# Patient Record
Sex: MALE | Race: WHITE | NOT HISPANIC OR LATINO | Employment: OTHER | URBAN - METROPOLITAN AREA
[De-identification: names, ages, dates, MRNs, and addresses within clinical notes are randomized per-mention and may not be internally consistent; named-entity substitution may affect disease eponyms.]

---

## 2024-01-28 ENCOUNTER — HOSPITAL ENCOUNTER (OUTPATIENT)
Facility: HOSPITAL | Age: 74
Discharge: HOME | End: 2024-01-29
Attending: EMERGENCY MEDICINE | Admitting: INTERNAL MEDICINE
Payer: MEDICARE

## 2024-01-28 ENCOUNTER — APPOINTMENT (OUTPATIENT)
Dept: CT IMAGING | Facility: HOSPITAL | Age: 74
End: 2024-01-28
Payer: MEDICARE

## 2024-01-28 ENCOUNTER — APPOINTMENT (OUTPATIENT)
Dept: GENERAL RADIOLOGY | Facility: HOSPITAL | Age: 74
End: 2024-01-28
Payer: MEDICARE

## 2024-01-28 DIAGNOSIS — J18.9 PNEUMONIA DUE TO INFECTIOUS ORGANISM, UNSPECIFIED LATERALITY, UNSPECIFIED PART OF LUNG: ICD-10-CM

## 2024-01-28 DIAGNOSIS — R07.9 CHEST PAIN, UNSPECIFIED TYPE: Primary | ICD-10-CM

## 2024-01-28 DIAGNOSIS — I21.4 NSTEMI, INITIAL EPISODE OF CARE: ICD-10-CM

## 2024-01-28 LAB
ALBUMIN SERPL-MCNC: 4.2 G/DL (ref 3.5–5.2)
ALBUMIN/GLOB SERPL: 1.4 G/DL
ALP SERPL-CCNC: 91 U/L (ref 39–117)
ALT SERPL W P-5'-P-CCNC: 79 U/L (ref 1–41)
ANION GAP SERPL CALCULATED.3IONS-SCNC: 11 MMOL/L (ref 5–15)
APTT PPP: 24.8 SECONDS (ref 61–76.5)
AST SERPL-CCNC: 133 U/L (ref 1–40)
BASOPHILS # BLD AUTO: 0.1 10*3/MM3 (ref 0–0.2)
BASOPHILS NFR BLD AUTO: 0.6 % (ref 0–1.5)
BILIRUB SERPL-MCNC: 0.6 MG/DL (ref 0–1.2)
BUN SERPL-MCNC: 26 MG/DL (ref 8–23)
BUN/CREAT SERPL: 22.8 (ref 7–25)
CALCIUM SPEC-SCNC: 9.4 MG/DL (ref 8.6–10.5)
CHLORIDE SERPL-SCNC: 102 MMOL/L (ref 98–107)
CO2 SERPL-SCNC: 27 MMOL/L (ref 22–29)
CREAT SERPL-MCNC: 1.14 MG/DL (ref 0.76–1.27)
DEPRECATED RDW RBC AUTO: 43.3 FL (ref 37–54)
EGFRCR SERPLBLD CKD-EPI 2021: 67.5 ML/MIN/1.73
EOSINOPHIL # BLD AUTO: 0.8 10*3/MM3 (ref 0–0.4)
EOSINOPHIL NFR BLD AUTO: 8.1 % (ref 0.3–6.2)
ERYTHROCYTE [DISTWIDTH] IN BLOOD BY AUTOMATED COUNT: 12.8 % (ref 12.3–15.4)
GEN 5 2HR TROPONIN T REFLEX: 53 NG/L
GLOBULIN UR ELPH-MCNC: 3 GM/DL
GLUCOSE SERPL-MCNC: 141 MG/DL (ref 65–99)
HCT VFR BLD AUTO: 47.2 % (ref 37.5–51)
HGB BLD-MCNC: 16.1 G/DL (ref 13–17.7)
HOLD SPECIMEN: NORMAL
HOLD SPECIMEN: NORMAL
INR PPP: 0.98 (ref 0.93–1.1)
LIPASE SERPL-CCNC: 46 U/L (ref 13–60)
LYMPHOCYTES # BLD AUTO: 1.8 10*3/MM3 (ref 0.7–3.1)
LYMPHOCYTES NFR BLD AUTO: 17.9 % (ref 19.6–45.3)
MCH RBC QN AUTO: 31.6 PG (ref 26.6–33)
MCHC RBC AUTO-ENTMCNC: 34.1 G/DL (ref 31.5–35.7)
MCV RBC AUTO: 92.7 FL (ref 79–97)
MONOCYTES # BLD AUTO: 0.6 10*3/MM3 (ref 0.1–0.9)
MONOCYTES NFR BLD AUTO: 5.9 % (ref 5–12)
NEUTROPHILS NFR BLD AUTO: 6.7 10*3/MM3 (ref 1.7–7)
NEUTROPHILS NFR BLD AUTO: 67.5 % (ref 42.7–76)
NRBC BLD AUTO-RTO: 0 /100 WBC (ref 0–0.2)
PLATELET # BLD AUTO: 173 10*3/MM3 (ref 140–450)
PMV BLD AUTO: 8.3 FL (ref 6–12)
POTASSIUM SERPL-SCNC: 4 MMOL/L (ref 3.5–5.2)
PROT SERPL-MCNC: 7.2 G/DL (ref 6–8.5)
PROTHROMBIN TIME: 10.7 SECONDS (ref 9.6–11.7)
RBC # BLD AUTO: 5.1 10*6/MM3 (ref 4.14–5.8)
SODIUM SERPL-SCNC: 140 MMOL/L (ref 136–145)
TROPONIN T DELTA: -6 NG/L
TROPONIN T SERPL HS-MCNC: 59 NG/L
WBC NRBC COR # BLD AUTO: 9.9 10*3/MM3 (ref 3.4–10.8)
WHOLE BLOOD HOLD COAG: NORMAL
WHOLE BLOOD HOLD SPECIMEN: NORMAL

## 2024-01-28 PROCEDURE — 93005 ELECTROCARDIOGRAM TRACING: CPT

## 2024-01-28 PROCEDURE — 83690 ASSAY OF LIPASE: CPT | Performed by: EMERGENCY MEDICINE

## 2024-01-28 PROCEDURE — 25510000001 IOPAMIDOL PER 1 ML: Performed by: EMERGENCY MEDICINE

## 2024-01-28 PROCEDURE — 36415 COLL VENOUS BLD VENIPUNCTURE: CPT

## 2024-01-28 PROCEDURE — 85730 THROMBOPLASTIN TIME PARTIAL: CPT | Performed by: EMERGENCY MEDICINE

## 2024-01-28 PROCEDURE — 71275 CT ANGIOGRAPHY CHEST: CPT

## 2024-01-28 PROCEDURE — 87040 BLOOD CULTURE FOR BACTERIA: CPT | Performed by: EMERGENCY MEDICINE

## 2024-01-28 PROCEDURE — 99285 EMERGENCY DEPT VISIT HI MDM: CPT

## 2024-01-28 PROCEDURE — 85025 COMPLETE CBC W/AUTO DIFF WBC: CPT | Performed by: EMERGENCY MEDICINE

## 2024-01-28 PROCEDURE — 80053 COMPREHEN METABOLIC PANEL: CPT | Performed by: EMERGENCY MEDICINE

## 2024-01-28 PROCEDURE — 84484 ASSAY OF TROPONIN QUANT: CPT | Performed by: EMERGENCY MEDICINE

## 2024-01-28 PROCEDURE — 93005 ELECTROCARDIOGRAM TRACING: CPT | Performed by: EMERGENCY MEDICINE

## 2024-01-28 PROCEDURE — 71045 X-RAY EXAM CHEST 1 VIEW: CPT

## 2024-01-28 PROCEDURE — 85610 PROTHROMBIN TIME: CPT | Performed by: EMERGENCY MEDICINE

## 2024-01-28 RX ORDER — NITROGLYCERIN 20 MG/100ML
10-50 INJECTION INTRAVENOUS
Status: DISCONTINUED | OUTPATIENT
Start: 2024-01-28 | End: 2024-01-30 | Stop reason: HOSPADM

## 2024-01-28 RX ORDER — MELOXICAM 7.5 MG/1
7.5 TABLET ORAL DAILY
COMMUNITY
Start: 2024-01-18 | End: 2024-01-29 | Stop reason: HOSPADM

## 2024-01-28 RX ORDER — SODIUM CHLORIDE 0.9 % (FLUSH) 0.9 %
10 SYRINGE (ML) INJECTION AS NEEDED
Status: DISCONTINUED | OUTPATIENT
Start: 2024-01-28 | End: 2024-01-30 | Stop reason: HOSPADM

## 2024-01-28 RX ORDER — TAMSULOSIN HYDROCHLORIDE 0.4 MG/1
1 CAPSULE ORAL NIGHTLY
COMMUNITY
Start: 2024-01-18

## 2024-01-28 RX ORDER — ASPIRIN 81 MG/1
81 TABLET ORAL NIGHTLY
COMMUNITY

## 2024-01-28 RX ORDER — SIMVASTATIN 20 MG
20 TABLET ORAL NIGHTLY
COMMUNITY
Start: 2024-01-18

## 2024-01-28 RX ADMIN — IOPAMIDOL 100 ML: 755 INJECTION, SOLUTION INTRAVENOUS at 20:12

## 2024-01-28 RX ADMIN — NITROGLYCERIN 1 INCH: 20 OINTMENT TOPICAL at 17:44

## 2024-01-28 NOTE — ED PROVIDER NOTES
Subjective   History of Present Illness  Chief complaint: Patient is 73-year-old sometime around 4:30 PM he was sitting in chair and had sudden onset chest pain.  His lower sternal chest straight through to his back.  It was severe.  He did take a Pepcid and over time has had some gradual improvement but there is still pain there.  He is not sure if it is heartburn or nausea is never experienced anything like this before.  He has had no history of a workup on his heart or lungs.  No stress test.  He does not take blood pressure medicine.  Takes a baby aspirin a day.  He has had no fever.    Context:    Duration:    Timing: Sudden acute onset    Severity: Severe    Associated Symptoms:        PCP:  LMP:      Review of Systems   Constitutional: Negative.    Respiratory: Negative.     Cardiovascular:  Positive for chest pain.   Gastrointestinal:  Negative for abdominal pain.   Genitourinary: Negative.    Musculoskeletal:  Positive for back pain.   Neurological: Negative.        No past medical history on file.    No Known Allergies    No past surgical history on file.    No family history on file.    Social History     Socioeconomic History    Marital status:            Objective   Physical Exam  Vitals and nursing note reviewed.   Constitutional:       Appearance: He is well-developed.   HENT:      Head: Normocephalic and atraumatic.   Cardiovascular:      Rate and Rhythm: Normal rate and regular rhythm.      Heart sounds: Normal heart sounds.   Pulmonary:      Effort: Pulmonary effort is normal.      Breath sounds: Normal breath sounds.   Abdominal:      Tenderness: There is no abdominal tenderness.   Skin:     General: Skin is warm and dry.   Neurological:      General: No focal deficit present.      Mental Status: He is alert and oriented to person, place, and time.   Psychiatric:         Mood and Affect: Mood normal.         Behavior: Behavior normal.         Procedures           ED Course                                  Results for orders placed or performed during the hospital encounter of 01/28/24   Comprehensive Metabolic Panel    Specimen: Blood   Result Value Ref Range    Glucose 141 (H) 65 - 99 mg/dL    BUN 26 (H) 8 - 23 mg/dL    Creatinine 1.14 0.76 - 1.27 mg/dL    Sodium 140 136 - 145 mmol/L    Potassium 4.0 3.5 - 5.2 mmol/L    Chloride 102 98 - 107 mmol/L    CO2 27.0 22.0 - 29.0 mmol/L    Calcium 9.4 8.6 - 10.5 mg/dL    Total Protein 7.2 6.0 - 8.5 g/dL    Albumin 4.2 3.5 - 5.2 g/dL    ALT (SGPT) 79 (H) 1 - 41 U/L    AST (SGOT) 133 (H) 1 - 40 U/L    Alkaline Phosphatase 91 39 - 117 U/L    Total Bilirubin 0.6 0.0 - 1.2 mg/dL    Globulin 3.0 gm/dL    A/G Ratio 1.4 g/dL    BUN/Creatinine Ratio 22.8 7.0 - 25.0    Anion Gap 11.0 5.0 - 15.0 mmol/L    eGFR 67.5 >60.0 mL/min/1.73   Protime-INR    Specimen: Blood   Result Value Ref Range    Protime 10.7 9.6 - 11.7 Seconds    INR 0.98 0.93 - 1.10   aPTT    Specimen: Blood   Result Value Ref Range    PTT 24.8 (L) 61.0 - 76.5 seconds   Lipase    Specimen: Blood   Result Value Ref Range    Lipase 46 13 - 60 U/L   High Sensitivity Troponin T    Specimen: Blood   Result Value Ref Range    HS Troponin T 59 (C) <22 ng/L   CBC Auto Differential    Specimen: Blood   Result Value Ref Range    WBC 9.90 3.40 - 10.80 10*3/mm3    RBC 5.10 4.14 - 5.80 10*6/mm3    Hemoglobin 16.1 13.0 - 17.7 g/dL    Hematocrit 47.2 37.5 - 51.0 %    MCV 92.7 79.0 - 97.0 fL    MCH 31.6 26.6 - 33.0 pg    MCHC 34.1 31.5 - 35.7 g/dL    RDW 12.8 12.3 - 15.4 %    RDW-SD 43.3 37.0 - 54.0 fl    MPV 8.3 6.0 - 12.0 fL    Platelets 173 140 - 450 10*3/mm3    Neutrophil % 67.5 42.7 - 76.0 %    Lymphocyte % 17.9 (L) 19.6 - 45.3 %    Monocyte % 5.9 5.0 - 12.0 %    Eosinophil % 8.1 (H) 0.3 - 6.2 %    Basophil % 0.6 0.0 - 1.5 %    Neutrophils, Absolute 6.70 1.70 - 7.00 10*3/mm3    Lymphocytes, Absolute 1.80 0.70 - 3.10 10*3/mm3    Monocytes, Absolute 0.60 0.10 - 0.90 10*3/mm3    Eosinophils, Absolute 0.80 (H) 0.00 -  0.40 10*3/mm3    Basophils, Absolute 0.10 0.00 - 0.20 10*3/mm3    nRBC 0.0 0.0 - 0.2 /100 WBC   High Sensitivity Troponin T 2Hr    Specimen: Hand, Left; Blood   Result Value Ref Range    HS Troponin T 53 (C) <22 ng/L    Troponin T Delta -6 (L) >=-4 - <+4 ng/L   ECG 12 Lead Chest Pain   Result Value Ref Range    QT Interval 383 ms    QTC Interval 455 ms   Green Top (Gel)   Result Value Ref Range    Extra Tube Done    Lavender Top   Result Value Ref Range    Extra Tube hold for add-on    Gold Top - SST   Result Value Ref Range    Extra Tube Hold for add-ons.    Light Blue Top   Result Value Ref Range    Extra Tube Hold for add-ons.        CT Angiogram Chest    Result Date: 1/28/2024  Impression: 1.No evidence of pulmonary embolism. 2.Patchy airspace disease within the superior segment of the right lower lobe consistent with pneumonia. Mild right hilar adenopathy is present, likely reactive. Follow-up to resolution recommended. 3.Tortuous prominent aorta with no evidence of focal aneurysmal dilatation. Correlate for history of valvular disease and/or hypertension 4.. Ancillary findings as described above. Electronically Signed: Catrachita Mast MD  1/28/2024 8:17 PM EST  Workstation ID: [id number]    XR Chest 1 View    Result Date: 1/28/2024  Impression: No acute cardiopulmonary abnormality. Electronically Signed: Emigdio Smith MD  1/28/2024 5:52 PM EST  Workstation ID: [id number]               Medical Decision Making  Patient was seen and evaluated for the above problem    Differential diagnosis includes but is not limited to ACS, PE, aneurysm, dissection, pneumothorax    Patient does have initial EKG interpreted by myself as below.  Troponin was 59 but repeat was down to 53.  He had sudden onset discomfort.  He does show on CT scan no signs of aneurysm.  No signs of PE.  No emergent findings but there is evidence of pneumonia.  Did provide aspirin and nitroglycerin here in the emergency department.  Patient is stable this  point time.  No new pain or worsening discomfort.  He was provided antibiotics.  I spoke withYolanda on-call for the hospitalist who agrees to admit the patient for further cardiac consultation.  Patient and family verbalized understanding of admission    Problems Addressed:  Chest pain, unspecified type: complicated acute illness or injury  Pneumonia due to infectious organism, unspecified laterality, unspecified part of lung: complicated acute illness or injury    Amount and/or Complexity of Data Reviewed  Labs: ordered. Decision-making details documented in ED Course.     Details: Labs reviewed by myself  Radiology: ordered and independent interpretation performed.     Details: CT chest reviewed by myself.  No dissection.  No aneurysm.  No emergent finding.  There are infiltrates pneumonia  ECG/medicine tests: ordered and independent interpretation performed.     Details: EKG interpreted by myself sinus rhythm rate of 86.  No ST elevation or depression noted.    Risk  Prescription drug management.  Drug therapy requiring intensive monitoring for toxicity.  Decision regarding hospitalization.        Final diagnoses:   None     Chest pain  Pneumonia  ED Disposition  ED Disposition       None            No follow-up provider specified.       Medication List      No changes were made to your prescriptions during this visit.            Mendez Gandhi DO  01/28/24 2149

## 2024-01-29 ENCOUNTER — APPOINTMENT (OUTPATIENT)
Dept: NUCLEAR MEDICINE | Facility: HOSPITAL | Age: 74
End: 2024-01-29
Payer: MEDICARE

## 2024-01-29 ENCOUNTER — APPOINTMENT (OUTPATIENT)
Dept: CARDIOLOGY | Facility: HOSPITAL | Age: 74
End: 2024-01-29
Payer: MEDICARE

## 2024-01-29 VITALS
DIASTOLIC BLOOD PRESSURE: 74 MMHG | HEART RATE: 52 BPM | RESPIRATION RATE: 18 BRPM | HEIGHT: 71 IN | OXYGEN SATURATION: 94 % | BODY MASS INDEX: 27.56 KG/M2 | TEMPERATURE: 97.5 F | SYSTOLIC BLOOD PRESSURE: 135 MMHG | WEIGHT: 196.87 LBS

## 2024-01-29 PROBLEM — J18.9 PNEUMONIA DUE TO INFECTIOUS ORGANISM: Status: ACTIVE | Noted: 2024-01-29

## 2024-01-29 PROBLEM — J18.9 PNEUMONIA: Status: ACTIVE | Noted: 2024-01-29

## 2024-01-29 PROBLEM — I21.4 NSTEMI, INITIAL EPISODE OF CARE: Status: ACTIVE | Noted: 2024-01-28

## 2024-01-29 PROBLEM — R07.9 CHEST PAIN: Status: ACTIVE | Noted: 2024-01-29

## 2024-01-29 LAB
ANION GAP SERPL CALCULATED.3IONS-SCNC: 11 MMOL/L (ref 5–15)
BASOPHILS # BLD AUTO: 0.1 10*3/MM3 (ref 0–0.2)
BASOPHILS NFR BLD AUTO: 1.2 % (ref 0–1.5)
BH CV ECHO MEAS - AO MAX PG: 7.2 MMHG
BH CV ECHO MEAS - AO MEAN PG: 4.3 MMHG
BH CV ECHO MEAS - AO ROOT DIAM: 3.6 CM
BH CV ECHO MEAS - AO V2 MAX: 134.6 CM/SEC
BH CV ECHO MEAS - AO V2 VTI: 25.7 CM
BH CV ECHO MEAS - AVA(I,D): 2.7 CM2
BH CV ECHO MEAS - EDV(CUBED): 85.4 ML
BH CV ECHO MEAS - EDV(MOD-SP2): 69 ML
BH CV ECHO MEAS - EDV(MOD-SP4): 76.5 ML
BH CV ECHO MEAS - EF(MOD-SP2): 61.1 %
BH CV ECHO MEAS - EF(MOD-SP4): 62.4 %
BH CV ECHO MEAS - ESV(CUBED): 14.7 ML
BH CV ECHO MEAS - ESV(MOD-SP2): 26.8 ML
BH CV ECHO MEAS - ESV(MOD-SP4): 28.8 ML
BH CV ECHO MEAS - FS: 44.4 %
BH CV ECHO MEAS - IVS/LVPW: 1.2 CM
BH CV ECHO MEAS - IVSD: 1.09 CM
BH CV ECHO MEAS - LV DIASTOLIC VOL/BSA (35-75): 36.6 CM2
BH CV ECHO MEAS - LV MASS(C)D: 148.4 GRAMS
BH CV ECHO MEAS - LV MAX PG: 5 MMHG
BH CV ECHO MEAS - LV MEAN PG: 2.6 MMHG
BH CV ECHO MEAS - LV SYSTOLIC VOL/BSA (12-30): 13.8 CM2
BH CV ECHO MEAS - LV V1 MAX: 112.3 CM/SEC
BH CV ECHO MEAS - LV V1 VTI: 19.3 CM
BH CV ECHO MEAS - LVIDD: 4.4 CM
BH CV ECHO MEAS - LVIDS: 2.45 CM
BH CV ECHO MEAS - LVOT AREA: 3.6 CM2
BH CV ECHO MEAS - LVOT DIAM: 2.15 CM
BH CV ECHO MEAS - LVPWD: 0.91 CM
BH CV ECHO MEAS - MV A MAX VEL: 64.9 CM/SEC
BH CV ECHO MEAS - MV DEC SLOPE: 511.7 CM/SEC2
BH CV ECHO MEAS - MV DEC TIME: 0.14 SEC
BH CV ECHO MEAS - MV E MAX VEL: 69.4 CM/SEC
BH CV ECHO MEAS - MV E/A: 1.07
BH CV ECHO MEAS - MV MAX PG: 3.4 MMHG
BH CV ECHO MEAS - MV MEAN PG: 1.75 MMHG
BH CV ECHO MEAS - MV V2 VTI: 21.9 CM
BH CV ECHO MEAS - MVA(VTI): 3.2 CM2
BH CV ECHO MEAS - PA V2 MAX: 79.8 CM/SEC
BH CV ECHO MEAS - PULM A REVS DUR: 0.09 SEC
BH CV ECHO MEAS - PULM A REVS VEL: 28.8 CM/SEC
BH CV ECHO MEAS - PULM DIAS VEL: 37.8 CM/SEC
BH CV ECHO MEAS - PULM S/D: 1.72
BH CV ECHO MEAS - PULM SYS VEL: 65.2 CM/SEC
BH CV ECHO MEAS - RV MAX PG: 1.58 MMHG
BH CV ECHO MEAS - RV V1 MAX: 62.9 CM/SEC
BH CV ECHO MEAS - RV V1 VTI: 12.6 CM
BH CV ECHO MEAS - RVDD: 3.4 CM
BH CV ECHO MEAS - SI(MOD-SP2): 20.2 ML/M2
BH CV ECHO MEAS - SI(MOD-SP4): 22.8 ML/M2
BH CV ECHO MEAS - SV(LVOT): 70.3 ML
BH CV ECHO MEAS - SV(MOD-SP2): 42.2 ML
BH CV ECHO MEAS - SV(MOD-SP4): 47.7 ML
BH CV ECHO MEAS - TR MAX PG: 23.9 MMHG
BH CV ECHO MEAS - TR MAX VEL: 240.3 CM/SEC
BH CV REST NUCLEAR ISOTOPE DOSE: 10.8 MCI
BH CV STRESS BP STAGE 1: NORMAL
BH CV STRESS BP STAGE 2: NORMAL
BH CV STRESS COMMENTS STAGE 1: NORMAL
BH CV STRESS DOSE REGADENOSON STAGE 1: 0.4
BH CV STRESS DURATION MIN STAGE 1: 0
BH CV STRESS DURATION SEC STAGE 1: 10
BH CV STRESS DURATION SEC STAGE 2: 0
BH CV STRESS HR STAGE 1: 105
BH CV STRESS HR STAGE 2: 114
BH CV STRESS NUCLEAR ISOTOPE DOSE: 32.8 MCI
BH CV STRESS PROTOCOL 1: NORMAL
BH CV STRESS RECOVERY BP: NORMAL MMHG
BH CV STRESS RECOVERY HR: 106 BPM
BH CV STRESS STAGE 1: 1
BH CV STRESS STAGE 2: 2
BUN SERPL-MCNC: 21 MG/DL (ref 8–23)
BUN/CREAT SERPL: 20.8 (ref 7–25)
CALCIUM SPEC-SCNC: 8.7 MG/DL (ref 8.6–10.5)
CHLORIDE SERPL-SCNC: 105 MMOL/L (ref 98–107)
CO2 SERPL-SCNC: 23 MMOL/L (ref 22–29)
CREAT SERPL-MCNC: 1.01 MG/DL (ref 0.76–1.27)
DEPRECATED RDW RBC AUTO: 43.8 FL (ref 37–54)
EGFRCR SERPLBLD CKD-EPI 2021: 78 ML/MIN/1.73
EOSINOPHIL # BLD AUTO: 0.4 10*3/MM3 (ref 0–0.4)
EOSINOPHIL NFR BLD AUTO: 4.4 % (ref 0.3–6.2)
ERYTHROCYTE [DISTWIDTH] IN BLOOD BY AUTOMATED COUNT: 13 % (ref 12.3–15.4)
GLUCOSE SERPL-MCNC: 102 MG/DL (ref 65–99)
HCT VFR BLD AUTO: 42.6 % (ref 37.5–51)
HGB BLD-MCNC: 14.6 G/DL (ref 13–17.7)
LV EF NUC BP: 56 %
LYMPHOCYTES # BLD AUTO: 2 10*3/MM3 (ref 0.7–3.1)
LYMPHOCYTES NFR BLD AUTO: 22 % (ref 19.6–45.3)
MAXIMAL PREDICTED HEART RATE: 146 BPM
MCH RBC QN AUTO: 31.5 PG (ref 26.6–33)
MCHC RBC AUTO-ENTMCNC: 34.3 G/DL (ref 31.5–35.7)
MCV RBC AUTO: 91.7 FL (ref 79–97)
MONOCYTES # BLD AUTO: 0.7 10*3/MM3 (ref 0.1–0.9)
MONOCYTES NFR BLD AUTO: 7.8 % (ref 5–12)
NEUTROPHILS NFR BLD AUTO: 6 10*3/MM3 (ref 1.7–7)
NEUTROPHILS NFR BLD AUTO: 64.6 % (ref 42.7–76)
NRBC BLD AUTO-RTO: 0.1 /100 WBC (ref 0–0.2)
PERCENT MAX PREDICTED HR: 78.08 %
PLATELET # BLD AUTO: 173 10*3/MM3 (ref 140–450)
PMV BLD AUTO: 8.4 FL (ref 6–12)
POTASSIUM SERPL-SCNC: 3.9 MMOL/L (ref 3.5–5.2)
PROCALCITONIN SERPL-MCNC: 0.27 NG/ML (ref 0–0.25)
QT INTERVAL: 383 MS
QTC INTERVAL: 455 MS
RBC # BLD AUTO: 4.64 10*6/MM3 (ref 4.14–5.8)
SODIUM SERPL-SCNC: 139 MMOL/L (ref 136–145)
STRESS BASELINE BP: NORMAL MMHG
STRESS BASELINE HR: 90 BPM
STRESS PERCENT HR: 92 %
STRESS POST PEAK BP: NORMAL MMHG
STRESS POST PEAK HR: 114 BPM
STRESS TARGET HR: 124 BPM
TROPONIN T SERPL HS-MCNC: 66 NG/L
WBC NRBC COR # BLD AUTO: 9.3 10*3/MM3 (ref 3.4–10.8)

## 2024-01-29 PROCEDURE — 84484 ASSAY OF TROPONIN QUANT: CPT

## 2024-01-29 PROCEDURE — C1769 GUIDE WIRE: HCPCS | Performed by: INTERNAL MEDICINE

## 2024-01-29 PROCEDURE — 93017 CV STRESS TEST TRACING ONLY: CPT

## 2024-01-29 PROCEDURE — A9502 TC99M TETROFOSMIN: HCPCS | Performed by: INTERNAL MEDICINE

## 2024-01-29 PROCEDURE — G0378 HOSPITAL OBSERVATION PER HR: HCPCS

## 2024-01-29 PROCEDURE — C1894 INTRO/SHEATH, NON-LASER: HCPCS | Performed by: INTERNAL MEDICINE

## 2024-01-29 PROCEDURE — 25010000002 CEFTRIAXONE PER 250 MG: Performed by: EMERGENCY MEDICINE

## 2024-01-29 PROCEDURE — 25010000002 MIDAZOLAM PER 1 MG: Performed by: INTERNAL MEDICINE

## 2024-01-29 PROCEDURE — 99204 OFFICE O/P NEW MOD 45 MIN: CPT | Performed by: INTERNAL MEDICINE

## 2024-01-29 PROCEDURE — 25010000002 FENTANYL CITRATE (PF) 100 MCG/2ML SOLUTION: Performed by: INTERNAL MEDICINE

## 2024-01-29 PROCEDURE — 78452 HT MUSCLE IMAGE SPECT MULT: CPT

## 2024-01-29 PROCEDURE — 93018 CV STRESS TEST I&R ONLY: CPT | Performed by: INTERNAL MEDICINE

## 2024-01-29 PROCEDURE — 25510000001 IOPAMIDOL PER 1 ML: Performed by: INTERNAL MEDICINE

## 2024-01-29 PROCEDURE — 93306 TTE W/DOPPLER COMPLETE: CPT

## 2024-01-29 PROCEDURE — 85025 COMPLETE CBC W/AUTO DIFF WBC: CPT

## 2024-01-29 PROCEDURE — 25010000002 AZITHROMYCIN PER 500 MG: Performed by: EMERGENCY MEDICINE

## 2024-01-29 PROCEDURE — C1760 CLOSURE DEV, VASC: HCPCS | Performed by: INTERNAL MEDICINE

## 2024-01-29 PROCEDURE — 25810000003 SODIUM CHLORIDE 0.9 % SOLUTION: Performed by: INTERNAL MEDICINE

## 2024-01-29 PROCEDURE — 84145 PROCALCITONIN (PCT): CPT | Performed by: EMERGENCY MEDICINE

## 2024-01-29 PROCEDURE — 93356 MYOCRD STRAIN IMG SPCKL TRCK: CPT

## 2024-01-29 PROCEDURE — 80048 BASIC METABOLIC PNL TOTAL CA: CPT

## 2024-01-29 PROCEDURE — 25810000003 SODIUM CHLORIDE 0.9 % SOLUTION 250 ML FLEX CONT: Performed by: EMERGENCY MEDICINE

## 2024-01-29 PROCEDURE — 25010000002 REGADENOSON 0.4 MG/5ML SOLUTION: Performed by: INTERNAL MEDICINE

## 2024-01-29 PROCEDURE — 0 TECHNETIUM TETROFOSMIN KIT: Performed by: INTERNAL MEDICINE

## 2024-01-29 PROCEDURE — 93454 CORONARY ARTERY ANGIO S&I: CPT | Performed by: INTERNAL MEDICINE

## 2024-01-29 PROCEDURE — 78452 HT MUSCLE IMAGE SPECT MULT: CPT | Performed by: INTERNAL MEDICINE

## 2024-01-29 RX ORDER — SODIUM CHLORIDE 9 MG/ML
40 INJECTION, SOLUTION INTRAVENOUS AS NEEDED
Status: DISCONTINUED | OUTPATIENT
Start: 2024-01-29 | End: 2024-01-30 | Stop reason: HOSPADM

## 2024-01-29 RX ORDER — LIDOCAINE HYDROCHLORIDE 20 MG/ML
INJECTION, SOLUTION INFILTRATION; PERINEURAL
Status: DISCONTINUED | OUTPATIENT
Start: 2024-01-29 | End: 2024-01-29 | Stop reason: HOSPADM

## 2024-01-29 RX ORDER — SODIUM CHLORIDE 0.9 % (FLUSH) 0.9 %
10 SYRINGE (ML) INJECTION EVERY 12 HOURS SCHEDULED
Status: DISCONTINUED | OUTPATIENT
Start: 2024-01-29 | End: 2024-01-30 | Stop reason: HOSPADM

## 2024-01-29 RX ORDER — SODIUM CHLORIDE 9 MG/ML
75 INJECTION, SOLUTION INTRAVENOUS CONTINUOUS
Status: DISCONTINUED | OUTPATIENT
Start: 2024-01-29 | End: 2024-01-30 | Stop reason: HOSPADM

## 2024-01-29 RX ORDER — BISACODYL 5 MG/1
5 TABLET, DELAYED RELEASE ORAL DAILY PRN
Status: DISCONTINUED | OUTPATIENT
Start: 2024-01-29 | End: 2024-01-30 | Stop reason: HOSPADM

## 2024-01-29 RX ORDER — HYDRALAZINE HYDROCHLORIDE 20 MG/ML
10 INJECTION INTRAMUSCULAR; INTRAVENOUS EVERY 6 HOURS PRN
Status: DISCONTINUED | OUTPATIENT
Start: 2024-01-29 | End: 2024-01-30 | Stop reason: HOSPADM

## 2024-01-29 RX ORDER — SODIUM CHLORIDE 0.9 % (FLUSH) 0.9 %
10 SYRINGE (ML) INJECTION AS NEEDED
Status: DISCONTINUED | OUTPATIENT
Start: 2024-01-29 | End: 2024-01-30 | Stop reason: HOSPADM

## 2024-01-29 RX ORDER — PANTOPRAZOLE SODIUM 40 MG/1
40 TABLET, DELAYED RELEASE ORAL DAILY
Qty: 30 TABLET | Refills: 1 | Status: SHIPPED | OUTPATIENT
Start: 2024-01-29

## 2024-01-29 RX ORDER — NITROGLYCERIN 0.4 MG/1
0.4 TABLET SUBLINGUAL
Status: DISCONTINUED | OUTPATIENT
Start: 2024-01-29 | End: 2024-01-30 | Stop reason: HOSPADM

## 2024-01-29 RX ORDER — ACETAMINOPHEN 325 MG/1
650 TABLET ORAL EVERY 4 HOURS PRN
Start: 2024-01-29

## 2024-01-29 RX ORDER — METOPROLOL SUCCINATE 25 MG/1
25 TABLET, EXTENDED RELEASE ORAL
Qty: 30 TABLET | Refills: 1 | Status: SHIPPED | OUTPATIENT
Start: 2024-01-30

## 2024-01-29 RX ORDER — BISACODYL 10 MG
10 SUPPOSITORY, RECTAL RECTAL DAILY PRN
Status: DISCONTINUED | OUTPATIENT
Start: 2024-01-29 | End: 2024-01-30 | Stop reason: HOSPADM

## 2024-01-29 RX ORDER — ONDANSETRON 2 MG/ML
4 INJECTION INTRAMUSCULAR; INTRAVENOUS EVERY 6 HOURS PRN
Status: DISCONTINUED | OUTPATIENT
Start: 2024-01-29 | End: 2024-01-30 | Stop reason: HOSPADM

## 2024-01-29 RX ORDER — ACETAMINOPHEN 325 MG/1
650 TABLET ORAL EVERY 4 HOURS PRN
Status: DISCONTINUED | OUTPATIENT
Start: 2024-01-29 | End: 2024-01-30 | Stop reason: HOSPADM

## 2024-01-29 RX ORDER — TAMSULOSIN HYDROCHLORIDE 0.4 MG/1
0.4 CAPSULE ORAL NIGHTLY
Status: DISCONTINUED | OUTPATIENT
Start: 2024-01-29 | End: 2024-01-30 | Stop reason: HOSPADM

## 2024-01-29 RX ORDER — REGADENOSON 0.08 MG/ML
0.4 INJECTION, SOLUTION INTRAVENOUS
Status: COMPLETED | OUTPATIENT
Start: 2024-01-29 | End: 2024-01-29

## 2024-01-29 RX ORDER — SODIUM CHLORIDE 9 MG/ML
250 INJECTION, SOLUTION INTRAVENOUS ONCE AS NEEDED
Status: DISCONTINUED | OUTPATIENT
Start: 2024-01-29 | End: 2024-01-30 | Stop reason: HOSPADM

## 2024-01-29 RX ORDER — FENTANYL CITRATE 50 UG/ML
INJECTION, SOLUTION INTRAMUSCULAR; INTRAVENOUS
Status: DISCONTINUED | OUTPATIENT
Start: 2024-01-29 | End: 2024-01-29 | Stop reason: HOSPADM

## 2024-01-29 RX ORDER — SODIUM CHLORIDE 9 MG/ML
INJECTION, SOLUTION INTRAVENOUS
Status: COMPLETED | OUTPATIENT
Start: 2024-01-29 | End: 2024-01-29

## 2024-01-29 RX ORDER — FAMOTIDINE 20 MG/1
40 TABLET, FILM COATED ORAL DAILY
Status: DISCONTINUED | OUTPATIENT
Start: 2024-01-29 | End: 2024-01-30 | Stop reason: HOSPADM

## 2024-01-29 RX ORDER — POLYETHYLENE GLYCOL 3350 17 G/17G
17 POWDER, FOR SOLUTION ORAL DAILY PRN
Status: DISCONTINUED | OUTPATIENT
Start: 2024-01-29 | End: 2024-01-30 | Stop reason: HOSPADM

## 2024-01-29 RX ORDER — AMOXICILLIN 250 MG
2 CAPSULE ORAL 2 TIMES DAILY
Status: DISCONTINUED | OUTPATIENT
Start: 2024-01-29 | End: 2024-01-30 | Stop reason: HOSPADM

## 2024-01-29 RX ORDER — METOPROLOL SUCCINATE 25 MG/1
25 TABLET, EXTENDED RELEASE ORAL
Status: DISCONTINUED | OUTPATIENT
Start: 2024-01-29 | End: 2024-01-30 | Stop reason: HOSPADM

## 2024-01-29 RX ORDER — MIDAZOLAM HYDROCHLORIDE 1 MG/ML
INJECTION INTRAMUSCULAR; INTRAVENOUS
Status: DISCONTINUED | OUTPATIENT
Start: 2024-01-29 | End: 2024-01-29 | Stop reason: HOSPADM

## 2024-01-29 RX ADMIN — AZITHROMYCIN MONOHYDRATE 500 MG: 500 INJECTION, POWDER, LYOPHILIZED, FOR SOLUTION INTRAVENOUS at 02:24

## 2024-01-29 RX ADMIN — Medication 10 ML: at 08:49

## 2024-01-29 RX ADMIN — METOPROLOL SUCCINATE 25 MG: 25 TABLET, EXTENDED RELEASE ORAL at 18:01

## 2024-01-29 RX ADMIN — TETROFOSMIN 1 DOSE: 1.38 INJECTION, POWDER, LYOPHILIZED, FOR SOLUTION INTRAVENOUS at 11:52

## 2024-01-29 RX ADMIN — SODIUM CHLORIDE 500 ML: 9 INJECTION, SOLUTION INTRAVENOUS at 10:38

## 2024-01-29 RX ADMIN — Medication 10 ML: at 02:26

## 2024-01-29 RX ADMIN — TETROFOSMIN 1 DOSE: 1.38 INJECTION, POWDER, LYOPHILIZED, FOR SOLUTION INTRAVENOUS at 13:01

## 2024-01-29 RX ADMIN — CEFTRIAXONE 2000 MG: 2 INJECTION, POWDER, FOR SOLUTION INTRAMUSCULAR; INTRAVENOUS at 01:04

## 2024-01-29 RX ADMIN — REGADENOSON 0.4 MG: 0.08 INJECTION, SOLUTION INTRAVENOUS at 13:01

## 2024-01-29 NOTE — CASE MANAGEMENT/SOCIAL WORK
Discharge Planning Assessment   Janes     Patient Name: Young Ames  MRN: [id number]  Today's Date: 1/29/2024    Admit Date: 1/28/2024    Plan: Anticipate routine home with spouse.   Discharge Needs Assessment       Row Name 01/29/24 1522       Living Environment    People in Home spouse    Name(s) of People in Home Spouse- Ruth    Current Living Arrangements home    Potentially Unsafe Housing Conditions none    In the past 12 months has the electric, gas, oil, or water company threatened to shut off services in your home? No    Primary Care Provided by self    Provides Primary Care For no one    Family Caregiver if Needed spouse    Quality of Family Relationships supportive;involved;helpful    Able to Return to Prior Arrangements yes       Resource/Environmental Concerns    Resource/Environmental Concerns none    Transportation Concerns none       Transportation Needs    In the past 12 months, has lack of transportation kept you from medical appointments or from getting medications? no    In the past 12 months, has lack of transportation kept you from meetings, work, or from getting things needed for daily living? No       Food Insecurity    Within the past 12 months, you worried that your food would run out before you got the money to buy more. Never true    Within the past 12 months, the food you bought just didn't last and you didn't have money to get more. Never true       Transition Planning    Patient/Family Anticipates Transition to home with family    Patient/Family Anticipated Services at Transition none    Transportation Anticipated family or friend will provide       Discharge Needs Assessment    Readmission Within the Last 30 Days no previous admission in last 30 days    Equipment Currently Used at Home none    Concerns to be Addressed denies needs/concerns at this time    Anticipated Changes Related to Illness none    Equipment Needed After Discharge none    Provided Post Acute Provider List? N/A                    Discharge Plan       Row Name 01/29/24 1523       Plan    Plan Anticipate routine home with spouse.    Patient/Family in Agreement with Plan yes    Plan Comments CM met with patient and spouse at bedside to discuss dc planning and MOON letter. PCP and pharmacy confirmed, reported that he sees PCP Abner Funes every 6 months, reported no trouble affording medications, and declined needs at this time for any DME/HH/PT services. DC Barriers: Myoview performed 1/29, results pending. Cardiology following.                        Demographic Summary       Row Name 01/29/24 1522       General Information    Admission Type observation    Arrived From emergency department    Required Notices Provided Observation Status Notice    Referral Source admission list    Reason for Consult discharge planning    Preferred Language English       Contact Information    Permission Granted to Share Info With                    Functional Status       Row Name 01/29/24 1522       Functional Status    Usual Activity Tolerance good    Current Activity Tolerance good       Functional Status, IADL    Medications independent    Meal Preparation independent    Housekeeping independent    Laundry independent    Shopping independent                Natalie Graves RN     Office Phone: [phone]  Office Cell: [phone]

## 2024-01-29 NOTE — PLAN OF CARE
Problem: Fluid Imbalance (Pneumonia)  Goal: Fluid Balance  Outcome: Ongoing, Progressing     Problem: Infection (Pneumonia)  Goal: Resolution of Infection Signs and Symptoms  Outcome: Ongoing, Progressing     Problem: Respiratory Compromise (Pneumonia)  Goal: Effective Oxygenation and Ventilation  Outcome: Ongoing, Progressing     Problem: Chest Pain  Goal: Resolution of Chest Pain Symptoms  Outcome: Ongoing, Progressing   Goal Outcome Evaluation:

## 2024-01-29 NOTE — PLAN OF CARE
Goal Outcome Evaluation:      VSS, no chest pain present

## 2024-01-29 NOTE — SIGNIFICANT NOTE
This provider went to see patient after admission and the patient stated his PCP is Abner Funes with [facility]. Spoke with bedside nurse. [facility] family medicine consult placed. Hospitalist group will cancel consult.

## 2024-01-29 NOTE — H&P
Patient Care Team:  Hao Butler MD as PCP - General    Chief complaint chest pain    Subjective     Patient is a 73 y.o. male who presented to the ER with complaints of sudden onset of lower sternal chest pain around 4:30pm while he was sitting in his vanessa at home. He reports the pain radiated straight through to his back and was severe. Reports taking pepcid with some improvement, but continued to have a persistent pain while in the ER. He denies any pain like this in the past and reports no cardiac workup that he knows of. Denies any fever, chills, nausea, vomiting, diaphoresis.   In the ER EKG sinus rhythm rate 86, troponin 59 repeat 53, CT chest negative for aneurysm or PE but did show pneumonia. He was given aspirin and nitro patch with some relief of his pain. He was started on rocephin and doxy for pneumonia.     No past cardiac workup per chart review      Onset of symptoms was 1 day    Review of Systems   Constitutional: Negative.    HENT: Negative.     Eyes: Negative.    Respiratory: Negative.     Cardiovascular:  Positive for chest pain.   Gastrointestinal: Negative.    Endocrine: Negative.    Genitourinary: Negative.    Musculoskeletal:  Positive for back pain.   Skin: Negative.    Neurological: Negative.    Psychiatric/Behavioral: Negative.            History  History reviewed. No pertinent past medical history.  History reviewed. No pertinent surgical history.  History reviewed. No pertinent family history.  Social History     Tobacco Use    Smoking status: Former     Packs/day: 0.25     Years: 15.00     Additional pack years: 0.00     Total pack years: 3.75     Types: Cigarettes     Start date: 1969     Quit date: 1999     Years since quittin.0     Passive exposure: Past    Smokeless tobacco: Never   Vaping Use    Vaping Use: Never used   Substance Use Topics    Alcohol use: Yes     Alcohol/week: 1.0 standard drink of alcohol     Types: 1 Cans of beer per week    Drug use: Never  Outpatient surveillance per PCP recommendations recommended, discussed with patient and her son       Medications Prior to Admission   Medication Sig Dispense Refill Last Dose    aspirin 81 MG EC tablet Take 1 tablet by mouth Every Night.   1/27/2024    meloxicam (MOBIC) 7.5 MG tablet Take 1 tablet by mouth Daily.   1/28/2024    simvastatin (ZOCOR) 20 MG tablet Take 1 tablet by mouth Every Night.   1/27/2024    tamsulosin (FLOMAX) 0.4 MG capsule 24 hr capsule Take 1 capsule by mouth Every Night.   1/27/2024     Allergies:  Patient has no known allergies.    Objective     Vital Signs  Temp:  [97.1 °F (36.2 °C)-97.8 °F (36.6 °C)] 97.4 °F (36.3 °C)  Heart Rate:  [80-97] 88  Resp:  [13-20] 13  BP: (100-162)/(67-91) 100/67     Physical Exam:      General Appearance:    Alert, cooperative, in no acute distress   Head:    Normocephalic, without obvious abnormality, atraumatic   Eyes:            Lids and lashes normal, conjunctivae and sclerae normal, no   icterus, no pallor, corneas clear, PERRLA   Ears:    Ears appear intact with no abnormalities noted   Throat:   No oral lesions, no thrush, oral mucosa moist   Neck:   No adenopathy, supple, trachea midline, no thyromegaly, no   carotid bruit, no JVD   Lungs:     Clear to auscultation,respirations regular, even and                  unlabored    Heart:    Regular rhythm and normal rate, normal S1 and S2, no            murmur, no gallop, no rub, no click   Chest Wall:    No abnormalities observed   Abdomen:     Normal bowel sounds, no masses, no organomegaly, soft        non-tender, non-distended, no guarding, no rebound                tenderness   Extremities:   Moves all extremities well, no edema, no cyanosis, no             redness   Pulses:   Pulses palpable and equal bilaterally   Skin:   No bleeding, bruising or rash   Lymph nodes:   No palpable adenopathy   Neurologic:   No focal deficits noted       Results Review:     Imaging Results (Last 24 Hours)       Procedure Component Value Units Date/Time    CT Angiogram Chest [[id number]] Collected: 01/28/24 2014      Updated: 01/28/24 2019    Narrative:      CT ANGIOGRAM CHEST    Date of Exam: 1/28/2024 8:11 PM EST    Indication: chest pain.    Comparison: None available.    Technique: CTA of the chest was performed after the uneventful intravenous administration of iodinated contrast. Reconstructed coronal and sagittal images were also obtained. In addition, a 3-D volume rendered image was created for interpretation.   Automated exposure control and iterative reconstruction methods were used.      Findings:    Pulmonary arteries: Adequate opacification of the pulmonary arteries. No evidence of acute pulmonary embolism.    Lungs and Pleura: Patchy airspace disease is seen within the superior segment of the right lower lobe. No additional significant airspace disease identified. Emphysematous changes are present predominately within the bilateral upper lobes. No focal   nodule.. No pleural fluid.    Mediastinum/Nandini: Mild right hilar adenopathy is present.. Mildly enlargement of the left thyroid lobe extending to the superior mediastinum.    Lymph nodes: No axillary or supraclavicular adenopathy.    Cardiovascular: The heart appears mildly enlarged. The pericardium is normal. The aorta appears tortuous with prominence of the arch. No evidence of dissection. No evidence of focal aneurysmal dilatation..       Upper Abdomen: The upper abdominal contents are unremarkable.          Bones and Soft Tissue: No suspicious osseous lesion.        Impression:      Impression:  1.No evidence of pulmonary embolism.  2.Patchy airspace disease within the superior segment of the right lower lobe consistent with pneumonia. Mild right hilar adenopathy is present, likely reactive. Follow-up to resolution recommended.  3.Tortuous prominent aorta with no evidence of focal aneurysmal dilatation. Correlate for history of valvular disease and/or hypertension  4.. Ancillary findings as described above.        Electronically Signed: Catrachita Mast MD     1/28/2024 8:17 PM EST    Workstation ID: [id number]    XR Chest 1 View [[id number]] Collected: 01/28/24 1752     Updated: 01/28/24 1754    Narrative:      XR CHEST 1 VW    Date of Exam: 1/28/2024 5:49 PM EST    Indication: cp    Comparison: 8/10/2014    Findings:  Unremarkable cardiomediastinal silhouette. No focal airspace consolidation. No pleural effusion or pneumothorax. No acute osseous abnormality.      Impression:      Impression:  No acute cardiopulmonary abnormality.      Electronically Signed: Emigdio Smith MD    1/28/2024 5:52 PM EST    Workstation ID: [id number]             Lab Results (last 24 hours)       Procedure Component Value Units Date/Time    Blood Culture - Blood, Arm, Right [[id number]] Collected: 01/28/24 2111    Specimen: Blood from Arm, Right Updated: 01/28/24 2114    Blood Culture - Blood, Hand, Left [[id number]] Collected: 01/28/24 2112    Specimen: Blood from Hand, Left Updated: 01/28/24 2114    High Sensitivity Troponin T 2Hr [[id number]]  (Abnormal) Collected: 01/28/24 2021    Specimen: Blood from Hand, Left Updated: 01/28/24 2048     HS Troponin T 53 ng/L      Troponin T Delta -6 ng/L     Narrative:      High Sensitive Troponin T Reference Range:  <14.0 ng/L- Negative Female for AMI  <22.0 ng/L- Negative Male for AMI  >=14 - Abnormal Female indicating possible myocardial injury.  >=22 - Abnormal Male indicating possible myocardial injury.   Clinicians would have to utilize clinical acumen, EKG, Troponin, and serial changes to determine if it is an Acute Myocardial Infarction or myocardial injury due to an underlying chronic condition.         [place] Draw [[id number]] Collected: 01/28/24 1737    Specimen: Blood Updated: 01/28/24 1845    Narrative:      The following orders were created for panel order [place] Draw.  Procedure                               Abnormality         Status                     ---------                               -----------         ------                     Green  Top (Gel)[[id number]]                                  Final result               Lavender Top[[id number]]                                     Final result               Gold Top - SST[[id number]]                                   Final result               Light Blue Top[[id number]]                                   Final result                 Please view results for these tests on the individual orders.    Lavender Top [[id number]] Collected: 01/28/24 1737    Specimen: Blood Updated: 01/28/24 1845     Extra Tube hold for add-on     Comment: Auto resulted       Gold Top - SST [[id number]] Collected: 01/28/24 1737    Specimen: Blood Updated: 01/28/24 1845     Extra Tube Hold for add-ons.     Comment: Auto resulted.       Light Blue Top [[id number]] Collected: 01/28/24 1737    Specimen: Blood Updated: 01/28/24 1845     Extra Tube Hold for add-ons.     Comment: Auto resulted       Green Top (Gel) [[id number]] Collected: 01/28/24 1737    Specimen: Blood Updated: 01/28/24 1833     Extra Tube Done    High Sensitivity Troponin T [[id number]]  (Abnormal) Collected: 01/28/24 1737    Specimen: Blood Updated: 01/28/24 1812     HS Troponin T 59 ng/L     Narrative:      High Sensitive Troponin T Reference Range:  <14.0 ng/L- Negative Female for AMI  <22.0 ng/L- Negative Male for AMI  >=14 - Abnormal Female indicating possible myocardial injury.  >=22 - Abnormal Male indicating possible myocardial injury.   Clinicians would have to utilize clinical acumen, EKG, Troponin, and serial changes to determine if it is an Acute Myocardial Infarction or myocardial injury due to an underlying chronic condition.         Comprehensive Metabolic Panel [[id number]]  (Abnormal) Collected: 01/28/24 1737    Specimen: Blood Updated: 01/28/24 1810     Glucose 141 mg/dL      BUN 26 mg/dL      Creatinine 1.14 mg/dL      Sodium 140 mmol/L      Potassium 4.0 mmol/L      Comment: Slight hemolysis detected by analyzer. Result may be falsely elevated.         Chloride 102 mmol/L      CO2 27.0 mmol/L      Calcium 9.4 mg/dL      Total Protein 7.2 g/dL      Albumin 4.2 g/dL      ALT (SGPT) 79 U/L      AST (SGOT) 133 U/L      Alkaline Phosphatase 91 U/L      Total Bilirubin 0.6 mg/dL      Globulin 3.0 gm/dL      A/G Ratio 1.4 g/dL      BUN/Creatinine Ratio 22.8     Anion Gap 11.0 mmol/L      eGFR 67.5 mL/min/1.73     Narrative:      GFR Normal >60  Chronic Kidney Disease <60  Kidney Failure <15    The GFR formula is only valid for adults with stable renal function between ages 18 and 70.    Lipase [[id number]]  (Normal) Collected: 01/28/24 1737    Specimen: Blood Updated: 01/28/24 1810     Lipase 46 U/L     Protime-INR [[id number]]  (Normal) Collected: 01/28/24 1737    Specimen: Blood Updated: 01/28/24 1758     Protime 10.7 Seconds      INR 0.98    aPTT [[id number]]  (Abnormal) Collected: 01/28/24 1737    Specimen: Blood Updated: 01/28/24 1758     PTT 24.8 seconds     CBC & Differential [[id number]]  (Abnormal) Collected: 01/28/24 1737    Specimen: Blood Updated: 01/28/24 1748    Narrative:      The following orders were created for panel order CBC & Differential.  Procedure                               Abnormality         Status                     ---------                               -----------         ------                     CBC Auto Differential[[id number]]        Abnormal            Final result                 Please view results for these tests on the individual orders.    CBC Auto Differential [[id number]]  (Abnormal) Collected: 01/28/24 1737    Specimen: Blood Updated: 01/28/24 1748     WBC 9.90 10*3/mm3      RBC 5.10 10*6/mm3      Hemoglobin 16.1 g/dL      Hematocrit 47.2 %      MCV 92.7 fL      MCH 31.6 pg      MCHC 34.1 g/dL      RDW 12.8 %      RDW-SD 43.3 fl      MPV 8.3 fL      Platelets 173 10*3/mm3      Neutrophil % 67.5 %      Lymphocyte % 17.9 %      Monocyte % 5.9 %      Eosinophil % 8.1 %      Basophil % 0.6 %      Neutrophils, Absolute 6.70 10*3/mm3       Lymphocytes, Absolute 1.80 10*3/mm3      Monocytes, Absolute 0.60 10*3/mm3      Eosinophils, Absolute 0.80 10*3/mm3      Basophils, Absolute 0.10 10*3/mm3      nRBC 0.0 /100 WBC              I reviewed the patient's new clinical results.    Assessment & Plan       Pneumonia due to infectious organism    Chest pain  -EKG sinus rhythm rate 86  -troponin 59 repeat 53  -CT chest negative for aneurysm or PE but did show pneumonia  -given aspirin and nitro patch with some relief of his pain  -started on rocephin and azithromycin for pneumonia  -npo after mn  -will plan for stress test in am  -echo pending for am      DVT prophylaxis- SCDs  GI prophylaxis- ppi    I discussed the patient's findings and my recommendations with patient.     Krista Quan, APRN  01/29/24  01:55 EST

## 2024-01-30 NOTE — CASE MANAGEMENT/SOCIAL WORK
Case Management Discharge Note           Selected Continued Care - Discharged on 1/29/2024 Admission date: 1/28/2024 - Discharge disposition: Home or Self Care          Transportation Services  Private: Car    Final Discharge Disposition Code: 01 - home or self-care

## 2024-01-30 NOTE — DISCHARGE SUMMARY
"  Date of Discharge:  1/31/2024    Discharge Diagnosis: Chest pain, non-obstructive CAD, NSTEMI    Presenting Problem/History of Present Illness  Active Hospital Problems    Diagnosis  POA    **Chest pain [R07.9]  Yes      Resolved Hospital Problems   No resolved problems to display.          Hospital Course  Patient is a 73 y.o. male presented with substernal chest pain associated with nausea and diaphoresis.  He had elevated troponin on arrival and troponin trended up slightly.  He had abnormal stress test.  Heart cath showed a 20-30% lesion in RCA.  This will be treated medically.  He felt well and had no recurrence of symptoms.  He will have follow up with PCP and cardiologist.      Procedures Performed    Procedure(s):  Coronary angiography  -------------------       Consults:   Consults       Date and Time Order Name Status Description    1/29/2024 10:00 AM Inpatient Cardiology Consult Completed             Pertinent Test Results:    Lab Results (most recent)       Procedure Component Value Units Date/Time    Procalcitonin [[id number]]  (Abnormal) Collected: 01/29/24 0427    Specimen: Blood Updated: 01/29/24 1013     Procalcitonin 0.27 ng/mL     Narrative:      As a Marker for Sepsis (Non-Neonates):    1. <0.5 ng/mL represents a low risk of severe sepsis and/or septic shock.  2. >2 ng/mL represents a high risk of severe sepsis and/or septic shock.    As a Marker for Lower Respiratory Tract Infections that require antibiotic therapy:    PCT on Admission    Antibiotic Therapy       6-12 Hrs later    >0.5                Strongly Recommended  >0.25 - <0.5        Recommended   0.1 - 0.25          Discouraged              Remeasure/reassess PCT  <0.1                Strongly Discouraged     Remeasure/reassess PCT    As 28 day mortality risk marker: \"Change in Procalcitonin Result\" (>80% or <=80%) if Day 0 (or Day 1) and Day 4 values are available. Refer to http://www.[employer]-pct-calculator.com    Change in PCT <=80%  A " decrease of PCT levels below or equal to 80% defines a positive change in PCT test result representing a higher risk for 28-day all-cause mortality of patients diagnosed with severe sepsis for septic shock.    Change in PCT >80%  A decrease of PCT levels of more than 80% defines a negative change in PCT result representing a lower risk for 28-day all-cause mortality of patients diagnosed with severe sepsis or septic shock.       High Sensitivity Troponin T [[id number]]  (Abnormal) Collected: 01/29/24 0427    Specimen: Blood Updated: 01/29/24 0509     HS Troponin T 66 ng/L     Narrative:      High Sensitive Troponin T Reference Range:  <14.0 ng/L- Negative Female for AMI  <22.0 ng/L- Negative Male for AMI  >=14 - Abnormal Female indicating possible myocardial injury.  >=22 - Abnormal Male indicating possible myocardial injury.   Clinicians would have to utilize clinical acumen, EKG, Troponin, and serial changes to determine if it is an Acute Myocardial Infarction or myocardial injury due to an underlying chronic condition.         Basic Metabolic Panel [[id number]]  (Abnormal) Collected: 01/29/24 0427    Specimen: Blood Updated: 01/29/24 0507     Glucose 102 mg/dL      BUN 21 mg/dL      Creatinine 1.01 mg/dL      Sodium 139 mmol/L      Potassium 3.9 mmol/L      Comment: Slight hemolysis detected by analyzer. Result may be falsely elevated.        Chloride 105 mmol/L      CO2 23.0 mmol/L      Calcium 8.7 mg/dL      BUN/Creatinine Ratio 20.8     Anion Gap 11.0 mmol/L      eGFR 78.0 mL/min/1.73     Narrative:      GFR Normal >60  Chronic Kidney Disease <60  Kidney Failure <15    The GFR formula is only valid for adults with stable renal function between ages 18 and 70.    CBC & Differential [[id number]]  (Normal) Collected: 01/29/24 0427    Specimen: Blood Updated: 01/29/24 0439    Narrative:      The following orders were created for panel order CBC & Differential.  Procedure                               Abnormality          Status                     ---------                               -----------         ------                     CBC Auto Differential[[id number]]        Normal              Final result                 Please view results for these tests on the individual orders.    CBC Auto Differential [[id number]]  (Normal) Collected: 01/29/24 0427    Specimen: Blood Updated: 01/29/24 0439     WBC 9.30 10*3/mm3      RBC 4.64 10*6/mm3      Hemoglobin 14.6 g/dL      Hematocrit 42.6 %      MCV 91.7 fL      MCH 31.5 pg      MCHC 34.3 g/dL      RDW 13.0 %      RDW-SD 43.8 fl      MPV 8.4 fL      Platelets 173 10*3/mm3      Neutrophil % 64.6 %      Lymphocyte % 22.0 %      Monocyte % 7.8 %      Eosinophil % 4.4 %      Basophil % 1.2 %      Neutrophils, Absolute 6.00 10*3/mm3      Lymphocytes, Absolute 2.00 10*3/mm3      Monocytes, Absolute 0.70 10*3/mm3      Eosinophils, Absolute 0.40 10*3/mm3      Basophils, Absolute 0.10 10*3/mm3      nRBC 0.1 /100 WBC     Blood Culture - Blood, Arm, Right [[id number]] Collected: 01/28/24 2111    Specimen: Blood from Arm, Right Updated: 01/28/24 2114    Blood Culture - Blood, Hand, Left [[id number]] Collected: 01/28/24 2112    Specimen: Blood from Hand, Left Updated: 01/28/24 2114    High Sensitivity Troponin T 2Hr [[id number]]  (Abnormal) Collected: 01/28/24 2021    Specimen: Blood from Hand, Left Updated: 01/28/24 2048     HS Troponin T 53 ng/L      Troponin T Delta -6 ng/L     Narrative:      High Sensitive Troponin T Reference Range:  <14.0 ng/L- Negative Female for AMI  <22.0 ng/L- Negative Male for AMI  >=14 - Abnormal Female indicating possible myocardial injury.  >=22 - Abnormal Male indicating possible myocardial injury.   Clinicians would have to utilize clinical acumen, EKG, Troponin, and serial changes to determine if it is an Acute Myocardial Infarction or myocardial injury due to an underlying chronic condition.         [facility] Draw [[id number]] Collected: 01/28/24 1737    Specimen:  Blood Updated: 01/28/24 1845    Narrative:      The following orders were created for panel order [facility] Draw.  Procedure                               Abnormality         Status                     ---------                               -----------         ------                     Green Top (Gel)[[id number]]                                  Final result               Lavender Top[[id number]]                                     Final result               Gold Top - SST[[id number]]                                   Final result               Light Blue Top[[id number]]                                   Final result                 Please view results for these tests on the individual orders.    Lavender Top [[id number]] Collected: 01/28/24 1737    Specimen: Blood Updated: 01/28/24 1845     Extra Tube hold for add-on     Comment: Auto resulted       Gold Top - SST [[id number]] Collected: 01/28/24 1737    Specimen: Blood Updated: 01/28/24 1845     Extra Tube Hold for add-ons.     Comment: Auto resulted.       Light Blue Top [[id number]] Collected: 01/28/24 1737    Specimen: Blood Updated: 01/28/24 1845     Extra Tube Hold for add-ons.     Comment: Auto resulted       Green Top (Gel) [[id number]] Collected: 01/28/24 1737    Specimen: Blood Updated: 01/28/24 1833     Extra Tube Done    High Sensitivity Troponin T [[id number]]  (Abnormal) Collected: 01/28/24 1737    Specimen: Blood Updated: 01/28/24 1812     HS Troponin T 59 ng/L     Narrative:      High Sensitive Troponin T Reference Range:  <14.0 ng/L- Negative Female for AMI  <22.0 ng/L- Negative Male for AMI  >=14 - Abnormal Female indicating possible myocardial injury.  >=22 - Abnormal Male indicating possible myocardial injury.   Clinicians would have to utilize clinical acumen, EKG, Troponin, and serial changes to determine if it is an Acute Myocardial Infarction or myocardial injury due to an underlying chronic condition.         Comprehensive Metabolic Panel  [[id number]]  (Abnormal) Collected: 01/28/24 1737    Specimen: Blood Updated: 01/28/24 1810     Glucose 141 mg/dL      BUN 26 mg/dL      Creatinine 1.14 mg/dL      Sodium 140 mmol/L      Potassium 4.0 mmol/L      Comment: Slight hemolysis detected by analyzer. Result may be falsely elevated.        Chloride 102 mmol/L      CO2 27.0 mmol/L      Calcium 9.4 mg/dL      Total Protein 7.2 g/dL      Albumin 4.2 g/dL      ALT (SGPT) 79 U/L      AST (SGOT) 133 U/L      Alkaline Phosphatase 91 U/L      Total Bilirubin 0.6 mg/dL      Globulin 3.0 gm/dL      A/G Ratio 1.4 g/dL      BUN/Creatinine Ratio 22.8     Anion Gap 11.0 mmol/L      eGFR 67.5 mL/min/1.73     Narrative:      GFR Normal >60  Chronic Kidney Disease <60  Kidney Failure <15    The GFR formula is only valid for adults with stable renal function between ages 18 and 70.    Lipase [[id number]]  (Normal) Collected: 01/28/24 1737    Specimen: Blood Updated: 01/28/24 1810     Lipase 46 U/L     Protime-INR [[id number]]  (Normal) Collected: 01/28/24 1737    Specimen: Blood Updated: 01/28/24 1758     Protime 10.7 Seconds      INR 0.98    aPTT [[id number]]  (Abnormal) Collected: 01/28/24 1737    Specimen: Blood Updated: 01/28/24 1758     PTT 24.8 seconds     CBC & Differential [[id number]]  (Abnormal) Collected: 01/28/24 1737    Specimen: Blood Updated: 01/28/24 1748    Narrative:      The following orders were created for panel order CBC & Differential.  Procedure                               Abnormality         Status                     ---------                               -----------         ------                     CBC Auto Differential[[id number]]        Abnormal            Final result                 Please view results for these tests on the individual orders.    CBC Auto Differential [[id number]]  (Abnormal) Collected: 01/28/24 1737    Specimen: Blood Updated: 01/28/24 1748     WBC 9.90 10*3/mm3      RBC 5.10 10*6/mm3      Hemoglobin 16.1 g/dL      Hematocrit  47.2 %      MCV 92.7 fL      MCH 31.6 pg      MCHC 34.1 g/dL      RDW 12.8 %      RDW-SD 43.3 fl      MPV 8.3 fL      Platelets 173 10*3/mm3      Neutrophil % 67.5 %      Lymphocyte % 17.9 %      Monocyte % 5.9 %      Eosinophil % 8.1 %      Basophil % 0.6 %      Neutrophils, Absolute 6.70 10*3/mm3      Lymphocytes, Absolute 1.80 10*3/mm3      Monocytes, Absolute 0.60 10*3/mm3      Eosinophils, Absolute 0.80 10*3/mm3      Basophils, Absolute 0.10 10*3/mm3      nRBC 0.0 /100 WBC              Results for orders placed during the hospital encounter of 01/28/24    Adult Transthoracic Echo Complete W/ Cont if Necessary Per Protocol    Interpretation Summary    Left ventricular ejection fraction appears to be 56 - 60%.    Left ventricular diastolic function was normal.    The right ventricular cavity is mildly dilated.              Condition on Discharge:  Improved, stable    Vital Signs       Physical Exam:     General Appearance:    Alert, cooperative, in no acute distress   Head:    Normocephalic, without obvious abnormality, atraumatic   Eyes:            Lids and lashes normal, conjunctivae and sclerae normal, no   icterus, no pallor, corneas clear, PERRLA   Ears:    Ears appear intact with no abnormalities noted   Throat:   No oral lesions, no thrush, oral mucosa moist   Neck:   No adenopathy, supple, trachea midline, no thyromegaly, no   carotid bruit, no JVD   Lungs:     Clear to auscultation,respirations regular, even and                  unlabored    Heart:    Regular rhythm and normal rate, normal S1 and S2, no            murmur, no gallop, no rub, no click   Chest Wall:    No abnormalities observed   Abdomen:     Normal bowel sounds, no masses, no organomegaly, soft        non-tender, non-distended, no guarding, no rebound                tenderness   Extremities:   Moves all extremities well, no edema, no cyanosis, no             redness   Pulses:   Pulses palpable and equal bilaterally   Skin:   No bleeding,  bruising or rash   Lymph nodes:   No palpable adenopathy   Neurologic:   Cranial nerves 2 - 12 grossly intact, sensation intact, DTR       present and equal bilaterally       Discharge Disposition  Home or Self Care    Discharge Medications     Discharge Medications        New Medications        Instructions Start Date   acetaminophen 325 MG tablet  Commonly known as: TYLENOL   650 mg, Oral, Every 4 Hours PRN      metoprolol succinate XL 25 MG 24 hr tablet  Commonly known as: TOPROL-XL   25 mg, Oral, Every 24 Hours Scheduled      pantoprazole 40 MG EC tablet  Commonly known as: PROTONIX   40 mg, Oral, Daily             Continue These Medications        Instructions Start Date   aspirin 81 MG EC tablet   81 mg, Oral, Nightly      simvastatin 20 MG tablet  Commonly known as: ZOCOR   20 mg, Oral, Nightly      tamsulosin 0.4 MG capsule 24 hr capsule  Commonly known as: FLOMAX   1 capsule, Oral, Nightly             Stop These Medications      meloxicam 7.5 MG tablet  Commonly known as: MOBIC              Discharge Diet: Healthy heart    Activity at Discharge: No restrictions    Follow-up Appointments  No future appointments.  Additional Instructions for the Follow-ups that You Need to Schedule       Discharge Follow-up with PCP   As directed       Currently Documented PCP:    Abner Funes FNP    PCP Phone Number:    [phone]     Follow Up Details: February 8th at 3:40 with Tommie Blount, in Abner Funes's office.                Test Results Pending at Discharge  Pending Labs       Order Current Status    Blood Culture - Blood, Arm, Right Preliminary result    Blood Culture - Blood, Hand, Left Preliminary result             Evelyn Felder MD  01/31/24  21:07 EST    Time: Discharge 25 min

## 2024-01-30 NOTE — PLAN OF CARE
Goal Outcome Evaluation:    PT went to cath lab, no intervention were donr, angio seal in place on rt groin, site is tender . VSS

## 2024-01-30 NOTE — CONSULTS
CARDIOLOGY CONSULT:    Young Ames  1950  male  [id number]      Referring Provider: Dr. Felder  Reason for Consultation: Chest pain and elevated troponin    Patient Care Team:  Abner Funes FNP as PCP - General (Family Medicine)    Chief complaint chest pain    Subjective .     History of present illness:  Young Ames is a 73 y.o. male with history of  hypertension and possibly hyperlipidemia presented to the hospital complains of chest pain.  Patient states that he started having chest pain which is substernal with radiation to the back and not worsened with any shortness of breath.  No complaint of any diaphoresis.  No PND or orthopnea.  No palpitation dizziness syncope or swelling of the feet.  In the ER patient had an EKG which showed normal sinus rhythm and his troponin was slightly elevated which later increased.  He had a CT scan of the chest which is negative for PE or aneurysm.  Patient is then admitted and had a stress moistly which was abnormal.    Review of Systems   Constitutional: Negative for fever and malaise/fatigue.   HENT:  Negative for ear pain and nosebleeds.    Eyes:  Negative for blurred vision and double vision.   Cardiovascular:  Positive for chest pain. Negative for dyspnea on exertion and palpitations.   Respiratory:  Negative for cough and shortness of breath.    Skin:  Negative for rash.   Musculoskeletal:  Negative for joint pain.   Gastrointestinal:  Negative for abdominal pain, nausea and vomiting.   Neurological:  Negative for focal weakness and headaches.   Psychiatric/Behavioral:  Negative for depression. The patient is not nervous/anxious.    All other systems reviewed and are negative.      History  History reviewed. No pertinent past medical history.        History reviewed. No pertinent family history.    Social History     Tobacco Use    Smoking status: Former     Packs/day: 0.25     Years: 15.00     Additional pack years: 0.00     Total pack years: 3.75     Types:  Cigarettes     Start date: 1969     Quit date: 1999     Years since quittin.0     Passive exposure: Past    Smokeless tobacco: Never   Vaping Use    Vaping Use: Never used   Substance Use Topics    Alcohol use: Yes     Alcohol/week: 1.0 standard drink of alcohol     Types: 1 Cans of beer per week    Drug use: Never        Medications Prior to Admission   Medication Sig Dispense Refill Last Dose    aspirin 81 MG EC tablet Take 1 tablet by mouth Every Night.   2024    meloxicam (MOBIC) 7.5 MG tablet Take 1 tablet by mouth Daily.   2024    simvastatin (ZOCOR) 20 MG tablet Take 1 tablet by mouth Every Night.   2024    tamsulosin (FLOMAX) 0.4 MG capsule 24 hr capsule Take 1 capsule by mouth Every Night.   2024       Allergies: Patient has no known allergies.    Scheduled Meds:famotidine, 40 mg, Oral, Daily  metoprolol succinate XL, 25 mg, Oral, Q24H  senna-docusate sodium, 2 tablet, Oral, BID  sodium chloride, 10 mL, Intravenous, Q12H  tamsulosin, 0.4 mg, Oral, Nightly      Continuous Infusions:nitroglycerin, 10-50 mcg/min  sodium chloride, 75 mL/hr      PRN Meds:.  acetaminophen    acetaminophen    atropine    senna-docusate sodium **AND** polyethylene glycol **AND** bisacodyl **AND** bisacodyl    Calcium Replacement - Follow Nurse / BPA Driven Protocol    hydrALAZINE    Magnesium Standard Dose Replacement - Follow Nurse / BPA Driven Protocol    nitroglycerin    ondansetron    Phosphorus Replacement - Follow Nurse / BPA Driven Protocol    Potassium Replacement - Follow Nurse / BPA Driven Protocol    sodium chloride    [COMPLETED] Insert Peripheral IV **AND** sodium chloride    sodium chloride    sodium chloride    sodium chloride    Objective     VITAL SIGNS  Vitals:    24 1945 24 2100   BP: 132/86  135/75 135/74   BP Location: Right arm  Right arm Right arm   Patient Position: Lying  Sitting Sitting   Pulse: 53 55 54 52   Resp: 18  18 18   Temp:  "97.3 °F (36.3 °C)  97.3 °F (36.3 °C) 97.5 °F (36.4 °C)   TempSrc: Oral  Oral Oral   SpO2: 92% 93% 93% 94%   Weight:       Height:           Flowsheet Rows      Flowsheet Row First Filed Value   Admission Height 180.3 cm (71\") Documented at 01/28/2024 1637   Admission Weight 88.5 kg (195 lb) Documented at 01/28/2024 1637             TELEMETRY: Sinus rhythm with nonspecific ST segment abnormality    Physical Exam:  Constitutional:       Appearance: Well-developed.   Eyes:      General: No scleral icterus.     Conjunctiva/sclera: Conjunctivae normal.      Pupils: Pupils are equal, round, and reactive to light.   HENT:      Head: Normocephalic and atraumatic.   Neck:      Vascular: No carotid bruit or JVD.   Pulmonary:      Effort: Pulmonary effort is normal.      Breath sounds: Normal breath sounds. No wheezing. No rales.   Cardiovascular:      Normal rate. Regular rhythm.   Pulses:     Intact distal pulses.   Abdominal:      General: Bowel sounds are normal.      Palpations: Abdomen is soft.   Musculoskeletal: Normal range of motion.      Cervical back: Normal range of motion and neck supple. Skin:     General: Skin is warm and dry.      Findings: No rash.   Neurological:      Mental Status: Alert.      Comments: No focal deficits          Results Review:   I reviewed the patient's new clinical results.  Lab Results (last 24 hours)       Procedure Component Value Units Date/Time    Blood Culture - Blood, Hand, Left [[id number]]  (Normal) Collected: 01/28/24 2112    Specimen: Blood from Hand, Left Updated: 01/29/24 2116     Blood Culture No growth at 24 hours    Blood Culture - Blood, Arm, Right [[id number]]  (Normal) Collected: 01/28/24 2111    Specimen: Blood from Arm, Right Updated: 01/29/24 2116     Blood Culture No growth at 24 hours    Procalcitonin [[id number]]  (Abnormal) Collected: 01/29/24 0427    Specimen: Blood Updated: 01/29/24 1013     Procalcitonin 0.27 ng/mL     Narrative:      As a Marker for Sepsis " "(Non-Neonates):    1. <0.5 ng/mL represents a low risk of severe sepsis and/or septic shock.  2. >2 ng/mL represents a high risk of severe sepsis and/or septic shock.    As a Marker for Lower Respiratory Tract Infections that require antibiotic therapy:    PCT on Admission    Antibiotic Therapy       6-12 Hrs later    >0.5                Strongly Recommended  >0.25 - <0.5        Recommended   0.1 - 0.25          Discouraged              Remeasure/reassess PCT  <0.1                Strongly Discouraged     Remeasure/reassess PCT    As 28 day mortality risk marker: \"Change in Procalcitonin Result\" (>80% or <=80%) if Day 0 (or Day 1) and Day 4 values are available. Refer to http://www.Red Mapachepct-calculator.com    Change in PCT <=80%  A decrease of PCT levels below or equal to 80% defines a positive change in PCT test result representing a higher risk for 28-day all-cause mortality of patients diagnosed with severe sepsis for septic shock.    Change in PCT >80%  A decrease of PCT levels of more than 80% defines a negative change in PCT result representing a lower risk for 28-day all-cause mortality of patients diagnosed with severe sepsis or septic shock.       High Sensitivity Troponin T [[id number]]  (Abnormal) Collected: 01/29/24 0427    Specimen: Blood Updated: 01/29/24 0509     HS Troponin T 66 ng/L     Narrative:      High Sensitive Troponin T Reference Range:  <14.0 ng/L- Negative Female for AMI  <22.0 ng/L- Negative Male for AMI  >=14 - Abnormal Female indicating possible myocardial injury.  >=22 - Abnormal Male indicating possible myocardial injury.   Clinicians would have to utilize clinical acumen, EKG, Troponin, and serial changes to determine if it is an Acute Myocardial Infarction or myocardial injury due to an underlying chronic condition.         Basic Metabolic Panel [[id number]]  (Abnormal) Collected: 01/29/24 0427    Specimen: Blood Updated: 01/29/24 0507     Glucose 102 mg/dL      BUN 21 mg/dL      " Creatinine 1.01 mg/dL      Sodium 139 mmol/L      Potassium 3.9 mmol/L      Comment: Slight hemolysis detected by analyzer. Result may be falsely elevated.        Chloride 105 mmol/L      CO2 23.0 mmol/L      Calcium 8.7 mg/dL      BUN/Creatinine Ratio 20.8     Anion Gap 11.0 mmol/L      eGFR 78.0 mL/min/1.73     Narrative:      GFR Normal >60  Chronic Kidney Disease <60  Kidney Failure <15    The GFR formula is only valid for adults with stable renal function between ages 18 and 70.    CBC & Differential [[id number]]  (Normal) Collected: 01/29/24 0427    Specimen: Blood Updated: 01/29/24 0439    Narrative:      The following orders were created for panel order CBC & Differential.  Procedure                               Abnormality         Status                     ---------                               -----------         ------                     CBC Auto Differential[[id number]]        Normal              Final result                 Please view results for these tests on the individual orders.    CBC Auto Differential [[id number]]  (Normal) Collected: 01/29/24 0427    Specimen: Blood Updated: 01/29/24 0439     WBC 9.30 10*3/mm3      RBC 4.64 10*6/mm3      Hemoglobin 14.6 g/dL      Hematocrit 42.6 %      MCV 91.7 fL      MCH 31.5 pg      MCHC 34.3 g/dL      RDW 13.0 %      RDW-SD 43.8 fl      MPV 8.4 fL      Platelets 173 10*3/mm3      Neutrophil % 64.6 %      Lymphocyte % 22.0 %      Monocyte % 7.8 %      Eosinophil % 4.4 %      Basophil % 1.2 %      Neutrophils, Absolute 6.00 10*3/mm3      Lymphocytes, Absolute 2.00 10*3/mm3      Monocytes, Absolute 0.70 10*3/mm3      Eosinophils, Absolute 0.40 10*3/mm3      Basophils, Absolute 0.10 10*3/mm3      nRBC 0.1 /100 WBC             Imaging Results (Last 24 Hours)       ** No results found for the last 24 hours. **            EKG      I personally viewed and interpreted the patient's EKG/Telemetry data:    ECHOCARDIOGRAM:  Results for orders placed during the  hospital encounter of 01/28/24    Adult Transthoracic Echo Complete W/ Cont if Necessary Per Protocol    Interpretation Summary    Left ventricular ejection fraction appears to be 56 - 60%.    Left ventricular diastolic function was normal.    The right ventricular cavity is mildly dilated.         STRESS MYOVIEW:  Results for orders placed during the hospital encounter of 01/28/24    Stress Test With Myocardial Perfusion One Day    Interpretation Summary    Impressions are consistent with an intermediate risk study.    Left ventricular ejection fraction is normal (Calculated EF = 56%).    Myocardial perfusion imaging indicates a moderate-sized, moderately severe area of ischemia located in the inferior wall and septal wall.       CARDIAC CATHETERIZATION:    No results found for this or any previous visit.       OTHER:         MDM      Chest pain, elevated troponin  Patient presented with chest pain and had elevated troponin consistent non-STEMI  Patient is on aspirin and nitroglycerin and thereafter underwent a stress test which showed inferior wall ischemia  Patient will have cardiac catheterization performed.  Discussed with patient and family about procedure risks and benefits    Hypertension  Patient blood pressure heart rate are high and hence started on Toprol-XL 25 g once a day    Hyperlipidemia  Patient is on simvastatin and the lipid levels are followed by the primary care doctor.    Pneumonia  Patient also has pneumonia and is CT scan with no PE and hence started on antibiotics.    I discussed the patients findings and my recommendations with patient and nurse    Abran Chand MD  01/29/24  21:37 EST

## 2024-02-02 LAB
BACTERIA SPEC AEROBE CULT: NORMAL
BACTERIA SPEC AEROBE CULT: NORMAL

## 2024-08-12 ENCOUNTER — APPOINTMENT (OUTPATIENT)
Dept: CT IMAGING | Facility: HOSPITAL | Age: 74
DRG: 194 | End: 2024-08-12
Payer: MEDICARE

## 2024-08-12 ENCOUNTER — HOSPITAL ENCOUNTER (INPATIENT)
Facility: HOSPITAL | Age: 74
LOS: 5 days | Discharge: HOME | DRG: 194 | End: 2024-08-17
Attending: EMERGENCY MEDICINE | Admitting: INTERNAL MEDICINE
Payer: MEDICARE

## 2024-08-12 DIAGNOSIS — J18.9 PNEUMONIA OF RIGHT LOWER LOBE DUE TO INFECTIOUS ORGANISM: ICD-10-CM

## 2024-08-12 DIAGNOSIS — C34.91 PRIMARY LUNG CANCER, RIGHT: ICD-10-CM

## 2024-08-12 DIAGNOSIS — J18.9 PNEUMONIA OF RIGHT MIDDLE LOBE DUE TO INFECTIOUS ORGANISM: ICD-10-CM

## 2024-08-12 DIAGNOSIS — J90 PLEURAL EFFUSION ON RIGHT: ICD-10-CM

## 2024-08-12 DIAGNOSIS — J18.9 PNEUMONIA DUE TO INFECTIOUS ORGANISM, UNSPECIFIED LATERALITY, UNSPECIFIED PART OF LUNG: Primary | ICD-10-CM

## 2024-08-12 DIAGNOSIS — R91.8 PULMONARY NODULES: ICD-10-CM

## 2024-08-12 LAB
ALBUMIN SERPL-MCNC: 3.3 G/DL (ref 3.5–5.2)
ALBUMIN/GLOB SERPL: 1.2 G/DL
ALP SERPL-CCNC: 55 U/L (ref 39–117)
ALT SERPL W P-5'-P-CCNC: 20 U/L (ref 1–41)
ANION GAP SERPL CALCULATED.3IONS-SCNC: 12.2 MMOL/L (ref 5–15)
AST SERPL-CCNC: 39 U/L (ref 1–40)
B PARAPERT DNA SPEC QL NAA+PROBE: NOT DETECTED
B PERT DNA SPEC QL NAA+PROBE: NOT DETECTED
BASOPHILS # BLD MANUAL: 0.13 10*3/MM3 (ref 0–0.2)
BASOPHILS NFR BLD MANUAL: 1 % (ref 0–1.5)
BILIRUB SERPL-MCNC: 0.2 MG/DL (ref 0–1.2)
BUN SERPL-MCNC: 23 MG/DL (ref 8–23)
BUN/CREAT SERPL: 18 (ref 7–25)
C PNEUM DNA NPH QL NAA+NON-PROBE: NOT DETECTED
CALCIUM SPEC-SCNC: 8.6 MG/DL (ref 8.6–10.5)
CHLORIDE SERPL-SCNC: 104 MMOL/L (ref 98–107)
CO2 SERPL-SCNC: 20.8 MMOL/L (ref 22–29)
CREAT SERPL-MCNC: 1.28 MG/DL (ref 0.76–1.27)
D-LACTATE SERPL-SCNC: 1.6 MMOL/L (ref 0.3–2)
DEPRECATED RDW RBC AUTO: 44.8 FL (ref 37–54)
EGFRCR SERPLBLD CKD-EPI 2021: 58.7 ML/MIN/1.73
EOSINOPHIL # BLD MANUAL: 0.81 10*3/MM3 (ref 0–0.4)
EOSINOPHIL NFR BLD MANUAL: 6 % (ref 0.3–6.2)
ERYTHROCYTE [DISTWIDTH] IN BLOOD BY AUTOMATED COUNT: 12.7 % (ref 12.3–15.4)
FLUAV SUBTYP SPEC NAA+PROBE: NOT DETECTED
FLUBV RNA ISLT QL NAA+PROBE: NOT DETECTED
GEN 5 2HR TROPONIN T REFLEX: 53 NG/L
GLOBULIN UR ELPH-MCNC: 2.7 GM/DL
GLUCOSE SERPL-MCNC: 167 MG/DL (ref 65–99)
HADV DNA SPEC NAA+PROBE: NOT DETECTED
HCOV 229E RNA SPEC QL NAA+PROBE: NOT DETECTED
HCOV HKU1 RNA SPEC QL NAA+PROBE: NOT DETECTED
HCOV NL63 RNA SPEC QL NAA+PROBE: NOT DETECTED
HCOV OC43 RNA SPEC QL NAA+PROBE: NOT DETECTED
HCT VFR BLD AUTO: 47.5 % (ref 37.5–51)
HGB BLD-MCNC: 15.8 G/DL (ref 13–17.7)
HMPV RNA NPH QL NAA+NON-PROBE: NOT DETECTED
HPIV1 RNA ISLT QL NAA+PROBE: NOT DETECTED
HPIV2 RNA SPEC QL NAA+PROBE: NOT DETECTED
HPIV3 RNA NPH QL NAA+PROBE: NOT DETECTED
HPIV4 P GENE NPH QL NAA+PROBE: NOT DETECTED
INR PPP: 1 (ref 0.93–1.1)
L PNEUMO1 AG UR QL IA: NEGATIVE
LYMPHOCYTES # BLD MANUAL: 0.94 10*3/MM3 (ref 0.7–3.1)
LYMPHOCYTES NFR BLD MANUAL: 4 % (ref 5–12)
M PNEUMO IGG SER IA-ACNC: NOT DETECTED
MCH RBC QN AUTO: 31.6 PG (ref 26.6–33)
MCHC RBC AUTO-ENTMCNC: 33.3 G/DL (ref 31.5–35.7)
MCV RBC AUTO: 95 FL (ref 79–97)
MONOCYTES # BLD: 0.54 10*3/MM3 (ref 0.1–0.9)
NEUTROPHILS # BLD AUTO: 11.05 10*3/MM3 (ref 1.7–7)
NEUTROPHILS NFR BLD MANUAL: 82 % (ref 42.7–76)
NT-PROBNP SERPL-MCNC: 118 PG/ML (ref 0–900)
PLAT MORPH BLD: NORMAL
PLATELET # BLD AUTO: 252 10*3/MM3 (ref 140–450)
PMV BLD AUTO: 10.3 FL (ref 6–12)
POTASSIUM SERPL-SCNC: 4.4 MMOL/L (ref 3.5–5.2)
PROT SERPL-MCNC: 6 G/DL (ref 6–8.5)
PROTHROMBIN TIME: 10.9 SECONDS (ref 9.6–11.7)
RBC # BLD AUTO: 5 10*6/MM3 (ref 4.14–5.8)
RBC MORPH BLD: NORMAL
RHINOVIRUS RNA SPEC NAA+PROBE: NOT DETECTED
RSV RNA NPH QL NAA+NON-PROBE: NOT DETECTED
S PNEUM AG SPEC QL LA: NEGATIVE
SARS-COV-2 RNA NPH QL NAA+NON-PROBE: NOT DETECTED
SCAN SLIDE: NORMAL
SODIUM SERPL-SCNC: 137 MMOL/L (ref 136–145)
TROPONIN T DELTA: -4 NG/L
TROPONIN T SERPL HS-MCNC: 57 NG/L
VARIANT LYMPHS NFR BLD MANUAL: 7 % (ref 19.6–45.3)
WBC MORPH BLD: NORMAL
WBC NRBC COR # BLD AUTO: 13.47 10*3/MM3 (ref 3.4–10.8)

## 2024-08-12 PROCEDURE — 83516 IMMUNOASSAY NONANTIBODY: CPT | Performed by: INTERNAL MEDICINE

## 2024-08-12 PROCEDURE — 80053 COMPREHEN METABOLIC PANEL: CPT | Performed by: EMERGENCY MEDICINE

## 2024-08-12 PROCEDURE — 85025 COMPLETE CBC W/AUTO DIFF WBC: CPT | Performed by: EMERGENCY MEDICINE

## 2024-08-12 PROCEDURE — 25010000002 METHYLPREDNISOLONE PER 125 MG: Performed by: EMERGENCY MEDICINE

## 2024-08-12 PROCEDURE — 86038 ANTINUCLEAR ANTIBODIES: CPT | Performed by: INTERNAL MEDICINE

## 2024-08-12 PROCEDURE — 25010000002 PIPERACILLIN SOD-TAZOBACTAM PER 1 G: Performed by: INTERNAL MEDICINE

## 2024-08-12 PROCEDURE — 84484 ASSAY OF TROPONIN QUANT: CPT | Performed by: EMERGENCY MEDICINE

## 2024-08-12 PROCEDURE — 0202U NFCT DS 22 TRGT SARS-COV-2: CPT | Performed by: EMERGENCY MEDICINE

## 2024-08-12 PROCEDURE — 83880 ASSAY OF NATRIURETIC PEPTIDE: CPT | Performed by: EMERGENCY MEDICINE

## 2024-08-12 PROCEDURE — 85610 PROTHROMBIN TIME: CPT | Performed by: INTERNAL MEDICINE

## 2024-08-12 PROCEDURE — 86235 NUCLEAR ANTIGEN ANTIBODY: CPT | Performed by: INTERNAL MEDICINE

## 2024-08-12 PROCEDURE — 94761 N-INVAS EAR/PLS OXIMETRY MLT: CPT

## 2024-08-12 PROCEDURE — 36415 COLL VENOUS BLD VENIPUNCTURE: CPT

## 2024-08-12 PROCEDURE — 93005 ELECTROCARDIOGRAM TRACING: CPT | Performed by: EMERGENCY MEDICINE

## 2024-08-12 PROCEDURE — 86037 ANCA TITER EACH ANTIBODY: CPT | Performed by: INTERNAL MEDICINE

## 2024-08-12 PROCEDURE — 71250 CT THORAX DX C-: CPT

## 2024-08-12 PROCEDURE — 87449 NOS EACH ORGANISM AG IA: CPT | Performed by: INTERNAL MEDICINE

## 2024-08-12 PROCEDURE — 94799 UNLISTED PULMONARY SVC/PX: CPT

## 2024-08-12 PROCEDURE — 87040 BLOOD CULTURE FOR BACTERIA: CPT | Performed by: EMERGENCY MEDICINE

## 2024-08-12 PROCEDURE — 99285 EMERGENCY DEPT VISIT HI MDM: CPT

## 2024-08-12 PROCEDURE — 86317 IMMUNOASSAY INFECTIOUS AGENT: CPT | Performed by: INTERNAL MEDICINE

## 2024-08-12 PROCEDURE — 85007 BL SMEAR W/DIFF WBC COUNT: CPT | Performed by: EMERGENCY MEDICINE

## 2024-08-12 PROCEDURE — 94640 AIRWAY INHALATION TREATMENT: CPT

## 2024-08-12 PROCEDURE — 86225 DNA ANTIBODY NATIVE: CPT | Performed by: INTERNAL MEDICINE

## 2024-08-12 PROCEDURE — 87385 HISTOPLASMA CAPSUL AG IA: CPT | Performed by: INTERNAL MEDICINE

## 2024-08-12 PROCEDURE — 93005 ELECTROCARDIOGRAM TRACING: CPT

## 2024-08-12 PROCEDURE — 83605 ASSAY OF LACTIC ACID: CPT

## 2024-08-12 RX ORDER — ALBUTEROL SULFATE 90 UG/1
2 AEROSOL, METERED RESPIRATORY (INHALATION) EVERY 4 HOURS PRN
COMMUNITY
Start: 2024-07-29

## 2024-08-12 RX ORDER — NITROGLYCERIN 0.4 MG/1
0.4 TABLET SUBLINGUAL
Status: DISCONTINUED | OUTPATIENT
Start: 2024-08-12 | End: 2024-08-17 | Stop reason: HOSPADM

## 2024-08-12 RX ORDER — METOPROLOL SUCCINATE 25 MG/1
25 TABLET, EXTENDED RELEASE ORAL NIGHTLY
COMMUNITY
End: 2024-08-17 | Stop reason: HOSPADM

## 2024-08-12 RX ORDER — ACETAMINOPHEN 325 MG/1
650 TABLET ORAL EVERY 4 HOURS PRN
Status: DISCONTINUED | OUTPATIENT
Start: 2024-08-12 | End: 2024-08-17 | Stop reason: HOSPADM

## 2024-08-12 RX ORDER — IPRATROPIUM BROMIDE AND ALBUTEROL SULFATE 2.5; .5 MG/3ML; MG/3ML
3 SOLUTION RESPIRATORY (INHALATION) ONCE
Status: COMPLETED | OUTPATIENT
Start: 2024-08-12 | End: 2024-08-12

## 2024-08-12 RX ORDER — METOPROLOL SUCCINATE 25 MG/1
25 TABLET, EXTENDED RELEASE ORAL
Status: DISCONTINUED | OUTPATIENT
Start: 2024-08-13 | End: 2024-08-14

## 2024-08-12 RX ORDER — SODIUM CHLORIDE 0.9 % (FLUSH) 0.9 %
10 SYRINGE (ML) INJECTION AS NEEDED
Status: DISCONTINUED | OUTPATIENT
Start: 2024-08-12 | End: 2024-08-17 | Stop reason: HOSPADM

## 2024-08-12 RX ORDER — ATORVASTATIN CALCIUM 10 MG/1
10 TABLET, FILM COATED ORAL DAILY
Status: DISCONTINUED | OUTPATIENT
Start: 2024-08-13 | End: 2024-08-17 | Stop reason: HOSPADM

## 2024-08-12 RX ORDER — ONDANSETRON 2 MG/ML
4 INJECTION INTRAMUSCULAR; INTRAVENOUS EVERY 6 HOURS PRN
Status: DISCONTINUED | OUTPATIENT
Start: 2024-08-12 | End: 2024-08-17 | Stop reason: HOSPADM

## 2024-08-12 RX ORDER — CHLORAL HYDRATE 500 MG
CAPSULE ORAL DAILY
COMMUNITY
End: 2024-08-17 | Stop reason: HOSPADM

## 2024-08-12 RX ORDER — TAMSULOSIN HYDROCHLORIDE 0.4 MG/1
0.4 CAPSULE ORAL NIGHTLY
Status: DISCONTINUED | OUTPATIENT
Start: 2024-08-13 | End: 2024-08-13

## 2024-08-12 RX ORDER — IPRATROPIUM BROMIDE AND ALBUTEROL SULFATE 2.5; .5 MG/3ML; MG/3ML
3 SOLUTION RESPIRATORY (INHALATION)
Status: DISCONTINUED | OUTPATIENT
Start: 2024-08-12 | End: 2024-08-17 | Stop reason: HOSPADM

## 2024-08-12 RX ORDER — SODIUM CHLORIDE 0.9 % (FLUSH) 0.9 %
10 SYRINGE (ML) INJECTION EVERY 12 HOURS SCHEDULED
Status: DISCONTINUED | OUTPATIENT
Start: 2024-08-12 | End: 2024-08-17 | Stop reason: HOSPADM

## 2024-08-12 RX ORDER — BISACODYL 10 MG
10 SUPPOSITORY, RECTAL RECTAL DAILY PRN
Status: DISCONTINUED | OUTPATIENT
Start: 2024-08-12 | End: 2024-08-17 | Stop reason: HOSPADM

## 2024-08-12 RX ORDER — BISACODYL 5 MG/1
5 TABLET, DELAYED RELEASE ORAL DAILY PRN
Status: DISCONTINUED | OUTPATIENT
Start: 2024-08-12 | End: 2024-08-17 | Stop reason: HOSPADM

## 2024-08-12 RX ORDER — PANTOPRAZOLE SODIUM 40 MG/1
40 TABLET, DELAYED RELEASE ORAL
Status: DISCONTINUED | OUTPATIENT
Start: 2024-08-13 | End: 2024-08-17 | Stop reason: HOSPADM

## 2024-08-12 RX ORDER — METHYLPREDNISOLONE SODIUM SUCCINATE 125 MG/2ML
125 INJECTION, POWDER, LYOPHILIZED, FOR SOLUTION INTRAMUSCULAR; INTRAVENOUS ONCE
Status: COMPLETED | OUTPATIENT
Start: 2024-08-12 | End: 2024-08-12

## 2024-08-12 RX ORDER — SODIUM CHLORIDE 9 MG/ML
40 INJECTION, SOLUTION INTRAVENOUS AS NEEDED
Status: DISCONTINUED | OUTPATIENT
Start: 2024-08-12 | End: 2024-08-17 | Stop reason: HOSPADM

## 2024-08-12 RX ORDER — METHYLPREDNISOLONE SODIUM SUCCINATE 40 MG/ML
40 INJECTION, POWDER, LYOPHILIZED, FOR SOLUTION INTRAMUSCULAR; INTRAVENOUS EVERY 8 HOURS
Status: DISCONTINUED | OUTPATIENT
Start: 2024-08-13 | End: 2024-08-14

## 2024-08-12 RX ORDER — AMOXICILLIN 250 MG
2 CAPSULE ORAL 2 TIMES DAILY PRN
Status: DISCONTINUED | OUTPATIENT
Start: 2024-08-12 | End: 2024-08-17 | Stop reason: HOSPADM

## 2024-08-12 RX ORDER — POLYETHYLENE GLYCOL 3350 17 G/17G
17 POWDER, FOR SOLUTION ORAL DAILY PRN
Status: DISCONTINUED | OUTPATIENT
Start: 2024-08-12 | End: 2024-08-17 | Stop reason: HOSPADM

## 2024-08-12 RX ADMIN — PIPERACILLIN AND TAZOBACTAM 4.5 G: 4; .5 INJECTION, POWDER, FOR SOLUTION INTRAVENOUS at 23:31

## 2024-08-12 RX ADMIN — IPRATROPIUM BROMIDE AND ALBUTEROL SULFATE 3 ML: .5; 3 SOLUTION RESPIRATORY (INHALATION) at 18:29

## 2024-08-12 RX ADMIN — IPRATROPIUM BROMIDE AND ALBUTEROL SULFATE 3 ML: .5; 3 SOLUTION RESPIRATORY (INHALATION) at 17:00

## 2024-08-12 RX ADMIN — PIPERACILLIN AND TAZOBACTAM 4.5 G: 4; .5 INJECTION, POWDER, FOR SOLUTION INTRAVENOUS at 18:35

## 2024-08-12 RX ADMIN — Medication 10 ML: at 22:59

## 2024-08-12 RX ADMIN — DOXYCYCLINE 100 MG: 100 INJECTION, POWDER, LYOPHILIZED, FOR SOLUTION INTRAVENOUS at 21:52

## 2024-08-12 RX ADMIN — METHYLPREDNISOLONE SODIUM SUCCINATE 125 MG: 125 INJECTION, POWDER, FOR SOLUTION INTRAMUSCULAR; INTRAVENOUS at 17:58

## 2024-08-12 NOTE — Clinical Note
Level of Care: Telemetry [5]   Diagnosis: Large pleural effusion [[id number]]   Admitting Physician: JESUS RYAN [[id number]]   Attending Physician: JESUS RYAN [[id number]]   Isolate for COVID?: No [0]   Certification: I Certify That Inpatient Hospital Services Are Medically Necessary For Greater Than 2 Midnights

## 2024-08-12 NOTE — ED TRIAGE NOTES
Pt via PV from home with c/o congest cough, SOA, abnormal repeat CXR;   Dx with PNA, no improvement with abx and steroids

## 2024-08-12 NOTE — CONSULTS
[facility]: [facility]         CONSULT NOTE    Patient Identification:  Young Ames  73 y.o.  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 patient was directed to come to the emergency room denies any hemoptysis   in [place] with exposure to agent of orange    Assessment:    73 year-old male ex-smoker quit in 1999 with 25-pack-year history, presents with unresolving pneumonia for the last 2 months with 15 pound weight loss despite multiple antibiotics  Chest x-ray done at outside facility showed 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  Broad-spectrum antibiotics Zosyn    STEVEN, ANCA  Urine histo antigen  Legionella and strep urine antigen        Review of Sytems:  Review of Systems   Respiratory:  Positive for cough and shortness of breath. Negative for wheezing and stridor.    Cardiovascular:  Negative for chest pain, palpitations and leg swelling.       Past Medical History:  History reviewed. No pertinent past medical history.    Past Surgical History:  Past Surgical History:   Procedure Laterality Date    CARDIAC CATHETERIZATION Right 1/29/2024    Procedure: Coronary angiography;  Surgeon: Abran Chand MD;  Location: [facility] INVASIVE LOCATION;  Service: Cardiovascular;  Laterality: Right;    CARDIAC CATHETERIZATION N/A 1/29/2024    Procedure: Left Heart Cath;  Surgeon: Jagruti  "MD Abran;  Location: [facility] INVASIVE LOCATION;  Service: Cardiovascular;  Laterality: N/A;        Home Meds:  (Not in a hospital admission)      Allergies:  No Known Allergies    Social History:   Social History     Socioeconomic History    Marital status:    Tobacco Use    Smoking status: Former     Current packs/day: 0.00     Average packs/day: 0.3 packs/day for 30.0 years (7.5 ttl pk-yrs)     Types: Cigarettes     Start date: 1969     Quit date: 1999     Years since quittin.6     Passive exposure: Past    Smokeless tobacco: Never   Vaping Use    Vaping status: Never Used   Substance and Sexual Activity    Alcohol use: Yes     Alcohol/week: 1.0 standard drink of alcohol     Types: 1 Cans of beer per week    Drug use: Never    Sexual activity: Defer       Family History:  History reviewed. No pertinent family history.    Physical Exam:  /87   Pulse 120   Temp 97.4 °F (36.3 °C) (Oral)   Resp 20   Ht 177.8 cm (70\")   Wt 82.3 kg (181 lb 7 oz)   SpO2 94%   BMI 26.03 kg/m²  Body mass index is 26.03 kg/m². 94% 82.3 kg (181 lb 7 oz)  Physical Exam  Cardiovascular:      Heart sounds: No murmur heard.     No gallop.   Pulmonary:      Effort: No respiratory distress.      Breath sounds: No stridor. No wheezing, rhonchi or rales.   Chest:      Chest wall: No tenderness.         LABS:  Lab Results   Component Value Date    CALCIUM 8.7 2024       Lab Results   Component Value Date    TROPONINT 66 (C) 2024             Results from last 7 days   Lab Units 24  1748   LACTATE mmol/L 1.6                     No results found for: \"TSH\"  CrCl cannot be calculated (Patient's most recent lab result is older than the maximum 30 days allowed.).         Imaging:  Imaging Results (Last 24 Hours)       ** No results found for the last 24 hours. **              Current Meds:   SCHEDULE  methylPREDNISolone sodium succinate, 125 mg, Intravenous, Once      Infusions     PRNs          Azmi " MD Lisa  8/12/2024  17:56 EDT      Much of this encounter note is an electronic transcription/translation of spoken language to printed text using Dragon Software.

## 2024-08-13 ENCOUNTER — ANESTHESIA EVENT (OUTPATIENT)
Dept: GASTROENTEROLOGY | Facility: HOSPITAL | Age: 74
End: 2024-08-13
Payer: MEDICARE

## 2024-08-13 ENCOUNTER — APPOINTMENT (OUTPATIENT)
Dept: INTERVENTIONAL RADIOLOGY/VASCULAR | Facility: HOSPITAL | Age: 74
DRG: 194 | End: 2024-08-13
Payer: MEDICARE

## 2024-08-13 ENCOUNTER — APPOINTMENT (OUTPATIENT)
Dept: GENERAL RADIOLOGY | Facility: HOSPITAL | Age: 74
DRG: 194 | End: 2024-08-13
Payer: MEDICARE

## 2024-08-13 ENCOUNTER — ANESTHESIA (OUTPATIENT)
Dept: GASTROENTEROLOGY | Facility: HOSPITAL | Age: 74
End: 2024-08-13
Payer: MEDICARE

## 2024-08-13 LAB
ANION GAP SERPL CALCULATED.3IONS-SCNC: 10.1 MMOL/L (ref 5–15)
APPEARANCE FLD: ABNORMAL
B PARAPERT DNA SPEC QL NAA+PROBE: NOT DETECTED
B PERT DNA SPEC QL NAA+PROBE: NOT DETECTED
BASOPHILS # BLD AUTO: 0.02 10*3/MM3 (ref 0–0.2)
BASOPHILS NFR BLD AUTO: 0.2 % (ref 0–1.5)
BUN SERPL-MCNC: 27 MG/DL (ref 8–23)
BUN/CREAT SERPL: 20.9 (ref 7–25)
C PNEUM DNA NPH QL NAA+NON-PROBE: NOT DETECTED
CALCIUM SPEC-SCNC: 8.7 MG/DL (ref 8.6–10.5)
CHLORIDE SERPL-SCNC: 106 MMOL/L (ref 98–107)
CO2 SERPL-SCNC: 18.9 MMOL/L (ref 22–29)
COLOR FLD: ABNORMAL
CREAT SERPL-MCNC: 1.29 MG/DL (ref 0.76–1.27)
DEPRECATED RDW RBC AUTO: 43.6 FL (ref 37–54)
EGFRCR SERPLBLD CKD-EPI 2021: 58.2 ML/MIN/1.73
EOSINOPHIL # BLD AUTO: 0 10*3/MM3 (ref 0–0.4)
EOSINOPHIL NFR BLD AUTO: 0 % (ref 0.3–6.2)
ERYTHROCYTE [DISTWIDTH] IN BLOOD BY AUTOMATED COUNT: 12.6 % (ref 12.3–15.4)
FLUAV SUBTYP SPEC NAA+PROBE: NOT DETECTED
FLUBV RNA ISLT QL NAA+PROBE: NOT DETECTED
GLUCOSE FLD-MCNC: 182 MG/DL
GLUCOSE SERPL-MCNC: 225 MG/DL (ref 65–99)
HADV DNA SPEC NAA+PROBE: NOT DETECTED
HCOV 229E RNA SPEC QL NAA+PROBE: NOT DETECTED
HCOV HKU1 RNA SPEC QL NAA+PROBE: NOT DETECTED
HCOV NL63 RNA SPEC QL NAA+PROBE: NOT DETECTED
HCOV OC43 RNA SPEC QL NAA+PROBE: NOT DETECTED
HCT VFR BLD AUTO: 42.5 % (ref 37.5–51)
HGB BLD-MCNC: 14.3 G/DL (ref 13–17.7)
HMPV RNA NPH QL NAA+NON-PROBE: NOT DETECTED
HPIV1 RNA ISLT QL NAA+PROBE: NOT DETECTED
HPIV2 RNA SPEC QL NAA+PROBE: NOT DETECTED
HPIV3 RNA NPH QL NAA+PROBE: NOT DETECTED
HPIV4 P GENE NPH QL NAA+PROBE: NOT DETECTED
IMM GRANULOCYTES # BLD AUTO: 0.03 10*3/MM3 (ref 0–0.05)
IMM GRANULOCYTES NFR BLD AUTO: 0.3 % (ref 0–0.5)
LDH FLD-CCNC: 538 U/L
LYMPHOCYTES # BLD AUTO: 0.92 10*3/MM3 (ref 0.7–3.1)
LYMPHOCYTES NFR BLD AUTO: 10.3 % (ref 19.6–45.3)
LYMPHOCYTES NFR FLD MANUAL: 2 %
M PNEUMO IGG SER IA-ACNC: NOT DETECTED
MCH RBC QN AUTO: 31.4 PG (ref 26.6–33)
MCHC RBC AUTO-ENTMCNC: 33.6 G/DL (ref 31.5–35.7)
MCV RBC AUTO: 93.2 FL (ref 79–97)
MESOTHL CELL NFR FLD MANUAL: 4 %
METHOD: ABNORMAL
MONOCYTES # BLD AUTO: 0.07 10*3/MM3 (ref 0.1–0.9)
MONOCYTES NFR BLD AUTO: 0.8 % (ref 5–12)
MONOCYTES NFR FLD: 16 %
NEUTROPHILS NFR BLD AUTO: 7.92 10*3/MM3 (ref 1.7–7)
NEUTROPHILS NFR BLD AUTO: 88.4 % (ref 42.7–76)
NEUTROPHILS NFR FLD MANUAL: 2 %
NRBC BLD AUTO-RTO: 0 /100 WBC (ref 0–0.2)
NUC CELL # FLD: 1539 /MM3
PATHOLOGY REVIEW: YES
PH FLD: 8 [PH] (ref 6.5–7.5)
PLATELET # BLD AUTO: 251 10*3/MM3 (ref 140–450)
PMV BLD AUTO: 9.6 FL (ref 6–12)
POTASSIUM SERPL-SCNC: 4.4 MMOL/L (ref 3.5–5.2)
PROT FLD-MCNC: 3.7 G/DL
QT INTERVAL: 315 MS
QTC INTERVAL: 453 MS
RBC # BLD AUTO: 4.56 10*6/MM3 (ref 4.14–5.8)
RHINOVIRUS RNA SPEC NAA+PROBE: NOT DETECTED
RSV RNA NPH QL NAA+NON-PROBE: NOT DETECTED
SARS-COV-2 RNA NPH QL NAA+NON-PROBE: NOT DETECTED
SODIUM SERPL-SCNC: 135 MMOL/L (ref 136–145)
UNCLASSIFIED CELLS, FLUID: 76 %
WBC NRBC COR # BLD AUTO: 8.96 10*3/MM3 (ref 3.4–10.8)

## 2024-08-13 PROCEDURE — 25010000002 PROPOFOL 200 MG/20ML EMULSION: Performed by: NURSE ANESTHETIST, CERTIFIED REGISTERED

## 2024-08-13 PROCEDURE — 87205 SMEAR GRAM STAIN: CPT | Performed by: INTERNAL MEDICINE

## 2024-08-13 PROCEDURE — 80048 BASIC METABOLIC PNL TOTAL CA: CPT

## 2024-08-13 PROCEDURE — 87798 DETECT AGENT NOS DNA AMP: CPT | Performed by: INTERNAL MEDICINE

## 2024-08-13 PROCEDURE — 94761 N-INVAS EAR/PLS OXIMETRY MLT: CPT

## 2024-08-13 PROCEDURE — 87305 ASPERGILLUS AG IA: CPT | Performed by: INTERNAL MEDICINE

## 2024-08-13 PROCEDURE — 87116 MYCOBACTERIA CULTURE: CPT | Performed by: INTERNAL MEDICINE

## 2024-08-13 PROCEDURE — 87496 CYTOMEG DNA AMP PROBE: CPT | Performed by: INTERNAL MEDICINE

## 2024-08-13 PROCEDURE — 94799 UNLISTED PULMONARY SVC/PX: CPT

## 2024-08-13 PROCEDURE — 71045 X-RAY EXAM CHEST 1 VIEW: CPT

## 2024-08-13 PROCEDURE — 76942 ECHO GUIDE FOR BIOPSY: CPT

## 2024-08-13 PROCEDURE — 0202U NFCT DS 22 TRGT SARS-COV-2: CPT | Performed by: INTERNAL MEDICINE

## 2024-08-13 PROCEDURE — 25010000002 PIPERACILLIN SOD-TAZOBACTAM PER 1 G: Performed by: INTERNAL MEDICINE

## 2024-08-13 PROCEDURE — 87102 FUNGUS ISOLATION CULTURE: CPT | Performed by: INTERNAL MEDICINE

## 2024-08-13 PROCEDURE — 25010000002 ESMOLOL 100 MG/10ML SOLUTION: Performed by: NURSE ANESTHETIST, CERTIFIED REGISTERED

## 2024-08-13 PROCEDURE — 87206 SMEAR FLUORESCENT/ACID STAI: CPT | Performed by: INTERNAL MEDICINE

## 2024-08-13 PROCEDURE — 85025 COMPLETE CBC W/AUTO DIFF WBC: CPT

## 2024-08-13 PROCEDURE — 84157 ASSAY OF PROTEIN OTHER: CPT | Performed by: INTERNAL MEDICINE

## 2024-08-13 PROCEDURE — 25010000002 METHYLPREDNISOLONE PER 40 MG: Performed by: INTERNAL MEDICINE

## 2024-08-13 PROCEDURE — 89051 BODY FLUID CELL COUNT: CPT | Performed by: INTERNAL MEDICINE

## 2024-08-13 PROCEDURE — 83615 LACTATE (LD) (LDH) ENZYME: CPT | Performed by: INTERNAL MEDICINE

## 2024-08-13 PROCEDURE — C1729 CATH, DRAINAGE: HCPCS

## 2024-08-13 PROCEDURE — 25810000003 SODIUM CHLORIDE 0.9 % SOLUTION: Performed by: NURSE ANESTHETIST, CERTIFIED REGISTERED

## 2024-08-13 PROCEDURE — 25010000002 LIDOCAINE 1 % SOLUTION

## 2024-08-13 PROCEDURE — 3E1F88Z IRRIGATION OF RESPIRATORY TRACT USING IRRIGATING SUBSTANCE, VIA NATURAL OR ARTIFICIAL OPENING ENDOSCOPIC: ICD-10-PCS | Performed by: INTERNAL MEDICINE

## 2024-08-13 PROCEDURE — 87075 CULTR BACTERIA EXCEPT BLOOD: CPT | Performed by: INTERNAL MEDICINE

## 2024-08-13 PROCEDURE — 88108 CYTOPATH CONCENTRATE TECH: CPT | Performed by: INTERNAL MEDICINE

## 2024-08-13 PROCEDURE — 87070 CULTURE OTHR SPECIMN AEROBIC: CPT | Performed by: INTERNAL MEDICINE

## 2024-08-13 PROCEDURE — 83986 ASSAY PH BODY FLUID NOS: CPT | Performed by: INTERNAL MEDICINE

## 2024-08-13 PROCEDURE — 25810000003 SODIUM CHLORIDE 0.9 % SOLUTION: Performed by: INTERNAL MEDICINE

## 2024-08-13 PROCEDURE — 87385 HISTOPLASMA CAPSUL AG IA: CPT | Performed by: INTERNAL MEDICINE

## 2024-08-13 PROCEDURE — 82945 GLUCOSE OTHER FLUID: CPT | Performed by: INTERNAL MEDICINE

## 2024-08-13 RX ORDER — LORAZEPAM 0.5 MG/1
0.5 TABLET ORAL EVERY 6 HOURS PRN
Status: DISCONTINUED | OUTPATIENT
Start: 2024-08-13 | End: 2024-08-17 | Stop reason: HOSPADM

## 2024-08-13 RX ORDER — HYDRALAZINE HYDROCHLORIDE 20 MG/ML
5 INJECTION INTRAMUSCULAR; INTRAVENOUS
Status: DISCONTINUED | OUTPATIENT
Start: 2024-08-13 | End: 2024-08-13 | Stop reason: HOSPADM

## 2024-08-13 RX ORDER — LIDOCAINE 50 MG/G
OINTMENT TOPICAL AS NEEDED
Status: DISCONTINUED | OUTPATIENT
Start: 2024-08-13 | End: 2024-08-13 | Stop reason: HOSPADM

## 2024-08-13 RX ORDER — LIDOCAINE HYDROCHLORIDE 20 MG/ML
INJECTION, SOLUTION INFILTRATION; PERINEURAL AS NEEDED
Status: DISCONTINUED | OUTPATIENT
Start: 2024-08-13 | End: 2024-08-13 | Stop reason: SURG

## 2024-08-13 RX ORDER — TAMSULOSIN HYDROCHLORIDE 0.4 MG/1
0.4 CAPSULE ORAL NIGHTLY
Status: DISCONTINUED | OUTPATIENT
Start: 2024-08-14 | End: 2024-08-17 | Stop reason: HOSPADM

## 2024-08-13 RX ORDER — ONDANSETRON 2 MG/ML
4 INJECTION INTRAMUSCULAR; INTRAVENOUS ONCE AS NEEDED
Status: DISCONTINUED | OUTPATIENT
Start: 2024-08-13 | End: 2024-08-13 | Stop reason: HOSPADM

## 2024-08-13 RX ORDER — LABETALOL HYDROCHLORIDE 5 MG/ML
5 INJECTION, SOLUTION INTRAVENOUS
Status: DISCONTINUED | OUTPATIENT
Start: 2024-08-13 | End: 2024-08-13 | Stop reason: HOSPADM

## 2024-08-13 RX ORDER — ESMOLOL HYDROCHLORIDE 10 MG/ML
INJECTION INTRAVENOUS AS NEEDED
Status: DISCONTINUED | OUTPATIENT
Start: 2024-08-13 | End: 2024-08-13 | Stop reason: SURG

## 2024-08-13 RX ORDER — DIPHENHYDRAMINE HYDROCHLORIDE 50 MG/ML
12.5 INJECTION INTRAMUSCULAR; INTRAVENOUS
Status: DISCONTINUED | OUTPATIENT
Start: 2024-08-13 | End: 2024-08-13 | Stop reason: HOSPADM

## 2024-08-13 RX ORDER — LIDOCAINE HYDROCHLORIDE 10 MG/ML
INJECTION, SOLUTION INFILTRATION; PERINEURAL AS NEEDED
Status: COMPLETED | OUTPATIENT
Start: 2024-08-13 | End: 2024-08-13

## 2024-08-13 RX ORDER — EPHEDRINE SULFATE 5 MG/ML
5 INJECTION INTRAVENOUS ONCE AS NEEDED
Status: DISCONTINUED | OUTPATIENT
Start: 2024-08-13 | End: 2024-08-13 | Stop reason: HOSPADM

## 2024-08-13 RX ORDER — PROPOFOL 10 MG/ML
INJECTION, EMULSION INTRAVENOUS AS NEEDED
Status: DISCONTINUED | OUTPATIENT
Start: 2024-08-13 | End: 2024-08-13 | Stop reason: SURG

## 2024-08-13 RX ORDER — SODIUM CHLORIDE 9 MG/ML
50 INJECTION, SOLUTION INTRAVENOUS CONTINUOUS
Status: DISCONTINUED | OUTPATIENT
Start: 2024-08-13 | End: 2024-08-17 | Stop reason: HOSPADM

## 2024-08-13 RX ORDER — IPRATROPIUM BROMIDE AND ALBUTEROL SULFATE 2.5; .5 MG/3ML; MG/3ML
3 SOLUTION RESPIRATORY (INHALATION) ONCE AS NEEDED
Status: DISCONTINUED | OUTPATIENT
Start: 2024-08-13 | End: 2024-08-13 | Stop reason: HOSPADM

## 2024-08-13 RX ORDER — SODIUM CHLORIDE 9 MG/ML
INJECTION, SOLUTION INTRAVENOUS CONTINUOUS PRN
Status: DISCONTINUED | OUTPATIENT
Start: 2024-08-13 | End: 2024-08-13 | Stop reason: SURG

## 2024-08-13 RX ORDER — LIDOCAINE HYDROCHLORIDE 20 MG/ML
INJECTION, SOLUTION INFILTRATION; PERINEURAL AS NEEDED
Status: DISCONTINUED | OUTPATIENT
Start: 2024-08-13 | End: 2024-08-13 | Stop reason: HOSPADM

## 2024-08-13 RX ADMIN — Medication 10 ML: at 10:34

## 2024-08-13 RX ADMIN — IPRATROPIUM BROMIDE AND ALBUTEROL SULFATE 3 ML: .5; 3 SOLUTION RESPIRATORY (INHALATION) at 11:49

## 2024-08-13 RX ADMIN — LIDOCAINE HYDROCHLORIDE 8 ML: 10 INJECTION, SOLUTION INFILTRATION; PERINEURAL at 13:54

## 2024-08-13 RX ADMIN — LIDOCAINE HYDROCHLORIDE 100 MG: 20 INJECTION, SOLUTION INFILTRATION; PERINEURAL at 08:09

## 2024-08-13 RX ADMIN — DOXYCYCLINE 100 MG: 100 INJECTION, POWDER, LYOPHILIZED, FOR SOLUTION INTRAVENOUS at 20:59

## 2024-08-13 RX ADMIN — ATORVASTATIN CALCIUM 10 MG: 10 TABLET, FILM COATED ORAL at 10:32

## 2024-08-13 RX ADMIN — SODIUM CHLORIDE: 9 INJECTION, SOLUTION INTRAVENOUS at 08:00

## 2024-08-13 RX ADMIN — PIPERACILLIN AND TAZOBACTAM 4.5 G: 4; .5 INJECTION, POWDER, FOR SOLUTION INTRAVENOUS at 11:07

## 2024-08-13 RX ADMIN — PANTOPRAZOLE SODIUM 40 MG: 40 TABLET, DELAYED RELEASE ORAL at 10:32

## 2024-08-13 RX ADMIN — PROPOFOL 50 MG: 10 INJECTION, EMULSION INTRAVENOUS at 08:12

## 2024-08-13 RX ADMIN — SODIUM CHLORIDE 50 ML/HR: 9 INJECTION, SOLUTION INTRAVENOUS at 10:33

## 2024-08-13 RX ADMIN — PROPOFOL 50 MG: 10 INJECTION, EMULSION INTRAVENOUS at 08:09

## 2024-08-13 RX ADMIN — Medication 10 ML: at 21:00

## 2024-08-13 RX ADMIN — IPRATROPIUM BROMIDE AND ALBUTEROL SULFATE 3 ML: .5; 3 SOLUTION RESPIRATORY (INHALATION) at 06:55

## 2024-08-13 RX ADMIN — METHYLPREDNISOLONE SODIUM SUCCINATE 40 MG: 40 INJECTION, POWDER, FOR SOLUTION INTRAMUSCULAR; INTRAVENOUS at 10:32

## 2024-08-13 RX ADMIN — ACETAMINOPHEN 650 MG: 325 TABLET, FILM COATED ORAL at 20:59

## 2024-08-13 RX ADMIN — METHYLPREDNISOLONE SODIUM SUCCINATE 40 MG: 40 INJECTION, POWDER, FOR SOLUTION INTRAMUSCULAR; INTRAVENOUS at 01:55

## 2024-08-13 RX ADMIN — DOXYCYCLINE 100 MG: 100 INJECTION, POWDER, LYOPHILIZED, FOR SOLUTION INTRAVENOUS at 10:33

## 2024-08-13 RX ADMIN — ACETAMINOPHEN 650 MG: 325 TABLET, FILM COATED ORAL at 16:24

## 2024-08-13 RX ADMIN — IPRATROPIUM BROMIDE AND ALBUTEROL SULFATE 3 ML: .5; 3 SOLUTION RESPIRATORY (INHALATION) at 18:55

## 2024-08-13 RX ADMIN — METHYLPREDNISOLONE SODIUM SUCCINATE 40 MG: 40 INJECTION, POWDER, FOR SOLUTION INTRAMUSCULAR; INTRAVENOUS at 17:22

## 2024-08-13 RX ADMIN — PIPERACILLIN AND TAZOBACTAM 4.5 G: 4; .5 INJECTION, POWDER, FOR SOLUTION INTRAVENOUS at 16:14

## 2024-08-13 RX ADMIN — TAMSULOSIN HYDROCHLORIDE 0.4 MG: 0.4 CAPSULE ORAL at 01:55

## 2024-08-13 RX ADMIN — TAMSULOSIN HYDROCHLORIDE 0.4 MG: 0.4 CAPSULE ORAL at 20:59

## 2024-08-13 RX ADMIN — ESMOLOL HYDROCHLORIDE 5 MG: 100 INJECTION, SOLUTION INTRAVENOUS at 08:03

## 2024-08-13 RX ADMIN — METOPROLOL SUCCINATE 25 MG: 25 TABLET, EXTENDED RELEASE ORAL at 10:32

## 2024-08-13 RX ADMIN — LORAZEPAM 0.5 MG: 0.5 TABLET ORAL at 16:54

## 2024-08-13 NOTE — H&P
Patient Care Team:  Abner Funes APRN as PCP - General (Family Medicine)    Chief complaint shortness of breath    Subjective     Patient is a 73 y.o. male who presented to the ER with complaints of increasing shortness of breath over the last week. He reports pleuritic chest pain over the last month that has been intermittent but has worsened the last 3 days. Patient reports history of being diagnosed in February with pneumonia and seems to not have fully recovered since then. He had a repeat CXR done in July that showed pleural effusion with residual pneumonia. He states hs had another CXR done today that showed increased effusion. He states he has been treated with Cephalexin, Zithromax, and Augmentin without relief of his symptoms. He was sent to the ER by Dr. Gonzalez where he was seen in the office today for un resolving pneumonia for the last 2 months.   Patient did see Dr. Gonzalez with Pulmonology today who recommended CT chest without contrast, IV Zosyn, and bronchoscopy in the morning.     Onset of symptoms was ongoing    Review of Systems   Constitutional: Negative.    HENT: Negative.     Eyes: Negative.    Respiratory:  Positive for cough and shortness of breath.    Cardiovascular: Negative.    Gastrointestinal: Negative.    Endocrine: Negative.    Genitourinary: Negative.    Musculoskeletal: Negative.    Skin: Negative.    Neurological: Negative.    Psychiatric/Behavioral: Negative.            History  History reviewed. No pertinent past medical history.  Past Surgical History:   Procedure Laterality Date    CARDIAC CATHETERIZATION Right 1/29/2024    Procedure: Coronary angiography;  Surgeon: Abran Chand MD;  Location: [facility] INVASIVE LOCATION;  Service: Cardiovascular;  Laterality: Right;    CARDIAC CATHETERIZATION N/A 1/29/2024    Procedure: Left Heart Cath;  Surgeon: Abran Chand MD;  Location: [facility] INVASIVE LOCATION;  Service: Cardiovascular;  Laterality: N/A;     History reviewed.  No pertinent family history.  Social History     Tobacco Use    Smoking status: Former     Current packs/day: 0.00     Average packs/day: 0.3 packs/day for 30.0 years (7.5 ttl pk-yrs)     Types: Cigarettes     Start date: 1969     Quit date: 1999     Years since quittin.6     Passive exposure: Past    Smokeless tobacco: Never   Vaping Use    Vaping status: Never Used   Substance Use Topics    Alcohol use: Yes     Alcohol/week: 1.0 standard drink of alcohol     Types: 1 Cans of beer per week    Drug use: Never     (Not in a hospital admission)    Allergies:  Patient has no known allergies.    Objective     Vital Signs  Temp:  [97.4 °F (36.3 °C)] 97.4 °F (36.3 °C)  Heart Rate:  [114-133] 114  Resp:  [16-20] 16  BP: (107-144)/(69-96) 107/76     Physical Exam:      General Appearance:    Alert, cooperative, in no acute distress   Head:    Normocephalic, without obvious abnormality, atraumatic   Eyes:            Lids and lashes normal, conjunctivae and sclerae normal, no   icterus, no pallor, corneas clear, PERRLA   Ears:    Ears appear intact with no abnormalities noted   Throat:   No oral lesions, no thrush, oral mucosa moist   Neck:   No adenopathy, supple, trachea midline, no thyromegaly, no   carotid bruit, no JVD   Lungs:     Decreased breath sounds,respirations regular, even and                  unlabored    Heart:    Regular rhythm and normal rate, normal S1 and S2, no            murmur, no gallop, no rub, no click   Chest Wall:    No abnormalities observed   Abdomen:     Normal bowel sounds, no masses, no organomegaly, soft        non-tender, non-distended, no guarding, no rebound                tenderness   Extremities:   Moves all extremities well, no edema, no cyanosis, no             redness   Pulses:   Pulses palpable and equal bilaterally   Skin:   No bleeding, bruising or rash   Lymph nodes:   No palpable adenopathy   Neurologic:   No focal deficits noted       Results Review:     Imaging  Results (Last 24 Hours)       Procedure Component Value Units Date/Time    CT Chest Without Contrast Diagnostic [[id number]] Collected: 08/12/24 1913     Updated: 08/12/24 1923    Narrative:      CT CHEST WO CONTRAST DIAGNOSTIC    Date of Exam: 8/12/2024 6:47 PM EDT    Indication: Pneumonia.    Comparison: CT chest with contrast 1/28/2024    Technique: Axial CT images were obtained of the chest without contrast administration.  Sagittal and coronal reconstructions were performed.  Automated exposure control and iterative reconstruction methods were used.    Findings:  Visualized noncontrast soft tissues of the lower neck are without acute abnormality. Heart size normal. Coronary artery calcifications are present. Small pericardial effusion. There are several enlarged mediastinal lymph nodes for example low right   paratracheal node measuring 13 mm in short axis (2/44). Subcarinal node measuring 13 mm in short axis (2/54). Right hilar adenopathy suspected, difficult to fully assess due to lack of contrast on this study.    There is a large right-sided pleural effusion. There is consolidation involving the right lower lobe and right middle lobe consistent with multifocal pneumonia. Multifocal areas of nodularity involving the posterior right upper lobe also consistent with   pneumonia. There is interlobular septal thickening asymmetric Shai involving the right lung. Negative for pneumothorax. Emphysema.     There are several new nodules involving the left lower lobe for example measuring 5 mm abutting the pleura in the superior segment (4/49). Additional left lower lobe 6 mm nodule (4/65). Several other smaller left lower lobe adjacent nodules are noted.    Noncontrast visualized portions of the liver, spleen, adrenal glands and pancreas are without acute abnormality. Cholelithiasis. There is moderate left hydronephrosis and hydroureter partially imaged. No hydronephrosis on the right. Multilevel disc   narrowing in the  thoracic and visualized upper lumbar spine. No aggressive osseous lesion or acute fracture.      Impression:      Impression:  1. Large right pleural effusion with dense consolidation involving the right middle and right lower lobes and extensive nodular airspace disease throughout the right upper lobe consistent with multifocal pneumonia.  2. Asymmetric interlobular septal thickening throughout the right lung may relate to atypical infection or asymmetric pulmonary edema.  3. Emphysema with new nodules in left lower lobe largest 6 mm, recommend attention on short-term follow-up.  4. Enlarged right paratracheal and right hilar lymph nodes likely reactive adenopathy.  5. New moderate left hydroureteronephrosis partially imaged, consider dedicated CT abdomen and pelvis for further assessment.      Electronically Signed: Antwon Estrella MD    8/12/2024 7:20 PM EDT    Workstation ID: [id number]             Lab Results (last 24 hours)       Procedure Component Value Units Date/Time    Comprehensive Metabolic Panel [[id number]]  (Abnormal) Collected: 08/12/24 2030    Specimen: Blood Updated: 08/12/24 2058     Glucose 167 mg/dL      BUN 23 mg/dL      Creatinine 1.28 mg/dL      Sodium 137 mmol/L      Potassium 4.4 mmol/L      Comment: Specimen hemolyzed.  Result may be falsely elevated.        Chloride 104 mmol/L      CO2 20.8 mmol/L      Calcium 8.6 mg/dL      Total Protein 6.0 g/dL      Albumin 3.3 g/dL      ALT (SGPT) 20 U/L      Comment: Specimen hemolyzed.  Result may  be falsely elevated.        AST (SGOT) 39 U/L      Comment: Specimen hemolyzed.  Result may be falsely elevated.        Alkaline Phosphatase 55 U/L      Total Bilirubin 0.2 mg/dL      Globulin 2.7 gm/dL      A/G Ratio 1.2 g/dL      BUN/Creatinine Ratio 18.0     Anion Gap 12.2 mmol/L      eGFR 58.7 mL/min/1.73     Narrative:      GFR Normal >60  Chronic Kidney Disease <60  Kidney Failure <15    The GFR formula is only valid for adults with stable renal function  between ages 18 and 70.    Single High Sensitivity Troponin T [[id number]]  (Abnormal) Collected: 08/12/24 2030    Specimen: Blood Updated: 08/12/24 2058     HS Troponin T 57 ng/L      Comment: Specimen hemolyzed.  Results may be falsely decreased.       Narrative:      High Sensitive Troponin T Reference Range:  <14.0 ng/L- Negative Female for AMI  <22.0 ng/L- Negative Male for AMI  >=14 - Abnormal Female indicating possible myocardial injury.  >=22 - Abnormal Male indicating possible myocardial injury.   Clinicians would have to utilize clinical acumen, EKG, Troponin, and serial changes to determine if it is an Acute Myocardial Infarction or myocardial injury due to an underlying chronic condition.         BNP [[id number]]  (Normal) Collected: 08/12/24 2030    Specimen: Blood Updated: 08/12/24 2055     proBNP 118.0 pg/mL     Narrative:      This assay is used as an aid in the diagnosis of individuals suspected of having heart failure. It can be used as an aid in the diagnosis of acute decompensated heart failure (ADHF) in patients presenting with signs and symptoms of ADHF to the emergency department (ED). In addition, NT-proBNP of <300 pg/mL indicates ADHF is not likely.    Age Range Result Interpretation  NT-proBNP Concentration (pg/mL:      <50             Positive            >450                   Gray                 300-450                    Negative             <300    50-75           Positive            >900                  Gray                300-900                  Negative            <300      >75             Positive            >1800                  Gray                300-1800                  Negative            <300    S. Pneumo Ag Urine or CSF - Urine, Urine, Clean Catch [[id number]]  (Normal) Collected: 08/12/24 1933    Specimen: Urine, Clean Catch Updated: 08/12/24 1957     Strep Pneumo Ag Negative    Legionella Antigen, Urine - Urine, Urine, Clean Catch [[id number]]  (Normal) Collected: 08/12/24  1933    Specimen: Urine, Clean Catch Updated: 08/12/24 1957     LEGIONELLA ANTIGEN, URINE Negative    Histoplasma Ag Ur - Urine, Urine, Clean Catch [[id number]] Collected: 08/12/24 1933    Specimen: Urine, Clean Catch Updated: 08/12/24 1936    Protime-INR [[id number]]  (Normal) Collected: 08/12/24 1834    Specimen: Blood Updated: 08/12/24 1852     Protime 10.9 Seconds      INR 1.00    Respiratory Panel PCR w/COVID-19(SARS-CoV-2) SARAH/ELENI/NEY/PAD/COR/LUCRETIA In-House, NP Swab in UTM/VTM, 2 HR TAT - Swab, Nasopharynx [[id number]]  (Normal) Collected: 08/12/24 1749    Specimen: Swab from Nasopharynx Updated: 08/12/24 1841     ADENOVIRUS, PCR Not Detected     Coronavirus 229E Not Detected     Coronavirus HKU1 Not Detected     Coronavirus NL63 Not Detected     Coronavirus OC43 Not Detected     COVID19 Not Detected     Human Metapneumovirus Not Detected     Human Rhinovirus/Enterovirus Not Detected     Influenza A PCR Not Detected     Influenza B PCR Not Detected     Parainfluenza Virus 1 Not Detected     Parainfluenza Virus 2 Not Detected     Parainfluenza Virus 3 Not Detected     Parainfluenza Virus 4 Not Detected     RSV, PCR Not Detected     Bordetella pertussis pcr Not Detected     Bordetella parapertussis PCR Not Detected     Chlamydophila pneumoniae PCR Not Detected     Mycoplasma pneumo by PCR Not Detected    Narrative:      In the setting of a positive respiratory panel with a viral infection PLUS a negative procalcitonin without other underlying concern for bacterial infection, consider observing off antibiotics or discontinuation of antibiotics and continue supportive care. If the respiratory panel is positive for atypical bacterial infection (Bordetella pertussis, Chlamydophila pneumoniae, or Mycoplasma pneumoniae), consider antibiotic de-escalation to target atypical bacterial infection.    ANCA Panel [[id number]] Collected: 08/12/24 1834    Specimen: Blood Updated: 08/12/24 1840    Strep Pneumoniae Antibody 7  Serotypes [[id number]] Collected: 08/12/24 1834    Specimen: Blood Updated: 08/12/24 1840    STEVEN by IFA, Reflex 9-biomarkers profile [[id number]] Collected: 08/12/24 1834    Specimen: Blood Updated: 08/12/24 1840    Blood Culture - Blood, Arm, Right [[id number]] Collected: 08/12/24 1834    Specimen: Blood from Arm, Right Updated: 08/12/24 1839    CBC & Differential [[id number]]  (Abnormal) Collected: 08/12/24 1732    Specimen: Blood Updated: 08/12/24 1837    Narrative:      The following orders were created for panel order CBC & Differential.  Procedure                               Abnormality         Status                     ---------                               -----------         ------                     CBC Auto Differential[[id number]]        Abnormal            Final result               Scan Slide[[id number]]                                       Final result                 Please view results for these tests on the individual orders.    CBC Auto Differential [[id number]]  (Abnormal) Collected: 08/12/24 1732    Specimen: Blood Updated: 08/12/24 1837     WBC 13.47 10*3/mm3      RBC 5.00 10*6/mm3      Hemoglobin 15.8 g/dL      Hematocrit 47.5 %      MCV 95.0 fL      MCH 31.6 pg      MCHC 33.3 g/dL      RDW 12.7 %      RDW-SD 44.8 fl      MPV 10.3 fL      Platelets 252 10*3/mm3     Narrative:      The previously reported component NRBC is no longer being reported. Previous result was 0.0 /100 WBC (Reference Range: 0.0-0.2 /100 WBC) on 8/12/2024 at 1835 EDT.    Scan Slide [[id number]] Collected: 08/12/24 1732    Specimen: Blood Updated: 08/12/24 1837     Scan Slide --     Comment: See Manual Differential Results       Manual Differential [[id number]]  (Abnormal) Collected: 08/12/24 1732    Specimen: Blood Updated: 08/12/24 1837     Neutrophil % 82.0 %      Lymphocyte % 7.0 %      Monocyte % 4.0 %      Eosinophil % 6.0 %      Basophil % 1.0 %      Neutrophils Absolute 11.05 10*3/mm3      Lymphocytes Absolute 0.94  10*3/mm3      Monocytes Absolute 0.54 10*3/mm3      Eosinophils Absolute 0.81 10*3/mm3      Basophils Absolute 0.13 10*3/mm3      RBC Morphology Normal     WBC Morphology Normal     Platelet Morphology Normal    POC Lactate [[id number]]  (Normal) Collected: 08/12/24 1748    Specimen: Blood Updated: 08/12/24 1752     Lactate 1.6 mmol/L      Comment: Serial Number: [id number]:  [id number]       Blood Culture - Blood, Arm, Left [[id number]] Collected: 08/12/24 1744    Specimen: Blood from Arm, Left Updated: 08/12/24 1751             I reviewed the patient's new clinical results.    Assessment & Plan       Large pleural effusion  -IV Zosyn and Doxy  -steroids, bronchodilators  -oxygen titrated as needed  -keep npo after mn, plan for bronch in am   -Pulmonary following, Dr. Gonzalez      DVT prophylaxis- SCD's  GI prophylaxis- ppi    I discussed the patient's findings and my recommendations with patient.     Krista Quan, APRN  08/12/24  21:54 EDT

## 2024-08-13 NOTE — PROGRESS NOTES
LOS: 1 day   Patient Care Team:  Abner Funes APRN as PCP - General (Family Medicine)    Subjective     Interval History:s/p bronchoscopy this am    Patient Complaints: nonproductive cough, shortness of breath, general fatigue    History taken from: patient    Review of Systems   Constitutional:  Positive for activity change, appetite change and fatigue. Negative for chills, diaphoresis and fever.   HENT:  Negative for facial swelling.    Eyes:  Negative for visual disturbance.   Respiratory:  Positive for cough and shortness of breath. Negative for wheezing and stridor.    Cardiovascular:  Negative for chest pain, palpitations and leg swelling.   Gastrointestinal:  Negative for abdominal pain, constipation, diarrhea and nausea.   Endocrine: Negative for polyuria.   Genitourinary:  Negative for dysuria.   Musculoskeletal:  Negative for arthralgias and back pain.   Skin:  Negative for rash.   Neurological:  Negative for dizziness, light-headedness and headaches.   Psychiatric/Behavioral:  Negative for confusion.            Objective     Vital Signs  Temp:  [97.4 °F (36.3 °C)-98.2 °F (36.8 °C)] 98.2 °F (36.8 °C)  Heart Rate:  [] 129  Resp:  [15-28] 15  BP: (107-144)/(67-96) 120/67    Physical Exam:     General Appearance:    Alert, cooperative, in no acute distress,   Head:    Normocephalic, without obvious abnormality, atraumatic   Eyes:            Lids and lashes normal, conjunctivae and sclerae normal, no   icterus, no pallor, corneas clear, PERRLA   Ears:    Ears appear intact with no abnormalities noted   Throat:   No oral lesions, no thrush, oral mucosa moist   Neck:   No adenopathy, supple, trachea midline, no thyromegaly, no   carotid bruit, no JVD   Lungs:    Decreased breath sounds right base    Heart:    Regular rhythm and normal rate, normal S1 and S2, no            murmur, no gallop, no rub, no click   Chest Wall:    No abnormalities observed   Abdomen:     Normal bowel sounds, no masses,  no organomegaly, soft        Non-tender non-distended, no guarding,   Extremities:   Moves all extremities well, no edema, no cyanosis, no             Redness   Pulses:   Pulses palpable and equal bilaterally   Skin:   No bleeding, bruising or rash   Lymph nodes:   No palpable adenopathy   Neurologic:   Cranial nerves 2 - 12 grossly intact, sensation intact, DTR       present and equal bilaterally        Results Review:    Lab Results (last 24 hours)       Procedure Component Value Units Date/Time    pH, Body Fluid - Pleural Fluid, Pleural Cavity [[id number]]  (Abnormal) Collected: 08/13/24 1401    Specimen: Pleural Fluid from Pleural Cavity Updated: 08/13/24 1517     pH, Fluid 8.00    Body Fluid Cell Count With Differential - Pleural Fluid, Pleural Cavity [[id number]]  (Abnormal) Collected: 08/13/24 1401    Specimen: Pleural Fluid from Pleural Cavity Updated: 08/13/24 1513    Narrative:      The following orders were created for panel order Body Fluid Cell Count With Differential - Pleural Fluid, Pleural Cavity.  Procedure                               Abnormality         Status                     ---------                               -----------         ------                     Body fluid cell count - ...[[id number]]  Abnormal            Final result               Body fluid differential ...[[id number]]                      In process                 Path Consult Reflex[[id number]]                              Final result                 Please view results for these tests on the individual orders.    Path Consult Reflex [[id number]] Collected: 08/13/24 1401    Specimen: Pleural Fluid from Pleural Cavity Updated: 08/13/24 1513     Pathology Review Yes    Body fluid cell count - Pleural Fluid, Pleural Cavity [[id number]]  (Abnormal) Collected: 08/13/24 1401    Specimen: Pleural Fluid from Pleural Cavity Updated: 08/13/24 1417     Color, Fluid Red     Appearance, Fluid Cloudy     Nucleated Cells, Fluid 1,539 /mm3       Method: Automated Sysmex XN Method    Narrative:      No reference range established. Physician to interpret results with clinical findings.    Body fluid differential - Pleural Fluid, Pleural Cavity [[id number]] Collected: 08/13/24 1401    Specimen: Pleural Fluid from Pleural Cavity Updated: 08/13/24 1415    Protein, Body Fluid - Pleural Fluid, Pleural Cavity [[id number]] Collected: 08/13/24 1401    Specimen: Pleural Fluid from Pleural Cavity Updated: 08/13/24 1406    Lactate Dehydrogenase, Body Fluid - Pleural Fluid, Pleural Cavity [[id number]] Collected: 08/13/24 1401    Specimen: Pleural Fluid from Pleural Cavity Updated: 08/13/24 1406    Glucose, Body Fluid - Pleural Fluid, Pleural Cavity [[id number]] Collected: 08/13/24 1401    Specimen: Pleural Fluid from Pleural Cavity Updated: 08/13/24 1406    AFB Culture - Body Fluid, Pleural Cavity [[id number]] Collected: 08/13/24 1401    Specimen: Body Fluid from Pleural Cavity Updated: 08/13/24 1406    Body Fluid Culture - Body Fluid, Pleural Cavity [[id number]] Collected: 08/13/24 1401    Specimen: Body Fluid from Pleural Cavity Updated: 08/13/24 1406    Anaerobic Culture - Pleural Fluid, Pleural Cavity [[id number]] Collected: 08/13/24 1401    Specimen: Pleural Fluid from Pleural Cavity Updated: 08/13/24 1406    Respiratory Culture - Wash, Lung, R [[id number]] Collected: 08/13/24 0820    Specimen: Wash from Lung, R Updated: 08/13/24 1252     Gram Stain Moderate (3+) WBCs seen      Few (2+) Bronchial epithelial cells      Few (2+) Gram positive cocci in clusters      Rare (1+) Gram negative bacilli    Flow Cytometry [[id number]] Collected: 08/13/24 0820    Specimen: Wash from Lung, R Updated: 08/13/24 1229    Non-gynecologic Cytology [[id number]] Collected: 08/13/24 0813    Specimen: Brushing from Lung, R; Wash from Lung, R Updated: 08/13/24 1223    Respiratory Panel PCR w/COVID-19(SARS-CoV-2) SARAH/ELENI/NEY/PAD/COR/LUCRETIA In-House, NP Swab in UTM/VTM, 2 HR TAT - Wash, Lung, R  [[id number]]  (Normal) Collected: 08/13/24 0820    Specimen: Wash from Lung, R Updated: 08/13/24 1016     ADENOVIRUS, PCR Not Detected     Coronavirus 229E Not Detected     Coronavirus HKU1 Not Detected     Coronavirus NL63 Not Detected     Coronavirus OC43 Not Detected     COVID19 Not Detected     Human Metapneumovirus Not Detected     Human Rhinovirus/Enterovirus Not Detected     Influenza A PCR Not Detected     Influenza B PCR Not Detected     Parainfluenza Virus 1 Not Detected     Parainfluenza Virus 2 Not Detected     Parainfluenza Virus 3 Not Detected     Parainfluenza Virus 4 Not Detected     RSV, PCR Not Detected     Bordetella pertussis pcr Not Detected     Bordetella parapertussis PCR Not Detected     Chlamydophila pneumoniae PCR Not Detected     Mycoplasma pneumo by PCR Not Detected    Narrative:      In the setting of a positive respiratory panel with a viral infection PLUS a negative procalcitonin without other underlying concern for bacterial infection, consider observing off antibiotics or discontinuation of antibiotics and continue supportive care. If the respiratory panel is positive for atypical bacterial infection (Bordetella pertussis, Chlamydophila pneumoniae, or Mycoplasma pneumoniae), consider antibiotic de-escalation to target atypical bacterial infection.    Aspergillus Galactomannan Antigen - Wash, Lung, R [[id number]] Collected: 08/13/24 0820    Specimen: Wash from Lung, R Updated: 08/13/24 0903    Cytomegalovirus (CMV) By PCR ([place]) (NEY) - Wash, Lung, R [[id number]] Collected: 08/13/24 0820    Specimen: Wash from Lung, R Updated: 08/13/24 0903    Pneumocystis PCR - Wash, Lung, R [[id number]] Collected: 08/13/24 0820    Specimen: Wash from Lung, R Updated: 08/13/24 0903    Fungus Culture - Wash, Lung, R [[id number]] Collected: 08/13/24 0820    Specimen: Wash from Lung, R Updated: 08/13/24 0903    AFB Culture - Wash, Lung, R [[id number]] Collected: 08/13/24 0820    Specimen: Wash from Lung, R  Updated: 08/13/24 0903    Histoplasma Antigen, CSF or BAL - Wash, Lung, R [[id number]] Collected: 08/13/24 0820    Specimen: Wash from Lung, R Updated: 08/13/24 0903    Basic Metabolic Panel [[id number]]  (Abnormal) Collected: 08/13/24 0335    Specimen: Blood Updated: 08/13/24 0406     Glucose 225 mg/dL      BUN 27 mg/dL      Creatinine 1.29 mg/dL      Sodium 135 mmol/L      Potassium 4.4 mmol/L      Chloride 106 mmol/L      CO2 18.9 mmol/L      Calcium 8.7 mg/dL      BUN/Creatinine Ratio 20.9     Anion Gap 10.1 mmol/L      eGFR 58.2 mL/min/1.73     Narrative:      GFR Normal >60  Chronic Kidney Disease <60  Kidney Failure <15    The GFR formula is only valid for adults with stable renal function between ages 18 and 70.    CBC & Differential [[id number]]  (Abnormal) Collected: 08/13/24 0335    Specimen: Blood Updated: 08/13/24 0341    Narrative:      The following orders were created for panel order CBC & Differential.  Procedure                               Abnormality         Status                     ---------                               -----------         ------                     CBC Auto Differential[[id number]]        Abnormal            Final result                 Please view results for these tests on the individual orders.    CBC Auto Differential [[id number]]  (Abnormal) Collected: 08/13/24 0335    Specimen: Blood Updated: 08/13/24 0341     WBC 8.96 10*3/mm3      RBC 4.56 10*6/mm3      Hemoglobin 14.3 g/dL      Hematocrit 42.5 %      MCV 93.2 fL      MCH 31.4 pg      MCHC 33.6 g/dL      RDW 12.6 %      RDW-SD 43.6 fl      MPV 9.6 fL      Platelets 251 10*3/mm3      Neutrophil % 88.4 %      Lymphocyte % 10.3 %      Monocyte % 0.8 %      Eosinophil % 0.0 %      Basophil % 0.2 %      Immature Grans % 0.3 %      Neutrophils, Absolute 7.92 10*3/mm3      Lymphocytes, Absolute 0.92 10*3/mm3      Monocytes, Absolute 0.07 10*3/mm3      Eosinophils, Absolute 0.00 10*3/mm3      Basophils, Absolute 0.02 10*3/mm3       Immature Grans, Absolute 0.03 10*3/mm3      nRBC 0.0 /100 WBC     High Sensitivity Troponin T 2Hr [[id number]]  (Abnormal) Collected: 08/12/24 2305    Specimen: Blood from Arm, Right Updated: 08/12/24 2341     HS Troponin T 53 ng/L      Troponin T Delta -4 ng/L     Narrative:      High Sensitive Troponin T Reference Range:  <14.0 ng/L- Negative Female for AMI  <22.0 ng/L- Negative Male for AMI  >=14 - Abnormal Female indicating possible myocardial injury.  >=22 - Abnormal Male indicating possible myocardial injury.   Clinicians would have to utilize clinical acumen, EKG, Troponin, and serial changes to determine if it is an Acute Myocardial Infarction or myocardial injury due to an underlying chronic condition.         Comprehensive Metabolic Panel [[id number]]  (Abnormal) Collected: 08/12/24 2030    Specimen: Blood Updated: 08/12/24 2058     Glucose 167 mg/dL      BUN 23 mg/dL      Creatinine 1.28 mg/dL      Sodium 137 mmol/L      Potassium 4.4 mmol/L      Comment: Specimen hemolyzed.  Result may be falsely elevated.        Chloride 104 mmol/L      CO2 20.8 mmol/L      Calcium 8.6 mg/dL      Total Protein 6.0 g/dL      Albumin 3.3 g/dL      ALT (SGPT) 20 U/L      Comment: Specimen hemolyzed.  Result may  be falsely elevated.        AST (SGOT) 39 U/L      Comment: Specimen hemolyzed.  Result may be falsely elevated.        Alkaline Phosphatase 55 U/L      Total Bilirubin 0.2 mg/dL      Globulin 2.7 gm/dL      A/G Ratio 1.2 g/dL      BUN/Creatinine Ratio 18.0     Anion Gap 12.2 mmol/L      eGFR 58.7 mL/min/1.73     Narrative:      GFR Normal >60  Chronic Kidney Disease <60  Kidney Failure <15    The GFR formula is only valid for adults with stable renal function between ages 18 and 70.    Single High Sensitivity Troponin T [[id number]]  (Abnormal) Collected: 08/12/24 2030    Specimen: Blood Updated: 08/12/24 2058     HS Troponin T 57 ng/L      Comment: Specimen hemolyzed.  Results may be falsely decreased.        Narrative:      High Sensitive Troponin T Reference Range:  <14.0 ng/L- Negative Female for AMI  <22.0 ng/L- Negative Male for AMI  >=14 - Abnormal Female indicating possible myocardial injury.  >=22 - Abnormal Male indicating possible myocardial injury.   Clinicians would have to utilize clinical acumen, EKG, Troponin, and serial changes to determine if it is an Acute Myocardial Infarction or myocardial injury due to an underlying chronic condition.         BNP [[id number]]  (Normal) Collected: 08/12/24 2030    Specimen: Blood Updated: 08/12/24 2055     proBNP 118.0 pg/mL     Narrative:      This assay is used as an aid in the diagnosis of individuals suspected of having heart failure. It can be used as an aid in the diagnosis of acute decompensated heart failure (ADHF) in patients presenting with signs and symptoms of ADHF to the emergency department (ED). In addition, NT-proBNP of <300 pg/mL indicates ADHF is not likely.    Age Range Result Interpretation  NT-proBNP Concentration (pg/mL:      <50             Positive            >450                   Gray                 300-450                    Negative             <300    50-75           Positive            >900                  Gray                300-900                  Negative            <300      >75             Positive            >1800                  Gray                300-1800                  Negative            <300    S. Pneumo Ag Urine or CSF - Urine, Urine, Clean Catch [[id number]]  (Normal) Collected: 08/12/24 1933    Specimen: Urine, Clean Catch Updated: 08/12/24 1957     Strep Pneumo Ag Negative    Legionella Antigen, Urine - Urine, Urine, Clean Catch [[id number]]  (Normal) Collected: 08/12/24 1933    Specimen: Urine, Clean Catch Updated: 08/12/24 1957     LEGIONELLA ANTIGEN, URINE Negative    Histoplasma Ag Ur - Urine, Urine, Clean Catch [[id number]] Collected: 08/12/24 1933    Specimen: Urine, Clean Catch Updated: 08/12/24 1936     Protime-INR [[id number]]  (Normal) Collected: 08/12/24 1834    Specimen: Blood Updated: 08/12/24 1852     Protime 10.9 Seconds      INR 1.00    Respiratory Panel PCR w/COVID-19(SARS-CoV-2) SARAH/ELENI/NEY/PAD/COR/LUCRETIA In-House, NP Swab in UTM/VTM, 2 HR TAT - Swab, Nasopharynx [[id number]]  (Normal) Collected: 08/12/24 1749    Specimen: Swab from Nasopharynx Updated: 08/12/24 1841     ADENOVIRUS, PCR Not Detected     Coronavirus 229E Not Detected     Coronavirus HKU1 Not Detected     Coronavirus NL63 Not Detected     Coronavirus OC43 Not Detected     COVID19 Not Detected     Human Metapneumovirus Not Detected     Human Rhinovirus/Enterovirus Not Detected     Influenza A PCR Not Detected     Influenza B PCR Not Detected     Parainfluenza Virus 1 Not Detected     Parainfluenza Virus 2 Not Detected     Parainfluenza Virus 3 Not Detected     Parainfluenza Virus 4 Not Detected     RSV, PCR Not Detected     Bordetella pertussis pcr Not Detected     Bordetella parapertussis PCR Not Detected     Chlamydophila pneumoniae PCR Not Detected     Mycoplasma pneumo by PCR Not Detected    Narrative:      In the setting of a positive respiratory panel with a viral infection PLUS a negative procalcitonin without other underlying concern for bacterial infection, consider observing off antibiotics or discontinuation of antibiotics and continue supportive care. If the respiratory panel is positive for atypical bacterial infection (Bordetella pertussis, Chlamydophila pneumoniae, or Mycoplasma pneumoniae), consider antibiotic de-escalation to target atypical bacterial infection.    ANCA Panel [[id number]] Collected: 08/12/24 1834    Specimen: Blood Updated: 08/12/24 1840    Strep Pneumoniae Antibody 7 Serotypes [[id number]] Collected: 08/12/24 1834    Specimen: Blood Updated: 08/12/24 1840    STEVEN by IFA, Reflex 9-biomarkers profile [[id number]] Collected: 08/12/24 1834    Specimen: Blood Updated: 08/12/24 1840    Blood Culture - Blood, Arm, Right  [[id number]] Collected: 08/12/24 1834    Specimen: Blood from Arm, Right Updated: 08/12/24 1839    CBC & Differential [[id number]]  (Abnormal) Collected: 08/12/24 1732    Specimen: Blood Updated: 08/12/24 1837    Narrative:      The following orders were created for panel order CBC & Differential.  Procedure                               Abnormality         Status                     ---------                               -----------         ------                     CBC Auto Differential[[id number]]        Abnormal            Final result               Scan Slide[[id number]]                                       Final result                 Please view results for these tests on the individual orders.    CBC Auto Differential [[id number]]  (Abnormal) Collected: 08/12/24 1732    Specimen: Blood Updated: 08/12/24 1837     WBC 13.47 10*3/mm3      RBC 5.00 10*6/mm3      Hemoglobin 15.8 g/dL      Hematocrit 47.5 %      MCV 95.0 fL      MCH 31.6 pg      MCHC 33.3 g/dL      RDW 12.7 %      RDW-SD 44.8 fl      MPV 10.3 fL      Platelets 252 10*3/mm3     Narrative:      The previously reported component NRBC is no longer being reported. Previous result was 0.0 /100 WBC (Reference Range: 0.0-0.2 /100 WBC) on 8/12/2024 at 1835 EDT.    Scan Slide [[id number]] Collected: 08/12/24 1732    Specimen: Blood Updated: 08/12/24 1837     Scan Slide --     Comment: See Manual Differential Results       Manual Differential [[id number]]  (Abnormal) Collected: 08/12/24 1732    Specimen: Blood Updated: 08/12/24 1837     Neutrophil % 82.0 %      Lymphocyte % 7.0 %      Monocyte % 4.0 %      Eosinophil % 6.0 %      Basophil % 1.0 %      Neutrophils Absolute 11.05 10*3/mm3      Lymphocytes Absolute 0.94 10*3/mm3      Monocytes Absolute 0.54 10*3/mm3      Eosinophils Absolute 0.81 10*3/mm3      Basophils Absolute 0.13 10*3/mm3      RBC Morphology Normal     WBC Morphology Normal     Platelet Morphology Normal    POC Lactate [[id number]]  (Normal)  Collected: 08/12/24 1748    Specimen: Blood Updated: 08/12/24 1752     Lactate 1.6 mmol/L      Comment: Serial Number: [id number]:  [id number]       Blood Culture - Blood, Arm, Left [[id number]] Collected: 08/12/24 1744    Specimen: Blood from Arm, Left Updated: 08/12/24 1751             Imaging Results (Last 24 Hours)       Procedure Component Value Units Date/Time    US Thoracentesis [[id number]] Collected: 08/13/24 1421    Specimen: Body Fluid Updated: 08/13/24 1454    Narrative:      DATE OF EXAM:  8/13/2024 1:53 PM EDT    PROCEDURE:  US THORACENTESIS    INDICATIONS:  Pleural effusion    COMPARISON:  CT 8/12/2024    FLUOROSCOPIC TIME:  None    PHYSICIAN MONITORED CONSCIOUS SEDATION TIME:  None minutes    TECHNIQUE:   A detailed explanation of the procedure, including the risks, benefits, and alternatives was provided. Informed consent was obtained from the patient. A preprocedure timeout was performed. The interventional radiology nurse monitored the patient at all   times. The patient was placed upright in the bed and the right back was ultrasounded, demonstrating a large right pleural effusion. The right back was then marked and prepped and draped in the usual sterile fashion. The skin and subcutaneous tissues were   anesthetized with 1% lidocaine and a small skin incision was made. Next, a 4 [place]ic centesis needle was advanced into the collection. A total of 2000 mL of clear red fluid was aspirated and the needle was removed. Specimens were collected and sent to   the lab per the ordering clinician. Hemostasis was achieved and a sterile bandage was applied. The patient tolerated the procedure well, without immediate complication. Post procedure chest x-ray completed and without evidence of pneumothorax.    FINDINGS:  See above      Impression:      Technically successful right thoracentesis utilizing ultrasound guidance and yielding 2000 mL of clear red fluid.      Report dictated by: Justyn Boateng DNP       I have personally reviewed this case and agree with the findings above:    Electronically Signed: Sean Buckley MD    8/13/2024 2:28 PM EDT    Workstation ID: [id number]    XR Chest 1 View [[id number]] Collected: 08/13/24 1429     Updated: 08/13/24 1454    Narrative:      XR CHEST 1 VW    Date of Exam: 8/13/2024 2:18 PM EDT    Indication: s/p right thora    Comparison: 1/28/2024, chest CT 8/12/2024    Findings:  Cardiac size is stable. The left lung is clear. The patient has developed diffuse infiltrate throughout the right lung. There is a small residual right pleural effusion. No pneumothorax is identified.      Impression:      Impression:    1. Development of an interstitial infiltrate throughout the right lung which hasn't changed significantly from the patient's chest CT of 8/12/2024.  2. Small residual right pleural effusion. No pneumothorax is identified.      Electronically Signed: Joe Mathew MD    8/13/2024 2:31 PM EDT    Workstation ID: [id number]    CT Chest Without Contrast Diagnostic [[id number]] Collected: 08/12/24 1913     Updated: 08/12/24 1923    Narrative:      CT CHEST WO CONTRAST DIAGNOSTIC    Date of Exam: 8/12/2024 6:47 PM EDT    Indication: Pneumonia.    Comparison: CT chest with contrast 1/28/2024    Technique: Axial CT images were obtained of the chest without contrast administration.  Sagittal and coronal reconstructions were performed.  Automated exposure control and iterative reconstruction methods were used.    Findings:  Visualized noncontrast soft tissues of the lower neck are without acute abnormality. Heart size normal. Coronary artery calcifications are present. Small pericardial effusion. There are several enlarged mediastinal lymph nodes for example low right   paratracheal node measuring 13 mm in short axis (2/44). Subcarinal node measuring 13 mm in short axis (2/54). Right hilar adenopathy suspected, difficult to fully assess due to lack of contrast on this study.    There is a  large right-sided pleural effusion. There is consolidation involving the right lower lobe and right middle lobe consistent with multifocal pneumonia. Multifocal areas of nodularity involving the posterior right upper lobe also consistent with   pneumonia. There is interlobular septal thickening asymmetric Shai involving the right lung. Negative for pneumothorax. Emphysema.     There are several new nodules involving the left lower lobe for example measuring 5 mm abutting the pleura in the superior segment (4/49). Additional left lower lobe 6 mm nodule (4/65). Several other smaller left lower lobe adjacent nodules are noted.    Noncontrast visualized portions of the liver, spleen, adrenal glands and pancreas are without acute abnormality. Cholelithiasis. There is moderate left hydronephrosis and hydroureter partially imaged. No hydronephrosis on the right. Multilevel disc   narrowing in the thoracic and visualized upper lumbar spine. No aggressive osseous lesion or acute fracture.      Impression:      Impression:  1. Large right pleural effusion with dense consolidation involving the right middle and right lower lobes and extensive nodular airspace disease throughout the right upper lobe consistent with multifocal pneumonia.  2. Asymmetric interlobular septal thickening throughout the right lung may relate to atypical infection or asymmetric pulmonary edema.  3. Emphysema with new nodules in left lower lobe largest 6 mm, recommend attention on short-term follow-up.  4. Enlarged right paratracheal and right hilar lymph nodes likely reactive adenopathy.  5. New moderate left hydroureteronephrosis partially imaged, consider dedicated CT abdomen and pelvis for further assessment.      Electronically Signed: Antwon Estrella MD    8/12/2024 7:20 PM EDT    Workstation ID: [id number]                 I reviewed the patient's new clinical results.    Medication Review:   Scheduled Meds:atorvastatin, 10 mg, Oral, Daily  doxycycline,  100 mg, Intravenous, Q12H  ipratropium-albuterol, 3 mL, Nebulization, Q6H While Awake - RT  methylPREDNISolone sodium succinate, 40 mg, Intravenous, Q8H  metoprolol succinate XL, 25 mg, Oral, Q24H  pantoprazole, 40 mg, Oral, QAM AC  piperacillin-tazobactam, 4.5 g, Intravenous, Q8H  sodium chloride, 10 mL, Intravenous, Q12H  tamsulosin, 0.4 mg, Oral, Nightly      Continuous Infusions:hold, 1 each  sodium chloride, 50 mL/hr, Last Rate: 50 mL/hr (08/13/24 1033)      PRN Meds:.  acetaminophen    senna-docusate sodium **AND** polyethylene glycol **AND** bisacodyl **AND** bisacodyl    hold    nitroglycerin    ondansetron    sodium chloride    sodium chloride     Assessment & Plan       Large pleural effusion    Pneumonia due to infectious organism  - 2000 cc removed during thoracentesis - studies pending;  Continue Zosyn and doxycycline for broad spectrum coverage.  Await culture of bronch specimens and BAL  HTN - Metoprolol  BPH - tamsulosin  GERD - PPI    SCD'd for dvt prophy         Plan for disposition:Home at discharge    Evelyn Felder MD  08/13/24  15:21 EDT

## 2024-08-13 NOTE — PROGRESS NOTES
Daily Progress Note        Large pleural effusion    Pneumonia due to infectious organism      Assessment    Right side pneumonia not responding to multiple courses of antibiotics as an outpatient    Adjacent moderate-sized right pleural effusion    Ex-smoker quit in 1999 with 25-pack-year     15 pound weight loss      in [place] with exposure to agent of orange    Respiratory viral panel was negative  Legionella antigen urine strep antigen negative        Recommendations:     Bronchoscopy completed 8/13/2024   no endobronchial lesions, no mucopurulent secretions, right lung washing revealed yellowish fluid material   Right lower lobe endobronchial brushing x 1 was done  bronchial washings sent for flow cytometry in addition to cytology/expanded cultures    Broad-spectrum antibiotics Zosyn     Following labs are pending   Blood cultures  STEVEN, ANCA  Urine histo antigen    Consulted with IR for diagnostic right thoracentesis            LOS: 1 day     Subjective         Objective     Vital signs for last 24 hours:  Vitals:    08/13/24 0735 08/13/24 0743 08/13/24 0823 08/13/24 0825   BP: 137/88  114/95 112/90   BP Location: Left arm  Left arm Left arm   Patient Position: Sitting  Sitting Sitting   Pulse: 120  119 114   Resp: 25  27 28   Temp:  97.8 °F (36.6 °C)     TempSrc:       SpO2: 94%  97% 97%   Weight:       Height:           Intake/Output last 3 shifts:  No intake/output data recorded.  Intake/Output this shift:  I/O this shift:  In: 400 [I.V.:400]  Out: -       Radiology  Imaging Results (Last 24 Hours)       Procedure Component Value Units Date/Time    CT Chest Without Contrast Diagnostic [[id number]] Collected: 08/12/24 1913     Updated: 08/12/24 1923    Narrative:      CT CHEST WO CONTRAST DIAGNOSTIC    Date of Exam: 8/12/2024 6:47 PM EDT    Indication: Pneumonia.    Comparison: CT chest with contrast 1/28/2024    Technique: Axial CT images were obtained of the chest without contrast administration.   Sagittal and coronal reconstructions were performed.  Automated exposure control and iterative reconstruction methods were used.    Findings:  Visualized noncontrast soft tissues of the lower neck are without acute abnormality. Heart size normal. Coronary artery calcifications are present. Small pericardial effusion. There are several enlarged mediastinal lymph nodes for example low right   paratracheal node measuring 13 mm in short axis (2/44). Subcarinal node measuring 13 mm in short axis (2/54). Right hilar adenopathy suspected, difficult to fully assess due to lack of contrast on this study.    There is a large right-sided pleural effusion. There is consolidation involving the right lower lobe and right middle lobe consistent with multifocal pneumonia. Multifocal areas of nodularity involving the posterior right upper lobe also consistent with   pneumonia. There is interlobular septal thickening asymmetric Shai involving the right lung. Negative for pneumothorax. Emphysema.     There are several new nodules involving the left lower lobe for example measuring 5 mm abutting the pleura in the superior segment (4/49). Additional left lower lobe 6 mm nodule (4/65). Several other smaller left lower lobe adjacent nodules are noted.    Noncontrast visualized portions of the liver, spleen, adrenal glands and pancreas are without acute abnormality. Cholelithiasis. There is moderate left hydronephrosis and hydroureter partially imaged. No hydronephrosis on the right. Multilevel disc   narrowing in the thoracic and visualized upper lumbar spine. No aggressive osseous lesion or acute fracture.      Impression:      Impression:  1. Large right pleural effusion with dense consolidation involving the right middle and right lower lobes and extensive nodular airspace disease throughout the right upper lobe consistent with multifocal pneumonia.  2. Asymmetric interlobular septal thickening throughout the right lung may relate to  atypical infection or asymmetric pulmonary edema.  3. Emphysema with new nodules in left lower lobe largest 6 mm, recommend attention on short-term follow-up.  4. Enlarged right paratracheal and right hilar lymph nodes likely reactive adenopathy.  5. New moderate left hydroureteronephrosis partially imaged, consider dedicated CT abdomen and pelvis for further assessment.      Electronically Signed: Antwon Estrella MD    8/12/2024 7:20 PM EDT    Workstation ID: [id number]            Labs:  Results from last 7 days   Lab Units 08/13/24  0335   WBC 10*3/mm3 8.96   HEMOGLOBIN g/dL 14.3   HEMATOCRIT % 42.5   PLATELETS 10*3/mm3 251     Results from last 7 days   Lab Units 08/13/24 0335 08/12/24 2030   SODIUM mmol/L 135* 137   POTASSIUM mmol/L 4.4 4.4   CHLORIDE mmol/L 106 104   CO2 mmol/L 18.9* 20.8*   BUN mg/dL 27* 23   CREATININE mg/dL 1.29* 1.28*   CALCIUM mg/dL 8.7 8.6   BILIRUBIN mg/dL  --  0.2   ALK PHOS U/L  --  55   ALT (SGPT) U/L  --  20   AST (SGOT) U/L  --  39   GLUCOSE mg/dL 225* 167*         Results from last 7 days   Lab Units 08/12/24  2030   ALBUMIN g/dL 3.3*     Results from last 7 days   Lab Units 08/12/24  2305 08/12/24  2030   HSTROP T ng/L 53* 57*             Results from last 7 days   Lab Units 08/12/24  1834   INR  1.00               Meds:   SCHEDULE  atorvastatin, 10 mg, Oral, Daily  doxycycline, 100 mg, Intravenous, Q12H  ipratropium-albuterol, 3 mL, Nebulization, Q6H While Awake - RT  methylPREDNISolone sodium succinate, 40 mg, Intravenous, Q8H  metoprolol succinate XL, 25 mg, Oral, Q24H  pantoprazole, 40 mg, Oral, QAM AC  piperacillin-tazobactam, 4.5 g, Intravenous, Q8H  sodium chloride, 10 mL, Intravenous, Q12H  tamsulosin, 0.4 mg, Oral, Nightly      Infusions  hold, 1 each  sodium chloride, 50 mL/hr      PRNs    acetaminophen    atropine    senna-docusate sodium **AND** polyethylene glycol **AND** bisacodyl **AND** bisacodyl    diphenhydrAMINE    ePHEDrine Sulfate (Pressors)    hold     hydrALAZINE    ipratropium-albuterol    labetalol    lidocaine (cardiac)    nitroglycerin    ondansetron    ondansetron    sodium chloride    sodium chloride    Physical Exam:  Physical Exam  Cardiovascular:      Heart sounds: No murmur heard.     No gallop.   Pulmonary:      Effort: No respiratory distress.      Breath sounds: No stridor. Rhonchi and rales present. No wheezing.   Chest:      Chest wall: No tenderness.         ROS  Review of Systems   Respiratory:  Positive for cough and shortness of breath. Negative for wheezing and stridor.    Cardiovascular:  Negative for chest pain, palpitations and leg swelling.             Total time spent with patient greater than: 45 Minutes

## 2024-08-13 NOTE — PLAN OF CARE
Goal Outcome Evaluation:      Pt rested throughout shift. Pt NPO for procedure today. Pt remains on 2L NC. All questions and concerns addressed.

## 2024-08-13 NOTE — ANESTHESIA PREPROCEDURE EVALUATION
Anesthesia Evaluation     NPO Solid Status: > 8 hours  NPO Liquid Status: > 8 hours           Airway   Mallampati: I  TM distance: >3 FB  Neck ROM: full  No difficulty expected  Dental - normal exam     Pulmonary - normal exam   (+) pneumonia , pleural effusion,  Cardiovascular - normal exam    (+) past MI       Neuro/Psych  GI/Hepatic/Renal/Endo      Musculoskeletal     Abdominal  - normal exam    Bowel sounds: normal.   Substance History      OB/GYN          Other                    Anesthesia Plan    ASA 3     MAC   total IV anesthesia  intravenous induction     Anesthetic plan, risks, benefits, and alternatives have been provided, discussed and informed consent has been obtained with: patient.  Pre-procedure education provided  Plan discussed with CRNA.    CODE STATUS:    Level Of Support Discussed With: Patient  Code Status (Patient has no pulse and is not breathing): CPR (Attempt to Resuscitate)  Medical Interventions (Patient has pulse or is breathing): Full Support

## 2024-08-13 NOTE — NURSING NOTE
Patient had complaints of worsening pain to right chest. Patient also noted to have increased heart rate, upon speaking with patient he expressed fear of collapsed lung and some anxiety related. MD notified. New orders noted.

## 2024-08-13 NOTE — ANESTHESIA POSTPROCEDURE EVALUATION
Patient: Young Ames    Procedure Summary       Date: 08/13/24 Room / Location: [facility] ENDOSCOPY 3 / [facility] ENDOSCOPY    Anesthesia Start: 0800 Anesthesia Stop: 0823    Procedure: BRONCHOSCOPY WITH BRONCHIAL WASHING AND BRONCHIAL BRUSHING (Bronchus) Diagnosis:       Pneumonia due to infectious organism, unspecified laterality, unspecified part of lung      (Pneumonia due to infectious organism, unspecified laterality, unspecified part of lung [J18.9])    Surgeons: Kelvin Gonzalez MD Provider: Frankie Jimenez MD    Anesthesia Type: MAC ASA Status: 3            Anesthesia Type: MAC    Vitals  Vitals Value Taken Time   /74 08/13/24 0828   Temp     Pulse 119 08/13/24 0828   Resp 28 08/13/24 0825   SpO2 94 % 08/13/24 0828   Vitals shown include unfiled device data.        Post Anesthesia Care and Evaluation    Patient location during evaluation: PACU  Patient participation: complete - patient participated  Level of consciousness: awake  Pain scale: See nurse's notes for pain score.  Pain management: adequate    Airway patency: patent  Anesthetic complications: No anesthetic complications  PONV Status: none  Cardiovascular status: acceptable  Respiratory status: acceptable and spontaneous ventilation  Hydration status: acceptable    Comments: Patient seen and examined postoperatively; vital signs stable; SpO2 greater than or equal to 90%; cardiopulmonary status stable; nausea/vomiting adequately controlled; pain adequately controlled; no apparent anesthesia complications; patient discharged from anesthesia care when discharge criteria were met

## 2024-08-13 NOTE — ED PROVIDER NOTES
Subjective   History of Present Illness  73-year-old male complaining of increasing shortness of breath over the last several days.  States that he was first diagnosed as having right-sided pneumonia in February.  He states he had a x-ray done that showed pleural effusion with residual pneumonia on 29 July.  The patient apparently had another x-ray done today that showed a larger effusion although this is not available for ER review.  The patient then apparently had antibiotic therapy with cephalexin, then Zithromax, then Augmentin without resolution of symptomatology.  The patient was sent to the ER for admission but was not identified as a direct admission.  Patient reports he has had pleuritic chest pain intermittently for the last month and has been fairly consistent when he coughs for the last 3 days    Review of Systems   Constitutional:  Positive for chills, fatigue and fever.   Respiratory:  Positive for cough, chest tightness, shortness of breath and wheezing.    Cardiovascular:  Positive for chest pain. Negative for palpitations and leg swelling.       History reviewed. No pertinent past medical history.    No Known Allergies    Past Surgical History:   Procedure Laterality Date    CARDIAC CATHETERIZATION Right 1/29/2024    Procedure: Coronary angiography;  Surgeon: Abran Chand MD;  Location: [facility] CATH INVASIVE LOC[facility];  Service: Cardiovascular;  Laterality: Right;    CARDIAC CATHETERIZATION N/A 1/29/2024    Procedure: Left Heart Cath;  Surgeon: Abran Chand MD;  Location: [facility] INVASIVE LOCATION;  Service: Cardiovascular;  Laterality: N/A;       History reviewed. No pertinent family history.    Social History     Socioeconomic History    Marital status:    Tobacco Use    Smoking status: Former     Current packs/day: 0.00     Average packs/day: 0.3 packs/day for 30.0 years (7.5 ttl pk-yrs)     Types: Cigarettes     Start date: 1/1/1969     Quit date: 1/1/1999     Years since quitting:  25.6     Passive exposure: Past    Smokeless tobacco: Never   Vaping Use    Vaping status: Never Used   Substance and Sexual Activity    Alcohol use: Yes     Alcohol/week: 1.0 standard drink of alcohol     Types: 1 Cans of beer per week    Drug use: Never    Sexual activity: Defer     Reports no recent unusual food water travel or activity      Objective   Physical Exam  Alert Ramón Coma Scale 15   HEENT: Pupils equal and reactive to light. Conjunctivae are not injected. Normal tympanic membranes. Oropharynx and nares are normal.   Neck: Supple. Midline trachea. No JVD. No goiter.   Chest: Right-sided Rales were noted the bases dull.  There is a few scattered wheezes bilaterally and equal breath sounds bilaterally, regular rate and rhythm without murmur or rub.   Abdomen: Positive bowel sounds, nontender, nondistended. No rebound or peritoneal signs. No CVA tenderness.   Extremities no clubbing. cyanosis or edema. Motor sensory exam is normal. The full range of motion is intact   Skin: Warm and dry, no rashes or petechia.   Lymphatic: No regional lymphadenopathy. No calf pain, swelling or Homans sign    Procedures           ED Course      Labs Reviewed   CBC WITH AUTO DIFFERENTIAL - Abnormal; Notable for the following components:       Result Value    WBC 13.47 (*)     All other components within normal limits    Narrative:     The previously reported component NRBC is no longer being reported. Previous result was 0.0 /100 WBC (Reference Range: 0.0-0.2 /100 WBC) on 8/12/2024 at 1835 EDT.   MANUAL DIFFERENTIAL - Abnormal; Notable for the following components:    Neutrophil % 82.0 (*)     Lymphocyte % 7.0 (*)     Monocyte % 4.0 (*)     Neutrophils Absolute 11.05 (*)     Eosinophils Absolute 0.81 (*)     All other components within normal limits   RESPIRATORY PANEL PCR W/ COVID-19 (SARS-COV-2), NP SWAB IN UTM/VTP, 2 HR TAT - Normal    Narrative:     In the setting of a positive respiratory panel with a viral  infection PLUS a negative procalcitonin without other underlying concern for bacterial infection, consider observing off antibiotics or discontinuation of antibiotics and continue supportive care. If the respiratory panel is positive for atypical bacterial infection (Bordetella pertussis, Chlamydophila pneumoniae, or Mycoplasma pneumoniae), consider antibiotic de-escalation to target atypical bacterial infection.   LEGIONELLA ANTIGEN, URINE - Normal   STREP PNEUMO AG, URINE OR CSF - Normal   PROTIME-INR - Normal   POC LACTATE - Normal   BLOOD CULTURE   BLOOD CULTURE   HISTOPLASMA AG UR   SCAN SLIDE   COMPREHENSIVE METABOLIC PANEL   BNP (IN-HOUSE)   SINGLE HS TROPONIN T   STREP PNEUMONIAE ANTIBODY 7 SEROTYPES   STEVEN BY IFA, REFLEX 9-BIOMARKERS PROFILE   ANCA PANEL   POC LACTATE   CBC AND DIFFERENTIAL    Narrative:     The following orders were created for panel order CBC & Differential.  Procedure                               Abnormality         Status                     ---------                               -----------         ------                     CBC Auto Differential[[id number]]        Abnormal            Final result               Scan Slide[[id number]]                                       Final result                 Please view results for these tests on the individual orders.     Medications   piperacillin-tazobactam (ZOSYN) 4.5 g IVPB in 100 mL NS MBP (CD) (has no administration in time range)   methylPREDNISolone sodium succinate (SOLU-Medrol) injection 40 mg (has no administration in time range)   ipratropium-albuterol (DUO-NEB) nebulizer solution 3 mL (3 mL Nebulization Given 8/12/24 1829)   doxycycline (VIBRAMYCIN) 100 mg in sodium chloride 0.9 % 100 mL MBP (has no administration in time range)   ipratropium-albuterol (DUO-NEB) nebulizer solution 3 mL (3 mL Nebulization Given 8/12/24 1700)   methylPREDNISolone sodium succinate (SOLU-Medrol) injection 125 mg (125 mg Intravenous Given 8/12/24 1758)    piperacillin-tazobactam (ZOSYN) 4.5 g IVPB in 100 mL NS MBP (CD) (0 g Intravenous Stopped 8/12/24 2008)     CT Chest Without Contrast Diagnostic    Result Date: 8/12/2024  Impression: 1. Large right pleural effusion with dense consolidation involving the right middle and right lower lobes and extensive nodular airspace disease throughout the right upper lobe consistent with multifocal pneumonia. 2. Asymmetric interlobular septal thickening throughout the right lung may relate to atypical infection or asymmetric pulmonary edema. 3. Emphysema with new nodules in left lower lobe largest 6 mm, recommend attention on short-term follow-up. 4. Enlarged right paratracheal and right hilar lymph nodes likely reactive adenopathy. 5. New moderate left hydroureteronephrosis partially imaged, consider dedicated CT abdomen and pelvis for further assessment. Electronically Signed: Antwon Estrella MD  8/12/2024 7:20 PM EDT  Workstation ID: [id number]                                          Medical Decision Making  The referring primary care service apparently contacted Dr. Gonzalez who saw the patient in the ER after initial workup had been completed.  The patient was started on piperacillin by Dr. Patel we will add doxycycline pending culture results.  The patient was also loaded on steroids and given bronchodilators.  O2 saturation was in the upper 80s to 90% on room air and the patient was started on 3 L/min with improvement to 93 to 94% range.  The patient was agreeable with hospitalization and apparently be kept n.p.o. after midnight to facilitate additional pulmonary management tomorrow.  The patient was agreeable to this plan of treatment    Amount and/or Complexity of Data Reviewed  Labs: ordered.  ECG/medicine tests: ordered.    Risk  Prescription drug management.        Final diagnoses:   Pneumonia of right middle lobe due to infectious organism   Pneumonia of right lower lobe due to infectious organism   Pleural effusion on  right   Pulmonary nodules     ED Disposition  ED Disposition       ED Disposition   Decision to Admit    Condition   --    Comment   Level of Care: Telemetry [5]   Diagnosis: Large pleural effusion [[id number]]   Admitting Physician: JESUS RYAN [[id number]]   Attending Physician: JESUS RYAN [[id number]]   Isolate for COVID?: No [0]   Certification: I Certify That Inpatient Hospital Services Are Medically Necessary For Greater Than 2 Midnights                 No follow-up provider specified.       Medication List      No changes were made to your prescriptions during this visit.            Nader Castro MD  08/12/24 8455

## 2024-08-14 ENCOUNTER — APPOINTMENT (OUTPATIENT)
Dept: CARDIOLOGY | Facility: HOSPITAL | Age: 74
DRG: 194 | End: 2024-08-14
Payer: MEDICARE

## 2024-08-14 ENCOUNTER — APPOINTMENT (OUTPATIENT)
Dept: GENERAL RADIOLOGY | Facility: HOSPITAL | Age: 74
DRG: 194 | End: 2024-08-14
Payer: MEDICARE

## 2024-08-14 ENCOUNTER — APPOINTMENT (OUTPATIENT)
Dept: CT IMAGING | Facility: HOSPITAL | Age: 74
DRG: 194 | End: 2024-08-14
Payer: MEDICARE

## 2024-08-14 LAB
ANION GAP SERPL CALCULATED.3IONS-SCNC: 11.2 MMOL/L (ref 5–15)
ANION GAP SERPL CALCULATED.3IONS-SCNC: 12.6 MMOL/L (ref 5–15)
AORTIC DIMENSIONLESS INDEX: 0.8 (DI)
BASOPHILS # BLD AUTO: 0.03 10*3/MM3 (ref 0–0.2)
BASOPHILS NFR BLD AUTO: 0.2 % (ref 0–1.5)
BH CV ECHO MEAS - AO MAX PG: 10 MMHG
BH CV ECHO MEAS - AO MEAN PG: 6 MMHG
BH CV ECHO MEAS - AO V2 MAX: 158 CM/SEC
BH CV ECHO MEAS - AO V2 VTI: 26.3 CM
BH CV ECHO MEAS - AVA(I,D): 2.33 CM2
BH CV ECHO MEAS - EDV(CUBED): 54.9 ML
BH CV ECHO MEAS - EDV(MOD-SP2): 67.3 ML
BH CV ECHO MEAS - EDV(MOD-SP4): 76.3 ML
BH CV ECHO MEAS - EF(MOD-BP): 68.1 %
BH CV ECHO MEAS - EF(MOD-SP2): 65.2 %
BH CV ECHO MEAS - EF(MOD-SP4): 70 %
BH CV ECHO MEAS - ESV(CUBED): 32.8 ML
BH CV ECHO MEAS - ESV(MOD-SP2): 23.4 ML
BH CV ECHO MEAS - ESV(MOD-SP4): 22.9 ML
BH CV ECHO MEAS - FS: 15.8 %
BH CV ECHO MEAS - IVS/LVPW: 1 CM
BH CV ECHO MEAS - IVSD: 1.1 CM
BH CV ECHO MEAS - LA DIMENSION: 3.3 CM
BH CV ECHO MEAS - LV DIASTOLIC VOL/BSA (35-75): 36.9 CM2
BH CV ECHO MEAS - LV MASS(C)D: 134.7 GRAMS
BH CV ECHO MEAS - LV MAX PG: 6.4 MMHG
BH CV ECHO MEAS - LV MEAN PG: 3 MMHG
BH CV ECHO MEAS - LV SYSTOLIC VOL/BSA (12-30): 11.1 CM2
BH CV ECHO MEAS - LV V1 MAX: 126 CM/SEC
BH CV ECHO MEAS - LV V1 VTI: 19.5 CM
BH CV ECHO MEAS - LVIDD: 3.8 CM
BH CV ECHO MEAS - LVIDS: 3.2 CM
BH CV ECHO MEAS - LVOT AREA: 3.1 CM2
BH CV ECHO MEAS - LVOT DIAM: 2 CM
BH CV ECHO MEAS - LVPWD: 1.1 CM
BH CV ECHO MEAS - MR MAX PG: 77.4 MMHG
BH CV ECHO MEAS - MR MAX VEL: 440 CM/SEC
BH CV ECHO MEAS - MV DEC SLOPE: 702 CM/SEC2
BH CV ECHO MEAS - MV DEC TIME: 0.17 SEC
BH CV ECHO MEAS - MV E MAX VEL: 121 CM/SEC
BH CV ECHO MEAS - MV MAX PG: 10.2 MMHG
BH CV ECHO MEAS - MV MEAN PG: 4 MMHG
BH CV ECHO MEAS - MV P1/2T: 70.5 MSEC
BH CV ECHO MEAS - MV V2 VTI: 28.9 CM
BH CV ECHO MEAS - MVA(P1/2T): 3.1 CM2
BH CV ECHO MEAS - MVA(VTI): 2.12 CM2
BH CV ECHO MEAS - PA ACC TIME: 0.09 SEC
BH CV ECHO MEAS - PA V2 MAX: 115 CM/SEC
BH CV ECHO MEAS - RAP SYSTOLE: 3 MMHG
BH CV ECHO MEAS - RV MAX PG: 1.89 MMHG
BH CV ECHO MEAS - RV V1 MAX: 68.7 CM/SEC
BH CV ECHO MEAS - RV V1 VTI: 13.2 CM
BH CV ECHO MEAS - RVSP: 50.1 MMHG
BH CV ECHO MEAS - SV(LVOT): 61.3 ML
BH CV ECHO MEAS - SV(MOD-SP2): 43.9 ML
BH CV ECHO MEAS - SV(MOD-SP4): 53.4 ML
BH CV ECHO MEAS - SVI(LVOT): 29.6 ML/M2
BH CV ECHO MEAS - SVI(MOD-SP2): 21.2 ML/M2
BH CV ECHO MEAS - SVI(MOD-SP4): 25.8 ML/M2
BH CV ECHO MEAS - TAPSE (>1.6): 2.9 CM
BH CV ECHO MEAS - TR MAX PG: 47.1 MMHG
BH CV ECHO MEAS - TR MAX VEL: 343 CM/SEC
BUN SERPL-MCNC: 35 MG/DL (ref 8–23)
BUN SERPL-MCNC: 36 MG/DL (ref 8–23)
BUN/CREAT SERPL: 25.2 (ref 7–25)
BUN/CREAT SERPL: 27.7 (ref 7–25)
CALCIUM SPEC-SCNC: 8.3 MG/DL (ref 8.6–10.5)
CALCIUM SPEC-SCNC: 8.4 MG/DL (ref 8.6–10.5)
CHLORIDE SERPL-SCNC: 107 MMOL/L (ref 98–107)
CHLORIDE SERPL-SCNC: 107 MMOL/L (ref 98–107)
CO2 SERPL-SCNC: 19.4 MMOL/L (ref 22–29)
CO2 SERPL-SCNC: 19.8 MMOL/L (ref 22–29)
CREAT SERPL-MCNC: 1.3 MG/DL (ref 0.76–1.27)
CREAT SERPL-MCNC: 1.39 MG/DL (ref 0.76–1.27)
DEPRECATED RDW RBC AUTO: 44.4 FL (ref 37–54)
EGFRCR SERPLBLD CKD-EPI 2021: 53.2 ML/MIN/1.73
EGFRCR SERPLBLD CKD-EPI 2021: 57.6 ML/MIN/1.73
EOSINOPHIL # BLD AUTO: 0 10*3/MM3 (ref 0–0.4)
EOSINOPHIL NFR BLD AUTO: 0 % (ref 0.3–6.2)
ERYTHROCYTE [DISTWIDTH] IN BLOOD BY AUTOMATED COUNT: 13.1 % (ref 12.3–15.4)
GLUCOSE BLDC GLUCOMTR-MCNC: 158 MG/DL (ref 70–105)
GLUCOSE BLDC GLUCOMTR-MCNC: 218 MG/DL (ref 70–105)
GLUCOSE BLDC GLUCOMTR-MCNC: 256 MG/DL (ref 70–105)
GLUCOSE SERPL-MCNC: 223 MG/DL (ref 65–99)
GLUCOSE SERPL-MCNC: 256 MG/DL (ref 65–99)
HCT VFR BLD AUTO: 39 % (ref 37.5–51)
HGB BLD-MCNC: 13 G/DL (ref 13–17.7)
IMM GRANULOCYTES # BLD AUTO: 0.1 10*3/MM3 (ref 0–0.05)
IMM GRANULOCYTES NFR BLD AUTO: 0.5 % (ref 0–0.5)
LAB AP CASE REPORT: NORMAL
LAB AP CASE REPORT: NORMAL
LEFT ATRIUM VOLUME INDEX: 22.1 ML/M2
LYMPHOCYTES # BLD AUTO: 0.86 10*3/MM3 (ref 0.7–3.1)
LYMPHOCYTES NFR BLD AUTO: 4.5 % (ref 19.6–45.3)
Lab: NORMAL
MAGNESIUM SERPL-MCNC: 2 MG/DL (ref 1.6–2.4)
MCH RBC QN AUTO: 31 PG (ref 26.6–33)
MCHC RBC AUTO-ENTMCNC: 33.3 G/DL (ref 31.5–35.7)
MCV RBC AUTO: 93.1 FL (ref 79–97)
MONOCYTES # BLD AUTO: 0.72 10*3/MM3 (ref 0.1–0.9)
MONOCYTES NFR BLD AUTO: 3.8 % (ref 5–12)
NEUTROPHILS NFR BLD AUTO: 17.47 10*3/MM3 (ref 1.7–7)
NEUTROPHILS NFR BLD AUTO: 91 % (ref 42.7–76)
NRBC BLD AUTO-RTO: 0 /100 WBC (ref 0–0.2)
PATH REPORT.FINAL DX SPEC: NORMAL
PATH REPORT.FINAL DX SPEC: NORMAL
PATH REPORT.GROSS SPEC: NORMAL
PLATELET # BLD AUTO: 244 10*3/MM3 (ref 140–450)
PMV BLD AUTO: 9.9 FL (ref 6–12)
POTASSIUM SERPL-SCNC: 4.2 MMOL/L (ref 3.5–5.2)
POTASSIUM SERPL-SCNC: 4.3 MMOL/L (ref 3.5–5.2)
QT INTERVAL: 291 MS
QT INTERVAL: 305 MS
QTC INTERVAL: 467 MS
QTC INTERVAL: 470 MS
RBC # BLD AUTO: 4.19 10*6/MM3 (ref 4.14–5.8)
SINUS: 4.1 CM
SODIUM SERPL-SCNC: 138 MMOL/L (ref 136–145)
SODIUM SERPL-SCNC: 139 MMOL/L (ref 136–145)
STJ: 2.6 CM
TSH SERPL DL<=0.05 MIU/L-ACNC: 0.63 UIU/ML (ref 0.27–4.2)
WBC NRBC COR # BLD AUTO: 19.18 10*3/MM3 (ref 3.4–10.8)

## 2024-08-14 PROCEDURE — 25010000002 PIPERACILLIN SOD-TAZOBACTAM PER 1 G: Performed by: INTERNAL MEDICINE

## 2024-08-14 PROCEDURE — C1751 CATH, INF, PER/CENT/MIDLINE: HCPCS

## 2024-08-14 PROCEDURE — 94799 UNLISTED PULMONARY SVC/PX: CPT

## 2024-08-14 PROCEDURE — 71045 X-RAY EXAM CHEST 1 VIEW: CPT

## 2024-08-14 PROCEDURE — 25010000002 ENOXAPARIN PER 10 MG

## 2024-08-14 PROCEDURE — 93306 TTE W/DOPPLER COMPLETE: CPT | Performed by: INTERNAL MEDICINE

## 2024-08-14 PROCEDURE — 93010 ELECTROCARDIOGRAM REPORT: CPT | Performed by: INTERNAL MEDICINE

## 2024-08-14 PROCEDURE — 25510000001 IOPAMIDOL PER 1 ML: Performed by: INTERNAL MEDICINE

## 2024-08-14 PROCEDURE — 63710000001 INSULIN LISPRO (HUMAN) PER 5 UNITS: Performed by: INTERNAL MEDICINE

## 2024-08-14 PROCEDURE — 80048 BASIC METABOLIC PNL TOTAL CA: CPT

## 2024-08-14 PROCEDURE — 93005 ELECTROCARDIOGRAM TRACING: CPT | Performed by: NURSE PRACTITIONER

## 2024-08-14 PROCEDURE — 25810000003 SODIUM CHLORIDE 0.9 % SOLUTION: Performed by: INTERNAL MEDICINE

## 2024-08-14 PROCEDURE — 25010000002 DIGOXIN PER 500 MCG

## 2024-08-14 PROCEDURE — 80048 BASIC METABOLIC PNL TOTAL CA: CPT | Performed by: NURSE PRACTITIONER

## 2024-08-14 PROCEDURE — 25010000002 AMIODARONE IN DEXTROSE 5% 360-4.14 MG/200ML-% SOLUTION: Performed by: NURSE PRACTITIONER

## 2024-08-14 PROCEDURE — 94761 N-INVAS EAR/PLS OXIMETRY MLT: CPT

## 2024-08-14 PROCEDURE — 25010000002 METHYLPREDNISOLONE PER 40 MG: Performed by: INTERNAL MEDICINE

## 2024-08-14 PROCEDURE — 25010000002 AMIODARONE IN DEXTROSE 5% 150-4.21 MG/100ML-% SOLUTION: Performed by: NURSE PRACTITIONER

## 2024-08-14 PROCEDURE — 25810000003 SODIUM CHLORIDE 0.9 % SOLUTION: Performed by: NURSE PRACTITIONER

## 2024-08-14 PROCEDURE — 93306 TTE W/DOPPLER COMPLETE: CPT

## 2024-08-14 PROCEDURE — 99223 1ST HOSP IP/OBS HIGH 75: CPT | Performed by: INTERNAL MEDICINE

## 2024-08-14 PROCEDURE — 94664 DEMO&/EVAL PT USE INHALER: CPT

## 2024-08-14 PROCEDURE — 02HV33Z INSERTION OF INFUSION DEVICE INTO SUPERIOR VENA CAVA, PERCUTANEOUS APPROACH: ICD-10-PCS | Performed by: INTERNAL MEDICINE

## 2024-08-14 PROCEDURE — 71275 CT ANGIOGRAPHY CHEST: CPT

## 2024-08-14 PROCEDURE — 82948 REAGENT STRIP/BLOOD GLUCOSE: CPT

## 2024-08-14 PROCEDURE — 25010000002 DIGOXIN PER 500 MCG: Performed by: INTERNAL MEDICINE

## 2024-08-14 PROCEDURE — 83735 ASSAY OF MAGNESIUM: CPT | Performed by: NURSE PRACTITIONER

## 2024-08-14 PROCEDURE — 82948 REAGENT STRIP/BLOOD GLUCOSE: CPT | Performed by: INTERNAL MEDICINE

## 2024-08-14 PROCEDURE — 85025 COMPLETE CBC W/AUTO DIFF WBC: CPT

## 2024-08-14 PROCEDURE — 84443 ASSAY THYROID STIM HORMONE: CPT | Performed by: NURSE PRACTITIONER

## 2024-08-14 RX ORDER — DIGOXIN 0.25 MG/ML
500 INJECTION INTRAMUSCULAR; INTRAVENOUS ONCE
Status: COMPLETED | OUTPATIENT
Start: 2024-08-14 | End: 2024-08-14

## 2024-08-14 RX ORDER — DIGOXIN 0.25 MG/ML
INJECTION INTRAMUSCULAR; INTRAVENOUS
Status: COMPLETED
Start: 2024-08-14 | End: 2024-08-14

## 2024-08-14 RX ORDER — NICOTINE POLACRILEX 4 MG
15 LOZENGE BUCCAL
Status: DISCONTINUED | OUTPATIENT
Start: 2024-08-14 | End: 2024-08-17 | Stop reason: HOSPADM

## 2024-08-14 RX ORDER — METOPROLOL TARTRATE 1 MG/ML
5 INJECTION, SOLUTION INTRAVENOUS ONCE
Status: COMPLETED | OUTPATIENT
Start: 2024-08-14 | End: 2024-08-14

## 2024-08-14 RX ORDER — DILTIAZEM HCL/D5W 125 MG/125
5-15 PLASTIC BAG, INJECTION (ML) INTRAVENOUS
Status: DISCONTINUED | OUTPATIENT
Start: 2024-08-14 | End: 2024-08-14

## 2024-08-14 RX ORDER — MIDODRINE HYDROCHLORIDE 5 MG/1
10 TABLET ORAL ONCE
Status: COMPLETED | OUTPATIENT
Start: 2024-08-14 | End: 2024-08-14

## 2024-08-14 RX ORDER — PREDNISONE 10 MG/1
10 TABLET ORAL DAILY
Status: DISCONTINUED | OUTPATIENT
Start: 2024-08-19 | End: 2024-08-17 | Stop reason: HOSPADM

## 2024-08-14 RX ORDER — DILTIAZEM HYDROCHLORIDE 5 MG/ML
INJECTION INTRAVENOUS
Status: COMPLETED
Start: 2024-08-14 | End: 2024-08-14

## 2024-08-14 RX ORDER — INSULIN LISPRO 100 [IU]/ML
2-9 INJECTION, SOLUTION INTRAVENOUS; SUBCUTANEOUS
Status: DISCONTINUED | OUTPATIENT
Start: 2024-08-14 | End: 2024-08-17 | Stop reason: HOSPADM

## 2024-08-14 RX ORDER — ENOXAPARIN SODIUM 100 MG/ML
1 INJECTION SUBCUTANEOUS EVERY 12 HOURS
Status: DISCONTINUED | OUTPATIENT
Start: 2024-08-14 | End: 2024-08-17

## 2024-08-14 RX ORDER — DIGOXIN 0.25 MG/ML
250 INJECTION INTRAMUSCULAR; INTRAVENOUS ONCE
Status: COMPLETED | OUTPATIENT
Start: 2024-08-14 | End: 2024-08-14

## 2024-08-14 RX ORDER — IBUPROFEN 600 MG/1
1 TABLET ORAL
Status: DISCONTINUED | OUTPATIENT
Start: 2024-08-14 | End: 2024-08-17 | Stop reason: HOSPADM

## 2024-08-14 RX ORDER — SODIUM CHLORIDE 0.9 % (FLUSH) 0.9 %
10 SYRINGE (ML) INJECTION AS NEEDED
Status: DISCONTINUED | OUTPATIENT
Start: 2024-08-14 | End: 2024-08-17 | Stop reason: HOSPADM

## 2024-08-14 RX ORDER — SODIUM CHLORIDE 0.9 % (FLUSH) 0.9 %
20 SYRINGE (ML) INJECTION AS NEEDED
Status: DISCONTINUED | OUTPATIENT
Start: 2024-08-14 | End: 2024-08-17 | Stop reason: HOSPADM

## 2024-08-14 RX ORDER — SODIUM CHLORIDE 9 MG/ML
100 INJECTION, SOLUTION INTRAVENOUS CONTINUOUS
Status: DISCONTINUED | OUTPATIENT
Start: 2024-08-14 | End: 2024-08-17 | Stop reason: HOSPADM

## 2024-08-14 RX ORDER — DILTIAZEM HCL/D5W 125 MG/125
PLASTIC BAG, INJECTION (ML) INTRAVENOUS
Status: COMPLETED
Start: 2024-08-14 | End: 2024-08-14

## 2024-08-14 RX ORDER — SODIUM CHLORIDE 0.9 % (FLUSH) 0.9 %
10 SYRINGE (ML) INJECTION EVERY 12 HOURS SCHEDULED
Status: DISCONTINUED | OUTPATIENT
Start: 2024-08-14 | End: 2024-08-17 | Stop reason: HOSPADM

## 2024-08-14 RX ORDER — DEXTROSE MONOHYDRATE 25 G/50ML
25 INJECTION, SOLUTION INTRAVENOUS
Status: DISCONTINUED | OUTPATIENT
Start: 2024-08-14 | End: 2024-08-17 | Stop reason: HOSPADM

## 2024-08-14 RX ORDER — MIDODRINE HYDROCHLORIDE 5 MG/1
5 TABLET ORAL
Status: DISCONTINUED | OUTPATIENT
Start: 2024-08-14 | End: 2024-08-17 | Stop reason: HOSPADM

## 2024-08-14 RX ORDER — ENOXAPARIN SODIUM 100 MG/ML
1 INJECTION SUBCUTANEOUS ONCE
Status: COMPLETED | OUTPATIENT
Start: 2024-08-14 | End: 2024-08-14

## 2024-08-14 RX ORDER — PREDNISONE 20 MG/1
20 TABLET ORAL DAILY
Status: DISCONTINUED | OUTPATIENT
Start: 2024-08-17 | End: 2024-08-17 | Stop reason: HOSPADM

## 2024-08-14 RX ORDER — METOPROLOL TARTRATE 1 MG/ML
INJECTION, SOLUTION INTRAVENOUS
Status: COMPLETED
Start: 2024-08-14 | End: 2024-08-14

## 2024-08-14 RX ORDER — DILTIAZEM HYDROCHLORIDE 5 MG/ML
10 INJECTION INTRAVENOUS ONCE
Status: COMPLETED | OUTPATIENT
Start: 2024-08-14 | End: 2024-08-14

## 2024-08-14 RX ADMIN — METOPROLOL SUCCINATE 25 MG: 25 TABLET, EXTENDED RELEASE ORAL at 08:47

## 2024-08-14 RX ADMIN — MIDODRINE HYDROCHLORIDE 10 MG: 5 TABLET ORAL at 13:01

## 2024-08-14 RX ADMIN — IPRATROPIUM BROMIDE AND ALBUTEROL SULFATE 3 ML: .5; 3 SOLUTION RESPIRATORY (INHALATION) at 19:23

## 2024-08-14 RX ADMIN — SODIUM CHLORIDE 500 ML: 0.9 INJECTION, SOLUTION INTRAVENOUS at 13:45

## 2024-08-14 RX ADMIN — METHYLPREDNISOLONE SODIUM SUCCINATE 40 MG: 40 INJECTION, POWDER, FOR SOLUTION INTRAMUSCULAR; INTRAVENOUS at 09:12

## 2024-08-14 RX ADMIN — Medication 10 ML: at 08:47

## 2024-08-14 RX ADMIN — PIPERACILLIN AND TAZOBACTAM 4.5 G: 4; .5 INJECTION, POWDER, FOR SOLUTION INTRAVENOUS at 01:17

## 2024-08-14 RX ADMIN — INSULIN LISPRO 2 UNITS: 100 INJECTION, SOLUTION INTRAVENOUS; SUBCUTANEOUS at 21:14

## 2024-08-14 RX ADMIN — BISACODYL 5 MG: 5 TABLET, COATED ORAL at 13:01

## 2024-08-14 RX ADMIN — DIGOXIN 250 MCG: 0.25 INJECTION INTRAMUSCULAR; INTRAVENOUS at 10:35

## 2024-08-14 RX ADMIN — Medication 10 MG/HR: at 10:02

## 2024-08-14 RX ADMIN — METHYLPREDNISOLONE SODIUM SUCCINATE 40 MG: 40 INJECTION, POWDER, FOR SOLUTION INTRAMUSCULAR; INTRAVENOUS at 01:17

## 2024-08-14 RX ADMIN — AMIODARONE HYDROCHLORIDE 150 MG: 1.5 INJECTION, SOLUTION INTRAVENOUS at 10:58

## 2024-08-14 RX ADMIN — PIPERACILLIN AND TAZOBACTAM 4.5 G: 4; .5 INJECTION, POWDER, FOR SOLUTION INTRAVENOUS at 15:23

## 2024-08-14 RX ADMIN — ACETAMINOPHEN 650 MG: 325 TABLET, FILM COATED ORAL at 13:01

## 2024-08-14 RX ADMIN — SODIUM CHLORIDE 500 ML: 9 INJECTION, SOLUTION INTRAVENOUS at 13:01

## 2024-08-14 RX ADMIN — AMIODARONE HYDROCHLORIDE 0.5 MG/MIN: 1.8 INJECTION, SOLUTION INTRAVENOUS at 17:35

## 2024-08-14 RX ADMIN — INSULIN LISPRO 4 UNITS: 100 INJECTION, SOLUTION INTRAVENOUS; SUBCUTANEOUS at 17:38

## 2024-08-14 RX ADMIN — ATORVASTATIN CALCIUM 10 MG: 10 TABLET, FILM COATED ORAL at 08:47

## 2024-08-14 RX ADMIN — IOPAMIDOL 100 ML: 755 INJECTION, SOLUTION INTRAVENOUS at 14:50

## 2024-08-14 RX ADMIN — METOPROLOL TARTRATE 5 MG: 1 INJECTION, SOLUTION INTRAVENOUS at 09:39

## 2024-08-14 RX ADMIN — ENOXAPARIN SODIUM 90 MG: 100 INJECTION SUBCUTANEOUS at 13:57

## 2024-08-14 RX ADMIN — SODIUM CHLORIDE 100 ML/HR: 9 INJECTION, SOLUTION INTRAVENOUS at 14:30

## 2024-08-14 RX ADMIN — DILTIAZEM HYDROCHLORIDE 10 MG: 5 INJECTION, SOLUTION INTRAVENOUS at 09:45

## 2024-08-14 RX ADMIN — DIGOXIN 500 MCG: 0.25 INJECTION INTRAMUSCULAR; INTRAVENOUS at 13:57

## 2024-08-14 RX ADMIN — TAMSULOSIN HYDROCHLORIDE 0.4 MG: 0.4 CAPSULE ORAL at 17:38

## 2024-08-14 RX ADMIN — AMIODARONE HYDROCHLORIDE 1 MG/MIN: 1.8 INJECTION, SOLUTION INTRAVENOUS at 11:08

## 2024-08-14 RX ADMIN — PANTOPRAZOLE SODIUM 40 MG: 40 TABLET, DELAYED RELEASE ORAL at 08:47

## 2024-08-14 RX ADMIN — IPRATROPIUM BROMIDE AND ALBUTEROL SULFATE 3 ML: .5; 3 SOLUTION RESPIRATORY (INHALATION) at 07:34

## 2024-08-14 RX ADMIN — DILTIAZEM HYDROCHLORIDE 10 MG: 5 INJECTION INTRAVENOUS at 09:45

## 2024-08-14 RX ADMIN — Medication 10 ML: at 21:15

## 2024-08-14 RX ADMIN — ENOXAPARIN SODIUM 90 MG: 100 INJECTION SUBCUTANEOUS at 21:15

## 2024-08-14 RX ADMIN — DOXYCYCLINE 100 MG: 100 INJECTION, POWDER, LYOPHILIZED, FOR SOLUTION INTRAVENOUS at 08:47

## 2024-08-14 RX ADMIN — MIDODRINE HYDROCHLORIDE 5 MG: 5 TABLET ORAL at 17:38

## 2024-08-14 RX ADMIN — PIPERACILLIN AND TAZOBACTAM 4.5 G: 4; .5 INJECTION, POWDER, FOR SOLUTION INTRAVENOUS at 08:47

## 2024-08-14 NOTE — CONSULTS
Referring Provider: Evelyn Felder MD    Reason for Consultation:      Atrial fibrillation with RVR      Patient Care Team:  Abner Funes APRN as PCP - General (Family Medicine)    Seen and examined agree with narrative as discussed with nurse practitioner after face-to-face encounter scruff findings below greater than 50% of total encounter time and medical decision making performed by me    SUBJECTIVE     Chief Complaint: Shortness of breath/palpitation    History of present illness:  Young Ames is a 73 y.o. male with a history of pneumonia who presented to [facility] with complaint of Shortness of breath.  Patient presented to [facility] on August 12, 2024 with complaints of shortness of breath.    In February of this year the patient was first diagnosed with having right-sided pneumonia.  He had repeat chest x-ray in July of this year showing residual pneumonia and pleural effusion.    He had been treated as an outpatient with antibiotic therapy including Keflex, Zithromax and Augmentin without resolution of his symptoms.    The patient presented to [facility] and admitted on August 12.    Patient has been evaluated by pulmonology.  He underwent bronchoscopy.  He also underwent thoracentesis removing 2 L from the right lung with fluid reporting to be clear and red.  Pathology and serology are pending given resistance to antibiotic therapy    This morning the patient developed atrial fibrillation with RVR.  His ventricular rates were very rapid over 200 beats a minute.  He was in sinus rhythm upon presentation to the hospital.  He does not have documented history of atrial fibrillation but reports in the past he has had episodes of feeling similar palpitations.    During his rapid response the patient received IV metoprolol, IV diltiazem bolus, IV diltiazem drip was started.  He he remained refractory to medical therapy in atrial fibrillation with RVR heart rate currently is in  the 160s to 170 range.  His blood pressure is currently 95/50.  Amiodarone drip was ordered along with IV bolus, if cannot be controlled may warrant cardioversion    The patient denies chest pain or worsening dyspnea at present.    The patient has no prior known history of revascularization.  In January of this year he underwent cardiac catheterization which showed Normal coronary arteries with 20 to 30% mid RCA stenosis.  He had normal LV function at that time.  Echocardiogram done in January 2024 demonstrated EF of 55 to 60% no diastolic dysfunction.  There is mild RV dilatation.  Mild TR.    Historical data copied forward from previous encounters in EMR is unchanged    Review of systems:    Constitutional: No weakness, fatigue, fever, rigors, chills   Eyes: No vision changes, eye pain   ENT/oropharynx: No difficulty swallowing, sore throat, epistaxis, changes in hearing   Cardiovascular: No chest pain, chest tightness, palpitations, paroxysmal nocturnal dyspnea, orthopnea, diaphoresis, dizziness / syncopal episode   Respiratory: C/o shortness of breath, dyspnea on exertion, cough, denies wheezing, hemoptysis   Gastrointestinal: No abdominal pain, nausea, vomiting, diarrhea, bloody stools   Genitourinary: No hematuria, dysuria   Neurological: No headache, tremors, numbness, one-sided weakness    Musculoskeletal: No cramps, myalgias, joint pain, joint swelling   Integument: No rash, edema        Personal History:      History reviewed. No pertinent past medical history.    Past Surgical History:   Procedure Laterality Date    CARDIAC CATHETERIZATION Right 1/29/2024    Procedure: Coronary angiography;  Surgeon: Abran Chand MD;  Location: [facility] CATH INVASIVE LOCATION;  Service: Cardiovascular;  Laterality: Right;    CARDIAC CATHETERIZATION N/A 1/29/2024    Procedure: Left Heart Cath;  Surgeon: Abran Chand MD;  Location: [facility] CATH INVASIVE LOCATION;  Service: Cardiovascular;  Laterality: N/A;       History  reviewed. No pertinent family history.    Social History     Tobacco Use    Smoking status: Former     Current packs/day: 0.00     Average packs/day: 0.3 packs/day for 30.0 years (7.5 ttl pk-yrs)     Types: Cigarettes     Start date: 1969     Quit date: 1999     Years since quittin.6     Passive exposure: Past    Smokeless tobacco: Never   Vaping Use    Vaping status: Never Used   Substance Use Topics    Alcohol use: Yes     Alcohol/week: 1.0 standard drink of alcohol     Types: 1 Cans of beer per week    Drug use: Never        Home meds:  Prior to Admission medications    Medication Sig Start Date End Date Taking? Authorizing Provider   acetaminophen (TYLENOL) 325 MG tablet Take 2 tablets by mouth Every 4 (Four) Hours As Needed for Mild Pain. 24  Yes Evelyn Felder MD   albuterol sulfate  (90 Base) MCG/ACT inhaler Inhale 2 puffs Every 4 (Four) Hours As Needed. 24  Yes Oswaldo Simpson MD   APPLE CIDER VINEGAR PO Take 1 capsule by mouth Daily.   Yes Oswaldo Simpson MD   aspirin 81 MG EC tablet Take 1 tablet by mouth Every Night.   Yes Oswaldo Simpson MD   Cholecalciferol (VITAMIN D-3 PO) Take 1 tablet by mouth Daily.   Yes Oswaldo Simpson MD   metoprolol succinate XL (TOPROL-XL) 25 MG 24 hr tablet Take 1 tablet by mouth Every Night.   Yes Oswaldo Simpson MD   multivitamin with minerals (Centrum Silver 50+Men) tablet tablet Take 1 tablet by mouth Every Morning.   Yes Oswaldo Smipson MD   NON FORMULARY Take 2 tablets by mouth Every Night. Zzzquil   Yes Oswaldo Simpson MD   Omega-3 Fatty Acids (fish oil) 1000 MG capsule capsule Take  by mouth Daily.   Yes Oswaldo Simpson MD   pantoprazole (PROTONIX) 40 MG EC tablet Take 1 tablet by mouth Daily. 24  Yes Evelyn Felder MD   simvastatin (ZOCOR) 20 MG tablet Take 1 tablet by mouth Every Night. 24  Yes Oswaldo Simpson MD   tamsulosin (FLOMAX) 0.4 MG capsule 24 hr capsule Take 1 capsule  "by mouth Every Night. 1/18/24  Yes Provider, MD Oswaldo   thiamine (VITAMIN B-1) 100 MG tablet  tablet Take 1 tablet by mouth Every Morning.   Yes Provider, MD Oswaldo       Allergies:     Patient has no known allergies.    Scheduled Meds:atorvastatin, 10 mg, Oral, Daily  doxycycline, 100 mg, Intravenous, Q12H  ipratropium-albuterol, 3 mL, Nebulization, Q6H While Awake - RT  methylPREDNISolone sodium succinate, 40 mg, Intravenous, Q8H  metoprolol succinate XL, 25 mg, Oral, Q24H  pantoprazole, 40 mg, Oral, QAM AC  piperacillin-tazobactam, 4.5 g, Intravenous, Q8H  sodium chloride, 500 mL, Intravenous, Once  sodium chloride, 10 mL, Intravenous, Q12H  tamsulosin, 0.4 mg, Oral, Nightly      Continuous Infusions:amiodarone, 1 mg/min, Last Rate: 1 mg/min (08/14/24 1108)   Followed by  amiodarone, 0.5 mg/min  dilTIAZem, 5-15 mg/hr, Last Rate: 10 mg/hr (08/14/24 1102)  hold, 1 each  sodium chloride, 50 mL/hr, Last Rate: 50 mL/hr (08/13/24 1033)      PRN Meds:  acetaminophen    senna-docusate sodium **AND** polyethylene glycol **AND** bisacodyl **AND** bisacodyl    [COMPLETED] dilTIAZem **AND** dilTIAZem **AND** dilTIAZem    hold    LORazepam    nitroglycerin    ondansetron    sodium chloride    sodium chloride      OBJECTIVE    Vital Signs  Vitals:    08/14/24 0957 08/14/24 1002 08/14/24 1014 08/14/24 1035   BP:  112/77  95/59   BP Location:       Patient Position:       Pulse: (!) 154 (!) 140 (!) 149 (!) 166   Resp:       Temp:       TempSrc:       SpO2: 92%      Weight:       Height:           Flowsheet Rows      Flowsheet Row First Filed Value   Admission Height 177.8 cm (70\") Documented at 08/12/2024 1626   Admission Weight 82.3 kg (181 lb 7 oz) Documented at 08/12/2024 1626              Intake/Output Summary (Last 24 hours) at 8/14/2024 1127  Last data filed at 8/14/2024 0455  Gross per 24 hour   Intake 480 ml   Output 2375 ml   Net -1895 ml        Telemetry:  Atrial Fibrillation RVR    Physical Exam:  The " patient is alert, oriented and in no distress.  Vital signs as noted above.  Head and neck revealed no carotid bruits or jugular venous distention.  No thyromegaly or lymphadenopathy is present  Lungs clear.  Diminished bases  Heart: irregularly irregular rhythm rapid VR.,  No gross rubs gallops heave or lift  Abdomen: Soft and nontender.  No organomegaly is present.  Extremities with good peripheral pulses without any pedal edema.  Skin: Warm and dry.  Musculoskeletal system is grossly normal.  CNS grossly normal.     Scribed findings for exam above    Results Review:  I have personally reviewed the results from the time of this admission to 8/14/2024 11:27 EDT and agree with these findings:  [x]  Laboratory  []  Microbiology  []  Radiology  [x]  EKG/Telemetry   [x]  Cardiology/Vascular   []  Pathology  []  Old records  []  Other:    Most notable findings include:     Lab Results (last 24 hours)       Procedure Component Value Units Date/Time    TSH [[id number]] Collected: 08/14/24 1000    Specimen: Blood Updated: 08/14/24 1051    Respiratory Culture - Wash, Lung, R [[id number]] Collected: 08/13/24 0820    Specimen: Wash from Lung, R Updated: 08/14/24 1037     Respiratory Culture Light growth (2+) The culture consists of normal respiratory scottie. This is a preliminary report; final report to follow.     Gram Stain Moderate (3+) WBCs seen      Few (2+) Bronchial epithelial cells      Few (2+) Gram positive cocci in clusters      Rare (1+) Gram negative bacilli    Magnesium [[id number]]  (Normal) Collected: 08/14/24 1000    Specimen: Blood Updated: 08/14/24 1034     Magnesium 2.0 mg/dL     Basic Metabolic Panel [[id number]]  (Abnormal) Collected: 08/14/24 1000    Specimen: Blood Updated: 08/14/24 1034     Glucose 256 mg/dL      BUN 36 mg/dL      Creatinine 1.30 mg/dL      Sodium 139 mmol/L      Potassium 4.3 mmol/L      Comment: Specimen hemolyzed.  Result may be falsely elevated.        Chloride 107 mmol/L      CO2 19.4  mmol/L      Calcium 8.4 mg/dL      BUN/Creatinine Ratio 27.7     Anion Gap 12.6 mmol/L      eGFR 57.6 mL/min/1.73     Narrative:      GFR Normal >60  Chronic Kidney Disease <60  Kidney Failure <15    The GFR formula is only valid for adults with stable renal function between ages 18 and 70.    Non-gynecologic Cytology [[id number]] Collected: 08/13/24 1401    Specimen: Pleural Fluid from Pleural Cavity Updated: 08/14/24 1025    POC Glucose Once [[id number]]  (Abnormal) Collected: 08/14/24 0933    Specimen: Blood Updated: 08/14/24 0934     Glucose 256 mg/dL      Comment: Serial Number: [id number]:  [id number]       Flow Cytometry [[id number]] Collected: 08/13/24 0820    Specimen: Wash from Lung, R Updated: 08/14/24 0933     Consult Global Result Comment^Text^TXT     Comment: Right lung:  Few B-cells with kappa excess (limited sample) (see   comments)  DISCLAIMER: REFER TO HARDCOPY OR PDF FOR COMPLETE RESULT.   If synopsis provided, clinical decisions should not be   based on this interfaced synopsis alone.  Performed at:  [facility] - [facility].  [address] [address]  :  Kassandra Villagomez M.D., Ph.D., Phone:  [phone]       Narrative:      Performed at:  [facility].  [address], [place] [address]  :  Kassandra Villagomez M.D., Ph.D., Phone:  [phone]    Body Fluid Culture - Body Fluid, Pleural Cavity [[id number]] Collected: 08/13/24 1401    Specimen: Body Fluid from Pleural Cavity Updated: 08/14/24 0801     Body Fluid Culture No growth at less than 24 hours     Gram Stain Moderate (3+) WBCs per low power field      No organisms seen    Pathology Consultation [[id number]] Collected: 08/13/24 1401    Specimen: Pleural Fluid from Pleural Cavity Updated: 08/14/24 0700    Basic Metabolic Panel [[id number]]  (Abnormal) Collected: 08/14/24 0128    Specimen: Blood from Arm, Left Updated:  08/14/24 0200     Glucose 223 mg/dL      BUN 35 mg/dL      Creatinine 1.39 mg/dL      Sodium 138 mmol/L      Potassium 4.2 mmol/L      Comment: Specimen hemolyzed.  Result may be falsely elevated.        Chloride 107 mmol/L      CO2 19.8 mmol/L      Calcium 8.3 mg/dL      BUN/Creatinine Ratio 25.2     Anion Gap 11.2 mmol/L      eGFR 53.2 mL/min/1.73     Narrative:      GFR Normal >60  Chronic Kidney Disease <60  Kidney Failure <15    The GFR formula is only valid for adults with stable renal function between ages 18 and 70.    CBC & Differential [[id number]]  (Abnormal) Collected: 08/14/24 0128    Specimen: Blood from Arm, Left Updated: 08/14/24 0139    Narrative:      The following orders were created for panel order CBC & Differential.  Procedure                               Abnormality         Status                     ---------                               -----------         ------                     CBC Auto Differential[[id number]]        Abnormal            Final result                 Please view results for these tests on the individual orders.    CBC Auto Differential [[id number]]  (Abnormal) Collected: 08/14/24 0128    Specimen: Blood from Arm, Left Updated: 08/14/24 0139     WBC 19.18 10*3/mm3      RBC 4.19 10*6/mm3      Hemoglobin 13.0 g/dL      Hematocrit 39.0 %      MCV 93.1 fL      MCH 31.0 pg      MCHC 33.3 g/dL      RDW 13.1 %      RDW-SD 44.4 fl      MPV 9.9 fL      Platelets 244 10*3/mm3      Neutrophil % 91.0 %      Lymphocyte % 4.5 %      Monocyte % 3.8 %      Eosinophil % 0.0 %      Basophil % 0.2 %      Immature Grans % 0.5 %      Neutrophils, Absolute 17.47 10*3/mm3      Lymphocytes, Absolute 0.86 10*3/mm3      Monocytes, Absolute 0.72 10*3/mm3      Eosinophils, Absolute 0.00 10*3/mm3      Basophils, Absolute 0.03 10*3/mm3      Immature Grans, Absolute 0.10 10*3/mm3      nRBC 0.0 /100 WBC     Protein, Body Fluid - Pleural Fluid, Pleural Cavity [[id number]] Collected: 08/13/24 7591     Specimen: Pleural Fluid from Pleural Cavity Updated: 08/13/24 2003     Protein, Total, Fluid 3.7 g/dL     Narrative:      No Reference Ranges Established.    A serous fluid total fluid (TP) greater than 50 percent of the serum TP suggests the fluid is an exudate.      1. Pleural TP/Serum TP >0.5  2. Pleural LD/Serum LD >0.6  3. Pleural LD >2/3 of the upper limit of normal for serum LDH    This test was developed, it performance characteristics determined and judged suitable for clinical purposes by [facility].  It has not been cleared or approved by the FDA.  The laboratory is regulated under CLIA as qualified to perform high-complexity testing.     Lactate Dehydrogenase, Body Fluid - Pleural Fluid, Pleural Cavity [[id number]] Collected: 08/13/24 1401    Specimen: Pleural Fluid from Pleural Cavity Updated: 08/13/24 2003     Lactate Dehydrogenase (LD), Fluid 538 U/L     Narrative:      No Reference Ranges Established.    Serous fluid LDH greater than 60 percent of the serum LDH or serous fluid LDH two-thirds of the upper limit of normal for serum LDH suggests the fluid is an exudate.     1. Pleural TP/Serum TP >0.5  2. Pleural LD/Serum LD >0.6  3. Pleural LD >2/3 of the upper limit of normal for serum LDH    This test was developed, it performance characteristics determined and judged suitable for clinical purposes by [facility].  It has not been cleared or approved by the FDA.  The laboratory is regulated under CLIA as qualified to perform high-complexity testing.     Glucose, Body Fluid - Pleural Fluid, Pleural Cavity [[id number]] Collected: 08/13/24 1401    Specimen: Pleural Fluid from Pleural Cavity Updated: 08/13/24 2003     Glucose, Fluid 182 mg/dL     Narrative:      No Reference Ranges Established.    Serous fluid glucose less than 60 mg/dL or less than 30 mg/dL below serum glucose suggests an infectious or malignant exudate.     This test was developed, it  performance characteristics determined and judged suitable for clinical purposes by [facility].  It has not been cleared or approved by the FDA.  The laboratory is regulated under CLIA as qualified to perform high-complexity testing.     Blood Culture - Blood, Arm, Right [[id number]]  (Normal) Collected: 08/12/24 1834    Specimen: Blood from Arm, Right Updated: 08/13/24 1845     Blood Culture No growth at 24 hours    Blood Culture - Blood, Arm, Left [[id number]]  (Normal) Collected: 08/12/24 1744    Specimen: Blood from Arm, Left Updated: 08/13/24 1800     Blood Culture No growth at 24 hours    Narrative:      Less than seven (7) mL's of blood was collected.  Insufficient quantity may yield false negative results.    Body Fluid Cell Count With Differential - Pleural Fluid, Pleural Cavity [[id number]]  (Abnormal) Collected: 08/13/24 1401    Specimen: Pleural Fluid from Pleural Cavity Updated: 08/13/24 1541    Narrative:      The following orders were created for panel order Body Fluid Cell Count With Differential - Pleural Fluid, Pleural Cavity.  Procedure                               Abnormality         Status                     ---------                               -----------         ------                     Body fluid cell count - ...[[id number]]  Abnormal            Final result               Body fluid differential ...[[id number]]                      Final result               Path Consult Reflex[[id number]]                              Final result                 Please view results for these tests on the individual orders.    Body fluid differential - Pleural Fluid, Pleural Cavity [[id number]] Collected: 08/13/24 1401    Specimen: Pleural Fluid from Pleural Cavity Updated: 08/13/24 1541     Neutrophils, Fluid 2 %      Lymphocytes, Fluid 2 %      Monocytes, Fluid 16 %      Unclassified Cells, Fluid 76 %      Mesothelial Cells, Fluid 4 %     Narrative:      Concentrated Smear by  Cytocentrifuge Prep.    pH, Body Fluid - Pleural Fluid, Pleural Cavity [[id number]]  (Abnormal) Collected: 08/13/24 1401    Specimen: Pleural Fluid from Pleural Cavity Updated: 08/13/24 1517     pH, Fluid 8.00    Path Consult Reflex [[id number]] Collected: 08/13/24 1401    Specimen: Pleural Fluid from Pleural Cavity Updated: 08/13/24 1513     Pathology Review Yes    Body fluid cell count - Pleural Fluid, Pleural Cavity [[id number]]  (Abnormal) Collected: 08/13/24 1401    Specimen: Pleural Fluid from Pleural Cavity Updated: 08/13/24 1417     Color, Fluid Red     Appearance, Fluid Cloudy     Nucleated Cells, Fluid 1,539 /mm3      Method: Automated Sysmex XN Method    Narrative:      No reference range established. Physician to interpret results with clinical findings.    AFB Culture - Body Fluid, Pleural Cavity [[id number]] Collected: 08/13/24 1401    Specimen: Body Fluid from Pleural Cavity Updated: 08/13/24 1406    Anaerobic Culture - Pleural Fluid, Pleural Cavity [[id number]] Collected: 08/13/24 1401    Specimen: Pleural Fluid from Pleural Cavity Updated: 08/13/24 1406    Non-gynecologic Cytology [[id number]] Collected: 08/13/24 0813    Specimen: Brushing from Lung, R; Wash from Lung, R Updated: 08/13/24 1223            Imaging Results (Last 24 Hours)       Procedure Component Value Units Date/Time    XR Chest 1 View [[id number]] Collected: 08/14/24 1026     Updated: 08/14/24 1029    Narrative:      XR CHEST 1 VW    Date of Exam: 8/14/2024 9:55 AM EDT    Indication: high heart rate    Comparison: Chest x-ray 8/13/2024    Findings:  Defibrillator pads project over the left chest. Stable cardiomediastinal silhouette within normal limits. Redemonstration of patchy parenchymal and interstitial opacities throughout most of the right lung, worst in the mid and lower lung. Redemonstration   of small right-sided pleural effusion. The left lung remains grossly clear. No pneumothorax.      Impression:      Impression:  Interval  placement of defibrillator pads. Redemonstration of extensive airspace disease of the right lung.      Electronically Signed: Gabino Carey MD    8/14/2024 10:27 AM EDT    Workstation ID: [id number]    XR Chest 1 View [[id number]] Collected: 08/13/24 1630     Updated: 08/13/24 1633    Narrative:      XR CHEST 1 VW    Date of Exam: 8/13/2024 4:29 PM EDT    Indication: chest pain after thoracentesis    Comparison: 8/13/2024 at 1417 hours    Findings:  Cardiac size stable in the left lung is clear. There is diffuse right lung pulmonary infiltrate. There is a small right pleural effusion. There is no pneumothorax.      Impression:      Impression:    1. Diffuse infiltrate throughout the right lung.  2. Small right pleural effusion.  3. No pneumothorax identified.      Electronically Signed: Joe Mathew MD    8/13/2024 4:31 PM EDT    Workstation ID: [id number]    US Thoracentesis [[id number]] Collected: 08/13/24 1421    Specimen: Body Fluid Updated: 08/13/24 1454    Narrative:      DATE OF EXAM:  8/13/2024 1:53 PM EDT    PROCEDURE:  US THORACENTESIS    INDICATIONS:  Pleural effusion    COMPARISON:  CT 8/12/2024    FLUOROSCOPIC TIME:  None    PHYSICIAN MONITORED CONSCIOUS SEDATION TIME:  None minutes    TECHNIQUE:   A detailed explanation of the procedure, including the risks, benefits, and alternatives was provided. Informed consent was obtained from the patient. A preprocedure timeout was performed. The interventional radiology nurse monitored the patient at all   times. The patient was placed upright in the bed and the right back was ultrasounded, demonstrating a large right pleural effusion. The right back was then marked and prepped and draped in the usual sterile fashion. The skin and subcutaneous tissues were   anesthetized with 1% lidocaine and a small skin incision was made. Next, a 4 Arabic centesis needle was advanced into the collection. A total of 2000 mL of clear red fluid was aspirated and the needle was  removed. Specimens were collected and sent to   the lab per the ordering clinician. Hemostasis was achieved and a sterile bandage was applied. The patient tolerated the procedure well, without immediate complication. Post procedure chest x-ray completed and without evidence of pneumothorax.    FINDINGS:  See above      Impression:      Technically successful right thoracentesis utilizing ultrasound guidance and yielding 2000 mL of clear red fluid.      Report dictated by: Justyn Boateng DNP      I have personally reviewed this case and agree with the findings above:    Electronically Signed: Sean Buckley MD    8/13/2024 2:28 PM EDT    Workstation ID: [id number]    XR Chest 1 View [[id number]] Collected: 08/13/24 1429     Updated: 08/13/24 1454    Narrative:      XR CHEST 1 VW    Date of Exam: 8/13/2024 2:18 PM EDT    Indication: s/p right thora    Comparison: 1/28/2024, chest CT 8/12/2024    Findings:  Cardiac size is stable. The left lung is clear. The patient has developed diffuse infiltrate throughout the right lung. There is a small residual right pleural effusion. No pneumothorax is identified.      Impression:      Impression:    1. Development of an interstitial infiltrate throughout the right lung which hasn't changed significantly from the patient's chest CT of 8/12/2024.  2. Small residual right pleural effusion. No pneumothorax is identified.      Electronically Signed: Joe Mathew MD    8/13/2024 2:31 PM EDT    Workstation ID: [id number]            LAB RESULTS (LAST 7 DAYS)    CBC  Results from last 7 days   Lab Units 08/14/24  0128 08/13/24  0335 08/12/24  1732   WBC 10*3/mm3 19.18* 8.96 13.47*   RBC 10*6/mm3 4.19 4.56 5.00   HEMOGLOBIN g/dL 13.0 14.3 15.8   HEMATOCRIT % 39.0 42.5 47.5   MCV fL 93.1 93.2 95.0   PLATELETS 10*3/mm3 244 251 252       BMP  Results from last 7 days   Lab Units 08/14/24  1000 08/14/24  0128 08/13/24  0335 08/12/24 2030   SODIUM mmol/L 139 138 135* 137   POTASSIUM mmol/L 4.3 4.2 4.4  4.4   CHLORIDE mmol/L 107 107 106 104   CO2 mmol/L 19.4* 19.8* 18.9* 20.8*   BUN mg/dL 36* 35* 27* 23   CREATININE mg/dL 1.30* 1.39* 1.29* 1.28*   GLUCOSE mg/dL 256* 223* 225* 167*   MAGNESIUM mg/dL 2.0  --   --   --        CMP   Results from last 7 days   Lab Units 08/14/24  1000 08/14/24  0128 08/13/24  0335 08/12/24  2030   SODIUM mmol/L 139 138 135* 137   POTASSIUM mmol/L 4.3 4.2 4.4 4.4   CHLORIDE mmol/L 107 107 106 104   CO2 mmol/L 19.4* 19.8* 18.9* 20.8*   BUN mg/dL 36* 35* 27* 23   CREATININE mg/dL 1.30* 1.39* 1.29* 1.28*   GLUCOSE mg/dL 256* 223* 225* 167*   ALBUMIN g/dL  --   --   --  3.3*   BILIRUBIN mg/dL  --   --   --  0.2   ALK PHOS U/L  --   --   --  55   AST (SGOT) U/L  --   --   --  39   ALT (SGPT) U/L  --   --   --  20       BNP        TROPONIN  Results from last 7 days   Lab Units 08/12/24  2305   HSTROP T ng/L 53*       CoAg  Results from last 7 days   Lab Units 08/12/24  1834   INR  1.00       Creatinine Clearance  Estimated Creatinine Clearance: 56.1 mL/min (A) (by C-G formula based on SCr of 1.3 mg/dL (H)).    ABG          Radiology  XR Chest 1 View    Result Date: 8/14/2024  Impression: Interval placement of defibrillator pads. Redemonstration of extensive airspace disease of the right lung. Electronically Signed: Gabino Carey MD  8/14/2024 10:27 AM EDT  Workstation ID: [id number]    XR Chest 1 View    Result Date: 8/13/2024  Impression: 1. Diffuse infiltrate throughout the right lung. 2. Small right pleural effusion. 3. No pneumothorax identified. Electronically Signed: Joe Mathew MD  8/13/2024 4:31 PM EDT  Workstation ID: [id number]    XR Chest 1 View    Result Date: 8/13/2024  Impression: 1. Development of an interstitial infiltrate throughout the right lung which hasn't changed significantly from the patient's chest CT of 8/12/2024. 2. Small residual right pleural effusion. No pneumothorax is identified. Electronically Signed: Joe Mathew MD  8/13/2024 2:31 PM EDT  Workstation ID:  [id number]    US Thoracentesis    Result Date: 8/13/2024  Technically successful right thoracentesis utilizing ultrasound guidance and yielding 2000 mL of clear red fluid. Report dictated by: Justyn Boateng DNP  I have personally reviewed this case and agree with the findings above: Electronically Signed: Sean Buckley MD  8/13/2024 2:28 PM EDT  Workstation ID: [id number]    CT Chest Without Contrast Diagnostic    Result Date: 8/12/2024  Impression: 1. Large right pleural effusion with dense consolidation involving the right middle and right lower lobes and extensive nodular airspace disease throughout the right upper lobe consistent with multifocal pneumonia. 2. Asymmetric interlobular septal thickening throughout the right lung may relate to atypical infection or asymmetric pulmonary edema. 3. Emphysema with new nodules in left lower lobe largest 6 mm, recommend attention on short-term follow-up. 4. Enlarged right paratracheal and right hilar lymph nodes likely reactive adenopathy. 5. New moderate left hydroureteronephrosis partially imaged, consider dedicated CT abdomen and pelvis for further assessment. Electronically Signed: Antwon Estrella MD  8/12/2024 7:20 PM EDT  Workstation ID: [id number]       EKG  I personally viewed and interpreted the patient's EKG/Telemetry data:  ECG 12 Lead Rhythm Change   Final Result   HEART OMET=736  bpm   RR Feejqrfp=771  ms   ID Interval=  ms   P Horizontal Axis=  deg   P Front Axis=  deg   QRSD Interval=72  ms   QT Lqahqahe=962  ms   OOmV=059  ms   QRS Axis=66  deg   T Wave Axis=256  deg   - ABNORMAL ECG -   Atrial fibrillation with rapid V-rate   Anteroseptal  infarct, old   Repolarization abnormality, prob rate related   Electronically Signed By: Alex Cunningham ([facility]) 2024-08-14 09:59:52   Date and Time of Study:2024-08-14 09:45:24      ECG 12 Lead Dyspnea   Final Result   HEART ZMFB=963  bpm   RR Itgsivmz=654  ms   ID Tcuhutai=044  ms   P Horizontal Axis=26  deg   P Front Axis=57   deg   QRSD Interval=85  ms   QT Eiwwxjes=161  ms   VBkL=709  ms   QRS Axis=-31  deg   T Wave Axis=57  deg   - OTHERWISE NORMAL ECG -   Sinus tachycardia   Left axis deviation   Electronically Signed By: Nader Castro ([facility]) 2024-08-13 09:11:23   Date and Time of Study:2024-08-12 16:33:06      Telemetry Scan   Final Result      Telemetry Scan   Final Result      Telemetry Scan   Final Result      Telemetry Scan   Final Result      Telemetry Scan   Final Result      Telemetry Scan   Final Result      Telemetry Scan   Final Result      Telemetry Scan   Final Result      Telemetry Scan   Final Result      Telemetry Scan   Final Result      Telemetry Scan   Final Result      Telemetry Scan   Final Result      Telemetry Scan   Final Result      Telemetry Scan   Final Result      Telemetry Scan   Final Result      Telemetry Scan   Final Result      ECG 12 Lead Drug Monitoring; Amiodarone    (Results Pending)                       Echocardiogram:    Results for orders placed during the hospital encounter of 01/28/24    Adult Transthoracic Echo Complete W/ Cont if Necessary Per Protocol    Interpretation Summary    Left ventricular ejection fraction appears to be 56 - 60%.    Left ventricular diastolic function was normal.    The right ventricular cavity is mildly dilated.        Stress Test:  Results for orders placed during the hospital encounter of 01/28/24    Stress Test With Myocardial Perfusion One Day    Interpretation Summary    Impressions are consistent with an intermediate risk study.    Left ventricular ejection fraction is normal (Calculated EF = 56%).    Myocardial perfusion imaging indicates a moderate-sized, moderately severe area of ischemia located in the inferior wall and septal wall.        Cardiac Catheterization:  Results for orders placed during the hospital encounter of 01/28/24    Cardiac Catheterization/Vascular Study    [id number] SCORE   No data recorded     Other:      ASSESSMENT &  PLAN:    Principal Problem:    Large pleural effusion  Active Problems:    Pneumonia due to infectious organism    Atrial fibrillation with RVR  Ventricular rates initially over 200  Patient has received IV metoprolol, IV diltiazem bolus, IV diltiazem drip, IV digoxin  Now started on IV amiodarone  BP slightly soft  Will give IV fluid bolus  Discussed with Dr. Gonzalez who would like patient to get CT PE protocol  [id number] equals 2  Will need to consider anticoagulation    Right-sided pneumonia with large right pleural effusion  Status post bronchoscopy and thoracentesis  2 L removed pleural fluid reported to be clear and red    Hypertension  Currently soft  Follow and trend blood pressures    Nonobstructive coronary artery disease  Cardiac catheterization in January 2024  20 to 30% mid L RCA lesion.  Otherwise normal coronary anatomy.      Will start IV amiodarone per protocol with IV loading dose  Follow QT interval  Keep potassium greater than 4 magnesium greater than 2    Discussed with Dr. Gonzalez and he would like CT PE protocol.  Will check TSH.   [id number] score equals 2.  Prefer Lovenox for anticoagulation twice daily  Consider cardioversion if unable to control ventricular rate or has hypotension  Repeat echo estimate filling pressures, rule out pericardial effusion      Guarded patient  Further recommendation follow findings and response to therapy    Carlos Pavon MD, PhD        Mary Headley, APRN  08/14/24  11:27 EDT

## 2024-08-14 NOTE — PROGRESS NOTES
Daily Progress Note        Large pleural effusion    Pneumonia due to infectious organism      Assessment    Bronchoscopy right lower lobe brushing and washing positive for non-small cell lung cancer     Initially presented for non resolving Right side pneumonia not responding to multiple courses of antibiotics as an outpatient    CT PE protocol negative for pulmonary embolism completed 08/14/2024    S/p Bronchoscopy completed 8/13/2024   no endobronchial lesions, no mucopurulent secretions, right lung washing revealed yellowish fluid material   Right lower lobe endobronchial brushing x 1 was done      moderate-sized right pleural effusion  Status post thoracentesis by IR on 8/13/2024  Removal of 2 L of red-colored fluid  Fluid analysis elevated nucleated cells unclassified  pH 8, glucose 182, , protein 3.7 compatible with exudate  cultures pending      Ex-smoker quit in 1999 with 25-pack-year     15 pound weight loss     [place] in [place] with exposure to agent of orange    Respiratory viral panel was negative  Legionella antigen urine strep antigen negative        Recommendations:     Oncology consult   Followed by cardiology for new onset Afib with RVR   Broad-spectrum antibiotics Zosyn  Monitor bronchial washing cultures and pleural fluid cultures      Chest x-ray 8/14/2024      CT chest 8/12/2024        LOS: 2 days     Subjective         Objective     Vital signs for last 24 hours:  Vitals:    08/14/24 0116 08/14/24 0455 08/14/24 0734 08/14/24 0739   BP: 127/74 120/78     BP Location: Left arm Left arm     Patient Position: Lying Lying     Pulse: 116 111 104 108   Resp: 21 17 12 14   Temp: 97.4 °F (36.3 °C) 98.1 °F (36.7 °C)     TempSrc: Oral Oral     SpO2: 94% 93% 95% 99%   Weight:  89.2 kg (196 lb 10.4 oz)     Height:           Intake/Output last 3 shifts:  I/O last 3 completed shifts:  In: 880 [P.O.:480; I.V.:400]  Out: 2375 [Urine:375; Other:2000]  Intake/Output this shift:  No intake/output data  recorded.      Radiology  Imaging Results (Last 24 Hours)       Procedure Component Value Units Date/Time    XR Chest 1 View [[id number]] Collected: 08/13/24 1630     Updated: 08/13/24 1633    Narrative:      XR CHEST 1 VW    Date of Exam: 8/13/2024 4:29 PM EDT    Indication: chest pain after thoracentesis    Comparison: 8/13/2024 at 1417 hours    Findings:  Cardiac size stable in the left lung is clear. There is diffuse right lung pulmonary infiltrate. There is a small right pleural effusion. There is no pneumothorax.      Impression:      Impression:    1. Diffuse infiltrate throughout the right lung.  2. Small right pleural effusion.  3. No pneumothorax identified.      Electronically Signed: Joe Mathew MD    8/13/2024 4:31 PM EDT    Workstation ID: [id number]    US Thoracentesis [[id number]] Collected: 08/13/24 1421    Specimen: Body Fluid Updated: 08/13/24 1454    Narrative:      DATE OF EXAM:  8/13/2024 1:53 PM EDT    PROCEDURE:  US THORACENTESIS    INDICATIONS:  Pleural effusion    COMPARISON:  CT 8/12/2024    FLUOROSCOPIC TIME:  None    PHYSICIAN MONITORED CONSCIOUS SEDATION TIME:  None minutes    TECHNIQUE:   A detailed explanation of the procedure, including the risks, benefits, and alternatives was provided. Informed consent was obtained from the patient. A preprocedure timeout was performed. The interventional radiology nurse monitored the patient at all   times. The patient was placed upright in the bed and the right back was ultrasounded, demonstrating a large right pleural effusion. The right back was then marked and prepped and draped in the usual sterile fashion. The skin and subcutaneous tissues were   anesthetized with 1% lidocaine and a small skin incision was made. Next, a 4 Jamaican centesis needle was advanced into the collection. A total of 2000 mL of clear red fluid was aspirated and the needle was removed. Specimens were collected and sent to   the lab per the ordering clinician. Hemostasis  was achieved and a sterile bandage was applied. The patient tolerated the procedure well, without immediate complication. Post procedure chest x-ray completed and without evidence of pneumothorax.    FINDINGS:  See above      Impression:      Technically successful right thoracentesis utilizing ultrasound guidance and yielding 2000 mL of clear red fluid.      Report dictated by: Justyn Boateng DNP      I have personally reviewed this case and agree with the findings above:    Electronically Signed: Sean Buckley MD    8/13/2024 2:28 PM EDT    Workstation ID: [id number]    XR Chest 1 View [[id number]] Collected: 08/13/24 1429     Updated: 08/13/24 1454    Narrative:      XR CHEST 1 VW    Date of Exam: 8/13/2024 2:18 PM EDT    Indication: s/p right thora    Comparison: 1/28/2024, chest CT 8/12/2024    Findings:  Cardiac size is stable. The left lung is clear. The patient has developed diffuse infiltrate throughout the right lung. There is a small residual right pleural effusion. No pneumothorax is identified.      Impression:      Impression:    1. Development of an interstitial infiltrate throughout the right lung which hasn't changed significantly from the patient's chest CT of 8/12/2024.  2. Small residual right pleural effusion. No pneumothorax is identified.      Electronically Signed: Joe Mathew MD    8/13/2024 2:31 PM EDT    Workstation ID: [id number]            Labs:  Results from last 7 days   Lab Units 08/14/24  0128   WBC 10*3/mm3 19.18*   HEMOGLOBIN g/dL 13.0   HEMATOCRIT % 39.0   PLATELETS 10*3/mm3 244     Results from last 7 days   Lab Units 08/14/24  0128 08/13/24  0335 08/12/24 2030   SODIUM mmol/L 138   < > 137   POTASSIUM mmol/L 4.2   < > 4.4   CHLORIDE mmol/L 107   < > 104   CO2 mmol/L 19.8*   < > 20.8*   BUN mg/dL 35*   < > 23   CREATININE mg/dL 1.39*   < > 1.28*   CALCIUM mg/dL 8.3*   < > 8.6   BILIRUBIN mg/dL  --   --  0.2   ALK PHOS U/L  --   --  55   ALT (SGPT) U/L  --   --  20   AST (SGOT) U/L  --    --  39   GLUCOSE mg/dL 223*   < > 167*    < > = values in this interval not displayed.         Results from last 7 days   Lab Units 08/12/24 2030   ALBUMIN g/dL 3.3*     Results from last 7 days   Lab Units 08/12/24  2305 08/12/24 2030   HSTROP T ng/L 53* 57*             Results from last 7 days   Lab Units 08/12/24  1834   INR  1.00               Meds:   SCHEDULE  atorvastatin, 10 mg, Oral, Daily  doxycycline, 100 mg, Intravenous, Q12H  ipratropium-albuterol, 3 mL, Nebulization, Q6H While Awake - RT  methylPREDNISolone sodium succinate, 40 mg, Intravenous, Q8H  metoprolol succinate XL, 25 mg, Oral, Q24H  pantoprazole, 40 mg, Oral, QAM AC  piperacillin-tazobactam, 4.5 g, Intravenous, Q8H  sodium chloride, 10 mL, Intravenous, Q12H  tamsulosin, 0.4 mg, Oral, Nightly      Infusions  hold, 1 each  sodium chloride, 50 mL/hr, Last Rate: 50 mL/hr (08/13/24 1033)      PRNs    acetaminophen    senna-docusate sodium **AND** polyethylene glycol **AND** bisacodyl **AND** bisacodyl    hold    LORazepam    nitroglycerin    ondansetron    sodium chloride    sodium chloride    Physical Exam:  Physical Exam  Cardiovascular:      Heart sounds: No murmur heard.     No gallop.   Pulmonary:      Effort: No respiratory distress.      Breath sounds: No stridor. Rhonchi and rales present. No wheezing.   Chest:      Chest wall: No tenderness.         ROS  Review of Systems   Respiratory:  Positive for cough and shortness of breath. Negative for wheezing and stridor.    Cardiovascular:  Negative for chest pain, palpitations and leg swelling.             Total time spent with patient greater than: 45 Minutes

## 2024-08-14 NOTE — CASE MANAGEMENT/SOCIAL WORK
Continued Stay Note  KATIE Marrero     Patient Name: Young Ames  MRN: [id number]  Today's Date: 8/14/2024    Admit Date: 8/12/2024    Plan: Anticipate routine home with spouse. Watch O2 needs.   Discharge Plan       Row Name 08/14/24 1153       Plan    Plan Anticipate routine home with spouse. Watch O2 needs.    Plan Comments DC Barriers: Rapid response called, cardiology consulted, Cardizem gtt, amiodarone gtt, CT PE ordered and pending. Patient currently noted to be on 2L O2 with no home O2. May require walking oximetry to be performed 24-48 hrs prior to d/c.                Natalie Graves RN     Office Phone: [phone]  Office Cell: [phone]

## 2024-08-14 NOTE — PROGRESS NOTES
LOS: 2 days   Patient Care Team:  Abner Funes APRN as PCP - General (Family Medicine)    Subjective     Interval History: Fasting this a.m. for new onset atrial fibrillation with RVR    Patient Complaints: Mild shortness of breath and general fatigue    History taken from: patient    Review of Systems   Constitutional:  Positive for activity change, appetite change and fatigue. Negative for chills, diaphoresis and fever.   HENT:  Negative for facial swelling.    Eyes:  Negative for visual disturbance.   Respiratory:  Positive for cough and shortness of breath. Negative for wheezing and stridor.    Cardiovascular:  Negative for chest pain, palpitations and leg swelling.   Gastrointestinal:  Negative for abdominal pain, constipation, diarrhea and nausea.   Endocrine: Negative for polyuria.   Genitourinary:  Negative for dysuria.   Musculoskeletal:  Negative for arthralgias and back pain.   Skin:  Negative for rash.   Neurological:  Negative for dizziness, light-headedness and headaches.   Psychiatric/Behavioral:  Negative for confusion.            Objective     Vital Signs  Temp:  [97.4 °F (36.3 °C)-98.4 °F (36.9 °C)] 98.1 °F (36.7 °C)  Heart Rate:  [104-220] 135  Resp:  [12-24] 14  BP: ()/(59-95) 111/68    Physical Exam:     General Appearance:    Alert, cooperative, in no acute distress,   Head:    Normocephalic, without obvious abnormality, atraumatic   Eyes:            Lids and lashes normal, conjunctivae and sclerae normal, no   icterus, no pallor, corneas clear, PERRLA   Ears:    Ears appear intact with no abnormalities noted   Throat:   No oral lesions, no thrush, oral mucosa moist   Neck:   No adenopathy, supple, trachea midline, no thyromegaly, no   carotid bruit, no JVD   Lungs:    Decreased breath sounds right base    Heart:    Regular rhythm and normal rate, normal S1 and S2, no            murmur, no gallop, no rub, no click   Chest Wall:    No abnormalities observed   Abdomen:     Normal  bowel sounds, no masses, no organomegaly, soft        Non-tender non-distended, no guarding,   Extremities:   Moves all extremities well, no edema, no cyanosis, no             Redness   Pulses:   Pulses palpable and equal bilaterally   Skin:   No bleeding, bruising or rash   Lymph nodes:   No palpable adenopathy   Neurologic:   Cranial nerves 2 - 12 grossly intact, sensation intact, DTR       present and equal bilaterally        Results Review:    Lab Results (last 24 hours)       Procedure Component Value Units Date/Time    TSH [[id number]]  (Normal) Collected: 08/14/24 1000    Specimen: Blood Updated: 08/14/24 1136     TSH 0.626 uIU/mL     Respiratory Culture - Wash, Lung, R [[id number]] Collected: 08/13/24 0820    Specimen: Wash from Lung, R Updated: 08/14/24 1037     Respiratory Culture Light growth (2+) The culture consists of normal respiratory scottie. This is a preliminary report; final report to follow.     Gram Stain Moderate (3+) WBCs seen      Few (2+) Bronchial epithelial cells      Few (2+) Gram positive cocci in clusters      Rare (1+) Gram negative bacilli    Magnesium [[id number]]  (Normal) Collected: 08/14/24 1000    Specimen: Blood Updated: 08/14/24 1034     Magnesium 2.0 mg/dL     Basic Metabolic Panel [[id number]]  (Abnormal) Collected: 08/14/24 1000    Specimen: Blood Updated: 08/14/24 1034     Glucose 256 mg/dL      BUN 36 mg/dL      Creatinine 1.30 mg/dL      Sodium 139 mmol/L      Potassium 4.3 mmol/L      Comment: Specimen hemolyzed.  Result may be falsely elevated.        Chloride 107 mmol/L      CO2 19.4 mmol/L      Calcium 8.4 mg/dL      BUN/Creatinine Ratio 27.7     Anion Gap 12.6 mmol/L      eGFR 57.6 mL/min/1.73     Narrative:      GFR Normal >60  Chronic Kidney Disease <60  Kidney Failure <15    The GFR formula is only valid for adults with stable renal function between ages 18 and 70.    Non-gynecologic Cytology [[id number]] Collected: 08/13/24 1401    Specimen: Pleural Fluid from Pleural  Cavity Updated: 08/14/24 1025    POC Glucose Once [[id number]]  (Abnormal) Collected: 08/14/24 0933    Specimen: Blood Updated: 08/14/24 0934     Glucose 256 mg/dL      Comment: Serial Number: [id number]:  [id number]       Flow Cytometry [[id number]] Collected: 08/13/24 0820    Specimen: Wash from Lung, R Updated: 08/14/24 0933     Consult Global Result Comment^Text^TXT     Comment: Right lung:  Few B-cells with kappa excess (limited sample) (see   comments)  DISCLAIMER: REFER TO HARDCOPY OR PDF FOR COMPLETE RESULT.   If synopsis provided, clinical decisions should not be   based on this interfaced synopsis alone.  Performed at:  [facility]  [address] [address]  :  Kassandra Villagomez M.D., Ph.D., Phone:  [phone]       Narrative:      Performed at:  [facility].  [address], [place] [address]  :  Kassandra Villagomez M.D., Ph.D., Phone:  [phone]    Body Fluid Culture - Body Fluid, Pleural Cavity [[id number]] Collected: 08/13/24 1401    Specimen: Body Fluid from Pleural Cavity Updated: 08/14/24 0801     Body Fluid Culture No growth at less than 24 hours     Gram Stain Moderate (3+) WBCs per low power field      No organisms seen    Pathology Consultation [[id number]] Collected: 08/13/24 1401    Specimen: Pleural Fluid from Pleural Cavity Updated: 08/14/24 0700    Basic Metabolic Panel [[id number]]  (Abnormal) Collected: 08/14/24 0128    Specimen: Blood from Arm, Left Updated: 08/14/24 0200     Glucose 223 mg/dL      BUN 35 mg/dL      Creatinine 1.39 mg/dL      Sodium 138 mmol/L      Potassium 4.2 mmol/L      Comment: Specimen hemolyzed.  Result may be falsely elevated.        Chloride 107 mmol/L      CO2 19.8 mmol/L      Calcium 8.3 mg/dL      BUN/Creatinine Ratio 25.2     Anion Gap 11.2 mmol/L      eGFR 53.2 mL/min/1.73     Narrative:      GFR Normal >60  Chronic Kidney  Disease <60  Kidney Failure <15    The GFR formula is only valid for adults with stable renal function between ages 18 and 70.    CBC & Differential [[id number]]  (Abnormal) Collected: 08/14/24 0128    Specimen: Blood from Arm, Left Updated: 08/14/24 0139    Narrative:      The following orders were created for panel order CBC & Differential.  Procedure                               Abnormality         Status                     ---------                               -----------         ------                     CBC Auto Differential[[id number]]        Abnormal            Final result                 Please view results for these tests on the individual orders.    CBC Auto Differential [[id number]]  (Abnormal) Collected: 08/14/24 0128    Specimen: Blood from Arm, Left Updated: 08/14/24 0139     WBC 19.18 10*3/mm3      RBC 4.19 10*6/mm3      Hemoglobin 13.0 g/dL      Hematocrit 39.0 %      MCV 93.1 fL      MCH 31.0 pg      MCHC 33.3 g/dL      RDW 13.1 %      RDW-SD 44.4 fl      MPV 9.9 fL      Platelets 244 10*3/mm3      Neutrophil % 91.0 %      Lymphocyte % 4.5 %      Monocyte % 3.8 %      Eosinophil % 0.0 %      Basophil % 0.2 %      Immature Grans % 0.5 %      Neutrophils, Absolute 17.47 10*3/mm3      Lymphocytes, Absolute 0.86 10*3/mm3      Monocytes, Absolute 0.72 10*3/mm3      Eosinophils, Absolute 0.00 10*3/mm3      Basophils, Absolute 0.03 10*3/mm3      Immature Grans, Absolute 0.10 10*3/mm3      nRBC 0.0 /100 WBC     Protein, Body Fluid - Pleural Fluid, Pleural Cavity [[id number]] Collected: 08/13/24 1401    Specimen: Pleural Fluid from Pleural Cavity Updated: 08/13/24 2003     Protein, Total, Fluid 3.7 g/dL     Narrative:      No Reference Ranges Established.    A serous fluid total fluid (TP) greater than 50 percent of the serum TP suggests the fluid is an exudate.      1. Pleural TP/Serum TP >0.5  2. Pleural LD/Serum LD >0.6  3. Pleural LD >2/3 of the upper limit of normal for serum LDH    This test was  developed, it performance characteristics determined and judged suitable for clinical purposes by [facility].  It has not been cleared or approved by the FDA.  The laboratory is regulated under CLIA as qualified to perform high-complexity testing.     Lactate Dehydrogenase, Body Fluid - Pleural Fluid, Pleural Cavity [[id number]] Collected: 08/13/24 1401    Specimen: Pleural Fluid from Pleural Cavity Updated: 08/13/24 2003     Lactate Dehydrogenase (LD), Fluid 538 U/L     Narrative:      No Reference Ranges Established.    Serous fluid LDH greater than 60 percent of the serum LDH or serous fluid LDH two-thirds of the upper limit of normal for serum LDH suggests the fluid is an exudate.     1. Pleural TP/Serum TP >0.5  2. Pleural LD/Serum LD >0.6  3. Pleural LD >2/3 of the upper limit of normal for serum LDH    This test was developed, it performance characteristics determined and judged suitable for clinical purposes by [facility].  It has not been cleared or approved by the FDA.  The laboratory is regulated under CLIA as qualified to perform high-complexity testing.     Glucose, Body Fluid - Pleural Fluid, Pleural Cavity [[id number]] Collected: 08/13/24 1401    Specimen: Pleural Fluid from Pleural Cavity Updated: 08/13/24 2003     Glucose, Fluid 182 mg/dL     Narrative:      No Reference Ranges Established.    Serous fluid glucose less than 60 mg/dL or less than 30 mg/dL below serum glucose suggests an infectious or malignant exudate.     This test was developed, it performance characteristics determined and judged suitable for clinical purposes by [facility].  It has not been cleared or approved by the FDA.  The laboratory is regulated under CLIA as qualified to perform high-complexity testing.     Blood Culture - Blood, Arm, Right [[id number]]  (Normal) Collected: 08/12/24 1834    Specimen: Blood from Arm, Right Updated: 08/13/24 1845      Blood Culture No growth at 24 hours    Blood Culture - Blood, Arm, Left [[id number]]  (Normal) Collected: 08/12/24 1744    Specimen: Blood from Arm, Left Updated: 08/13/24 1800     Blood Culture No growth at 24 hours    Narrative:      Less than seven (7) mL's of blood was collected.  Insufficient quantity may yield false negative results.    Body Fluid Cell Count With Differential - Pleural Fluid, Pleural Cavity [[id number]]  (Abnormal) Collected: 08/13/24 1401    Specimen: Pleural Fluid from Pleural Cavity Updated: 08/13/24 1541    Narrative:      The following orders were created for panel order Body Fluid Cell Count With Differential - Pleural Fluid, Pleural Cavity.  Procedure                               Abnormality         Status                     ---------                               -----------         ------                     Body fluid cell count - ...[[id number]]  Abnormal            Final result               Body fluid differential ...[[id number]]                      Final result               Path Consult Reflex[[id number]]                              Final result                 Please view results for these tests on the individual orders.    Body fluid differential - Pleural Fluid, Pleural Cavity [[id number]] Collected: 08/13/24 1401    Specimen: Pleural Fluid from Pleural Cavity Updated: 08/13/24 1541     Neutrophils, Fluid 2 %      Lymphocytes, Fluid 2 %      Monocytes, Fluid 16 %      Unclassified Cells, Fluid 76 %      Mesothelial Cells, Fluid 4 %     Narrative:      Concentrated Smear by Cytocentrifuge Prep.    pH, Body Fluid - Pleural Fluid, Pleural Cavity [[id number]]  (Abnormal) Collected: 08/13/24 1401    Specimen: Pleural Fluid from Pleural Cavity Updated: 08/13/24 1517     pH, Fluid 8.00    Path Consult Reflex [[id number]] Collected: 08/13/24 1401    Specimen: Pleural Fluid from Pleural Cavity Updated: 08/13/24 1513     Pathology Review Yes    Body fluid cell count - Pleural Fluid,  Pleural Cavity [[id number]]  (Abnormal) Collected: 08/13/24 1401    Specimen: Pleural Fluid from Pleural Cavity Updated: 08/13/24 1417     Color, Fluid Red     Appearance, Fluid Cloudy     Nucleated Cells, Fluid 1,539 /mm3      Method: Automated Sysmex XN Method    Narrative:      No reference range established. Physician to interpret results with clinical findings.    AFB Culture - Body Fluid, Pleural Cavity [[id number]] Collected: 08/13/24 1401    Specimen: Body Fluid from Pleural Cavity Updated: 08/13/24 1406    Anaerobic Culture - Pleural Fluid, Pleural Cavity [[id number]] Collected: 08/13/24 1401    Specimen: Pleural Fluid from Pleural Cavity Updated: 08/13/24 1406             Imaging Results (Last 24 Hours)       Procedure Component Value Units Date/Time    XR Chest 1 View [[id number]] Collected: 08/14/24 1026     Updated: 08/14/24 1029    Narrative:      XR CHEST 1 VW    Date of Exam: 8/14/2024 9:55 AM EDT    Indication: high heart rate    Comparison: Chest x-ray 8/13/2024    Findings:  Defibrillator pads project over the left chest. Stable cardiomediastinal silhouette within normal limits. Redemonstration of patchy parenchymal and interstitial opacities throughout most of the right lung, worst in the mid and lower lung. Redemonstration   of small right-sided pleural effusion. The left lung remains grossly clear. No pneumothorax.      Impression:      Impression:  Interval placement of defibrillator pads. Redemonstration of extensive airspace disease of the right lung.      Electronically Signed: Gabino Carey MD    8/14/2024 10:27 AM EDT    Workstation ID: [id number]    XR Chest 1 View [[id number]] Collected: 08/13/24 1630     Updated: 08/13/24 1633    Narrative:      XR CHEST 1 VW    Date of Exam: 8/13/2024 4:29 PM EDT    Indication: chest pain after thoracentesis    Comparison: 8/13/2024 at 1417 hours    Findings:  Cardiac size stable in the left lung is clear. There is diffuse right lung pulmonary infiltrate.  There is a small right pleural effusion. There is no pneumothorax.      Impression:      Impression:    1. Diffuse infiltrate throughout the right lung.  2. Small right pleural effusion.  3. No pneumothorax identified.      Electronically Signed: Joe Mathew MD    8/13/2024 4:31 PM EDT    Workstation ID: [id number]    US Thoracentesis [[id number]] Collected: 08/13/24 1421    Specimen: Body Fluid Updated: 08/13/24 1454    Narrative:      DATE OF EXAM:  8/13/2024 1:53 PM EDT    PROCEDURE:  US THORACENTESIS    INDICATIONS:  Pleural effusion    COMPARISON:  CT 8/12/2024    FLUOROSCOPIC TIME:  None    PHYSICIAN MONITORED CONSCIOUS SEDATION TIME:  None minutes    TECHNIQUE:   A detailed explanation of the procedure, including the risks, benefits, and alternatives was provided. Informed consent was obtained from the patient. A preprocedure timeout was performed. The interventional radiology nurse monitored the patient at all   times. The patient was placed upright in the bed and the right back was ultrasounded, demonstrating a large right pleural effusion. The right back was then marked and prepped and draped in the usual sterile fashion. The skin and subcutaneous tissues were   anesthetized with 1% lidocaine and a small skin incision was made. Next, a 4 Czech centesis needle was advanced into the collection. A total of 2000 mL of clear red fluid was aspirated and the needle was removed. Specimens were collected and sent to   the lab per the ordering clinician. Hemostasis was achieved and a sterile bandage was applied. The patient tolerated the procedure well, without immediate complication. Post procedure chest x-ray completed and without evidence of pneumothorax.    FINDINGS:  See above      Impression:      Technically successful right thoracentesis utilizing ultrasound guidance and yielding 2000 mL of clear red fluid.      Report dictated by: Justyn Boateng DNP      I have personally reviewed this case and agree with the  findings above:    Electronically Signed: Sean Buckley MD    8/13/2024 2:28 PM EDT    Workstation ID: [id number]    XR Chest 1 View [[id number]] Collected: 08/13/24 1429     Updated: 08/13/24 1454    Narrative:      XR CHEST 1 VW    Date of Exam: 8/13/2024 2:18 PM EDT    Indication: s/p right thora    Comparison: 1/28/2024, chest CT 8/12/2024    Findings:  Cardiac size is stable. The left lung is clear. The patient has developed diffuse infiltrate throughout the right lung. There is a small residual right pleural effusion. No pneumothorax is identified.      Impression:      Impression:    1. Development of an interstitial infiltrate throughout the right lung which hasn't changed significantly from the patient's chest CT of 8/12/2024.  2. Small residual right pleural effusion. No pneumothorax is identified.      Electronically Signed: Joe Mathew MD    8/13/2024 2:31 PM EDT    Workstation ID: [id number]                 I reviewed the patient's new clinical results.    Medication Review:   Scheduled Meds:atorvastatin, 10 mg, Oral, Daily  doxycycline, 100 mg, Intravenous, Q12H  ipratropium-albuterol, 3 mL, Nebulization, Q6H While Awake - RT  methylPREDNISolone sodium succinate, 40 mg, Intravenous, Q8H  metoprolol succinate XL, 25 mg, Oral, Q24H  pantoprazole, 40 mg, Oral, QAM AC  piperacillin-tazobactam, 4.5 g, Intravenous, Q8H  sodium chloride, 500 mL, Intravenous, Once  sodium chloride, 10 mL, Intravenous, Q12H  tamsulosin, 0.4 mg, Oral, Nightly      Continuous Infusions:amiodarone, 1 mg/min, Last Rate: 1 mg/min (08/14/24 1108)   Followed by  amiodarone, 0.5 mg/min  dilTIAZem, 5-15 mg/hr, Last Rate: 10 mg/hr (08/14/24 1102)  hold, 1 each  sodium chloride, 50 mL/hr, Last Rate: 50 mL/hr (08/13/24 1033)      PRN Meds:.  acetaminophen    senna-docusate sodium **AND** polyethylene glycol **AND** bisacodyl **AND** bisacodyl    [COMPLETED] dilTIAZem **AND** dilTIAZem **AND** dilTIAZem    hold    LORazepam    nitroglycerin     ondansetron    sodium chloride    sodium chloride     Assessment & Plan     Right sided pneumonia  -Failed outpatient treatment  -Status post bronchoscopy 8/13/2024, follow BAL  -Continue broad-spectrum antibiotic, Zosyn and doxycycline  -Solu-Medrol 40 mg every 8 hours    Large right pleural effusion  - 2000 cc removed during thoracentesis on 8/13/2024- studies pending    Atrial fibrillation with RVR, new onset  - Appreciate cardiology input  - Antiarrhythmics per cardiology  -Will start Lovenox  -Chest CT with PE protocol has been ordered    HTN - Metoprolol  BPH - tamsulosin  GERD - PPI    SCD'd for dvt prophy      Plan for disposition:Home at discharge    CARL Quintero  08/14/24  12:39 EDT

## 2024-08-14 NOTE — CODE DOCUMENTATION
"RR called for SVT, . CARL Velasquez called to bedside. Pt reports no chest pain, but can feel his HR is high and feels \"weird\". No dizziness or SOA. LINDA Recinos at bedside. See RR charting for details. Metoprolol and cardizem given. Yaron discussed plan of care with Dr. Kraus. New cardiology consult placed. CXR ordered due to recent thoracentesis. Patient reports feeling better after HR improved. Cardizem drip started.  "

## 2024-08-14 NOTE — SIGNIFICANT NOTE
Rapid response called due to development of SVT, nearing VT, which was a narrow complex.  Patient reported palpitations, but denied chest pain, nausea, vomiting.  Patient remained neurologically intact with adequate blood pressure throughout.  Due to high risk for demise, stepped in for rescue, given 5 mg of IV Lopressor, which did slow down rhythm to determine patient in atrial fibrillation with rapid ventricular response.  Patient was given a 10 mg bolus of IV Cardizem per my directive.  Then started on a Cardizem drip.  Patient's blood pressure maintains hemodynamic stability, and heart rate had improved to the 120s.  Personally called attending provider, Dr. Kraus, and updated her on the directives taken, and she agreed with this plan of care.  She will continue to assume care of this patient, there is no current need to transfer to ICU.  Recommended for patient to be seen by cardiology.  Patient did state that he has seen Dr. Chand in the past, but did not follow-up with him, and had previously planned to follow-up with Dr. Pavon, but the scheduled appointment had not yet arrived.  Stat magnesium and potassium were also ordered with follow-up per attending physician.  Critical care will sign off at this time.      Electronically signed by CARL Cox, 08/14/24, 10:02 AM EDT.

## 2024-08-14 NOTE — PLAN OF CARE
Goal Outcome Evaluation:  Plan of Care Reviewed With: patient        Progress: no change     Pt arrived to PCU from ED on 2L NC. Pt tolerating O2 well. He has had minimal complaints of pain. VSS.

## 2024-08-14 NOTE — CONSULTS
Picc team consult:    Procedure explained to patient and family and agrees to proceed.  Informed consent obtained and time out performed.  Picc line placed RuE basilic vessel utilizing US guidance and modified seldinger technique with easily compressible vessel without difficulty.

## 2024-08-14 NOTE — NURSING NOTE
Patient HR was reading 216 on monitor; this RN and other staff entered room to assess patient. He stated that he could feel his heat beating rapidly; no c/o of SOA or dizziness. Instructed patient to bear down with other RN called RR. BP taken and was 135/91. Afebrile. Patient had Metoprolol at 0837 with morning meds. IV lopressor and IV cardizem given. Cardizem gtt; CXR; and cardiology consult ordered. Patient was scheduled to see Dr Pavon in September.  Post RR cardiology NP bedside and ordered one time dose Dig; no improvement. BP 95/59. Amio bolus and gtt ordered. Hospitalist NP bedside and ordered PICC; consent signed. After amio bolus HR improved slightly; gtt started and HR currently 120-140's. Titrating down cardizem gtt as HR improves per cardiology. Dr Gonzalez also came bedside and ordered CT of the chest with contrast. Continuing to monitor.

## 2024-08-15 ENCOUNTER — APPOINTMENT (OUTPATIENT)
Dept: MRI IMAGING | Facility: HOSPITAL | Age: 74
DRG: 194 | End: 2024-08-15
Payer: MEDICARE

## 2024-08-15 ENCOUNTER — APPOINTMENT (OUTPATIENT)
Dept: CT IMAGING | Facility: HOSPITAL | Age: 74
DRG: 194 | End: 2024-08-15
Payer: MEDICARE

## 2024-08-15 PROBLEM — C34.91 PRIMARY LUNG CANCER, RIGHT: Status: ACTIVE | Noted: 2024-08-12

## 2024-08-15 LAB
ANA SER QL IF: POSITIVE
ANA SPECKLED TITR SER: NORMAL {TITER}
ANION GAP SERPL CALCULATED.3IONS-SCNC: 9.9 MMOL/L (ref 5–15)
BACTERIA SPEC RESP CULT: NORMAL
BASOPHILS # BLD AUTO: 0.01 10*3/MM3 (ref 0–0.2)
BASOPHILS NFR BLD AUTO: 0.1 % (ref 0–1.5)
BUN SERPL-MCNC: 40 MG/DL (ref 8–23)
BUN/CREAT SERPL: 30.5 (ref 7–25)
C-ANCA TITR SER IF: NORMAL TITER
CALCIUM SPEC-SCNC: 8 MG/DL (ref 8.6–10.5)
CENTROMERE B AB SER-ACNC: <0.2 AI (ref 0–0.9)
CHLORIDE SERPL-SCNC: 112 MMOL/L (ref 98–107)
CHROMATIN AB SERPL-ACNC: <0.2 AI (ref 0–0.9)
CO2 SERPL-SCNC: 21.1 MMOL/L (ref 22–29)
CREAT SERPL-MCNC: 1.31 MG/DL (ref 0.76–1.27)
DEPRECATED RDW RBC AUTO: 46.2 FL (ref 37–54)
DSDNA AB SER-ACNC: <1 IU/ML (ref 0–9)
EGFRCR SERPLBLD CKD-EPI 2021: 57.1 ML/MIN/1.73
ENA JO1 AB SER-ACNC: <0.2 AI (ref 0–0.9)
ENA RNP AB SER-ACNC: <0.2 AI (ref 0–0.9)
ENA SCL70 AB SER-ACNC: <0.2 AI (ref 0–0.9)
ENA SM AB SER-ACNC: <0.2 AI (ref 0–0.9)
ENA SS-A AB SER-ACNC: <0.2 AI (ref 0–0.9)
ENA SS-B AB SER-ACNC: <0.2 AI (ref 0–0.9)
EOSINOPHIL # BLD AUTO: 0 10*3/MM3 (ref 0–0.4)
EOSINOPHIL NFR BLD AUTO: 0 % (ref 0.3–6.2)
ERYTHROCYTE [DISTWIDTH] IN BLOOD BY AUTOMATED COUNT: 13.2 % (ref 12.3–15.4)
GLUCOSE BLDC GLUCOMTR-MCNC: 104 MG/DL (ref 70–105)
GLUCOSE BLDC GLUCOMTR-MCNC: 109 MG/DL (ref 70–105)
GLUCOSE BLDC GLUCOMTR-MCNC: 173 MG/DL (ref 70–105)
GLUCOSE BLDC GLUCOMTR-MCNC: 185 MG/DL (ref 70–105)
GLUCOSE SERPL-MCNC: 147 MG/DL (ref 65–99)
GRAM STN SPEC: NORMAL
HCT VFR BLD AUTO: 36.6 % (ref 37.5–51)
HGB BLD-MCNC: 12.1 G/DL (ref 13–17.7)
IMM GRANULOCYTES # BLD AUTO: 0.12 10*3/MM3 (ref 0–0.05)
IMM GRANULOCYTES NFR BLD AUTO: 0.6 % (ref 0–0.5)
LAB AP CASE REPORT: NORMAL
LABORATORY COMMENT REPORT: ABNORMAL
LYMPHOCYTES # BLD AUTO: 0.98 10*3/MM3 (ref 0.7–3.1)
LYMPHOCYTES NFR BLD AUTO: 5.2 % (ref 19.6–45.3)
Lab: NORMAL
Lab: NORMAL
MCH RBC QN AUTO: 31.3 PG (ref 26.6–33)
MCHC RBC AUTO-ENTMCNC: 33.1 G/DL (ref 31.5–35.7)
MCV RBC AUTO: 94.6 FL (ref 79–97)
MONOCYTES # BLD AUTO: 1.08 10*3/MM3 (ref 0.1–0.9)
MONOCYTES NFR BLD AUTO: 5.8 % (ref 5–12)
MYELOPEROXIDASE AB SER IA-ACNC: <0.2 UNITS (ref 0–0.9)
NEUTROPHILS NFR BLD AUTO: 16.59 10*3/MM3 (ref 1.7–7)
NEUTROPHILS NFR BLD AUTO: 88.3 % (ref 42.7–76)
NRBC BLD AUTO-RTO: 0 /100 WBC (ref 0–0.2)
P-ANCA ATYPICAL TITR SER IF: NORMAL TITER
P-ANCA TITR SER IF: NORMAL TITER
PATH REPORT.FINAL DX SPEC: NORMAL
PATH REPORT.GROSS SPEC: NORMAL
PLATELET # BLD AUTO: 244 10*3/MM3 (ref 140–450)
PMV BLD AUTO: 9.9 FL (ref 6–12)
POTASSIUM SERPL-SCNC: 4.2 MMOL/L (ref 3.5–5.2)
PROTEINASE3 AB SER IA-ACNC: <0.2 UNITS (ref 0–0.9)
RBC # BLD AUTO: 3.87 10*6/MM3 (ref 4.14–5.8)
SODIUM SERPL-SCNC: 143 MMOL/L (ref 136–145)
WBC NRBC COR # BLD AUTO: 18.78 10*3/MM3 (ref 3.4–10.8)

## 2024-08-15 PROCEDURE — 63710000001 PREDNISONE PER 5 MG: Performed by: INTERNAL MEDICINE

## 2024-08-15 PROCEDURE — 80048 BASIC METABOLIC PNL TOTAL CA: CPT

## 2024-08-15 PROCEDURE — 94761 N-INVAS EAR/PLS OXIMETRY MLT: CPT

## 2024-08-15 PROCEDURE — 99233 SBSQ HOSP IP/OBS HIGH 50: CPT | Performed by: INTERNAL MEDICINE

## 2024-08-15 PROCEDURE — A9579 GAD-BASE MR CONTRAST NOS,1ML: HCPCS | Performed by: INTERNAL MEDICINE

## 2024-08-15 PROCEDURE — 94664 DEMO&/EVAL PT USE INHALER: CPT

## 2024-08-15 PROCEDURE — 63710000001 PREDNISONE PER 1 MG: Performed by: INTERNAL MEDICINE

## 2024-08-15 PROCEDURE — 94799 UNLISTED PULMONARY SVC/PX: CPT

## 2024-08-15 PROCEDURE — 71250 CT THORAX DX C-: CPT

## 2024-08-15 PROCEDURE — 82948 REAGENT STRIP/BLOOD GLUCOSE: CPT | Performed by: INTERNAL MEDICINE

## 2024-08-15 PROCEDURE — 70553 MRI BRAIN STEM W/O & W/DYE: CPT

## 2024-08-15 PROCEDURE — 63710000001 INSULIN LISPRO (HUMAN) PER 5 UNITS: Performed by: INTERNAL MEDICINE

## 2024-08-15 PROCEDURE — 25810000003 SODIUM CHLORIDE 0.9 % SOLUTION: Performed by: INTERNAL MEDICINE

## 2024-08-15 PROCEDURE — 25010000002 PIPERACILLIN SOD-TAZOBACTAM PER 1 G: Performed by: INTERNAL MEDICINE

## 2024-08-15 PROCEDURE — 85025 COMPLETE CBC W/AUTO DIFF WBC: CPT

## 2024-08-15 PROCEDURE — 25010000002 GADOTERIDOL PER 1 ML: Performed by: INTERNAL MEDICINE

## 2024-08-15 PROCEDURE — 82948 REAGENT STRIP/BLOOD GLUCOSE: CPT

## 2024-08-15 PROCEDURE — 93005 ELECTROCARDIOGRAM TRACING: CPT | Performed by: NURSE PRACTITIONER

## 2024-08-15 PROCEDURE — 99223 1ST HOSP IP/OBS HIGH 75: CPT | Performed by: INTERNAL MEDICINE

## 2024-08-15 PROCEDURE — 93010 ELECTROCARDIOGRAM REPORT: CPT | Performed by: INTERNAL MEDICINE

## 2024-08-15 PROCEDURE — 25010000002 AMIODARONE IN DEXTROSE 5% 360-4.14 MG/200ML-% SOLUTION: Performed by: NURSE PRACTITIONER

## 2024-08-15 RX ORDER — AMIODARONE HYDROCHLORIDE 200 MG/1
200 TABLET ORAL
Status: DISCONTINUED | OUTPATIENT
Start: 2024-08-15 | End: 2024-08-16

## 2024-08-15 RX ADMIN — MIDODRINE HYDROCHLORIDE 5 MG: 5 TABLET ORAL at 09:35

## 2024-08-15 RX ADMIN — AMIODARONE HYDROCHLORIDE 200 MG: 200 TABLET ORAL at 06:33

## 2024-08-15 RX ADMIN — Medication 10 ML: at 21:05

## 2024-08-15 RX ADMIN — IPRATROPIUM BROMIDE AND ALBUTEROL SULFATE 3 ML: .5; 3 SOLUTION RESPIRATORY (INHALATION) at 07:11

## 2024-08-15 RX ADMIN — SODIUM CHLORIDE 100 ML/HR: 9 INJECTION, SOLUTION INTRAVENOUS at 13:27

## 2024-08-15 RX ADMIN — AMIODARONE HYDROCHLORIDE 0.5 MG/MIN: 1.8 INJECTION, SOLUTION INTRAVENOUS at 05:04

## 2024-08-15 RX ADMIN — Medication 10 ML: at 09:38

## 2024-08-15 RX ADMIN — TAMSULOSIN HYDROCHLORIDE 0.4 MG: 0.4 CAPSULE ORAL at 21:04

## 2024-08-15 RX ADMIN — PIPERACILLIN AND TAZOBACTAM 4.5 G: 4; .5 INJECTION, POWDER, FOR SOLUTION INTRAVENOUS at 00:15

## 2024-08-15 RX ADMIN — INSULIN LISPRO 2 UNITS: 100 INJECTION, SOLUTION INTRAVENOUS; SUBCUTANEOUS at 21:04

## 2024-08-15 RX ADMIN — PREDNISONE 30 MG: 20 TABLET ORAL at 09:49

## 2024-08-15 RX ADMIN — PIPERACILLIN AND TAZOBACTAM 4.5 G: 4; .5 INJECTION, POWDER, FOR SOLUTION INTRAVENOUS at 18:01

## 2024-08-15 RX ADMIN — PIPERACILLIN AND TAZOBACTAM 4.5 G: 4; .5 INJECTION, POWDER, FOR SOLUTION INTRAVENOUS at 09:36

## 2024-08-15 RX ADMIN — IPRATROPIUM BROMIDE AND ALBUTEROL SULFATE 3 ML: .5; 3 SOLUTION RESPIRATORY (INHALATION) at 11:35

## 2024-08-15 RX ADMIN — Medication 10 ML: at 09:37

## 2024-08-15 RX ADMIN — INSULIN LISPRO 2 UNITS: 100 INJECTION, SOLUTION INTRAVENOUS; SUBCUTANEOUS at 18:01

## 2024-08-15 RX ADMIN — Medication 10 ML: at 00:14

## 2024-08-15 RX ADMIN — PANTOPRAZOLE SODIUM 40 MG: 40 TABLET, DELAYED RELEASE ORAL at 09:35

## 2024-08-15 RX ADMIN — MIDODRINE HYDROCHLORIDE 5 MG: 5 TABLET ORAL at 18:01

## 2024-08-15 RX ADMIN — MIDODRINE HYDROCHLORIDE 5 MG: 5 TABLET ORAL at 13:27

## 2024-08-15 RX ADMIN — IPRATROPIUM BROMIDE AND ALBUTEROL SULFATE 3 ML: .5; 3 SOLUTION RESPIRATORY (INHALATION) at 19:12

## 2024-08-15 RX ADMIN — GADOTERIDOL 18 ML: 279.3 INJECTION, SOLUTION INTRAVENOUS at 15:52

## 2024-08-15 RX ADMIN — ATORVASTATIN CALCIUM 10 MG: 10 TABLET, FILM COATED ORAL at 09:35

## 2024-08-15 NOTE — PROGRESS NOTES
CARDIOLOGY FOLLOW-UP PROGRESS NOTE      Reason for follow-up:    Atrial Fibrillation RVR      Attending: Evelyn Felder MD      Subjective .     No chest pain  Breathing feels at baseline  Sinus rhythm  No new fevers      ROS  Pertinent items are noted in HPI, all other systems reviewed and negative  Allergies: Patient has no known allergies.    Scheduled Meds:amiodarone, 200 mg, Oral, Q24H  atorvastatin, 10 mg, Oral, Daily  [Held by provider] enoxaparin, 1 mg/kg, Subcutaneous, Q12H  insulin lispro, 2-9 Units, Subcutaneous, 4x Daily AC & at Bedtime  ipratropium-albuterol, 3 mL, Nebulization, Q6H While Awake - RT  midodrine, 5 mg, Oral, TID AC  pantoprazole, 40 mg, Oral, QAM AC  piperacillin-tazobactam, 4.5 g, Intravenous, Q8H  predniSONE, 30 mg, Oral, Daily   Followed by  [START ON 8/17/2024] predniSONE, 20 mg, Oral, Daily   Followed by  [START ON 8/19/2024] predniSONE, 10 mg, Oral, Daily  sodium chloride, 10 mL, Intravenous, Q12H  sodium chloride, 10 mL, Intravenous, Q12H  sodium chloride, 10 mL, Intravenous, Q12H  sodium chloride, 10 mL, Intravenous, Q12H  tamsulosin, 0.4 mg, Oral, Nightly        Continuous Infusions:hold, 1 each  Pharmacy to Dose enoxaparin (LOVENOX),   sodium chloride, 50 mL/hr, Last Rate: 50 mL/hr (08/13/24 1033)  sodium chloride, 100 mL/hr, Last Rate: 100 mL/hr (08/14/24 1927)        PRN Meds:.  acetaminophen    senna-docusate sodium **AND** polyethylene glycol **AND** bisacodyl **AND** bisacodyl    dextrose    dextrose    glucagon (human recombinant)    hold    LORazepam    nitroglycerin    ondansetron    Pharmacy to Dose enoxaparin (LOVENOX)    sodium chloride    sodium chloride    sodium chloride    sodium chloride    Objective     VITAL SIGNS  Patient Vitals for the past 24 hrs:   BP Temp Temp src Pulse Resp SpO2 Weight   08/15/24 1138 -- -- -- 82 10 94 % --   08/15/24 1135 -- -- -- 84 18 91 % --   08/15/24 1009 116/74 97.5 °F (36.4 °C) Oral 77 20 92 % --   08/15/24 0715 -- -- -- 86 21 96 %  "--   08/15/24 0711 -- -- -- 87 26 93 % --   08/15/24 0633 102/58 -- -- -- -- -- --   08/15/24 0400 95/65 98 °F (36.7 °C) Oral 74 18 95 % 90.9 kg (200 lb 6.4 oz)   08/15/24 0200 107/62 97.5 °F (36.4 °C) Oral 88 14 93 % --   08/14/24 2100 112/67 97.7 °F (36.5 °C) Oral 119 14 94 % --   08/14/24 1927 -- -- -- 93 14 93 % --   08/14/24 1923 -- -- -- 95 14 93 % --   08/14/24 1400 107/67 97.5 °F (36.4 °C) Oral 115 25 92 % --   08/14/24 1345 104/60 -- -- (!) 121 -- 92 % --   08/14/24 1330 103/66 -- -- (!) 158 -- -- --   08/14/24 1301 (!) 87/53 -- -- -- -- -- --        Flowsheet Rows      Flowsheet Row First Filed Value   Admission Height 177.8 cm (70\") Documented at 08/12/2024 1626   Admission Weight 82.3 kg (181 lb 7 oz) Documented at 08/12/2024 1626            Body mass index is 28.75 kg/m².      Intake/Output Summary (Last 24 hours) at 8/15/2024 1239  Last data filed at 8/15/2024 0635  Gross per 24 hour   Intake 240 ml   Output 700 ml   Net -460 ml        TELEMETRY:     SR    Physical Exam:  Vitals reviewed.   Constitutional:       General: Not in acute distress.     Appearance: Normal appearance. Well-developed.   Eyes:      Pupils: Pupils are equal, round, and reactive to light.   HENT:      Head: Normocephalic and atraumatic.   Neck:      Vascular: No JVD.   Pulmonary:      Effort: Pulmonary effort is normal.      Breath sounds: Decreased breath sounds present.   Cardiovascular:      Normal rate. Regular rhythm.   Pulses:     Intact distal pulses.   Edema:     Peripheral edema absent.   Abdominal:      General: There is no distension.      Palpations: Abdomen is soft.      Tenderness: There is no abdominal tenderness.   Musculoskeletal: Normal range of motion.      Cervical back: Normal range of motion and neck supple. Skin:     General: Skin is warm and dry.   Neurological:      Mental Status: Alert and oriented to person, place, and time.        Scribed findings above    Results Review:   I reviewed the patient's new " "clinical results.    CBC    Results from last 7 days   Lab Units 08/15/24  0029 08/14/24  0128 08/13/24  0335 08/12/24  1732   WBC 10*3/mm3 18.78* 19.18* 8.96 13.47*   HEMOGLOBIN g/dL 12.1* 13.0 14.3 15.8   PLATELETS 10*3/mm3 244 244 251 252     BMP   Results from last 7 days   Lab Units 08/15/24  0029 08/14/24  1000 08/14/24  0128 08/13/24  0335 08/12/24  2030   SODIUM mmol/L 143 139 138 135* 137   POTASSIUM mmol/L 4.2 4.3 4.2 4.4 4.4   CHLORIDE mmol/L 112* 107 107 106 104   CO2 mmol/L 21.1* 19.4* 19.8* 18.9* 20.8*   BUN mg/dL 40* 36* 35* 27* 23   CREATININE mg/dL 1.31* 1.30* 1.39* 1.29* 1.28*   GLUCOSE mg/dL 147* 256* 223* 225* 167*   MAGNESIUM mg/dL  --  2.0  --   --   --      Cr Clearance Estimated Creatinine Clearance: 56.1 mL/min (A) (by C-G formula based on SCr of 1.31 mg/dL (H)).  Coag   Results from last 7 days   Lab Units 08/12/24  1834   INR  1.00     HbA1C No results found for: \"HGBA1C\"  Blood Glucose   Glucose   Date/Time Value Ref Range Status   08/15/2024 1105 109 (H) 70 - 105 mg/dL Final     Comment:     Serial Number: [id number]:  [id number]   08/15/2024 0722 104 70 - 105 mg/dL Final     Comment:     Serial Number: [id number]:  [id number]   08/14/2024 2006 158 (H) 70 - 105 mg/dL Final     Comment:     Serial Number: [id number]:  [id number]   08/14/2024 1728 218 (H) 70 - 105 mg/dL Final     Comment:     Serial Number: [id number]:  [id number]   08/14/2024 0933 256 (H) 70 - 105 mg/dL Final     Comment:     Serial Number: [id number]:  [id number]     Infection   Results from last 7 days   Lab Units 08/13/24  1401 08/13/24  0820 08/12/24  1834 08/12/24  1744   BLOODCX   --   --  No growth at 2 days No growth at 2 days   BODYFLDCX  No growth at 2 days  --   --   --    RESPCX   --  Light growth (2+) Normal respiratory scottie. No S. aureus or Pseudomonas aeruginosa detected. Final report.  --   --      CMP   Results from last 7 days   Lab Units 08/15/24  0029 08/14/24  1000 " "08/14/24  0128 08/13/24 0335 08/12/24 2030   SODIUM mmol/L 143 139 138 135* 137   POTASSIUM mmol/L 4.2 4.3 4.2 4.4 4.4   CHLORIDE mmol/L 112* 107 107 106 104   CO2 mmol/L 21.1* 19.4* 19.8* 18.9* 20.8*   BUN mg/dL 40* 36* 35* 27* 23   CREATININE mg/dL 1.31* 1.30* 1.39* 1.29* 1.28*   GLUCOSE mg/dL 147* 256* 223* 225* 167*   ALBUMIN g/dL  --   --   --   --  3.3*   BILIRUBIN mg/dL  --   --   --   --  0.2   ALK PHOS U/L  --   --   --   --  55   AST (SGOT) U/L  --   --   --   --  39   ALT (SGPT) U/L  --   --   --   --  20     ABG      UA      BHAVYA  No results found for: \"POCMETH\", \"POCAMPHET\", \"POCBARBITUR\", \"POCBENZO\", \"POCCOCAINE\", \"POCOPIATES\", \"POCOXYCODO\", \"POCPHENCYC\", \"POCPROPOXY\", \"POCTHC\", \"POCTRICYC\"  Lysis Labs   Results from last 7 days   Lab Units 08/15/24  0029 08/14/24  1000 08/14/24 0128 08/13/24 0335 08/12/24 2030 08/12/24  1834 08/12/24  1732   INR   --   --   --   --   --  1.00  --    HEMOGLOBIN g/dL 12.1*  --  13.0 14.3  --   --  15.8   PLATELETS 10*3/mm3 244  --  244 251  --   --  252   CREATININE mg/dL 1.31* 1.30* 1.39* 1.29* 1.28*  --   --      Radiology(recent) CT Angiogram Chest Pulmonary Embolism    Result Date: 8/14/2024  Impression: 1. No pulmonary embolism. 2. Small right pleural effusion, improved since 8/12/2024. The patient is status post thoracentesis. 3. Question of 5.5 cm right lower lobe cavitary lesion, could reflect focal necrotizing pneumonia or even malignant cavitary lesion. It is partially obscured by adjacent consolidation and pleural fluid. 4. Extensive abnormal reticular interstitial nodular thickening throughout the right hemithorax. This, coupled with the presence of mediastinal and hilar adenopathy,, along with right basilar consolidation, could reflect changes of pneumonia, but malignancy cannot be excluded. Consider short-term CT chest follow-up after trial of antibiotic therapy and, if unresolved, bronchoscopic correlation should be considered. 5. Small noncalcified " left lower lobe lung nodules are present. Attention at surveillance imaging. 6. Mild concentric pericardial effusion. 7. Left hydronephrosis persists. No obstructing etiology is seen. Consider dedicated CT abdomen and pelvis follow-up. 8. Uncomplicated cholelithiasis. Electronically Signed: Mar Aly MD  8/14/2024 3:07 PM EDT  Workstation ID: [id number]    XR Chest 1 View    Result Date: 8/14/2024  Impression: Right arm approach PICC tip terminates at the level of the lower SVC. No pneumothorax. 2. Extensive right lung interstitial and alveolar disease and small right pleural effusion, unchanged. Electronically Signed: Mar Aly MD  8/14/2024 1:01 PM EDT  Workstation ID: [id number]    XR Chest 1 View    Result Date: 8/14/2024  Impression: Interval placement of defibrillator pads. Redemonstration of extensive airspace disease of the right lung. Electronically Signed: Gabino Carey MD  8/14/2024 10:27 AM EDT  Workstation ID: [id number]    XR Chest 1 View    Result Date: 8/13/2024  Impression: 1. Diffuse infiltrate throughout the right lung. 2. Small right pleural effusion. 3. No pneumothorax identified. Electronically Signed: Joe Mathew MD  8/13/2024 4:31 PM EDT  Workstation ID: [id number]    XR Chest 1 View    Result Date: 8/13/2024  Impression: 1. Development of an interstitial infiltrate throughout the right lung which hasn't changed significantly from the patient's chest CT of 8/12/2024. 2. Small residual right pleural effusion. No pneumothorax is identified. Electronically Signed: Joe Mathew MD  8/13/2024 2:31 PM EDT  Workstation ID: [id number]    US Thoracentesis    Result Date: 8/13/2024  Technically successful right thoracentesis utilizing ultrasound guidance and yielding 2000 mL of clear red fluid. Report dictated by: Justyn Boateng DNP  I have personally reviewed this case and agree with the findings above: Electronically Signed: Sean Buckley MD  8/13/2024 2:28 PM EDT  Workstation ID: [id number]      Imaging Results (Last 24 Hours)       Procedure Component Value Units Date/Time    CT Angiogram Chest Pulmonary Embolism [[id number]] Collected: 08/14/24 1453     Updated: 08/14/24 1510    Narrative:      CT ANGIOGRAM CHEST PULMONARY EMBOLISM    Date of Exam: 8/14/2024 2:43 PM EDT    Indication: PAF, SOA.    Comparison: AP chest 8/14/2024. CT chest 8/12/2024.    Technique: Axial CT images were obtained of the chest after the uneventful intravenous administration of iodinated contrast utilizing pulmonary embolism protocol.  Sagittal and coronal reconstructions were performed.  Automated exposure control and   iterative reconstruction methods were used.      Findings:  Extensive interstitial and alveolar opacities are present within the right lung, with more dense consolidations in the posterior right lower lobe and lateral right middle lobe. The right lung interstitium has a reticular nodular appearance. There is   bronchial wall thickening in the right lung. Partial opacification of right lower lobe bronchi.    There is question of necrotizing pneumonia versus a cavitary lesion within the right lower lobe, depicted on series 5 image 100, measuring 5.5 cm transversely.    There is only minimal dependent atelectasis in the left lower lobe. No acute left lung consolidations are identified.    Small 6 mm or less noncalcified nodules are demonstrated within the left lower lobe, best depicted on series 5 images 61, 71, 76, and 84.    No pulmonary embolism is seen. Heart size is within normal limits. Small concentric pericardial effusion is present. The descending thoracic aorta is mildly aneurysmal, 3.2 cm. No aortic dissection. Small right pleural effusion layers to a depth of 2.4   cm.    Enlarged right hilar lymph node measures 2.7 x 2.5 cm. Enlarged mediastinal lymph nodes are present, including precarinal 1.3 x 2.2 cm (4/62), AP window 1.7 x 1.2 cm (4/58), subcarinal 1.6 cm short axis (4/79).    There is  asymmetric enlargement of the left thyroid lobe extending into the superior mediastinum. Right arm approach PICC tip extends to the mid to lower SVC.    There is moderate left hydronephrosis and proximal hydroureter. No obstructing etiology is seen on this examination. Small gallstones are present without evidence of cholecystitis.    No acute or suspicious osseous abnormalities are identified.      Impression:      Impression:    1. No pulmonary embolism.  2. Small right pleural effusion, improved since 8/12/2024. The patient is status post thoracentesis.  3. Question of 5.5 cm right lower lobe cavitary lesion, could reflect focal necrotizing pneumonia or even malignant cavitary lesion. It is partially obscured by adjacent consolidation and pleural fluid.  4. Extensive abnormal reticular interstitial nodular thickening throughout the right hemithorax. This, coupled with the presence of mediastinal and hilar adenopathy,, along with right basilar consolidation, could reflect changes of pneumonia, but   malignancy cannot be excluded. Consider short-term CT chest follow-up after trial of antibiotic therapy and, if unresolved, bronchoscopic correlation should be considered.  5. Small noncalcified left lower lobe lung nodules are present. Attention at surveillance imaging.  6. Mild concentric pericardial effusion.  7. Left hydronephrosis persists. No obstructing etiology is seen. Consider dedicated CT abdomen and pelvis follow-up.  8. Uncomplicated cholelithiasis.        Electronically Signed: Mar Aly MD    8/14/2024 3:07 PM EDT    Workstation ID: [id number]    XR Chest 1 View [[id number]] Collected: 08/14/24 1258     Updated: 08/14/24 1303    Narrative:      XR CHEST 1 VW    Date of Exam: 8/14/2024 12:55 PM EDT    Indication: picc tip    Comparison: AP chest radiograph 8/14/2024 at 09 48.    Findings:  Interstitial and alveolar disease is seen throughout the right lung, with relative sparing of the apex, without  significant interval change. Small right pleural effusion appears stable. The left lung appears relatively clear. The heart size is normal.   Right arm approach PICC tip terminates at the lower SVC level, and no pneumothorax is identified.      Impression:      Impression:  Right arm approach PICC tip terminates at the level of the lower SVC. No pneumothorax.  2. Extensive right lung interstitial and alveolar disease and small right pleural effusion, unchanged.      Electronically Signed: Mar Aly MD    8/14/2024 1:01 PM EDT    Workstation ID: [id number]            Results from last 7 days   Lab Units 08/12/24  2305   HSTROP T ng/L 53*       EKG         I personally viewed and interpreted the patient's EKG/Telemetry data:        ECHOCARDIOGRAM:     Results for orders placed during the hospital encounter of 08/12/24    Adult Transthoracic Echo Complete W/ Cont if Necessary Per Protocol    Interpretation Summary    Left ventricular ejection fraction appears to be 66 - 70%.    Left ventricular diastolic function was indeterminate.    The left atrial cavity is mildly dilated.    Left atrial volume is mildly increased.    Estimated right ventricular systolic pressure from tricuspid regurgitation is mildly elevated (35-45 mmHg).    Moderate pulmonary hypertension is present.        STRESS MYOVIEW:      CARDIAC CATHETERIZATION:      OTHER:         Assessment & Plan            Large pleural effusion    Pneumonia due to infectious organism    Primary lung cancer, right        ASSESSMENT:      Paroxysmal Atrial fibrillation with intermittent RVR  Now converted to SR  Ventricular rates initially over 200  Now started on IV amiodarone  BP remains soft  [id number] equals 2  Will need to consider anticoagulation     Right-sided pneumonia with large right pleural effusion, malignant non-small cell lung cancer  Status post bronchoscopy and thoracentesis  2 L removed pleural fluid reported to be clear and red  Repeat as needed  management per pulmonary     Hypertension  Currently soft  Follow and trend blood pressures  Continue midodrine  Off antihypertensives     Nonobstructive coronary artery disease  Cardiac catheterization in January 2024  20 to 30% mid L RCA lesion.  Otherwise normal coronary anatomy.        Normal EF 60% no regional abnormalities by echo  Mildly elevated pulmonary pressures, no significant valvular abnormality    Amiodarone has been transitioned to po twice daily  Continue midodrine for blood pressure support    Will add low dose beta blocker when/if BP allows  Dose digoxin per level  Does not appear with active clinical heart failure volume overload    Transition to oral a/c when no additional procedures planned  EF 60-65%; no pericardial effusion  Will follow      New diagnosis non-small cell lung cancer, malignant pleural effusion       Carlos Pavon MD, PhD           Mary Headley, APRN  08/15/24  12:39 EDT

## 2024-08-15 NOTE — CASE MANAGEMENT/SOCIAL WORK
Continued Stay Note  [facility]     Patient Name: Young Ames  MRN: [id number]  Today's Date: 8/15/2024    Admit Date: 8/12/2024    Plan: Anticipate routine home with spouse. Watch O2 needs.   Discharge Plan       Row Name 08/15/24 0786       Plan    Plan Comments Barrier to D/C: 2L O2, bronch tomorrow, IV abx, cardiology, oncology and pulmonology following.                      Expected Discharge Date and Time       Expected Discharge Date Expected Discharge Time    Aug 17, 2024           Phone communication or documentation only - no physical contact with patient or family.     YU HebertN, RN    [address] [address]    Office: [phone]  Fax: [phone]

## 2024-08-15 NOTE — PROGRESS NOTES
LOS: 3 days   Patient Care Team:  Abner Funes APRN as PCP - General (Family Medicine)    Subjective     Patient is wanting to get up and move around the room      Review of Systems   Constitutional: Negative.    HENT: Negative.     Respiratory: Negative.     Cardiovascular: Negative.    Gastrointestinal: Negative.    Genitourinary: Negative.    Musculoskeletal: Negative.    Neurological: Negative.    Psychiatric/Behavioral: Negative.             Objective     Vital Signs  Temp:  [97.5 °F (36.4 °C)-98 °F (36.7 °C)] 97.5 °F (36.4 °C)  Heart Rate:  [] 77  Resp:  [14-26] 20  BP: ()/(53-75) 116/74      Physical Exam  Vitals reviewed.   Constitutional:       Appearance: He is not ill-appearing.   HENT:      Head: Normocephalic and atraumatic.      Right Ear: External ear normal.      Left Ear: External ear normal.      Nose: Nose normal.      Mouth/Throat:      Mouth: Mucous membranes are moist.   Eyes:      General:         Right eye: No discharge.         Left eye: No discharge.   Cardiovascular:      Rate and Rhythm: Normal rate and regular rhythm.      Pulses: Normal pulses.      Heart sounds: Normal heart sounds.   Pulmonary:      Effort: Pulmonary effort is normal.      Breath sounds: Normal breath sounds.   Abdominal:      General: Bowel sounds are normal.      Palpations: Abdomen is soft.   Musculoskeletal:         General: Normal range of motion.      Cervical back: Normal range of motion.   Skin:     General: Skin is warm and dry.   Neurological:      Mental Status: He is alert and oriented to person, place, and time.   Psychiatric:         Behavior: Behavior normal.              Results Review:    Lab Results (last 24 hours)       Procedure Component Value Units Date/Time    Respiratory Culture - Wash, Lung, R [[id number]] Collected: 08/13/24 0820    Specimen: Wash from Lung, R Updated: 08/15/24 0934     Respiratory Culture Light growth (2+) Normal respiratory scottie. No S. aureus or  Pseudomonas aeruginosa detected. Final report.     Gram Stain Moderate (3+) WBCs seen      Few (2+) Bronchial epithelial cells      Few (2+) Gram positive cocci in clusters      Rare (1+) Gram negative bacilli    Body Fluid Culture - Body Fluid, Pleural Cavity [[id number]] Collected: 08/13/24 1401    Specimen: Body Fluid from Pleural Cavity Updated: 08/15/24 0859     Body Fluid Culture No growth at 2 days     Gram Stain Moderate (3+) WBCs per low power field      No organisms seen    ANCA Panel [[id number]] Collected: 08/12/24 1834    Specimen: Blood Updated: 08/15/24 0819     ANTI-MPO ANTIBODIES <0.2 units      ANTI-PR3 ANTIBODIES <0.2 units      C-ANCA <1:20 titer      P-ANCA <1:20 titer      Comment: The presence of positive fluorescence exhibiting P-ANCA or C-ANCA  patterns alone is not specific for the diagnosis of Wegener's  Granulomatosis (WG) or microscopic polyangiitis. Decisions about  treatment should not be based solely on ANCA IFA results.  The  International ANCA Group Consensus recommends follow up testing of  positive sera with both AR-3 and MPO-ANCA enzyme immunoassays. As  many as 5% serum samples are positive only by EIA.  Ref. AM J Clin Pathol 1999;111:507-513.        Atypical pANCA <1:20 titer      Comment: The atypical pANCA pattern has been observed in a significant  percentage of patients with ulcerative colitis, primary sclerosing  cholangitis and autoimmune hepatitis.       Narrative:      Performed at:  01 - [address]  [id number]  : Meera Connelly MD, Phone:  [phone]  Performed at:  02 - [facility]  [id number]  : Yogi Venegas PhD, Phone:  [phone]    Non-gynecologic Cytology [[id number]] Collected: 08/13/24 1401    Specimen: Pleural Fluid from Pleural Cavity Updated: 08/15/24 0821     Case Report --     Medical Cytology Report                           Case: [id number]                   "                Authorizing Provider:  Kelvin Gonzalez MD             Collected:           08/13/2024 02:01 PM          Ordering Location:     [facility]  Received:            08/14/2024 10:25 AM                                 SUITES                                                                       Pathologist:           Al Reese MD                                                            Specimen:    Pleural Cavity                                                                              Final Diagnosis --     Pleural fluid with smears and cytospin:  Malignant cells present consistent with non-small cell carcinoma    GUILLERMO       Gross Description --     1. Pleural Cavity.  Received in carbowax and designated \"Pleural Cavity\" are 12 mL of cloudy brown-colored fluid. Particulate matter is present. This specimen is processed as per protocol.          POC Glucose 4x Daily Before Meals & at Bedtime [[id number]]  (Normal) Collected: 08/15/24 0722    Specimen: Blood Updated: 08/15/24 0724     Glucose 104 mg/dL      Comment: Serial Number: [id number]:  [id number]       Basic Metabolic Panel [[id number]]  (Abnormal) Collected: 08/15/24 0029    Specimen: Blood Updated: 08/15/24 0112     Glucose 147 mg/dL      BUN 40 mg/dL      Creatinine 1.31 mg/dL      Sodium 143 mmol/L      Potassium 4.2 mmol/L      Comment: Specimen hemolyzed.  Result may be falsely elevated.        Chloride 112 mmol/L      CO2 21.1 mmol/L      Calcium 8.0 mg/dL      BUN/Creatinine Ratio 30.5     Anion Gap 9.9 mmol/L      eGFR 57.1 mL/min/1.73     Narrative:      GFR Normal >60  Chronic Kidney Disease <60  Kidney Failure <15    The GFR formula is only valid for adults with stable renal function between ages 18 and 70.    CBC & Differential [[id number]]  (Abnormal) Collected: 08/15/24 0029    Specimen: Blood Updated: 08/15/24 0036    Narrative:      The following orders were created for panel order CBC & Differential.  Procedure "                               Abnormality         Status                     ---------                               -----------         ------                     CBC Auto Differential[[id number]]        Abnormal            Final result                 Please view results for these tests on the individual orders.    CBC Auto Differential [[id number]]  (Abnormal) Collected: 08/15/24 0029    Specimen: Blood Updated: 08/15/24 0036     WBC 18.78 10*3/mm3      RBC 3.87 10*6/mm3      Hemoglobin 12.1 g/dL      Hematocrit 36.6 %      MCV 94.6 fL      MCH 31.3 pg      MCHC 33.1 g/dL      RDW 13.2 %      RDW-SD 46.2 fl      MPV 9.9 fL      Platelets 244 10*3/mm3      Neutrophil % 88.3 %      Lymphocyte % 5.2 %      Monocyte % 5.8 %      Eosinophil % 0.0 %      Basophil % 0.1 %      Immature Grans % 0.6 %      Neutrophils, Absolute 16.59 10*3/mm3      Lymphocytes, Absolute 0.98 10*3/mm3      Monocytes, Absolute 1.08 10*3/mm3      Eosinophils, Absolute 0.00 10*3/mm3      Basophils, Absolute 0.01 10*3/mm3      Immature Grans, Absolute 0.12 10*3/mm3      nRBC 0.0 /100 WBC     POC Glucose Once [[id number]]  (Abnormal) Collected: 08/14/24 2006    Specimen: Blood Updated: 08/14/24 2008     Glucose 158 mg/dL      Comment: Serial Number: [id number]:  [id number]       Blood Culture - Blood, Arm, Right [[id number]]  (Normal) Collected: 08/12/24 1834    Specimen: Blood from Arm, Right Updated: 08/14/24 1845     Blood Culture No growth at 2 days    Blood Culture - Blood, Arm, Left [[id number]]  (Normal) Collected: 08/12/24 1744    Specimen: Blood from Arm, Left Updated: 08/14/24 1800     Blood Culture No growth at 2 days    Narrative:      Less than seven (7) mL's of blood was collected.  Insufficient quantity may yield false negative results.    POC Glucose 4x Daily Before Meals & at Bedtime [[id number]]  (Abnormal) Collected: 08/14/24 1728    Specimen: Blood Updated: 08/14/24 1729     Glucose 218 mg/dL      Comment: Serial Number:  [id number]:  [id number]       Pathology Consultation [[id number]] Collected: 08/13/24 1401    Specimen: Pleural Fluid from Pleural Cavity Updated: 08/14/24 1334     Final Diagnosis --     Positive for non-small cell carcinoma       Case Report --     Surgical Pathology Report                         Case: [id number]                                  Authorizing Provider:  Kelvin Gonzalez MD             Collected:           08/13/2024 02:01 PM          Ordering Location:     [facility]       Received:            08/14/2024 07:00 AM                                 EMERGENCY DEPARTMENT                                                         Pathologist:           Jevon Hsu MD                                                             Specimen:    Pleural Cavity                                                                             AFB Culture - Wash, Lung, R [[id number]] Collected: 08/13/24 0820    Specimen: Wash from Lung, R Updated: 08/14/24 1333     AFB Stain No acid fast bacilli seen on concentrated smear    AFB Culture - Body Fluid, Pleural Cavity [[id number]] Collected: 08/13/24 1401    Specimen: Body Fluid from Pleural Cavity Updated: 08/14/24 1332     AFB Stain No acid fast bacilli seen on concentrated smear    Non-gynecologic Cytology [[id number]] Collected: 08/13/24 0813    Specimen: Brushing from Lung, R; Wash from Lung, R Updated: 08/14/24 1304     Case Report --     Medical Cytology Report                           Case: [id number]                                  Authorizing Provider:  Kelvin Gonzalez MD             Collected:           08/13/2024 08:13 AM          Ordering Location:     [facility] END[facility]  Received:            08/13/2024 12:23 PM                                 SUITES                                                                       Pathologist:           Jevon Hsu MD                                                             Specimens:   1) - Lung, R          "                                                                               2) - Lung, R                                                                                Final Diagnosis --     Specimen #1 (Lung, right, bronch wash, smears and cytospin preparation):    Positive for malignancy, non-small cell carcinoma    Specimen #2 (Lung, right, bronch brush, smear preparation only):    Positive for malignancy, non-small cell carcinoma    JPR         Gross Description --     1. Lung, R.  Received in carbowax and designated \"bronchial washing, right lung\" are 43 mL of hazy, green fluid. Particulate matter is not present. This specimen is processed as per protocol.      2. Lung, R.  Received in 95% alcohol are four slides, designated \"right lung.\" These are pap stained for evaluation.          TSH [[id number]]  (Normal) Collected: 08/14/24 1000    Specimen: Blood Updated: 08/14/24 1136     TSH 0.626 uIU/mL              Imaging Results (Last 24 Hours)       Procedure Component Value Units Date/Time    CT Angiogram Chest Pulmonary Embolism [[id number]] Collected: 08/14/24 1453     Updated: 08/14/24 1510    Narrative:      CT ANGIOGRAM CHEST PULMONARY EMBOLISM    Date of Exam: 8/14/2024 2:43 PM EDT    Indication: PAF, SOA.    Comparison: AP chest 8/14/2024. CT chest 8/12/2024.    Technique: Axial CT images were obtained of the chest after the uneventful intravenous administration of iodinated contrast utilizing pulmonary embolism protocol.  Sagittal and coronal reconstructions were performed.  Automated exposure control and   iterative reconstruction methods were used.      Findings:  Extensive interstitial and alveolar opacities are present within the right lung, with more dense consolidations in the posterior right lower lobe and lateral right middle lobe. The right lung interstitium has a reticular nodular appearance. There is   bronchial wall thickening in the right lung. Partial opacification of right lower lobe " bronchi.    There is question of necrotizing pneumonia versus a cavitary lesion within the right lower lobe, depicted on series 5 image 100, measuring 5.5 cm transversely.    There is only minimal dependent atelectasis in the left lower lobe. No acute left lung consolidations are identified.    Small 6 mm or less noncalcified nodules are demonstrated within the left lower lobe, best depicted on series 5 images 61, 71, 76, and 84.    No pulmonary embolism is seen. Heart size is within normal limits. Small concentric pericardial effusion is present. The descending thoracic aorta is mildly aneurysmal, 3.2 cm. No aortic dissection. Small right pleural effusion layers to a depth of 2.4   cm.    Enlarged right hilar lymph node measures 2.7 x 2.5 cm. Enlarged mediastinal lymph nodes are present, including precarinal 1.3 x 2.2 cm (4/62), AP window 1.7 x 1.2 cm (4/58), subcarinal 1.6 cm short axis (4/79).    There is asymmetric enlargement of the left thyroid lobe extending into the superior mediastinum. Right arm approach PICC tip extends to the mid to lower SVC.    There is moderate left hydronephrosis and proximal hydroureter. No obstructing etiology is seen on this examination. Small gallstones are present without evidence of cholecystitis.    No acute or suspicious osseous abnormalities are identified.      Impression:      Impression:    1. No pulmonary embolism.  2. Small right pleural effusion, improved since 8/12/2024. The patient is status post thoracentesis.  3. Question of 5.5 cm right lower lobe cavitary lesion, could reflect focal necrotizing pneumonia or even malignant cavitary lesion. It is partially obscured by adjacent consolidation and pleural fluid.  4. Extensive abnormal reticular interstitial nodular thickening throughout the right hemithorax. This, coupled with the presence of mediastinal and hilar adenopathy,, along with right basilar consolidation, could reflect changes of pneumonia, but   malignancy  cannot be excluded. Consider short-term CT chest follow-up after trial of antibiotic therapy and, if unresolved, bronchoscopic correlation should be considered.  5. Small noncalcified left lower lobe lung nodules are present. Attention at surveillance imaging.  6. Mild concentric pericardial effusion.  7. Left hydronephrosis persists. No obstructing etiology is seen. Consider dedicated CT abdomen and pelvis follow-up.  8. Uncomplicated cholelithiasis.        Electronically Signed: Mar Aly MD    8/14/2024 3:07 PM EDT    Workstation ID: [id number]    XR Chest 1 View [[id number]] Collected: 08/14/24 1258     Updated: 08/14/24 1303    Narrative:      XR CHEST 1 VW    Date of Exam: 8/14/2024 12:55 PM EDT    Indication: picc tip    Comparison: AP chest radiograph 8/14/2024 at 09 48.    Findings:  Interstitial and alveolar disease is seen throughout the right lung, with relative sparing of the apex, without significant interval change. Small right pleural effusion appears stable. The left lung appears relatively clear. The heart size is normal.   Right arm approach PICC tip terminates at the lower SVC level, and no pneumothorax is identified.      Impression:      Impression:  Right arm approach PICC tip terminates at the level of the lower SVC. No pneumothorax.  2. Extensive right lung interstitial and alveolar disease and small right pleural effusion, unchanged.      Electronically Signed: Mar Aly MD    8/14/2024 1:01 PM EDT    Workstation ID: [id number]                 I reviewed the patient's new clinical results.    Medication Review:   Scheduled Meds:amiodarone, 200 mg, Oral, Q24H  atorvastatin, 10 mg, Oral, Daily  [Held by provider] enoxaparin, 1 mg/kg, Subcutaneous, Q12H  insulin lispro, 2-9 Units, Subcutaneous, 4x Daily AC & at Bedtime  ipratropium-albuterol, 3 mL, Nebulization, Q6H While Awake - RT  midodrine, 5 mg, Oral, TID AC  pantoprazole, 40 mg, Oral, QAM AC  piperacillin-tazobactam, 4.5 g,  Intravenous, Q8H  predniSONE, 30 mg, Oral, Daily   Followed by  [START ON 8/17/2024] predniSONE, 20 mg, Oral, Daily   Followed by  [START ON 8/19/2024] predniSONE, 10 mg, Oral, Daily  sodium chloride, 10 mL, Intravenous, Q12H  sodium chloride, 10 mL, Intravenous, Q12H  sodium chloride, 10 mL, Intravenous, Q12H  sodium chloride, 10 mL, Intravenous, Q12H  tamsulosin, 0.4 mg, Oral, Nightly      Continuous Infusions:hold, 1 each  Pharmacy to Dose enoxaparin (LOVENOX),   sodium chloride, 50 mL/hr, Last Rate: 50 mL/hr (08/13/24 1033)  sodium chloride, 100 mL/hr, Last Rate: 100 mL/hr (08/14/24 1927)      PRN Meds:.  acetaminophen    senna-docusate sodium **AND** polyethylene glycol **AND** bisacodyl **AND** bisacodyl    dextrose    dextrose    glucagon (human recombinant)    hold    LORazepam    nitroglycerin    ondansetron    Pharmacy to Dose enoxaparin (LOVENOX)    sodium chloride    sodium chloride    sodium chloride    sodium chloride     Interval History:    Assessment & Plan     Right sided pneumonia  -Status post bronchoscopy 8/13/2024, follow BAL  -Continue broad-spectrum antibiotic, Zosyn    -steroid taper     Large right pleural effusion  Small cell lung cancer  - 2000 cc removed during thoracentesis on 8/13/2024- studies pending  -CT showing a 5.5 cm right lower lobe lesion  -Hold Lovenox  pulmonology to perform bronchoscopy to obtain additional tissue for NGS testing  -Check brain MRI and plan for outpatient PET for further staging  -Plan to begin treatment as an outpatient with chemotherapy or chemoimmunotherapy pending NGS testing for targeted treatment options     Atrial fibrillation with RVR, new onset  HTN  -bp soft side most likely due to medications for SVT/afib yesterday started on midodrine with improvement continue to monitor  -cardiology following  -Amio gtt converted back to SR  -Lovenox on hold SR will have bronch for tissue in am     BPH - tamsulosin  GERD - PPI     Plan for  disposition:Home    Nancyuri Mora, CARL  08/15/24  10:36 EDT

## 2024-08-15 NOTE — CONSULTS
Hematology/Oncology Inpatient Consultation    Patient name: Young Ames  : 1950  MRN: [id number]  Referring Provider: Dr. Felder  Reason for Consultation: Newly diagnosed lung cancer    Chief complaint: Shortness of breath    History of present illness:    Young Ames is a 73 y.o. male who presented to [facility] on 2024 with complaints of increasing shortness of breath.  Past medical history of unresolving pneumonia, tobacco use, recent unexplained weight loss.  Reported initially being diagnosed in February with pneumonia but had not ever seem to fully recover.  Had a repeat chest x-ray done in July that showed pleural effusion with residual pneumonia.  He reported he had been treated as an outpatient with cephalexin, Zithromax, and Augmentin without relief of his symptoms.  He was seen by his pulmonologist the day of presentation and was sent to the ED from their office for unresolving pneumonia for the past 2 months.  Pulmonology recommended CT of the chest and bronchoscopy.    2024 CT chest without contrast  -Large right pleural effusion with dense consolidation involving the right middle and right lower lobes and extensive nodular airspace disease throughout the right upper lobe consistent with multifocal pneumonia  -Asymmetric interlobular septal thickening throughout the right lung may relate to atypical infection or asymmetric pulmonary edema  -Emphysema with new nodules in the left lower lobe largest 6 mm  -Enlarged right paratracheal and right hilar lymph nodes likely reactive adenopathy    2024 ultrasound-guided thoracentesis  -Pleural fluid with malignant cells present consistent with non-small cell carcinoma    2024 CT chest protocol  -Negative for pulmonary embolism  -Small right pleural effusion, improved since 2024.  Status post thoracentesis.  -Question of a 5.5 cm right lower lobe cavitary lesion, could reflect focal necrotizing pneumonia or  malignant cavitary lesion.  It is obscured partially by adjacent consolidation and pleural fluid.  -Extensive abnormal reticular interstitial nodular thickening throughout the right hemithorax; mediastinal and hilar adenopathy      08/15/24  Hematology/Oncology was consulted for if the lung mass and malignant pleural effusion.  At the time of the consult WBC 18.78, hemoglobin 12.1, platelets 244,000.    He/She  has no past medical history on file.    PCP: Abner Funes APRN    History:  History reviewed. No pertinent past medical history.,   Past Surgical History:   Procedure Laterality Date    CARDIAC CATHETERIZATION Right 2024    Procedure: Coronary angiography;  Surgeon: Abran Chand MD;  Location:  [facility];  Service: Cardiovascular;  Laterality: Right;    CARDIAC CATHETERIZATION N/A 2024    Procedure: Left Heart Cath;  Surgeon: Abran Chand MD;  Location:  [facility];  Service: Cardiovascular;  Laterality: N/A;   , History reviewed. No pertinent family history.,   Social History     Tobacco Use    Smoking status: Former     Current packs/day: 0.00     Average packs/day: 0.3 packs/day for 30.0 years (7.5 ttl pk-yrs)     Types: Cigarettes     Start date: 1969     Quit date: 1999     Years since quittin.6     Passive exposure: Past    Smokeless tobacco: Never   Vaping Use    Vaping status: Never Used   Substance Use Topics    Alcohol use: Yes     Alcohol/week: 1.0 standard drink of alcohol     Types: 1 Cans of beer per week    Drug use: Never   ,   Medications Prior to Admission   Medication Sig Dispense Refill Last Dose    acetaminophen (TYLENOL) 325 MG tablet Take 2 tablets by mouth Every 4 (Four) Hours As Needed for Mild Pain.       albuterol sulfate  (90 Base) MCG/ACT inhaler Inhale 2 puffs Every 4 (Four) Hours As Needed.       APPLE CIDER VINEGAR PO Take 1 capsule by mouth Daily.   2024    aspirin 81 MG EC tablet Take 1 tablet by  mouth Every Night.   8/11/2024    Cholecalciferol (VITAMIN D-3 PO) Take 1 tablet by mouth Daily.   8/11/2024    metoprolol succinate XL (TOPROL-XL) 25 MG 24 hr tablet Take 1 tablet by mouth Every Night.   8/11/2024    multivitamin with minerals (Centrum Silver 50+Men) tablet tablet Take 1 tablet by mouth Every Morning.   8/12/2024    NON FORMULARY Take 2 tablets by mouth Every Night. Zzzquil   8/11/2024    Omega-3 Fatty Acids (fish oil) 1000 MG capsule capsule Take  by mouth Daily.   8/11/2024    pantoprazole (PROTONIX) 40 MG EC tablet Take 1 tablet by mouth Daily. 30 tablet 1 8/12/2024    simvastatin (ZOCOR) 20 MG tablet Take 1 tablet by mouth Every Night.   8/11/2024    tamsulosin (FLOMAX) 0.4 MG capsule 24 hr capsule Take 1 capsule by mouth Every Night.   8/11/2024    thiamine (VITAMIN B-1) 100 MG tablet  tablet Take 1 tablet by mouth Every Morning.   8/12/2024   , Scheduled Meds:  amiodarone, 200 mg, Oral, Q24H  atorvastatin, 10 mg, Oral, Daily  [Held by provider] enoxaparin, 1 mg/kg, Subcutaneous, Q12H  insulin lispro, 2-9 Units, Subcutaneous, 4x Daily AC & at Bedtime  ipratropium-albuterol, 3 mL, Nebulization, Q6H While Awake - RT  midodrine, 5 mg, Oral, TID AC  pantoprazole, 40 mg, Oral, QAM AC  piperacillin-tazobactam, 4.5 g, Intravenous, Q8H  predniSONE, 30 mg, Oral, Daily   Followed by  [START ON 8/17/2024] predniSONE, 20 mg, Oral, Daily   Followed by  [START ON 8/19/2024] predniSONE, 10 mg, Oral, Daily  sodium chloride, 10 mL, Intravenous, Q12H  sodium chloride, 10 mL, Intravenous, Q12H  sodium chloride, 10 mL, Intravenous, Q12H  sodium chloride, 10 mL, Intravenous, Q12H  tamsulosin, 0.4 mg, Oral, Nightly    , Continuous Infusions:  hold, 1 each  Pharmacy to Dose enoxaparin (LOVENOX),   sodium chloride, 50 mL/hr, Last Rate: 50 mL/hr (08/13/24 1033)  sodium chloride, 100 mL/hr, Last Rate: 100 mL/hr (08/14/24 1927)    , PRN Meds:    acetaminophen    senna-docusate sodium **AND** polyethylene glycol **AND**  "bisacodyl **AND** bisacodyl    dextrose    dextrose    glucagon (human recombinant)    hold    LORazepam    nitroglycerin    ondansetron    Pharmacy to Dose enoxaparin (LOVENOX)    sodium chloride    sodium chloride    sodium chloride    sodium chloride   Allergies:  Patient has no known allergies.    Subjective     ROS:  Review of Systems   Constitutional:  Negative for fatigue and fever.   HENT:  Negative for congestion and nosebleeds.    Eyes:  Negative for pain.   Respiratory:  Positive for cough and shortness of breath.    Cardiovascular:  Negative for chest pain.   Gastrointestinal:  Negative for abdominal pain, blood in stool, diarrhea, nausea and vomiting.   Endocrine: Negative for cold intolerance and heat intolerance.   Genitourinary:  Negative for difficulty urinating.   Musculoskeletal:  Negative for arthralgias.   Skin:  Negative for rash.   Neurological:  Negative for dizziness and headaches.   Hematological:  Does not bruise/bleed easily.   Psychiatric/Behavioral:  Negative for behavioral problems.         Objective   Vital Signs:   /58   Pulse 86   Temp 98 °F (36.7 °C) (Oral)   Resp 21   Ht 177.8 cm (70\")   Wt 90.9 kg (200 lb 6.4 oz)   SpO2 96%   BMI 28.75 kg/m²     Physical Exam: (performed by MD)  Physical Exam  Constitutional:       Appearance: Normal appearance.   HENT:      Head: Normocephalic and atraumatic.   Eyes:      Pupils: Pupils are equal, round, and reactive to light.   Cardiovascular:      Rate and Rhythm: Normal rate and regular rhythm.      Pulses: Normal pulses.      Heart sounds: No murmur heard.  Pulmonary:      Effort: Pulmonary effort is normal.      Breath sounds: Normal breath sounds.   Abdominal:      General: There is no distension.      Palpations: Abdomen is soft. There is no mass.      Tenderness: There is no abdominal tenderness.   Musculoskeletal:         General: Normal range of motion.      Cervical back: Normal range of motion and neck supple.   Skin:    "  General: Skin is warm.   Neurological:      General: No focal deficit present.      Mental Status: He is alert.   Psychiatric:         Mood and Affect: Mood normal.         Results Review:  Lab Results (last 48 hours)       Procedure Component Value Units Date/Time    POC Glucose 4x Daily Before Meals & at Bedtime [[id number]]  (Normal) Collected: 08/15/24 0722    Specimen: Blood Updated: 08/15/24 0724     Glucose 104 mg/dL      Comment: Serial Number: [id number]:  [id number]       Basic Metabolic Panel [[id number]]  (Abnormal) Collected: 08/15/24 0029    Specimen: Blood Updated: 08/15/24 0112     Glucose 147 mg/dL      BUN 40 mg/dL      Creatinine 1.31 mg/dL      Sodium 143 mmol/L      Potassium 4.2 mmol/L      Comment: Specimen hemolyzed.  Result may be falsely elevated.        Chloride 112 mmol/L      CO2 21.1 mmol/L      Calcium 8.0 mg/dL      BUN/Creatinine Ratio 30.5     Anion Gap 9.9 mmol/L      eGFR 57.1 mL/min/1.73     Narrative:      GFR Normal >60  Chronic Kidney Disease <60  Kidney Failure <15    The GFR formula is only valid for adults with stable renal function between ages 18 and 70.    CBC & Differential [[id number]]  (Abnormal) Collected: 08/15/24 0029    Specimen: Blood Updated: 08/15/24 0036    Narrative:      The following orders were created for panel order CBC & Differential.  Procedure                               Abnormality         Status                     ---------                               -----------         ------                     CBC Auto Differential[[id number]]        Abnormal            Final result                 Please view results for these tests on the individual orders.    CBC Auto Differential [[id number]]  (Abnormal) Collected: 08/15/24 0029    Specimen: Blood Updated: 08/15/24 0036     WBC 18.78 10*3/mm3      RBC 3.87 10*6/mm3      Hemoglobin 12.1 g/dL      Hematocrit 36.6 %      MCV 94.6 fL      MCH 31.3 pg      MCHC 33.1 g/dL      RDW 13.2 %      RDW-SD 46.2 fl       MPV 9.9 fL      Platelets 244 10*3/mm3      Neutrophil % 88.3 %      Lymphocyte % 5.2 %      Monocyte % 5.8 %      Eosinophil % 0.0 %      Basophil % 0.1 %      Immature Grans % 0.6 %      Neutrophils, Absolute 16.59 10*3/mm3      Lymphocytes, Absolute 0.98 10*3/mm3      Monocytes, Absolute 1.08 10*3/mm3      Eosinophils, Absolute 0.00 10*3/mm3      Basophils, Absolute 0.01 10*3/mm3      Immature Grans, Absolute 0.12 10*3/mm3      nRBC 0.0 /100 WBC     POC Glucose Once [[id number]]  (Abnormal) Collected: 08/14/24 2006    Specimen: Blood Updated: 08/14/24 2008     Glucose 158 mg/dL      Comment: Serial Number: [id number]:  [id number]       Blood Culture - Blood, Arm, Right [[id number]]  (Normal) Collected: 08/12/24 1834    Specimen: Blood from Arm, Right Updated: 08/14/24 1845     Blood Culture No growth at 2 days    Blood Culture - Blood, Arm, Left [[id number]]  (Normal) Collected: 08/12/24 1744    Specimen: Blood from Arm, Left Updated: 08/14/24 1800     Blood Culture No growth at 2 days    Narrative:      Less than seven (7) mL's of blood was collected.  Insufficient quantity may yield false negative results.    POC Glucose 4x Daily Before Meals & at Bedtime [[id number]]  (Abnormal) Collected: 08/14/24 1728    Specimen: Blood Updated: 08/14/24 1729     Glucose 218 mg/dL      Comment: Serial Number: [id number]:  [id number]       Pathology Consultation [[id number]] Collected: 08/13/24 1401    Specimen: Pleural Fluid from Pleural Cavity Updated: 08/14/24 1334     Final Diagnosis --     Positive for non-small cell carcinoma       Case Report --     Surgical Pathology Report                         Case: [id number]                                  Authorizing Provider:  Kelvin Gonzalez MD             Collected:           08/13/2024 02:01 PM          Ordering Location:     [facility]       Received:            08/14/2024 07:00 AM                                 EMERGENCY DEPARTMENT                       "                                   Pathologist:           Jevon Hsu MD                                                             Specimen:    Pleural Cavity                                                                             AFB Culture - Wash, Lung, R [[id number]] Collected: 08/13/24 0820    Specimen: Wash from Lung, R Updated: 08/14/24 1333     AFB Stain No acid fast bacilli seen on concentrated smear    AFB Culture - Body Fluid, Pleural Cavity [[id number]] Collected: 08/13/24 1401    Specimen: Body Fluid from Pleural Cavity Updated: 08/14/24 1332     AFB Stain No acid fast bacilli seen on concentrated smear    Non-gynecologic Cytology [[id number]] Collected: 08/13/24 0813    Specimen: Brushing from Lung, R; Wash from Lung, R Updated: 08/14/24 1304     Case Report --     Medical Cytology Report                           Case: [id number]                                  Authorizing Provider:  Kelvin Gonzalez MD             Collected:           08/13/2024 08:13 AM          Ordering Location:     [facility]  Received:            08/13/2024 12:23 PM                                 SUITES                                                                       Pathologist:           Jevon Hsu MD                                                             Specimens:   1) - Lung, R                                                                                        2) - Lung, R                                                                                Final Diagnosis --     Specimen #1 (Lung, right, bronch wash, smears and cytospin preparation):    Positive for malignancy, non-small cell carcinoma    Specimen #2 (Lung, right, bronch brush, smear preparation only):    Positive for malignancy, non-small cell carcinoma    JPR         Gross Description --     1. Lung, R.  Received in [employer] and designated \"bronchial washing, right lung\" are 43 mL of hazy, green fluid. Particulate matter is not " present. This specimen is processed as per protocol.      2. Lung, R.  .          TSH [[id number]]  (Normal) Collected: 08/14/24 1000    Specimen: Blood Updated: 08/14/24 1136     TSH 0.626 uIU/mL     Respiratory Culture - Wash, Lung, R [[id number]] Collected: 08/13/24 0820    Specimen: Wash from Lung, R Updated: 08/14/24 1037     Respiratory Culture Light growth (2+) The culture consists of normal respiratory scottie. This is a preliminary report; final report to follow.     Gram Stain Moderate (3+) WBCs seen      Few (2+) Bronchial epithelial cells      Few (2+) Gram positive cocci in clusters      Rare (1+) Gram negative bacilli    Magnesium [[id number]]  (Normal) Collected: 08/14/24 1000    Specimen: Blood Updated: 08/14/24 1034     Magnesium 2.0 mg/dL     Basic Metabolic Panel [[id number]]  (Abnormal) Collected: 08/14/24 1000    Specimen: Blood Updated: 08/14/24 1034     Glucose 256 mg/dL      BUN 36 mg/dL      Creatinine 1.30 mg/dL      Sodium 139 mmol/L      Potassium 4.3 mmol/L      Comment: Specimen hemolyzed.  Result may be falsely elevated.        Chloride 107 mmol/L      CO2 19.4 mmol/L      Calcium 8.4 mg/dL      BUN/Creatinine Ratio 27.7     Anion Gap 12.6 mmol/L      eGFR 57.6 mL/min/1.73     Narrative:      GFR Normal >60  Chronic Kidney Disease <60  Kidney Failure <15    The GFR formula is only valid for adults with stable renal function between ages 18 and 70.    Non-gynecologic Cytology [[id number]] Collected: 08/13/24 1401    Specimen: Pleural Fluid from Pleural Cavity Updated: 08/14/24 1025    POC Glucose Once [[id number]]  (Abnormal) Collected: 08/14/24 0933    Specimen: Blood Updated: 08/14/24 0934     Glucose 256 mg/dL      Comment: Serial Number: [id number]:  [id number]       Flow Cytometry [[id number]] Collected: 08/13/24 0820    Specimen: Wash from Lung, R Updated: 08/14/24 0933     Consult Global Result Comment^Text^TXT     Comment: Right lung:  Few B-cells with kappa excess (limited sample)  (see   comments)  DISCLAIMER: REFER TO HARDCOPY OR PDF FOR COMPLETE RESULT.   If synopsis provided, clinical decisions should not be   based on this interfaced synopsis alone.  Performed at:  1 - [facility]  [address], [place] [address]  :  Kassandra Villagomez M.D., Ph.D., Phone:  [phone]       Narrative:      Performed at:  1 - [facility]  [address], [place] [address]  :  Kassandra Villagomez M.D., Ph.D., Phone:  [phone]    Body Fluid Culture - Body Fluid, Pleural Cavity [[id number]] Collected: 08/13/24 1401    Specimen: Body Fluid from Pleural Cavity Updated: 08/14/24 0801     Body Fluid Culture No growth at less than 24 hours     Gram Stain Moderate (3+) WBCs per low power field      No organisms seen    Basic Metabolic Panel [[id number]]  (Abnormal) Collected: 08/14/24 0128    Specimen: Blood from Arm, Left Updated: 08/14/24 0200     Glucose 223 mg/dL      BUN 35 mg/dL      Creatinine 1.39 mg/dL      Sodium 138 mmol/L      Potassium 4.2 mmol/L      Comment: Specimen hemolyzed.  Result may be falsely elevated.        Chloride 107 mmol/L      CO2 19.8 mmol/L      Calcium 8.3 mg/dL      BUN/Creatinine Ratio 25.2     Anion Gap 11.2 mmol/L      eGFR 53.2 mL/min/1.73     Narrative:      GFR Normal >60  Chronic Kidney Disease <60  Kidney Failure <15    The GFR formula is only valid for adults with stable renal function between ages 18 and 70.    CBC & Differential [[id number]]  (Abnormal) Collected: 08/14/24 0128    Specimen: Blood from Arm, Left Updated: 08/14/24 0139    Narrative:      The following orders were created for panel order CBC & Differential.  Procedure                               Abnormality         Status                     ---------                               -----------         ------                     CBC Auto Differential[[id number]]        Abnormal             Final result                 Please view results for these tests on the individual orders.    CBC Auto Differential [[id number]]  (Abnormal) Collected: 08/14/24 0128    Specimen: Blood from Arm, Left Updated: 08/14/24 0139     WBC 19.18 10*3/mm3      RBC 4.19 10*6/mm3      Hemoglobin 13.0 g/dL      Hematocrit 39.0 %      MCV 93.1 fL      MCH 31.0 pg      MCHC 33.3 g/dL      RDW 13.1 %      RDW-SD 44.4 fl      MPV 9.9 fL      Platelets 244 10*3/mm3      Neutrophil % 91.0 %      Lymphocyte % 4.5 %      Monocyte % 3.8 %      Eosinophil % 0.0 %      Basophil % 0.2 %      Immature Grans % 0.5 %      Neutrophils, Absolute 17.47 10*3/mm3      Lymphocytes, Absolute 0.86 10*3/mm3      Monocytes, Absolute 0.72 10*3/mm3      Eosinophils, Absolute 0.00 10*3/mm3      Basophils, Absolute 0.03 10*3/mm3      Immature Grans, Absolute 0.10 10*3/mm3      nRBC 0.0 /100 WBC     Protein, Body Fluid - Pleural Fluid, Pleural Cavity [[id number]] Collected: 08/13/24 1401    Specimen: Pleural Fluid from Pleural Cavity Updated: 08/13/24 2003     Protein, Total, Fluid 3.7 g/dL     Narrative:      No Reference Ranges Established.    A serous fluid total fluid (TP) greater than 50 percent of the serum TP suggests the fluid is an exudate.      1. Pleural TP/Serum TP >0.5  2. Pleural LD/Serum LD >0.6  3. Pleural LD >2/3 of the upper limit of normal for serum LDH    This test was developed, it performance characteristics determined and judged suitable for clinical purposes by [facility].  It has not been cleared or approved by the FDA.  The laboratory is regulated under CLIA as qualified to perform high-complexity testing.     Lactate Dehydrogenase, Body Fluid - Pleural Fluid, Pleural Cavity [[id number]] Collected: 08/13/24 1401    Specimen: Pleural Fluid from Pleural Cavity Updated: 08/13/24 2003     Lactate Dehydrogenase (LD), Fluid 538 U/L     Narrative:      No Reference Ranges Established.    Serous fluid LDH greater than  60 percent of the serum LDH or serous fluid LDH two-thirds of the upper limit of normal for serum LDH suggests the fluid is an exudate.     1. Pleural TP/Serum TP >0.5  2. Pleural LD/Serum LD >0.6  3. Pleural LD >2/3 of the upper limit of normal for serum LDH    This test was developed, it performance characteristics determined and judged suitable for clinical purposes by [facility].  It has not been cleared or approved by the FDA.  The laboratory is regulated under CLIA as qualified to perform high-complexity testing.     Glucose, Body Fluid - Pleural Fluid, Pleural Cavity [[id number]] Collected: 08/13/24 1401    Specimen: Pleural Fluid from Pleural Cavity Updated: 08/13/24 2003     Glucose, Fluid 182 mg/dL     Narrative:      No Reference Ranges Established.    Serous fluid glucose less than 60 mg/dL or less than 30 mg/dL below serum glucose suggests an infectious or malignant exudate.     This test was developed, it performance characteristics determined and judged suitable for clinical purposes by [facility].  It has not been cleared or approved by the FDA.  The laboratory is regulated under CLIA as qualified to perform high-complexity testing.     Body Fluid Cell Count With Differential - Pleural Fluid, Pleural Cavity [[id number]]  (Abnormal) Collected: 08/13/24 1401    Specimen: Pleural Fluid from Pleural Cavity Updated: 08/13/24 1541    Narrative:      The following orders were created for panel order Body Fluid Cell Count With Differential - Pleural Fluid, Pleural Cavity.  Procedure                               Abnormality         Status                     ---------                               -----------         ------                     Body fluid cell count - ...[[id number]]  Abnormal            Final result               Body fluid differential ...[[id number]]                      Final result               Path Consult Reflex[[id number]]                               Final result                 Please view results for these tests on the individual orders.    Body fluid differential - Pleural Fluid, Pleural Cavity [[id number]] Collected: 08/13/24 1401    Specimen: Pleural Fluid from Pleural Cavity Updated: 08/13/24 1541     Neutrophils, Fluid 2 %      Lymphocytes, Fluid 2 %      Monocytes, Fluid 16 %      Unclassified Cells, Fluid 76 %      Mesothelial Cells, Fluid 4 %     Narrative:      Concentrated Smear by Cytocentrifuge Prep.    pH, Body Fluid - Pleural Fluid, Pleural Cavity [[id number]]  (Abnormal) Collected: 08/13/24 1401    Specimen: Pleural Fluid from Pleural Cavity Updated: 08/13/24 1517     pH, Fluid 8.00    Path Consult Reflex [[id number]] Collected: 08/13/24 1401    Specimen: Pleural Fluid from Pleural Cavity Updated: 08/13/24 1513     Pathology Review Yes    Body fluid cell count - Pleural Fluid, Pleural Cavity [[id number]]  (Abnormal) Collected: 08/13/24 1401    Specimen: Pleural Fluid from Pleural Cavity Updated: 08/13/24 1417     Color, Fluid Red     Appearance, Fluid Cloudy     Nucleated Cells, Fluid 1,539 /mm3      Method: Automated SysmFi.tt XN Method    Narrative:      No reference range established. Physician to interpret results with clinical findings.    Anaerobic Culture - Pleural Fluid, Pleural Cavity [[id number]] Collected: 08/13/24 1401    Specimen: Pleural Fluid from Pleural Cavity Updated: 08/13/24 1406    Respiratory Panel PCR w/COVID-19(SARS-CoV-2) [facility] In-House, NP Swab in UTM/VTM, 2 HR TAT - Wash, Lung, R [[id number]]  (Normal) Collected: 08/13/24 0820    Specimen: Wash from Lung, R Updated: 08/13/24 1016     ADENOVIRUS, PCR Not Detected     Coronavirus 229E Not Detected     Coronavirus HKU1 Not Detected     Coronavirus NL63 Not Detected     Coronavirus OC43 Not Detected     COVID19 Not Detected     Human Metapneumovirus Not Detected     Human Rhinovirus/Enterovirus Not Detected     Influenza A PCR Not Detected      Influenza B PCR Not Detected     Parainfluenza Virus 1 Not Detected     Parainfluenza Virus 2 Not Detected     Parainfluenza Virus 3 Not Detected     Parainfluenza Virus 4 Not Detected     RSV, PCR Not Detected     Bordetella pertussis pcr Not Detected     Bordetella parapertussis PCR Not Detected     Chlamydophila pneumoniae PCR Not Detected     Mycoplasma pneumo by PCR Not Detected    Narrative:      In the setting of a positive respiratory panel with a viral infection PLUS a negative procalcitonin without other underlying concern for bacterial infection, consider observing off antibiotics or discontinuation of antibiotics and continue supportive care. If the respiratory panel is positive for atypical bacterial infection (Bordetella pertussis, Chlamydophila pneumoniae, or Mycoplasma pneumoniae), consider antibiotic de-escalation to target atypical bacterial infection.    Aspergillus Galactomannan Antigen - Wash, Lung, R [[id number]] Collected: 08/13/24 0820    Specimen: Wash from Lung, R Updated: 08/13/24 0903    Cytomegalovirus (CMV) By PCR ([place]) (NEY) - Wash, Lung, R [[id number]] Collected: 08/13/24 0820    Specimen: Wash from Lung, R Updated: 08/13/24 0903    Pneumocystis PCR - Wash, Lung, R [[id number]] Collected: 08/13/24 0820    Specimen: Wash from Lung, R Updated: 08/13/24 0903    Fungus Culture - Wash, Lung, R [[id number]] Collected: 08/13/24 0820    Specimen: Wash from Lung, R Updated: 08/13/24 0903    Histoplasma Antigen, CSF or BAL - Wash, Lung, R [[id number]] Collected: 08/13/24 0820    Specimen: Wash from Lung, R Updated: 08/13/24 0903             Pending Results:     Imaging Reviewed:   CT Angiogram Chest Pulmonary Embolism    Result Date: 8/14/2024  Impression: 1. No pulmonary embolism. 2. Small right pleural effusion, improved since 8/12/2024. The patient is status post thoracentesis. 3. Question of 5.5 cm right lower lobe cavitary lesion, could reflect focal necrotizing pneumonia or even malignant  cavitary lesion. It is partially obscured by adjacent consolidation and pleural fluid. 4. Extensive abnormal reticular interstitial nodular thickening throughout the right hemithorax. This, coupled with the presence of mediastinal and hilar adenopathy,, along with right basilar consolidation, could reflect changes of pneumonia, but malignancy cannot be excluded. Consider short-term CT chest follow-up after trial of antibiotic therapy and, if unresolved, bronchoscopic correlation should be considered. 5. Small noncalcified left lower lobe lung nodules are present. Attention at surveillance imaging. 6. Mild concentric pericardial effusion. 7. Left hydronephrosis persists. No obstructing etiology is seen. Consider dedicated CT abdomen and pelvis follow-up. 8. Uncomplicated cholelithiasis. Electronically Signed: Mar Aly MD  8/14/2024 3:07 PM EDT  Workstation ID: [id number]    XR Chest 1 View    Result Date: 8/14/2024  Impression: Right arm approach PICC tip terminates at the level of the lower SVC. No pneumothorax. 2. Extensive right lung interstitial and alveolar disease and small right pleural effusion, unchanged. Electronically Signed: Mar Aly MD  8/14/2024 1:01 PM EDT  Workstation ID: [id number]    XR Chest 1 View    Result Date: 8/14/2024  Impression: Interval placement of defibrillator pads. Redemonstration of extensive airspace disease of the right lung. Electronically Signed: Gabino Carey MD  8/14/2024 10:27 AM EDT  Workstation ID: [id number]    XR Chest 1 View    Result Date: 8/13/2024  Impression: 1. Diffuse infiltrate throughout the right lung. 2. Small right pleural effusion. 3. No pneumothorax identified. Electronically Signed: Joe Mathew MD  8/13/2024 4:31 PM EDT  Workstation ID: [id number]    XR Chest 1 View    Result Date: 8/13/2024  Impression: 1. Development of an interstitial infiltrate throughout the right lung which hasn't changed significantly from the patient's chest CT of 8/12/2024.  2. Small residual right pleural effusion. No pneumothorax is identified. Electronically Signed: Joe Mathew MD  8/13/2024 2:31 PM EDT  Workstation ID: [id number]    US Thoracentesis    Result Date: 8/13/2024  Technically successful right thoracentesis utilizing ultrasound guidance and yielding 2000 mL of clear red fluid. Report dictated by: Justyn Boateng DNP  I have personally reviewed this case and agree with the findings above: Electronically Signed: Sean Buckley MD  8/13/2024 2:28 PM EDT  Workstation ID: [id number]    CT Chest Without Contrast Diagnostic    Result Date: 8/12/2024  Impression: 1. Large right pleural effusion with dense consolidation involving the right middle and right lower lobes and extensive nodular airspace disease throughout the right upper lobe consistent with multifocal pneumonia. 2. Asymmetric interlobular septal thickening throughout the right lung may relate to atypical infection or asymmetric pulmonary edema. 3. Emphysema with new nodules in left lower lobe largest 6 mm, recommend attention on short-term follow-up. 4. Enlarged right paratracheal and right hilar lymph nodes likely reactive adenopathy. 5. New moderate left hydroureteronephrosis partially imaged, consider dedicated CT abdomen and pelvis for further assessment. Electronically Signed: Antwon Estrella MD  8/12/2024 7:20 PM EDT  Workstation ID: [id number]          Assessment & Plan   Patient is a 73-year-old male with recurrent unresolving pneumonia despite outpatient treatment that presented with increasing shortness of breath requiring thoracentesis for a right pleural effusion.  Pleural fluid is positive for malignancy and repeat imaging revealed a right lower lobe lesion.    Stage IV non-small cell lung cancer/malignant pleural effusion/right lower lobe lesion  -Status post thoracentesis on 8/13/2024 with pleural fluid positive for malignancy, non-small cell carcinoma  -Postthoracentesis CT showing a 5.5 cm right lower lobe  lesion  -Hold Lovenox and have requested pulmonology to perform bronchoscopy to obtain additional tissue for NGS testing  -Check brain MRI and plan for outpatient PET for further staging  -Plan to begin treatment as an outpatient with chemotherapy or chemoimmunotherapy pending NGS testing for targeted treatment options    Electronically signed by Jolie Spence, CARL, 08/15/24, 8:05 AM EDT.    Patient seen and examined agree with assessment and plan    Patient is a 73-year-old male with prior history of smoking quit more than 20 years ago now presents with recurrent unresolving pneumonia despite outpatient treatment increased shortness of breath.  CT imaging with large right-sided pleural effusion right lower lobe cavitary appearing mass status post thoracentesis postthoracentesis CT was showing the mass.  Bronchoscopy was done bronchial lavage obtained cytology positive for non-small cell lung cancer pleural fluid cytology positive for malignancy as well.  This points towards stage IV non-small cell lung cancer.  Will complete staging with a brain MRI plan for outpatient PET/CT.  From cytology we would not have adequate sample for NGS testing.  I have discussed with Dr. Gonzalez about the possibility of getting tissue core for adequate sampling for NGS.  He will try to get that done patient has been on Lovenox for new onset A-fib we will hold Lovenox for the procedure can be started after.  I will also send [id number] outpatient.  Further treatment depends on PD-L1 and NGS testing.    Thank you for this consult. We will be happy to follow along with you.       I have obtained complete history and perform physical exam along with review of labs, imaging.  I have completed the majority and substantive portion of medical decision making  Acute severe condition requiring hospitalization.  Reviewed labs, imaging report, reviewed other documentation, nursing notes, discussed with pulmonary about further plan.  Rafaela  Mary Kate ACEVEDO

## 2024-08-15 NOTE — DISCHARGE PLACEMENT REQUEST
"Yonug Ames (73 y.o. Male)       Date of Birth   1950    Social Security Number       Address   [address] [place] [address] [place] [address]    Home Phone       MRN   [id number]       [employer]   Unknown    Marital Status                               Admission Date   8/12/24    Admission Type   Emergency    Admitting Provider   Evelyn Felder MD    Attending Provider   Evelyn Felder MD    Department, Room/Bed   [facility], 2116/1       Discharge Date       Discharge Disposition       Discharge Destination                                 Attending Provider: Evelyn Felder MD    Allergies: No Known Allergies    Isolation: None   Infection: None   Code Status: CPR    Ht: 177.8 cm (70\")   Wt: 90.9 kg (200 lb 6.4 oz)    Admission Cmt: None   Principal Problem: Large pleural effusion [J90]                   Active Insurance as of 8/12/2024       Primary Coverage       Payor Plan Insurance Group Employer/Plan Group    AETNA MEDICARE REPLAC[id number]       Payor Plan Address Payor Plan Phone Number Payor Plan Fax Number Effective Dates    [address] [phone]  1/1/2024 - None Entered    [place] [address]         Subscriber Name Subscriber Birth Date Member ID       YOUNG AMES 1950 [id number]                     Emergency Contacts        (Rel.) Home Phone Work Phone Mobile Phone    AMIRA AMES (Spouse) -- -- [phone]                 History & Physical        Krista Quan APRN at 08/12/24 2154       Attestation signed by Evelyn Felder MD at 08/13/24 1634    I have reviewed this documentation and agree.                      Patient Care Team:  Abner Funes APRN as PCP - General (Family Medicine)    Chief complaint shortness of breath    Subjective    Patient is a 73 y.o. male who presented to the ER with complaints of increasing shortness of breath over the last week. He reports pleuritic chest pain over the last month that has " been intermittent but has worsened the last 3 days. Patient reports history of being diagnosed in February with pneumonia and seems to not have fully recovered since then. He had a repeat CXR done in July that showed pleural effusion with residual pneumonia. He states hs had another CXR done today that showed increased effusion. He states he has been treated with Cephalexin, Zithromax, and Augmentin without relief of his symptoms. He was sent to the ER by Dr. Gonzalez where he was seen in the office today for un resolving pneumonia for the last 2 months.   Patient did see Dr. Gonzalez with Pulmonology today who recommended CT chest without contrast, IV Zosyn, and bronchoscopy in the morning.     Onset of symptoms was ongoing    Review of Systems   Constitutional: Negative.    HENT: Negative.     Eyes: Negative.    Respiratory:  Positive for cough and shortness of breath.    Cardiovascular: Negative.    Gastrointestinal: Negative.    Endocrine: Negative.    Genitourinary: Negative.    Musculoskeletal: Negative.    Skin: Negative.    Neurological: Negative.    Psychiatric/Behavioral: Negative.            History  History reviewed. No pertinent past medical history.  Past Surgical History:   Procedure Laterality Date    CARDIAC CATHETERIZATION Right 2024    Procedure: Coronary angiography;  Surgeon: Abran Chand MD;  Location: [facility] CATH INVASIVE LOCATION;  Service: Cardiovascular;  Laterality: Right;    CARDIAC CATHETERIZATION N/A 2024    Procedure: Left Heart Cath;  Surgeon: Abran Chand MD;  Location: [facility] CATH INVASIVE LOCATION;  Service: Cardiovascular;  Laterality: N/A;     History reviewed. No pertinent family history.  Social History     Tobacco Use    Smoking status: Former     Current packs/day: 0.00     Average packs/day: 0.3 packs/day for 30.0 years (7.5 ttl pk-yrs)     Types: Cigarettes     Start date: 1969     Quit date: 1999     Years since quittin.6     Passive exposure: Past     Smokeless tobacco: Never   Vaping Use    Vaping status: Never Used   Substance Use Topics    Alcohol use: Yes     Alcohol/week: 1.0 standard drink of alcohol     Types: 1 Cans of beer per week    Drug use: Never     (Not in a hospital admission)    Allergies:  Patient has no known allergies.    Objective    Vital Signs  Temp:  [97.4 °F (36.3 °C)] 97.4 °F (36.3 °C)  Heart Rate:  [114-133] 114  Resp:  [16-20] 16  BP: (107-144)/(69-96) 107/76     Physical Exam:      General Appearance:    Alert, cooperative, in no acute distress   Head:    Normocephalic, without obvious abnormality, atraumatic   Eyes:            Lids and lashes normal, conjunctivae and sclerae normal, no   icterus, no pallor, corneas clear, PERRLA   Ears:    Ears appear intact with no abnormalities noted   Throat:   No oral lesions, no thrush, oral mucosa moist   Neck:   No adenopathy, supple, trachea midline, no thyromegaly, no   carotid bruit, no JVD   Lungs:     Decreased breath sounds,respirations regular, even and                  unlabored    Heart:    Regular rhythm and normal rate, normal S1 and S2, no            murmur, no gallop, no rub, no click   Chest Wall:    No abnormalities observed   Abdomen:     Normal bowel sounds, no masses, no organomegaly, soft        non-tender, non-distended, no guarding, no rebound                tenderness   Extremities:   Moves all extremities well, no edema, no cyanosis, no             redness   Pulses:   Pulses palpable and equal bilaterally   Skin:   No bleeding, bruising or rash   Lymph nodes:   No palpable adenopathy   Neurologic:   No focal deficits noted       Results Review:     Imaging Results (Last 24 Hours)       Procedure Component Value Units Date/Time    CT Chest Without Contrast Diagnostic [[id number]] Collected: 08/12/24 1913     Updated: 08/12/24 1923    Narrative:      CT CHEST WO CONTRAST DIAGNOSTIC    Date of Exam: 8/12/2024 6:47 PM EDT    Indication: Pneumonia.    Comparison: CT chest with  contrast 1/28/2024    Technique: Axial CT images were obtained of the chest without contrast administration.  Sagittal and coronal reconstructions were performed.  Automated exposure control and iterative reconstruction methods were used.    Findings:  Visualized noncontrast soft tissues of the lower neck are without acute abnormality. Heart size normal. Coronary artery calcifications are present. Small pericardial effusion. There are several enlarged mediastinal lymph nodes for example low right   paratracheal node measuring 13 mm in short axis (2/44). Subcarinal node measuring 13 mm in short axis (2/54). Right hilar adenopathy suspected, difficult to fully assess due to lack of contrast on this study.    There is a large right-sided pleural effusion. There is consolidation involving the right lower lobe and right middle lobe consistent with multifocal pneumonia. Multifocal areas of nodularity involving the posterior right upper lobe also consistent with   pneumonia. There is interlobular septal thickening asymmetric Shai involving the right lung. Negative for pneumothorax. Emphysema.     There are several new nodules involving the left lower lobe for example measuring 5 mm abutting the pleura in the superior segment (4/49). Additional left lower lobe 6 mm nodule (4/65). Several other smaller left lower lobe adjacent nodules are noted.    Noncontrast visualized portions of the liver, spleen, adrenal glands and pancreas are without acute abnormality. Cholelithiasis. There is moderate left hydronephrosis and hydroureter partially imaged. No hydronephrosis on the right. Multilevel disc   narrowing in the thoracic and visualized upper lumbar spine. No aggressive osseous lesion or acute fracture.      Impression:      Impression:  1. Large right pleural effusion with dense consolidation involving the right middle and right lower lobes and extensive nodular airspace disease throughout the right upper lobe consistent with  multifocal pneumonia.  2. Asymmetric interlobular septal thickening throughout the right lung may relate to atypical infection or asymmetric pulmonary edema.  3. Emphysema with new nodules in left lower lobe largest 6 mm, recommend attention on short-term follow-up.  4. Enlarged right paratracheal and right hilar lymph nodes likely reactive adenopathy.  5. New moderate left hydroureteronephrosis partially imaged, consider dedicated CT abdomen and pelvis for further assessment.      Electronically Signed: Antwon Estrella MD    8/12/2024 7:20 PM EDT    Workstation ID: [id number]             Lab Results (last 24 hours)       Procedure Component Value Units Date/Time    Comprehensive Metabolic Panel [[id number]]  (Abnormal) Collected: 08/12/24 2030    Specimen: Blood Updated: 08/12/24 2058     Glucose 167 mg/dL      BUN 23 mg/dL      Creatinine 1.28 mg/dL      Sodium 137 mmol/L      Potassium 4.4 mmol/L      Comment: Specimen hemolyzed.  Result may be falsely elevated.        Chloride 104 mmol/L      CO2 20.8 mmol/L      Calcium 8.6 mg/dL      Total Protein 6.0 g/dL      Albumin 3.3 g/dL      ALT (SGPT) 20 U/L      Comment: Specimen hemolyzed.  Result may  be falsely elevated.        AST (SGOT) 39 U/L      Comment: Specimen hemolyzed.  Result may be falsely elevated.        Alkaline Phosphatase 55 U/L      Total Bilirubin 0.2 mg/dL      Globulin 2.7 gm/dL      A/G Ratio 1.2 g/dL      BUN/Creatinine Ratio 18.0     Anion Gap 12.2 mmol/L      eGFR 58.7 mL/min/1.73     Narrative:      GFR Normal >60  Chronic Kidney Disease <60  Kidney Failure <15    The GFR formula is only valid for adults with stable renal function between ages 18 and 70.    Single High Sensitivity Troponin T [[id number]]  (Abnormal) Collected: 08/12/24 2030    Specimen: Blood Updated: 08/12/24 2058     HS Troponin T 57 ng/L      Comment: Specimen hemolyzed.  Results may be falsely decreased.       Narrative:      High Sensitive Troponin T Reference Range:  <14.0  ng/L- Negative Female for AMI  <22.0 ng/L- Negative Male for AMI  >=14 - Abnormal Female indicating possible myocardial injury.  >=22 - Abnormal Male indicating possible myocardial injury.   Clinicians would have to utilize clinical acumen, EKG, Troponin, and serial changes to determine if it is an Acute Myocardial Infarction or myocardial injury due to an underlying chronic condition.         BNP [[id number]]  (Normal) Collected: 08/12/24 2030    Specimen: Blood Updated: 08/12/24 2055     proBNP 118.0 pg/mL     Narrative:      This assay is used as an aid in the diagnosis of individuals suspected of having heart failure. It can be used as an aid in the diagnosis of acute decompensated heart failure (ADHF) in patients presenting with signs and symptoms of ADHF to the emergency department (ED). In addition, NT-proBNP of <300 pg/mL indicates ADHF is not likely.    Age Range Result Interpretation  NT-proBNP Concentration (pg/mL:      <50             Positive            >450                   Gray                 300-450                    Negative             <300    50-75           Positive            >900                  Gray                300-900                  Negative            <300      >75             Positive            >1800                  Gray                300-1800                  Negative            <300    S. Pneumo Ag Urine or CSF - Urine, Urine, Clean Catch [[id number]]  (Normal) Collected: 08/12/24 1933    Specimen: Urine, Clean Catch Updated: 08/12/24 1957     Strep Pneumo Ag Negative    Legionella Antigen, Urine - Urine, Urine, Clean Catch [[id number]]  (Normal) Collected: 08/12/24 1933    Specimen: Urine, Clean Catch Updated: 08/12/24 1957     LEGIONELLA ANTIGEN, URINE Negative    Histoplasma Ag Ur - Urine, Urine, Clean Catch [[id number]] Collected: 08/12/24 1933    Specimen: Urine, Clean Catch Updated: 08/12/24 1936    Protime-INR [[id number]]  (Normal) Collected: 08/12/24 1834    Specimen:  Blood Updated: 08/12/24 1852     Protime 10.9 Seconds      INR 1.00    Respiratory Panel PCR w/COVID-19(SARS-CoV-2) SARAH/ELENI/NEY/PAD/COR/LUCRETIA In-House, NP Swab in UTM/VTM, 2 HR TAT - Swab, Nasopharynx [[id number]]  (Normal) Collected: 08/12/24 1749    Specimen: Swab from Nasopharynx Updated: 08/12/24 1841     ADENOVIRUS, PCR Not Detected     Coronavirus 229E Not Detected     Coronavirus HKU1 Not Detected     Coronavirus NL63 Not Detected     Coronavirus OC43 Not Detected     COVID19 Not Detected     Human Metapneumovirus Not Detected     Human Rhinovirus/Enterovirus Not Detected     Influenza A PCR Not Detected     Influenza B PCR Not Detected     Parainfluenza Virus 1 Not Detected     Parainfluenza Virus 2 Not Detected     Parainfluenza Virus 3 Not Detected     Parainfluenza Virus 4 Not Detected     RSV, PCR Not Detected     Bordetella pertussis pcr Not Detected     Bordetella parapertussis PCR Not Detected     Chlamydophila pneumoniae PCR Not Detected     Mycoplasma pneumo by PCR Not Detected    Narrative:      In the setting of a positive respiratory panel with a viral infection PLUS a negative procalcitonin without other underlying concern for bacterial infection, consider observing off antibiotics or discontinuation of antibiotics and continue supportive care. If the respiratory panel is positive for atypical bacterial infection (Bordetella pertussis, Chlamydophila pneumoniae, or Mycoplasma pneumoniae), consider antibiotic de-escalation to target atypical bacterial infection.    ANCA Panel [[id number]] Collected: 08/12/24 1834    Specimen: Blood Updated: 08/12/24 1840    Strep Pneumoniae Antibody 7 Serotypes [[id number]] Collected: 08/12/24 1834    Specimen: Blood Updated: 08/12/24 1840    STEVEN by IFA, Reflex 9-biomarkers profile [[id number]] Collected: 08/12/24 1834    Specimen: Blood Updated: 08/12/24 1840    Blood Culture - Blood, Arm, Right [[id number]] Collected: 08/12/24 1834    Specimen: Blood from Arm, Right  Updated: 08/12/24 1839    CBC & Differential [[id number]]  (Abnormal) Collected: 08/12/24 1732    Specimen: Blood Updated: 08/12/24 1837    Narrative:      The following orders were created for panel order CBC & Differential.  Procedure                               Abnormality         Status                     ---------                               -----------         ------                     CBC Auto Differential[[id number]]        Abnormal            Final result               Scan Slide[[id number]]                                       Final result                 Please view results for these tests on the individual orders.    CBC Auto Differential [[id number]]  (Abnormal) Collected: 08/12/24 1732    Specimen: Blood Updated: 08/12/24 1837     WBC 13.47 10*3/mm3      RBC 5.00 10*6/mm3      Hemoglobin 15.8 g/dL      Hematocrit 47.5 %      MCV 95.0 fL      MCH 31.6 pg      MCHC 33.3 g/dL      RDW 12.7 %      RDW-SD 44.8 fl      MPV 10.3 fL      Platelets 252 10*3/mm3     Narrative:      The previously reported component NRBC is no longer being reported. Previous result was 0.0 /100 WBC (Reference Range: 0.0-0.2 /100 WBC) on 8/12/2024 at 1835 EDT.    Scan Slide [[id number]] Collected: 08/12/24 1732    Specimen: Blood Updated: 08/12/24 1837     Scan Slide --     Comment: See Manual Differential Results       Manual Differential [[id number]]  (Abnormal) Collected: 08/12/24 1732    Specimen: Blood Updated: 08/12/24 1837     Neutrophil % 82.0 %      Lymphocyte % 7.0 %      Monocyte % 4.0 %      Eosinophil % 6.0 %      Basophil % 1.0 %      Neutrophils Absolute 11.05 10*3/mm3      Lymphocytes Absolute 0.94 10*3/mm3      Monocytes Absolute 0.54 10*3/mm3      Eosinophils Absolute 0.81 10*3/mm3      Basophils Absolute 0.13 10*3/mm3      RBC Morphology Normal     WBC Morphology Normal     Platelet Morphology Normal    POC Lactate [[id number]]  (Normal) Collected: 08/12/24 1748    Specimen: Blood Updated: 08/12/24 1752      Lactate 1.6 mmol/L      Comment: Serial Number: [id number]:  [id number]       Blood Culture - Blood, Arm, Left [[id number]] Collected: 08/12/24 1744    Specimen: Blood from Arm, Left Updated: 08/12/24 2971             I reviewed the patient's new clinical results.    Assessment & Plan      Large pleural effusion  -IV Zosyn and Doxy  -steroids, bronchodilators  -oxygen titrated as needed  -keep npo after mn, plan for bronch in am   -Pulmonary following, Dr. Gonzalez      DVT prophylaxis- SCD's  GI prophylaxis- ppi    I discussed the patient's findings and my recommendations with patient.     Kristatiana Quan, APRN  08/12/24  21:54 EDT        Electronically signed by Evelyn Felder MD at 08/13/24 1638          Operative/Procedure Notes (all)        Kelvin Gonzalez MD at 08/13/24 0809  Version 1 of 1         Bronchoscopy Procedure Note    Timeout was done appropriately by staff    Procedure:  Bronchoscopy, Therapeutic washing right lung  Right lower lobe endobronchial brushing    Preoperative Diagnosis: Unresolving pneumonia with weight loss    Postoperative Diagnosis:     no endobronchial lesions, no mucopurulent secretions, right lung washing revealed yellowish fluid material   Right lower lobe endobronchial brushing x 1 was done  bronchial washings sent for flow cytometry in addition to cytology/expanded cultures    Anesthesia: Moderate Sedation    Procedure Details: Patient was consented for the procedure with all risks and benefits of the procedure explained in detail.  Patient was given the opportunity to ask questions and all concerns were answered.  The bronchocope was inserted into the main airway via the oropharynx. An anatomical survey was done of the main airways and the subsegmental bronchus to at least the first subsegmental level of all five lobes of both lungs.  The findings are reported below.  A bronchoalveolar lavage was performed using aliquots of normal saline instilled into the airways then aspirated  back.      Findings:      no endobronchial lesions, no mucopurulent secretions, right lung washing revealed yellowish fluid material   Right lower lobe endobronchial brushing x 1 was done  bronchial washings sent for flow cytometry in addition to cytology/expanded cultures    Patient tolerated procedure well        Estimated Blood Loss:  Minimal           Specimens:  Sent serosanguinous fluid                Complications:  None; patient tolerated the procedure well.           Disposition: PACU - hemodynamically stable.      Patient tolerated the procedure well.    Kelvin Gonzalez MD  8/13/2024  08:59 EDT      Electronically signed by Kelvin Gonzalez MD at 08/13/24 0900          Physician Progress Notes (last 48 hours)        Asya Marques APRN at 08/14/24 1239               LOS: 2 days   Patient Care Team:  Abner Funes APRN as PCP - General (Family Medicine)    Subjective     Interval History: Fasting this a.m. for new onset atrial fibrillation with RVR    Patient Complaints: Mild shortness of breath and general fatigue    History taken from: patient    Review of Systems   Constitutional:  Positive for activity change, appetite change and fatigue. Negative for chills, diaphoresis and fever.   HENT:  Negative for facial swelling.    Eyes:  Negative for visual disturbance.   Respiratory:  Positive for cough and shortness of breath. Negative for wheezing and stridor.    Cardiovascular:  Negative for chest pain, palpitations and leg swelling.   Gastrointestinal:  Negative for abdominal pain, constipation, diarrhea and nausea.   Endocrine: Negative for polyuria.   Genitourinary:  Negative for dysuria.   Musculoskeletal:  Negative for arthralgias and back pain.   Skin:  Negative for rash.   Neurological:  Negative for dizziness, light-headedness and headaches.   Psychiatric/Behavioral:  Negative for confusion.            Objective     Vital Signs  Temp:  [97.4 °F (36.3 °C)-98.4 °F (36.9 °C)] 98.1 °F (36.7 °C)  Heart  Rate:  [104-220] 135  Resp:  [12-24] 14  BP: ()/(59-95) 111/68    Physical Exam:     General Appearance:    Alert, cooperative, in no acute distress,   Head:    Normocephalic, without obvious abnormality, atraumatic   Eyes:            Lids and lashes normal, conjunctivae and sclerae normal, no   icterus, no pallor, corneas clear, PERRLA   Ears:    Ears appear intact with no abnormalities noted   Throat:   No oral lesions, no thrush, oral mucosa moist   Neck:   No adenopathy, supple, trachea midline, no thyromegaly, no   carotid bruit, no JVD   Lungs:    Decreased breath sounds right base    Heart:    Regular rhythm and normal rate, normal S1 and S2, no            murmur, no gallop, no rub, no click   Chest Wall:    No abnormalities observed   Abdomen:     Normal bowel sounds, no masses, no organomegaly, soft        Non-tender non-distended, no guarding,   Extremities:   Moves all extremities well, no edema, no cyanosis, no             Redness   Pulses:   Pulses palpable and equal bilaterally   Skin:   No bleeding, bruising or rash   Lymph nodes:   No palpable adenopathy   Neurologic:   Cranial nerves 2 - 12 grossly intact, sensation intact, DTR       present and equal bilaterally        Results Review:    Lab Results (last 24 hours)       Procedure Component Value Units Date/Time    TSH [[id number]]  (Normal) Collected: 08/14/24 1000    Specimen: Blood Updated: 08/14/24 1136     TSH 0.626 uIU/mL     Respiratory Culture - Wash, Lung, R [[id number]] Collected: 08/13/24 0820    Specimen: Wash from Lung, R Updated: 08/14/24 1037     Respiratory Culture Light growth (2+) The culture consists of normal respiratory scottie. This is a preliminary report; final report to follow.     Gram Stain Moderate (3+) WBCs seen      Few (2+) Bronchial epithelial cells      Few (2+) Gram positive cocci in clusters      Rare (1+) Gram negative bacilli    Magnesium [[id number]]  (Normal) Collected: 08/14/24 1000    Specimen: Blood  Updated: 08/14/24 1034     Magnesium 2.0 mg/dL     Basic Metabolic Panel [[id number]]  (Abnormal) Collected: 08/14/24 1000    Specimen: Blood Updated: 08/14/24 1034     Glucose 256 mg/dL      BUN 36 mg/dL      Creatinine 1.30 mg/dL      Sodium 139 mmol/L      Potassium 4.3 mmol/L      Comment: Specimen hemolyzed.  Result may be falsely elevated.        Chloride 107 mmol/L      CO2 19.4 mmol/L      Calcium 8.4 mg/dL      BUN/Creatinine Ratio 27.7     Anion Gap 12.6 mmol/L      eGFR 57.6 mL/min/1.73     Narrative:      GFR Normal >60  Chronic Kidney Disease <60  Kidney Failure <15    The GFR formula is only valid for adults with stable renal function between ages 18 and 70.    Non-gynecologic Cytology [[id number]] Collected: 08/13/24 1401    Specimen: Pleural Fluid from Pleural Cavity Updated: 08/14/24 1025    POC Glucose Once [[id number]]  (Abnormal) Collected: 08/14/24 0933    Specimen: Blood Updated: 08/14/24 0934     Glucose 256 mg/dL      Comment: Serial Number: [id number]:  [id number]       Flow Cytometry [[id number]] Collected: 08/13/24 0820    Specimen: Wash from Lung, R Updated: 08/14/24 0933     Consult Global Result Comment^Text^TXT     Comment: Right lung:  Few B-cells with kappa excess (limited sample) (see   comments)  DISCLAIMER: REFER TO HARDCOPY OR PDF FOR COMPLETE RESULT.   If synopsis provided, clinical decisions should not be   based on this interfaced synopsis alone.  Performed at:  1 - [employer]  [address] [facility] [address] [place] [address]  :  Kassandra Villagomez M.D., Ph.D., Phone:  [phone]       Narrative:      Performed at:  1 - [employer].  [address] [address]  :  Kassandra Villagomez M.D., Ph.D., Phone:  [phone]    Body Fluid Culture - Body Fluid, Pleural Cavity [[id number]] Collected: 08/13/24 1401    Specimen: Body Fluid from Pleural Cavity Updated: 08/14/24 0801      Body Fluid Culture No growth at less than 24 hours     Gram Stain Moderate (3+) WBCs per low power field      No organisms seen    Pathology Consultation [[id number]] Collected: 08/13/24 1401    Specimen: Pleural Fluid from Pleural Cavity Updated: 08/14/24 0700    Basic Metabolic Panel [[id number]]  (Abnormal) Collected: 08/14/24 0128    Specimen: Blood from Arm, Left Updated: 08/14/24 0200     Glucose 223 mg/dL      BUN 35 mg/dL      Creatinine 1.39 mg/dL      Sodium 138 mmol/L      Potassium 4.2 mmol/L      Comment: Specimen hemolyzed.  Result may be falsely elevated.        Chloride 107 mmol/L      CO2 19.8 mmol/L      Calcium 8.3 mg/dL      BUN/Creatinine Ratio 25.2     Anion Gap 11.2 mmol/L      eGFR 53.2 mL/min/1.73     Narrative:      GFR Normal >60  Chronic Kidney Disease <60  Kidney Failure <15    The GFR formula is only valid for adults with stable renal function between ages 18 and 70.    CBC & Differential [[id number]]  (Abnormal) Collected: 08/14/24 0128    Specimen: Blood from Arm, Left Updated: 08/14/24 0139    Narrative:      The following orders were created for panel order CBC & Differential.  Procedure                               Abnormality         Status                     ---------                               -----------         ------                     CBC Auto Differential[[id number]]        Abnormal            Final result                 Please view results for these tests on the individual orders.    CBC Auto Differential [[id number]]  (Abnormal) Collected: 08/14/24 0128    Specimen: Blood from Arm, Left Updated: 08/14/24 0139     WBC 19.18 10*3/mm3      RBC 4.19 10*6/mm3      Hemoglobin 13.0 g/dL      Hematocrit 39.0 %      MCV 93.1 fL      MCH 31.0 pg      MCHC 33.3 g/dL      RDW 13.1 %      RDW-SD 44.4 fl      MPV 9.9 fL      Platelets 244 10*3/mm3      Neutrophil % 91.0 %      Lymphocyte % 4.5 %      Monocyte % 3.8 %      Eosinophil % 0.0 %      Basophil % 0.2 %      Immature  Grans % 0.5 %      Neutrophils, Absolute 17.47 10*3/mm3      Lymphocytes, Absolute 0.86 10*3/mm3      Monocytes, Absolute 0.72 10*3/mm3      Eosinophils, Absolute 0.00 10*3/mm3      Basophils, Absolute 0.03 10*3/mm3      Immature Grans, Absolute 0.10 10*3/mm3      nRBC 0.0 /100 WBC     Protein, Body Fluid - Pleural Fluid, Pleural Cavity [[id number]] Collected: 08/13/24 1401    Specimen: Pleural Fluid from Pleural Cavity Updated: 08/13/24 2003     Protein, Total, Fluid 3.7 g/dL     Narrative:      No Reference Ranges Established.    A serous fluid total fluid (TP) greater than 50 percent of the serum TP suggests the fluid is an exudate.      1. Pleural TP/Serum TP >0.5  2. Pleural LD/Serum LD >0.6  3. Pleural LD >2/3 of the upper limit of normal for serum LDH    This test was developed, it performance characteristics determined and judged suitable for clinical purposes by [facility].  It has not been cleared or approved by the FDA.  The laboratory is regulated under CLIA as qualified to perform high-complexity testing.     Lactate Dehydrogenase, Body Fluid - Pleural Fluid, Pleural Cavity [[id number]] Collected: 08/13/24 1401    Specimen: Pleural Fluid from Pleural Cavity Updated: 08/13/24 2003     Lactate Dehydrogenase (LD), Fluid 538 U/L     Narrative:      No Reference Ranges Established.    Serous fluid LDH greater than 60 percent of the serum LDH or serous fluid LDH two-thirds of the upper limit of normal for serum LDH suggests the fluid is an exudate.     1. Pleural TP/Serum TP >0.5  2. Pleural LD/Serum LD >0.6  3. Pleural LD >2/3 of the upper limit of normal for serum LDH    This test was developed, it performance characteristics determined and judged suitable for clinical purposes by [facility].  It has not been cleared or approved by the FDA.  The laboratory is regulated under CLIA as qualified to perform high-complexity testing.     Glucose, Body Fluid -  Pleural Fluid, Pleural Cavity [[id number]] Collected: 08/13/24 1401    Specimen: Pleural Fluid from Pleural Cavity Updated: 08/13/24 2003     Glucose, Fluid 182 mg/dL     Narrative:      No Reference Ranges Established.    Serous fluid glucose less than 60 mg/dL or less than 30 mg/dL below serum glucose suggests an infectious or malignant exudate.     This test was developed, it performance characteristics determined and judged suitable for clinical purposes by [facility].  It has not been cleared or approved by the FDA.  The laboratory is regulated under CLIA as qualified to perform high-complexity testing.     Blood Culture - Blood, Arm, Right [[id number]]  (Normal) Collected: 08/12/24 1834    Specimen: Blood from Arm, Right Updated: 08/13/24 1845     Blood Culture No growth at 24 hours    Blood Culture - Blood, Arm, Left [[id number]]  (Normal) Collected: 08/12/24 1744    Specimen: Blood from Arm, Left Updated: 08/13/24 1800     Blood Culture No growth at 24 hours    Narrative:      Less than seven (7) mL's of blood was collected.  Insufficient quantity may yield false negative results.    Body Fluid Cell Count With Differential - Pleural Fluid, Pleural Cavity [[id number]]  (Abnormal) Collected: 08/13/24 1401    Specimen: Pleural Fluid from Pleural Cavity Updated: 08/13/24 1541    Narrative:      The following orders were created for panel order Body Fluid Cell Count With Differential - Pleural Fluid, Pleural Cavity.  Procedure                               Abnormality         Status                     ---------                               -----------         ------                     Body fluid cell count - ...[[id number]]  Abnormal            Final result               Body fluid differential ...[[id number]]                      Final result               Path Consult Reflex[[id number]]                              Final result                 Please view results for these tests on the  individual orders.    Body fluid differential - Pleural Fluid, Pleural Cavity [[id number]] Collected: 08/13/24 1401    Specimen: Pleural Fluid from Pleural Cavity Updated: 08/13/24 1541     Neutrophils, Fluid 2 %      Lymphocytes, Fluid 2 %      Monocytes, Fluid 16 %      Unclassified Cells, Fluid 76 %      Mesothelial Cells, Fluid 4 %     Narrative:      Concentrated Smear by Cytocentrifuge Prep.    pH, Body Fluid - Pleural Fluid, Pleural Cavity [[id number]]  (Abnormal) Collected: 08/13/24 1401    Specimen: Pleural Fluid from Pleural Cavity Updated: 08/13/24 1517     pH, Fluid 8.00    Path Consult Reflex [[id number]] Collected: 08/13/24 1401    Specimen: Pleural Fluid from Pleural Cavity Updated: 08/13/24 1513     Pathology Review Yes    Body fluid cell count - Pleural Fluid, Pleural Cavity [[id number]]  (Abnormal) Collected: 08/13/24 1401    Specimen: Pleural Fluid from Pleural Cavity Updated: 08/13/24 1417     Color, Fluid Red     Appearance, Fluid Cloudy     Nucleated Cells, Fluid 1,539 /mm3      Method: Automated [email or web address] XN Method    Narrative:      No reference range established. Physician to interpret results with clinical findings.    AFB Culture - Body Fluid, Pleural Cavity [[id number]] Collected: 08/13/24 1401    Specimen: Body Fluid from Pleural Cavity Updated: 08/13/24 1406    Anaerobic Culture - Pleural Fluid, Pleural Cavity [[id number]] Collected: 08/13/24 1401    Specimen: Pleural Fluid from Pleural Cavity Updated: 08/13/24 1406             Imaging Results (Last 24 Hours)       Procedure Component Value Units Date/Time    XR Chest 1 View [[id number]] Collected: 08/14/24 1026     Updated: 08/14/24 1029    Narrative:      XR CHEST 1 VW    Date of Exam: 8/14/2024 9:55 AM EDT    Indication: high heart rate    Comparison: Chest x-ray 8/13/2024    Findings:  Defibrillator pads project over the left chest. Stable cardiomediastinal silhouette within normal limits. Redemonstration of patchy parenchymal and  interstitial opacities throughout most of the right lung, worst in the mid and lower lung. Redemonstration   of small right-sided pleural effusion. The left lung remains grossly clear. No pneumothorax.      Impression:      Impression:  Interval placement of defibrillator pads. Redemonstration of extensive airspace disease of the right lung.      Electronically Signed: Gabino Carey MD    8/14/2024 10:27 AM EDT    Workstation ID: [id number]    XR Chest 1 View [[id number]] Collected: 08/13/24 1630     Updated: 08/13/24 1633    Narrative:      XR CHEST 1 VW    Date of Exam: 8/13/2024 4:29 PM EDT    Indication: chest pain after thoracentesis    Comparison: 8/13/2024 at 1417 hours    Findings:  Cardiac size stable in the left lung is clear. There is diffuse right lung pulmonary infiltrate. There is a small right pleural effusion. There is no pneumothorax.      Impression:      Impression:    1. Diffuse infiltrate throughout the right lung.  2. Small right pleural effusion.  3. No pneumothorax identified.      Electronically Signed: Joe Mathew MD    8/13/2024 4:31 PM EDT    Workstation ID: [id number]    US Thoracentesis [[id number]] Collected: 08/13/24 1421    Specimen: Body Fluid Updated: 08/13/24 1454    Narrative:      DATE OF EXAM:  8/13/2024 1:53 PM EDT    PROCEDURE:  US THORACENTESIS    INDICATIONS:  Pleural effusion    COMPARISON:  CT 8/12/2024    FLUOROSCOPIC TIME:  None    PHYSICIAN MONITORED CONSCIOUS SEDATION TIME:  None minutes    TECHNIQUE:   A detailed explanation of the procedure, including the risks, benefits, and alternatives was provided. Informed consent was obtained from the patient. A preprocedure timeout was performed. The interventional radiology nurse monitored the patient at all   times. The patient was placed upright in the bed and the right back was ultrasounded, demonstrating a large right pleural effusion. The right back was then marked and prepped and draped in the usual sterile fashion. The  skin and subcutaneous tissues were   anesthetized with 1% lidocaine and a small skin incision was made. Next, a 4 Urdu centesis needle was advanced into the collection. A total of 2000 mL of clear red fluid was aspirated and the needle was removed. Specimens were collected and sent to   the lab per the ordering clinician. Hemostasis was achieved and a sterile bandage was applied. The patient tolerated the procedure well, without immediate complication. Post procedure chest x-ray completed and without evidence of pneumothorax.    FINDINGS:  See above      Impression:      Technically successful right thoracentesis utilizing ultrasound guidance and yielding 2000 mL of clear red fluid.      Report dictated by: Justyn Boateng DNP      I have personally reviewed this case and agree with the findings above:    Electronically Signed: Sean Buckley MD    8/13/2024 2:28 PM EDT    Workstation ID: [id number]    XR Chest 1 View [[id number]] Collected: 08/13/24 1429     Updated: 08/13/24 1454    Narrative:      XR CHEST 1 VW    Date of Exam: 8/13/2024 2:18 PM EDT    Indication: s/p right thora    Comparison: 1/28/2024, chest CT 8/12/2024    Findings:  Cardiac size is stable. The left lung is clear. The patient has developed diffuse infiltrate throughout the right lung. There is a small residual right pleural effusion. No pneumothorax is identified.      Impression:      Impression:    1. Development of an interstitial infiltrate throughout the right lung which hasn't changed significantly from the patient's chest CT of 8/12/2024.  2. Small residual right pleural effusion. No pneumothorax is identified.      Electronically Signed: Joe Mathew MD    8/13/2024 2:31 PM EDT    Workstation ID: [id number]                 I reviewed the patient's new clinical results.    Medication Review:   Scheduled Meds:atorvastatin, 10 mg, Oral, Daily  doxycycline, 100 mg, Intravenous, Q12H  ipratropium-albuterol, 3 mL, Nebulization, Q6H While Awake -  RT  methylPREDNISolone sodium succinate, 40 mg, Intravenous, Q8H  metoprolol succinate XL, 25 mg, Oral, Q24H  pantoprazole, 40 mg, Oral, QAM AC  piperacillin-tazobactam, 4.5 g, Intravenous, Q8H  sodium chloride, 500 mL, Intravenous, Once  sodium chloride, 10 mL, Intravenous, Q12H  tamsulosin, 0.4 mg, Oral, Nightly      Continuous Infusions:amiodarone, 1 mg/min, Last Rate: 1 mg/min (08/14/24 1108)   Followed by  amiodarone, 0.5 mg/min  dilTIAZem, 5-15 mg/hr, Last Rate: 10 mg/hr (08/14/24 1102)  hold, 1 each  sodium chloride, 50 mL/hr, Last Rate: 50 mL/hr (08/13/24 1033)      PRN Meds:.  acetaminophen    senna-docusate sodium **AND** polyethylene glycol **AND** bisacodyl **AND** bisacodyl    [COMPLETED] dilTIAZem **AND** dilTIAZem **AND** dilTIAZem    hold    LORazepam    nitroglycerin    ondansetron    sodium chloride    sodium chloride     Assessment & Plan     Right sided pneumonia  -Failed outpatient treatment  -Status post bronchoscopy 8/13/2024, follow BAL  -Continue broad-spectrum antibiotic, Zosyn and doxycycline  -Solu-Medrol 40 mg every 8 hours    Large right pleural effusion  - 2000 cc removed during thoracentesis on 8/13/2024- studies pending    Atrial fibrillation with RVR, new onset  - Appreciate cardiology input  - Antiarrhythmics per cardiology  -Will start Lovenox  -Chest CT with PE protocol has been ordered    HTN - Metoprolol  BPH - tamsulosin  GERD - PPI    SCD'd for dvt prophy      Plan for disposition:Home at discharge    CARL Quintero  08/14/24  12:39 EDT          Electronically signed by Asya Marques APRN at 08/14/24 1255       Kelvin Gonzalez MD at 08/14/24 0812          Daily Progress Note        Large pleural effusion    Pneumonia due to infectious organism      Assessment    Bronchoscopy right lower lobe brushing and washing positive for non-small cell lung cancer     Initially presented for non resolving Right side pneumonia not responding to multiple courses of antibiotics as an  outpatient    CT PE protocol negative for pulmonary embolism completed 08/14/2024    S/p Bronchoscopy completed 8/13/2024   no endobronchial lesions, no mucopurulent secretions, right lung washing revealed yellowish fluid material   Right lower lobe endobronchial brushing x 1 was done      moderate-sized right pleural effusion  Status post thoracentesis by IR on 8/13/2024  Removal of 2 L of red-colored fluid  Fluid analysis elevated nucleated cells unclassified  pH 8, glucose 182, , protein 3.7 compatible with exudate  cultures pending      Ex-smoker quit in 1999 with 25-pack-year     15 pound weight loss     [place] in [place] with exposure to agent of orange    Respiratory viral panel was negative  Legionella antigen urine strep antigen negative        Recommendations:     Oncology consult   Followed by cardiology for new onset Afib with RVR   Broad-spectrum antibiotics Zosyn  Monitor bronchial washing cultures and pleural fluid cultures      Chest x-ray 8/14/2024      CT chest 8/12/2024        LOS: 2 days     Subjective         Objective     Vital signs for last 24 hours:  Vitals:    08/14/24 0116 08/14/24 0455 08/14/24 0734 08/14/24 0739   BP: 127/74 120/78     BP Location: Left arm Left arm     Patient Position: Lying Lying     Pulse: 116 111 104 108   Resp: 21 17 12 14   Temp: 97.4 °F (36.3 °C) 98.1 °F (36.7 °C)     TempSrc: Oral Oral     SpO2: 94% 93% 95% 99%   Weight:  89.2 kg (196 lb 10.4 oz)     Height:           Intake/Output last 3 shifts:  I/O last 3 completed shifts:  In: 880 [P.O.:480; I.V.:400]  Out: 2375 [Urine:375; Other:2000]  Intake/Output this shift:  No intake/output data recorded.      Radiology  Imaging Results (Last 24 Hours)       Procedure Component Value Units Date/Time    XR Chest 1 View [[id number]] Collected: 08/13/24 1630     Updated: 08/13/24 1633    Narrative:      XR CHEST 1 VW    Date of Exam: 8/13/2024 4:29 PM EDT    Indication: chest pain after thoracentesis    Comparison:  8/13/2024 at 1417 hours    Findings:  Cardiac size stable in the left lung is clear. There is diffuse right lung pulmonary infiltrate. There is a small right pleural effusion. There is no pneumothorax.      Impression:      Impression:    1. Diffuse infiltrate throughout the right lung.  2. Small right pleural effusion.  3. No pneumothorax identified.      Electronically Signed: Joe Mathew MD    8/13/2024 4:31 PM EDT    Workstation ID: [id number]    US Thoracentesis [[id number]] Collected: 08/13/24 1421    Specimen: Body Fluid Updated: 08/13/24 1454    Narrative:      DATE OF EXAM:  8/13/2024 1:53 PM EDT    PROCEDURE:  US THORACENTESIS    INDICATIONS:  Pleural effusion    COMPARISON:  CT 8/12/2024    FLUOROSCOPIC TIME:  None    PHYSICIAN MONITORED CONSCIOUS SEDATION TIME:  None minutes    TECHNIQUE:   A detailed explanation of the procedure, including the risks, benefits, and alternatives was provided. Informed consent was obtained from the patient. A preprocedure timeout was performed. The interventional radiology nurse monitored the patient at all   times. The patient was placed upright in the bed and the right back was ultrasounded, demonstrating a large right pleural effusion. The right back was then marked and prepped and draped in the usual sterile fashion. The skin and subcutaneous tissues were   anesthetized with 1% lidocaine and a small skin incision was made. Next, a 4 Burmese centesis needle was advanced into the collection. A total of 2000 mL of clear red fluid was aspirated and the needle was removed. Specimens were collected and sent to   the lab per the ordering clinician. Hemostasis was achieved and a sterile bandage was applied. The patient tolerated the procedure well, without immediate complication. Post procedure chest x-ray completed and without evidence of pneumothorax.    FINDINGS:  See above      Impression:      Technically successful right thoracentesis utilizing ultrasound guidance and  yielding 2000 mL of clear red fluid.      Report dictated by: Justyn Boateng DNP      I have personally reviewed this case and agree with the findings above:    Electronically Signed: Sean Buckley MD    8/13/2024 2:28 PM EDT    Workstation ID: [id number]    XR Chest 1 View [[id number]] Collected: 08/13/24 1429     Updated: 08/13/24 1454    Narrative:      XR CHEST 1 VW    Date of Exam: 8/13/2024 2:18 PM EDT    Indication: s/p right thora    Comparison: 1/28/2024, chest CT 8/12/2024    Findings:  Cardiac size is stable. The left lung is clear. The patient has developed diffuse infiltrate throughout the right lung. There is a small residual right pleural effusion. No pneumothorax is identified.      Impression:      Impression:    1. Development of an interstitial infiltrate throughout the right lung which hasn't changed significantly from the patient's chest CT of 8/12/2024.  2. Small residual right pleural effusion. No pneumothorax is identified.      Electronically Signed: Joe Mathew MD    8/13/2024 2:31 PM EDT    Workstation ID: [id number]            Labs:  Results from last 7 days   Lab Units 08/14/24  0128   WBC 10*3/mm3 19.18*   HEMOGLOBIN g/dL 13.0   HEMATOCRIT % 39.0   PLATELETS 10*3/mm3 244     Results from last 7 days   Lab Units 08/14/24  0128 08/13/24  0335 08/12/24 2030   SODIUM mmol/L 138   < > 137   POTASSIUM mmol/L 4.2   < > 4.4   CHLORIDE mmol/L 107   < > 104   CO2 mmol/L 19.8*   < > 20.8*   BUN mg/dL 35*   < > 23   CREATININE mg/dL 1.39*   < > 1.28*   CALCIUM mg/dL 8.3*   < > 8.6   BILIRUBIN mg/dL  --   --  0.2   ALK PHOS U/L  --   --  55   ALT (SGPT) U/L  --   --  20   AST (SGOT) U/L  --   --  39   GLUCOSE mg/dL 223*   < > 167*    < > = values in this interval not displayed.         Results from last 7 days   Lab Units 08/12/24  2030   ALBUMIN g/dL 3.3*     Results from last 7 days   Lab Units 08/12/24  2305 08/12/24 2030   HSTROP T ng/L 53* 57*             Results from last 7 days   Lab Units  24  1834   INR  1.00               Meds:   SCHEDULE  atorvastatin, 10 mg, Oral, Daily  doxycycline, 100 mg, Intravenous, Q12H  ipratropium-albuterol, 3 mL, Nebulization, Q6H While Awake - RT  methylPREDNISolone sodium succinate, 40 mg, Intravenous, Q8H  metoprolol succinate XL, 25 mg, Oral, Q24H  pantoprazole, 40 mg, Oral, QAM AC  piperacillin-tazobactam, 4.5 g, Intravenous, Q8H  sodium chloride, 10 mL, Intravenous, Q12H  tamsulosin, 0.4 mg, Oral, Nightly      Infusions  hold, 1 each  sodium chloride, 50 mL/hr, Last Rate: 50 mL/hr (24 1033)      PRNs    acetaminophen    senna-docusate sodium **AND** polyethylene glycol **AND** bisacodyl **AND** bisacodyl    hold    LORazepam    nitroglycerin    ondansetron    sodium chloride    sodium chloride    Physical Exam:  Physical Exam  Cardiovascular:      Heart sounds: No murmur heard.     No gallop.   Pulmonary:      Effort: No respiratory distress.      Breath sounds: No stridor. Rhonchi and rales present. No wheezing.   Chest:      Chest wall: No tenderness.         ROS  Review of Systems   Respiratory:  Positive for cough and shortness of breath. Negative for wheezing and stridor.    Cardiovascular:  Negative for chest pain, palpitations and leg swelling.             Total time spent with patient greater than: 45 Minutes    Electronically signed by Klevin Gonzalez MD at 24          Consult Notes (last 48 hours)        Jolie Spence APRN at 08/15/24 0804        Consult Orders    1. Hematology & Oncology Inpatient Consult [[id number]] ordered by Kelvin Gonzalez MD at 24 1624                         Hematology/Oncology Inpatient Consultation    Patient name: Young Ames  : 1950  MRN: [id number]  Referring Provider: Dr. Felder  Reason for Consultation: Newly diagnosed lung cancer    Chief complaint: Shortness of breath    History of present illness:    Young Ames is a 73 y.o. male who presented to [facility] on 2024  with complaints of increasing shortness of breath.  Past medical history of unresolving pneumonia, tobacco use, recent unexplained weight loss.  Reported initially being diagnosed in February with pneumonia but had not ever seem to fully recover.  Had a repeat chest x-ray done in July that showed pleural effusion with residual pneumonia.  He reported he had been treated as an outpatient with cephalexin, Zithromax, and Augmentin without relief of his symptoms.  He was seen by his pulmonologist the day of presentation and was sent to the ED from their office for unresolving pneumonia for the past 2 months.  Pulmonology recommended CT of the chest and bronchoscopy.    8/12/2024 CT chest without contrast  -Large right pleural effusion with dense consolidation involving the right middle and right lower lobes and extensive nodular airspace disease throughout the right upper lobe consistent with multifocal pneumonia  -Asymmetric interlobular septal thickening throughout the right lung may relate to atypical infection or asymmetric pulmonary edema  -Emphysema with new nodules in the left lower lobe largest 6 mm  -Enlarged right paratracheal and right hilar lymph nodes likely reactive adenopathy    8/13/2024 ultrasound-guided thoracentesis  -Pleural fluid with malignant cells present consistent with non-small cell carcinoma    8/14/2024 CT chest protocol  -Negative for pulmonary embolism  -Small right pleural effusion, improved since 8/12/2024.  Status post thoracentesis.  -Question of a 5.5 cm right lower lobe cavitary lesion, could reflect focal necrotizing pneumonia or malignant cavitary lesion.  It is obscured partially by adjacent consolidation and pleural fluid.  -Extensive abnormal reticular interstitial nodular thickening throughout the right hemithorax; mediastinal and hilar adenopathy      08/15/24  Hematology/Oncology was consulted for if the lung mass and malignant pleural effusion.  At the time of the consult  WBC 18.78, hemoglobin 12.1, platelets 244,000.    He/She  has no past medical history on file.    PCP: Abner Funes APRN    History:  History reviewed. No pertinent past medical history.,   Past Surgical History:   Procedure Laterality Date    CARDIAC CATHETERIZATION Right 2024    Procedure: Coronary angiography;  Surgeon: Abran Chand MD;  Location: [facility] CATH INVASIVE LOCATION;  Service: Cardiovascular;  Laterality: Right;    CARDIAC CATHETERIZATION N/A 2024    Procedure: Left Heart Cath;  Surgeon: Abran Chand MD;  Location: [facility] CATH INVASIVE LOCATION;  Service: Cardiovascular;  Laterality: N/A;   , History reviewed. No pertinent family history.,   Social History     Tobacco Use    Smoking status: Former     Current packs/day: 0.00     Average packs/day: 0.3 packs/day for 30.0 years (7.5 ttl pk-yrs)     Types: Cigarettes     Start date: 1969     Quit date: 1999     Years since quittin.6     Passive exposure: Past    Smokeless tobacco: Never   Vaping Use    Vaping status: Never Used   Substance Use Topics    Alcohol use: Yes     Alcohol/week: 1.0 standard drink of alcohol     Types: 1 Cans of beer per week    Drug use: Never   ,   Medications Prior to Admission   Medication Sig Dispense Refill Last Dose    acetaminophen (TYLENOL) 325 MG tablet Take 2 tablets by mouth Every 4 (Four) Hours As Needed for Mild Pain.       albuterol sulfate  (90 Base) MCG/ACT inhaler Inhale 2 puffs Every 4 (Four) Hours As Needed.       APPLE CIDER VINEGAR PO Take 1 capsule by mouth Daily.   2024    aspirin 81 MG EC tablet Take 1 tablet by mouth Every Night.   2024    Cholecalciferol (VITAMIN D-3 PO) Take 1 tablet by mouth Daily.   2024    metoprolol succinate XL (TOPROL-XL) 25 MG 24 hr tablet Take 1 tablet by mouth Every Night.   2024    multivitamin with minerals (Centrum Silver 50+Men) tablet tablet Take 1 tablet by mouth Every Morning.   2024    NON FORMULARY Take 2  tablets by mouth Every Night. Zzzquil   8/11/2024    Omega-3 Fatty Acids (fish oil) 1000 MG capsule capsule Take  by mouth Daily.   8/11/2024    pantoprazole (PROTONIX) 40 MG EC tablet Take 1 tablet by mouth Daily. 30 tablet 1 8/12/2024    simvastatin (ZOCOR) 20 MG tablet Take 1 tablet by mouth Every Night.   8/11/2024    tamsulosin (FLOMAX) 0.4 MG capsule 24 hr capsule Take 1 capsule by mouth Every Night.   8/11/2024    thiamine (VITAMIN B-1) 100 MG tablet  tablet Take 1 tablet by mouth Every Morning.   8/12/2024   , Scheduled Meds:  amiodarone, 200 mg, Oral, Q24H  atorvastatin, 10 mg, Oral, Daily  [Held by provider] enoxaparin, 1 mg/kg, Subcutaneous, Q12H  insulin lispro, 2-9 Units, Subcutaneous, 4x Daily AC & at Bedtime  ipratropium-albuterol, 3 mL, Nebulization, Q6H While Awake - RT  midodrine, 5 mg, Oral, TID AC  pantoprazole, 40 mg, Oral, QAM AC  piperacillin-tazobactam, 4.5 g, Intravenous, Q8H  predniSONE, 30 mg, Oral, Daily   Followed by  [START ON 8/17/2024] predniSONE, 20 mg, Oral, Daily   Followed by  [START ON 8/19/2024] predniSONE, 10 mg, Oral, Daily  sodium chloride, 10 mL, Intravenous, Q12H  sodium chloride, 10 mL, Intravenous, Q12H  sodium chloride, 10 mL, Intravenous, Q12H  sodium chloride, 10 mL, Intravenous, Q12H  tamsulosin, 0.4 mg, Oral, Nightly    , Continuous Infusions:  hold, 1 each  Pharmacy to Dose enoxaparin (LOVENOX),   sodium chloride, 50 mL/hr, Last Rate: 50 mL/hr (08/13/24 1033)  sodium chloride, 100 mL/hr, Last Rate: 100 mL/hr (08/14/24 1927)    , PRN Meds:    acetaminophen    senna-docusate sodium **AND** polyethylene glycol **AND** bisacodyl **AND** bisacodyl    dextrose    dextrose    glucagon (human recombinant)    hold    LORazepam    nitroglycerin    ondansetron    Pharmacy to Dose enoxaparin (LOVENOX)    sodium chloride    sodium chloride    sodium chloride    sodium chloride   Allergies:  Patient has no known allergies.    Subjective     ROS:  Review of Systems     Objective  "  Vital Signs:   /58   Pulse 86   Temp 98 °F (36.7 °C) (Oral)   Resp 21   Ht 177.8 cm (70\")   Wt 90.9 kg (200 lb 6.4 oz)   SpO2 96%   BMI 28.75 kg/m²     Physical Exam: (performed by MD)  Physical Exam    Results Review:  Lab Results (last 48 hours)       Procedure Component Value Units Date/Time    POC Glucose 4x Daily Before Meals & at Bedtime [[id number]]  (Normal) Collected: 08/15/24 0722    Specimen: Blood Updated: 08/15/24 0724     Glucose 104 mg/dL      Comment: Serial Number: [id number]:  [id number]       Basic Metabolic Panel [[id number]]  (Abnormal) Collected: 08/15/24 0029    Specimen: Blood Updated: 08/15/24 0112     Glucose 147 mg/dL      BUN 40 mg/dL      Creatinine 1.31 mg/dL      Sodium 143 mmol/L      Potassium 4.2 mmol/L      Comment: Specimen hemolyzed.  Result may be falsely elevated.        Chloride 112 mmol/L      CO2 21.1 mmol/L      Calcium 8.0 mg/dL      BUN/Creatinine Ratio 30.5     Anion Gap 9.9 mmol/L      eGFR 57.1 mL/min/1.73     Narrative:      GFR Normal >60  Chronic Kidney Disease <60  Kidney Failure <15    The GFR formula is only valid for adults with stable renal function between ages 18 and 70.    CBC & Differential [[id number]]  (Abnormal) Collected: 08/15/24 0029    Specimen: Blood Updated: 08/15/24 0036    Narrative:      The following orders were created for panel order CBC & Differential.  Procedure                               Abnormality         Status                     ---------                               -----------         ------                     CBC Auto Differential[[id number]]        Abnormal            Final result                 Please view results for these tests on the individual orders.    CBC Auto Differential [[id number]]  (Abnormal) Collected: 08/15/24 0029    Specimen: Blood Updated: 08/15/24 0036     WBC 18.78 10*3/mm3      RBC 3.87 10*6/mm3      Hemoglobin 12.1 g/dL      Hematocrit 36.6 %      MCV 94.6 fL      MCH 31.3 pg      MCHC " 33.1 g/dL      RDW 13.2 %      RDW-SD 46.2 fl      MPV 9.9 fL      Platelets 244 10*3/mm3      Neutrophil % 88.3 %      Lymphocyte % 5.2 %      Monocyte % 5.8 %      Eosinophil % 0.0 %      Basophil % 0.1 %      Immature Grans % 0.6 %      Neutrophils, Absolute 16.59 10*3/mm3      Lymphocytes, Absolute 0.98 10*3/mm3      Monocytes, Absolute 1.08 10*3/mm3      Eosinophils, Absolute 0.00 10*3/mm3      Basophils, Absolute 0.01 10*3/mm3      Immature Grans, Absolute 0.12 10*3/mm3      nRBC 0.0 /100 WBC     POC Glucose Once [[id number]]  (Abnormal) Collected: 08/14/24 2006    Specimen: Blood Updated: 08/14/24 2008     Glucose 158 mg/dL      Comment: Serial Number: [id number]:  [id number]       Blood Culture - Blood, Arm, Right [[id number]]  (Normal) Collected: 08/12/24 1834    Specimen: Blood from Arm, Right Updated: 08/14/24 1845     Blood Culture No growth at 2 days    Blood Culture - Blood, Arm, Left [[id number]]  (Normal) Collected: 08/12/24 1744    Specimen: Blood from Arm, Left Updated: 08/14/24 1800     Blood Culture No growth at 2 days    Narrative:      Less than seven (7) mL's of blood was collected.  Insufficient quantity may yield false negative results.    POC Glucose 4x Daily Before Meals & at Bedtime [[id number]]  (Abnormal) Collected: 08/14/24 1728    Specimen: Blood Updated: 08/14/24 1729     Glucose 218 mg/dL      Comment: Serial Number: [id number]:  [id number]       Pathology Consultation [[id number]] Collected: 08/13/24 1401    Specimen: Pleural Fluid from Pleural Cavity Updated: 08/14/24 1334     Final Diagnosis --     Positive for non-small cell carcinoma       Case Report --     Surgical Pathology Report                         Case: [id number]                                  Authorizing Provider:  Kelvin Gonzalez MD             Collected:           08/13/2024 02:01 PM          [facility]       Received:            08/14/2024 07:00 AM                              "    EMERGENCY DEPARTMENT                                                         Pathologist:           Jevon Hsu MD                                                             Specimen:    Pleural Cavity                                                                             AFB Culture - Wash, Lung, R [[id number]] Collected: 08/13/24 0820    Specimen: Wash from Lung, R Updated: 08/14/24 1333     AFB Stain No acid fast bacilli seen on concentrated smear    AFB Culture - Body Fluid, Pleural Cavity [[id number]] Collected: 08/13/24 1401    Specimen: Body Fluid from Pleural Cavity Updated: 08/14/24 1332     AFB Stain No acid fast bacilli seen on concentrated smear    Non-gynecologic Cytology [[id number]] Collected: 08/13/24 0813    Specimen: Brushing from Lung, R; Wash from Lung, R Updated: 08/14/24 1304     Case Report --     Medical Cytology Report                           Case: [id number]                                  Authorizing Provider:  Kelvin Gonzalez MD             Collected:           08/13/2024 08:13 AM          Ordering Location:     [facility]  Received:            08/13/2024 12:23 PM                                 SUITES                                                                       Pathologist:           Jevon Hsu MD                                                             Specimens:   1) - Lung, R                                                                                        2) - Lung, R                                                                                Final Diagnosis --     Specimen #1 (Lung, right, bronch wash, smears and cytospin preparation):    Positive for malignancy, non-small cell carcinoma    Specimen #2 (Lung, right, bronch brush, smear preparation only):    Positive for malignancy, non-small cell carcinoma    JPR         Gross Description --     1. Lung, R.  Received in [facility] and designated \"bronchial washing, right lung\" are 43 mL of " hazy, green fluid. Particulate matter is not present. This specimen is processed as per protocol.      2. Lung, R.  .          TSH [[id number]]  (Normal) Collected: 08/14/24 1000    Specimen: Blood Updated: 08/14/24 1136     TSH 0.626 uIU/mL     Respiratory Culture - Wash, Lung, R [[id number]] Collected: 08/13/24 0820    Specimen: Wash from Lung, R Updated: 08/14/24 1037     Respiratory Culture Light growth (2+) The culture consists of normal respiratory scottie. This is a preliminary report; final report to follow.     Gram Stain Moderate (3+) WBCs seen      Few (2+) Bronchial epithelial cells      Few (2+) Gram positive cocci in clusters      Rare (1+) Gram negative bacilli    Magnesium [[id number]]  (Normal) Collected: 08/14/24 1000    Specimen: Blood Updated: 08/14/24 1034     Magnesium 2.0 mg/dL     Basic Metabolic Panel [[id number]]  (Abnormal) Collected: 08/14/24 1000    Specimen: Blood Updated: 08/14/24 1034     Glucose 256 mg/dL      BUN 36 mg/dL      Creatinine 1.30 mg/dL      Sodium 139 mmol/L      Potassium 4.3 mmol/L      Comment: Specimen hemolyzed.  Result may be falsely elevated.        Chloride 107 mmol/L      CO2 19.4 mmol/L      Calcium 8.4 mg/dL      BUN/Creatinine Ratio 27.7     Anion Gap 12.6 mmol/L      eGFR 57.6 mL/min/1.73     Narrative:      GFR Normal >60  Chronic Kidney Disease <60  Kidney Failure <15    The GFR formula is only valid for adults with stable renal function between ages 18 and 70.    Non-gynecologic Cytology [[id number]] Collected: 08/13/24 1401    Specimen: Pleural Fluid from Pleural Cavity Updated: 08/14/24 1025    POC Glucose Once [[id number]]  (Abnormal) Collected: 08/14/24 0933    Specimen: Blood Updated: 08/14/24 0934     Glucose 256 mg/dL      Comment: Serial Number: [id number]:  [id number]       Flow Cytometry [[id number]] Collected: 08/13/24 0820    Specimen: Wash from Lung, R Updated: 08/14/24 0933     Consult Global Result Comment^Text^TXT     Comment: Right  lung:  Few B-cells with kappa excess (limited sample) (see   comments)  DISCLAIMER: REFER TO HARDCOPY OR PDF FOR COMPLETE RESULT.   If synopsis provided, clinical decisions should not be   based on this interfaced synopsis alone.  Performed at:  1 - [facility]  [address] [address]  :  Kassandra Villagomez M.D., Ph.D., Phone:  [phone]       Narrative:      Performed at:  1 - [facility].  [address], [place] [address]  :  Kassandra Villagomez M.D., Ph.D., Phone:  [phone]    Body Fluid Culture - Body Fluid, Pleural Cavity [[id number]] Collected: 08/13/24 1401    Specimen: Body Fluid from Pleural Cavity Updated: 08/14/24 0801     Body Fluid Culture No growth at less than 24 hours     Gram Stain Moderate (3+) WBCs per low power field      No organisms seen    Basic Metabolic Panel [[id number]]  (Abnormal) Collected: 08/14/24 0128    Specimen: Blood from Arm, Left Updated: 08/14/24 0200     Glucose 223 mg/dL      BUN 35 mg/dL      Creatinine 1.39 mg/dL      Sodium 138 mmol/L      Potassium 4.2 mmol/L      Comment: Specimen hemolyzed.  Result may be falsely elevated.        Chloride 107 mmol/L      CO2 19.8 mmol/L      Calcium 8.3 mg/dL      BUN/Creatinine Ratio 25.2     Anion Gap 11.2 mmol/L      eGFR 53.2 mL/min/1.73     Narrative:      GFR Normal >60  Chronic Kidney Disease <60  Kidney Failure <15    The GFR formula is only valid for adults with stable renal function between ages 18 and 70.    CBC & Differential [[id number]]  (Abnormal) Collected: 08/14/24 0128    Specimen: Blood from Arm, Left Updated: 08/14/24 0139    Narrative:      The following orders were created for panel order CBC & Differential.  Procedure                               Abnormality         Status                     ---------                               -----------         ------                     CBC Auto  Differential[[id number]]        Abnormal            Final result                 Please view results for these tests on the individual orders.    CBC Auto Differential [[id number]]  (Abnormal) Collected: 08/14/24 0128    Specimen: Blood from Arm, Left Updated: 08/14/24 0139     WBC 19.18 10*3/mm3      RBC 4.19 10*6/mm3      Hemoglobin 13.0 g/dL      Hematocrit 39.0 %      MCV 93.1 fL      MCH 31.0 pg      MCHC 33.3 g/dL      RDW 13.1 %      RDW-SD 44.4 fl      MPV 9.9 fL      Platelets 244 10*3/mm3      Neutrophil % 91.0 %      Lymphocyte % 4.5 %      Monocyte % 3.8 %      Eosinophil % 0.0 %      Basophil % 0.2 %      Immature Grans % 0.5 %      Neutrophils, Absolute 17.47 10*3/mm3      Lymphocytes, Absolute 0.86 10*3/mm3      Monocytes, Absolute 0.72 10*3/mm3      Eosinophils, Absolute 0.00 10*3/mm3      Basophils, Absolute 0.03 10*3/mm3      Immature Grans, Absolute 0.10 10*3/mm3      nRBC 0.0 /100 WBC     Protein, Body Fluid - Pleural Fluid, Pleural Cavity [[id number]] Collected: 08/13/24 1401    Specimen: Pleural Fluid from Pleural Cavity Updated: 08/13/24 2003     Protein, Total, Fluid 3.7 g/dL     Narrative:      No Reference Ranges Established.    A serous fluid total fluid (TP) greater than 50 percent of the serum TP suggests the fluid is an exudate.      1. Pleural TP/Serum TP >0.5  2. Pleural LD/Serum LD >0.6  3. Pleural LD >2/3 of the upper limit of normal for serum LDH    This test was developed, it performance characteristics determined and judged suitable for clinical purposes by [facility].  It has not been cleared or approved by the FDA.  The laboratory is regulated under CLIA as qualified to perform high-complexity testing.     Lactate Dehydrogenase, Body Fluid - Pleural Fluid, Pleural Cavity [[id number]] Collected: 08/13/24 1401    Specimen: Pleural Fluid from Pleural Cavity Updated: 08/13/24 2003     Lactate Dehydrogenase (LD), Fluid 538 U/L     Narrative:      No Reference  Ranges Established.    Serous fluid LDH greater than 60 percent of the serum LDH or serous fluid LDH two-thirds of the upper limit of normal for serum LDH suggests the fluid is an exudate.     1. Pleural TP/Serum TP >0.5  2. Pleural LD/Serum LD >0.6  3. Pleural LD >2/3 of the upper limit of normal for serum LDH    This test was developed, it performance characteristics determined and judged suitable for clinical purposes by [facility].  It has not been cleared or approved by the FDA.  The laboratory is regulated under CLIA as qualified to perform high-complexity testing.     Glucose, Body Fluid - Pleural Fluid, Pleural Cavity [[id number]] Collected: 08/13/24 1401    Specimen: Pleural Fluid from Pleural Cavity Updated: 08/13/24 2003     Glucose, Fluid 182 mg/dL     Narrative:      No Reference Ranges Established.    Serous fluid glucose less than 60 mg/dL or less than 30 mg/dL below serum glucose suggests an infectious or malignant exudate.     This test was developed, it performance characteristics determined and judged suitable for clinical purposes by [facility].  It has not been cleared or approved by the FDA.  The laboratory is regulated under CLIA as qualified to perform high-complexity testing.     Body Fluid Cell Count With Differential - Pleural Fluid, Pleural Cavity [[id number]]  (Abnormal) Collected: 08/13/24 1401    Specimen: Pleural Fluid from Pleural Cavity Updated: 08/13/24 1541    Narrative:      The following orders were created for panel order Body Fluid Cell Count With Differential - Pleural Fluid, Pleural Cavity.  Procedure                               Abnormality         Status                     ---------                               -----------         ------                     Body fluid cell count - ...[[id number]]  Abnormal            Final result               Body fluid differential ...[[id number]]                      Final result                Path Consult Reflex[[id number]]                              Final result                 Please view results for these tests on the individual orders.    Body fluid differential - Pleural Fluid, Pleural Cavity [[id number]] Collected: 08/13/24 1401    Specimen: Pleural Fluid from Pleural Cavity Updated: 08/13/24 1541     Neutrophils, Fluid 2 %      Lymphocytes, Fluid 2 %      Monocytes, Fluid 16 %      Unclassified Cells, Fluid 76 %      Mesothelial Cells, Fluid 4 %     Narrative:      Concentrated Smear by Cytocentrifuge Prep.    pH, Body Fluid - Pleural Fluid, Pleural Cavity [[id number]]  (Abnormal) Collected: 08/13/24 1401    Specimen: Pleural Fluid from Pleural Cavity Updated: 08/13/24 1517     pH, Fluid 8.00    Path Consult Reflex [[id number]] Collected: 08/13/24 1401    Specimen: Pleural Fluid from Pleural Cavity Updated: 08/13/24 1513     Pathology Review Yes    Body fluid cell count - Pleural Fluid, Pleural Cavity [[id number]]  (Abnormal) Collected: 08/13/24 1401    Specimen: Pleural Fluid from Pleural Cavity Updated: 08/13/24 1417     Color, Fluid Red     Appearance, Fluid Cloudy     Nucleated Cells, Fluid 1,539 /mm3      Method: Automated Sysmex XN Method    Narrative:      No reference range established. Physician to interpret results with clinical findings.    Anaerobic Culture - Pleural Fluid, Pleural Cavity [[id number]] Collected: 08/13/24 1401    Specimen: Pleural Fluid from Pleural Cavity Updated: 08/13/24 1406    Respiratory Panel PCR w/COVID-19(SARS-CoV-2) SARAH/ELENI/NEY/PAD/COR/LUCRETIA In-House, NP Swab in UTM/VTM, 2 HR TAT - Wash, Lung, R [[id number]]  (Normal) Collected: 08/13/24 0820    Specimen: Wash from Lung, R Updated: 08/13/24 1016     ADENOVIRUS, PCR Not Detected     Coronavirus 229E Not Detected     Coronavirus HKU1 Not Detected     Coronavirus NL63 Not Detected     Coronavirus OC43 Not Detected     COVID19 Not Detected     Human Metapneumovirus Not Detected     Human Rhinovirus/Enterovirus Not  Detected     Influenza A PCR Not Detected     Influenza B PCR Not Detected     Parainfluenza Virus 1 Not Detected     Parainfluenza Virus 2 Not Detected     Parainfluenza Virus 3 Not Detected     Parainfluenza Virus 4 Not Detected     RSV, PCR Not Detected     Bordetella pertussis pcr Not Detected     Bordetella parapertussis PCR Not Detected     Chlamydophila pneumoniae PCR Not Detected     Mycoplasma pneumo by PCR Not Detected    Narrative:      In the setting of a positive respiratory panel with a viral infection PLUS a negative procalcitonin without other underlying concern for bacterial infection, consider observing off antibiotics or discontinuation of antibiotics and continue supportive care. If the respiratory panel is positive for atypical bacterial infection (Bordetella pertussis, Chlamydophila pneumoniae, or Mycoplasma pneumoniae), consider antibiotic de-escalation to target atypical bacterial infection.    Aspergillus Galactomannan Antigen - Wash, Lung, R [[id number]] Collected: 08/13/24 0820    Specimen: Wash from Lung, R Updated: 08/13/24 0903    Cytomegalovirus (CMV) By PCR ([facility]) (NEY) - Wash, Lung, R [[id number]] Collected: 08/13/24 0820    Specimen: Wash from Lung, R Updated: 08/13/24 0903    Pneumocystis PCR - Wash, Lung, R [[id number]] Collected: 08/13/24 0820    Specimen: Wash from Lung, R Updated: 08/13/24 0903    Fungus Culture - Wash, Lung, R [[id number]] Collected: 08/13/24 0820    Specimen: Wash from Lung, R Updated: 08/13/24 0903    Histoplasma Antigen, CSF or BAL - Wash, Lung, R [[id number]] Collected: 08/13/24 0820    Specimen: Wash from Lung, R Updated: 08/13/24 0903             Pending Results:     Imaging Reviewed:   CT Angiogram Chest Pulmonary Embolism    Result Date: 8/14/2024  Impression: 1. No pulmonary embolism. 2. Small right pleural effusion, improved since 8/12/2024. The patient is status post thoracentesis. 3. Question of 5.5 cm right lower lobe cavitary lesion, could reflect  focal necrotizing pneumonia or even malignant cavitary lesion. It is partially obscured by adjacent consolidation and pleural fluid. 4. Extensive abnormal reticular interstitial nodular thickening throughout the right hemithorax. This, coupled with the presence of mediastinal and hilar adenopathy,, along with right basilar consolidation, could reflect changes of pneumonia, but malignancy cannot be excluded. Consider short-term CT chest follow-up after trial of antibiotic therapy and, if unresolved, bronchoscopic correlation should be considered. 5. Small noncalcified left lower lobe lung nodules are present. Attention at surveillance imaging. 6. Mild concentric pericardial effusion. 7. Left hydronephrosis persists. No obstructing etiology is seen. Consider dedicated CT abdomen and pelvis follow-up. 8. Uncomplicated cholelithiasis. Electronically Signed: Mar Aly MD  8/14/2024 3:07 PM EDT  Workstation ID: [id number]    XR Chest 1 View    Result Date: 8/14/2024  Impression: Right arm approach PICC tip terminates at the level of the lower SVC. No pneumothorax. 2. Extensive right lung interstitial and alveolar disease and small right pleural effusion, unchanged. Electronically Signed: Mar Aly MD  8/14/2024 1:01 PM EDT  Workstation ID: [id number]    XR Chest 1 View    Result Date: 8/14/2024  Impression: Interval placement of defibrillator pads. Redemonstration of extensive airspace disease of the right lung. Electronically Signed: Gabino Carey MD  8/14/2024 10:27 AM EDT  Workstation ID: [id number]    XR Chest 1 View    Result Date: 8/13/2024  Impression: 1. Diffuse infiltrate throughout the right lung. 2. Small right pleural effusion. 3. No pneumothorax identified. Electronically Signed: Joe Mathew MD  8/13/2024 4:31 PM EDT  Workstation ID: [id number]    XR Chest 1 View    Result Date: 8/13/2024  Impression: 1. Development of an interstitial infiltrate throughout the right lung which hasn't changed  significantly from the patient's chest CT of 8/12/2024. 2. Small residual right pleural effusion. No pneumothorax is identified. Electronically Signed: Joe Mathew MD  8/13/2024 2:31 PM EDT  Workstation ID: [id number]    US Thoracentesis    Result Date: 8/13/2024  Technically successful right thoracentesis utilizing ultrasound guidance and yielding 2000 mL of clear red fluid. Report dictated by: Justyn Boateng DNP  I have personally reviewed this case and agree with the findings above: Electronically Signed: Sean Buckley MD  8/13/2024 2:28 PM EDT  Workstation ID: [id number]    CT Chest Without Contrast Diagnostic    Result Date: 8/12/2024  Impression: 1. Large right pleural effusion with dense consolidation involving the right middle and right lower lobes and extensive nodular airspace disease throughout the right upper lobe consistent with multifocal pneumonia. 2. Asymmetric interlobular septal thickening throughout the right lung may relate to atypical infection or asymmetric pulmonary edema. 3. Emphysema with new nodules in left lower lobe largest 6 mm, recommend attention on short-term follow-up. 4. Enlarged right paratracheal and right hilar lymph nodes likely reactive adenopathy. 5. New moderate left hydroureteronephrosis partially imaged, consider dedicated CT abdomen and pelvis for further assessment. Electronically Signed: Antwon Estrella MD  8/12/2024 7:20 PM EDT  Workstation ID: [id number]         Assessment & Plan   Patient is a 73-year-old male with recurrent unresolving pneumonia despite outpatient treatment that presented with increasing shortness of breath requiring thoracentesis for a right pleural effusion.  Pleural fluid is positive for malignancy and repeat imaging revealed a right lower lobe lesion.    Stage IV non-small cell lung cancer/malignant pleural effusion/right lower lobe lesion  -Status post thoracentesis on 8/13/2024 with pleural fluid positive for malignancy, non-small cell  carcinoma  -Postthoracentesis CT showing a 5.5 cm right lower lobe lesion  -Hold Lovenox and have requested pulmonology to perform bronchoscopy to obtain additional tissue for NGS testing  -Check brain MRI and plan for outpatient PET for further staging  -Plan to begin treatment as an outpatient with chemotherapy or chemoimmunotherapy pending NGS testing for targeted treatment options    Electronically signed by CARL Odom, 08/15/24, 8:05 AM EDT.        Thank you for this consult. We will be happy to follow along with you.       Electronically signed by Jolie Spence APRN at 08/15/24 0911       Carlos Pavon MD at 08/14/24 1127        Consult Orders    1. Inpatient Cardiology Consult [[id number]] ordered by Souleymane Gotti APRN at 08/14/24 0953    2. Inpatient Cardiology Consult [[id number]] ordered by Asya Marques APRN at 08/14/24 0950                   Referring Provider: Evelyn Felder MD    Reason for Consultation:      Atrial fibrillation with RVR      Patient Care Team:  Abner Funes APRN as PCP - General (Family Medicine)    Seen and examined agree with narrative as discussed with nurse practitioner after face-to-face encounter scruff findings below greater than 50% of total encounter time and medical decision making performed by me    SUBJECTIVE     Chief Complaint: Shortness of breath/palpitation    History of present illness:  Young Ames is a 73 y.o. male with a history of pneumonia who presented to [facility] with complaint of Shortness of breath.  Patient presented to [facility] on August 12, 2024 with complaints of shortness of breath.    In February of this year the patient was first diagnosed with having right-sided pneumonia.  He had repeat chest x-ray in July of this year showing residual pneumonia and pleural effusion.    He had been treated as an outpatient with antibiotic therapy including Keflex, Zithromax and Augmentin without  resolution of his symptoms.    The patient presented to [facility] and admitted on August 12.    Patient has been evaluated by pulmonology.  He underwent bronchoscopy.  He also underwent thoracentesis removing 2 L from the right lung with fluid reporting to be clear and red.  Pathology and serology are pending given resistance to antibiotic therapy    This morning the patient developed atrial fibrillation with RVR.  His ventricular rates were very rapid over 200 beats a minute.  He was in sinus rhythm upon presentation to the hospital.  He does not have documented history of atrial fibrillation but reports in the past he has had episodes of feeling similar palpitations.    During his rapid response the patient received IV metoprolol, IV diltiazem bolus, IV diltiazem drip was started.  He he remained refractory to medical therapy in atrial fibrillation with RVR heart rate currently is in the 160s to 170 range.  His blood pressure is currently 95/50.  Amiodarone drip was ordered along with IV bolus, if cannot be controlled may warrant cardioversion    The patient denies chest pain or worsening dyspnea at present.    The patient has no prior known history of revascularization.  In January of this year he underwent cardiac catheterization which showed Normal coronary arteries with 20 to 30% mid RCA stenosis.  He had normal LV function at that time.  Echocardiogram done in January 2024 demonstrated EF of 55 to 60% no diastolic dysfunction.  There is mild RV dilatation.  Mild TR.    Historical data copied forward from previous encounters in EMR is unchanged    Review of systems:    Constitutional: No weakness, fatigue, fever, rigors, chills   Eyes: No vision changes, eye pain   ENT/oropharynx: No difficulty swallowing, sore throat, epistaxis, changes in hearing   Cardiovascular: No chest pain, chest tightness, palpitations, paroxysmal nocturnal dyspnea, orthopnea, diaphoresis, dizziness / syncopal episode    Respiratory: C/o shortness of breath, dyspnea on exertion, cough, denies wheezing, hemoptysis   Gastrointestinal: No abdominal pain, nausea, vomiting, diarrhea, bloody stools   Genitourinary: No hematuria, dysuria   Neurological: No headache, tremors, numbness, one-sided weakness    Musculoskeletal: No cramps, myalgias, joint pain, joint swelling   Integument: No rash, edema        Personal History:      History reviewed. No pertinent past medical history.    Past Surgical History:   Procedure Laterality Date    CARDIAC CATHETERIZATION Right 2024    Procedure: Coronary angiography;  Surgeon: Abran Chand MD;  Location: [facility] CATH INVASIVE LOCATION;  Service: Cardiovascular;  Laterality: Right;    CARDIAC CATHETERIZATION N/A 2024    Procedure: Left Heart Cath;  Surgeon: Abran Chand MD;  Location: [facility] CATH INVASIVE LOCATION;  Service: Cardiovascular;  Laterality: N/A;       History reviewed. No pertinent family history.    Social History     Tobacco Use    Smoking status: Former     Current packs/day: 0.00     Average packs/day: 0.3 packs/day for 30.0 years (7.5 ttl pk-yrs)     Types: Cigarettes     Start date: 1969     Quit date: 1999     Years since quittin.6     Passive exposure: Past    Smokeless tobacco: Never   Vaping Use    Vaping status: Never Used   Substance Use Topics    Alcohol use: Yes     Alcohol/week: 1.0 standard drink of alcohol     Types: 1 Cans of beer per week    Drug use: Never        Home meds:  Prior to Admission medications    Medication Sig Start Date End Date Taking? Authorizing Provider   acetaminophen (TYLENOL) 325 MG tablet Take 2 tablets by mouth Every 4 (Four) Hours As Needed for Mild Pain. 24  Yes Evelyn Felder MD   albuterol sulfate  (90 Base) MCG/ACT inhaler Inhale 2 puffs Every 4 (Four) Hours As Needed. 24  Yes Oswaldo Simpson MD   APPLE CIDER VINEGAR PO Take 1 capsule by mouth Daily.   Yes Oswaldo Simpson MD   aspirin 81  MG EC tablet Take 1 tablet by mouth Every Night.   Yes Oswaldo Simpson MD   Cholecalciferol (VITAMIN D-3 PO) Take 1 tablet by mouth Daily.   Yes Oswaldo Simpson MD   metoprolol succinate XL (TOPROL-XL) 25 MG 24 hr tablet Take 1 tablet by mouth Every Night.   Yes Oswaldo Simpson MD   multivitamin with minerals (Centrum Silver 50+Men) tablet tablet Take 1 tablet by mouth Every Morning.   Yes Oswaldo Simpson MD   NON FORMULARY Take 2 tablets by mouth Every Night. Zzzquil   Yes Oswaldo Simpson MD   Omega-3 Fatty Acids (fish oil) 1000 MG capsule capsule Take  by mouth Daily.   Yes Oswaldo Simpson MD   pantoprazole (PROTONIX) 40 MG EC tablet Take 1 tablet by mouth Daily. 1/29/24  Yes Evelyn Felder MD   simvastatin (ZOCOR) 20 MG tablet Take 1 tablet by mouth Every Night. 1/18/24  Yes Oswaldo Simpson MD   tamsulosin (FLOMAX) 0.4 MG capsule 24 hr capsule Take 1 capsule by mouth Every Night. 1/18/24  Yes Oswaldo Simpson MD   thiamine (VITAMIN B-1) 100 MG tablet  tablet Take 1 tablet by mouth Every Morning.   Yes Oswaldo Simpson MD       Allergies:     Patient has no known allergies.    Scheduled Meds:atorvastatin, 10 mg, Oral, Daily  doxycycline, 100 mg, Intravenous, Q12H  ipratropium-albuterol, 3 mL, Nebulization, Q6H While Awake - RT  methylPREDNISolone sodium succinate, 40 mg, Intravenous, Q8H  metoprolol succinate XL, 25 mg, Oral, Q24H  pantoprazole, 40 mg, Oral, QAM AC  piperacillin-tazobactam, 4.5 g, Intravenous, Q8H  sodium chloride, 500 mL, Intravenous, Once  sodium chloride, 10 mL, Intravenous, Q12H  tamsulosin, 0.4 mg, Oral, Nightly      Continuous Infusions:amiodarone, 1 mg/min, Last Rate: 1 mg/min (08/14/24 1108)   Followed by  amiodarone, 0.5 mg/min  dilTIAZem, 5-15 mg/hr, Last Rate: 10 mg/hr (08/14/24 1102)  hold, 1 each  sodium chloride, 50 mL/hr, Last Rate: 50 mL/hr (08/13/24 1033)      PRN Meds:  acetaminophen    senna-docusate sodium **AND** polyethylene  "glycol **AND** bisacodyl **AND** bisacodyl    [COMPLETED] dilTIAZem **AND** dilTIAZem **AND** dilTIAZem    hold    LORazepam    nitroglycerin    ondansetron    sodium chloride    sodium chloride      OBJECTIVE    Vital Signs  Vitals:    08/14/24 0957 08/14/24 1002 08/14/24 1014 08/14/24 1035   BP:  112/77  95/59   BP Location:       Patient Position:       Pulse: (!) 154 (!) 140 (!) 149 (!) 166   Resp:       Temp:       TempSrc:       SpO2: 92%      Weight:       Height:           Flowsheet Rows      Flowsheet Row First Filed Value   Admission Height 177.8 cm (70\") Documented at 08/12/2024 1626   Admission Weight 82.3 kg (181 lb 7 oz) Documented at 08/12/2024 1626              Intake/Output Summary (Last 24 hours) at 8/14/2024 1127  Last data filed at 8/14/2024 0455  Gross per 24 hour   Intake 480 ml   Output 2375 ml   Net -1895 ml        Telemetry:  Atrial Fibrillation RVR    Physical Exam:  The patient is alert, oriented and in no distress.  Vital signs as noted above.  Head and neck revealed no carotid bruits or jugular venous distention.  No thyromegaly or lymphadenopathy is present  Lungs clear.  Diminished bases  Heart: irregularly irregular rhythm rapid VR.,  No gross rubs gallops heave or lift  Abdomen: Soft and nontender.  No organomegaly is present.  Extremities with good peripheral pulses without any pedal edema.  Skin: Warm and dry.  Musculoskeletal system is grossly normal.  CNS grossly normal.     Scribed findings for exam above    Results Review:  I have personally reviewed the results from the time of this admission to 8/14/2024 11:27 EDT and agree with these findings:  [x]  Laboratory  []  Microbiology  []  Radiology  [x]  EKG/Telemetry   [x]  Cardiology/Vascular   []  Pathology  []  Old records  []  Other:    Most notable findings include:     Lab Results (last 24 hours)       Procedure Component Value Units Date/Time    TSH [[id number]] Collected: 08/14/24 1000    Specimen: Blood Updated: 08/14/24 " 1051    Respiratory Culture - Wash, Lung, R [[id number]] Collected: 08/13/24 0820    Specimen: Wash from Lung, R Updated: 08/14/24 1037     Respiratory Culture Light growth (2+) The culture consists of normal respiratory scottie. This is a preliminary report; final report to follow.     Gram Stain Moderate (3+) WBCs seen      Few (2+) Bronchial epithelial cells      Few (2+) Gram positive cocci in clusters      Rare (1+) Gram negative bacilli    Magnesium [[id number]]  (Normal) Collected: 08/14/24 1000    Specimen: Blood Updated: 08/14/24 1034     Magnesium 2.0 mg/dL     Basic Metabolic Panel [[id number]]  (Abnormal) Collected: 08/14/24 1000    Specimen: Blood Updated: 08/14/24 1034     Glucose 256 mg/dL      BUN 36 mg/dL      Creatinine 1.30 mg/dL      Sodium 139 mmol/L      Potassium 4.3 mmol/L      Comment: Specimen hemolyzed.  Result may be falsely elevated.        Chloride 107 mmol/L      CO2 19.4 mmol/L      Calcium 8.4 mg/dL      BUN/Creatinine Ratio 27.7     Anion Gap 12.6 mmol/L      eGFR 57.6 mL/min/1.73     Narrative:      GFR Normal >60  Chronic Kidney Disease <60  Kidney Failure <15    The GFR formula is only valid for adults with stable renal function between ages 18 and 70.    Non-gynecologic Cytology [[id number]] Collected: 08/13/24 1401    Specimen: Pleural Fluid from Pleural Cavity Updated: 08/14/24 1025    POC Glucose Once [[id number]]  (Abnormal) Collected: 08/14/24 0933    Specimen: Blood Updated: 08/14/24 0934     Glucose 256 mg/dL      Comment: Serial Number: [id number]:  [id number]       Flow Cytometry [[id number]] Collected: 08/13/24 0820    Specimen: Wash from Lung, R Updated: 08/14/24 0933     Consult Global Result Comment^Text^TXT     Comment: Right lung:  Few B-cells with kappa excess (limited sample) (see   comments)  DISCLAIMER: REFER TO HARDCOPY OR PDF FOR COMPLETE RESULT.   If synopsis provided, clinical decisions should not be   based on this interfaced synopsis alone.  Performed at:   1 - [facility].  [address], [place] [address]  :  Kassandra Villagomez M.D., Ph.D., Phone:  [phone]       Narrative:      Performed at:  1 - [facility].  [address], [place] [address]  :  Kassandra Vilalgomez M.D., Ph.D., Phone:  [phone]    Body Fluid Culture - Body Fluid, Pleural Cavity [[id number]] Collected: 08/13/24 1401    Specimen: Body Fluid from Pleural Cavity Updated: 08/14/24 0801     Body Fluid Culture No growth at less than 24 hours     Gram Stain Moderate (3+) WBCs per low power field      No organisms seen    Pathology Consultation [[id number]] Collected: 08/13/24 1401    Specimen: Pleural Fluid from Pleural Cavity Updated: 08/14/24 0700    Basic Metabolic Panel [[id number]]  (Abnormal) Collected: 08/14/24 0128    Specimen: Blood from Arm, Left Updated: 08/14/24 0200     Glucose 223 mg/dL      BUN 35 mg/dL      Creatinine 1.39 mg/dL      Sodium 138 mmol/L      Potassium 4.2 mmol/L      Comment: Specimen hemolyzed.  Result may be falsely elevated.        Chloride 107 mmol/L      CO2 19.8 mmol/L      Calcium 8.3 mg/dL      BUN/Creatinine Ratio 25.2     Anion Gap 11.2 mmol/L      eGFR 53.2 mL/min/1.73     Narrative:      GFR Normal >60  Chronic Kidney Disease <60  Kidney Failure <15    The GFR formula is only valid for adults with stable renal function between ages 18 and 70.    CBC & Differential [[id number]]  (Abnormal) Collected: 08/14/24 0128    Specimen: Blood from Arm, Left Updated: 08/14/24 0139    Narrative:      The following orders were created for panel order CBC & Differential.  Procedure                               Abnormality         Status                     ---------                               -----------         ------                     CBC Auto Differential[[id number]]        Abnormal            Final result                 Please view results for these  tests on the individual orders.    CBC Auto Differential [[id number]]  (Abnormal) Collected: 08/14/24 0128    Specimen: Blood from Arm, Left Updated: 08/14/24 0139     WBC 19.18 10*3/mm3      RBC 4.19 10*6/mm3      Hemoglobin 13.0 g/dL      Hematocrit 39.0 %      MCV 93.1 fL      MCH 31.0 pg      MCHC 33.3 g/dL      RDW 13.1 %      RDW-SD 44.4 fl      MPV 9.9 fL      Platelets 244 10*3/mm3      Neutrophil % 91.0 %      Lymphocyte % 4.5 %      Monocyte % 3.8 %      Eosinophil % 0.0 %      Basophil % 0.2 %      Immature Grans % 0.5 %      Neutrophils, Absolute 17.47 10*3/mm3      Lymphocytes, Absolute 0.86 10*3/mm3      Monocytes, Absolute 0.72 10*3/mm3      Eosinophils, Absolute 0.00 10*3/mm3      Basophils, Absolute 0.03 10*3/mm3      Immature Grans, Absolute 0.10 10*3/mm3      nRBC 0.0 /100 WBC     Protein, Body Fluid - Pleural Fluid, Pleural Cavity [[id number]] Collected: 08/13/24 1401    Specimen: Pleural Fluid from Pleural Cavity Updated: 08/13/24 2003     Protein, Total, Fluid 3.7 g/dL     Narrative:      No Reference Ranges Established.    A serous fluid total fluid (TP) greater than 50 percent of the serum TP suggests the fluid is an exudate.      1. Pleural TP/Serum TP >0.5  2. Pleural LD/Serum LD >0.6  3. Pleural LD >2/3 of the upper limit of normal for serum LDH    This test was developed, it performance characteristics determined and judged suitable for clinical purposes by [facility].  It has not been cleared or approved by the FDA.  The laboratory is regulated under CLIA as qualified to perform high-complexity testing.     Lactate Dehydrogenase, Body Fluid - Pleural Fluid, Pleural Cavity [[id number]] Collected: 08/13/24 1401    Specimen: Pleural Fluid from Pleural Cavity Updated: 08/13/24 2003     Lactate Dehydrogenase (LD), Fluid 538 U/L     Narrative:      No Reference Ranges Established.    Serous fluid LDH greater than 60 percent of the serum LDH or serous fluid LDH two-thirds  of the upper limit of normal for serum LDH suggests the fluid is an exudate.     1. Pleural TP/Serum TP >0.5  2. Pleural LD/Serum LD >0.6  3. Pleural LD >2/3 of the upper limit of normal for serum LDH    This test was developed, it performance characteristics determined and judged suitable for clinical purposes by [facility].  It has not been cleared or approved by the FDA.  The laboratory is regulated under CLIA as qualified to perform high-complexity testing.     Glucose, Body Fluid - Pleural Fluid, Pleural Cavity [[id number]] Collected: 08/13/24 1401    Specimen: Pleural Fluid from Pleural Cavity Updated: 08/13/24 2003     Glucose, Fluid 182 mg/dL     Narrative:      No Reference Ranges Established.    Serous fluid glucose less than 60 mg/dL or less than 30 mg/dL below serum glucose suggests an infectious or malignant exudate.     This test was developed, it performance characteristics determined and judged suitable for clinical purposes by [facility].  It has not been cleared or approved by the FDA.  The laboratory is regulated under CLIA as qualified to perform high-complexity testing.     Blood Culture - Blood, Arm, Right [[id number]]  (Normal) Collected: 08/12/24 1834    Specimen: Blood from Arm, Right Updated: 08/13/24 1845     Blood Culture No growth at 24 hours    Blood Culture - Blood, Arm, Left [[id number]]  (Normal) Collected: 08/12/24 1744    Specimen: Blood from Arm, Left Updated: 08/13/24 1800     Blood Culture No growth at 24 hours    Narrative:      Less than seven (7) mL's of blood was collected.  Insufficient quantity may yield false negative results.    Body Fluid Cell Count With Differential - Pleural Fluid, Pleural Cavity [[id number]]  (Abnormal) Collected: 08/13/24 1401    Specimen: Pleural Fluid from Pleural Cavity Updated: 08/13/24 1541    Narrative:      The following orders were created for panel order Body Fluid Cell Count With Differential  - Pleural Fluid, Pleural Cavity.  Procedure                               Abnormality         Status                     ---------                               -----------         ------                     Body fluid cell count - ...[[id number]]  Abnormal            Final result               Body fluid differential ...[[id number]]                      Final result               Path Consult Reflex[[id number]]                              Final result                 Please view results for these tests on the individual orders.    Body fluid differential - Pleural Fluid, Pleural Cavity [[id number]] Collected: 08/13/24 1401    Specimen: Pleural Fluid from Pleural Cavity Updated: 08/13/24 1541     Neutrophils, Fluid 2 %      Lymphocytes, Fluid 2 %      Monocytes, Fluid 16 %      Unclassified Cells, Fluid 76 %      Mesothelial Cells, Fluid 4 %     Narrative:      Concentrated Smear by Cytocentrifuge Prep.    pH, Body Fluid - Pleural Fluid, Pleural Cavity [[id number]]  (Abnormal) Collected: 08/13/24 1401    Specimen: Pleural Fluid from Pleural Cavity Updated: 08/13/24 1517     pH, Fluid 8.00    Path Consult Reflex [[id number]] Collected: 08/13/24 1401    Specimen: Pleural Fluid from Pleural Cavity Updated: 08/13/24 1513     Pathology Review Yes    Body fluid cell count - Pleural Fluid, Pleural Cavity [[id number]]  (Abnormal) Collected: 08/13/24 1401    Specimen: Pleural Fluid from Pleural Cavity Updated: 08/13/24 1417     Color, Fluid Red     Appearance, Fluid Cloudy     Nucleated Cells, Fluid 1,539 /mm3      Method: Automated Sysmex XN Method    Narrative:      No reference range established. Physician to interpret results with clinical findings.    AFB Culture - Body Fluid, Pleural Cavity [[id number]] Collected: 08/13/24 1401    Specimen: Body Fluid from Pleural Cavity Updated: 08/13/24 1406    Anaerobic Culture - Pleural Fluid, Pleural Cavity [[id number]] Collected: 08/13/24 1401    Specimen: Pleural Fluid from Pleural  Cavity Updated: 08/13/24 1406    Non-gynecologic Cytology [[id number]] Collected: 08/13/24 0813    Specimen: Brushing from Lung, R; Wash from Lung, R Updated: 08/13/24 1223            Imaging Results (Last 24 Hours)       Procedure Component Value Units Date/Time    XR Chest 1 View [[id number]] Collected: 08/14/24 1026     Updated: 08/14/24 1029    Narrative:      XR CHEST 1 VW    Date of Exam: 8/14/2024 9:55 AM EDT    Indication: high heart rate    Comparison: Chest x-ray 8/13/2024    Findings:  Defibrillator pads project over the left chest. Stable cardiomediastinal silhouette within normal limits. Redemonstration of patchy parenchymal and interstitial opacities throughout most of the right lung, worst in the mid and lower lung. Redemonstration   of small right-sided pleural effusion. The left lung remains grossly clear. No pneumothorax.      Impression:      Impression:  Interval placement of defibrillator pads. Redemonstration of extensive airspace disease of the right lung.      Electronically Signed: Gabino Carey MD    8/14/2024 10:27 AM EDT    Workstation ID: [id number]    XR Chest 1 View [[id number]] Collected: 08/13/24 1630     Updated: 08/13/24 1633    Narrative:      XR CHEST 1 VW    Date of Exam: 8/13/2024 4:29 PM EDT    Indication: chest pain after thoracentesis    Comparison: 8/13/2024 at 1417 hours    Findings:  Cardiac size stable in the left lung is clear. There is diffuse right lung pulmonary infiltrate. There is a small right pleural effusion. There is no pneumothorax.      Impression:      Impression:    1. Diffuse infiltrate throughout the right lung.  2. Small right pleural effusion.  3. No pneumothorax identified.      Electronically Signed: Joe Mathew MD    8/13/2024 4:31 PM EDT    Workstation ID: [id number]    US Thoracentesis [[id number]] Collected: 08/13/24 1421    Specimen: Body Fluid Updated: 08/13/24 1454    Narrative:      DATE OF EXAM:  8/13/2024 1:53 PM EDT    PROCEDURE:  US  THORACENTESIS    INDICATIONS:  Pleural effusion    COMPARISON:  CT 8/12/2024    FLUOROSCOPIC TIME:  None    PHYSICIAN MONITORED CONSCIOUS SEDATION TIME:  None minutes    TECHNIQUE:   A detailed explanation of the procedure, including the risks, benefits, and alternatives was provided. Informed consent was obtained from the patient. A preprocedure timeout was performed. The interventional radiology nurse monitored the patient at all   times. The patient was placed upright in the bed and the right back was ultrasounded, demonstrating a large right pleural effusion. The right back was then marked and prepped and draped in the usual sterile fashion. The skin and subcutaneous tissues were   anesthetized with 1% lidocaine and a small skin incision was made. Next, a 4 Thai centesis needle was advanced into the collection. A total of 2000 mL of clear red fluid was aspirated and the needle was removed. Specimens were collected and sent to   the lab per the ordering clinician. Hemostasis was achieved and a sterile bandage was applied. The patient tolerated the procedure well, without immediate complication. Post procedure chest x-ray completed and without evidence of pneumothorax.    FINDINGS:  See above      Impression:      Technically successful right thoracentesis utilizing ultrasound guidance and yielding 2000 mL of clear red fluid.      Report dictated by: Justyn Boateng DNP      I have personally reviewed this case and agree with the findings above:    Electronically Signed: Sean Buckley MD    8/13/2024 2:28 PM EDT    Workstation ID: [id number]    XR Chest 1 View [[id number]] Collected: 08/13/24 1429     Updated: 08/13/24 1454    Narrative:      XR CHEST 1 VW    Date of Exam: 8/13/2024 2:18 PM EDT    Indication: s/p right thora    Comparison: 1/28/2024, chest CT 8/12/2024    Findings:  Cardiac size is stable. The left lung is clear. The patient has developed diffuse infiltrate throughout the right lung. There is a small  residual right pleural effusion. No pneumothorax is identified.      Impression:      Impression:    1. Development of an interstitial infiltrate throughout the right lung which hasn't changed significantly from the patient's chest CT of 8/12/2024.  2. Small residual right pleural effusion. No pneumothorax is identified.      Electronically Signed: Joe Mathew MD    8/13/2024 2:31 PM EDT    Workstation ID: [id number]            LAB RESULTS (LAST 7 DAYS)    CBC  Results from last 7 days   Lab Units 08/14/24 0128 08/13/24 0335 08/12/24  1732   WBC 10*3/mm3 19.18* 8.96 13.47*   RBC 10*6/mm3 4.19 4.56 5.00   HEMOGLOBIN g/dL 13.0 14.3 15.8   HEMATOCRIT % 39.0 42.5 47.5   MCV fL 93.1 93.2 95.0   PLATELETS 10*3/mm3 244 251 252       BMP  Results from last 7 days   Lab Units 08/14/24 1000 08/14/24 0128 08/13/24 0335 08/12/24 2030   SODIUM mmol/L 139 138 135* 137   POTASSIUM mmol/L 4.3 4.2 4.4 4.4   CHLORIDE mmol/L 107 107 106 104   CO2 mmol/L 19.4* 19.8* 18.9* 20.8*   BUN mg/dL 36* 35* 27* 23   CREATININE mg/dL 1.30* 1.39* 1.29* 1.28*   GLUCOSE mg/dL 256* 223* 225* 167*   MAGNESIUM mg/dL 2.0  --   --   --        CMP   Results from last 7 days   Lab Units 08/14/24 1000 08/14/24 0128 08/13/24 0335 08/12/24 2030   SODIUM mmol/L 139 138 135* 137   POTASSIUM mmol/L 4.3 4.2 4.4 4.4   CHLORIDE mmol/L 107 107 106 104   CO2 mmol/L 19.4* 19.8* 18.9* 20.8*   BUN mg/dL 36* 35* 27* 23   CREATININE mg/dL 1.30* 1.39* 1.29* 1.28*   GLUCOSE mg/dL 256* 223* 225* 167*   ALBUMIN g/dL  --   --   --  3.3*   BILIRUBIN mg/dL  --   --   --  0.2   ALK PHOS U/L  --   --   --  55   AST (SGOT) U/L  --   --   --  39   ALT (SGPT) U/L  --   --   --  20       BNP        TROPONIN  Results from last 7 days   Lab Units 08/12/24  2305   HSTROP T ng/L 53*       CoAg  Results from last 7 days   Lab Units 08/12/24  1834   INR  1.00       Creatinine Clearance  Estimated Creatinine Clearance: 56.1 mL/min (A) (by C-G formula based on SCr of 1.3 mg/dL  (H)).    ABG          Radiology  XR Chest 1 View    Result Date: 8/14/2024  Impression: Interval placement of defibrillator pads. Redemonstration of extensive airspace disease of the right lung. Electronically Signed: Gabino Carey MD  8/14/2024 10:27 AM EDT  Workstation ID: [id number]    XR Chest 1 View    Result Date: 8/13/2024  Impression: 1. Diffuse infiltrate throughout the right lung. 2. Small right pleural effusion. 3. No pneumothorax identified. Electronically Signed: Joe Mathew MD  8/13/2024 4:31 PM EDT  Workstation ID: [id number]    XR Chest 1 View    Result Date: 8/13/2024  Impression: 1. Development of an interstitial infiltrate throughout the right lung which hasn't changed significantly from the patient's chest CT of 8/12/2024. 2. Small residual right pleural effusion. No pneumothorax is identified. Electronically Signed: Joe Mathew MD  8/13/2024 2:31 PM EDT  Workstation ID: [id number]    US Thoracentesis    Result Date: 8/13/2024  Technically successful right thoracentesis utilizing ultrasound guidance and yielding 2000 mL of clear red fluid. Report dictated by: Justyn Boateng DNP  I have personally reviewed this case and agree with the findings above: Electronically Signed: Sean Buckley MD  8/13/2024 2:28 PM EDT  Workstation ID: [id number]    CT Chest Without Contrast Diagnostic    Result Date: 8/12/2024  Impression: 1. Large right pleural effusion with dense consolidation involving the right middle and right lower lobes and extensive nodular airspace disease throughout the right upper lobe consistent with multifocal pneumonia. 2. Asymmetric interlobular septal thickening throughout the right lung may relate to atypical infection or asymmetric pulmonary edema. 3. Emphysema with new nodules in left lower lobe largest 6 mm, recommend attention on short-term follow-up. 4. Enlarged right paratracheal and right hilar lymph nodes likely reactive adenopathy. 5. New moderate left hydroureteronephrosis partially  imaged, consider dedicated CT abdomen and pelvis for further assessment. Electronically Signed: Antwon Estrella MD  8/12/2024 7:20 PM EDT  Workstation ID: [id number]       EKG  I personally viewed and interpreted the patient's EKG/Telemetry data:  ECG 12 Lead Rhythm Change   Final Result   HEART HIOR=261  bpm   RR Bfhjdtlp=731  ms   KS Interval=  ms   P Horizontal Axis=  deg   P Front Axis=  deg   QRSD Interval=72  ms   QT Jjbqjjoc=879  ms   XCtA=677  ms   QRS Axis=66  deg   T Wave Axis=256  deg   - ABNORMAL ECG -   Atrial fibrillation with rapid V-rate   Anteroseptal  infarct, old   Repolarization abnormality, prob rate related   Electronically Signed By: Alex Cunningham ([facility]) 2024-08-14 09:59:52   Date and Time of Study:2024-08-14 09:45:24      ECG 12 Lead Dyspnea   Final Result   HEART EQHV=870  bpm   RR Xuxhsxno=858  ms   KS Vnwnzgnk=163  ms   P Horizontal Axis=26  deg   P Front Axis=57  deg   QRSD Interval=85  ms   QT Ajdnhfoj=324  ms   ENnA=465  ms   QRS Axis=-31  deg   T Wave Axis=57  deg   - OTHERWISE NORMAL ECG -   Sinus tachycardia   Left axis deviation   Electronically Signed By: Nader Castro ([facility]) 2024-08-13 09:11:23   Date and Time of Study:2024-08-12 16:33:06      Telemetry Scan   Final Result      Telemetry Scan   Final Result      Telemetry Scan   Final Result      Telemetry Scan   Final Result      Telemetry Scan   Final Result      Telemetry Scan   Final Result      Telemetry Scan   Final Result      Telemetry Scan   Final Result      Telemetry Scan   Final Result      Telemetry Scan   Final Result      Telemetry Scan   Final Result      Telemetry Scan   Final Result      Telemetry Scan   Final Result      Telemetry Scan   Final Result      Telemetry Scan   Final Result      Telemetry Scan   Final Result      ECG 12 Lead Drug Monitoring; Amiodarone    (Results Pending)                       Echocardiogram:    Results for orders placed during the hospital encounter of 01/28/24    Adult  Transthoracic Echo Complete W/ Cont if Necessary Per Protocol    Interpretation Summary    Left ventricular ejection fraction appears to be 56 - 60%.    Left ventricular diastolic function was normal.    The right ventricular cavity is mildly dilated.        Stress Test:  Results for orders placed during the hospital encounter of 01/28/24    Stress Test With Myocardial Perfusion One Day    Interpretation Summary    Impressions are consistent with an intermediate risk study.    Left ventricular ejection fraction is normal (Calculated EF = 56%).    Myocardial perfusion imaging indicates a moderate-sized, moderately severe area of ischemia located in the inferior wall and septal wall.        Cardiac Catheterization:  Results for orders placed during the hospital encounter of 01/28/24    Cardiac Catheterization/Vascular Study    [id number] SCORE   No data recorded     Other:      ASSESSMENT & PLAN:    Principal Problem:    Large pleural effusion  Active Problems:    Pneumonia due to infectious organism    Atrial fibrillation with RVR  Ventricular rates initially over 200  Patient has received IV metoprolol, IV diltiazem bolus, IV diltiazem drip, IV digoxin  Now started on IV amiodarone  BP slightly soft  Will give IV fluid bolus  Discussed with Dr. Gonzalez who would like patient to get CT PE protocol  [id number] equals 2  Will need to consider anticoagulation    Right-sided pneumonia with large right pleural effusion  Status post bronchoscopy and thoracentesis  2 L removed pleural fluid reported to be clear and red    Hypertension  Currently soft  Follow and trend blood pressures    Nonobstructive coronary artery disease  Cardiac catheterization in January 2024  20 to 30% mid L RCA lesion.  Otherwise normal coronary anatomy.      Will start IV amiodarone per protocol with IV loading dose  Follow QT interval  Keep potassium greater than 4 magnesium greater than 2    Discussed with Dr. Gonzalez and he would like CT PE  protocol.  Will check TSH.   [id number] score equals 2.  Prefer Lovenox for anticoagulation twice daily  Consider cardioversion if unable to control ventricular rate or has hypotension  Repeat echo estimate filling pressures, rule out pericardial effusion      Guarded patient  Further recommendation follow findings and response to therapy    Carlos Pavon MD, PhD        Maryerica Myersins, APRN  08/14/24  11:27 EDT              Electronically signed by Carlos Pavon MD at 08/14/24 1157       Nutrition Notes (most recent note)    No notes exist for this encounter.       Physical Therapy Notes (all)    No notes exist for this encounter.       Occupational Therapy Notes (all)    No notes exist for this encounter.       Speech Language Pathology Notes (all)    No notes exist for this encounter.

## 2024-08-16 ENCOUNTER — ANESTHESIA (OUTPATIENT)
Dept: GASTROENTEROLOGY | Facility: HOSPITAL | Age: 74
End: 2024-08-16
Payer: MEDICARE

## 2024-08-16 ENCOUNTER — APPOINTMENT (OUTPATIENT)
Dept: GENERAL RADIOLOGY | Facility: HOSPITAL | Age: 74
DRG: 194 | End: 2024-08-16
Payer: MEDICARE

## 2024-08-16 ENCOUNTER — ANESTHESIA EVENT (OUTPATIENT)
Dept: GASTROENTEROLOGY | Facility: HOSPITAL | Age: 74
End: 2024-08-16
Payer: MEDICARE

## 2024-08-16 LAB
GLUCOSE BLDC GLUCOMTR-MCNC: 203 MG/DL (ref 70–105)
GLUCOSE BLDC GLUCOMTR-MCNC: 279 MG/DL (ref 70–105)
QT INTERVAL: 390 MS
QTC INTERVAL: 457 MS

## 2024-08-16 PROCEDURE — 82948 REAGENT STRIP/BLOOD GLUCOSE: CPT

## 2024-08-16 PROCEDURE — 88341 IMHCHEM/IMCYTCHM EA ADD ANTB: CPT | Performed by: INTERNAL MEDICINE

## 2024-08-16 PROCEDURE — 99232 SBSQ HOSP IP/OBS MODERATE 35: CPT | Performed by: INTERNAL MEDICINE

## 2024-08-16 PROCEDURE — 88333 PATH CONSLTJ SURG CYTO XM 1: CPT | Performed by: INTERNAL MEDICINE

## 2024-08-16 PROCEDURE — 25010000002 PROPOFOL 200 MG/20ML EMULSION: Performed by: NURSE ANESTHETIST, CERTIFIED REGISTERED

## 2024-08-16 PROCEDURE — 94799 UNLISTED PULMONARY SVC/PX: CPT

## 2024-08-16 PROCEDURE — 63710000001 PREDNISONE PER 5 MG: Performed by: INTERNAL MEDICINE

## 2024-08-16 PROCEDURE — 94761 N-INVAS EAR/PLS OXIMETRY MLT: CPT

## 2024-08-16 PROCEDURE — 93005 ELECTROCARDIOGRAM TRACING: CPT | Performed by: NURSE PRACTITIONER

## 2024-08-16 PROCEDURE — 88342 IMHCHEM/IMCYTCHM 1ST ANTB: CPT | Performed by: INTERNAL MEDICINE

## 2024-08-16 PROCEDURE — 94664 DEMO&/EVAL PT USE INHALER: CPT

## 2024-08-16 PROCEDURE — 88305 TISSUE EXAM BY PATHOLOGIST: CPT | Performed by: INTERNAL MEDICINE

## 2024-08-16 PROCEDURE — 82948 REAGENT STRIP/BLOOD GLUCOSE: CPT | Performed by: INTERNAL MEDICINE

## 2024-08-16 PROCEDURE — 25010000002 EPINEPHRINE 1 MG/10ML SOLUTION PREFILLED SYRINGE: Performed by: INTERNAL MEDICINE

## 2024-08-16 PROCEDURE — 0B968ZX DRAINAGE OF RIGHT LOWER LOBE BRONCHUS, VIA NATURAL OR ARTIFICIAL OPENING ENDOSCOPIC, DIAGNOSTIC: ICD-10-PCS | Performed by: INTERNAL MEDICINE

## 2024-08-16 PROCEDURE — 0B9N8ZX DRAINAGE OF RIGHT PLEURA, VIA NATURAL OR ARTIFICIAL OPENING ENDOSCOPIC, DIAGNOSTIC: ICD-10-PCS | Performed by: INTERNAL MEDICINE

## 2024-08-16 PROCEDURE — 25010000002 ENOXAPARIN PER 10 MG: Performed by: NURSE PRACTITIONER

## 2024-08-16 PROCEDURE — 76000 FLUOROSCOPY <1 HR PHYS/QHP: CPT

## 2024-08-16 PROCEDURE — 25010000002 ONDANSETRON PER 1 MG: Performed by: NURSE ANESTHETIST, CERTIFIED REGISTERED

## 2024-08-16 PROCEDURE — 25010000002 SUCCINYLCHOLINE PER 20 MG: Performed by: NURSE ANESTHETIST, CERTIFIED REGISTERED

## 2024-08-16 PROCEDURE — 63710000001 INSULIN LISPRO (HUMAN) PER 5 UNITS: Performed by: INTERNAL MEDICINE

## 2024-08-16 PROCEDURE — 25010000002 DEXAMETHASONE PER 1 MG: Performed by: NURSE ANESTHETIST, CERTIFIED REGISTERED

## 2024-08-16 PROCEDURE — 88334 PATH CONSLTJ SURG CYTO XM EA: CPT | Performed by: INTERNAL MEDICINE

## 2024-08-16 PROCEDURE — 8E0W8CZ ROBOTIC ASSISTED PROCEDURE OF TRUNK REGION, VIA NATURAL OR ARTIFICIAL OPENING ENDOSCOPIC: ICD-10-PCS | Performed by: INTERNAL MEDICINE

## 2024-08-16 PROCEDURE — 25010000002 SUGAMMADEX 200 MG/2ML SOLUTION: Performed by: NURSE ANESTHETIST, CERTIFIED REGISTERED

## 2024-08-16 PROCEDURE — 71045 X-RAY EXAM CHEST 1 VIEW: CPT

## 2024-08-16 RX ORDER — NALOXONE HCL 0.4 MG/ML
0.4 VIAL (ML) INJECTION AS NEEDED
Status: DISCONTINUED | OUTPATIENT
Start: 2024-08-16 | End: 2024-08-16 | Stop reason: HOSPADM

## 2024-08-16 RX ORDER — DROPERIDOL 2.5 MG/ML
0.62 INJECTION, SOLUTION INTRAMUSCULAR; INTRAVENOUS ONCE AS NEEDED
Status: DISCONTINUED | OUTPATIENT
Start: 2024-08-16 | End: 2024-08-16 | Stop reason: HOSPADM

## 2024-08-16 RX ORDER — SUCCINYLCHOLINE CHLORIDE 20 MG/ML
INJECTION INTRAMUSCULAR; INTRAVENOUS AS NEEDED
Status: DISCONTINUED | OUTPATIENT
Start: 2024-08-16 | End: 2024-08-16 | Stop reason: SURG

## 2024-08-16 RX ORDER — ROCURONIUM BROMIDE 10 MG/ML
INJECTION, SOLUTION INTRAVENOUS AS NEEDED
Status: DISCONTINUED | OUTPATIENT
Start: 2024-08-16 | End: 2024-08-16 | Stop reason: SURG

## 2024-08-16 RX ORDER — AMIODARONE HYDROCHLORIDE 200 MG/1
200 TABLET ORAL EVERY 12 HOURS SCHEDULED
Status: DISCONTINUED | OUTPATIENT
Start: 2024-08-16 | End: 2024-08-17 | Stop reason: HOSPADM

## 2024-08-16 RX ORDER — ONDANSETRON 2 MG/ML
INJECTION INTRAMUSCULAR; INTRAVENOUS AS NEEDED
Status: DISCONTINUED | OUTPATIENT
Start: 2024-08-16 | End: 2024-08-16 | Stop reason: SURG

## 2024-08-16 RX ORDER — ONDANSETRON 2 MG/ML
4 INJECTION INTRAMUSCULAR; INTRAVENOUS ONCE AS NEEDED
Status: DISCONTINUED | OUTPATIENT
Start: 2024-08-16 | End: 2024-08-16 | Stop reason: HOSPADM

## 2024-08-16 RX ORDER — IPRATROPIUM BROMIDE AND ALBUTEROL SULFATE 2.5; .5 MG/3ML; MG/3ML
3 SOLUTION RESPIRATORY (INHALATION) ONCE AS NEEDED
Status: DISCONTINUED | OUTPATIENT
Start: 2024-08-16 | End: 2024-08-16 | Stop reason: HOSPADM

## 2024-08-16 RX ORDER — EPHEDRINE SULFATE 5 MG/ML
5 INJECTION INTRAVENOUS ONCE AS NEEDED
Status: DISCONTINUED | OUTPATIENT
Start: 2024-08-16 | End: 2024-08-16 | Stop reason: HOSPADM

## 2024-08-16 RX ORDER — DEXAMETHASONE SODIUM PHOSPHATE 4 MG/ML
INJECTION, SOLUTION INTRA-ARTICULAR; INTRALESIONAL; INTRAMUSCULAR; INTRAVENOUS; SOFT TISSUE AS NEEDED
Status: DISCONTINUED | OUTPATIENT
Start: 2024-08-16 | End: 2024-08-16 | Stop reason: SURG

## 2024-08-16 RX ORDER — FLUMAZENIL 0.1 MG/ML
0.2 INJECTION INTRAVENOUS AS NEEDED
Status: DISCONTINUED | OUTPATIENT
Start: 2024-08-16 | End: 2024-08-16 | Stop reason: HOSPADM

## 2024-08-16 RX ORDER — LABETALOL HYDROCHLORIDE 5 MG/ML
5 INJECTION, SOLUTION INTRAVENOUS
Status: DISCONTINUED | OUTPATIENT
Start: 2024-08-16 | End: 2024-08-16 | Stop reason: HOSPADM

## 2024-08-16 RX ORDER — PROPOFOL 10 MG/ML
INJECTION, EMULSION INTRAVENOUS AS NEEDED
Status: DISCONTINUED | OUTPATIENT
Start: 2024-08-16 | End: 2024-08-16 | Stop reason: SURG

## 2024-08-16 RX ORDER — EPINEPHRINE IN SOD CHLOR,ISO 1 MG/10 ML
SYRINGE (ML) INTRAVENOUS AS NEEDED
Status: DISCONTINUED | OUTPATIENT
Start: 2024-08-16 | End: 2024-08-16 | Stop reason: HOSPADM

## 2024-08-16 RX ORDER — HYDRALAZINE HYDROCHLORIDE 20 MG/ML
5 INJECTION INTRAMUSCULAR; INTRAVENOUS
Status: DISCONTINUED | OUTPATIENT
Start: 2024-08-16 | End: 2024-08-16 | Stop reason: HOSPADM

## 2024-08-16 RX ORDER — OXYCODONE HYDROCHLORIDE 5 MG/1
5 TABLET ORAL ONCE AS NEEDED
Status: DISCONTINUED | OUTPATIENT
Start: 2024-08-16 | End: 2024-08-16 | Stop reason: HOSPADM

## 2024-08-16 RX ORDER — PHENYLEPHRINE HCL IN 0.9% NACL 1 MG/10 ML
SYRINGE (ML) INTRAVENOUS AS NEEDED
Status: DISCONTINUED | OUTPATIENT
Start: 2024-08-16 | End: 2024-08-16 | Stop reason: SURG

## 2024-08-16 RX ORDER — LIDOCAINE 50 MG/G
OINTMENT TOPICAL AS NEEDED
Status: DISCONTINUED | OUTPATIENT
Start: 2024-08-16 | End: 2024-08-16 | Stop reason: HOSPADM

## 2024-08-16 RX ORDER — ACETAMINOPHEN 325 MG/1
650 TABLET ORAL ONCE AS NEEDED
Status: COMPLETED | OUTPATIENT
Start: 2024-08-16 | End: 2024-08-16

## 2024-08-16 RX ADMIN — ONDANSETRON 4 MG: 2 INJECTION INTRAMUSCULAR; INTRAVENOUS at 10:45

## 2024-08-16 RX ADMIN — Medication 10 ML: at 21:29

## 2024-08-16 RX ADMIN — Medication 10 ML: at 09:05

## 2024-08-16 RX ADMIN — INSULIN LISPRO 6 UNITS: 100 INJECTION, SOLUTION INTRAVENOUS; SUBCUTANEOUS at 18:04

## 2024-08-16 RX ADMIN — Medication 100 MCG: at 10:49

## 2024-08-16 RX ADMIN — ROCURONIUM BROMIDE 30 MG: 10 INJECTION, SOLUTION INTRAVENOUS at 10:45

## 2024-08-16 RX ADMIN — PROPOFOL 140 MCG/KG/MIN: 10 INJECTION, EMULSION INTRAVENOUS at 10:40

## 2024-08-16 RX ADMIN — Medication 200 MCG: at 10:55

## 2024-08-16 RX ADMIN — IPRATROPIUM BROMIDE AND ALBUTEROL SULFATE 3 ML: .5; 3 SOLUTION RESPIRATORY (INHALATION) at 06:32

## 2024-08-16 RX ADMIN — PROPOFOL 150 MG: 10 INJECTION, EMULSION INTRAVENOUS at 10:37

## 2024-08-16 RX ADMIN — PREDNISONE 30 MG: 20 TABLET ORAL at 13:30

## 2024-08-16 RX ADMIN — METOPROLOL TARTRATE 25 MG: 25 TABLET, FILM COATED ORAL at 21:25

## 2024-08-16 RX ADMIN — AMIODARONE HYDROCHLORIDE 200 MG: 200 TABLET ORAL at 13:29

## 2024-08-16 RX ADMIN — Medication 200 MCG: at 10:51

## 2024-08-16 RX ADMIN — SUCCINYLCHOLINE CHLORIDE 100 MG: 20 INJECTION, SOLUTION INTRAMUSCULAR; INTRAVENOUS at 10:37

## 2024-08-16 RX ADMIN — ROCURONIUM BROMIDE 10 MG: 10 INJECTION, SOLUTION INTRAVENOUS at 10:37

## 2024-08-16 RX ADMIN — SUGAMMADEX 300 MG: 100 INJECTION, SOLUTION INTRAVENOUS at 11:11

## 2024-08-16 RX ADMIN — ACETAMINOPHEN 650 MG: 325 TABLET, FILM COATED ORAL at 12:07

## 2024-08-16 RX ADMIN — IPRATROPIUM BROMIDE AND ALBUTEROL SULFATE 3 ML: .5; 3 SOLUTION RESPIRATORY (INHALATION) at 18:48

## 2024-08-16 RX ADMIN — Medication 10 ML: at 21:30

## 2024-08-16 RX ADMIN — MIDODRINE HYDROCHLORIDE 5 MG: 5 TABLET ORAL at 13:29

## 2024-08-16 RX ADMIN — INSULIN LISPRO 4 UNITS: 100 INJECTION, SOLUTION INTRAVENOUS; SUBCUTANEOUS at 21:28

## 2024-08-16 RX ADMIN — ENOXAPARIN SODIUM 90 MG: 100 INJECTION SUBCUTANEOUS at 21:25

## 2024-08-16 RX ADMIN — AMIODARONE HYDROCHLORIDE 200 MG: 200 TABLET ORAL at 21:25

## 2024-08-16 RX ADMIN — DEXAMETHASONE SODIUM PHOSPHATE 4 MG: 4 INJECTION, SOLUTION INTRAMUSCULAR; INTRAVENOUS at 10:45

## 2024-08-16 RX ADMIN — IPRATROPIUM BROMIDE AND ALBUTEROL SULFATE 3 ML: .5; 3 SOLUTION RESPIRATORY (INHALATION) at 13:10

## 2024-08-16 RX ADMIN — TAMSULOSIN HYDROCHLORIDE 0.4 MG: 0.4 CAPSULE ORAL at 21:25

## 2024-08-16 NOTE — PROGRESS NOTES
Daily Progress Note        Large pleural effusion    Pneumonia due to infectious organism    Primary lung cancer, right      Assessment    Bronchoscopy right lower lobe brushing and washing positive for non-small cell lung cancer     Initially presented for non resolving Right side pneumonia not responding to multiple courses of antibiotics as an outpatient    CT PE protocol negative for pulmonary embolism completed 08/14/2024    S/p Bronchoscopy completed 8/13/2024   no endobronchial lesions, no mucopurulent secretions, right lung washing revealed yellowish fluid material   Right lower lobe endobronchial brushing x 1 was done      moderate-sized right pleural effusion  Status post thoracentesis by IR on 8/13/2024  Removal of 2 L of red-colored fluid  Fluid analysis elevated nucleated cells unclassified  pH 8, glucose 182, , protein 3.7 compatible with exudate  cultures pending      Ex-smoker quit in 1999 with 25-pack-year     15 pound weight loss     [place] in [place] with exposure to agent of orange    Respiratory viral panel was negative  Legionella antigen urine strep antigen negative        Recommendations:     [facility] robotic bronch in AM for tissue samples for NGS testing  Broad-spectrum antibiotics Zosyn    Hold lovenox      Chest x-ray 8/14/2024      CT chest 8/12/2024        LOS: 3 days     Subjective         Objective     Vital signs for last 24 hours:  Vitals:    08/15/24 1138 08/15/24 1810 08/15/24 1912 08/15/24 1917   BP:  118/66     BP Location:  Left arm     Patient Position:  Lying     Pulse: 82 105 103 97   Resp: 10 23 18 18   Temp:  97.4 °F (36.3 °C)     TempSrc:  Axillary     SpO2: 94%  94% 98%   Weight:       Height:           Intake/Output last 3 shifts:  I/O last 3 completed shifts:  In: 720 [P.O.:720]  Out: 700 [Urine:700]  Intake/Output this shift:  No intake/output data recorded.      Radiology  Imaging Results (Last 24 Hours)       Procedure Component Value Units Date/Time    MRI Brain  With & Without Contrast [[id number]] Collected: 08/15/24 1611     Updated: 08/15/24 1617    Narrative:      MRI BRAIN W WO CONTRAST    Date of Exam: 8/15/2024 3:51 PM EDT    Indication: lung cancer staging.     Comparison: None available.    Technique:  Routine multiplanar/multisequence sequence images of the brain were obtained before and after the uneventful administration of Prohance.      Findings:  No acute infarct is present on diffusion weighted sequences. Midline structures are normal and the craniocervical junction appears satisfactory. Age-related changes are present with mild generalized volume loss and typical T2 hyperintense subcortical and   pontine white matter changes, nonspecific but favored to reflect sequela of chronic microvascular ischemia. There is otherwise no evidence of intracranial hemorrhage, mass or mass effect. The ventricles are normal in size and configuration. The orbits   are normal. The paranasal sinuses are clear. There is no abnormal brain parenchymal enhancement.      Impression:      Impression:  No evidence of intracranial metastatic disease.     Some mild typical chronic and age-related changes are present. Otherwise essentially normal contrast-enhanced MRI of the brain.        Electronically Signed: Jeremy Camilo MD    8/15/2024 4:15 PM EDT    Workstation ID: [id number]    CT Chest Without Contrast Diagnostic [[id number]] Collected: 08/15/24 1535     Updated: 08/15/24 1551    Narrative:      CT CHEST WO CONTRAST DIAGNOSTIC    Date of Exam: 8/15/2024 2:07 PM EDT    Indication: Bronchoscopy, pleural fluid consistent with malignancy    Comparison: CT chest with contrast 8/14/2024    Technique: Axial CT images were obtained of the chest without contrast administration.  Sagittal and coronal reconstructions were performed.  Automated exposure control and iterative reconstruction methods were used.    Findings:  Soft tissues of the lower neck are without acute abnormality. There is a  right sided PICC catheter with the tip at the mid SVC. Heart size normal. Coronary artery calcifications are present. Small pericardial effusion. Enlarged right paratracheal,   subcarinal and right hilar lymph nodes again noted better assessed on the recent contrast-enhanced study. No new adenopathy. No axillary adenopathy.    There is a persistent small to moderate right-sided pleural effusion. Trace effusion on the left. There is diffuse interlobular septal thickening throughout the right lung. Groundglass opacities involving the right lung not significantly changed in the   prior study. There is redemonstration of dense consolidation in the dependent right lower lobe which may relate to pneumonia or malignancy. There is again question of the partially cavitary right lower lobe mass partially obscured due to consolidation   with representative measurement of 6 cm (2/69). Patchy areas of airspace disease involving the right lower lobe and right middle lobe consistent with pneumonia.    No consolidation in the left lung. Mild emphysema. Several small pulmonary nodules again noted in the left lung for example representative nodule at the left lower lobe measuring 6 mm (4/65), at the left lower lobe measuring 3 mm (4/65), and the left   lower lobe abutting the pleura measuring 5 mm (4/56).    Upper abdomen demonstrates no acute abnormality in the liver, spleen, adrenal glands, or pancreas. Cholelithiasis. There is moderate left hydroureteronephrosis partially imaged similar to the prior study. No free fluid in the upper abdomen. Small   sclerotic lesion in the T12 vertebral body likely bone island measuring 5 mm. Negative for acute fracture.      Impression:      Impression:  1. Persistent dense consolidation in right lower lobe suggesting pneumonia with suspicion of a cavitary right lower lobe mass partially obscured due to consolidation similar to prior study which may relate to malignancy.  2. Stable enlarged  mediastinal and right hilar adenopathy which may relate to metastatic adenopathy or reactive nodes.  3. Diffuse interlobular septal thickening in right lung not significantly changed which may relate to asymmetric pulmonary edema or lymphangitic spread of malignancy.  4. Small to moderate right pleural effusion similar to prior study.  5. Several small nonspecific pulmonary nodules in the left lung unchanged largest 6 mm. Trace left pleural effusion.  6. Moderate left hydroureteronephrosis again noted, consider dedicated CT abdomen and pelvis for further assessment.      Electronically Signed: Antwon Estrella MD    8/15/2024 3:48 PM EDT    Workstation ID: [id number]            Labs:  Results from last 7 days   Lab Units 08/15/24  0029   WBC 10*3/mm3 18.78*   HEMOGLOBIN g/dL 12.1*   HEMATOCRIT % 36.6*   PLATELETS 10*3/mm3 244     Results from last 7 days   Lab Units 08/15/24  0029 08/13/24  0335 08/12/24 2030   SODIUM mmol/L 143   < > 137   POTASSIUM mmol/L 4.2   < > 4.4   CHLORIDE mmol/L 112*   < > 104   CO2 mmol/L 21.1*   < > 20.8*   BUN mg/dL 40*   < > 23   CREATININE mg/dL 1.31*   < > 1.28*   CALCIUM mg/dL 8.0*   < > 8.6   BILIRUBIN mg/dL  --   --  0.2   ALK PHOS U/L  --   --  55   ALT (SGPT) U/L  --   --  20   AST (SGOT) U/L  --   --  39   GLUCOSE mg/dL 147*   < > 167*    < > = values in this interval not displayed.         Results from last 7 days   Lab Units 08/12/24  2030   ALBUMIN g/dL 3.3*     Results from last 7 days   Lab Units 08/12/24  2305 08/12/24  2030   HSTROP T ng/L 53* 57*     Results from last 7 days   Lab Units 08/12/24  1834   STEVEN  Positive*     Results from last 7 days   Lab Units 08/14/24  1000   MAGNESIUM mg/dL 2.0     Results from last 7 days   Lab Units 08/12/24  1834   INR  1.00     Results from last 7 days   Lab Units 08/14/24  1000   TSH uIU/mL 0.626           Meds:   SCHEDULE  amiodarone, 200 mg, Oral, Q24H  atorvastatin, 10 mg, Oral, Daily  [Held by provider] enoxaparin, 1 mg/kg,  Subcutaneous, Q12H  insulin lispro, 2-9 Units, Subcutaneous, 4x Daily AC & at Bedtime  ipratropium-albuterol, 3 mL, Nebulization, Q6H While Awake - RT  midodrine, 5 mg, Oral, TID AC  pantoprazole, 40 mg, Oral, QAM AC  piperacillin-tazobactam, 4.5 g, Intravenous, Q8H  predniSONE, 30 mg, Oral, Daily   Followed by  [START ON 8/17/2024] predniSONE, 20 mg, Oral, Daily   Followed by  [START ON 8/19/2024] predniSONE, 10 mg, Oral, Daily  sodium chloride, 10 mL, Intravenous, Q12H  sodium chloride, 10 mL, Intravenous, Q12H  sodium chloride, 10 mL, Intravenous, Q12H  sodium chloride, 10 mL, Intravenous, Q12H  tamsulosin, 0.4 mg, Oral, Nightly      Infusions  hold, 1 each  Pharmacy to Dose enoxaparin (LOVENOX),   sodium chloride, 50 mL/hr, Last Rate: 50 mL/hr (08/13/24 1033)  sodium chloride, 100 mL/hr, Last Rate: 100 mL/hr (08/15/24 1327)      PRNs    acetaminophen    senna-docusate sodium **AND** polyethylene glycol **AND** bisacodyl **AND** bisacodyl    dextrose    dextrose    glucagon (human recombinant)    hold    LORazepam    nitroglycerin    ondansetron    Pharmacy to Dose enoxaparin (LOVENOX)    sodium chloride    sodium chloride    sodium chloride    sodium chloride    Physical Exam:  Physical Exam  Cardiovascular:      Heart sounds: No murmur heard.     No gallop.   Pulmonary:      Effort: No respiratory distress.      Breath sounds: No stridor. Rhonchi and rales present. No wheezing.   Chest:      Chest wall: No tenderness.         ROS  Review of Systems   Respiratory:  Positive for cough and shortness of breath. Negative for wheezing and stridor.    Cardiovascular:  Negative for chest pain, palpitations and leg swelling.             Total time spent with patient greater than: 45 Minutes

## 2024-08-16 NOTE — CASE MANAGEMENT/SOCIAL WORK
Continued Stay Note  [facility]     Patient Name: Young Ames  MRN: [id number]  Today's Date: 8/16/2024    Admit Date: 8/12/2024    Plan: Anticipate routine home with spouse.   Discharge Plan       Row Name 08/16/24 1300       Plan    Plan Anticipate routine home with spouse.    Plan Comments Barrier to D/C: bronch today, pulmonology, cardiology and oncology following.                      Expected Discharge Date and Time       Expected Discharge Date Expected Discharge Time    Aug 17, 2024           Phone communication or documentation only - no physical contact with patient or family.     YU HebertN, RN    [address] [address]    Office: [phone]  Fax: [phone]

## 2024-08-16 NOTE — PROGRESS NOTES
LOS: 4 days   Patient Care Team:  Abner Funes APRN as PCP - General (Family Medicine)    Subjective     Patient is wanting to get up and move around the room      Review of Systems   Constitutional: Negative.    HENT: Negative.     Respiratory: Negative.     Cardiovascular: Negative.    Gastrointestinal: Negative.    Genitourinary: Negative.    Musculoskeletal: Negative.    Neurological: Negative.    Psychiatric/Behavioral: Negative.             Objective     Vital Signs  Temp:  [97 °F (36.1 °C)-98.9 °F (37.2 °C)] 97 °F (36.1 °C)  Heart Rate:  [] 100  Resp:  [12-23] 22  BP: ()/(50-75) 115/63      Physical Exam  Vitals reviewed.   Constitutional:       Appearance: He is not ill-appearing.   HENT:      Head: Normocephalic and atraumatic.      Right Ear: External ear normal.      Left Ear: External ear normal.      Nose: Nose normal.      Mouth/Throat:      Mouth: Mucous membranes are moist.   Eyes:      General:         Right eye: No discharge.         Left eye: No discharge.   Cardiovascular:      Rate and Rhythm: Normal rate and regular rhythm.      Pulses: Normal pulses.      Heart sounds: Normal heart sounds.   Pulmonary:      Effort: Pulmonary effort is normal.      Breath sounds: Normal breath sounds.   Abdominal:      General: Bowel sounds are normal.      Palpations: Abdomen is soft.   Musculoskeletal:         General: Normal range of motion.      Cervical back: Normal range of motion.   Skin:     General: Skin is warm and dry.   Neurological:      Mental Status: He is alert and oriented to person, place, and time.   Psychiatric:         Behavior: Behavior normal.              Results Review:    Lab Results (last 24 hours)       Procedure Component Value Units Date/Time    Body Fluid Culture - Body Fluid, Pleural Cavity [[id number]] Collected: 08/13/24 1401    Specimen: Body Fluid from Pleural Cavity Updated: 08/16/24 0707     Body Fluid Culture No growth at 3 days     Gram Stain Moderate  (3+) WBCs per low power field      No organisms seen    POC Glucose Once [[id number]]  (Abnormal) Collected: 08/15/24 2057    Specimen: Blood Updated: 08/15/24 2059     Glucose 173 mg/dL      Comment: Serial Number: [id number]:  [id number]       Blood Culture - Blood, Arm, Right [[id number]]  (Normal) Collected: 08/12/24 1834    Specimen: Blood from Arm, Right Updated: 08/15/24 1845     Blood Culture No growth at 3 days    Blood Culture - Blood, Arm, Left [[id number]]  (Normal) Collected: 08/12/24 1744    Specimen: Blood from Arm, Left Updated: 08/15/24 1800     Blood Culture No growth at 3 days    Narrative:      Less than seven (7) mL's of blood was collected.  Insufficient quantity may yield false negative results.    POC Glucose Once [[id number]]  (Abnormal) Collected: 08/15/24 1636    Specimen: Blood Updated: 08/15/24 1637     Glucose 185 mg/dL      Comment: Serial Number: [id number]:  [id number]       STEVEN by IFA, Reflex 9-biomarkers profile [[id number]]  (Abnormal) Collected: 08/12/24 1834    Specimen: Blood Updated: 08/15/24 1511     STEVEN Positive     Comment:                                      Negative   <1:80                                       Borderline  1:80                                       Positive   >1:80        Please note Comment     Comment: STEVEN Multiplex methodology was designed to detect up to 11 antibodies  of the 100+ antibodies that may be detected by STEVEN IFA methodology.       Narrative:      Performed at:  [address] [place] [address]  [id number]  : Yogi Venegas PhD, Phone:  [phone]    TAYLOR+DNA/DS+Antich+Centro+FA.. [[id number]] Collected: 08/12/24 1834    Specimen: Blood Updated: 08/15/24 1511     Speckled Pattern 1:80     Comment: ICAP nomenclature: AC-2,4,5,29        Note: (Reference) Comment     Comment: Pattern              Potential Disease Association  -------------  ---------------------------------------------  Homogeneous     Systemic Lupus Erythematosus, Drug Induced                 Systemic Lupus Erythematosus, Chronic                 Autoimmune hepatitis, Juvenile Idiopathic                 Arthritis  -------------  ---------------------------------------------  Speckled       Sjogren Syndrome, Systemic Lupus                 Erythematosus, Subacute Cutaneous Lupus,                  Lupus, Congenital Heart Block,                 Mixed Connective Tissue Disease,                 Scleroderma-diffuse, Scleroderma-Autoimmune                 Myositis Overlap Syndrome, Systemic Lupus                 Lfyanturgumjk-Uvziownftfn-Kreoowwlfp                 Myositis Overlap Syndrome, Systemic                 Autoimmune Rheumatic Disease,                 Undifferentiated Connective Tissue Disease  -------------  ---------------------------------------------  Nucleolar      Systemic Sclerosis, Scleroderma-Autoimmune                 Myositis Overlap Syndrome, Sjogren                 Syndrome, Raynaud phenomenon, Pulmonary                 Arterial Hypertension, Systemic Autoimmune                 Rheumatic Disease, Cancer  -------------  ---------------------------------------------  Centromere     Scleroderma-CREST, Limited Cutaneous SSc,                 Raynaud's Phenomenon, Primary Biliary                 Cholangitis  -------------  ---------------------------------------------  Nuclear Dot    Primary Biliary Cholangitis  -------------  ---------------------------------------------  Nuclear        Primary Biliary Cholangitis, Autoimmune  Membrane       Hepatitis/Liver disease, Systemic Autoimmune                 Rheumatic Disease, Autoimmune Cytopenias,                 Linear Scleroderma, Antiphospholipid Syndrome  -------------  ---------------------------------------------        Anti-DNA (DS) Ab Qn <1 IU/mL      Comment:                                    Negative      <5                                     Equivocal  5 - 9                                      Positive      >9        RNP Antibodies <0.2 AI      Hernandez Antibodies <0.2 AI      Antiscleroderma-70 Antibodies <0.2 AI      Sjogren's Anti-SS-A <0.2 AI      Sjogren's Anti-SS-B <0.2 AI      Antichromatin Antibodies <0.2 AI      LEN-1 IgG <0.2 AI      Anti-Centromere B Antibodies <0.2 AI      See below: Comment     Comment: Autoantibody                       Disease Association  ------------------------------------------------------------                          Condition                  Frequency  ---------------------   ------------------------   ---------  Antinuclear Antibody,    SLE, mixed connective  Direct (STEVEN-D)           tissue diseases  ---------------------   ------------------------   ---------  dsDNA                    SLE                        40 - 60%  ---------------------   ------------------------   ---------  Chromatin                Drug induced SLE                90%                           SLE                        48 - 97%  ---------------------   ------------------------   ---------  SSA (Ro)                 SLE                        25 - 35%                           Sjogren's Syndrome         40 - 70%                            Lupus                 100%  ---------------------   ------------------------   ---------  SSB (La)                 SLE                             10%                           Sjogren's Syndrome              30%  ---------------------   -----------------------    ---------  Sm (anti-Smith)          SLE                        15 - 30%  ---------------------   -----------------------    ---------  RNP                      Mixed Connective Tissue                           Disease                         95%  (U1 nRNP,                SLE                        30 - 50%  anti-ribonucleoprotein)  Polymyositis and/or                           Dermatomyositis                 20%  ---------------------   ------------------------    ---------  Scl-70 (antiDNA          Scleroderma (diffuse)      20 - 35%  topoisomerase)           Crest                           13%  ---------------------   ------------------------   ---------  Gely-1                     Polymyositis and/or                           Dermatomyositis            20 - 40%  ---------------------   ------------------------   ---------  Centromere B             Scleroderma - Crest                           variant                         80%       Narrative:      Performed at:  [address] [place] [address]  [id number]  : Yogi Venegas PhD, Phone:  [phone]             Imaging Results (Last 24 Hours)       Procedure Component Value Units Date/Time    XR Chest 1 View [[id number]] Collected: 08/16/24 1201     Updated: 08/16/24 1208    Narrative:      XR CHEST 1 VW    Date of Exam: 8/16/2024 11:50 AM EDT    Indication: post ion bronchoscopy    Comparison: CT chest 8/15/2024, AP chest x-ray 8/14/2024    Findings:  No pneumothorax is identified. Extensive interstitial and airspace opacities throughout the right lung appear stable. Blunting of the right lateral costophrenic angle is unchanged. Known right lower lobe cavitary mass is obscured. Left lung is clear. Tip   of the right upper extremity PICC terminates in the SVC. Cardiomediastinal contours are stable.      Impression:      Impression:  1.No visible pneumothorax.  2.Stable appearance of extensive interstitial and airspace opacities throughout the right lung with right pleural effusion, obscuring the known right lower lobe cavitary mass.      Electronically Signed: Noemy Prince    8/16/2024 12:06 PM EDT    Workstation ID: [id number]    FL < 1 Hour [[id number]] Resulted: 08/16/24 1155     Updated: 08/16/24 1155    Narrative:      This procedure was auto-finalized with no dictation required.    MRI Brain With & Without Contrast [[id number]] Collected: 08/15/24 1611     Updated: 08/15/24 1617    Narrative:       MRI BRAIN W WO CONTRAST    Date of Exam: 8/15/2024 3:51 PM EDT    Indication: lung cancer staging.     Comparison: None available.    Technique:  Routine multiplanar/multisequence sequence images of the brain were obtained before and after the uneventful administration of Prohance.      Findings:  No acute infarct is present on diffusion weighted sequences. Midline structures are normal and the craniocervical junction appears satisfactory. Age-related changes are present with mild generalized volume loss and typical T2 hyperintense subcortical and   pontine white matter changes, nonspecific but favored to reflect sequela of chronic microvascular ischemia. There is otherwise no evidence of intracranial hemorrhage, mass or mass effect. The ventricles are normal in size and configuration. The orbits   are normal. The paranasal sinuses are clear. There is no abnormal brain parenchymal enhancement.      Impression:      Impression:  No evidence of intracranial metastatic disease.     Some mild typical chronic and age-related changes are present. Otherwise essentially normal contrast-enhanced MRI of the brain.        Electronically Signed: Jeremy Camilo MD    8/15/2024 4:15 PM EDT    Workstation ID: [id number]    CT Chest Without Contrast Diagnostic [[id number]] Collected: 08/15/24 1535     Updated: 08/15/24 1551    Narrative:      CT CHEST WO CONTRAST DIAGNOSTIC    Date of Exam: 8/15/2024 2:07 PM EDT    Indication: Bronchoscopy, pleural fluid consistent with malignancy    Comparison: CT chest with contrast 8/14/2024    Technique: Axial CT images were obtained of the chest without contrast administration.  Sagittal and coronal reconstructions were performed.  Automated exposure control and iterative reconstruction methods were used.    Findings:  Soft tissues of the lower neck are without acute abnormality. There is a right sided PICC catheter with the tip at the mid SVC. Heart size normal. Coronary artery  calcifications are present. Small pericardial effusion. Enlarged right paratracheal,   subcarinal and right hilar lymph nodes again noted better assessed on the recent contrast-enhanced study. No new adenopathy. No axillary adenopathy.    There is a persistent small to moderate right-sided pleural effusion. Trace effusion on the left. There is diffuse interlobular septal thickening throughout the right lung. Groundglass opacities involving the right lung not significantly changed in the   prior study. There is redemonstration of dense consolidation in the dependent right lower lobe which may relate to pneumonia or malignancy. There is again question of the partially cavitary right lower lobe mass partially obscured due to consolidation   with representative measurement of 6 cm (2/69). Patchy areas of airspace disease involving the right lower lobe and right middle lobe consistent with pneumonia.    No consolidation in the left lung. Mild emphysema. Several small pulmonary nodules again noted in the left lung for example representative nodule at the left lower lobe measuring 6 mm (4/65), at the left lower lobe measuring 3 mm (4/65), and the left   lower lobe abutting the pleura measuring 5 mm (4/56).    Upper abdomen demonstrates no acute abnormality in the liver, spleen, adrenal glands, or pancreas. Cholelithiasis. There is moderate left hydroureteronephrosis partially imaged similar to the prior study. No free fluid in the upper abdomen. Small   sclerotic lesion in the T12 vertebral body likely bone island measuring 5 mm. Negative for acute fracture.      Impression:      Impression:  1. Persistent dense consolidation in right lower lobe suggesting pneumonia with suspicion of a cavitary right lower lobe mass partially obscured due to consolidation similar to prior study which may relate to malignancy.  2. Stable enlarged mediastinal and right hilar adenopathy which may relate to metastatic adenopathy or reactive  nodes.  3. Diffuse interlobular septal thickening in right lung not significantly changed which may relate to asymmetric pulmonary edema or lymphangitic spread of malignancy.  4. Small to moderate right pleural effusion similar to prior study.  5. Several small nonspecific pulmonary nodules in the left lung unchanged largest 6 mm. Trace left pleural effusion.  6. Moderate left hydroureteronephrosis again noted, consider dedicated CT abdomen and pelvis for further assessment.      Electronically Signed: Antwon Estrella MD    8/15/2024 3:48 PM EDT    Workstation ID: [id number]                 I reviewed the patient's new clinical results.    Medication Review:   Scheduled Meds:amiodarone, 200 mg, Oral, Q12H  atorvastatin, 10 mg, Oral, Daily  [Held by provider] enoxaparin, 1 mg/kg, Subcutaneous, Q12H  insulin lispro, 2-9 Units, Subcutaneous, 4x Daily AC & at Bedtime  ipratropium-albuterol, 3 mL, Nebulization, Q6H While Awake - RT  midodrine, 5 mg, Oral, TID AC  pantoprazole, 40 mg, Oral, QAM AC  predniSONE, 30 mg, Oral, Daily   Followed by  [START ON 8/17/2024] predniSONE, 20 mg, Oral, Daily   Followed by  [START ON 8/19/2024] predniSONE, 10 mg, Oral, Daily  sodium chloride, 10 mL, Intravenous, Q12H  sodium chloride, 10 mL, Intravenous, Q12H  sodium chloride, 10 mL, Intravenous, Q12H  sodium chloride, 10 mL, Intravenous, Q12H  tamsulosin, 0.4 mg, Oral, Nightly      Continuous Infusions:hold, 1 each  Pharmacy to Dose enoxaparin (LOVENOX),   sodium chloride, 50 mL/hr, Last Rate: Stopped (08/16/24 1116)  sodium chloride, 100 mL/hr, Last Rate: 100 mL/hr (08/15/24 1327)      PRN Meds:.  acetaminophen    senna-docusate sodium **AND** polyethylene glycol **AND** bisacodyl **AND** bisacodyl    dextrose    dextrose    droperidol **OR** droperidol    ePHEDrine Sulfate (Pressors)    flumazenil    glucagon (human recombinant)    hold    hydrALAZINE    ipratropium-albuterol    labetalol    LORazepam    naloxone    nitroglycerin     ondansetron    ondansetron    oxyCODONE    Pharmacy to Dose enoxaparin (LOVENOX)    sodium chloride    sodium chloride    sodium chloride    sodium chloride     Interval History:    Assessment & Plan     Right sided pneumonia  -Status post bronchoscopy 8/13/2024, follow BAL  -Continue broad-spectrum antibiotic, Zosyn    -steroid taper     Large right pleural effusion  Small cell lung cancer  - 2000 cc removed during thoracentesis on 8/13/2024- studies pending  -CT showing a 5.5 cm right lower lobe lesion  -Lovenox restarted post bronch     -Check brain MRI and plan for outpatient PET for further staging  -Plan to begin treatment as an outpatient with chemotherapy or chemoimmunotherapy pending NGS testing for targeted treatment options     Atrial fibrillation with RVR, new onset  HTN  -bp soft side most likely due to medications for SVT/afib yesterday started on midodrine with improvement continue to monitor  -cardiology following  -Amio gtt converted back to SR  -Lovenox restart post bronch      BPH - tamsulosin  GERD - PPI     Plan for disposition:Home    CARL Cook  08/16/24  12:09 EDT

## 2024-08-16 NOTE — ANESTHESIA PREPROCEDURE EVALUATION
Anesthesia Evaluation     no history of anesthetic complications:   NPO Solid Status: > 8 hours  NPO Liquid Status: > 8 hours           Airway   Mallampati: I  TM distance: >3 FB  Neck ROM: full  No difficulty expected  Dental - normal exam     Pulmonary - normal exam   (+) pneumonia , pleural effusion, lung cancer,  Cardiovascular - normal exam    (+) past MI       Neuro/Psych  GI/Hepatic/Renal/Endo      Musculoskeletal     Abdominal  - normal exam    Bowel sounds: normal.   Substance History      OB/GYN          Other      history of cancer active                    Anesthesia Plan    ASA 3     general     intravenous induction     Anesthetic plan, risks, benefits, and alternatives have been provided, discussed and informed consent has been obtained with: patient.  Pre-procedure education provided  Plan discussed with CRNA.      CODE STATUS:    Level Of Support Discussed With: Patient  Code Status (Patient has no pulse and is not breathing): CPR (Attempt to Resuscitate)  Medical Interventions (Patient has pulse or is breathing): Full Support

## 2024-08-16 NOTE — PROGRESS NOTES
Daily Progress Note        Large pleural effusion    Pneumonia due to infectious organism    Primary lung cancer, right      Assessment    Bronchoscopy right lower lobe brushing and washing positive for non-small cell lung cancer     Initially presented for non resolving Right side pneumonia not responding to multiple courses of antibiotics as an outpatient    CT PE protocol negative for pulmonary embolism completed 08/14/2024    S/p Bronchoscopy completed 8/13/2024   no endobronchial lesions, no mucopurulent secretions, right lung washing revealed yellowish fluid material   Right lower lobe endobronchial brushing x 1 was done      moderate-sized right pleural effusion  Status post thoracentesis by IR on 8/13/2024  Removal of 2 L of red-colored fluid  Fluid analysis elevated nucleated cells unclassified  pH 8, glucose 182, , protein 3.7 compatible with exudate  cultures pending      Ex-smoker quit in 1999 with 25-pack-year     15 pound weight loss     [place] in [place] with exposure to agent of orange    Respiratory viral panel was negative  Legionella antigen urine strep antigen negative        Recommendations:     S/p ION robotic bronch 8/16/24  adequat tissue samples will send for NGS testing  Pasthology on site confirmed Dx of NSCLCA    Broad-spectrum antibiotics Zosyn, may downgrade to Augmentine    Resume lovenox    Steroids wean    Chest x-ray 8/14/2024      CT chest 8/12/2024        LOS: 4 days     Subjective         Objective     Vital signs for last 24 hours:  Vitals:    08/16/24 0500 08/16/24 0541 08/16/24 0632 08/16/24 0635   BP:  126/75     BP Location:  Left arm     Patient Position:  Lying     Pulse:  94 93 86   Resp:  19 16 12   Temp:  97.5 °F (36.4 °C)     TempSrc:  Oral     SpO2:  94% 93% 98%   Weight: 91.9 kg (202 lb 9.6 oz)      Height:           Intake/Output last 3 shifts:  I/O last 3 completed shifts:  In: 480 [P.O.:480]  Out: 950 [Urine:950]  Intake/Output this shift:  No  intake/output data recorded.      Radiology  Imaging Results (Last 24 Hours)       Procedure Component Value Units Date/Time    MRI Brain With & Without Contrast [[id number]] Collected: 08/15/24 1611     Updated: 08/15/24 1617    Narrative:      MRI BRAIN W WO CONTRAST    Date of Exam: 8/15/2024 3:51 PM EDT    Indication: lung cancer staging.     Comparison: None available.    Technique:  Routine multiplanar/multisequence sequence images of the brain were obtained before and after the uneventful administration of Prohance.      Findings:  No acute infarct is present on diffusion weighted sequences. Midline structures are normal and the craniocervical junction appears satisfactory. Age-related changes are present with mild generalized volume loss and typical T2 hyperintense subcortical and   pontine white matter changes, nonspecific but favored to reflect sequela of chronic microvascular ischemia. There is otherwise no evidence of intracranial hemorrhage, mass or mass effect. The ventricles are normal in size and configuration. The orbits   are normal. The paranasal sinuses are clear. There is no abnormal brain parenchymal enhancement.      Impression:      Impression:  No evidence of intracranial metastatic disease.     Some mild typical chronic and age-related changes are present. Otherwise essentially normal contrast-enhanced MRI of the brain.        Electronically Signed: Jeremy Camilo MD    8/15/2024 4:15 PM EDT    Workstation ID: [id number]    CT Chest Without Contrast Diagnostic [[id number]] Collected: 08/15/24 1535     Updated: 08/15/24 1551    Narrative:      CT CHEST WO CONTRAST DIAGNOSTIC    Date of Exam: 8/15/2024 2:07 PM EDT    Indication: Bronchoscopy, pleural fluid consistent with malignancy    Comparison: CT chest with contrast 8/14/2024    Technique: Axial CT images were obtained of the chest without contrast administration.  Sagittal and coronal reconstructions were performed.  Automated exposure  control and iterative reconstruction methods were used.    Findings:  Soft tissues of the lower neck are without acute abnormality. There is a right sided PICC catheter with the tip at the mid SVC. Heart size normal. Coronary artery calcifications are present. Small pericardial effusion. Enlarged right paratracheal,   subcarinal and right hilar lymph nodes again noted better assessed on the recent contrast-enhanced study. No new adenopathy. No axillary adenopathy.    There is a persistent small to moderate right-sided pleural effusion. Trace effusion on the left. There is diffuse interlobular septal thickening throughout the right lung. Groundglass opacities involving the right lung not significantly changed in the   prior study. There is redemonstration of dense consolidation in the dependent right lower lobe which may relate to pneumonia or malignancy. There is again question of the partially cavitary right lower lobe mass partially obscured due to consolidation   with representative measurement of 6 cm (2/69). Patchy areas of airspace disease involving the right lower lobe and right middle lobe consistent with pneumonia.    No consolidation in the left lung. Mild emphysema. Several small pulmonary nodules again noted in the left lung for example representative nodule at the left lower lobe measuring 6 mm (4/65), at the left lower lobe measuring 3 mm (4/65), and the left   lower lobe abutting the pleura measuring 5 mm (4/56).    Upper abdomen demonstrates no acute abnormality in the liver, spleen, adrenal glands, or pancreas. Cholelithiasis. There is moderate left hydroureteronephrosis partially imaged similar to the prior study. No free fluid in the upper abdomen. Small   sclerotic lesion in the T12 vertebral body likely bone island measuring 5 mm. Negative for acute fracture.      Impression:      Impression:  1. Persistent dense consolidation in right lower lobe suggesting pneumonia with suspicion of a cavitary  right lower lobe mass partially obscured due to consolidation similar to prior study which may relate to malignancy.  2. Stable enlarged mediastinal and right hilar adenopathy which may relate to metastatic adenopathy or reactive nodes.  3. Diffuse interlobular septal thickening in right lung not significantly changed which may relate to asymmetric pulmonary edema or lymphangitic spread of malignancy.  4. Small to moderate right pleural effusion similar to prior study.  5. Several small nonspecific pulmonary nodules in the left lung unchanged largest 6 mm. Trace left pleural effusion.  6. Moderate left hydroureteronephrosis again noted, consider dedicated CT abdomen and pelvis for further assessment.      Electronically Signed: Antwon Estrella MD    8/15/2024 3:48 PM EDT    Workstation ID: [id number]            Labs:  Results from last 7 days   Lab Units 08/15/24  0029   WBC 10*3/mm3 18.78*   HEMOGLOBIN g/dL 12.1*   HEMATOCRIT % 36.6*   PLATELETS 10*3/mm3 244     Results from last 7 days   Lab Units 08/15/24  0029 08/13/24  0335 08/12/24 2030   SODIUM mmol/L 143   < > 137   POTASSIUM mmol/L 4.2   < > 4.4   CHLORIDE mmol/L 112*   < > 104   CO2 mmol/L 21.1*   < > 20.8*   BUN mg/dL 40*   < > 23   CREATININE mg/dL 1.31*   < > 1.28*   CALCIUM mg/dL 8.0*   < > 8.6   BILIRUBIN mg/dL  --   --  0.2   ALK PHOS U/L  --   --  55   ALT (SGPT) U/L  --   --  20   AST (SGOT) U/L  --   --  39   GLUCOSE mg/dL 147*   < > 167*    < > = values in this interval not displayed.         Results from last 7 days   Lab Units 08/12/24  2030   ALBUMIN g/dL 3.3*     Results from last 7 days   Lab Units 08/12/24  2305 08/12/24  2030   HSTROP T ng/L 53* 57*     Results from last 7 days   Lab Units 08/12/24  1834   STEVEN  Positive*     Results from last 7 days   Lab Units 08/14/24  1000   MAGNESIUM mg/dL 2.0     Results from last 7 days   Lab Units 08/12/24  1834   INR  1.00     Results from last 7 days   Lab Units 08/14/24  1000   TSH uIU/mL 0.626            Meds:   SCHEDULE  amiodarone, 200 mg, Oral, Q12H  atorvastatin, 10 mg, Oral, Daily  [Held by provider] enoxaparin, 1 mg/kg, Subcutaneous, Q12H  insulin lispro, 2-9 Units, Subcutaneous, 4x Daily AC & at Bedtime  ipratropium-albuterol, 3 mL, Nebulization, Q6H While Awake - RT  midodrine, 5 mg, Oral, TID AC  pantoprazole, 40 mg, Oral, QAM AC  predniSONE, 30 mg, Oral, Daily   Followed by  [START ON 8/17/2024] predniSONE, 20 mg, Oral, Daily   Followed by  [START ON 8/19/2024] predniSONE, 10 mg, Oral, Daily  sodium chloride, 10 mL, Intravenous, Q12H  sodium chloride, 10 mL, Intravenous, Q12H  sodium chloride, 10 mL, Intravenous, Q12H  sodium chloride, 10 mL, Intravenous, Q12H  tamsulosin, 0.4 mg, Oral, Nightly      Infusions  hold, 1 each  Pharmacy to Dose enoxaparin (LOVENOX),   sodium chloride, 50 mL/hr, Last Rate: 50 mL/hr (08/13/24 1033)  sodium chloride, 100 mL/hr, Last Rate: 100 mL/hr (08/15/24 1327)      PRNs    acetaminophen    senna-docusate sodium **AND** polyethylene glycol **AND** bisacodyl **AND** bisacodyl    dextrose    dextrose    glucagon (human recombinant)    hold    LORazepam    nitroglycerin    ondansetron    Pharmacy to Dose enoxaparin (LOVENOX)    sodium chloride    sodium chloride    sodium chloride    sodium chloride    Physical Exam:  Physical Exam  Cardiovascular:      Heart sounds: No murmur heard.     No gallop.   Pulmonary:      Effort: No respiratory distress.      Breath sounds: No stridor. Rhonchi and rales present. No wheezing.   Chest:      Chest wall: No tenderness.         ROS  Review of Systems   Respiratory:  Positive for cough and shortness of breath. Negative for wheezing and stridor.    Cardiovascular:  Negative for chest pain, palpitations and leg swelling.             Total time spent with patient greater than: 45 Minutes

## 2024-08-16 NOTE — PROGRESS NOTES
Hematology/Oncology Inpatient Progress Note    PATIENT NAME: Young Ames  : 1950  MRN: [id number]    Referring Provider: Dr. Felder  Reason for Consultation: Newly diagnosed lung cancer     Chief complaint: Shortness of breath     History of present illness:    Young Ames is a 73 y.o. male who presented to [facility] on 2024 with complaints of increasing shortness of breath.  Past medical history of unresolving pneumonia, tobacco use, recent unexplained weight loss.  Reported initially being diagnosed in February with pneumonia but had not ever seem to fully recover.  Had a repeat chest x-ray done in July that showed pleural effusion with residual pneumonia.  He reported he had been treated as an outpatient with cephalexin, Zithromax, and Augmentin without relief of his symptoms.  He was seen by his pulmonologist the day of presentation and was sent to the ED from their office for unresolving pneumonia for the past 2 months.  Pulmonology recommended CT of the chest and bronchoscopy.     2024 CT chest without contrast  -Large right pleural effusion with dense consolidation involving the right middle and right lower lobes and extensive nodular airspace disease throughout the right upper lobe consistent with multifocal pneumonia  -Asymmetric interlobular septal thickening throughout the right lung may relate to atypical infection or asymmetric pulmonary edema  -Emphysema with new nodules in the left lower lobe largest 6 mm  -Enlarged right paratracheal and right hilar lymph nodes likely reactive adenopathy     2024 ultrasound-guided thoracentesis  -Pleural fluid with malignant cells present consistent with non-small cell carcinoma     2024 CT chest protocol  -Negative for pulmonary embolism  -Small right pleural effusion, improved since 2024.  Status post thoracentesis.  -Question of a 5.5 cm right lower lobe cavitary lesion, could reflect focal necrotizing pneumonia  or malignant cavitary lesion.  It is obscured partially by adjacent consolidation and pleural fluid.  -Extensive abnormal reticular interstitial nodular thickening throughout the right hemithorax; mediastinal and hilar adenopathy     08/15/24  Hematology/Oncology was consulted for if the lung mass and malignant pleural effusion.  At the time of the consult WBC 18.78, hemoglobin 12.1, platelets 244,000.     8/16/24 - bronchoscopy and biopsy for more tissue sample.      Subjective   No new symptoms overnight.     ROS:  Review of Systems   Constitutional:  Negative for fatigue and fever.   HENT:  Negative for congestion and nosebleeds.    Eyes:  Negative for pain.   Respiratory:  Negative for cough and shortness of breath.    Cardiovascular:  Negative for chest pain.   Gastrointestinal:  Negative for abdominal pain, blood in stool, diarrhea, nausea and vomiting.   Endocrine: Negative for cold intolerance and heat intolerance.   Genitourinary:  Negative for difficulty urinating.   Musculoskeletal:  Negative for arthralgias.   Skin:  Negative for rash.   Neurological:  Negative for dizziness and headaches.   Hematological:  Does not bruise/bleed easily.   Psychiatric/Behavioral:  Negative for behavioral problems.         MEDICATIONS:    Scheduled Meds:  amiodarone, 200 mg, Oral, Q12H  atorvastatin, 10 mg, Oral, Daily  enoxaparin, 1 mg/kg, Subcutaneous, Q12H  insulin lispro, 2-9 Units, Subcutaneous, 4x Daily AC & at Bedtime  ipratropium-albuterol, 3 mL, Nebulization, Q6H While Awake - RT  metoprolol tartrate, 25 mg, Oral, Q12H  midodrine, 5 mg, Oral, TID AC  pantoprazole, 40 mg, Oral, QAM AC  [START ON 8/17/2024] predniSONE, 20 mg, Oral, Daily   Followed by  [START ON 8/19/2024] predniSONE, 10 mg, Oral, Daily  sodium chloride, 10 mL, Intravenous, Q12H  sodium chloride, 10 mL, Intravenous, Q12H  sodium chloride, 10 mL, Intravenous, Q12H  sodium chloride, 10 mL, Intravenous, Q12H  tamsulosin, 0.4 mg, Oral, Nightly      "  Continuous Infusions:  hold, 1 each  Pharmacy to Dose enoxaparin (LOVENOX),   sodium chloride, 50 mL/hr, Last Rate: Stopped (08/16/24 1116)  sodium chloride, 100 mL/hr, Last Rate: 100 mL/hr (08/15/24 1327)       PRN Meds:    acetaminophen    senna-docusate sodium **AND** polyethylene glycol **AND** bisacodyl **AND** bisacodyl    dextrose    dextrose    glucagon (human recombinant)    hold    LORazepam    nitroglycerin    ondansetron    Pharmacy to Dose enoxaparin (LOVENOX)    sodium chloride    sodium chloride    sodium chloride    sodium chloride     ALLERGIES:  No Known Allergies    Objective    VITALS:   /80 (BP Location: Left arm, Patient Position: Lying)   Pulse 105   Temp 97.8 °F (36.6 °C) (Oral)   Resp 19   Ht 177.8 cm (70\")   Wt 91.9 kg (202 lb 9.6 oz)   SpO2 93%   BMI 29.07 kg/m²     PHYSICAL EXAM: (performed by MD)  Physical Exam  Constitutional:       Appearance: Normal appearance.   HENT:      Head: Normocephalic and atraumatic.   Eyes:      Pupils: Pupils are equal, round, and reactive to light.   Cardiovascular:      Rate and Rhythm: Normal rate and regular rhythm.      Pulses: Normal pulses.      Heart sounds: Normal heart sounds. No murmur heard.  Pulmonary:      Effort: Pulmonary effort is normal.      Breath sounds: Normal breath sounds.   Abdominal:      General: There is no distension.      Palpations: Abdomen is soft. There is no mass.      Tenderness: There is no abdominal tenderness.   Musculoskeletal:         General: Normal range of motion.      Cervical back: Normal range of motion and neck supple.   Skin:     General: Skin is warm.   Neurological:      General: No focal deficit present.      Mental Status: He is alert.   Psychiatric:         Mood and Affect: Mood normal.           RECENT LABS:  Lab Results (last 24 hours)       Procedure Component Value Units Date/Time    Blood Culture - Blood, Arm, Left [[id number]]  (Normal) Collected: 08/12/24 1744    Specimen: Blood from " Arm, Left Updated: 08/16/24 1800     Blood Culture No growth at 4 days    Narrative:      Less than seven (7) mL's of blood was collected.  Insufficient quantity may yield false negative results.    POC Glucose 4x Daily Before Meals & at Bedtime [[id number]]  (Abnormal) Collected: 08/16/24 1755    Specimen: Blood Updated: 08/16/24 1758     Glucose 279 mg/dL      Comment: Serial Number: [id number]:  [id number]       Tissue Pathology Exam [[id number]] Collected: 08/16/24 1051    Specimen: Tissue from Lung, Right Lower Lobe Updated: 08/16/24 1414    Body Fluid Culture - Body Fluid, Pleural Cavity [[id number]] Collected: 08/13/24 1401    Specimen: Body Fluid from Pleural Cavity Updated: 08/16/24 0707     Body Fluid Culture No growth at 3 days     Gram Stain Moderate (3+) WBCs per low power field      No organisms seen    POC Glucose Once [[id number]]  (Abnormal) Collected: 08/15/24 2057    Specimen: Blood Updated: 08/15/24 2059     Glucose 173 mg/dL      Comment: Serial Number: [id number]:  [id number]       Blood Culture - Blood, Arm, Right [[id number]]  (Normal) Collected: 08/12/24 1834    Specimen: Blood from Arm, Right Updated: 08/15/24 1845     Blood Culture No growth at 3 days              IMAGING REVIEWED:  XR Chest 1 View    Result Date: 8/16/2024  Impression: 1.No visible pneumothorax. 2.Stable appearance of extensive interstitial and airspace opacities throughout the right lung with right pleural effusion, obscuring the known right lower lobe cavitary mass. Electronically Signed: Noemy Prince  8/16/2024 12:06 PM EDT  Workstation ID: [id number]    MRI Brain With & Without Contrast    Result Date: 8/15/2024  Impression: No evidence of intracranial metastatic disease. Some mild typical chronic and age-related changes are present. Otherwise essentially normal contrast-enhanced MRI of the brain. Electronically Signed: Jeremy Camilo MD  8/15/2024 4:15 PM EDT  Workstation ID: [id number]    CT Chest Without  Contrast Diagnostic    Result Date: 8/15/2024  Impression: 1. Persistent dense consolidation in right lower lobe suggesting pneumonia with suspicion of a cavitary right lower lobe mass partially obscured due to consolidation similar to prior study which may relate to malignancy. 2. Stable enlarged mediastinal and right hilar adenopathy which may relate to metastatic adenopathy or reactive nodes. 3. Diffuse interlobular septal thickening in right lung not significantly changed which may relate to asymmetric pulmonary edema or lymphangitic spread of malignancy. 4. Small to moderate right pleural effusion similar to prior study. 5. Several small nonspecific pulmonary nodules in the left lung unchanged largest 6 mm. Trace left pleural effusion. 6. Moderate left hydroureteronephrosis again noted, consider dedicated CT abdomen and pelvis for further assessment. Electronically Signed: Antwon Estrella MD  8/15/2024 3:48 PM EDT  Workstation ID: [id number]     Assessment & Plan     Patient is a 73-year-old male with recurrent unresolving pneumonia despite outpatient treatment that presented with increasing shortness of breath requiring thoracentesis for a right pleural effusion.  Pleural fluid is positive for malignancy and repeat imaging revealed a right lower lobe lesion.     Stage IV non-small cell lung cancer/malignant pleural effusion/right lower lobe lesion  -Status post thoracentesis on 8/13/2024 with pleural fluid positive for malignancy, non-small cell carcinoma  -Postthoracentesis CT showing a 5.5 cm right lower lobe lesion  Brain MRI negative for metastatic disease  -Plan to begin treatment as an outpatient with chemotherapy or chemoimmunotherapy pending NGS testing for targeted treatment options  Repeat bronch and tissue will be sent out for NGS testing. Will order that.   PET CT outpatient.

## 2024-08-16 NOTE — ANESTHESIA POSTPROCEDURE EVALUATION
Patient: Young Ames    Procedure Summary       Date: 08/16/24 Room / Location: [facility] ENDOSCOPY 3 / [facility] ENDOSCOPY    Anesthesia Start: 1029 Anesthesia Stop: 1119    Procedure: BRONCHOSCOPY WITH ION ROBOT WITH CRYO BIOPSY (Bronchus) Diagnosis:       Primary lung cancer, right      (Primary lung cancer, right [C34.91])    Surgeons: Kelvin Gonzalez MD Provider: Tommie Lucio MD    Anesthesia Type: general ASA Status: 3            Anesthesia Type: general    Vitals  Vitals Value Taken Time   /68 08/16/24 1212   Temp 97 °F (36.1 °C) 08/16/24 1125   Pulse 97 08/16/24 1213   Resp 22 08/16/24 1149   SpO2 94 % 08/16/24 1213   Vitals shown include unfiled device data.        Post Anesthesia Care and Evaluation    Patient location during evaluation: PACU  Patient participation: complete - patient participated  Level of consciousness: awake  Pain scale: See nurse's notes for pain score.  Pain management: adequate    Airway patency: patent  Anesthetic complications: No anesthetic complications  PONV Status: none  Cardiovascular status: acceptable  Respiratory status: acceptable and spontaneous ventilation  Hydration status: acceptable    Comments: Patient seen and examined postoperatively; vital signs stable; SpO2 greater than or equal to 90%; cardiopulmonary status stable; nausea/vomiting adequately controlled; pain adequately controlled; no apparent anesthesia complications; patient discharged from anesthesia care when discharge criteria were met

## 2024-08-16 NOTE — PROGRESS NOTES
CARDIOLOGY FOLLOW-UP PROGRESS NOTE      Reason for follow-up:    Atrial Fibrillation RVR      Attending: Evelyn Felder MD      Subjective .     No chest pain  Sinus rhythm  No new fevers   Remains on amiodarone,  on high risk medicines  Off digoxin and metoprolol at this time     ROS  Pertinent items are noted in HPI, all other systems reviewed and negative  Allergies: Patient has no known allergies.    Scheduled Meds:amiodarone, 200 mg, Oral, Q12H  atorvastatin, 10 mg, Oral, Daily  [Held by provider] enoxaparin, 1 mg/kg, Subcutaneous, Q12H  insulin lispro, 2-9 Units, Subcutaneous, 4x Daily AC & at Bedtime  ipratropium-albuterol, 3 mL, Nebulization, Q6H While Awake - RT  midodrine, 5 mg, Oral, TID AC  pantoprazole, 40 mg, Oral, QAM AC  predniSONE, 30 mg, Oral, Daily   Followed by  [START ON 8/17/2024] predniSONE, 20 mg, Oral, Daily   Followed by  [START ON 8/19/2024] predniSONE, 10 mg, Oral, Daily  sodium chloride, 10 mL, Intravenous, Q12H  sodium chloride, 10 mL, Intravenous, Q12H  sodium chloride, 10 mL, Intravenous, Q12H  sodium chloride, 10 mL, Intravenous, Q12H  tamsulosin, 0.4 mg, Oral, Nightly        Continuous Infusions:hold, 1 each  Pharmacy to Dose enoxaparin (LOVENOX),   sodium chloride, 50 mL/hr, Last Rate: 50 mL/hr (08/13/24 1033)  sodium chloride, 100 mL/hr, Last Rate: 100 mL/hr (08/15/24 1327)        PRN Meds:.  acetaminophen    senna-docusate sodium **AND** polyethylene glycol **AND** bisacodyl **AND** bisacodyl    dextrose    dextrose    glucagon (human recombinant)    hold    LORazepam    nitroglycerin    ondansetron    Pharmacy to Dose enoxaparin (LOVENOX)    sodium chloride    sodium chloride    sodium chloride    sodium chloride    Objective     VITAL SIGNS  Patient Vitals for the past 24 hrs:   BP Temp Temp src Pulse Resp SpO2 Weight   08/16/24 0635 -- -- -- 86 12 98 % --   08/16/24 0632 -- -- -- 93 16 93 % --   08/16/24 0541 126/75 97.5 °F (36.4 °C) Oral 94 19 94 % --   08/16/24 0500 --  "-- -- -- -- -- 91.9 kg (202 lb 9.6 oz)   08/16/24 0154 112/69 97.7 °F (36.5 °C) Oral 86 19 92 % --   08/15/24 2100 105/58 97.8 °F (36.6 °C) Oral 99 17 91 % --   08/15/24 1917 -- -- -- 97 18 98 % --   08/15/24 1912 -- -- -- 103 18 94 % --   08/15/24 1810 118/66 97.4 °F (36.3 °C) Axillary 105 23 -- --   08/15/24 1138 -- -- -- 82 10 94 % --   08/15/24 1135 -- -- -- 84 18 91 % --   08/15/24 1009 116/74 97.5 °F (36.4 °C) Oral 77 20 92 % --        Flowsheet Rows      Flowsheet Row First Filed Value   Admission Height 177.8 cm (70\") Documented at 08/12/2024 1626   Admission Weight 82.3 kg (181 lb 7 oz) Documented at 08/12/2024 1626            Body mass index is 29.07 kg/m².      Intake/Output Summary (Last 24 hours) at 8/16/2024 0809  Last data filed at 8/16/2024 0541  Gross per 24 hour   Intake 240 ml   Output 250 ml   Net -10 ml        TELEMETRY:     SR    Physical Exam:  Vitals reviewed.   Constitutional:       General: Not in acute distress.     Appearance: Normal appearance. Well-developed.   Eyes:      Pupils: Pupils are equal, round, and reactive to light.   HENT:      Head: Normocephalic and atraumatic.   Neck:      Vascular: No JVD.   Pulmonary:      Effort: Pulmonary effort is normal.      Breath sounds: Decreased breath sounds present.   Cardiovascular:      Normal rate. Regular rhythm.   Pulses:     Intact distal pulses.   Edema:     Peripheral edema absent.   Abdominal:      General: There is no distension.      Palpations: Abdomen is soft.      Tenderness: There is no abdominal tenderness.   Musculoskeletal: Normal range of motion.      Cervical back: Normal range of motion and neck supple. Skin:     General: Skin is warm and dry.   Neurological:      Mental Status: Alert and oriented to person, place, and time.        Scribed findings above    Results Review:   I reviewed the patient's new clinical results.    CBC    Results from last 7 days   Lab Units 08/15/24  0029 08/14/24  0128 08/13/24  0335 " "08/12/24  1732   WBC 10*3/mm3 18.78* 19.18* 8.96 13.47*   HEMOGLOBIN g/dL 12.1* 13.0 14.3 15.8   PLATELETS 10*3/mm3 244 244 251 252     BMP   Results from last 7 days   Lab Units 08/15/24  0029 08/14/24  1000 08/14/24  0128 08/13/24  0335 08/12/24  2030   SODIUM mmol/L 143 139 138 135* 137   POTASSIUM mmol/L 4.2 4.3 4.2 4.4 4.4   CHLORIDE mmol/L 112* 107 107 106 104   CO2 mmol/L 21.1* 19.4* 19.8* 18.9* 20.8*   BUN mg/dL 40* 36* 35* 27* 23   CREATININE mg/dL 1.31* 1.30* 1.39* 1.29* 1.28*   GLUCOSE mg/dL 147* 256* 223* 225* 167*   MAGNESIUM mg/dL  --  2.0  --   --   --      Cr Clearance Estimated Creatinine Clearance: 56.4 mL/min (A) (by C-G formula based on SCr of 1.31 mg/dL (H)).  Coag   Results from last 7 days   Lab Units 08/12/24  1834   INR  1.00     HbA1C No results found for: \"HGBA1C\"  Blood Glucose   Glucose   Date/Time Value Ref Range Status   08/15/2024 2057 173 (H) 70 - 105 mg/dL Final     Comment:     Serial Number: [id number]:  [id number]   08/15/2024 1636 185 (H) 70 - 105 mg/dL Final     Comment:     Serial Number: [id number]:  [id number]   08/15/2024 1105 109 (H) 70 - 105 mg/dL Final     Comment:     Serial Number: [id number]:  [id number]   08/15/2024 0722 104 70 - 105 mg/dL Final     Comment:     Serial Number: [id number]:  [id number]   08/14/2024 2006 158 (H) 70 - 105 mg/dL Final     Comment:     Serial Number: [id number]:  [id number]   08/14/2024 1728 218 (H) 70 - 105 mg/dL Final     Comment:     Serial Number: [id number]:  [id number]   08/14/2024 0933 256 (H) 70 - 105 mg/dL Final     Comment:     Serial Number: [id number]:  [id number]     Infection   Results from last 7 days   Lab Units 08/13/24  1401 08/13/24  0820 08/12/24  1834 08/12/24  1744   BLOODCX   --   --  No growth at 3 days No growth at 3 days   BODYFLDCX  No growth at 3 days  --   --   --    RESPCX   --  Light growth (2+) Normal respiratory scottie. No S. aureus or Pseudomonas aeruginosa " "detected. Final report.  --   --      CMP   Results from last 7 days   Lab Units 08/15/24  0029 08/14/24 1000 08/14/24  0128 08/13/24 0335 08/12/24 2030   SODIUM mmol/L 143 139 138 135* 137   POTASSIUM mmol/L 4.2 4.3 4.2 4.4 4.4   CHLORIDE mmol/L 112* 107 107 106 104   CO2 mmol/L 21.1* 19.4* 19.8* 18.9* 20.8*   BUN mg/dL 40* 36* 35* 27* 23   CREATININE mg/dL 1.31* 1.30* 1.39* 1.29* 1.28*   GLUCOSE mg/dL 147* 256* 223* 225* 167*   ALBUMIN g/dL  --   --   --   --  3.3*   BILIRUBIN mg/dL  --   --   --   --  0.2   ALK PHOS U/L  --   --   --   --  55   AST (SGOT) U/L  --   --   --   --  39   ALT (SGPT) U/L  --   --   --   --  20     ABG      UA      BHAVYA  No results found for: \"POCMETH\", \"POCAMPHET\", \"POCBARBITUR\", \"POCBENZO\", \"POCCOCAINE\", \"POCOPIATES\", \"POCOXYCODO\", \"POCPHENCYC\", \"POCPROPOXY\", \"POCTHC\", \"POCTRICYC\"  [facility] Labs   Results from last 7 days   Lab Units 08/15/24  0029 08/14/24 1000 08/14/24 0128 08/13/24 0335 08/12/24 2030 08/12/24  1834 08/12/24  1732   INR   --   --   --   --   --  1.00  --    HEMOGLOBIN g/dL 12.1*  --  13.0 14.3  --   --  15.8   PLATELETS 10*3/mm3 244  --  244 251  --   --  252   CREATININE mg/dL 1.31* 1.30* 1.39* 1.29* 1.28*  --   --      Radiology(recent) MRI Brain With & Without Contrast    Result Date: 8/15/2024  Impression: No evidence of intracranial metastatic disease. Some mild typical chronic and age-related changes are present. Otherwise essentially normal contrast-enhanced MRI of the brain. Electronically Signed: Jeremy Camilo MD  8/15/2024 4:15 PM EDT  Workstation ID: [id number]    CT Chest Without Contrast Diagnostic    Result Date: 8/15/2024  Impression: 1. Persistent dense consolidation in right lower lobe suggesting pneumonia with suspicion of a cavitary right lower lobe mass partially obscured due to consolidation similar to prior study which may relate to malignancy. 2. Stable enlarged mediastinal and right hilar adenopathy which may relate to metastatic adenopathy " or reactive nodes. 3. Diffuse interlobular septal thickening in right lung not significantly changed which may relate to asymmetric pulmonary edema or lymphangitic spread of malignancy. 4. Small to moderate right pleural effusion similar to prior study. 5. Several small nonspecific pulmonary nodules in the left lung unchanged largest 6 mm. Trace left pleural effusion. 6. Moderate left hydroureteronephrosis again noted, consider dedicated CT abdomen and pelvis for further assessment. Electronically Signed: Antwon Estrella MD  8/15/2024 3:48 PM EDT  Workstation ID: [id number]    CT Angiogram Chest Pulmonary Embolism    Result Date: 8/14/2024  Impression: 1. No pulmonary embolism. 2. Small right pleural effusion, improved since 8/12/2024. The patient is status post thoracentesis. 3. Question of 5.5 cm right lower lobe cavitary lesion, could reflect focal necrotizing pneumonia or even malignant cavitary lesion. It is partially obscured by adjacent consolidation and pleural fluid. 4. Extensive abnormal reticular interstitial nodular thickening throughout the right hemithorax. This, coupled with the presence of mediastinal and hilar adenopathy,, along with right basilar consolidation, could reflect changes of pneumonia, but malignancy cannot be excluded. Consider short-term CT chest follow-up after trial of antibiotic therapy and, if unresolved, bronchoscopic correlation should be considered. 5. Small noncalcified left lower lobe lung nodules are present. Attention at surveillance imaging. 6. Mild concentric pericardial effusion. 7. Left hydronephrosis persists. No obstructing etiology is seen. Consider dedicated CT abdomen and pelvis follow-up. 8. Uncomplicated cholelithiasis. Electronically Signed: Mar Aly MD  8/14/2024 3:07 PM EDT  Workstation ID: [id number]    XR Chest 1 View    Result Date: 8/14/2024  Impression: Right arm approach PICC tip terminates at the level of the lower SVC. No pneumothorax. 2. Extensive right  lung interstitial and alveolar disease and small right pleural effusion, unchanged. Electronically Signed: Mar Aly MD  8/14/2024 1:01 PM EDT  Workstation ID: [id number]    XR Chest 1 View    Result Date: 8/14/2024  Impression: Interval placement of defibrillator pads. Redemonstration of extensive airspace disease of the right lung. Electronically Signed: Gabino Carey MD  8/14/2024 10:27 AM EDT  Workstation ID: [id number]     Imaging Results (Last 24 Hours)       Procedure Component Value Units Date/Time    MRI Brain With & Without Contrast [[id number]] Collected: 08/15/24 1611     Updated: 08/15/24 1617    Narrative:      MRI BRAIN W WO CONTRAST    Date of Exam: 8/15/2024 3:51 PM EDT    Indication: lung cancer staging.     Comparison: None available.    Technique:  Routine multiplanar/multisequence sequence images of the brain were obtained before and after the uneventful administration of Prohance.      Findings:  No acute infarct is present on diffusion weighted sequences. Midline structures are normal and the craniocervical junction appears satisfactory. Age-related changes are present with mild generalized volume loss and typical T2 hyperintense subcortical and   pontine white matter changes, nonspecific but favored to reflect sequela of chronic microvascular ischemia. There is otherwise no evidence of intracranial hemorrhage, mass or mass effect. The ventricles are normal in size and configuration. The orbits   are normal. The paranasal sinuses are clear. There is no abnormal brain parenchymal enhancement.      Impression:      Impression:  No evidence of intracranial metastatic disease.     Some mild typical chronic and age-related changes are present. Otherwise essentially normal contrast-enhanced MRI of the brain.        Electronically Signed: Jeremy Camilo MD    8/15/2024 4:15 PM EDT    Workstation ID: [id number]    CT Chest Without Contrast Diagnostic [[id number]] Collected: 08/15/24 1535     Updated:  08/15/24 1551    Narrative:      CT CHEST WO CONTRAST DIAGNOSTIC    Date of Exam: 8/15/2024 2:07 PM EDT    Indication: Bronchoscopy, pleural fluid consistent with malignancy    Comparison: CT chest with contrast 8/14/2024    Technique: Axial CT images were obtained of the chest without contrast administration.  Sagittal and coronal reconstructions were performed.  Automated exposure control and iterative reconstruction methods were used.    Findings:  Soft tissues of the lower neck are without acute abnormality. There is a right sided PICC catheter with the tip at the mid SVC. Heart size normal. Coronary artery calcifications are present. Small pericardial effusion. Enlarged right paratracheal,   subcarinal and right hilar lymph nodes again noted better assessed on the recent contrast-enhanced study. No new adenopathy. No axillary adenopathy.    There is a persistent small to moderate right-sided pleural effusion. Trace effusion on the left. There is diffuse interlobular septal thickening throughout the right lung. Groundglass opacities involving the right lung not significantly changed in the   prior study. There is redemonstration of dense consolidation in the dependent right lower lobe which may relate to pneumonia or malignancy. There is again question of the partially cavitary right lower lobe mass partially obscured due to consolidation   with representative measurement of 6 cm (2/69). Patchy areas of airspace disease involving the right lower lobe and right middle lobe consistent with pneumonia.    No consolidation in the left lung. Mild emphysema. Several small pulmonary nodules again noted in the left lung for example representative nodule at the left lower lobe measuring 6 mm (4/65), at the left lower lobe measuring 3 mm (4/65), and the left   lower lobe abutting the pleura measuring 5 mm (4/56).    Upper abdomen demonstrates no acute abnormality in the liver, spleen, adrenal glands, or pancreas.  Cholelithiasis. There is moderate left hydroureteronephrosis partially imaged similar to the prior study. No free fluid in the upper abdomen. Small   sclerotic lesion in the T12 vertebral body likely bone island measuring 5 mm. Negative for acute fracture.      Impression:      Impression:  1. Persistent dense consolidation in right lower lobe suggesting pneumonia with suspicion of a cavitary right lower lobe mass partially obscured due to consolidation similar to prior study which may relate to malignancy.  2. Stable enlarged mediastinal and right hilar adenopathy which may relate to metastatic adenopathy or reactive nodes.  3. Diffuse interlobular septal thickening in right lung not significantly changed which may relate to asymmetric pulmonary edema or lymphangitic spread of malignancy.  4. Small to moderate right pleural effusion similar to prior study.  5. Several small nonspecific pulmonary nodules in the left lung unchanged largest 6 mm. Trace left pleural effusion.  6. Moderate left hydroureteronephrosis again noted, consider dedicated CT abdomen and pelvis for further assessment.      Electronically Signed: Antwon Estrella MD    8/15/2024 3:48 PM EDT    Workstation ID: [id number]            Results from last 7 days   Lab Units 08/12/24  2305   HSTROP T ng/L 53*       EKG         I personally viewed and interpreted the patient's EKG/Telemetry data:        ECHOCARDIOGRAM:     Results for orders placed during the hospital encounter of 08/12/24    Adult Transthoracic Echo Complete W/ Cont if Necessary Per Protocol    Interpretation Summary    Left ventricular ejection fraction appears to be 66 - 70%.    Left ventricular diastolic function was indeterminate.    The left atrial cavity is mildly dilated.    Left atrial volume is mildly increased.    Estimated right ventricular systolic pressure from tricuspid regurgitation is mildly elevated (35-45 mmHg).    Moderate pulmonary hypertension is present.        STRESS  MYOVIEW:      CARDIAC CATHETERIZATION:      OTHER:         Assessment & Plan            Large pleural effusion    Pneumonia due to infectious organism    Primary lung cancer, right        ASSESSMENT:      Paroxysmal Atrial fibrillation with intermittent RVR  Now converted to SR  Ventricular rates initially over 200  Now started on IV amiodarone  BP remains soft  [id number] equals 2  Will need to consider anticoagulation     Right-sided pneumonia with large right pleural effusion, malignant non-small cell lung cancer  Status post bronchoscopy and thoracentesis  2 L removed pleural fluid reported to be clear and red  Repeat as needed management per pulmonary     Hypertension  Currently soft  Follow and trend blood pressures  Continue midodrine  Off antihypertensives     Nonobstructive coronary artery disease  Cardiac catheterization in January 2024  20 to 30% mid L RCA lesion.  Otherwise normal coronary anatomy.      ======================================  Normal EF 60% no regional abnormalities by echo  Mildly elevated pulmonary pressures, no significant valvular abnormality    Amiodarone has been transitioned to po twice daily  Continue midodrine for blood pressure support  Off digoxin and metoprolol    Does not appear with active clinical heart failure volume overload    Transition to oral a/c when no additional procedures planned, Lovenox on hold  DVT prophylaxis recommended SCDs versus de-escalation of prophylactic Lovenox  EF 60-65%; no pericardial effusion  Will follow    Pending bronchoscopy today for further pathological analysis of lung tissue, genotyping    New diagnosis non-small cell lung cancer, malignant pleural effusion       Carlos Pavon MD, PhD           Carlos Pavon MD  08/16/24  08:09 EDT

## 2024-08-16 NOTE — PLAN OF CARE
Problem: Adult Inpatient Plan of Care  Goal: Plan of Care Review  Outcome: Ongoing, Progressing  Goal: Patient-Specific Goal (Individualized)  Outcome: Ongoing, Progressing  Goal: Absence of Hospital-Acquired Illness or Injury  Outcome: Ongoing, Progressing  Intervention: Identify and Manage Fall Risk  Recent Flowsheet Documentation  Taken 8/16/2024 1600 by Grisel Culp RN  Safety Promotion/Fall Prevention:   activity supervised   assistive device/personal items within reach   clutter free environment maintained   safety round/check completed   room organization consistent   nonskid shoes/slippers when out of bed  Taken 8/16/2024 1400 by Grisel Culp RN  Safety Promotion/Fall Prevention:   activity supervised   assistive device/personal items within reach   clutter free environment maintained   safety round/check completed   room organization consistent   nonskid shoes/slippers when out of bed  Intervention: Prevent Skin Injury  Recent Flowsheet Documentation  Taken 8/16/2024 1600 by Grisel Culp RN  Body Position: position changed independently  Taken 8/16/2024 1400 by Grisel Culp RN  Body Position: position changed independently  Intervention: Prevent and Manage VTE (Venous Thromboembolism) Risk  Recent Flowsheet Documentation  Taken 8/16/2024 1600 by Grisel Culp RN  Activity Management: ambulated to bathroom  Taken 8/16/2024 1400 by Grisel Culp RN  Activity Management: ambulated to bathroom  Intervention: Prevent Infection  Recent Flowsheet Documentation  Taken 8/16/2024 1600 by Grisel Culp RN  Infection Prevention:   rest/sleep promoted   hand hygiene promoted  Taken 8/16/2024 1400 by Grisel Culp RN  Infection Prevention:   rest/sleep promoted   hand hygiene promoted  Goal: Optimal Comfort and Wellbeing  Outcome: Ongoing, Progressing  Goal: Readiness for Transition of Care  Outcome: Ongoing, Progressing     Problem: Infection  Goal: Absence of Infection Signs and Symptoms  Outcome:  Ongoing, Progressing     Problem: Adjustment to Illness (Sepsis/Septic Shock)  Goal: Optimal Coping  Outcome: Ongoing, Progressing     Problem: Bleeding (Sepsis/Septic Shock)  Goal: Absence of Bleeding  Outcome: Ongoing, Progressing     Problem: Glycemic Control Impaired (Sepsis/Septic Shock)  Goal: Blood Glucose Level Within Desired Range  Outcome: Ongoing, Progressing     Problem: Infection Progression (Sepsis/Septic Shock)  Goal: Absence of Infection Signs and Symptoms  Outcome: Ongoing, Progressing  Intervention: Initiate Sepsis Management  Recent Flowsheet Documentation  Taken 8/16/2024 1600 by Grisel Culp RN  Infection Prevention:   rest/sleep promoted   hand hygiene promoted  Taken 8/16/2024 1400 by Grisel Culp RN  Infection Prevention:   rest/sleep promoted   hand hygiene promoted  Intervention: Promote Recovery  Recent Flowsheet Documentation  Taken 8/16/2024 1600 by Grisel Culp RN  Activity Management: ambulated to bathroom  Taken 8/16/2024 1400 by Grisel Culp RN  Activity Management: ambulated to bathroom     Problem: Nutrition Impaired (Sepsis/Septic Shock)  Goal: Optimal Nutrition Intake  Outcome: Ongoing, Progressing     Problem: Fall Injury Risk  Goal: Absence of Fall and Fall-Related Injury  Outcome: Ongoing, Progressing  Intervention: Promote Injury-Free Environment  Recent Flowsheet Documentation  Taken 8/16/2024 1600 by Grisel Culp RN  Safety Promotion/Fall Prevention:   activity supervised   assistive device/personal items within reach   clutter free environment maintained   safety round/check completed   room organization consistent   nonskid shoes/slippers when out of bed  Taken 8/16/2024 1400 by Grisel Culp RN  Safety Promotion/Fall Prevention:   activity supervised   assistive device/personal items within reach   clutter free environment maintained   safety round/check completed   room organization consistent   nonskid shoes/slippers when out of bed   Goal Outcome  Evaluation:   Patient had bronch this am with biopsies. Patient to start on anticoags tonight,monitoring overnight and potential d/c tomorrow. VSS. Patient is now on room air; sometimes needs 2L 02 HS. PET scan to be outpatient. BP stable this evening and held midodrine. Family bedside and supportive of patient. Nicotine gum bedside. Up ad moise. Patient and family updated on plan.

## 2024-08-16 NOTE — OP NOTE
Bronchoscopy Procedure Note    Timeout was done appropriately by staff    Procedure:  ION/ Robotic Bronchoscopy, Diagnostic  RLL mass biopsies x 9    Preoperative Diagnosis:  Lung Mass/CA    Postoperative Diagnosis:     Adequate sample, NSCLCA    Anesthesia: General Anesthesia    Procedure Details: Patient was consented for the procedure with all risks and benefits of the procedure explained in detail.  Patient was given the opportunity to ask questions and all concerns were answered.  The bronchocope was inserted into the main airway via the endotracheal tube. An anatomical survey was done of the main airways and the subsegmental bronchus to at least the first subsegmental level of all five lobes of both lungs.  The findings are reported below.  A bronchoalveolar lavage was performed using aliquots of normal saline instilled into the airways then aspirated back.      Findings:      Started with routine bronchoscopy which was introduced through the endotracheal tube all airways were visualized to at least the first subsegment level of all 5 lobes of both lungs.  Airways were of normal size and caliber.  No endobronchial lesions seen.    Mucoid secretions noted    We retracted the regular bronchoscope    Ion robotic catheter was introduced and completed mapping    Ion catheter was directed to the lesion RLL    Location confirmation with radial probe signal and fluoroscopy    Utilizing cryoprobe 1.1 multiple transbronchial biopsies  x9    was performed    No evidence of pneumothorax on fluoroscopy    After confirming no significant bleeding    We changed to regular bronchoscope and inspected airway    Patient tolerated procedure well        Estimated Blood Loss:  Minimal           Specimens:  Sent Tissue                Complications:  None; patient tolerated the procedure well.           Disposition: PACU - hemodynamically stable.      Patient tolerated the procedure well.    Kelvin Gonzalez MD  8/16/2024  11:19 EDT

## 2024-08-16 NOTE — ANESTHESIA PROCEDURE NOTES
Airway  Urgency: elective    Date/Time: 8/16/2024 10:40 AM  Airway not difficult    General Information and Staff    Patient location during procedure: OR  Anesthesiologist: Tommie Lucio MD  CRNA/CAA: Kathie Malik CRNA    Indications and Patient Condition  Indications for airway management: airway protection    Preoxygenated: yes (pt pre-O2 with 100% O2)  Mask difficulty assessment: 0 - not attempted    Final Airway Details  Final airway type: endotracheal airway      Successful airway: ETT  Cuffed: yes   Successful intubation technique: RSI and video laryngoscopy  Facilitating devices/methods: intubating stylet and anterior pressure/BURP  Endotracheal tube insertion site: oral  Blade: Stallings  Blade size: 4  ETT size (mm): 8.5  Cormack-Lehane Classification: grade I - full view of glottis  Placement verified by: chest auscultation and capnometry   Cuff volume (mL): 7  Measured from: lips  ETT/EBT  to lips (cm): 23  Number of attempts at approach: 1  Assessment: lips, teeth, and gum same as pre-op and atraumatic intubation    Additional Comments  ATOETx1. No change in dentition.

## 2024-08-16 NOTE — PLAN OF CARE
Goal Outcome Evaluation:  Plan of Care Reviewed With: patient        Progress: improving          Pt doing well throughout the shift. Pt on 2L NC when asleep and RA when awake. He has had no complaints of pain. Plan is for a bronch at 1000 with Dr. Gonzalez. Pt has been npo since midnight.

## 2024-08-17 ENCOUNTER — READMISSION MANAGEMENT (OUTPATIENT)
Dept: CALL CENTER | Facility: HOSPITAL | Age: 74
End: 2024-08-17
Payer: MEDICARE

## 2024-08-17 VITALS
DIASTOLIC BLOOD PRESSURE: 72 MMHG | SYSTOLIC BLOOD PRESSURE: 122 MMHG | HEIGHT: 70 IN | TEMPERATURE: 97.7 F | OXYGEN SATURATION: 93 % | HEART RATE: 88 BPM | RESPIRATION RATE: 18 BRPM | WEIGHT: 206.79 LBS | BODY MASS INDEX: 29.6 KG/M2

## 2024-08-17 LAB
ALBUMIN SERPL-MCNC: 3 G/DL (ref 3.5–5.2)
ALBUMIN/GLOB SERPL: 1.3 G/DL
ALP SERPL-CCNC: 43 U/L (ref 39–117)
ALT SERPL W P-5'-P-CCNC: 41 U/L (ref 1–41)
ANION GAP SERPL CALCULATED.3IONS-SCNC: 9.5 MMOL/L (ref 5–15)
AST SERPL-CCNC: 29 U/L (ref 1–40)
BACTERIA SPEC AEROBE CULT: NORMAL
BACTERIA SPEC AEROBE CULT: NORMAL
BASOPHILS # BLD AUTO: 0.01 10*3/MM3 (ref 0–0.2)
BASOPHILS NFR BLD AUTO: 0.1 % (ref 0–1.5)
BILIRUB SERPL-MCNC: 0.3 MG/DL (ref 0–1.2)
BUN SERPL-MCNC: 30 MG/DL (ref 8–23)
BUN/CREAT SERPL: 29.7 (ref 7–25)
CALCIUM SPEC-SCNC: 8.5 MG/DL (ref 8.6–10.5)
CHLORIDE SERPL-SCNC: 111 MMOL/L (ref 98–107)
CO2 SERPL-SCNC: 21.5 MMOL/L (ref 22–29)
CREAT SERPL-MCNC: 1.01 MG/DL (ref 0.76–1.27)
DEPRECATED RDW RBC AUTO: 45.1 FL (ref 37–54)
EGFRCR SERPLBLD CKD-EPI 2021: 78 ML/MIN/1.73
EOSINOPHIL # BLD AUTO: 0.04 10*3/MM3 (ref 0–0.4)
EOSINOPHIL NFR BLD AUTO: 0.3 % (ref 0.3–6.2)
ERYTHROCYTE [DISTWIDTH] IN BLOOD BY AUTOMATED COUNT: 13.2 % (ref 12.3–15.4)
GLOBULIN UR ELPH-MCNC: 2.3 GM/DL
GLUCOSE BLDC GLUCOMTR-MCNC: 133 MG/DL (ref 70–105)
GLUCOSE BLDC GLUCOMTR-MCNC: 213 MG/DL (ref 70–105)
GLUCOSE SERPL-MCNC: 124 MG/DL (ref 65–99)
HCT VFR BLD AUTO: 37.3 % (ref 37.5–51)
HGB BLD-MCNC: 12.5 G/DL (ref 13–17.7)
IMM GRANULOCYTES # BLD AUTO: 0.09 10*3/MM3 (ref 0–0.05)
IMM GRANULOCYTES NFR BLD AUTO: 0.7 % (ref 0–0.5)
LYMPHOCYTES # BLD AUTO: 1 10*3/MM3 (ref 0.7–3.1)
LYMPHOCYTES NFR BLD AUTO: 7.9 % (ref 19.6–45.3)
MAGNESIUM SERPL-MCNC: 2 MG/DL (ref 1.6–2.4)
MCH RBC QN AUTO: 31.3 PG (ref 26.6–33)
MCHC RBC AUTO-ENTMCNC: 33.5 G/DL (ref 31.5–35.7)
MCV RBC AUTO: 93.3 FL (ref 79–97)
MONOCYTES # BLD AUTO: 0.82 10*3/MM3 (ref 0.1–0.9)
MONOCYTES NFR BLD AUTO: 6.5 % (ref 5–12)
NEUTROPHILS NFR BLD AUTO: 10.65 10*3/MM3 (ref 1.7–7)
NEUTROPHILS NFR BLD AUTO: 84.5 % (ref 42.7–76)
NRBC BLD AUTO-RTO: 0 /100 WBC (ref 0–0.2)
PLATELET # BLD AUTO: 230 10*3/MM3 (ref 140–450)
PMV BLD AUTO: 9.7 FL (ref 6–12)
POTASSIUM SERPL-SCNC: 3.6 MMOL/L (ref 3.5–5.2)
PROT SERPL-MCNC: 5.3 G/DL (ref 6–8.5)
QT INTERVAL: 387 MS
QT INTERVAL: 407 MS
QTC INTERVAL: 464 MS
QTC INTERVAL: 473 MS
RBC # BLD AUTO: 4 10*6/MM3 (ref 4.14–5.8)
S PN DA SERO 19F IGG SER IA-MCNC: 3.1 UG/ML
S PNEUM DA 14 IGG SER IA-MCNC: 0.2 UG/ML
S PNEUM DA 18C IGG SER IA-MCNC: 1.4 UG/ML
S PNEUM DA 23F IGG SER IA-MCNC: 0.3 UG/ML
S PNEUM DA 4 IGG SER IA-MCNC: 0.2 UG/ML
S PNEUM DA 6B IGG SER IA-MCNC: <0.1 UG/ML
S PNEUM DA 9V IGG SER IA-MCNC: 3.9 UG/ML
SODIUM SERPL-SCNC: 142 MMOL/L (ref 136–145)
WBC NRBC COR # BLD AUTO: 12.61 10*3/MM3 (ref 3.4–10.8)

## 2024-08-17 PROCEDURE — 99232 SBSQ HOSP IP/OBS MODERATE 35: CPT | Performed by: INTERNAL MEDICINE

## 2024-08-17 PROCEDURE — 94799 UNLISTED PULMONARY SVC/PX: CPT

## 2024-08-17 PROCEDURE — 80053 COMPREHEN METABOLIC PANEL: CPT | Performed by: FAMILY MEDICINE

## 2024-08-17 PROCEDURE — 94761 N-INVAS EAR/PLS OXIMETRY MLT: CPT

## 2024-08-17 PROCEDURE — 25010000002 ENOXAPARIN PER 10 MG: Performed by: NURSE PRACTITIONER

## 2024-08-17 PROCEDURE — 93005 ELECTROCARDIOGRAM TRACING: CPT | Performed by: NURSE PRACTITIONER

## 2024-08-17 PROCEDURE — 63710000001 PREDNISONE PER 1 MG: Performed by: INTERNAL MEDICINE

## 2024-08-17 PROCEDURE — 83735 ASSAY OF MAGNESIUM: CPT | Performed by: FAMILY MEDICINE

## 2024-08-17 PROCEDURE — 94664 DEMO&/EVAL PT USE INHALER: CPT

## 2024-08-17 PROCEDURE — 82948 REAGENT STRIP/BLOOD GLUCOSE: CPT | Performed by: INTERNAL MEDICINE

## 2024-08-17 PROCEDURE — 85025 COMPLETE CBC W/AUTO DIFF WBC: CPT | Performed by: FAMILY MEDICINE

## 2024-08-17 RX ORDER — AMIODARONE HYDROCHLORIDE 200 MG/1
200 TABLET ORAL EVERY 12 HOURS SCHEDULED
Qty: 30 TABLET | Refills: 0 | Status: SHIPPED | OUTPATIENT
Start: 2024-08-17

## 2024-08-17 RX ORDER — MIDODRINE HYDROCHLORIDE 5 MG/1
5 TABLET ORAL
Qty: 20 TABLET | Refills: 0 | Status: SHIPPED | OUTPATIENT
Start: 2024-08-17

## 2024-08-17 RX ORDER — PREDNISONE 10 MG/1
TABLET ORAL
Qty: 4 TABLET | Refills: 0 | Status: SHIPPED | OUTPATIENT
Start: 2024-08-18 | End: 2024-08-20

## 2024-08-17 RX ORDER — AMOXICILLIN AND CLAVULANATE POTASSIUM 500; 125 MG/1; MG/1
1 TABLET, FILM COATED ORAL 2 TIMES DAILY
Qty: 10 TABLET | Refills: 0 | Status: SHIPPED | OUTPATIENT
Start: 2024-08-17

## 2024-08-17 RX ADMIN — PANTOPRAZOLE SODIUM 40 MG: 40 TABLET, DELAYED RELEASE ORAL at 09:23

## 2024-08-17 RX ADMIN — Medication 10 ML: at 09:24

## 2024-08-17 RX ADMIN — AMIODARONE HYDROCHLORIDE 200 MG: 200 TABLET ORAL at 09:23

## 2024-08-17 RX ADMIN — ENOXAPARIN SODIUM 90 MG: 100 INJECTION SUBCUTANEOUS at 09:23

## 2024-08-17 RX ADMIN — METOPROLOL TARTRATE 25 MG: 25 TABLET, FILM COATED ORAL at 09:23

## 2024-08-17 RX ADMIN — ATORVASTATIN CALCIUM 10 MG: 10 TABLET, FILM COATED ORAL at 09:23

## 2024-08-17 RX ADMIN — PREDNISONE 20 MG: 20 TABLET ORAL at 09:24

## 2024-08-17 RX ADMIN — IPRATROPIUM BROMIDE AND ALBUTEROL SULFATE 3 ML: .5; 3 SOLUTION RESPIRATORY (INHALATION) at 11:49

## 2024-08-17 RX ADMIN — IPRATROPIUM BROMIDE AND ALBUTEROL SULFATE 3 ML: .5; 3 SOLUTION RESPIRATORY (INHALATION) at 06:45

## 2024-08-17 RX ADMIN — Medication 10 ML: at 09:23

## 2024-08-17 NOTE — PROCEDURES
Exercise Oximetry    Patient Name:Young Ames   MRN: [id number]   Date: 08/17/24             ROOM AIR BASELINE   SpO2% 95 on room air sitting up in chair   Heart Rate    Blood Pressure      EXERCISE ON ROOM AIR SpO2% EXERCISE ON O2 @  LPM SpO2%   1 MINUTE  95 1 MINUTE    2 MINUTES  95 2 MINUTES    3 MINUTES  93 3 MINUTES    4 MINUTES  92 4 MINUTES    5 MINUTES  92 5 MINUTES    6 MINUTES  92 6 MINUTES               Distance Walked   Distance Walked   Dyspnea (Callie Scale)   Dyspnea (Callie Scale)   Fatigue (Callie Scale)   Fatigue (Callie Scale)   SpO2% Post Exercise   SpO2% Post Exercise   HR Post Exercise   HR Post Exercise   Time to Recovery   Time to Recovery     Comments: Sao2 on room air at rest in chair 95, pt.walked at bedside without desaturation.

## 2024-08-17 NOTE — PROGRESS NOTES
Hematology/Oncology Inpatient Progress Note    PATIENT NAME: Young Ames  : 1950  MRN: [id number]    Referring Provider: Dr. Felder  Reason for Consultation: Newly diagnosed lung cancer     Chief complaint: Shortness of breath     History of present illness:    Young Ames is a 73 y.o. male who presented to [facility] on 2024 with complaints of increasing shortness of breath.  Past medical history of unresolving pneumonia, tobacco use, recent unexplained weight loss.  Reported initially being diagnosed in February with pneumonia but had not ever seem to fully recover.  Had a repeat chest x-ray done in July that showed pleural effusion with residual pneumonia.  He reported he had been treated as an outpatient with cephalexin, Zithromax, and Augmentin without relief of his symptoms.  He was seen by his pulmonologist the day of presentation and was sent to the ED from their office for unresolving pneumonia for the past 2 months.  Pulmonology recommended CT of the chest and bronchoscopy.     2024 CT chest without contrast  -Large right pleural effusion with dense consolidation involving the right middle and right lower lobes and extensive nodular airspace disease throughout the right upper lobe consistent with multifocal pneumonia  -Asymmetric interlobular septal thickening throughout the right lung may relate to atypical infection or asymmetric pulmonary edema  -Emphysema with new nodules in the left lower lobe largest 6 mm  -Enlarged right paratracheal and right hilar lymph nodes likely reactive adenopathy     2024 ultrasound-guided thoracentesis  -Pleural fluid with malignant cells present consistent with non-small cell carcinoma     2024 CT chest protocol  -Negative for pulmonary embolism  -Small right pleural effusion, improved since 2024.  Status post thoracentesis.  -Question of a 5.5 cm right lower lobe cavitary lesion, could reflect focal necrotizing pneumonia  or malignant cavitary lesion.  It is obscured partially by adjacent consolidation and pleural fluid.  -Extensive abnormal reticular interstitial nodular thickening throughout the right hemithorax; mediastinal and hilar adenopathy     08/15/24  Hematology/Oncology was consulted for if the lung mass and malignant pleural effusion.  At the time of the consult WBC 18.78, hemoglobin 12.1, platelets 244,000.     8/16/24 - bronchoscopy and biopsy for more tissue sample.  MRI brain negative for metastatic disease.    Subjective   No new symptoms overnight    ROS:  Review of Systems   Constitutional:  Negative for fatigue and fever.   HENT:  Negative for congestion and nosebleeds.    Eyes:  Negative for pain.   Respiratory:  Negative for cough and shortness of breath.    Cardiovascular:  Negative for chest pain.   Gastrointestinal:  Negative for abdominal pain, blood in stool, diarrhea, nausea and vomiting.   Endocrine: Negative for cold intolerance and heat intolerance.   Genitourinary:  Negative for difficulty urinating.   Musculoskeletal:  Negative for arthralgias.   Skin:  Negative for rash.   Neurological:  Negative for dizziness and headaches.   Hematological:  Does not bruise/bleed easily.   Psychiatric/Behavioral:  Negative for behavioral problems.         MEDICATIONS:    Scheduled Meds:  amiodarone, 200 mg, Oral, Q12H  atorvastatin, 10 mg, Oral, Daily  enoxaparin, 1 mg/kg, Subcutaneous, Q12H  insulin lispro, 2-9 Units, Subcutaneous, 4x Daily AC & at Bedtime  ipratropium-albuterol, 3 mL, Nebulization, Q6H While Awake - RT  metoprolol tartrate, 25 mg, Oral, Q12H  midodrine, 5 mg, Oral, TID AC  pantoprazole, 40 mg, Oral, QAM AC  predniSONE, 20 mg, Oral, Daily   Followed by  [START ON 8/19/2024] predniSONE, 10 mg, Oral, Daily  sodium chloride, 10 mL, Intravenous, Q12H  sodium chloride, 10 mL, Intravenous, Q12H  sodium chloride, 10 mL, Intravenous, Q12H  sodium chloride, 10 mL, Intravenous, Q12H  tamsulosin, 0.4 mg,  "Oral, Nightly       Continuous Infusions:  hold, 1 each  Pharmacy to Dose enoxaparin (LOVENOX),   sodium chloride, 50 mL/hr, Last Rate: Stopped (08/16/24 1116)  sodium chloride, 100 mL/hr, Last Rate: 100 mL/hr (08/15/24 1327)       PRN Meds:    acetaminophen    senna-docusate sodium **AND** polyethylene glycol **AND** bisacodyl **AND** bisacodyl    dextrose    dextrose    glucagon (human recombinant)    hold    LORazepam    nitroglycerin    ondansetron    Pharmacy to Dose enoxaparin (LOVENOX)    sodium chloride    sodium chloride    sodium chloride    sodium chloride     ALLERGIES:  No Known Allergies    Objective    VITALS:   /72 (BP Location: Left arm, Patient Position: Sitting)   Pulse 88   Temp 97.7 °F (36.5 °C) (Oral)   Resp 18   Ht 177.8 cm (70\")   Wt 93.8 kg (206 lb 12.7 oz)   SpO2 93%   BMI 29.67 kg/m²     PHYSICAL EXAM: (performed by MD)  Physical Exam  Constitutional:       Appearance: Normal appearance.   HENT:      Head: Normocephalic and atraumatic.   Eyes:      Pupils: Pupils are equal, round, and reactive to light.   Cardiovascular:      Rate and Rhythm: Normal rate and regular rhythm.      Pulses: Normal pulses.      Heart sounds: No murmur heard.  Pulmonary:      Effort: Pulmonary effort is normal.      Breath sounds: Normal breath sounds.   Abdominal:      General: There is no distension.      Palpations: Abdomen is soft. There is no mass.      Tenderness: There is no abdominal tenderness.   Musculoskeletal:         General: Normal range of motion.      Cervical back: Normal range of motion.   Skin:     General: Skin is warm.   Neurological:      General: No focal deficit present.      Mental Status: He is alert.   Psychiatric:         Mood and Affect: Mood normal.           RECENT LABS:  Lab Results (last 24 hours)       Procedure Component Value Units Date/Time    POC Glucose 4x Daily Before Meals & at Bedtime [[id number]]  (Abnormal) Collected: 08/17/24 1106    Specimen: Blood " Updated: 08/17/24 1108     Glucose 213 mg/dL      Comment: Serial Number: [id number]:  [id number]       Magnesium [[id number]]  (Normal) Collected: 08/17/24 0929    Specimen: Blood Updated: 08/17/24 1010     Magnesium 2.0 mg/dL     Comprehensive Metabolic Panel [[id number]]  (Abnormal) Collected: 08/17/24 0929    Specimen: Blood Updated: 08/17/24 1010     Glucose 124 mg/dL      BUN 30 mg/dL      Creatinine 1.01 mg/dL      Sodium 142 mmol/L      Potassium 3.6 mmol/L      Chloride 111 mmol/L      CO2 21.5 mmol/L      Calcium 8.5 mg/dL      Total Protein 5.3 g/dL      Albumin 3.0 g/dL      ALT (SGPT) 41 U/L      AST (SGOT) 29 U/L      Alkaline Phosphatase 43 U/L      Total Bilirubin 0.3 mg/dL      Globulin 2.3 gm/dL      A/G Ratio 1.3 g/dL      BUN/Creatinine Ratio 29.7     Anion Gap 9.5 mmol/L      eGFR 78.0 mL/min/1.73     Narrative:      GFR Normal >60  Chronic Kidney Disease <60  Kidney Failure <15    The GFR formula is only valid for adults with stable renal function between ages 18 and 70.    CBC & Differential [[id number]]  (Abnormal) Collected: 08/17/24 0929    Specimen: Blood Updated: 08/17/24 0944    Narrative:      The following orders were created for panel order CBC & Differential.  Procedure                               Abnormality         Status                     ---------                               -----------         ------                     CBC Auto Differential[[id number]]        Abnormal            Final result                 Please view results for these tests on the individual orders.    CBC Auto Differential [[id number]]  (Abnormal) Collected: 08/17/24 0929    Specimen: Blood Updated: 08/17/24 0944     WBC 12.61 10*3/mm3      RBC 4.00 10*6/mm3      Hemoglobin 12.5 g/dL      Hematocrit 37.3 %      MCV 93.3 fL      MCH 31.3 pg      MCHC 33.5 g/dL      RDW 13.2 %      RDW-SD 45.1 fl      MPV 9.7 fL      Platelets 230 10*3/mm3      Neutrophil % 84.5 %      Lymphocyte % 7.9 %      Monocyte %  6.5 %      Eosinophil % 0.3 %      Basophil % 0.1 %      Immature Grans % 0.7 %      Neutrophils, Absolute 10.65 10*3/mm3      Lymphocytes, Absolute 1.00 10*3/mm3      Monocytes, Absolute 0.82 10*3/mm3      Eosinophils, Absolute 0.04 10*3/mm3      Basophils, Absolute 0.01 10*3/mm3      Immature Grans, Absolute 0.09 10*3/mm3      nRBC 0.0 /100 WBC     Body Fluid Culture - Body Fluid, Pleural Cavity [[id number]] Collected: 08/13/24 1401    Specimen: Body Fluid from Pleural Cavity Updated: 08/17/24 0835     Body Fluid Culture No growth at 4 days     Gram Stain Moderate (3+) WBCs per low power field      No organisms seen    POC Glucose 4x Daily Before Meals & at Bedtime [[id number]]  (Abnormal) Collected: 08/17/24 0755    Specimen: Blood Updated: 08/17/24 0757     Glucose 133 mg/dL      Comment: Serial Number: [id number]:  [id number]       POC Glucose Once [[id number]]  (Abnormal) Collected: 08/16/24 2004    Specimen: Blood Updated: 08/16/24 2006     Glucose 203 mg/dL      Comment: Serial Number: [id number]:  [id number]       Blood Culture - Blood, Arm, Right [[id number]]  (Normal) Collected: 08/12/24 1834    Specimen: Blood from Arm, Right Updated: 08/16/24 1845     Blood Culture No growth at 4 days    Blood Culture - Blood, Arm, Left [[id number]]  (Normal) Collected: 08/12/24 1744    Specimen: Blood from Arm, Left Updated: 08/16/24 1800     Blood Culture No growth at 4 days    Narrative:      Less than seven (7) mL's of blood was collected.  Insufficient quantity may yield false negative results.    POC Glucose 4x Daily Before Meals & at Bedtime [[id number]]  (Abnormal) Collected: 08/16/24 1755    Specimen: Blood Updated: 08/16/24 1758     Glucose 279 mg/dL      Comment: Serial Number: [id number]:  [id number]       Tissue Pathology Exam [[id number]] Collected: 08/16/24 1051    Specimen: Tissue from Lung, Right Lower Lobe Updated: 08/16/24 1414              IMAGING REVIEWED:  XR Chest 1 View    Result Date:  8/16/2024  Impression: 1.No visible pneumothorax. 2.Stable appearance of extensive interstitial and airspace opacities throughout the right lung with right pleural effusion, obscuring the known right lower lobe cavitary mass. Electronically Signed: Noemy Prince  8/16/2024 12:06 PM EDT  Workstation ID: [id number]    MRI Brain With & Without Contrast    Result Date: 8/15/2024  Impression: No evidence of intracranial metastatic disease. Some mild typical chronic and age-related changes are present. Otherwise essentially normal contrast-enhanced MRI of the brain. Electronically Signed: Jeremy Camilo MD  8/15/2024 4:15 PM EDT  Workstation ID: [id number]    CT Chest Without Contrast Diagnostic    Result Date: 8/15/2024  Impression: 1. Persistent dense consolidation in right lower lobe suggesting pneumonia with suspicion of a cavitary right lower lobe mass partially obscured due to consolidation similar to prior study which may relate to malignancy. 2. Stable enlarged mediastinal and right hilar adenopathy which may relate to metastatic adenopathy or reactive nodes. 3. Diffuse interlobular septal thickening in right lung not significantly changed which may relate to asymmetric pulmonary edema or lymphangitic spread of malignancy. 4. Small to moderate right pleural effusion similar to prior study. 5. Several small nonspecific pulmonary nodules in the left lung unchanged largest 6 mm. Trace left pleural effusion. 6. Moderate left hydroureteronephrosis again noted, consider dedicated CT abdomen and pelvis for further assessment. Electronically Signed: Antwon Estrella MD  8/15/2024 3:48 PM EDT  Workstation ID: [id number]     Assessment & Plan     Patient is a 73-year-old male with recurrent unresolving pneumonia despite outpatient treatment that presented with increasing shortness of breath requiring thoracentesis for a right pleural effusion.  Pleural fluid is positive for malignancy and repeat imaging revealed a right lower lobe  lesion.     Stage IV non-small cell lung cancer/malignant pleural effusion/right lower lobe lesion  -Status post thoracentesis on 8/13/2024 with pleural fluid positive for malignancy, non-small cell carcinoma  -Postthoracentesis CT showing a 5.5 cm right lower lobe lesion  Brain MRI negative for metastatic disease  -Plan to begin treatment as an outpatient with chemotherapy or chemoimmunotherapy pending NGS testing for targeted treatment options  Repeat bronch and tissue will be sent out for NGS testing. Will order that.   PET CT outpatient and follow up.   - d/w Dr. Gonzalez, good tissue samples obtained for testing. Caris will be ordered.will get guardant 360 in clinic as well.    Leukocytosis   Likely related to inflammation, reactive  Monitor.    Ok to discharge when appropriate per primary team. Will be seen outpatient by Dr. Tim for further treatment based on PDL1/NGS testing.

## 2024-08-17 NOTE — PLAN OF CARE
Problem: Adult Inpatient Plan of Care  Goal: Absence of Hospital-Acquired Illness or Injury  Intervention: Identify and Manage Fall Risk  Recent Flowsheet Documentation  Taken 8/17/2024 0600 by Arabella Guzman LPN  Safety Promotion/Fall Prevention:   safety round/check completed   room organization consistent   assistive device/personal items within reach   clutter free environment maintained  Taken 8/17/2024 0400 by Arabella Guzman LPN  Safety Promotion/Fall Prevention:   room organization consistent   safety round/check completed   clutter free environment maintained   assistive device/personal items within reach  Taken 8/17/2024 0200 by Arabella Guzman LPN  Safety Promotion/Fall Prevention:   safety round/check completed   room organization consistent   assistive device/personal items within reach   clutter free environment maintained  Taken 8/17/2024 0000 by Arabella Guzman LPN  Safety Promotion/Fall Prevention:   safety round/check completed   room organization consistent   clutter free environment maintained  Taken 8/16/2024 2200 by Arabella Guzman LPN  Safety Promotion/Fall Prevention:   safety round/check completed   room organization consistent   assistive device/personal items within reach   clutter free environment maintained  Taken 8/16/2024 2000 by Arabella Guzman LPN  Safety Promotion/Fall Prevention:   safety round/check completed   room organization consistent   clutter free environment maintained   assistive device/personal items within reach     Problem: Adult Inpatient Plan of Care  Goal: Absence of Hospital-Acquired Illness or Injury  Intervention: Prevent Skin Injury  Recent Flowsheet Documentation  Taken 8/17/2024 0400 by Arabella Guzman LPN  Body Position: position changed independently  Taken 8/17/2024 0000 by Arabella Guzman LPN  Body Position: position changed independently  Skin Protection: adhesive use limited  Taken 8/16/2024 2000 by Arabella Guzman LPN  Body  Position: position changed independently  Skin Protection: adhesive use limited     Problem: Adult Inpatient Plan of Care  Goal: Absence of Hospital-Acquired Illness or Injury  Intervention: Prevent Infection  Recent Flowsheet Documentation  Taken 8/17/2024 0600 by Arabella Guzman LPN  Infection Prevention:   rest/sleep promoted   environmental surveillance performed   hand hygiene promoted   single patient room provided  Taken 8/17/2024 0400 by Arabella Guzman LPN  Infection Prevention:   single patient room provided   rest/sleep promoted   hand hygiene promoted  Taken 8/17/2024 0200 by Arabella Guzman LPN  Infection Prevention:   rest/sleep promoted   environmental surveillance performed   hand hygiene promoted   single patient room provided  Taken 8/17/2024 0000 by Arabella Guzman LPN  Infection Prevention:   single patient room provided   rest/sleep promoted   hand hygiene promoted  Taken 8/16/2024 2200 by Arabella Guzman LPN  Infection Prevention:   rest/sleep promoted   environmental surveillance performed   hand hygiene promoted   single patient room provided  Taken 8/16/2024 2000 by Arabella Guzman LPN  Infection Prevention:   single patient room provided   rest/sleep promoted   hand hygiene promoted   Goal Outcome Evaluation:

## 2024-08-17 NOTE — PROGRESS NOTES
Daily Progress Note        Large pleural effusion    Pneumonia due to infectious organism    Primary lung cancer, right      Assessment    Bronchoscopy right lower lobe brushing and washing positive for non-small cell lung cancer     Initially presented for non resolving Right side pneumonia not responding to multiple courses of antibiotics as an outpatient    CT PE protocol negative for pulmonary embolism completed 08/14/2024    S/p Bronchoscopy completed 8/13/2024   no endobronchial lesions, no mucopurulent secretions, right lung washing revealed yellowish fluid material   Right lower lobe endobronchial brushing x 1 was done      moderate-sized right pleural effusion  Status post thoracentesis by IR on 8/13/2024  Removal of 2 L of red-colored fluid  Fluid analysis elevated nucleated cells unclassified  pH 8, glucose 182, , protein 3.7 compatible with exudate  cultures pending      Ex-smoker quit in 1999 with 25-pack-year     15 pound weight loss     [place] with exposure to agent of orange    Respiratory viral panel was negative  Legionella antigen urine strep antigen negative        Recommendations:     S/p ION robotic bronch 8/16/24  adequat tissue samples will send for NGS testing  Pasthology on site confirmed Dx of NSCLCA    Broad-spectrum antibiotics Zosyn, may downgrade to Augmentine    Resume lovenox    Steroids wean    Chest x-ray 8/14/2024      CT chest 8/12/2024        LOS: 5 days     Subjective         Objective     Vital signs for last 24 hours:  Vitals:    08/17/24 0632 08/17/24 0645 08/17/24 0648 08/17/24 1002   BP: 132/76   122/72   BP Location: Left arm   Left arm   Patient Position: Lying   Sitting   Pulse: 91 80 82 102   Resp: 20 16 10 15   Temp: 97.2 °F (36.2 °C)   97.7 °F (36.5 °C)   TempSrc: Oral   Oral   SpO2: 91% 94% 95% 94%   Weight: 93.8 kg (206 lb 12.7 oz)      Height:           Intake/Output last 3 shifts:  I/O last 3 completed shifts:  In: 1280 [P.O.:480;  I.V.:800]  Out: 250 [Urine:250]  Intake/Output this shift:  I/O this shift:  In: 240 [P.O.:240]  Out: -       Radiology  Imaging Results (Last 24 Hours)       Procedure Component Value Units Date/Time    XR Chest 1 View [[id number]] Collected: 08/16/24 1201     Updated: 08/16/24 1208    Narrative:      XR CHEST 1 VW    Date of Exam: 8/16/2024 11:50 AM EDT    Indication: post ion bronchoscopy    Comparison: CT chest 8/15/2024, AP chest x-ray 8/14/2024    Findings:  No pneumothorax is identified. Extensive interstitial and airspace opacities throughout the right lung appear stable. Blunting of the right lateral costophrenic angle is unchanged. Known right lower lobe cavitary mass is obscured. Left lung is clear. Tip   of the right upper extremity PICC terminates in the SVC. Cardiomediastinal contours are stable.      Impression:      Impression:  1.No visible pneumothorax.  2.Stable appearance of extensive interstitial and airspace opacities throughout the right lung with right pleural effusion, obscuring the known right lower lobe cavitary mass.      Electronically Signed: Noemy Prince    8/16/2024 12:06 PM EDT    Workstation ID: [id number]    FL < 1 Hour [[id number]] Resulted: 08/16/24 1155     Updated: 08/16/24 1155    Narrative:      This procedure was auto-finalized with no dictation required.            Labs:  Results from last 7 days   Lab Units 08/17/24  0929   WBC 10*3/mm3 12.61*   HEMOGLOBIN g/dL 12.5*   HEMATOCRIT % 37.3*   PLATELETS 10*3/mm3 230     Results from last 7 days   Lab Units 08/15/24  0029 08/13/24  0335 08/12/24  2030   SODIUM mmol/L 143   < > 137   POTASSIUM mmol/L 4.2   < > 4.4   CHLORIDE mmol/L 112*   < > 104   CO2 mmol/L 21.1*   < > 20.8*   BUN mg/dL 40*   < > 23   CREATININE mg/dL 1.31*   < > 1.28*   CALCIUM mg/dL 8.0*   < > 8.6   BILIRUBIN mg/dL  --   --  0.2   ALK PHOS U/L  --   --  55   ALT (SGPT) U/L  --   --  20   AST (SGOT) U/L  --   --  39   GLUCOSE mg/dL 147*   < > 167*    < > =  values in this interval not displayed.         Results from last 7 days   Lab Units 08/12/24 2030   ALBUMIN g/dL 3.3*     Results from last 7 days   Lab Units 08/12/24  2305 08/12/24 2030   HSTROP T ng/L 53* 57*     Results from last 7 days   Lab Units 08/12/24  1834   STEVEN  Positive*     Results from last 7 days   Lab Units 08/14/24  1000   MAGNESIUM mg/dL 2.0     Results from last 7 days   Lab Units 08/12/24  1834   INR  1.00     Results from last 7 days   Lab Units 08/14/24  1000   TSH uIU/mL 0.626           Meds:   SCHEDULE  amiodarone, 200 mg, Oral, Q12H  atorvastatin, 10 mg, Oral, Daily  enoxaparin, 1 mg/kg, Subcutaneous, Q12H  insulin lispro, 2-9 Units, Subcutaneous, 4x Daily AC & at Bedtime  ipratropium-albuterol, 3 mL, Nebulization, Q6H While Awake - RT  metoprolol tartrate, 25 mg, Oral, Q12H  midodrine, 5 mg, Oral, TID AC  pantoprazole, 40 mg, Oral, QAM AC  predniSONE, 20 mg, Oral, Daily   Followed by  [START ON 8/19/2024] predniSONE, 10 mg, Oral, Daily  sodium chloride, 10 mL, Intravenous, Q12H  sodium chloride, 10 mL, Intravenous, Q12H  sodium chloride, 10 mL, Intravenous, Q12H  sodium chloride, 10 mL, Intravenous, Q12H  tamsulosin, 0.4 mg, Oral, Nightly      Infusions  hold, 1 each  Pharmacy to Dose enoxaparin (LOVENOX),   sodium chloride, 50 mL/hr, Last Rate: Stopped (08/16/24 1116)  sodium chloride, 100 mL/hr, Last Rate: 100 mL/hr (08/15/24 1327)      PRNs    acetaminophen    senna-docusate sodium **AND** polyethylene glycol **AND** bisacodyl **AND** bisacodyl    dextrose    dextrose    glucagon (human recombinant)    hold    LORazepam    nitroglycerin    ondansetron    Pharmacy to Dose enoxaparin (LOVENOX)    sodium chloride    sodium chloride    sodium chloride    sodium chloride    Physical Exam:  Physical Exam  Cardiovascular:      Heart sounds: No murmur heard.     No gallop.   Pulmonary:      Effort: No respiratory distress.      Breath sounds: No stridor. Rhonchi and rales present. No  wheezing.   Chest:      Chest wall: No tenderness.         ROS  Review of Systems   Respiratory:  Positive for cough and shortness of breath. Negative for wheezing and stridor.    Cardiovascular:  Negative for chest pain, palpitations and leg swelling.             Total time spent with patient greater than: 45 Minutes

## 2024-08-17 NOTE — DISCHARGE SUMMARY
Date of Discharge:  8/17/2024    Discharge Diagnosis:     Right sided pneumonia  Large right pleural effusion  Small Cell Lung Cancer  Atrial Fibrillation  Hypertension  BPH  GERD    Presenting Problem/History of Present Illness  Active Hospital Problems    Diagnosis  POA    **Large pleural effusion [J90]  Yes    Primary lung cancer, right [C34.91]  Unknown    Pneumonia due to infectious organism [J18.9]  Unknown      Resolved Hospital Problems   No resolved problems to display.          Hospital Course    Patient presented to [facility] ED on 8/12/2024 with complaints of increasing shortness of breath that had progressed over the previous week.  He failed outpatient antibiotics x 3 to acute include cephalexin, Zithromax, and Augmentin.  Patient follows with pulmonology who recommended he report to ED for chest CT and IV antibiotics, as well as bronchoscopy.  Patient had CT that showed large right pleural effusion with dense consolidation in the right middle and lower lobe.  He was also found to have new nodules, as well as enlarged paratracheal and right hilar lymph nodes.  While in ED he was found to be in atrial fibrillation with RVR and was started on antiarrhythmic.  Cardiology was consulted.  Patient did convert to sinus rhythm.  He also underwent thoracentesis with 2000 cc removed.  Bronchoscopy was performed on 8/13/2024.  Brushing was performed that were positive for malignant cells consistent with non-small cell carcinoma.  On 8/16/2024 patient had Ion bronchoscopy with tissue sampling.  Pathology onsite confirmed diagnosis of NSCLCA.  Oncology was consulted.  He is scheduled to see  on 9/3/2024.  He will need an outpatient PET scan and determine plan of action.  He is also scheduled with cardiology, Dr. Pavon on 8/27/2024.  Patient will discharge with oral antibiotic to include Augmentin.  He will also discharged on anticoagulation Eliquis 5 mg twice a day.  Patient is  hemodynamically stable at time of discharge.  Above plan was discussed with patient, wife, and daughter who is a nurse.  All questions were answered.    Procedures Performed    Procedure(s):  BRONCHOSCOPY WITH BRONCHIAL WASHING AND BRONCHIAL BRUSHING  -------------------    Procedure(s):  BRONCHOSCOPY WITH ION ROBOT WITH CRYO BIOPSY  -------------------  08/17 1158 Walking Oximetry    Consults:   Consults       Date and Time Order Name Status Description    8/14/2024  4:24 PM Hematology & Oncology Inpatient Consult Completed     8/14/2024  9:53 AM Inpatient Cardiology Consult Completed     8/14/2024  9:50 AM Inpatient Cardiology Consult Completed             Pertinent Test Results:    Lab Results (most recent)       Procedure Component Value Units Date/Time    POC Glucose 4x Daily Before Meals & at Bedtime [[id number]]  (Abnormal) Collected: 08/17/24 1106    Specimen: Blood Updated: 08/17/24 1108     Glucose 213 mg/dL      Comment: Serial Number: [id number]:  [id number]       Magnesium [[id number]]  (Normal) Collected: 08/17/24 0929    Specimen: Blood Updated: 08/17/24 1010     Magnesium 2.0 mg/dL     Comprehensive Metabolic Panel [[id number]]  (Abnormal) Collected: 08/17/24 0929    Specimen: Blood Updated: 08/17/24 1010     Glucose 124 mg/dL      BUN 30 mg/dL      Creatinine 1.01 mg/dL      Sodium 142 mmol/L      Potassium 3.6 mmol/L      Chloride 111 mmol/L      CO2 21.5 mmol/L      Calcium 8.5 mg/dL      Total Protein 5.3 g/dL      Albumin 3.0 g/dL      ALT (SGPT) 41 U/L      AST (SGOT) 29 U/L      Alkaline Phosphatase 43 U/L      Total Bilirubin 0.3 mg/dL      Globulin 2.3 gm/dL      A/G Ratio 1.3 g/dL      BUN/Creatinine Ratio 29.7     Anion Gap 9.5 mmol/L      eGFR 78.0 mL/min/1.73     Narrative:      GFR Normal >60  Chronic Kidney Disease <60  Kidney Failure <15    The GFR formula is only valid for adults with stable renal function between ages 18 and 70.    CBC & Differential [[id number]]  (Abnormal)  Collected: 08/17/24 0929    Specimen: Blood Updated: 08/17/24 0944    Narrative:      The following orders were created for panel order CBC & Differential.  Procedure                               Abnormality         Status                     ---------                               -----------         ------                     CBC Auto Differential[[id number]]        Abnormal            Final result                 Please view results for these tests on the individual orders.    CBC Auto Differential [[id number]]  (Abnormal) Collected: 08/17/24 0929    Specimen: Blood Updated: 08/17/24 0944     WBC 12.61 10*3/mm3      RBC 4.00 10*6/mm3      Hemoglobin 12.5 g/dL      Hematocrit 37.3 %      MCV 93.3 fL      MCH 31.3 pg      MCHC 33.5 g/dL      RDW 13.2 %      RDW-SD 45.1 fl      MPV 9.7 fL      Platelets 230 10*3/mm3      Neutrophil % 84.5 %      Lymphocyte % 7.9 %      Monocyte % 6.5 %      Eosinophil % 0.3 %      Basophil % 0.1 %      Immature Grans % 0.7 %      Neutrophils, Absolute 10.65 10*3/mm3      Lymphocytes, Absolute 1.00 10*3/mm3      Monocytes, Absolute 0.82 10*3/mm3      Eosinophils, Absolute 0.04 10*3/mm3      Basophils, Absolute 0.01 10*3/mm3      Immature Grans, Absolute 0.09 10*3/mm3      nRBC 0.0 /100 WBC     Body Fluid Culture - Body Fluid, Pleural Cavity [[id number]] Collected: 08/13/24 1401    Specimen: Body Fluid from Pleural Cavity Updated: 08/17/24 0835     Body Fluid Culture No growth at 4 days     Gram Stain Moderate (3+) WBCs per low power field      No organisms seen    POC Glucose 4x Daily Before Meals & at Bedtime [[id number]]  (Abnormal) Collected: 08/17/24 0755    Specimen: Blood Updated: 08/17/24 0757     Glucose 133 mg/dL      Comment: Serial Number: [id number]:  [id number]       Blood Culture - Blood, Arm, Right [[id number]]  (Normal) Collected: 08/12/24 1834    Specimen: Blood from Arm, Right Updated: 08/16/24 1845     Blood Culture No growth at 4 days    Blood Culture - Blood,  Arm, Left [[id number]]  (Normal) Collected: 24 1744    Specimen: Blood from Arm, Left Updated: 24 1800     Blood Culture No growth at 4 days    Narrative:      Less than seven (7) mL's of blood was collected.  Insufficient quantity may yield false negative results.    Tissue Pathology Exam [[id number]] Collected: 24 1051    Specimen: Tissue from Lung, Right Lower Lobe Updated: 24 1414    STEVEN by IFA, Reflex 9-biomarkers profile [[id number]]  (Abnormal) Collected: 24    Specimen: Blood Updated: 08/15/24 1511     STEVEN Positive     Comment:                                      Negative   <1:80                                       Borderline  1:80                                       Positive   >1:80        Please note Comment     Comment: STEVEN Multiplex methodology was designed to detect up to 11 antibodies  of the 100+ antibodies that may be detected by STEVEN IFA methodology.       Narrative:      Performed at:  [address] [place] [address]  [id number]  : Yogi Venegas PhD, Phone:  [phone]    TAYLOR+DNA/DS+Antich+Centro+FA.. [[id number]] Collected: 24    Specimen: Blood Updated: 08/15/24 1511     Speckled Pattern 1:80     Comment: ICAP nomenclature: AC-2,4,5,29        Note: (Reference) Comment     Comment: Pattern              Potential Disease Association  -------------  ---------------------------------------------  Homogeneous    Systemic Lupus Erythematosus, Drug Induced                 Systemic Lupus Erythematosus, Chronic                 Autoimmune hepatitis, Juvenile Idiopathic                 Arthritis  -------------  ---------------------------------------------  Speckled       Sjogren Syndrome, Systemic Lupus                 Erythematosus, Subacute Cutaneous Lupus,                  Lupus, Congenital Heart Block,                 Mixed Connective Tissue Disease,                 Scleroderma-diffuse, Scleroderma-Autoimmune                  Myositis Overlap Syndrome, Systemic Lupus                 Xopdoywxfqzgi-Hzbqlqykdpx-Llkzoobuoe                 Myositis Overlap Syndrome, Systemic                 Autoimmune Rheumatic Disease,                 Undifferentiated Connective Tissue Disease  -------------  ---------------------------------------------  Nucleolar      Systemic Sclerosis, Scleroderma-Autoimmune                 Myositis Overlap Syndrome, Sjogren                 Syndrome, Raynaud phenomenon, Pulmonary                 Arterial Hypertension, Systemic Autoimmune                 Rheumatic Disease, Cancer  -------------  ---------------------------------------------  Centromere     Scleroderma-CREST, Limited Cutaneous SSc,                 Raynaud's Phenomenon, Primary Biliary                 Cholangitis  -------------  ---------------------------------------------  Nuclear Dot    Primary Biliary Cholangitis  -------------  ---------------------------------------------  Nuclear        Primary Biliary Cholangitis, Autoimmune  Membrane       Hepatitis/Liver disease, Systemic Autoimmune                 Rheumatic Disease, Autoimmune Cytopenias,                 Linear Scleroderma, Antiphospholipid Syndrome  -------------  ---------------------------------------------        Anti-DNA (DS) Ab Qn <1 IU/mL      Comment:                                    Negative      <5                                     Equivocal  5 - 9                                     Positive      >9        RNP Antibodies <0.2 AI      Hernandez Antibodies <0.2 AI      Antiscleroderma-70 Antibodies <0.2 AI      Sjogren's Anti-SS-A <0.2 AI      Sjogren's Anti-SS-B <0.2 AI      Antichromatin Antibodies <0.2 AI      LEN-1 IgG <0.2 AI      Anti-Centromere B Antibodies <0.2 AI      See below: Comment     Comment: Autoantibody                       Disease Association  ------------------------------------------------------------                          Condition                   Frequency  ---------------------   ------------------------   ---------  Antinuclear Antibody,    SLE, mixed connective  Direct (STEVEN-D)           tissue diseases  ---------------------   ------------------------   ---------  dsDNA                    SLE                        40 - 60%  ---------------------   ------------------------   ---------  Chromatin                Drug induced SLE                90%                           SLE                        48 - 97%  ---------------------   ------------------------   ---------  SSA (Ro)                 SLE                        25 - 35%                           Sjogren's Syndrome         40 - 70%                            Lupus                 100%  ---------------------   ------------------------   ---------  SSB (La)                 SLE                             10%                           Sjogren's Syndrome              30%  ---------------------   -----------------------    ---------  Sm (anti-Smith)          SLE                        15 - 30%  ---------------------   -----------------------    ---------  RNP                      Mixed Connective Tissue                           Disease                         95%  (U1 nRNP,                SLE                        30 - 50%  anti-ribonucleoprotein)  Polymyositis and/or                           Dermatomyositis                 20%  ---------------------   ------------------------   ---------  Scl-70 (antiDNA          Scleroderma (diffuse)      20 - 35%  topoisomerase)           Crest                           13%  ---------------------   ------------------------   ---------  Gely-1                     Polymyositis and/or                           Dermatomyositis            20 - 40%  ---------------------   ------------------------   ---------  Centromere B             Scleroderma - Crest                           variant                         80%       Narrative:      Performed at:  01 - [facility]  [address]  [id number]  : Yogi Venegas PhD, Phone:  [phone]    Respiratory Culture - Wash, Lung, R [[id number]] Collected: 08/13/24 0820    Specimen: Wash from Lung, R Updated: 08/15/24 0934     Respiratory Culture Light growth (2+) Normal respiratory scottie. No S. aureus or Pseudomonas aeruginosa detected. Final report.     Gram Stain Moderate (3+) WBCs seen      Few (2+) Bronchial epithelial cells      Few (2+) Gram positive cocci in clusters      Rare (1+) Gram negative bacilli    ANCA Panel [[id number]] Collected: 08/12/24 1834    Specimen: Blood Updated: 08/15/24 0819     ANTI-MPO ANTIBODIES <0.2 units      ANTI-PR3 ANTIBODIES <0.2 units      C-ANCA <1:20 titer      P-ANCA <1:20 titer      Comment: The presence of positive fluorescence exhibiting P-ANCA or C-ANCA  patterns alone is not specific for the diagnosis of Wegener's  Granulomatosis (WG) or microscopic polyangiitis. Decisions about  treatment should not be based solely on ANCA IFA results.  The  International ANCA Group Consensus recommends follow up testing of  positive sera with both OH-3 and MPO-ANCA enzyme immunoassays. As  many as 5% serum samples are positive only by EIA.  Ref. AM J Clin Pathol 1999;111:507-513.        Atypical pANCA <1:20 titer      Comment: The atypical pANCA pattern has been observed in a significant  percentage of patients with ulcerative colitis, primary sclerosing  cholangitis and autoimmune hepatitis.       Narrative:      Performed at:  01 - [facility]  [id number]  : Meera Connelly MD, Phone:  [phone]  Performed at:  02 - [facility]  [id number]  : Yogi Venegas PhD, Phone:  [phone]    Non-gynecologic Cytology [[id number]] Collected: 08/13/24 1401    Specimen: Pleural Fluid from Pleural Cavity Updated: 08/15/24 0811     Case Report --     Medical Cytology Report                  "          Case: [id number]                                  Authorizing Provider:  Kelvin Gonzalez MD             Collected:           08/13/2024 02:01 PM          Ordering Location:     [facility]  Received:            08/14/2024 10:25 AM                                 SUITES                                                                       Pathologist:           Al Reese MD                                                            Specimen:    Pleural Cavity                                                                              Final Diagnosis --     Pleural fluid with smears and cytospin:  Malignant cells present consistent with non-small cell carcinoma    GUILLERMO       Gross Description --     1. Pleural Cavity.  Received in carbowax and designated \"Pleural Cavity\" are 12 mL of cloudy brown-colored fluid. Particulate matter is present. This specimen is processed as per protocol.          Basic Metabolic Panel [[id number]]  (Abnormal) Collected: 08/15/24 0029    Specimen: Blood Updated: 08/15/24 0112     Glucose 147 mg/dL      BUN 40 mg/dL      Creatinine 1.31 mg/dL      Sodium 143 mmol/L      Potassium 4.2 mmol/L      Comment: Specimen hemolyzed.  Result may be falsely elevated.        Chloride 112 mmol/L      CO2 21.1 mmol/L      Calcium 8.0 mg/dL      BUN/Creatinine Ratio 30.5     Anion Gap 9.9 mmol/L      eGFR 57.1 mL/min/1.73     Narrative:      GFR Normal >60  Chronic Kidney Disease <60  Kidney Failure <15    The GFR formula is only valid for adults with stable renal function between ages 18 and 70.    CBC & Differential [[id number]]  (Abnormal) Collected: 08/15/24 0029    Specimen: Blood Updated: 08/15/24 0036    Narrative:      The following orders were created for panel order CBC & Differential.  Procedure                               Abnormality         Status                     ---------                               -----------         ------                     CBC Auto " Differential[[id number]]        Abnormal            Final result                 Please view results for these tests on the individual orders.    CBC Auto Differential [[id number]]  (Abnormal) Collected: 08/15/24 0029    Specimen: Blood Updated: 08/15/24 0036     WBC 18.78 10*3/mm3      RBC 3.87 10*6/mm3      Hemoglobin 12.1 g/dL      Hematocrit 36.6 %      MCV 94.6 fL      MCH 31.3 pg      MCHC 33.1 g/dL      RDW 13.2 %      RDW-SD 46.2 fl      MPV 9.9 fL      Platelets 244 10*3/mm3      Neutrophil % 88.3 %      Lymphocyte % 5.2 %      Monocyte % 5.8 %      Eosinophil % 0.0 %      Basophil % 0.1 %      Immature Grans % 0.6 %      Neutrophils, Absolute 16.59 10*3/mm3      Lymphocytes, Absolute 0.98 10*3/mm3      Monocytes, Absolute 1.08 10*3/mm3      Eosinophils, Absolute 0.00 10*3/mm3      Basophils, Absolute 0.01 10*3/mm3      Immature Grans, Absolute 0.12 10*3/mm3      nRBC 0.0 /100 WBC     Pathology Consultation [[id number]] Collected: 08/13/24 1401    Specimen: Pleural Fluid from Pleural Cavity Updated: 08/14/24 1334     Final Diagnosis --     Positive for non-small cell carcinoma       Case Report --     Surgical Pathology Report                         Case: [id number]                                  Authorizing Provider:  Kelvin Gonzalez MD             Collected:           08/13/2024 02:01 PM          Ordering Location:     [facility]       Received:            08/14/2024 07:00 AM                                 EMERGENCY DEPARTMENT                                                         Pathologist:           Jevon Hsu MD                                                             Specimen:    Pleural Cavity                                                                             AFB Culture - Wash, Lung, R [[id number]] Collected: 08/13/24 0820    Specimen: Wash from Lung, R Updated: 08/14/24 1333     AFB Stain No acid fast bacilli seen on concentrated smear    AFB Culture - Body Fluid, Pleural  "Cavity [[id number]] Collected: 08/13/24 1401    Specimen: Body Fluid from Pleural Cavity Updated: 08/14/24 1332     AFB Stain No acid fast bacilli seen on concentrated smear    Non-gynecologic Cytology [[id number]] Collected: 08/13/24 0813    Specimen: Brushing from Lung, R; Wash from Lung, R Updated: 08/14/24 1304     Case Report --     Medical Cytology Report                           Case: [id number]                                  Authorizing Provider:  Kelvin Gonzalez MD             Collected:           08/13/2024 08:13 AM          Ordering Location:     [facility]  Received:            08/13/2024 12:23 PM                                 SUITES                                                                       Pathologist:           Jevon Hsu MD                                                             Specimens:   1) - Lung, R                                                                                        2) - Lung, R                                                                                Final Diagnosis --     Specimen #1 (Lung, right, bronch wash, smears and cytospin preparation):    Positive for malignancy, non-small cell carcinoma    Specimen #2 (Lung, right, bronch brush, smear preparation only):    Positive for malignancy, non-small cell carcinoma    JPR         Gross Description --     1. Lung, R.  Received in carbowax and designated \"bronchial washing, right lung\" are 43 mL of hazy, green fluid. Particulate matter is not present. This specimen is processed as per protocol.      2. Lung, R.  Received in 95% alcohol are four slides, designated \"right lung.\" These are pap stained for evaluation.          TSH [[id number]]  (Normal) Collected: 08/14/24 1000    Specimen: Blood Updated: 08/14/24 1136     TSH 0.626 uIU/mL     Magnesium [[id number]]  (Normal) Collected: 08/14/24 1000    Specimen: Blood Updated: 08/14/24 1034     Magnesium 2.0 mg/dL     Basic Metabolic Panel " [[id number]]  (Abnormal) Collected: 08/14/24 1000    Specimen: Blood Updated: 08/14/24 1034     Glucose 256 mg/dL      BUN 36 mg/dL      Creatinine 1.30 mg/dL      Sodium 139 mmol/L      Potassium 4.3 mmol/L      Comment: Specimen hemolyzed.  Result may be falsely elevated.        Chloride 107 mmol/L      CO2 19.4 mmol/L      Calcium 8.4 mg/dL      BUN/Creatinine Ratio 27.7     Anion Gap 12.6 mmol/L      eGFR 57.6 mL/min/1.73     Narrative:      GFR Normal >60  Chronic Kidney Disease <60  Kidney Failure <15    The GFR formula is only valid for adults with stable renal function between ages 18 and 70.    Flow Cytometry [[id number]] Collected: 08/13/24 0820    Specimen: Wash from Lung, R Updated: 08/14/24 0933     Consult Global Result Comment^Text^TXT     Comment: Right lung:  Few B-cells with kappa excess (limited sample) (see   comments)  DISCLAIMER: REFER TO HARDCOPY OR PDF FOR COMPLETE RESULT.   If synopsis provided, clinical decisions should not be   based on this interfaced synopsis alone.  Performed at:  1 - [facility]  [address], [place] [address]  :  Kassandra Villagomez M.D., Ph.D., Phone:  [phone]       Narrative:      Performed at:  1 - [facility]  [address], [place] [address]  :  Kassandra Villagomez M.D., Ph.D., Phone:  [phone]    Protein, Body Fluid - Pleural Fluid, Pleural Cavity [[id number]] Collected: 08/13/24 1401    Specimen: Pleural Fluid from Pleural Cavity Updated: 08/13/24 2003     Protein, Total, Fluid 3.7 g/dL     Narrative:      No Reference Ranges Established.    A serous fluid total fluid (TP) greater than 50 percent of the serum TP suggests the fluid is an exudate.      1. Pleural TP/Serum TP >0.5  2. Pleural LD/Serum LD >0.6  3. Pleural LD >2/3 of the upper limit of normal for serum LDH    This test was developed, it performance characteristics determined and  judged suitable for clinical purposes by [facility].  It has not been cleared or approved by the FDA.  The laboratory is regulated under CLIA as qualified to perform high-complexity testing.     Lactate Dehydrogenase, Body Fluid - Pleural Fluid, Pleural Cavity [[id number]] Collected: 08/13/24 1401    Specimen: Pleural Fluid from Pleural Cavity Updated: 08/13/24 2003     Lactate Dehydrogenase (LD), Fluid 538 U/L     Narrative:      No Reference Ranges Established.    Serous fluid LDH greater than 60 percent of the serum LDH or serous fluid LDH two-thirds of the upper limit of normal for serum LDH suggests the fluid is an exudate.     1. Pleural TP/Serum TP >0.5  2. Pleural LD/Serum LD >0.6  3. Pleural LD >2/3 of the upper limit of normal for serum LDH    This test was developed, it performance characteristics determined and judged suitable for clinical purposes by [facility].  It has not been cleared or approved by the FDA.  The laboratory is regulated under CLIA as qualified to perform high-complexity testing.     Glucose, Body Fluid - Pleural Fluid, Pleural Cavity [[id number]] Collected: 08/13/24 1401    Specimen: Pleural Fluid from Pleural Cavity Updated: 08/13/24 2003     Glucose, Fluid 182 mg/dL     Narrative:      No Reference Ranges Established.    Serous fluid glucose less than 60 mg/dL or less than 30 mg/dL below serum glucose suggests an infectious or malignant exudate.     This test was developed, it performance characteristics determined and judged suitable for clinical purposes by [facility].  It has not been cleared or approved by the FDA.  The laboratory is regulated under CLIA as qualified to perform high-complexity testing.     Body Fluid Cell Count With Differential - Pleural Fluid, Pleural Cavity [[id number]]  (Abnormal) Collected: 08/13/24 1401    Specimen: Pleural Fluid from Pleural Cavity Updated: 08/13/24 1548     Narrative:      The following orders were created for panel order Body Fluid Cell Count With Differential - Pleural Fluid, Pleural Cavity.  Procedure                               Abnormality         Status                     ---------                               -----------         ------                     Body fluid cell count - ...[[id number]]  Abnormal            Final result               Body fluid differential ...[[id number]]                      Final result               Path Consult Reflex[[id number]]                              Final result                 Please view results for these tests on the individual orders.    Body fluid differential - Pleural Fluid, Pleural Cavity [[id number]] Collected: 08/13/24 1401    Specimen: Pleural Fluid from Pleural Cavity Updated: 08/13/24 1541     Neutrophils, Fluid 2 %      Lymphocytes, Fluid 2 %      Monocytes, Fluid 16 %      Unclassified Cells, Fluid 76 %      Mesothelial Cells, Fluid 4 %     Narrative:      Concentrated Smear by Cytocentrifuge Prep.    pH, Body Fluid - Pleural Fluid, Pleural Cavity [[id number]]  (Abnormal) Collected: 08/13/24 1401    Specimen: Pleural Fluid from Pleural Cavity Updated: 08/13/24 1517     pH, Fluid 8.00    Path Consult Reflex [[id number]] Collected: 08/13/24 1401    Specimen: Pleural Fluid from Pleural Cavity Updated: 08/13/24 1513     Pathology Review Yes    Body fluid cell count - Pleural Fluid, Pleural Cavity [[id number]]  (Abnormal) Collected: 08/13/24 1401    Specimen: Pleural Fluid from Pleural Cavity Updated: 08/13/24 1417     Color, Fluid Red     Appearance, Fluid Cloudy     Nucleated Cells, Fluid 1,539 /mm3      Method: Automated Sysmex XN Method    Narrative:      No reference range established. Physician to interpret results with clinical findings.    Anaerobic Culture - Pleural Fluid, Pleural Cavity [[id number]] Collected: 08/13/24 1401    Specimen: Pleural Fluid from Pleural Cavity Updated: 08/13/24 1406    Respiratory  Panel PCR w/COVID-19(SARS-CoV-2) SARAH/ELENI/NEY/PAD/COR/LUCRETIA In-House, NP Swab in UTM/VTM, 2 HR TAT - Wash, Lung, R [[id number]]  (Normal) Collected: 08/13/24 0820    Specimen: Wash from Lung, R Updated: 08/13/24 1016     ADENOVIRUS, PCR Not Detected     Coronavirus 229E Not Detected     Coronavirus HKU1 Not Detected     Coronavirus NL63 Not Detected     Coronavirus OC43 Not Detected     COVID19 Not Detected     Human Metapneumovirus Not Detected     Human Rhinovirus/Enterovirus Not Detected     Influenza A PCR Not Detected     Influenza B PCR Not Detected     Parainfluenza Virus 1 Not Detected     Parainfluenza Virus 2 Not Detected     Parainfluenza Virus 3 Not Detected     Parainfluenza Virus 4 Not Detected     RSV, PCR Not Detected     Bordetella pertussis pcr Not Detected     Bordetella parapertussis PCR Not Detected     Chlamydophila pneumoniae PCR Not Detected     Mycoplasma pneumo by PCR Not Detected    Narrative:      In the setting of a positive respiratory panel with a viral infection PLUS a negative procalcitonin without other underlying concern for bacterial infection, consider observing off antibiotics or discontinuation of antibiotics and continue supportive care. If the respiratory panel is positive for atypical bacterial infection (Bordetella pertussis, Chlamydophila pneumoniae, or Mycoplasma pneumoniae), consider antibiotic de-escalation to target atypical bacterial infection.    Aspergillus Galactomannan Antigen - Wash, Lung, R [[id number]] Collected: 08/13/24 0820    Specimen: Wash from Lung, R Updated: 08/13/24 0903    Cytomegalovirus (CMV) By PCR ([place]) (NEY) - Wash, Lung, R [[id number]] Collected: 08/13/24 0820    Specimen: Wash from Lung, R Updated: 08/13/24 0903    Pneumocystis PCR - Wash, Lung, R [[id number]] Collected: 08/13/24 0820    Specimen: Wash from Lung, R Updated: 08/13/24 0903    Fungus Culture - Wash, Lung, R [[id number]] Collected: 08/13/24 0820    Specimen: Wash from Lung, R Updated:  08/13/24 0903    Histoplasma Antigen, CSF or BAL - Wash, Lung, R [[id number]] Collected: 08/13/24 0820    Specimen: Wash from Lung, R Updated: 08/13/24 0903    High Sensitivity Troponin T 2Hr [[id number]]  (Abnormal) Collected: 08/12/24 2305    Specimen: Blood from Arm, Right Updated: 08/12/24 2341     HS Troponin T 53 ng/L      Troponin T Delta -4 ng/L     Narrative:      High Sensitive Troponin T Reference Range:  <14.0 ng/L- Negative Female for AMI  <22.0 ng/L- Negative Male for AMI  >=14 - Abnormal Female indicating possible myocardial injury.  >=22 - Abnormal Male indicating possible myocardial injury.   Clinicians would have to utilize clinical acumen, EKG, Troponin, and serial changes to determine if it is an Acute Myocardial Infarction or myocardial injury due to an underlying chronic condition.         Comprehensive Metabolic Panel [[id number]]  (Abnormal) Collected: 08/12/24 2030    Specimen: Blood Updated: 08/12/24 2058     Glucose 167 mg/dL      BUN 23 mg/dL      Creatinine 1.28 mg/dL      Sodium 137 mmol/L      Potassium 4.4 mmol/L      Comment: Specimen hemolyzed.  Result may be falsely elevated.        Chloride 104 mmol/L      CO2 20.8 mmol/L      Calcium 8.6 mg/dL      Total Protein 6.0 g/dL      Albumin 3.3 g/dL      ALT (SGPT) 20 U/L      Comment: Specimen hemolyzed.  Result may  be falsely elevated.        AST (SGOT) 39 U/L      Comment: Specimen hemolyzed.  Result may be falsely elevated.        Alkaline Phosphatase 55 U/L      Total Bilirubin 0.2 mg/dL      Globulin 2.7 gm/dL      A/G Ratio 1.2 g/dL      BUN/Creatinine Ratio 18.0     Anion Gap 12.2 mmol/L      eGFR 58.7 mL/min/1.73     Narrative:      GFR Normal >60  Chronic Kidney Disease <60  Kidney Failure <15    The GFR formula is only valid for adults with stable renal function between ages 18 and 70.    Single High Sensitivity Troponin T [[id number]]  (Abnormal) Collected: 08/12/24 2030    Specimen: Blood Updated: 08/12/24 2058     HS Troponin  T 57 ng/L      Comment: Specimen hemolyzed.  Results may be falsely decreased.       Narrative:      High Sensitive Troponin T Reference Range:  <14.0 ng/L- Negative Female for AMI  <22.0 ng/L- Negative Male for AMI  >=14 - Abnormal Female indicating possible myocardial injury.  >=22 - Abnormal Male indicating possible myocardial injury.   Clinicians would have to utilize clinical acumen, EKG, Troponin, and serial changes to determine if it is an Acute Myocardial Infarction or myocardial injury due to an underlying chronic condition.         BNP [[id number]]  (Normal) Collected: 08/12/24 2030    Specimen: Blood Updated: 08/12/24 2055     proBNP 118.0 pg/mL     Narrative:      This assay is used as an aid in the diagnosis of individuals suspected of having heart failure. It can be used as an aid in the diagnosis of acute decompensated heart failure (ADHF) in patients presenting with signs and symptoms of ADHF to the emergency department (ED). In addition, NT-proBNP of <300 pg/mL indicates ADHF is not likely.    Age Range Result Interpretation  NT-proBNP Concentration (pg/mL:      <50             Positive            >450                   Gray                 300-450                    Negative             <300    50-75           Positive            >900                  Gray                300-900                  Negative            <300      >75             Positive            >1800                  Gray                300-1800                  Negative            <300    S. Pneumo Ag Urine or CSF - Urine, Urine, Clean Catch [[id number]]  (Normal) Collected: 08/12/24 1933    Specimen: Urine, Clean Catch Updated: 08/12/24 1957     Strep Pneumo Ag Negative    Legionella Antigen, Urine - Urine, Urine, Clean Catch [[id number]]  (Normal) Collected: 08/12/24 1933    Specimen: Urine, Clean Catch Updated: 08/12/24 1957     LEGIONELLA ANTIGEN, URINE Negative    Histoplasma Ag Ur - Urine, Urine, Clean Catch [[id number]]  Collected: 08/12/24 1933    Specimen: Urine, Clean Catch Updated: 08/12/24 1936    Protime-INR [[id number]]  (Normal) Collected: 08/12/24 1834    Specimen: Blood Updated: 08/12/24 1852     Protime 10.9 Seconds      INR 1.00    Respiratory Panel PCR w/COVID-19(SARS-CoV-2) SARAH/ELENI/NEY/PAD/COR/LUCRETIA In-House, NP Swab in UTM/VTM, 2 HR TAT - Swab, Nasopharynx [[id number]]  (Normal) Collected: 08/12/24 1749    Specimen: Swab from Nasopharynx Updated: 08/12/24 1841     ADENOVIRUS, PCR Not Detected     Coronavirus 229E Not Detected     Coronavirus HKU1 Not Detected     Coronavirus NL63 Not Detected     Coronavirus OC43 Not Detected     COVID19 Not Detected     Human Metapneumovirus Not Detected     Human Rhinovirus/Enterovirus Not Detected     Influenza A PCR Not Detected     Influenza B PCR Not Detected     Parainfluenza Virus 1 Not Detected     Parainfluenza Virus 2 Not Detected     Parainfluenza Virus 3 Not Detected     Parainfluenza Virus 4 Not Detected     RSV, PCR Not Detected     Bordetella pertussis pcr Not Detected     Bordetella parapertussis PCR Not Detected     Chlamydophila pneumoniae PCR Not Detected     Mycoplasma pneumo by PCR Not Detected    Narrative:      In the setting of a positive respiratory panel with a viral infection PLUS a negative procalcitonin without other underlying concern for bacterial infection, consider observing off antibiotics or discontinuation of antibiotics and continue supportive care. If the respiratory panel is positive for atypical bacterial infection (Bordetella pertussis, Chlamydophila pneumoniae, or Mycoplasma pneumoniae), consider antibiotic de-escalation to target atypical bacterial infection.    Strep Pneumoniae Antibody 7 Serotypes [[id number]] Collected: 08/12/24 1834    Specimen: Blood Updated: 08/12/24 1840    Scan Slide [[id number]] Collected: 08/12/24 1732    Specimen: Blood Updated: 08/12/24 1837     Scan Slide --     Comment: See Manual Differential Results       Manual  Differential [[id number]]  (Abnormal) Collected: 08/12/24 1732    Specimen: Blood Updated: 08/12/24 1837     Neutrophil % 82.0 %      Lymphocyte % 7.0 %      Monocyte % 4.0 %      Eosinophil % 6.0 %      Basophil % 1.0 %      Neutrophils Absolute 11.05 10*3/mm3      Lymphocytes Absolute 0.94 10*3/mm3      Monocytes Absolute 0.54 10*3/mm3      Eosinophils Absolute 0.81 10*3/mm3      Basophils Absolute 0.13 10*3/mm3      RBC Morphology Normal     WBC Morphology Normal     Platelet Morphology Normal    POC Lactate [[id number]]  (Normal) Collected: 08/12/24 1748    Specimen: Blood Updated: 08/12/24 1752     Lactate 1.6 mmol/L      Comment: Serial Number: [id number]:  [id number]                Results for orders placed during the hospital encounter of 08/12/24    Adult Transthoracic Echo Complete W/ Cont if Necessary Per Protocol    Interpretation Summary    Left ventricular ejection fraction appears to be 66 - 70%.    Left ventricular diastolic function was indeterminate.    The left atrial cavity is mildly dilated.    Left atrial volume is mildly increased.    Estimated right ventricular systolic pressure from tricuspid regurgitation is mildly elevated (35-45 mmHg).    Moderate pulmonary hypertension is present.            Condition on Discharge:  stable    Vital Signs  Temp:  [97.2 °F (36.2 °C)-97.8 °F (36.6 °C)] 97.7 °F (36.5 °C)  Heart Rate:  [] 88  Resp:  [10-22] 18  BP: (117-132)/(72-80) 122/72      Physical Exam  Vitals reviewed.   HENT:      Head: Normocephalic and atraumatic.      Right Ear: External ear normal.      Left Ear: External ear normal.      Nose: Nose normal.      Mouth/Throat:      Mouth: Mucous membranes are moist.   Eyes:      General:         Right eye: No discharge.         Left eye: No discharge.   Cardiovascular:      Rate and Rhythm: Normal rate.   Pulmonary:      Effort: Pulmonary effort is normal.      Breath sounds: Rhonchi present.   Abdominal:      General: Bowel sounds are  normal.      Palpations: Abdomen is soft.   Musculoskeletal:         General: Normal range of motion.   Skin:     General: Skin is warm and dry.   Neurological:      Mental Status: He is alert and oriented to person, place, and time.   Psychiatric:         Mood and Affect: Mood normal.         Behavior: Behavior normal.              Discharge Disposition  Home or Self Care    Discharge Medications     Discharge Medications        New Medications        Instructions Start Date   amiodarone 200 MG tablet  Commonly known as: PACERONE   200 mg, Oral, Every 12 Hours Scheduled      amoxicillin-clavulanate 500-125 MG per tablet  Commonly known as: Augmentin   500 mg, Oral, 2 Times Daily      apixaban 5 MG tablet tablet  Commonly known as: ELIQUIS   5 mg, Oral, Every 12 Hours Scheduled      metoprolol tartrate 25 MG tablet  Commonly known as: LOPRESSOR   25 mg, Oral, Every 12 Hours Scheduled      midodrine 5 MG tablet  Commonly known as: PROAMATINE   5 mg, Oral, 3 Times Daily Before Meals      predniSONE 10 MG tablet  Commonly known as: DELTASONE   Take 2 tablets by mouth Daily for 1 day, THEN 1 tablet Daily for 2 days.   Start Date: August 18, 2024            Continue These Medications        Instructions Start Date   acetaminophen 325 MG tablet  Commonly known as: TYLENOL   650 mg, Oral, Every 4 Hours PRN      albuterol sulfate  (90 Base) MCG/ACT inhaler  Commonly known as: PROVENTIL HFA;VENTOLIN HFA;PROAIR HFA   2 puffs, Inhalation, Every 4 Hours PRN      APPLE CIDER VINEGAR PO   1 capsule, Oral, Daily      Centrum Silver 50+Men tablet tablet  Generic drug: multivitamin with minerals   1 tablet, Oral, Every Morning      NON FORMULARY   2 tablets, Oral, Nightly, Zzzquil      pantoprazole 40 MG EC tablet  Commonly known as: PROTONIX   40 mg, Oral, Daily      simvastatin 20 MG tablet  Commonly known as: ZOCOR   20 mg, Oral, Nightly      tamsulosin 0.4 MG capsule 24 hr capsule  Commonly known as: FLOMAX   1 capsule,  Oral, Nightly      thiamine 100 MG tablet  tablet  Commonly known as: VITAMIN B-1   100 mg, Oral, Every Morning      VITAMIN D-3 PO   1 tablet, Oral, Daily             Stop These Medications      aspirin 81 MG EC tablet     fish oil 1000 MG capsule capsule     metoprolol succinate XL 25 MG 24 hr tablet  Commonly known as: TOPROL-XL              Discharge Diet:   Diet Instructions       Diet: Regular/House Diet; Regular (IDDSI 7); Thin (IDDSI 0)      Discharge Diet: Regular/House Diet    Texture: Regular (IDDSI 7)    Fluid Consistency: Thin (IDDSI 0)            Activity at Discharge:   Activity Instructions       Activity as Tolerated              Follow-up Appointments  Future Appointments   Date Time Provider Department Center   8/27/2024 10:30 AM Carlos Pavon MD [facility]   9/4/2024  9:35 AM LAB MD [facility]   9/4/2024  9:45 AM Arben Tim MD [facility]     Additional Instructions for the Follow-ups that You Need to Schedule       Discharge Follow-up with PCP   As directed       Currently Documented PCP:    Abner Funes APRN    PCP Phone Number:    [phone]     Follow Up Details: 1-2 weeks        Discharge Follow-up with Specified Provider: Dr Pavon, cardiology   As directed      To: Dr Pavon, cardiology   Follow Up Details: August 27, 2024        Discharge Follow-up with Specified Provider: Dr. Etienne, oncology   As directed      To: Dr. Etienne, oncology   Follow Up Details: September 3, 2024        Discharge Follow-up with Specified Provider: Dr. Gonzalez, pulmonology; 1 Month   As directed      To: Dr. Gonzalez, pulmonology   Follow Up: 1 Month                Test Results Pending at Discharge  Pending Labs       Order Current Status    Anaerobic Culture - Pleural Fluid, Pleural Cavity In process    Aspergillus Galactomannan Antigen - Wash, Lung, R In process    Cytomegalovirus (CMV) By PCR ([place]) ([facility]) - Wash, Lung, R In process    Fungus Culture - Wash,  Lung, R In process    Histoplasma Ag Ur - Urine, Urine, Clean Catch In process    Histoplasma Antigen, CSF or BAL - Wash, Lung, R In process    Pneumocystis PCR - Wash, Lung, R In process    Strep Pneumoniae Antibody 7 Serotypes In process    Tissue Pathology Exam In process    AFB Culture - Body Fluid, Pleural Cavity Preliminary result    AFB Culture - Wash, Lung, R Preliminary result    Blood Culture - Blood, Arm, Left Preliminary result    Blood Culture - Blood, Arm, Right Preliminary result    Body Fluid Culture - Body Fluid, Pleural Cavity Preliminary result             Asya Marques, APRN  08/17/24  14:47 EDT    Time: Discharge 29 min

## 2024-08-17 NOTE — PLAN OF CARE
Goal Outcome Evaluation:              Outcome Evaluation: pt to d/c home with eliquis 5mg BID- pharmacy approved and only $47 monthly. He is to follow up with cardiology; oncology and pulm. PICC removed and pressure applied for 5 minutes

## 2024-08-17 NOTE — CASE MANAGEMENT/SOCIAL WORK
Continued Stay Note   Janes     Patient Name: Young Ames  MRN: [id number]  Today's Date: 8/17/2024    Admit Date: 8/12/2024    Plan: Home with spouse. New Eliquis at $47/mon. 30 day free coupon provided. Family to transport.   Discharge Plan       Row Name 08/17/24 1524       Plan    Plan Home with spouse. New Eliquis at $47/mon. 30 day free coupon provided. Family to transport.    Patient/Family in Agreement with Plan yes    Plan Comments CM contacted to check Eliquis cost. CM contacted [facility] pharmacy. Per pharmacist, cost will be $47. Pt agreeable to cost. Free 30 day coupon provided to pt at bedside. Family present and will transport at dc.     Keila Gill RN BSN  Weekend   [facility]  Phone: [phone]  Fax: [phone]

## 2024-08-17 NOTE — PROGRESS NOTES
CARDIOLOGY FOLLOW-UP PROGRESS NOTE      Reason for follow-up:    Atrial Fibrillation RVR      Attending: Evelyn Felder MD      Subjective .     No chest pain or shortness of breath     Review of Systems   Constitutional: Negative for malaise/fatigue.   Cardiovascular:  Negative for chest pain, dyspnea on exertion, leg swelling and palpitations.   Respiratory:  Negative for cough and shortness of breath.    Gastrointestinal:  Negative for abdominal pain, nausea and vomiting.   Neurological:  Negative for dizziness, focal weakness, headaches, light-headedness and numbness.   All other systems reviewed and are negative.      Allergies: Patient has no known allergies.    Scheduled Meds:amiodarone, 200 mg, Oral, Q12H  atorvastatin, 10 mg, Oral, Daily  enoxaparin, 1 mg/kg, Subcutaneous, Q12H  insulin lispro, 2-9 Units, Subcutaneous, 4x Daily AC & at Bedtime  ipratropium-albuterol, 3 mL, Nebulization, Q6H While Awake - RT  metoprolol tartrate, 25 mg, Oral, Q12H  midodrine, 5 mg, Oral, TID AC  pantoprazole, 40 mg, Oral, QAM AC  predniSONE, 20 mg, Oral, Daily   Followed by  [START ON 8/19/2024] predniSONE, 10 mg, Oral, Daily  sodium chloride, 10 mL, Intravenous, Q12H  sodium chloride, 10 mL, Intravenous, Q12H  sodium chloride, 10 mL, Intravenous, Q12H  sodium chloride, 10 mL, Intravenous, Q12H  tamsulosin, 0.4 mg, Oral, Nightly        Continuous Infusions:hold, 1 each  Pharmacy to Dose enoxaparin (LOVENOX),   sodium chloride, 50 mL/hr, Last Rate: Stopped (08/16/24 1116)  sodium chloride, 100 mL/hr, Last Rate: 100 mL/hr (08/15/24 1327)        PRN Meds:.  acetaminophen    senna-docusate sodium **AND** polyethylene glycol **AND** bisacodyl **AND** bisacodyl    dextrose    dextrose    glucagon (human recombinant)    hold    LORazepam    nitroglycerin    ondansetron    Pharmacy to Dose enoxaparin (LOVENOX)    sodium chloride    sodium chloride    sodium chloride    sodium chloride    Objective     VITAL SIGNS  Patient Vitals for  "the past 24 hrs:   BP Temp Temp src Pulse Resp SpO2 Weight   08/17/24 1152 -- -- -- 88 18 93 % --   08/17/24 1149 -- -- -- 88 18 93 % --   08/17/24 1002 122/72 97.7 °F (36.5 °C) Oral 102 15 94 % --   08/17/24 0648 -- -- -- 82 10 95 % --   08/17/24 0645 -- -- -- 80 16 94 % --   08/17/24 0632 132/76 97.2 °F (36.2 °C) Oral 91 20 91 % 93.8 kg (206 lb 12.7 oz)   08/17/24 0113 117/73 97.3 °F (36.3 °C) Oral 87 20 (!) 89 % --   08/16/24 2137 121/72 97.5 °F (36.4 °C) Oral 96 22 95 % --   08/16/24 2125 121/72 -- -- 84 -- -- --   08/16/24 1852 -- -- -- 99 16 97 % --   08/16/24 1848 -- -- -- 98 16 97 % --   08/16/24 1647 126/80 97.8 °F (36.6 °C) Oral 105 19 93 % --        Flowsheet Rows      Flowsheet Row First Filed Value   Admission Height 177.8 cm (70\") Documented at 08/12/2024 1626   Admission Weight 82.3 kg (181 lb 7 oz) Documented at 08/12/2024 1626            Body mass index is 29.67 kg/m².      Intake/Output Summary (Last 24 hours) at 8/17/2024 1409  Last data filed at 8/17/2024 1002  Gross per 24 hour   Intake 480 ml   Output --   Net 480 ml        TELEMETRY:     SR    Physical Exam:  Vitals reviewed.   Constitutional:       General: Not in acute distress.     Appearance: Normal appearance. Well-developed.   Eyes:      Pupils: Pupils are equal, round, and reactive to light.   HENT:      Head: Normocephalic and atraumatic.   Neck:      Vascular: No JVD.   Pulmonary:      Effort: Pulmonary effort is normal.      Breath sounds: Decreased breath sounds present.   Cardiovascular:      Normal rate. Regular rhythm.   Pulses:     Intact distal pulses.   Edema:     Peripheral edema absent.   Abdominal:      General: There is no distension.      Palpations: Abdomen is soft.      Tenderness: There is no abdominal tenderness.   Musculoskeletal: Normal range of motion.      Cervical back: Normal range of motion and neck supple. Skin:     General: Skin is warm and dry.   Neurological:      Mental Status: Alert and oriented to " "person, place, and time.        Scribed findings above    Results Review:   I reviewed the patient's new clinical results.    CBC    Results from last 7 days   Lab Units 08/17/24  0929 08/15/24  0029 08/14/24  0128 08/13/24  0335 08/12/24  1732   WBC 10*3/mm3 12.61* 18.78* 19.18* 8.96 13.47*   HEMOGLOBIN g/dL 12.5* 12.1* 13.0 14.3 15.8   PLATELETS 10*3/mm3 230 244 244 251 252     BMP   Results from last 7 days   Lab Units 08/17/24  0929 08/15/24  0029 08/14/24  1000 08/14/24 0128 08/13/24 0335 08/12/24  2030   SODIUM mmol/L 142 143 139 138 135* 137   POTASSIUM mmol/L 3.6 4.2 4.3 4.2 4.4 4.4   CHLORIDE mmol/L 111* 112* 107 107 106 104   CO2 mmol/L 21.5* 21.1* 19.4* 19.8* 18.9* 20.8*   BUN mg/dL 30* 40* 36* 35* 27* 23   CREATININE mg/dL 1.01 1.31* 1.30* 1.39* 1.29* 1.28*   GLUCOSE mg/dL 124* 147* 256* 223* 225* 167*   MAGNESIUM mg/dL 2.0  --  2.0  --   --   --      Cr Clearance Estimated Creatinine Clearance: 73.8 mL/min (by C-G formula based on SCr of 1.01 mg/dL).  Coag   Results from last 7 days   Lab Units 08/12/24  1834   INR  1.00     HbA1C No results found for: \"HGBA1C\"  Blood Glucose   Glucose   Date/Time Value Ref Range Status   08/17/2024 1106 213 (H) 70 - 105 mg/dL Final     Comment:     Serial Number: [id number]:  [id number]   08/17/2024 0755 133 (H) 70 - 105 mg/dL Final     Comment:     Serial Number: [id number]:  [id number]   08/16/2024 2004 203 (H) 70 - 105 mg/dL Final     Comment:     Serial Number: [id number]:  [id number]   08/16/2024 1755 279 (H) 70 - 105 mg/dL Final     Comment:     Serial Number: [id number]:  [id number]   08/15/2024 2057 173 (H) 70 - 105 mg/dL Final     Comment:     Serial Number: [id number]:  [id number]   08/15/2024 1636 185 (H) 70 - 105 mg/dL Final     Comment:     Serial Number: [id number]:  [id number]   08/15/2024 1105 109 (H) 70 - 105 mg/dL Final     Comment:     Serial Number: [id number]:  [id number]   08/15/2024 0722 104 70 - 105 " "mg/dL Final     Comment:     Serial Number: [id number]:  [id number]     Infection   Results from last 7 days   Lab Units 08/13/24  1401 08/13/24  0820 08/12/24  1834 08/12/24  1744   BLOODCX   --   --  No growth at 4 days No growth at 4 days   BODYFLDCX  No growth at 4 days  --   --   --    RESPCX   --  Light growth (2+) Normal respiratory scottie. No S. aureus or Pseudomonas aeruginosa detected. Final report.  --   --      CMP   Results from last 7 days   Lab Units 08/17/24  0929 08/15/24  0029 08/14/24  1000 08/14/24  0128 08/13/24  0335 08/12/24  2030   SODIUM mmol/L 142 143 139 138 135* 137   POTASSIUM mmol/L 3.6 4.2 4.3 4.2 4.4 4.4   CHLORIDE mmol/L 111* 112* 107 107 106 104   CO2 mmol/L 21.5* 21.1* 19.4* 19.8* 18.9* 20.8*   BUN mg/dL 30* 40* 36* 35* 27* 23   CREATININE mg/dL 1.01 1.31* 1.30* 1.39* 1.29* 1.28*   GLUCOSE mg/dL 124* 147* 256* 223* 225* 167*   ALBUMIN g/dL 3.0*  --   --   --   --  3.3*   BILIRUBIN mg/dL 0.3  --   --   --   --  0.2   ALK PHOS U/L 43  --   --   --   --  55   AST (SGOT) U/L 29  --   --   --   --  39   ALT (SGPT) U/L 41  --   --   --   --  20     ABG      UA      BHAVYA  No results found for: \"POCMETH\", \"POCAMPHET\", \"POCBARBITUR\", \"POCBENZO\", \"POCCOCAINE\", \"POCOPIATES\", \"POCOXYCODO\", \"POCPHENCYC\", \"POCPROPOXY\", \"POCTHC\", \"POCTRICYC\"  Lysis Labs   Results from last 7 days   Lab Units 08/17/24  0929 08/15/24  0029 08/14/24  1000 08/14/24  0128 08/13/24  0335 08/12/24  2030 08/12/24  1834 08/12/24  1732   INR   --   --   --   --   --   --  1.00  --    HEMOGLOBIN g/dL 12.5* 12.1*  --  13.0 14.3  --   --  15.8   PLATELETS 10*3/mm3 230 244  --  244 251  --   --  252   CREATININE mg/dL 1.01 1.31* 1.30* 1.39* 1.29* 1.28*  --   --      Radiology(recent) XR Chest 1 View    Result Date: 8/16/2024  Impression: 1.No visible pneumothorax. 2.Stable appearance of extensive interstitial and airspace opacities throughout the right lung with right pleural effusion, obscuring the known right lower lobe " cavitary mass. Electronically Signed: Noemy Prince  8/16/2024 12:06 PM EDT  Workstation ID: [id number]    MRI Brain With & Without Contrast    Result Date: 8/15/2024  Impression: No evidence of intracranial metastatic disease. Some mild typical chronic and age-related changes are present. Otherwise essentially normal contrast-enhanced MRI of the brain. Electronically Signed: Jeremy Camilo MD  8/15/2024 4:15 PM EDT  Workstation ID: [id number]    CT Chest Without Contrast Diagnostic    Result Date: 8/15/2024  Impression: 1. Persistent dense consolidation in right lower lobe suggesting pneumonia with suspicion of a cavitary right lower lobe mass partially obscured due to consolidation similar to prior study which may relate to malignancy. 2. Stable enlarged mediastinal and right hilar adenopathy which may relate to metastatic adenopathy or reactive nodes. 3. Diffuse interlobular septal thickening in right lung not significantly changed which may relate to asymmetric pulmonary edema or lymphangitic spread of malignancy. 4. Small to moderate right pleural effusion similar to prior study. 5. Several small nonspecific pulmonary nodules in the left lung unchanged largest 6 mm. Trace left pleural effusion. 6. Moderate left hydroureteronephrosis again noted, consider dedicated CT abdomen and pelvis for further assessment. Electronically Signed: Antwon Estrella MD  8/15/2024 3:48 PM EDT  Workstation ID: [id number]     Imaging Results (Last 24 Hours)       ** No results found for the last 24 hours. **            Results from last 7 days   Lab Units 08/12/24  2305   HSTROP T ng/L 53*       EKG         I personally viewed and interpreted the patient's EKG/Telemetry data:        ECHOCARDIOGRAM:     Results for orders placed during the hospital encounter of 08/12/24    Adult Transthoracic Echo Complete W/ Cont if Necessary Per Protocol    Interpretation Summary    Left ventricular ejection fraction appears to be 66 - 70%.    Left  ventricular diastolic function was indeterminate.    The left atrial cavity is mildly dilated.    Left atrial volume is mildly increased.    Estimated right ventricular systolic pressure from tricuspid regurgitation is mildly elevated (35-45 mmHg).    Moderate pulmonary hypertension is present.        STRESS MYOVIEW:      CARDIAC CATHETERIZATION:      OTHER:         Assessment & Plan          ASSESSMENT:      Paroxysmal Atrial fibrillation with intermittent RVR  Now converted to SR  Ventricular rates initially over 200  Now started on IV amiodarone  BP remains soft  [id number] equals 2  Family started on Eliquis and Lovenox will be stopped.     Right-sided pneumonia with large right pleural effusion, malignant non-small cell lung cancer  Status post bronchoscopy and thoracentesis  2 L removed pleural fluid reported to be clear and red  Repeat as needed management per pulmonary     Hypertension  Currently soft  Follow and trend blood pressures  Continue midodrine  Off antihypertensives     Nonobstructive coronary artery disease  Cardiac catheterization in January 2024  20 to 30% mid L RCA lesion.  Otherwise normal coronary anatomy.     Patient will be followed by primary cardiologist           Abran Chand MD  08/17/24  14:09 EDT

## 2024-08-18 LAB
BACTERIA FLD CULT: NORMAL
FUNGUS WND CULT: NORMAL
GRAM STN SPEC: NORMAL
GRAM STN SPEC: NORMAL
MYCOBACTERIUM SPEC CULT: NORMAL
MYCOBACTERIUM SPEC CULT: NORMAL
NIGHT BLUE STAIN TISS: NORMAL
NIGHT BLUE STAIN TISS: NORMAL

## 2024-08-18 NOTE — CASE MANAGEMENT/SOCIAL WORK
Case Management Discharge Note      Final Note: Home with spouse and new [facility]     Transportation Services  Private: Car    Final Discharge Disposition Code: 01 - home or self-care

## 2024-08-18 NOTE — OUTREACH NOTE
Prep Survey      Flowsheet Row Responses   [facility] patient discharged from? [facility]   Is LACE score < 7 ? No   Eligibility Readm Mgmt   Discharge diagnosis Right sided pneumonia   Does the patient have one of the following disease processes/diagnoses(primary or secondary)? Pneumonia   Does the patient have Home health ordered? No   Is there a DME ordered? No   Prep survey completed? Yes            JASMYNE ZHOU - Registered Nurse

## 2024-08-19 LAB
BACTERIA SPEC ANAEROBE CULT: NORMAL
LAB AP CASE REPORT: NORMAL
LAB AP DIAGNOSIS COMMENT: NORMAL
Lab: NORMAL
P JIROVECII DNA L RESP QL NAA+NON-PROBE: NEGATIVE
PATH REPORT.FINAL DX SPEC: NORMAL
PATH REPORT.GROSS SPEC: NORMAL
REF LAB TEST METHOD: NORMAL

## 2024-08-19 NOTE — PAYOR COMM NOTE
"This is dc notification for Young Ames.    DC on 8/17/24 to home.    AUTHORIZATION: [id number]   PLEASE FAX OR CALL DETERMINATION TO CONTACT BELOW:   THANK YOU.      Swathi Mills RN, CCM  Utilization Nurse  [address] [place] [address]   [phone]  Fx [phone]     Young Ames (73 y.o. Male)       Date of Birth   1950    Social Security Number       Address   [address] [place] [address] [place] [address]    Home Phone       MRN   [id number]       Gnosticist   Unknown    Marital Status                               Admission Date   8/12/24    Admission Type   Emergency    Admitting Provider   Evelyn Felder MD    Attending Provider       Department, Room/Bed   [facility] PROGRESS CARE, 2116/1       Discharge Date   8/17/2024    Discharge Disposition   Home or Self Care    Discharge Destination                                 Attending Provider: (none)   Allergies: No Known Allergies    Isolation: None   Infection: None   Code Status: Prior    Ht: 177.8 cm (70\")   Wt: 93.8 kg (206 lb 12.7 oz)    Admission Cmt: None   Principal Problem: Large pleural effusion [J90]                   Active Insurance as of 8/12/2024       Primary Coverage       Payor Plan Insurance Group Employer/Plan Group    AETNA MEDICARE REPLAC[id number]       Payor Plan Address Payor Plan Phone Number Payor Plan Fax Number Effective Dates    [address] [phone]  1/1/2024 - None Entered    [facility] [id number]         Subscriber Name Subscriber Birth Date Member ID       YOUNG AMES 1950 [id number]                     Emergency Contacts        (Rel.) Home Phone Work Phone Mobile Phone    AMIRA AMES (Spouse) -- -- [phone]                 Discharge Summary        Asya Marques APRN at 08/17/24 1442       Attestation signed by Roxie Kraus MD at 08/18/24 9537    I have reviewed this documentation and agree.          "           Date of Discharge:  8/17/2024    Discharge Diagnosis:     Right sided pneumonia  Large right pleural effusion  Small Cell Lung Cancer  Atrial Fibrillation  Hypertension  BPH  GERD    Presenting Problem/History of Present Illness  Active Hospital Problems    Diagnosis  POA    **Large pleural effusion [J90]  Yes    Primary lung cancer, right [C34.91]  Unknown    Pneumonia due to infectious organism [J18.9]  Unknown      Resolved Hospital Problems   No resolved problems to display.          Hospital Course    Patient presented to [facility] ED on 8/12/2024 with complaints of increasing shortness of breath that had progressed over the previous week.  He failed outpatient antibiotics x 3 to acute include cephalexin, Zithromax, and Augmentin.  Patient follows with pulmonology who recommended he report to ED for chest CT and IV antibiotics, as well as bronchoscopy.  Patient had CT that showed large right pleural effusion with dense consolidation in the right middle and lower lobe.  He was also found to have new nodules, as well as enlarged paratracheal and right hilar lymph nodes.  While in ED he was found to be in atrial fibrillation with RVR and was started on antiarrhythmic.  Cardiology was consulted.  Patient did convert to sinus rhythm.  He also underwent thoracentesis with 2000 cc removed.  Bronchoscopy was performed on 8/13/2024.  Brushing was performed that were positive for malignant cells consistent with non-small cell carcinoma.  On 8/16/2024 patient had Ion bronchoscopy with tissue sampling.  Pathology onsite confirmed diagnosis of NSCLCA.  Oncology was consulted.  He is scheduled to see  on 9/3/2024.  He will need an outpatient PET scan and determine plan of action.  He is also scheduled with cardiology, Dr. Pavon on 8/27/2024.  Patient will discharge with oral antibiotic to include Augmentin.  He will also discharged on anticoagulation Eliquis 5 mg twice a day.  Patient is  hemodynamically stable at time of discharge.  Above plan was discussed with patient, wife, and daughter who is a nurse.  All questions were answered.    Procedures Performed    Procedure(s):  BRONCHOSCOPY WITH BRONCHIAL WASHING AND BRONCHIAL BRUSHING  -------------------    Procedure(s):  BRONCHOSCOPY WITH ION ROBOT WITH CRYO BIOPSY  -------------------  08/17 1158 Walking Oximetry    Consults:   Consults       Date and Time Order Name Status Description    8/14/2024  4:24 PM Hematology & Oncology Inpatient Consult Completed     8/14/2024  9:53 AM Inpatient Cardiology Consult Completed     8/14/2024  9:50 AM Inpatient Cardiology Consult Completed             Pertinent Test Results:    Lab Results (most recent)       Procedure Component Value Units Date/Time    POC Glucose 4x Daily Before Meals & at Bedtime [[id number]]  (Abnormal) Collected: 08/17/24 1106    Specimen: Blood Updated: 08/17/24 1108     Glucose 213 mg/dL      Comment: Serial Number: [id number]:  [id number]       Magnesium [[id number]]  (Normal) Collected: 08/17/24 0929    Specimen: Blood Updated: 08/17/24 1010     Magnesium 2.0 mg/dL     Comprehensive Metabolic Panel [[id number]]  (Abnormal) Collected: 08/17/24 0929    Specimen: Blood Updated: 08/17/24 1010     Glucose 124 mg/dL      BUN 30 mg/dL      Creatinine 1.01 mg/dL      Sodium 142 mmol/L      Potassium 3.6 mmol/L      Chloride 111 mmol/L      CO2 21.5 mmol/L      Calcium 8.5 mg/dL      Total Protein 5.3 g/dL      Albumin 3.0 g/dL      ALT (SGPT) 41 U/L      AST (SGOT) 29 U/L      Alkaline Phosphatase 43 U/L      Total Bilirubin 0.3 mg/dL      Globulin 2.3 gm/dL      A/G Ratio 1.3 g/dL      BUN/Creatinine Ratio 29.7     Anion Gap 9.5 mmol/L      eGFR 78.0 mL/min/1.73     Narrative:      GFR Normal >60  Chronic Kidney Disease <60  Kidney Failure <15    The GFR formula is only valid for adults with stable renal function between ages 18 and 70.    CBC & Differential [[id number]]  (Abnormal)  Collected: 08/17/24 0929    Specimen: Blood Updated: 08/17/24 0944    Narrative:      The following orders were created for panel order CBC & Differential.  Procedure                               Abnormality         Status                     ---------                               -----------         ------                     CBC Auto Differential[[id number]]        Abnormal            Final result                 Please view results for these tests on the individual orders.    CBC Auto Differential [[id number]]  (Abnormal) Collected: 08/17/24 0929    Specimen: Blood Updated: 08/17/24 0944     WBC 12.61 10*3/mm3      RBC 4.00 10*6/mm3      Hemoglobin 12.5 g/dL      Hematocrit 37.3 %      MCV 93.3 fL      MCH 31.3 pg      MCHC 33.5 g/dL      RDW 13.2 %      RDW-SD 45.1 fl      MPV 9.7 fL      Platelets 230 10*3/mm3      Neutrophil % 84.5 %      Lymphocyte % 7.9 %      Monocyte % 6.5 %      Eosinophil % 0.3 %      Basophil % 0.1 %      Immature Grans % 0.7 %      Neutrophils, Absolute 10.65 10*3/mm3      Lymphocytes, Absolute 1.00 10*3/mm3      Monocytes, Absolute 0.82 10*3/mm3      Eosinophils, Absolute 0.04 10*3/mm3      Basophils, Absolute 0.01 10*3/mm3      Immature Grans, Absolute 0.09 10*3/mm3      nRBC 0.0 /100 WBC     Body Fluid Culture - Body Fluid, Pleural Cavity [[id number]] Collected: 08/13/24 1401    Specimen: Body Fluid from Pleural Cavity Updated: 08/17/24 0835     Body Fluid Culture No growth at 4 days     Gram Stain Moderate (3+) WBCs per low power field      No organisms seen    POC Glucose 4x Daily Before Meals & at Bedtime [[id number]]  (Abnormal) Collected: 08/17/24 0755    Specimen: Blood Updated: 08/17/24 0757     Glucose 133 mg/dL      Comment: Serial Number: [id number]:  [id number]       Blood Culture - Blood, Arm, Right [[id number]]  (Normal) Collected: 08/12/24 1834    Specimen: Blood from Arm, Right Updated: 08/16/24 1845     Blood Culture No growth at 4 days    Blood Culture - Blood,  Arm, Left [[id number]]  (Normal) Collected: 24 1744    Specimen: Blood from Arm, Left Updated: 24 1800     Blood Culture No growth at 4 days    Narrative:      Less than seven (7) mL's of blood was collected.  Insufficient quantity may yield false negative results.    Tissue Pathology Exam [[id number]] Collected: 24 1051    Specimen: Tissue from Lung, Right Lower Lobe Updated: 24 1414    STEVEN by IFA, Reflex 9-biomarkers profile [[id number]]  (Abnormal) Collected: 24    Specimen: Blood Updated: 08/15/24 1511     STEVEN Positive     Comment:                                      Negative   <1:80                                       Borderline  1:80                                       Positive   >1:80        Please note Comment     Comment: STEVEN Multiplex methodology was designed to detect up to 11 antibodies  of the 100+ antibodies that may be detected by STEVEN IFA methodology.       Narrative:      Performed at:  [address] [place] [address]  [id number]  : Yogi Venegas PhD, Phone:  [phone]    TAYLOR+DNA/DS+Antich+Centro+FA.. [[id number]] Collected: 24    Specimen: Blood Updated: 08/15/24 1511     Speckled Pattern 1:80     Comment: ICAP nomenclature: AC-2,4,5,29        Note: (Reference) Comment     Comment: Pattern              Potential Disease Association  -------------  ---------------------------------------------  Homogeneous    Systemic Lupus Erythematosus, Drug Induced                 Systemic Lupus Erythematosus, Chronic                 Autoimmune hepatitis, Juvenile Idiopathic                 Arthritis  -------------  ---------------------------------------------  Speckled       Sjogren Syndrome, Systemic Lupus                 Erythematosus, Subacute Cutaneous Lupus,                  Lupus, Congenital Heart Block,                 Mixed Connective Tissue Disease,                 Scleroderma-diffuse, Scleroderma-Autoimmune                  Myositis Overlap Syndrome, Systemic Lupus                 Qktxajswxljqp-Znoedlsfoco-Mxyxphpmuq                 Myositis Overlap Syndrome, Systemic                 Autoimmune Rheumatic Disease,                 Undifferentiated Connective Tissue Disease  -------------  ---------------------------------------------  Nucleolar      Systemic Sclerosis, Scleroderma-Autoimmune                 Myositis Overlap Syndrome, Sjogren                 Syndrome, Raynaud phenomenon, Pulmonary                 Arterial Hypertension, Systemic Autoimmune                 Rheumatic Disease, Cancer  -------------  ---------------------------------------------  Centromere     Scleroderma-CREST, Limited Cutaneous SSc,                 Raynaud's Phenomenon, Primary Biliary                 Cholangitis  -------------  ---------------------------------------------  Nuclear Dot    Primary Biliary Cholangitis  -------------  ---------------------------------------------  Nuclear        Primary Biliary Cholangitis, Autoimmune  Membrane       Hepatitis/Liver disease, Systemic Autoimmune                 Rheumatic Disease, Autoimmune Cytopenias,                 Linear Scleroderma, Antiphospholipid Syndrome  -------------  ---------------------------------------------        Anti-DNA (DS) Ab Qn <1 IU/mL      Comment:                                    Negative      <5                                     Equivocal  5 - 9                                     Positive      >9        RNP Antibodies <0.2 AI      Hernandez Antibodies <0.2 AI      Antiscleroderma-70 Antibodies <0.2 AI      Sjogren's Anti-SS-A <0.2 AI      Sjogren's Anti-SS-B <0.2 AI      Antichromatin Antibodies <0.2 AI      LEN-1 IgG <0.2 AI      Anti-Centromere B Antibodies <0.2 AI      See below: Comment     Comment: Autoantibody                       Disease Association  ------------------------------------------------------------                          Condition                   Frequency  ---------------------   ------------------------   ---------  Antinuclear Antibody,    SLE, mixed connective  Direct (STEVEN-D)           tissue diseases  ---------------------   ------------------------   ---------  dsDNA                    SLE                        40 - 60%  ---------------------   ------------------------   ---------  Chromatin                Drug induced SLE                90%                           SLE                        48 - 97%  ---------------------   ------------------------   ---------  SSA (Ro)                 SLE                        25 - 35%                           Sjogren's Syndrome         40 - 70%                            Lupus                 100%  ---------------------   ------------------------   ---------  SSB (La)                 SLE                             10%                           Sjogren's Syndrome              30%  ---------------------   -----------------------    ---------  Sm (anti-Smith)          SLE                        15 - 30%  ---------------------   -----------------------    ---------  RNP                      Mixed Connective Tissue                           Disease                         95%  (U1 nRNP,                SLE                        30 - 50%  anti-ribonucleoprotein)  Polymyositis and/or                           Dermatomyositis                 20%  ---------------------   ------------------------   ---------  Scl-70 (antiDNA          Scleroderma (diffuse)      20 - 35%  topoisomerase)           Crest                           13%  ---------------------   ------------------------   ---------  Gely-1                     Polymyositis and/or                           Dermatomyositis            20 - 40%  ---------------------   ------------------------   ---------  Centromere B             Scleroderma - Crest                           variant                         80%       Narrative:      Performed at:  01 - [facility]  [address]  [id number]  : Yogi Venegas PhD, Phone:  [phone]    Respiratory Culture - Wash, Lung, R [[id number]] Collected: 08/13/24 0820    Specimen: Wash from Lung, R Updated: 08/15/24 0934     Respiratory Culture Light growth (2+) Normal respiratory scottie. No S. aureus or Pseudomonas aeruginosa detected. Final report.     Gram Stain Moderate (3+) WBCs seen      Few (2+) Bronchial epithelial cells      Few (2+) Gram positive cocci in clusters      Rare (1+) Gram negative bacilli    ANCA Panel [[id number]] Collected: 08/12/24 1834    Specimen: Blood Updated: 08/15/24 0819     ANTI-MPO ANTIBODIES <0.2 units      ANTI-PR3 ANTIBODIES <0.2 units      C-ANCA <1:20 titer      P-ANCA <1:20 titer      Comment: The presence of positive fluorescence exhibiting P-ANCA or C-ANCA  patterns alone is not specific for the diagnosis of Wegener's  Granulomatosis (WG) or microscopic polyangiitis. Decisions about  treatment should not be based solely on ANCA IFA results.  The  International ANCA Group Consensus recommends follow up testing of  positive sera with both NC-3 and MPO-ANCA enzyme immunoassays. As  many as 5% serum samples are positive only by EIA.  Ref. AM J Clin Pathol 1999;111:507-513.        Atypical pANCA <1:20 titer      Comment: The atypical pANCA pattern has been observed in a significant  percentage of patients with ulcerative colitis, primary sclerosing  cholangitis and autoimmune hepatitis.       Narrative:      Performed at:  01 - [facility]  [id number]  : Meera Connelly MD, Phone:  [phone]  Performed at:  02 - [facility]  [id number]  : Yogi Venegas PhD, Phone:  [phone]    Non-gynecologic Cytology [[id number]] Collected: 08/13/24 1401    Specimen: Pleural Fluid from Pleural Cavity Updated: 08/15/24 0811     Case Report --     Medical Cytology Report                  "          Case: [id number]                                  Authorizing Provider:  Kelvin Gonzalez MD             Collected:           08/13/2024 02:01 PM          Ordering Location:     [facility]  Received:            08/14/2024 10:25 AM                                 SUITES                                                                       Pathologist:           Al Reese MD                                                            Specimen:    Pleural Cavity                                                                              Final Diagnosis --     Pleural fluid with smears and cytospin:  Malignant cells present consistent with non-small cell carcinoma    GUILLERMO       Gross Description --     1. Pleural Cavity.  Received in carbowax and designated \"Pleural Cavity\" are 12 mL of cloudy brown-colored fluid. Particulate matter is present. This specimen is processed as per protocol.          Basic Metabolic Panel [[id number]]  (Abnormal) Collected: 08/15/24 0029    Specimen: Blood Updated: 08/15/24 0112     Glucose 147 mg/dL      BUN 40 mg/dL      Creatinine 1.31 mg/dL      Sodium 143 mmol/L      Potassium 4.2 mmol/L      Comment: Specimen hemolyzed.  Result may be falsely elevated.        Chloride 112 mmol/L      CO2 21.1 mmol/L      Calcium 8.0 mg/dL      BUN/Creatinine Ratio 30.5     Anion Gap 9.9 mmol/L      eGFR 57.1 mL/min/1.73     Narrative:      GFR Normal >60  Chronic Kidney Disease <60  Kidney Failure <15    The GFR formula is only valid for adults with stable renal function between ages 18 and 70.    CBC & Differential [[id number]]  (Abnormal) Collected: 08/15/24 0029    Specimen: Blood Updated: 08/15/24 0036    Narrative:      The following orders were created for panel order CBC & Differential.  Procedure                               Abnormality         Status                     ---------                               -----------         ------                     CBC Auto " Differential[[id number]]        Abnormal            Final result                 Please view results for these tests on the individual orders.    CBC Auto Differential [[id number]]  (Abnormal) Collected: 08/15/24 0029    Specimen: Blood Updated: 08/15/24 0036     WBC 18.78 10*3/mm3      RBC 3.87 10*6/mm3      Hemoglobin 12.1 g/dL      Hematocrit 36.6 %      MCV 94.6 fL      MCH 31.3 pg      MCHC 33.1 g/dL      RDW 13.2 %      RDW-SD 46.2 fl      MPV 9.9 fL      Platelets 244 10*3/mm3      Neutrophil % 88.3 %      Lymphocyte % 5.2 %      Monocyte % 5.8 %      Eosinophil % 0.0 %      Basophil % 0.1 %      Immature Grans % 0.6 %      Neutrophils, Absolute 16.59 10*3/mm3      Lymphocytes, Absolute 0.98 10*3/mm3      Monocytes, Absolute 1.08 10*3/mm3      Eosinophils, Absolute 0.00 10*3/mm3      Basophils, Absolute 0.01 10*3/mm3      Immature Grans, Absolute 0.12 10*3/mm3      nRBC 0.0 /100 WBC     Pathology Consultation [[id number]] Collected: 08/13/24 1401    Specimen: Pleural Fluid from Pleural Cavity Updated: 08/14/24 1334     Final Diagnosis --     Positive for non-small cell carcinoma       Case Report --     Surgical Pathology Report                         Case: [id number]                                  Authorizing Provider:  Kelvin Gonzalez MD             Collected:           08/13/2024 02:01 PM          Ordering Location:     [facility]       Received:            08/14/2024 07:00 AM                                 EMERGENCY DEPARTMENT                                                         Pathologist:           Jevon Hsu MD                                                             Specimen:    Pleural Cavity                                                                             AFB Culture - Wash, Lung, R [[id number]] Collected: 08/13/24 0820    Specimen: Wash from Lung, R Updated: 08/14/24 1333     AFB Stain No acid fast bacilli seen on concentrated smear    AFB Culture - Body Fluid, Pleural  "Cavity [[id number]] Collected: 08/13/24 1401    Specimen: Body Fluid from Pleural Cavity Updated: 08/14/24 1332     AFB Stain No acid fast bacilli seen on concentrated smear    Non-gynecologic Cytology [[id number]] Collected: 08/13/24 0813    Specimen: Brushing from Lung, R; Wash from Lung, R Updated: 08/14/24 1304     Case Report --     Medical Cytology Report                           Case: [id number]                                  Authorizing Provider:  Kelvin Gonzalez MD             Collected:           08/13/2024 08:13 AM          Ordering Location:     [facility]  Received:            08/13/2024 12:23 PM                                 SUITES                                                                       Pathologist:           Jevon Hsu MD                                                             Specimens:   1) - Lung, R                                                                                        2) - Lung, R                                                                                Final Diagnosis --     Specimen #1 (Lung, right, bronch wash, smears and cytospin preparation):    Positive for malignancy, non-small cell carcinoma    Specimen #2 (Lung, right, bronch brush, smear preparation only):    Positive for malignancy, non-small cell carcinoma    JPR         Gross Description --     1. Lung, R.  Received in carbowax and designated \"bronchial washing, right lung\" are 43 mL of hazy, green fluid. Particulate matter is not present. This specimen is processed as per protocol.      2. Lung, R.  Received in 95% alcohol are four slides, designated \"right lung.\" These are pap stained for evaluation.          TSH [[id number]]  (Normal) Collected: 08/14/24 1000    Specimen: Blood Updated: 08/14/24 1136     TSH 0.626 uIU/mL     Magnesium [[id number]]  (Normal) Collected: 08/14/24 1000    Specimen: Blood Updated: 08/14/24 1034     Magnesium 2.0 mg/dL     Basic Metabolic Panel " [[id number]]  (Abnormal) Collected: 08/14/24 1000    Specimen: Blood Updated: 08/14/24 1034     Glucose 256 mg/dL      BUN 36 mg/dL      Creatinine 1.30 mg/dL      Sodium 139 mmol/L      Potassium 4.3 mmol/L      Comment: Specimen hemolyzed.  Result may be falsely elevated.        Chloride 107 mmol/L      CO2 19.4 mmol/L      Calcium 8.4 mg/dL      BUN/Creatinine Ratio 27.7     Anion Gap 12.6 mmol/L      eGFR 57.6 mL/min/1.73     Narrative:      GFR Normal >60  Chronic Kidney Disease <60  Kidney Failure <15    The GFR formula is only valid for adults with stable renal function between ages 18 and 70.    Flow Cytometry [[id number]] Collected: 08/13/24 0820    Specimen: Wash from Lung, R Updated: 08/14/24 0933     Consult Global Result Comment^Text^TXT     Comment: Right lung:  Few B-cells with kappa excess (limited sample) (see   comments)  DISCLAIMER: REFER TO HARDCOPY OR PDF FOR COMPLETE RESULT.   If synopsis provided, clinical decisions should not be   based on this interfaced synopsis alone.  Performed at:  1 - [facility]  [address] [address]  :  Kassandra Villagomez M.D., Ph.D., Phone:  [phone]       Narrative:      Performed at:  1 - [facility]  [address], [place] [address]  :  Kassandra Villagomez M.D., Ph.D., Phone:  [phone]    Protein, Body Fluid - Pleural Fluid, Pleural Cavity [[id number]] Collected: 08/13/24 1401    Specimen: Pleural Fluid from Pleural Cavity Updated: 08/13/24 2003     Protein, Total, Fluid 3.7 g/dL     Narrative:      No Reference Ranges Established.    A serous fluid total fluid (TP) greater than 50 percent of the serum TP suggests the fluid is an exudate.      1. Pleural TP/Serum TP >0.5  2. Pleural LD/Serum LD >0.6  3. Pleural LD >2/3 of the upper limit of normal for serum LDH    This test was developed, it performance characteristics determined and  judged suitable for clinical purposes by [facility].  It has not been cleared or approved by the FDA.  The laboratory is regulated under CLIA as qualified to perform high-complexity testing.     Lactate Dehydrogenase, Body Fluid - Pleural Fluid, Pleural Cavity [[id number]] Collected: 08/13/24 1401    Specimen: Pleural Fluid from Pleural Cavity Updated: 08/13/24 2003     Lactate Dehydrogenase (LD), Fluid 538 U/L     Narrative:      No Reference Ranges Established.    Serous fluid LDH greater than 60 percent of the serum LDH or serous fluid LDH two-thirds of the upper limit of normal for serum LDH suggests the fluid is an exudate.     1. Pleural TP/Serum TP >0.5  2. Pleural LD/Serum LD >0.6  3. Pleural LD >2/3 of the upper limit of normal for serum LDH    This test was developed, it performance characteristics determined and judged suitable for clinical purposes by [facility] Laboratory.  It has not been cleared or approved by the FDA.  The laboratory is regulated under CLIA as qualified to perform high-complexity testing.     Glucose, Body Fluid - Pleural Fluid, Pleural Cavity [[id number]] Collected: 08/13/24 1401    Specimen: Pleural Fluid from Pleural Cavity Updated: 08/13/24 2003     Glucose, Fluid 182 mg/dL     Narrative:      No Reference Ranges Established.    Serous fluid glucose less than 60 mg/dL or less than 30 mg/dL below serum glucose suggests an infectious or malignant exudate.     This test was developed, it performance characteristics determined and judged suitable for clinical purposes by [facility].  It has not been cleared or approved by the FDA.  The laboratory is regulated under CLIA as qualified to perform high-complexity testing.     Body Fluid Cell Count With Differential - Pleural Fluid, Pleural Cavity [[id number]]  (Abnormal) Collected: 08/13/24 1401    Specimen: Pleural Fluid from Pleural Cavity Updated: 08/13/24 1545     Narrative:      The following orders were created for panel order Body Fluid Cell Count With Differential - Pleural Fluid, Pleural Cavity.  Procedure                               Abnormality         Status                     ---------                               -----------         ------                     Body fluid cell count - ...[[id number]]  Abnormal            Final result               Body fluid differential ...[[id number]]                      Final result               Path Consult Reflex[[id number]]                              Final result                 Please view results for these tests on the individual orders.    Body fluid differential - Pleural Fluid, Pleural Cavity [[id number]] Collected: 08/13/24 1401    Specimen: Pleural Fluid from Pleural Cavity Updated: 08/13/24 1541     Neutrophils, Fluid 2 %      Lymphocytes, Fluid 2 %      Monocytes, Fluid 16 %      Unclassified Cells, Fluid 76 %      Mesothelial Cells, Fluid 4 %     Narrative:      Concentrated Smear by Cytocentrifuge Prep.    pH, Body Fluid - Pleural Fluid, Pleural Cavity [[id number]]  (Abnormal) Collected: 08/13/24 1401    Specimen: Pleural Fluid from Pleural Cavity Updated: 08/13/24 1517     pH, Fluid 8.00    Path Consult Reflex [[id number]] Collected: 08/13/24 1401    Specimen: Pleural Fluid from Pleural Cavity Updated: 08/13/24 1513     Pathology Review Yes    Body fluid cell count - Pleural Fluid, Pleural Cavity [[id number]]  (Abnormal) Collected: 08/13/24 1401    Specimen: Pleural Fluid from Pleural Cavity Updated: 08/13/24 1417     Color, Fluid Red     Appearance, Fluid Cloudy     Nucleated Cells, Fluid 1,539 /mm3      Method: Automated Sysmex XN Method    Narrative:      No reference range established. Physician to interpret results with clinical findings.    Anaerobic Culture - Pleural Fluid, Pleural Cavity [[id number]] Collected: 08/13/24 1401    Specimen: Pleural Fluid from Pleural Cavity Updated: 08/13/24 1406    Respiratory  Panel PCR w/COVID-19(SARS-CoV-2) SARAH/ELENI/NEY/PAD/COR/LUCRETIA In-House, NP Swab in UTM/VTM, 2 HR TAT - Wash, Lung, R [[id number]]  (Normal) Collected: 08/13/24 0820    Specimen: Wash from Lung, R Updated: 08/13/24 1016     ADENOVIRUS, PCR Not Detected     Coronavirus 229E Not Detected     Coronavirus HKU1 Not Detected     Coronavirus NL63 Not Detected     Coronavirus OC43 Not Detected     COVID19 Not Detected     Human Metapneumovirus Not Detected     Human Rhinovirus/Enterovirus Not Detected     Influenza A PCR Not Detected     Influenza B PCR Not Detected     Parainfluenza Virus 1 Not Detected     Parainfluenza Virus 2 Not Detected     Parainfluenza Virus 3 Not Detected     Parainfluenza Virus 4 Not Detected     RSV, PCR Not Detected     Bordetella pertussis pcr Not Detected     Bordetella parapertussis PCR Not Detected     Chlamydophila pneumoniae PCR Not Detected     Mycoplasma pneumo by PCR Not Detected    Narrative:      In the setting of a positive respiratory panel with a viral infection PLUS a negative procalcitonin without other underlying concern for bacterial infection, consider observing off antibiotics or discontinuation of antibiotics and continue supportive care. If the respiratory panel is positive for atypical bacterial infection (Bordetella pertussis, Chlamydophila pneumoniae, or Mycoplasma pneumoniae), consider antibiotic de-escalation to target atypical bacterial infection.    Aspergillus Galactomannan Antigen - Wash, Lung, R [[id number]] Collected: 08/13/24 0820    Specimen: Wash from Lung, R Updated: 08/13/24 0903    Cytomegalovirus (CMV) By PCR ([place]) (NEY) - Wash, Lung, R [[id number]] Collected: 08/13/24 0820    Specimen: Wash from Lung, R Updated: 08/13/24 0903    Pneumocystis PCR - Wash, Lung, R [[id number]] Collected: 08/13/24 0820    Specimen: Wash from Lung, R Updated: 08/13/24 0903    Fungus Culture - Wash, Lung, R [[id number]] Collected: 08/13/24 0820    Specimen: Wash from Lung, R Updated:  08/13/24 0903    Histoplasma Antigen, CSF or BAL - Wash, Lung, R [[id number]] Collected: 08/13/24 0820    Specimen: Wash from Lung, R Updated: 08/13/24 0903    High Sensitivity Troponin T 2Hr [[id number]]  (Abnormal) Collected: 08/12/24 2305    Specimen: Blood from Arm, Right Updated: 08/12/24 2341     HS Troponin T 53 ng/L      Troponin T Delta -4 ng/L     Narrative:      High Sensitive Troponin T Reference Range:  <14.0 ng/L- Negative Female for AMI  <22.0 ng/L- Negative Male for AMI  >=14 - Abnormal Female indicating possible myocardial injury.  >=22 - Abnormal Male indicating possible myocardial injury.   Clinicians would have to utilize clinical acumen, EKG, Troponin, and serial changes to determine if it is an Acute Myocardial Infarction or myocardial injury due to an underlying chronic condition.         Comprehensive Metabolic Panel [[id number]]  (Abnormal) Collected: 08/12/24 2030    Specimen: Blood Updated: 08/12/24 2058     Glucose 167 mg/dL      BUN 23 mg/dL      Creatinine 1.28 mg/dL      Sodium 137 mmol/L      Potassium 4.4 mmol/L      Comment: Specimen hemolyzed.  Result may be falsely elevated.        Chloride 104 mmol/L      CO2 20.8 mmol/L      Calcium 8.6 mg/dL      Total Protein 6.0 g/dL      Albumin 3.3 g/dL      ALT (SGPT) 20 U/L      Comment: Specimen hemolyzed.  Result may  be falsely elevated.        AST (SGOT) 39 U/L      Comment: Specimen hemolyzed.  Result may be falsely elevated.        Alkaline Phosphatase 55 U/L      Total Bilirubin 0.2 mg/dL      Globulin 2.7 gm/dL      A/G Ratio 1.2 g/dL      BUN/Creatinine Ratio 18.0     Anion Gap 12.2 mmol/L      eGFR 58.7 mL/min/1.73     Narrative:      GFR Normal >60  Chronic Kidney Disease <60  Kidney Failure <15    The GFR formula is only valid for adults with stable renal function between ages 18 and 70.    Single High Sensitivity Troponin T [[id number]]  (Abnormal) Collected: 08/12/24 2030    Specimen: Blood Updated: 08/12/24 2058     HS Troponin  T 57 ng/L      Comment: Specimen hemolyzed.  Results may be falsely decreased.       Narrative:      High Sensitive Troponin T Reference Range:  <14.0 ng/L- Negative Female for AMI  <22.0 ng/L- Negative Male for AMI  >=14 - Abnormal Female indicating possible myocardial injury.  >=22 - Abnormal Male indicating possible myocardial injury.   Clinicians would have to utilize clinical acumen, EKG, Troponin, and serial changes to determine if it is an Acute Myocardial Infarction or myocardial injury due to an underlying chronic condition.         BNP [[id number]]  (Normal) Collected: 08/12/24 2030    Specimen: Blood Updated: 08/12/24 2055     proBNP 118.0 pg/mL     Narrative:      This assay is used as an aid in the diagnosis of individuals suspected of having heart failure. It can be used as an aid in the diagnosis of acute decompensated heart failure (ADHF) in patients presenting with signs and symptoms of ADHF to the emergency department (ED). In addition, NT-proBNP of <300 pg/mL indicates ADHF is not likely.    Age Range Result Interpretation  NT-proBNP Concentration (pg/mL:      <50             Positive            >450                   Gray                 300-450                    Negative             <300    50-75           Positive            >900                  Gray                300-900                  Negative            <300      >75             Positive            >1800                  Gray                300-1800                  Negative            <300    S. Pneumo Ag Urine or CSF - Urine, Urine, Clean Catch [[id number]]  (Normal) Collected: 08/12/24 1933    Specimen: Urine, Clean Catch Updated: 08/12/24 1957     Strep Pneumo Ag Negative    Legionella Antigen, Urine - Urine, Urine, Clean Catch [[id number]]  (Normal) Collected: 08/12/24 1933    Specimen: Urine, Clean Catch Updated: 08/12/24 1957     LEGIONELLA ANTIGEN, URINE Negative    Histoplasma Ag Ur - Urine, Urine, Clean Catch [[id number]]  Collected: 08/12/24 1933    Specimen: Urine, Clean Catch Updated: 08/12/24 1936    Protime-INR [[id number]]  (Normal) Collected: 08/12/24 1834    Specimen: Blood Updated: 08/12/24 1852     Protime 10.9 Seconds      INR 1.00    Respiratory Panel PCR w/COVID-19(SARS-CoV-2) SARAH/ELENI/NEY/PAD/COR/LUCRETIA In-House, NP Swab in UTM/VTM, 2 HR TAT - Swab, Nasopharynx [[id number]]  (Normal) Collected: 08/12/24 1749    Specimen: Swab from Nasopharynx Updated: 08/12/24 1841     ADENOVIRUS, PCR Not Detected     Coronavirus 229E Not Detected     Coronavirus HKU1 Not Detected     Coronavirus NL63 Not Detected     Coronavirus OC43 Not Detected     COVID19 Not Detected     Human Metapneumovirus Not Detected     Human Rhinovirus/Enterovirus Not Detected     Influenza A PCR Not Detected     Influenza B PCR Not Detected     Parainfluenza Virus 1 Not Detected     Parainfluenza Virus 2 Not Detected     Parainfluenza Virus 3 Not Detected     Parainfluenza Virus 4 Not Detected     RSV, PCR Not Detected     Bordetella pertussis pcr Not Detected     Bordetella parapertussis PCR Not Detected     Chlamydophila pneumoniae PCR Not Detected     Mycoplasma pneumo by PCR Not Detected    Narrative:      In the setting of a positive respiratory panel with a viral infection PLUS a negative procalcitonin without other underlying concern for bacterial infection, consider observing off antibiotics or discontinuation of antibiotics and continue supportive care. If the respiratory panel is positive for atypical bacterial infection (Bordetella pertussis, Chlamydophila pneumoniae, or Mycoplasma pneumoniae), consider antibiotic de-escalation to target atypical bacterial infection.    Strep Pneumoniae Antibody 7 Serotypes [[id number]] Collected: 08/12/24 1834    Specimen: Blood Updated: 08/12/24 1840    Scan Slide [[id number]] Collected: 08/12/24 1732    Specimen: Blood Updated: 08/12/24 1837     Scan Slide --     Comment: See Manual Differential Results       Manual  Differential [[id number]]  (Abnormal) Collected: 08/12/24 1732    Specimen: Blood Updated: 08/12/24 1837     Neutrophil % 82.0 %      Lymphocyte % 7.0 %      Monocyte % 4.0 %      Eosinophil % 6.0 %      Basophil % 1.0 %      Neutrophils Absolute 11.05 10*3/mm3      Lymphocytes Absolute 0.94 10*3/mm3      Monocytes Absolute 0.54 10*3/mm3      Eosinophils Absolute 0.81 10*3/mm3      Basophils Absolute 0.13 10*3/mm3      RBC Morphology Normal     WBC Morphology Normal     Platelet Morphology Normal    POC Lactate [[id number]]  (Normal) Collected: 08/12/24 1748    Specimen: Blood Updated: 08/12/24 1752     Lactate 1.6 mmol/L      Comment: Serial Number: [id number]:  [id number]                Results for orders placed during the hospital encounter of 08/12/24    Adult Transthoracic Echo Complete W/ Cont if Necessary Per Protocol    Interpretation Summary    Left ventricular ejection fraction appears to be 66 - 70%.    Left ventricular diastolic function was indeterminate.    The left atrial cavity is mildly dilated.    Left atrial volume is mildly increased.    Estimated right ventricular systolic pressure from tricuspid regurgitation is mildly elevated (35-45 mmHg).    Moderate pulmonary hypertension is present.            Condition on Discharge:  stable    Vital Signs  Temp:  [97.2 °F (36.2 °C)-97.8 °F (36.6 °C)] 97.7 °F (36.5 °C)  Heart Rate:  [] 88  Resp:  [10-22] 18  BP: (117-132)/(72-80) 122/72      Physical Exam  Vitals reviewed.   HENT:      Head: Normocephalic and atraumatic.      Right Ear: External ear normal.      Left Ear: External ear normal.      Nose: Nose normal.      Mouth/Throat:      Mouth: Mucous membranes are moist.   Eyes:      General:         Right eye: No discharge.         Left eye: No discharge.   Cardiovascular:      Rate and Rhythm: Normal rate.   Pulmonary:      Effort: Pulmonary effort is normal.      Breath sounds: Rhonchi present.   Abdominal:      General: Bowel sounds are  normal.      Palpations: Abdomen is soft.   Musculoskeletal:         General: Normal range of motion.   Skin:     General: Skin is warm and dry.   Neurological:      Mental Status: He is alert and oriented to person, place, and time.   Psychiatric:         Mood and Affect: Mood normal.         Behavior: Behavior normal.              Discharge Disposition  Home or Self Care    Discharge Medications     Discharge Medications        New Medications        Instructions Start Date   amiodarone 200 MG tablet  Commonly known as: PACERONE   200 mg, Oral, Every 12 Hours Scheduled      amoxicillin-clavulanate 500-125 MG per tablet  Commonly known as: Augmentin   500 mg, Oral, 2 Times Daily      apixaban 5 MG tablet tablet  Commonly known as: ELIQUIS   5 mg, Oral, Every 12 Hours Scheduled      metoprolol tartrate 25 MG tablet  Commonly known as: LOPRESSOR   25 mg, Oral, Every 12 Hours Scheduled      midodrine 5 MG tablet  Commonly known as: PROAMATINE   5 mg, Oral, 3 Times Daily Before Meals      predniSONE 10 MG tablet  Commonly known as: DELTASONE   Take 2 tablets by mouth Daily for 1 day, THEN 1 tablet Daily for 2 days.   Start Date: August 18, 2024            Continue These Medications        Instructions Start Date   acetaminophen 325 MG tablet  Commonly known as: TYLENOL   650 mg, Oral, Every 4 Hours PRN      albuterol sulfate  (90 Base) MCG/ACT inhaler  Commonly known as: PROVENTIL HFA;VENTOLIN HFA;PROAIR HFA   2 puffs, Inhalation, Every 4 Hours PRN      APPLE CIDER VINEGAR PO   1 capsule, Oral, Daily      Centrum Silver 50+Men tablet tablet  Generic drug: multivitamin with minerals   1 tablet, Oral, Every Morning      NON FORMULARY   2 tablets, Oral, Nightly, Zzzquil      pantoprazole 40 MG EC tablet  Commonly known as: PROTONIX   40 mg, Oral, Daily      simvastatin 20 MG tablet  Commonly known as: ZOCOR   20 mg, Oral, Nightly      tamsulosin 0.4 MG capsule 24 hr capsule  Commonly known as: FLOMAX   1 capsule,  Oral, Nightly      thiamine 100 MG tablet  tablet  Commonly known as: VITAMIN B-1   100 mg, Oral, Every Morning      VITAMIN D-3 PO   1 tablet, Oral, Daily             Stop These Medications      aspirin 81 MG EC tablet     fish oil 1000 MG capsule capsule     metoprolol succinate XL 25 MG 24 hr tablet  Commonly known as: TOPROL-XL              Discharge Diet:   Diet Instructions       Diet: Regular/House Diet; Regular (IDDSI 7); Thin (IDDSI 0)      Discharge Diet: Regular/House Diet    Texture: Regular (IDDSI 7)    Fluid Consistency: Thin (IDDSI 0)            Activity at Discharge:   Activity Instructions       Activity as Tolerated              Follow-up Appointments  Future Appointments   Date Time Provider Department Center   8/27/2024 10:30 AM Carlos Pavon MD [facility]   9/4/2024  9:35 AM LAB MD [facility]   9/4/2024  9:45 AM Arben Tim MD [facility]     Additional Instructions for the Follow-ups that You Need to Schedule       Discharge Follow-up with PCP   As directed       Currently Documented PCP:    Abner Funes APRN    PCP Phone Number:    [phone]     Follow Up Details: 1-2 weeks        Discharge Follow-up with Specified Provider: Dr Pavon, cardiology   As directed      To: Dr Pavon, cardiology   Follow Up Details: August 27, 2024        Discharge Follow-up with Specified Provider: Dr. Etienne, oncology   As directed      To: Dr. Etienne, oncology   Follow Up Details: September 3, 2024        Discharge Follow-up with Specified Provider: Dr. Gonzalez, pulmonology; 1 Month   As directed      To: Dr. Gonzalez, pulmonology   Follow Up: 1 Month                Test Results Pending at Discharge  Pending Labs       Order Current Status    Anaerobic Culture - Pleural Fluid, Pleural Cavity In process    Aspergillus Galactomannan Antigen - Wash, Lung, R In process    Cytomegalovirus (CMV) By PCR ([place]) (NEY) - Wash, Lung, R In process    Fungus Culture - Wash,  Lung, R In process    Histoplasma Ag Ur - Urine, Urine, Clean Catch In process    Histoplasma Antigen, CSF or BAL - Wash, Lung, R In process    Pneumocystis PCR - Wash, Lung, R In process    Strep Pneumoniae Antibody 7 Serotypes In process    Tissue Pathology Exam In process    AFB Culture - Body Fluid, Pleural Cavity Preliminary result    AFB Culture - Wash, Lung, R Preliminary result    Blood Culture - Blood, Arm, Left Preliminary result    Blood Culture - Blood, Arm, Right Preliminary result    Body Fluid Culture - Body Fluid, Pleural Cavity Preliminary result             Asya Marques, APRN  08/17/24  14:47 EDT    Time: Discharge 29 min          Electronically signed by Gwyn Carreno MD at 08/18/24 0936       Discharge Order (From admission, onward)       Start     Ordered    08/17/24 1438  Discharge patient  Once        Expected Discharge Date: 08/17/24   Expected Discharge Time: Afternoon   Discharge Disposition: Home or Self Care   Physician of Record for Attribution - Please select from Treatment Team: GWYN CARRENO [[id number]]   Review needed by CMO to determine Physician of Record: No   Please choose which facility the patient is currently admitted if they are being discharged to another facility or unit.:  [facility]   Mode: Family      Question Answer Comment   Physician of Record for Attribution - Please select from Treatment Team GWYN CARRENO    Review needed by CMO to determine Physician of Record No    Please choose which facility the patient is currently admitted if they are being discharged to another facility or unit. KATIE Marrero    Mode: Family        08/17/24 8234

## 2024-08-20 ENCOUNTER — READMISSION MANAGEMENT (OUTPATIENT)
Dept: CALL CENTER | Facility: HOSPITAL | Age: 74
End: 2024-08-20
Payer: MEDICARE

## 2024-08-20 LAB
FUNGUS WND CULT: NORMAL
MYCOBACTERIUM SPEC CULT: NORMAL
MYCOBACTERIUM SPEC CULT: NORMAL
NIGHT BLUE STAIN TISS: NORMAL
NIGHT BLUE STAIN TISS: NORMAL

## 2024-08-20 NOTE — PROGRESS NOTES
HEMATOLOGY ONCOLOGY OUTPATIENT FOLLOW UP       Patient name: Young Ames  : 1950  MRN: [id number]  Primary Care Physician: Abner Funes APRN  Referring Physician: No ref. provider found  Reason For Consult: Adenocarcinoma of the right lung.     History of Present Illness:    2024: Mr. Ames first came to [facility] complaining of dyspnea. This had been getting worse over a short period of time. It was associated to unintended weight loss of approximately 15 lbs in around one month. He was diagnosed with pneumonia in 2024 he was treated with azithromycin and with amoxicillin/clavulanate, despite which he did not seem to recover completely. CT scan had shown dense consolidation of the right lower lobe and there was at some point suggestion of a cavitary lesion. 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 Following this a repeat scan showed a questionable 5.5 cm right lower lobe cavitary lesion. A bronchoscopy and biopsy on 2024 confirmed adenocarcinoma of the right lung. He feels reasonably well at this time. He has been breathing better, though he persists with dyspnea or exertion. He has been afebrile. He continues to cough but not more than before. He has not had abdominal pain and denies diarrhea or dysuria. On exam alert and conversant. Oriented and in no distress. No jaundice. No oral lesions and respirations are not labored. Lungs diminished bilaterally and absent on the right lower lobe. The abdomen is soft. No edema. Reviewed the images of the scans. Reviewed the laboratory exams. Discussed with him and his family. To proceed with a PET scan. Will not obtain  pulmonary function tests, as most likely he has had recurrence of a significant right pleural effusion. He is to see me with results.     Past Medical History:   Diagnosis Date    Coronary artery disease      Myocardial infarction 01/31/2024       Past Surgical History:   Procedure Laterality Date    BRONCHOSCOPY N/A 8/13/2024    Procedure: BRONCHOSCOPY WITH BRONCHIAL WASHING AND BRONCHIAL BRUSHING;  Surgeon: Kelvin Gonzalez MD;  Location: [facility] ENDOSCOPY;  Service: Pulmonary;  Laterality: N/A;    BRONCHOSCOPY WITH ION ROBOTIC ASSIST N/A 8/16/2024    Procedure: BRONCHOSCOPY WITH ION ROBOT WITH CRYO BIOPSY;  Surgeon: Kelvin Gonzalez MD;  Location: [facility] ENDOSCOPY;  Service: Robotics - Pulmonary;  Laterality: N/A;    CARDIAC CATHETERIZATION Right 1/29/2024    Procedure: Coronary angiography;  Surgeon: Abran Chand MD;  Location: [facility] CATH INVASIVE LOCATION;  Service: Cardiovascular;  Laterality: Right;    CARDIAC CATHETERIZATION N/A 1/29/2024    Procedure: Left Heart Cath;  Surgeon: Abran Chand MD;  Location: [facility] CATH INVASIVE LOCATION;  Service: Cardiovascular;  Laterality: N/A;       Current Outpatient Medications:     acetaminophen (TYLENOL) 325 MG tablet, Take 2 tablets by mouth Every 4 (Four) Hours As Needed for Mild Pain., Disp: , Rfl:     albuterol sulfate  (90 Base) MCG/ACT inhaler, Inhale 2 puffs Every 4 (Four) Hours As Needed., Disp: , Rfl:     amiodarone (PACERONE) 200 MG tablet, Take 1 tablet by mouth Every 12 (Twelve) Hours., Disp: 30 tablet, Rfl: 0    amoxicillin-clavulanate (Augmentin) 500-125 MG per tablet, Take 1 tablet by mouth 2 (Two) Times a Day., Disp: 10 tablet, Rfl: 0    apixaban (ELIQUIS) 5 MG tablet tablet, Take 1 tablet by mouth Every 12 (Twelve) Hours. Indications: Atrial Fibrillation, Disp: 60 tablet, Rfl: 0    Cholecalciferol (VITAMIN D-3 PO), Take 1 tablet by mouth Daily., Disp: , Rfl:     metoprolol tartrate (LOPRESSOR) 25 MG tablet, Take 1 tablet by mouth Every 12 (Twelve) Hours., Disp: 60 tablet, Rfl: 0    midodrine (PROAMATINE) 5 MG tablet, Take 1 tablet by mouth 3 (Three) Times a Day Before Meals., Disp: 20 tablet, Rfl: 0    multivitamin with minerals (Centrum Silver 50+Men)  tablet tablet, Take 1 tablet by mouth Every Morning., Disp: , Rfl:     NON FORMULARY, Take 2 tablets by mouth Every Night. Zzzquil, Disp: , Rfl:     pantoprazole (PROTONIX) 40 MG EC tablet, Take 1 tablet by mouth Daily., Disp: 30 tablet, Rfl: 1    simvastatin (ZOCOR) 20 MG tablet, Take 1 tablet by mouth Every Night., Disp: , Rfl:     tamsulosin (FLOMAX) 0.4 MG capsule 24 hr capsule, Take 1 capsule by mouth Every Night., Disp: , Rfl:     thiamine (VITAMIN B-1) 100 MG tablet  tablet, Take 1 tablet by mouth Every Morning., Disp: , Rfl:     No Known Allergies    Family History   Problem Relation Age of Onset    Dementia Mother     Breast cancer Sister 74       Cancer-related family history includes Breast cancer (age of onset: 74) in his sister.    Social History     Tobacco Use    Smoking status: Former     Current packs/day: 0.00     Average packs/day: 1 pack/day for 32.0 years (32.0 ttl pk-yrs)     Types: Cigarettes     Start date:      Quit date:      Years since quittin.6     Passive exposure: Past    Smokeless tobacco: Never   Vaping Use    Vaping status: Never Used   Substance Use Topics    Alcohol use: Yes     Alcohol/week: 1.0 standard drink of alcohol     Types: 1 Cans of beer per week    Drug use: Never     Social History     Social History Narrative    Not on file      ROS:   Review of Systems   Constitutional:  Positive for unexpected weight change. Negative for activity change, appetite change, chills, diaphoresis, fatigue and fever.   HENT:  Negative for congestion, dental problem, drooling, ear discharge, ear pain, facial swelling, hearing loss, mouth sores, nosebleeds, postnasal drip, rhinorrhea, sinus pressure, sinus pain, sneezing, sore throat, tinnitus, trouble swallowing and voice change.    Eyes:  Negative for photophobia, pain, discharge, redness, itching and visual disturbance.   Respiratory:  Positive for cough and shortness of breath. Negative for apnea, choking, chest tightness,  "wheezing and stridor.    Cardiovascular:  Negative for chest pain, palpitations and leg swelling.   Gastrointestinal:  Negative for abdominal distention, abdominal pain, anal bleeding, blood in stool, constipation, diarrhea, nausea, rectal pain and vomiting.   Endocrine: Negative for cold intolerance, heat intolerance, polydipsia and polyuria.   Genitourinary:  Negative for decreased urine volume, difficulty urinating, dysuria, flank pain, frequency, genital sores, hematuria and urgency.   Musculoskeletal:  Negative for arthralgias, back pain, gait problem, joint swelling, myalgias, neck pain and neck stiffness.   Skin:  Negative for color change, pallor and rash.   Neurological:  Negative for dizziness, tremors, seizures, syncope, facial asymmetry, speech difficulty, weakness, light-headedness, numbness and headaches.   Hematological:  Negative for adenopathy. Does not bruise/bleed easily.   Psychiatric/Behavioral:  Negative for agitation, behavioral problems, confusion, decreased concentration, hallucinations, self-injury, sleep disturbance and suicidal ideas. The patient is not nervous/anxious.      Objective:  Vital signs:  Vitals:    08/22/24 1107   BP: 121/79   Pulse: 81   Resp: 18   Temp: 96.4 °F (35.8 °C)   SpO2: 94%   Weight: 81.9 kg (180 lb 9.6 oz)   Height: 177.8 cm (70\")   PainSc: 0-No pain     Body mass index is 25.91 kg/m².  ECOG  (1) Restricted in physically strenuous activity, ambulatory and able to do work of light nature    Physical Exam:   Physical Exam  Constitutional:       General: He is not in acute distress.     Appearance: He is not ill-appearing, toxic-appearing or diaphoretic.      Comments: Slender. Well built and in no distress. No jaundice.    HENT:      Head: Normocephalic and atraumatic.      Right Ear: External ear normal.      Left Ear: External ear normal.      Nose: Nose normal.      Mouth/Throat:      Mouth: Mucous membranes are moist.      Pharynx: Oropharynx is clear.   Eyes: "      General: No scleral icterus.        Right eye: No discharge.         Left eye: No discharge.      Conjunctiva/sclera: Conjunctivae normal.      Pupils: Pupils are equal, round, and reactive to light.   Cardiovascular:      Rate and Rhythm: Normal rate and regular rhythm.      Pulses: Normal pulses.      Heart sounds: Normal heart sounds. No murmur heard.     No friction rub. No gallop.   Pulmonary:      Effort: No respiratory distress.      Breath sounds: No stridor. No wheezing, rhonchi or rales.      Comments: Breath sounds diminished bilaterally.   Chest:      Chest wall: No tenderness.   Abdominal:      General: Abdomen is flat. Bowel sounds are normal. There is no distension.      Palpations: Abdomen is soft. There is no mass.      Tenderness: There is no abdominal tenderness. There is no right CVA tenderness, left CVA tenderness, guarding or rebound.   Musculoskeletal:         General: No tenderness, deformity or signs of injury.      Cervical back: No rigidity.      Right lower leg: No edema.      Left lower leg: No edema.   Lymphadenopathy:      Cervical: No cervical adenopathy.   Skin:     General: Skin is warm and dry.      Coloration: Skin is not jaundiced or pale.      Findings: No bruising or rash.   Neurological:      General: No focal deficit present.      Mental Status: He is alert and oriented to person, place, and time.      Cranial Nerves: No cranial nerve deficit.   Psychiatric:         Mood and Affect: Mood normal.         Behavior: Behavior normal.         Thought Content: Thought content normal.         Judgment: Judgment normal.     DICKSON Tim MD performed the physical exam on 8/22/2024 as documented above.     Lab Results - Last 18 Months   Lab Units 08/22/24  1102 08/17/24  0929 08/15/24  0029   WBC 10*3/mm3 16.37* 12.61* 18.78*   HEMOGLOBIN g/dL 15.5 12.5* 12.1*   HEMATOCRIT % 46.5 37.3* 36.6*   PLATELETS 10*3/mm3 255 230 244   MCV fL 95.3 93.3 94.6     Lab Results - Last 18  Months   Lab Units 08/17/24  0929 08/15/24  0029 08/14/24  1000 08/13/24  0335 08/12/24  2030 01/29/24  0427 01/28/24  1737   SODIUM mmol/L 142 143 139   < > 137   < > 140   POTASSIUM mmol/L 3.6 4.2 4.3   < > 4.4   < > 4.0   CHLORIDE mmol/L 111* 112* 107   < > 104   < > 102   CO2 mmol/L 21.5* 21.1* 19.4*   < > 20.8*   < > 27.0   BUN mg/dL 30* 40* 36*   < > 23   < > 26*   CREATININE mg/dL 1.01 1.31* 1.30*   < > 1.28*   < > 1.14   CALCIUM mg/dL 8.5* 8.0* 8.4*   < > 8.6   < > 9.4   BILIRUBIN mg/dL 0.3  --   --   --  0.2  --  0.6   ALK PHOS U/L 43  --   --   --  55  --  91   ALT (SGPT) U/L 41  --   --   --  20  --  79*   AST (SGOT) U/L 29  --   --   --  39  --  133*   GLUCOSE mg/dL 124* 147* 256*   < > 167*   < > 141*    < > = values in this interval not displayed.     Lab Results   Component Value Date    GLUCOSE 124 (H) 08/17/2024    BUN 30 (H) 08/17/2024    CREATININE 1.01 08/17/2024    BCR 29.7 (H) 08/17/2024    K 3.6 08/17/2024    CO2 21.5 (L) 08/17/2024    CALCIUM 8.5 (L) 08/17/2024    ALBUMIN 3.0 (L) 08/17/2024    AST 29 08/17/2024    ALT 41 08/17/2024     Lab Results - Last 18 Months   Lab Units 08/12/24  1834 01/28/24  1737   INR  1.00 0.98   APTT seconds  --  24.8*     Lab Results   Component Value Date    STEVEN Positive (A) 08/12/2024     Lab Results   Component Value Date    PTT 24.8 (L) 01/28/2024    INR 1.00 08/12/2024     Assessment & Plan     Right lower lobe adenocarcinoma of lung. Impossible to determine the stage of the disease as the tumor is obscured by the pleural effusion and lung consolidation. He is to have a PET scan and will see me with the results.   Right lower lobe pneumonia  Right pleural effusion: Possibly will need a repeat thoracentesis in the near future.   History of tobacco smoking: Abstinent now for approximately 20 years.   Leukocytosis: Likely reactive. Will continue to follow.   Reviewed the records from the recent admission. Reviewed the images of the recent scans. Reviewed the  laboratory exams. Discussed with him and his family  See me in 2 weeks with PET scans.     Arben Tim MD on 8/22/2024 as documented above.

## 2024-08-20 NOTE — OUTREACH NOTE
COPD/PN Week 1 Survey      Flowsheet Row Responses   [facility] [place] patient discharged from? [facility]   Does the patient have one of the following disease processes/diagnoses(primary or secondary)? Pneumonia   Week 1 attempt successful? Yes   Call start time 1458   Call end time 1502   List who call center can speak with pt   Medication alerts for this patient antibiotics, steroids.   Meds reviewed with patient/caregiver? Yes   Is the patient having any side effects they believe may be caused by any medication additions or changes? No   Does the patient have all medications ordered at discharge? Yes   Is the patient taking all medications as directed (includes completed medication regime)? Yes   Comments regarding appointments Pt to see pcp next week   Does the patient have a primary care provider?  Yes   Has the patient kept scheduled appointments due by today? N/A   Has home health visited the patient within 72 hours of discharge? N/A   Pulse Ox monitoring None   Psychosocial issues? No   Did the patient receive a copy of their discharge instructions? Yes   Nursing interventions Reviewed instructions with patient   What is the patient's perception of their health status since discharge? Improving   Is the patient/caregiver able to teach back the hierarchy of who to call/visit for symptoms/problems? PCP, Specialist, Home health nurse, Urgent Care, ED, 911 Yes   Is the patient/caregiver able to teach back importance of completing antibiotic course of treatment? Yes   Week 1 call completed? Yes   Is the patient interested in additional calls from an ambulatory ? No   Would this patient benefit from a Referral to Amb Social Work? No   Wrap up additional comments Pt is improving. Pt has all medications. Pt to see pcp next week. Pt does not require 02.   Call end time 1502            Tierra SHARMA - Registered Nurse

## 2024-08-22 ENCOUNTER — LAB (OUTPATIENT)
Dept: LAB | Facility: HOSPITAL | Age: 74
End: 2024-08-22
Payer: MEDICARE

## 2024-08-22 ENCOUNTER — TELEPHONE (OUTPATIENT)
Dept: ONCOLOGY | Facility: CLINIC | Age: 74
End: 2024-08-22
Payer: MEDICARE

## 2024-08-22 ENCOUNTER — OFFICE VISIT (OUTPATIENT)
Dept: ONCOLOGY | Facility: CLINIC | Age: 74
End: 2024-08-22
Payer: MEDICARE

## 2024-08-22 VITALS
OXYGEN SATURATION: 94 % | WEIGHT: 180.6 LBS | RESPIRATION RATE: 18 BRPM | HEIGHT: 70 IN | TEMPERATURE: 96.4 F | SYSTOLIC BLOOD PRESSURE: 121 MMHG | BODY MASS INDEX: 25.86 KG/M2 | DIASTOLIC BLOOD PRESSURE: 79 MMHG | HEART RATE: 81 BPM

## 2024-08-22 DIAGNOSIS — C34.91 PRIMARY LUNG CANCER, RIGHT: Primary | ICD-10-CM

## 2024-08-22 DIAGNOSIS — R91.8 MASS OF LOWER LOBE OF RIGHT LUNG: ICD-10-CM

## 2024-08-22 LAB
ALBUMIN SERPL-MCNC: 3.6 G/DL (ref 3.5–5.2)
ALBUMIN/GLOB SERPL: 1.8 G/DL
ALP SERPL-CCNC: 55 U/L (ref 39–117)
ALT SERPL W P-5'-P-CCNC: 34 U/L (ref 1–41)
ANION GAP SERPL CALCULATED.3IONS-SCNC: 11.7 MMOL/L (ref 5–15)
AST SERPL-CCNC: 21 U/L (ref 1–40)
BASOPHILS # BLD AUTO: 0.03 10*3/MM3 (ref 0–0.2)
BASOPHILS NFR BLD AUTO: 0.2 % (ref 0–1.5)
BILIRUB SERPL-MCNC: 0.3 MG/DL (ref 0–1.2)
BUN SERPL-MCNC: 18 MG/DL (ref 8–23)
BUN/CREAT SERPL: 15.7 (ref 7–25)
CALCIUM SPEC-SCNC: 8.9 MG/DL (ref 8.6–10.5)
CHLORIDE SERPL-SCNC: 104 MMOL/L (ref 98–107)
CO2 SERPL-SCNC: 25.3 MMOL/L (ref 22–29)
CREAT SERPL-MCNC: 1.15 MG/DL (ref 0.76–1.27)
DEPRECATED RDW RBC AUTO: 46.9 FL (ref 37–54)
EGFRCR SERPLBLD CKD-EPI 2021: 66.8 ML/MIN/1.73
EOSINOPHIL # BLD AUTO: 1.59 10*3/MM3 (ref 0–0.4)
EOSINOPHIL NFR BLD AUTO: 9.7 % (ref 0.3–6.2)
ERYTHROCYTE [DISTWIDTH] IN BLOOD BY AUTOMATED COUNT: 13.9 % (ref 12.3–15.4)
GLOBULIN UR ELPH-MCNC: 2 GM/DL
GLUCOSE SERPL-MCNC: 95 MG/DL (ref 65–99)
HCT VFR BLD AUTO: 46.5 % (ref 37.5–51)
HGB BLD-MCNC: 15.5 G/DL (ref 13–17.7)
HOLD SPECIMEN: NORMAL
HOLD SPECIMEN: NORMAL
LYMPHOCYTES # BLD AUTO: 2.19 10*3/MM3 (ref 0.7–3.1)
LYMPHOCYTES NFR BLD AUTO: 13.4 % (ref 19.6–45.3)
MCH RBC QN AUTO: 31.8 PG (ref 26.6–33)
MCHC RBC AUTO-ENTMCNC: 33.3 G/DL (ref 31.5–35.7)
MCV RBC AUTO: 95.3 FL (ref 79–97)
MONOCYTES # BLD AUTO: 1.5 10*3/MM3 (ref 0.1–0.9)
MONOCYTES NFR BLD AUTO: 9.2 % (ref 5–12)
NEUTROPHILS NFR BLD AUTO: 11.06 10*3/MM3 (ref 1.7–7)
NEUTROPHILS NFR BLD AUTO: 67.5 % (ref 42.7–76)
PLATELET # BLD AUTO: 255 10*3/MM3 (ref 140–450)
PMV BLD AUTO: 9.6 FL (ref 6–12)
POTASSIUM SERPL-SCNC: 4.1 MMOL/L (ref 3.5–5.2)
PROT SERPL-MCNC: 5.6 G/DL (ref 6–8.5)
QT INTERVAL: 390 MS
QTC INTERVAL: 457 MS
RBC # BLD AUTO: 4.88 10*6/MM3 (ref 4.14–5.8)
SODIUM SERPL-SCNC: 141 MMOL/L (ref 136–145)
WBC NRBC COR # BLD AUTO: 16.37 10*3/MM3 (ref 3.4–10.8)

## 2024-08-22 PROCEDURE — 85025 COMPLETE CBC W/AUTO DIFF WBC: CPT | Performed by: INTERNAL MEDICINE

## 2024-08-22 PROCEDURE — 80053 COMPREHEN METABOLIC PANEL: CPT | Performed by: INTERNAL MEDICINE

## 2024-08-22 PROCEDURE — 36415 COLL VENOUS BLD VENIPUNCTURE: CPT

## 2024-08-22 RX ORDER — BUDESONIDE, GLYCOPYRROLATE, AND FORMOTEROL FUMARATE 160; 9; 4.8 UG/1; UG/1; UG/1
2 AEROSOL, METERED RESPIRATORY (INHALATION) 2 TIMES DAILY
Qty: 1 EACH | Refills: 0 | COMMUNITY
Start: 2024-08-22

## 2024-08-22 NOTE — TELEPHONE ENCOUNTER
CALLED AND LET PT'S WIFE KNOW THE DATE AND TIME OF PET SCAN HERE AT [facility] NEXT WEEK. SHE V/U. WE SPOKE OF DIRECTIONS THAT WERE IN MY CHART AS WELL.

## 2024-08-27 ENCOUNTER — OFFICE VISIT (OUTPATIENT)
Dept: CARDIOLOGY | Facility: CLINIC | Age: 74
End: 2024-08-27
Payer: MEDICARE

## 2024-08-27 VITALS
OXYGEN SATURATION: 92 % | WEIGHT: 175 LBS | BODY MASS INDEX: 25.05 KG/M2 | HEART RATE: 85 BPM | DIASTOLIC BLOOD PRESSURE: 82 MMHG | RESPIRATION RATE: 18 BRPM | SYSTOLIC BLOOD PRESSURE: 123 MMHG | HEIGHT: 70 IN

## 2024-08-27 DIAGNOSIS — I21.4 NSTEMI, INITIAL EPISODE OF CARE: ICD-10-CM

## 2024-08-27 DIAGNOSIS — R07.1 CHEST PAIN ON BREATHING: Primary | ICD-10-CM

## 2024-08-27 PROCEDURE — G2211 COMPLEX E/M VISIT ADD ON: HCPCS | Performed by: INTERNAL MEDICINE

## 2024-08-27 PROCEDURE — 99214 OFFICE O/P EST MOD 30 MIN: CPT | Performed by: INTERNAL MEDICINE

## 2024-08-27 RX ORDER — AMIODARONE HYDROCHLORIDE 200 MG/1
200 TABLET ORAL DAILY
Qty: 90 TABLET | Refills: 1 | Status: SHIPPED | OUTPATIENT
Start: 2024-08-27

## 2024-08-28 ENCOUNTER — READMISSION MANAGEMENT (OUTPATIENT)
Dept: CALL CENTER | Facility: HOSPITAL | Age: 74
End: 2024-08-28
Payer: MEDICARE

## 2024-08-28 ENCOUNTER — HOSPITAL ENCOUNTER (OUTPATIENT)
Dept: PET IMAGING | Facility: HOSPITAL | Age: 74
Discharge: HOME | End: 2024-08-28
Admitting: INTERNAL MEDICINE
Payer: MEDICARE

## 2024-08-28 DIAGNOSIS — C34.91 PRIMARY LUNG CANCER, RIGHT: ICD-10-CM

## 2024-08-28 DIAGNOSIS — R91.8 MASS OF LOWER LOBE OF RIGHT LUNG: ICD-10-CM

## 2024-08-28 LAB — GLUCOSE BLDC GLUCOMTR-MCNC: 100 MG/DL (ref 70–105)

## 2024-08-28 PROCEDURE — A9552 F18 FDG: HCPCS | Performed by: INTERNAL MEDICINE

## 2024-08-28 PROCEDURE — 78815 PET IMAGE W/CT SKULL-THIGH: CPT

## 2024-08-28 PROCEDURE — 0 FLUDEOXYGLUCOSE F18 SOLUTION: Performed by: INTERNAL MEDICINE

## 2024-08-28 PROCEDURE — 82948 REAGENT STRIP/BLOOD GLUCOSE: CPT

## 2024-08-28 RX ADMIN — FLUDEOXYGLUCOSE F 18 1 DOSE: 200 INJECTION, SOLUTION INTRAVENOUS at 12:54

## 2024-08-28 NOTE — OUTREACH NOTE
COPD/PN Week 2 Survey      Flowsheet Row Responses   [facility] patient discharged from? [facility]   Does the patient have one of the following disease processes/diagnoses(primary or secondary)? Pneumonia   Week 2 attempt successful? Yes   Call start time 0923   Call end time 0927   Discharge diagnosis Right sided pneumonia   Person spoke with today (if not patient) and relationship wife   Meds reviewed with patient/caregiver? Yes   Is the patient having any side effects they believe may be caused by any medication additions or changes? No   Does the patient have all medications ordered at discharge? Yes   Is the patient taking all medications as directed (includes completed medication regime)? Yes   Does the patient have a primary care provider?  Yes   Does the patient have an appointment with their PCP or specialist within 7 days of discharge? Yes   Has the patient kept scheduled appointments due by today? Yes   Psychosocial issues? No   Did the patient receive a copy of their discharge instructions? Yes   Nursing interventions Reviewed instructions with patient   What is the patient's perception of their health status since discharge? Improving   Nursing Interventions Nurse provided patient education   If the patient is a current smoker, are they able to teach back resources for cessation? Not a smoker   Is the patient/caregiver able to teach back the hierarchy of who to call/visit for symptoms/problems? PCP, Specialist, Home health nurse, Urgent Care, ED, 911 Yes   Week 2 call completed? Yes   Graduated Yes   Is the patient interested in additional calls from an ambulatory ? No   Would this patient benefit from a Referral to Amb Social Work? No   Wrap up additional comments Patient is improving and doing well. He saw cardiology yesterday and is having PET scan today.   Call end time 0927            Tod BOYD - Registered Nurse

## 2024-09-02 ENCOUNTER — PATIENT OUTREACH (OUTPATIENT)
Dept: ONCOLOGY | Facility: CLINIC | Age: 74
End: 2024-09-02
Payer: MEDICARE

## 2024-09-04 DIAGNOSIS — C34.91 PRIMARY LUNG CANCER, RIGHT: Primary | ICD-10-CM

## 2024-09-04 RX ORDER — HYDROCORTISONE 10 MG/1
20 TABLET ORAL DAILY
Qty: 60 TABLET | Refills: 0 | Status: SHIPPED | OUTPATIENT
Start: 2024-09-04 | End: 2024-10-04

## 2024-09-04 RX ORDER — AMOXICILLIN AND CLAVULANATE POTASSIUM 500; 125 MG/1; MG/1
1 TABLET, FILM COATED ORAL 3 TIMES DAILY
Qty: 21 TABLET | Refills: 0 | Status: SHIPPED | OUTPATIENT
Start: 2024-09-04 | End: 2024-09-11

## 2024-09-05 DIAGNOSIS — C34.91 PRIMARY LUNG CANCER, RIGHT: Primary | ICD-10-CM

## 2024-09-05 RX ORDER — IPRATROPIUM BROMIDE AND ALBUTEROL SULFATE 2.5; .5 MG/3ML; MG/3ML
3 SOLUTION RESPIRATORY (INHALATION) 4 TIMES DAILY PRN
Qty: 120 ML | Refills: 3 | Status: SHIPPED | OUTPATIENT
Start: 2024-09-05 | End: 2024-10-05

## 2024-09-06 ENCOUNTER — LAB (OUTPATIENT)
Dept: LAB | Facility: HOSPITAL | Age: 74
End: 2024-09-06
Payer: MEDICARE

## 2024-09-06 ENCOUNTER — OFFICE VISIT (OUTPATIENT)
Dept: ONCOLOGY | Facility: CLINIC | Age: 74
End: 2024-09-06
Payer: MEDICARE

## 2024-09-06 VITALS
SYSTOLIC BLOOD PRESSURE: 105 MMHG | OXYGEN SATURATION: 96 % | BODY MASS INDEX: 25.31 KG/M2 | DIASTOLIC BLOOD PRESSURE: 72 MMHG | HEIGHT: 70 IN | RESPIRATION RATE: 18 BRPM | WEIGHT: 176.8 LBS | HEART RATE: 84 BPM | TEMPERATURE: 97.3 F

## 2024-09-06 DIAGNOSIS — C34.91 PRIMARY LUNG CANCER, RIGHT: Primary | ICD-10-CM

## 2024-09-06 DIAGNOSIS — J90 LARGE PLEURAL EFFUSION: ICD-10-CM

## 2024-09-06 DIAGNOSIS — C34.91 PRIMARY LUNG CANCER, RIGHT: ICD-10-CM

## 2024-09-06 DIAGNOSIS — C34.91 STAGE IV ADENOCARCINOMA OF LUNG, RIGHT: ICD-10-CM

## 2024-09-06 PROBLEM — C34.92: Status: ACTIVE | Noted: 2024-09-06

## 2024-09-06 LAB
HOLD SPECIMEN: NORMAL
HOLD SPECIMEN: NORMAL

## 2024-09-06 PROCEDURE — 36415 COLL VENOUS BLD VENIPUNCTURE: CPT

## 2024-09-06 NOTE — PROGRESS NOTES
HEMATOLOGY ONCOLOGY OUTPATIENT FOLLOW UP       Patient name: Young Ames  : 1950  MRN: [id number]  Primary Care Physician: Abner Funes APRN  Referring Physician: No ref. provider found  Reason For Consult: Adenocarcinoma of the right lung.     History of Present Illness:    2024: Mr. Ames first came to [facility] complaining of dyspnea. This had been getting worse over a short period of time. It was associated to unintended weight loss of approximately 15 lbs in around one month. He was diagnosed with pneumonia in 2024 he was treated with azithromycin and with amoxicillin/clavulanate, despite which he did not seem to recover completely. CT scan had shown dense consolidation of the right lower lobe and there was at some point suggestion of a cavitary lesion. 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 Following this a repeat scan showed a questionable 5.5 cm right lower lobe cavitary lesion. A bronchoscopy and biopsy on 2024 confirmed adenocarcinoma of the right lung. He feels reasonably well at this time. He has been breathing better, though he persists with dyspnea or exertion. He has been afebrile. He continues to cough but not more than before. He has not had abdominal pain and denies diarrhea or dysuria. On exam alert and conversant. Oriented and in no distress. No jaundice. No oral lesions and respirations are not labored. Lungs diminished bilaterally and absent on the right lower lobe. The abdomen is soft. No edema. Reviewed the images of the scans. Reviewed the laboratory exams. Discussed with him and his family. To proceed with a PET scan. Will not obtain  pulmonary function tests, as most likely he has had recurrence of a significant right pleural effusion. He is to see me with results.     2024: Feeling about the same as at the time of the last visit but has had  more dyspnea.  He was placed on new medication in spite of which, intermittently, he continues to be uncomfortable.  He is eating reasonably well and has had no weight loss.  He is also afebrile.  He denies chest pains.  No abdominal pain.  On exam he is conversant and oriented.  No distress.  No jaundice.  The lungs are diminished bilaterally but there is absence of breath sounds and a cough when he in the lower parts of the right chest.  The abdomen is soft.  There is no edema.  Laboratory exams and final reports of pathology were reviewed and discussed with him.  Discussed with him the stage of the disease, which corresponds to 4, given the pleural involvement.  To start chemotherapy and immunotherapy.  I have requested next-generation sequencing.  Port as soon as available.    Past Medical History:   Diagnosis Date    Coronary artery disease     Myocardial infarction 01/31/2024     Past Surgical History:   Procedure Laterality Date    BRONCHOSCOPY N/A 8/13/2024    Procedure: BRONCHOSCOPY WITH BRONCHIAL WASHING AND BRONCHIAL BRUSHING;  Surgeon: Kelvin Gonzalez MD;  Location: [facility] ENDOSCOPY;  Service: Pulmonary;  Laterality: N/A;    BRONCHOSCOPY WITH ION ROBOTIC ASSIST N/A 8/16/2024    Procedure: BRONCHOSCOPY WITH ION ROBOT WITH CRYO BIOPSY;  Surgeon: Kelvin Gonzalez MD;  Location: [facility] ENDOSCOPY;  Service: Robotics - Pulmonary;  Laterality: N/A;    CARDIAC CATHETERIZATION Right 1/29/2024    Procedure: Coronary angiography;  Surgeon: Abran Chand MD;  Location: [facility] CATH INVASIVE LOCATION;  Service: Cardiovascular;  Laterality: Right;    CARDIAC CATHETERIZATION N/A 1/29/2024    Procedure: Left Heart Cath;  Surgeon: Abran Chand MD;  Location: [facility] CATH INVASIVE LOCATION;  Service: Cardiovascular;  Laterality: N/A;       Current Outpatient Medications:     acetaminophen (TYLENOL) 325 MG tablet, Take 2 tablets by mouth Every 4 (Four) Hours As Needed for Mild Pain., Disp: , Rfl:     amiodarone (PACERONE) 200 MG  tablet, Take 1 tablet by mouth Daily., Disp: 90 tablet, Rfl: 1    amoxicillin-clavulanate (Augmentin) 500-125 MG per tablet, Take 1 tablet by mouth 3 (Three) Times a Day for 7 days., Disp: 21 tablet, Rfl: 0    apixaban (ELIQUIS) 5 MG tablet tablet, Take 1 tablet by mouth Every 12 (Twelve) Hours. Indications: Atrial Fibrillation, Disp: 60 tablet, Rfl: 0    Cholecalciferol (VITAMIN D-3 PO), Take 1 tablet by mouth Daily., Disp: , Rfl:     hydrocortisone (CORTEF) 10 MG tablet, Take 2 tablets by mouth Daily for 30 days., Disp: 60 tablet, Rfl: 0    ipratropium-albuterol (DUO-NEB) 0.5-2.5 mg/3 ml nebulizer, Take 3 mL by nebulization 4 (Four) Times a Day As Needed for Wheezing (Dyspnea) for up to 30 days., Disp: 120 mL, Rfl: 3    metoprolol tartrate (LOPRESSOR) 25 MG tablet, Take 1 tablet by mouth Every 12 (Twelve) Hours., Disp: 60 tablet, Rfl: 0    midodrine (PROAMATINE) 5 MG tablet, Take 1 tablet by mouth 3 (Three) Times a Day Before Meals. (Patient taking differently: Take 1 tablet by mouth 3 (Three) Times a Day Before Meals. AS NEEDED), Disp: 20 tablet, Rfl: 0    multivitamin with minerals (Centrum Silver 50+Men) tablet tablet, Take 1 tablet by mouth Every Morning., Disp: , Rfl:     NON FORMULARY, Take 2 tablets by mouth Every Night. Zzzquil, Disp: , Rfl:     pantoprazole (PROTONIX) 40 MG EC tablet, Take 1 tablet by mouth Daily., Disp: 30 tablet, Rfl: 1    simvastatin (ZOCOR) 20 MG tablet, Take 1 tablet by mouth Every Night., Disp: , Rfl:     tamsulosin (FLOMAX) 0.4 MG capsule 24 hr capsule, Take 1 capsule by mouth Every Night., Disp: , Rfl:     thiamine (VITAMIN B-1) 100 MG tablet  tablet, Take 1 tablet by mouth Every Morning., Disp: , Rfl:     Budeson-Glycopyrrol-Formoterol (Breztri Aerosphere) 160-9-4.8 MCG/ACT aerosol inhaler, Inhale 2 puffs 2 (Two) Times a Day. (Patient not taking: Reported on 8/27/2024), Disp: 1 each, Rfl: 0    No Known Allergies    Family History   Problem Relation Age of Onset    Dementia Mother      Breast cancer Sister 74     Cancer-related family history includes Breast cancer (age of onset: 74) in his sister.    Social History     Tobacco Use    Smoking status: Former     Current packs/day: 0.00     Average packs/day: 1 pack/day for 32.0 years (32.0 ttl pk-yrs)     Types: Cigarettes     Start date:      Quit date:      Years since quittin.6     Passive exposure: Past    Smokeless tobacco: Never   Vaping Use    Vaping status: Never Used   Substance Use Topics    Alcohol use: Yes     Alcohol/week: 1.0 standard drink of alcohol     Types: 1 Cans of beer per week    Drug use: Never     Social History     Social History Narrative    Not on file      ROS:   Review of Systems   Constitutional:  Positive for unexpected weight change. Negative for activity change, appetite change, chills, diaphoresis, fatigue and fever.   HENT:  Negative for congestion, dental problem, drooling, ear discharge, ear pain, facial swelling, hearing loss, mouth sores, nosebleeds, postnasal drip, rhinorrhea, sinus pressure, sinus pain, sneezing, sore throat, tinnitus, trouble swallowing and voice change.    Eyes:  Negative for photophobia, pain, discharge, redness, itching and visual disturbance.   Respiratory:  Positive for cough and shortness of breath. Negative for apnea, choking, chest tightness, wheezing and stridor.    Cardiovascular:  Negative for chest pain, palpitations and leg swelling.   Gastrointestinal:  Negative for abdominal distention, abdominal pain, anal bleeding, blood in stool, constipation, diarrhea, nausea, rectal pain and vomiting.   Endocrine: Negative for cold intolerance, heat intolerance, polydipsia and polyuria.   Genitourinary:  Negative for decreased urine volume, difficulty urinating, dysuria, flank pain, frequency, genital sores, hematuria and urgency.   Musculoskeletal:  Negative for arthralgias, back pain, gait problem, joint swelling, myalgias, neck pain and neck stiffness.   Skin:   "Negative for color change, pallor and rash.   Neurological:  Negative for dizziness, tremors, seizures, syncope, facial asymmetry, speech difficulty, weakness, light-headedness, numbness and headaches.   Hematological:  Negative for adenopathy. Does not bruise/bleed easily.   Psychiatric/Behavioral:  Negative for agitation, behavioral problems, confusion, decreased concentration, hallucinations, self-injury, sleep disturbance and suicidal ideas. The patient is not nervous/anxious.      Objective:  Vital signs:  Vitals:    09/06/24 1257   BP: 105/72   Pulse: 84   Resp: 18   Temp: 97.3 °F (36.3 °C)   SpO2: 96%   Weight: 80.2 kg (176 lb 12.8 oz)   Height: 177.8 cm (70\")   PainSc: 0-No pain     Body mass index is 25.37 kg/m².  ECOG  (1) Restricted in physically strenuous activity, ambulatory and able to do work of light nature    Physical Exam:   Physical Exam  Constitutional:       General: He is not in acute distress.     Appearance: He is not ill-appearing, toxic-appearing or diaphoretic.      Comments: Slender. Well built and in no distress. No jaundice.    HENT:      Head: Normocephalic and atraumatic.      Right Ear: External ear normal.      Left Ear: External ear normal.      Nose: Nose normal.      Mouth/Throat:      Mouth: Mucous membranes are moist.      Pharynx: Oropharynx is clear.   Eyes:      General: No scleral icterus.        Right eye: No discharge.         Left eye: No discharge.      Conjunctiva/sclera: Conjunctivae normal.      Pupils: Pupils are equal, round, and reactive to light.   Cardiovascular:      Rate and Rhythm: Normal rate and regular rhythm.      Pulses: Normal pulses.      Heart sounds: Normal heart sounds. No murmur heard.     No friction rub. No gallop.   Pulmonary:      Effort: No respiratory distress.      Breath sounds: No stridor. No wheezing, rhonchi or rales.      Comments: Breath sounds diminished bilaterally.   Chest:      Chest wall: No tenderness.   Abdominal:      General: " Abdomen is flat. Bowel sounds are normal. There is no distension.      Palpations: Abdomen is soft. There is no mass.      Tenderness: There is no abdominal tenderness. There is no right CVA tenderness, left CVA tenderness, guarding or rebound.   Musculoskeletal:         General: No tenderness, deformity or signs of injury.      Cervical back: No rigidity.      Right lower leg: No edema.      Left lower leg: No edema.   Lymphadenopathy:      Cervical: No cervical adenopathy.   Skin:     General: Skin is warm and dry.      Coloration: Skin is not jaundiced or pale.      Findings: No bruising or rash.   Neurological:      General: No focal deficit present.      Mental Status: He is alert and oriented to person, place, and time.      Cranial Nerves: No cranial nerve deficit.   Psychiatric:         Mood and Affect: Mood normal.         Behavior: Behavior normal.         Thought Content: Thought content normal.         Judgment: Judgment normal.     DICKSON Tim MD performed the physical exam on 9/6/2024 as documented above.    Lab Results - Last 18 Months   Lab Units 09/06/24  1253 08/22/24  1102 08/17/24  0929   WBC 10*3/mm3 13.01* 16.37* 12.61*   HEMOGLOBIN g/dL 15.2 15.5 12.5*   HEMATOCRIT % 46.0 46.5 37.3*   PLATELETS 10*3/mm3 274 255 230   MCV fL 95.4 95.3 93.3     Lab Results - Last 18 Months   Lab Units 08/22/24  1102 08/17/24  0929 08/15/24  0029 08/13/24  0335 08/12/24  2030   SODIUM mmol/L 141 142 143   < > 137   POTASSIUM mmol/L 4.1 3.6 4.2   < > 4.4   CHLORIDE mmol/L 104 111* 112*   < > 104   CO2 mmol/L 25.3 21.5* 21.1*   < > 20.8*   BUN mg/dL 18 30* 40*   < > 23   CREATININE mg/dL 1.15 1.01 1.31*   < > 1.28*   CALCIUM mg/dL 8.9 8.5* 8.0*   < > 8.6   BILIRUBIN mg/dL 0.3 0.3  --   --  0.2   ALK PHOS U/L 55 43  --   --  55   ALT (SGPT) U/L 34 41  --   --  20   AST (SGOT) U/L 21 29  --   --  39   GLUCOSE mg/dL 95 124* 147*   < > 167*    < > = values in this interval not displayed.     Lab Results    Component Value Date    GLUCOSE 95 08/22/2024    BUN 18 08/22/2024    CREATININE 1.15 08/22/2024    BCR 15.7 08/22/2024    K 4.1 08/22/2024    CO2 25.3 08/22/2024    CALCIUM 8.9 08/22/2024    ALBUMIN 3.6 08/22/2024    AST 21 08/22/2024    ALT 34 08/22/2024     Lab Results - Last 18 Months   Lab Units 08/12/24  1834 01/28/24  1737   INR  1.00 0.98   APTT seconds  --  24.8*     Lab Results   Component Value Date    STEVEN Positive (A) 08/12/2024     Lab Results   Component Value Date    PTT 24.8 (L) 01/28/2024    INR 1.00 08/12/2024     Assessment & Plan     cTX N2 M1 adenocarcinoma of the right lung: Requested next-generation sequencing.  Needs support.  Discussed with him treatment with chemotherapy and immunotherapy.  Placed a treatment plan.  Will begin treatment with carboplatin/pemetrexed/pembrolizumab.  Discussed with him and explained side effects and potential complications.  Right lower lobe pneumonia  Right pleural effusion: Will likely need repeat thoracentesis.  History of tobacco smoking: Abstinent now for approximately 20 years.   Leukocytosis: Likely reactive. Will continue to follow.   Reviewed all records including notes from the hospital.  Independently reviewed the images of the PET scan.  Reviewed the laboratory exams and no reports of pathology.  Discussed with him.  Chest x-ray on September 9, 2024.  Port insertion as soon as possible.  Begin chemotherapy with pemetrexed/carboplatin/pembrolizumab as soon as approved.  Next-generation sequencing requested.  See me in approximately 4 weeks.    Arben Tim MD on 9/6/2024 at 0330 PM      Arben Tim MD on 8/22/2024 as documented above.

## 2024-09-09 ENCOUNTER — HOSPITAL ENCOUNTER (OUTPATIENT)
Dept: GENERAL RADIOLOGY | Facility: HOSPITAL | Age: 74
Discharge: HOME | End: 2024-09-09
Payer: MEDICARE

## 2024-09-09 ENCOUNTER — HOSPITAL ENCOUNTER (OUTPATIENT)
Dept: INTERVENTIONAL RADIOLOGY/VASCULAR | Facility: HOSPITAL | Age: 74
Discharge: HOME | End: 2024-09-09
Payer: MEDICARE

## 2024-09-09 VITALS
DIASTOLIC BLOOD PRESSURE: 68 MMHG | RESPIRATION RATE: 16 BRPM | SYSTOLIC BLOOD PRESSURE: 112 MMHG | HEART RATE: 90 BPM | OXYGEN SATURATION: 95 %

## 2024-09-09 DIAGNOSIS — C34.91 STAGE IV ADENOCARCINOMA OF LUNG, RIGHT: ICD-10-CM

## 2024-09-09 DIAGNOSIS — J90 LARGE PLEURAL EFFUSION: Primary | ICD-10-CM

## 2024-09-09 DIAGNOSIS — J90 LARGE PLEURAL EFFUSION: ICD-10-CM

## 2024-09-09 DIAGNOSIS — C34.91 PRIMARY LUNG CANCER, RIGHT: ICD-10-CM

## 2024-09-09 LAB
LAB AP CASE REPORT: NORMAL
LAB AP DIAGNOSIS COMMENT: NORMAL
Lab: NORMAL
PATH REPORT.ADDENDUM SPEC: NORMAL
PATH REPORT.FINAL DX SPEC: NORMAL
PATH REPORT.GROSS SPEC: NORMAL

## 2024-09-09 PROCEDURE — 25010000002 LIDOCAINE 1 % SOLUTION

## 2024-09-09 PROCEDURE — 71046 X-RAY EXAM CHEST 2 VIEWS: CPT

## 2024-09-09 PROCEDURE — 76942 ECHO GUIDE FOR BIOPSY: CPT

## 2024-09-09 PROCEDURE — 71045 X-RAY EXAM CHEST 1 VIEW: CPT

## 2024-09-09 RX ORDER — LIDOCAINE HYDROCHLORIDE 10 MG/ML
INJECTION, SOLUTION INFILTRATION; PERINEURAL AS NEEDED
Status: COMPLETED | OUTPATIENT
Start: 2024-09-09 | End: 2024-09-09

## 2024-09-09 RX ADMIN — LIDOCAINE HYDROCHLORIDE 8 ML: 10 INJECTION, SOLUTION INFILTRATION; PERINEURAL at 13:39

## 2024-09-09 NOTE — PROGRESS NOTES
Patients daughter Norma called and stated she has spoke with Stephanie in IR whom stated that if Dr. Tim enters the order for the thoracentesis today then they will be able have it completed today. I informed Norma that I appears per Dr. Tim's note, he wants to have the x-ray completed first to verify if a thoracentesis is necessary. She stated that she is bringing her dad to the hospital today to have the x-ray completed and she asked if they should wait for the results to see if the thoracentesis can be completed today. I informed her that I will have to confirm with the radiology department to verify the turnaround time for reading scans. I stated that I will contact her back after I speak with radiology. She confirmed.     Received a phone call from Stephanie in IR. She stated per the patients daughter, the patient is SOA. I informed her that I will verify with Dr. Tim to verify if he is agreeable to ordering the thoracentesis today. Per Dr. Tim, patient to have a STAT thoracentesis completed today due to SOA. Order entered. Stephanie informed.

## 2024-09-09 NOTE — PROGRESS NOTES
"Cardiology Clinic Note  Carlos Pavon MD, PhD    Subjective:     Encounter Date:08/27/2024      Patient ID: Young Ames is a 73 y.o. male.    Chief Complaint:  Chief Complaint   Patient presents with    Hospital Follow Up Visit       HPI:  I the pleasure to see this 73-year-old gentleman back in clinic today after hospitalization with malignant pleural effusion status post drainage, complicated by A-fib RVR hypotension.  He has normal EF and nonobstructive CAD.  He was initiated on amiodarone for rhythm control which resulted in conversion to sinus rhythm.  He had bronchoscopy and thoracentesis demonstrating non-small cell lung cancer and has scheduled follow-up with hematology oncology and pulmonary.  He is in sinus rhythm today rates in the 80s on metoprolol and amiodarone.  He appears euvolemic today he is tolerating Eliquis, no refractory chest pain or angina unexplained syncope.  He is taking midodrine as needed for hypotension and dysautonomia    Review of systems otherwise negative x 14 point review of systems except as mentioned above      Historical data copied forward from previous encounters in EMR including the history, exam, and assessment/plan has been reviewed and is unchanged unless noted otherwise.    Cardiac medicines reviewed with risk, benefits, and necessity of each discussed.    Risk and benefit of cardiac testing reviewed including death heart attack stroke pain bleeding infection need for vascular /cardiovascular surgery were discussed and the patient     Objective:         /82 (BP Location: Right arm, Patient Position: Sitting)   Pulse 85   Resp 18   Ht 177.8 cm (70\")   Wt 79.4 kg (175 lb)   SpO2 92%   BMI 25.11 kg/m²     Physical Exam  Regular rate and rhythm no rubs gallops heave or lift  No clubbing cyanosis or edema  Intact grossly  Skin is warm and dry    Assessment:         Paroxysmal Atrial fibrillation with intermittent RVR  Now converted to SR  Metoprolol twice daily " will continue  Decrease amiodarone 200 daily  [id number] equals 2  Eliquis twice daily     Right-sided pneumonia with large right pleural effusion, malignant non-small cell lung cancer  Status post bronchoscopy and thoracentesis, follow-up with hematology oncology and pulmonary         Hypertension  Stable 120 systolic  Midodrine as needed, on metoprolol     Nonobstructive coronary artery disease  Cardiac catheterization in January 2024  20 to 30% mid L RCA lesion.  Otherwise normal coronary anatomy.  Angina free  Normal EF 60%       Follow-up in 3 to 6 months  Try to get off amiodarone if possible  Could transition to sotalol in the future  Clinical course will really depend on prognosis from a lung cancer standpoint, therapeutic options of radiation chemotherapy or surgery depending on hematology oncology recommendations and pulmonary recommendations    Continue smoking abstinence    The pleasure to be involved in this patient's cardiovascular care.  Please call with any questions or concerns  Carlos Pavon MD, PhD    Most recent EKG as reviewed and interpreted by me:  Procedures     Most recent echo as reviewed and interpreted by me:  Results for orders placed during the hospital encounter of 08/12/24    Adult Transthoracic Echo Complete W/ Cont if Necessary Per Protocol    Interpretation Summary    Left ventricular ejection fraction appears to be 66 - 70%.    Left ventricular diastolic function was indeterminate.    The left atrial cavity is mildly dilated.    Left atrial volume is mildly increased.    Estimated right ventricular systolic pressure from tricuspid regurgitation is mildly elevated (35-45 mmHg).    Moderate pulmonary hypertension is present.      Most recent stress test as reviewed and interpreted by me:  Results for orders placed during the hospital encounter of 01/28/24    Stress Test With Myocardial Perfusion One Day    Interpretation Summary    Impressions are consistent with an intermediate  risk study.    Left ventricular ejection fraction is normal (Calculated EF = 56%).    Myocardial perfusion imaging indicates a moderate-sized, moderately severe area of ischemia located in the inferior wall and septal wall.      Most recent cardiac catheterization as reviewed interpreted by me:  Results for orders placed during the hospital encounter of 01/28/24    Cardiac Catheterization/Vascular Study    The following portions of the patient's history were reviewed and updated as appropriate: allergies, current medications, past family history, past medical history, past social history, past surgical history, and problem list.      ROS:  14 point review of systems negative except as mentioned above    Current Outpatient Medications:     acetaminophen (TYLENOL) 325 MG tablet, Take 2 tablets by mouth Every 4 (Four) Hours As Needed for Mild Pain., Disp: , Rfl:     apixaban (ELIQUIS) 5 MG tablet tablet, Take 1 tablet by mouth Every 12 (Twelve) Hours. Indications: Atrial Fibrillation, Disp: 60 tablet, Rfl: 0    Cholecalciferol (VITAMIN D-3 PO), Take 1 tablet by mouth Daily., Disp: , Rfl:     metoprolol tartrate (LOPRESSOR) 25 MG tablet, Take 1 tablet by mouth Every 12 (Twelve) Hours., Disp: 60 tablet, Rfl: 0    midodrine (PROAMATINE) 5 MG tablet, Take 1 tablet by mouth 3 (Three) Times a Day Before Meals. (Patient taking differently: Take 1 tablet by mouth 3 (Three) Times a Day Before Meals. AS NEEDED), Disp: 20 tablet, Rfl: 0    multivitamin with minerals (Centrum Silver 50+Men) tablet tablet, Take 1 tablet by mouth Every Morning., Disp: , Rfl:     NON FORMULARY, Take 2 tablets by mouth Every Night. Zzzquil, Disp: , Rfl:     pantoprazole (PROTONIX) 40 MG EC tablet, Take 1 tablet by mouth Daily., Disp: 30 tablet, Rfl: 1    simvastatin (ZOCOR) 20 MG tablet, Take 1 tablet by mouth Every Night., Disp: , Rfl:     tamsulosin (FLOMAX) 0.4 MG capsule 24 hr capsule, Take 1 capsule by mouth Every Night., Disp: , Rfl:      thiamine (VITAMIN B-1) 100 MG tablet  tablet, Take 1 tablet by mouth Every Morning., Disp: , Rfl:     amiodarone (PACERONE) 200 MG tablet, Take 1 tablet by mouth Daily., Disp: 90 tablet, Rfl: 1    amoxicillin-clavulanate (Augmentin) 500-125 MG per tablet, Take 1 tablet by mouth 3 (Three) Times a Day for 7 days., Disp: 21 tablet, Rfl: 0    Budeson-Glycopyrrol-Formoterol (Breztri Aerosphere) 160-9-4.8 MCG/ACT aerosol inhaler, Inhale 2 puffs 2 (Two) Times a Day. (Patient not taking: Reported on 8/27/2024), Disp: 1 each, Rfl: 0    hydrocortisone (CORTEF) 10 MG tablet, Take 2 tablets by mouth Daily for 30 days., Disp: 60 tablet, Rfl: 0    ipratropium-albuterol (DUO-NEB) 0.5-2.5 mg/3 ml nebulizer, Take 3 mL by nebulization 4 (Four) Times a Day As Needed for Wheezing (Dyspnea) for up to 30 days., Disp: 120 mL, Rfl: 3    Problem List:  Patient Active Problem List   Diagnosis    Chest pain    Pneumonia due to infectious organism    Pneumonia    NSTEMI, initial episode of care    Large pleural effusion    Stage IV adenocarcinoma of lung, right    Stage IV adenocarcinoma of lung, left     Past Medical History:  Past Medical History:   Diagnosis Date    Coronary artery disease     Myocardial infarction 01/31/2024     Past Surgical History:  Past Surgical History:   Procedure Laterality Date    BRONCHOSCOPY N/A 8/13/2024    Procedure: BRONCHOSCOPY WITH BRONCHIAL WASHING AND BRONCHIAL BRUSHING;  Surgeon: Kelvin Gonzalez MD;  Location: [facility] ENDOSCOPY;  Service: Pulmonary;  Laterality: N/A;    BRONCHOSCOPY WITH ION ROBOTIC ASSIST N/A 8/16/2024    Procedure: BRONCHOSCOPY WITH ION ROBOT WITH CRYO BIOPSY;  Surgeon: Kelvin Gonzalez MD;  Location: [facility] ENDOSCOPY;  Service: Robotics - Pulmonary;  Laterality: N/A;    CARDIAC CATHETERIZATION Right 1/29/2024    Procedure: Coronary angiography;  Surgeon: Abran Chand MD;  Location: [facility] CATH INVASIVE LOCATION;  Service: Cardiovascular;  Laterality: Right;    CARDIAC CATHETERIZATION N/A  2024    Procedure: Left Heart Cath;  Surgeon: Abran Chand MD;  Location: [facility] CATH INVASIVE LOCATION;  Service: Cardiovascular;  Laterality: N/A;     Social History:  Social History     Socioeconomic History    Marital status:    Tobacco Use    Smoking status: Former     Current packs/day: 0.00     Average packs/day: 1 pack/day for 32.0 years (32.0 ttl pk-yrs)     Types: Cigarettes     Start date:      Quit date: 2000     Years since quittin.7     Passive exposure: Past    Smokeless tobacco: Never   Vaping Use    Vaping status: Never Used   Substance and Sexual Activity    Alcohol use: Yes     Alcohol/week: 1.0 standard drink of alcohol     Types: 1 Cans of beer per week    Drug use: Never    Sexual activity: Defer     Allergies:  No Known Allergies  Immunizations:  Immunization History   Administered Date(s) Administered    COVID-19 (MODERNA) 1st,2nd,3rd Dose Monovalent 2021, 2021    COVID-19 (MODERNA) BIVALENT 12+YRS 2022    COVID-19 (MODERNA) Monovalent Original Booster 10/23/2021, 2022    COVID-19 F23 (MODERNA) 12YRS+ (SPIKEVAX) 2023    Fluzone  >6mos 2015    Fluzone High-Dose 65+YRS 10/10/2016, 10/13/2017, 10/15/2018    Fluzone High-Dose 65+yrs 10/15/2020, 10/19/2021, 10/15/2022, 2023    H1N1 Inj 2009    Hepatitis B Adult/Adolescent IM 10/27/2005, 2005, 2006    Influenza MDCK Quadrivalent with Preserative 10/15/2019    Influenza Seasonal Injectable 10/31/2013, 2014    Pneumococcal Conjugate 13-Valent (PCV13) 10/17/2016    Pneumococcal Polysaccharide (PPSV23) 10/17/2017    Shingrix 2023, 2024    Tdap 2017            In-Office Procedure(s):  No orders to display        ASCVD RIsk Score::  The ASCVD Risk score (Karlos MCCALLUM, et al., 2019) failed to calculate for the following reasons:    The patient has a prior MI or stroke diagnosis    Imaging:    Results for orders placed during the hospital encounter of  08/12/24    XR Chest 1 View    Narrative  XR CHEST 1 VW    Date of Exam: 8/16/2024 11:50 AM EDT    Indication: post ion bronchoscopy    Comparison: CT chest 8/15/2024, AP chest x-ray 8/14/2024    Findings:  No pneumothorax is identified. Extensive interstitial and airspace opacities throughout the right lung appear stable. Blunting of the right lateral costophrenic angle is unchanged. Known right lower lobe cavitary mass is obscured. Left lung is clear. Tip  of the right upper extremity PICC terminates in the SVC. Cardiomediastinal contours are stable.    Impression  Impression:  1.No visible pneumothorax.  2.Stable appearance of extensive interstitial and airspace opacities throughout the right lung with right pleural effusion, obscuring the known right lower lobe cavitary mass.      Electronically Signed: Noemy Prince  8/16/2024 12:06 PM EDT  Workstation ID: [id number]       Results for orders placed during the hospital encounter of 08/12/24    CT Chest Without Contrast Diagnostic    Narrative  CT CHEST WO CONTRAST DIAGNOSTIC    Date of Exam: 8/15/2024 2:07 PM EDT    Indication: Bronchoscopy, pleural fluid consistent with malignancy    Comparison: CT chest with contrast 8/14/2024    Technique: Axial CT images were obtained of the chest without contrast administration.  Sagittal and coronal reconstructions were performed.  Automated exposure control and iterative reconstruction methods were used.    Findings:  Soft tissues of the lower neck are without acute abnormality. There is a right sided PICC catheter with the tip at the mid SVC. Heart size normal. Coronary artery calcifications are present. Small pericardial effusion. Enlarged right paratracheal,  subcarinal and right hilar lymph nodes again noted better assessed on the recent contrast-enhanced study. No new adenopathy. No axillary adenopathy.    There is a persistent small to moderate right-sided pleural effusion. Trace effusion on the left. There is diffuse  interlobular septal thickening throughout the right lung. Groundglass opacities involving the right lung not significantly changed in the  prior study. There is redemonstration of dense consolidation in the dependent right lower lobe which may relate to pneumonia or malignancy. There is again question of the partially cavitary right lower lobe mass partially obscured due to consolidation  with representative measurement of 6 cm (2/69). Patchy areas of airspace disease involving the right lower lobe and right middle lobe consistent with pneumonia.    No consolidation in the left lung. Mild emphysema. Several small pulmonary nodules again noted in the left lung for example representative nodule at the left lower lobe measuring 6 mm (4/65), at the left lower lobe measuring 3 mm (4/65), and the left  lower lobe abutting the pleura measuring 5 mm (4/56).    Upper abdomen demonstrates no acute abnormality in the liver, spleen, adrenal glands, or pancreas. Cholelithiasis. There is moderate left hydroureteronephrosis partially imaged similar to the prior study. No free fluid in the upper abdomen. Small  sclerotic lesion in the T12 vertebral body likely bone island measuring 5 mm. Negative for acute fracture.    Impression  Impression:  1. Persistent dense consolidation in right lower lobe suggesting pneumonia with suspicion of a cavitary right lower lobe mass partially obscured due to consolidation similar to prior study which may relate to malignancy.  2. Stable enlarged mediastinal and right hilar adenopathy which may relate to metastatic adenopathy or reactive nodes.  3. Diffuse interlobular septal thickening in right lung not significantly changed which may relate to asymmetric pulmonary edema or lymphangitic spread of malignancy.  4. Small to moderate right pleural effusion similar to prior study.  5. Several small nonspecific pulmonary nodules in the left lung unchanged largest 6 mm. Trace left pleural effusion.  6.  Moderate left hydroureteronephrosis again noted, consider dedicated CT abdomen and pelvis for further assessment.      Electronically Signed: Antwon Estrella MD  8/15/2024 3:48 PM EDT  Workstation ID: [id number]      Results for orders placed during the hospital encounter of 08/12/24    CT Chest Without Contrast Diagnostic    Narrative  CT CHEST WO CONTRAST DIAGNOSTIC    Date of Exam: 8/15/2024 2:07 PM EDT    Indication: Bronchoscopy, pleural fluid consistent with malignancy    Comparison: CT chest with contrast 8/14/2024    Technique: Axial CT images were obtained of the chest without contrast administration.  Sagittal and coronal reconstructions were performed.  Automated exposure control and iterative reconstruction methods were used.    Findings:  Soft tissues of the lower neck are without acute abnormality. There is a right sided PICC catheter with the tip at the mid SVC. Heart size normal. Coronary artery calcifications are present. Small pericardial effusion. Enlarged right paratracheal,  subcarinal and right hilar lymph nodes again noted better assessed on the recent contrast-enhanced study. No new adenopathy. No axillary adenopathy.    There is a persistent small to moderate right-sided pleural effusion. Trace effusion on the left. There is diffuse interlobular septal thickening throughout the right lung. Groundglass opacities involving the right lung not significantly changed in the  prior study. There is redemonstration of dense consolidation in the dependent right lower lobe which may relate to pneumonia or malignancy. There is again question of the partially cavitary right lower lobe mass partially obscured due to consolidation  with representative measurement of 6 cm (2/69). Patchy areas of airspace disease involving the right lower lobe and right middle lobe consistent with pneumonia.    No consolidation in the left lung. Mild emphysema. Several small pulmonary nodules again noted in the left lung for  example representative nodule at the left lower lobe measuring 6 mm (4/65), at the left lower lobe measuring 3 mm (4/65), and the left  lower lobe abutting the pleura measuring 5 mm (4/56).    Upper abdomen demonstrates no acute abnormality in the liver, spleen, adrenal glands, or pancreas. Cholelithiasis. There is moderate left hydroureteronephrosis partially imaged similar to the prior study. No free fluid in the upper abdomen. Small  sclerotic lesion in the T12 vertebral body likely bone island measuring 5 mm. Negative for acute fracture.    Impression  Impression:  1. Persistent dense consolidation in right lower lobe suggesting pneumonia with suspicion of a cavitary right lower lobe mass partially obscured due to consolidation similar to prior study which may relate to malignancy.  2. Stable enlarged mediastinal and right hilar adenopathy which may relate to metastatic adenopathy or reactive nodes.  3. Diffuse interlobular septal thickening in right lung not significantly changed which may relate to asymmetric pulmonary edema or lymphangitic spread of malignancy.  4. Small to moderate right pleural effusion similar to prior study.  5. Several small nonspecific pulmonary nodules in the left lung unchanged largest 6 mm. Trace left pleural effusion.  6. Moderate left hydroureteronephrosis again noted, consider dedicated CT abdomen and pelvis for further assessment.      Electronically Signed: Antwon Estrella MD  8/15/2024 3:48 PM EDT  Workstation ID: [id number]      Lab Review:   Office Visit on 08/22/2024   Component Date Value    WBC 08/22/2024 16.37 (H)     RBC 08/22/2024 4.88     Hemoglobin 08/22/2024 15.5     Hematocrit 08/22/2024 46.5     MCV 08/22/2024 95.3     MCH 08/22/2024 31.8     MCHC 08/22/2024 33.3     RDW 08/22/2024 13.9     RDW-SD 08/22/2024 46.9     MPV 08/22/2024 9.6     Platelets 08/22/2024 255     Neutrophil % 08/22/2024 67.5     Lymphocyte % 08/22/2024 13.4 (L)     Monocyte % 08/22/2024 9.2      Eosinophil % 08/22/2024 9.7 (H)     Basophil % 08/22/2024 0.2     Neutrophils, Absolute 08/22/2024 11.06 (H)     Lymphocytes, Absolute 08/22/2024 2.19     Monocytes, Absolute 08/22/2024 1.50 (H)     Eosinophils, Absolute 08/22/2024 1.59 (H)     Basophils, Absolute 08/22/2024 0.03     Glucose 08/22/2024 95     BUN 08/22/2024 18     Creatinine 08/22/2024 1.15     Sodium 08/22/2024 141     Potassium 08/22/2024 4.1     Chloride 08/22/2024 104     CO2 08/22/2024 25.3     Calcium 08/22/2024 8.9     Total Protein 08/22/2024 5.6 (L)     Albumin 08/22/2024 3.6     ALT (SGPT) 08/22/2024 34     AST (SGOT) 08/22/2024 21     Alkaline Phosphatase 08/22/2024 55     Total Bilirubin 08/22/2024 0.3     Globulin 08/22/2024 2.0     A/G Ratio 08/22/2024 1.8     BUN/Creatinine Ratio 08/22/2024 15.7     Anion Gap 08/22/2024 11.7     eGFR 08/22/2024 66.8    Lab on 08/22/2024   Component Date Value    Extra Tube 08/22/2024 Hold for add-ons.     Extra Tube 08/22/2024 Hold for add-ons.    No results displayed because visit has over 200 results.        Recent labs reviewed and interpreted for clinical significance and application            Level of Care:           Carlos Pavon MD  09/09/24  .

## 2024-09-09 NOTE — PROGRESS NOTES
TREATMENT  PREPARATION    Young Ames  [id number]  1950    Chief Complaint: Treatment preparation and needs assessment    History of present illness:  Young Ames is a 73 y.o. year old male who is here today for treatment preparation and needs assessment.  The patient has been diagnosed with   Encounter Diagnoses   Name Primary?    Primary lung cancer, right     Stage IV adenocarcinoma of lung, right     Chest pain, unspecified type     Shortness of breath     Encounter for education Yes    and is scheduled to begin treatment with:     Oncology History:    Oncology/Hematology History   Stage IV adenocarcinoma of lung, right   8/12/2024 Initial Diagnosis    Stage IV adenocarcinoma of lung, right     9/19/2024 Biopsy    OP LUNG Pembrolizumab 200 mg / Pemetrexed / Carboplatin AUC=5  Plan Provider: Arben Tim MD  Treatment goal: Control  Line of treatment: First Line     Stage IV adenocarcinoma of lung, left   9/6/2024 Initial Diagnosis    Stage IV adenocarcinoma of lung, left         The current medication list and allergy list were reviewed and reconciled.     Past Medical History, Past Surgical History, Social History, Family History have been reviewed and are without significant changes except as mentioned.    Physical Exam:    Vitals:    09/11/24 1255   BP: 125/81   Pulse: 91   SpO2: 94%     Vitals:    09/11/24 1255   PainSc: 0-No pain        ECOG score: 0             Physical Exam  Vitals reviewed.   Constitutional:       General: He is not in acute distress.     Appearance: He is not diaphoretic.   HENT:      Head: Normocephalic and atraumatic.   Eyes:      General: No scleral icterus.        Right eye: No discharge.         Left eye: No discharge.      Conjunctiva/sclera: Conjunctivae normal.   Neck:      Thyroid: No thyromegaly.   Pulmonary:      Effort: Pulmonary effort is normal.      Comments: Diminished breath sounds bilaterally. No wheezing, no rhonchi  Abdominal:      General: Bowel sounds  are normal.      Palpations: Abdomen is soft. There is no mass.      Tenderness: There is no abdominal tenderness. There is no guarding or rebound.   Musculoskeletal:         General: No tenderness. Normal range of motion.      Cervical back: Normal range of motion.   Lymphadenopathy:      Cervical: No cervical adenopathy.   Skin:     General: Skin is warm.      Findings: No erythema or rash.   Neurological:      Mental Status: He is alert and oriented to person, place, and time.      Motor: No abnormal muscle tone.   Psychiatric:         Mood and Affect: Mood normal.         Behavior: Behavior normal.         Thought Content: Thought content normal.         Judgment: Judgment normal.           NEEDS ASSESSMENTS    Genetics  The patient's new diagnosis and family history have been reviewed for genetic counseling needs. The patient will not be referred..     Psychosocial and Barriers to care  The patient has completed a PHQ-9 Depression Screening and the Distress Thermometer (DT) today.  PHQ-9 results show PHQ-2 Total Score: 0 PHQ-9 Total Score: PHQ-9 Total Score: 0     The patient scored their distress today as Distress Level: 3 on a scale of 0-10 with 0 being no distress and 10 being extreme distress. Problems marked by the patient as being an issue for them within the last week include increased shortness of air, concern regarding treatment.     Results were reviewed along with psychosocial resources offered by our cancer center.  Our Supportive Oncology team will be flagged for a score of 4 or above, and a same day call will be made for a score of 9 or 10.  A mental health referral is offered at that time. Patients who score less than 4 have been educated on our support services and can be referred to our  upon request.  The patient will be referred to our .       Nutrition  The patient has completed the malnutrition screening today. They scored Malnutrition Screening Tool  Have you  recently lost weight without trying?  If yes, how much weight have you lost?: 0--> No  Have you been eating poorly because of a decreased appetite?: 0--> No  MST score: 0   with a score of 0-1 meaning not at risk in a score of 2 or greater meaning at risk.  Patients with a score of 3 or higher will be referred to our oncology dietitian for support. Patients beginning at risk treatment regimens or who have dietary concerns will also be referred to our oncology dietitian. The patient will be referred.    Functional Assessment  Persons who are age 70 or greater will be screened for qualification of a comprehensive geriatric assessment by our survivorship nurse practitioner.  Older adults with cancer face unique challenges. These may include an increased risk of drug reactions, financial burdens, and caregiver stress.     Intravenous Access Assessment  The patient and I discussed planned intravenous chemo/biotherapy as well as other IV treatments that are often needed throughout the course of treatment. These may include, but are not limited to blood transfusions, antibiotics, and IV hydration. Discussed that depending on selected treatment and vein assessment, patient may require venous access device (VAD) which could include but not limited to a Mediport or PICC line. Risks and benefits of VADs reviewed. The patient will be treated via Port.    Reproductive/Sexual Activity   People should avoid becoming pregnant and should not get a partner pregnant while undergoing chemo/biotherapy.  People of childbearing age should use effective contraception during active therapy. The best recommendation for all people is to use a barrier method for a minimum of 1 week after the last infusion of chemo/biotherapy to prevent your partner being exposed to byproducts from treatment medications in bodily fluids. Effective contraception should be discussed with your oncology team to make sure it is safe to take based on your diagnosis.  "Possible options include oral contraceptives, barrier methods. Chemo/biotherapy can change your ability to reproduce children in the future.  There are options for fertility preservation. NOT APPLICABLE    Advanced Care Planning  Advance Care Planning   The patient and I discussed advanced care planning, \"Conversations that Matter\".   This service is offered for development of advance directives with a certified ACP facilitator.  The patient does not have an up-to-date advanced directive. This document is not on file with our office. The patient is not interested in an appointment with one of our facilitators to create or update their advanced directives.               Smoking cessation  Tobacco Use: Medium Risk (9/11/2024)    Patient History     Smoking Tobacco Use: Former     Smokeless Tobacco Use: Never     Passive Exposure: Past       Patient and I discussed their tobacco use history. Referral will not be made for smoking cessation.      Palliative Care  When appropriate, the patient and I discussed the availability palliative care services and when appropriate Hospice care. Palliative care is not the same as Hospice care which was explained to the patient.The patient is not interested in additional information from our  on these services.    Survivorship   When appropriate, we discussed that we will refer the patient to survivorship clinic to discuss next steps following completion of planned treatment.  Reviewed this visit will include assessment of your physical, psychological, functional, and spiritual needs as a survivor and the need at attend this visit when scheduled.    TREATMENT EDUCATION    Today I met with the patient to discuss the chemo/biotherapy regimen recommended for treatment of Primary lung cancer, right  - CBC & Differential    Stage IV adenocarcinoma of lung, right  - XR Chest PA & Lateral  - lidocaine-prilocaine (EMLA) 2.5-2.5 % cream    Chest pain, unspecified type  - XR Chest " PA & Lateral    Shortness of breath  - XR Chest PA & Lateral    Encounter for education  .  The patient was given explanation of treatment premed side effects including office policy that prohibits patients to drive if sedating medications are administered, MD explanation given regarding benefits, side effects, toxicities and goals of treatment.  The patient received a Chemotherapy/Biotherapy Plan Summary including diagnosis and explanation of specific treatment plan.    SIDE EFFECTS:  Common side effects were discussed with the patient and/or significant other.  Discussion included where applicable hair loss/discoloration, anemia/fatigue, infection/chills/fever, appetite, bleeding risk/precautions, constipation, diarrhea, mouth sores, taste alteration, loss of appetite, nausea/vomiting, peripheral neuropathy, skin/nail changes, rash, muscle aches/weakness, photosensitivity, weight gain/loss, hearing loss, dizziness, menopausal symptoms, menstrual irregularity, sterility, high blood pressure, heart damage, liver damage, lung damage, kidney damage, DVT/PE risk, fluid retention, pleural/pericardial effusion, somnolence, electrolyte/LFT imbalance, vein exercises and/or the possible need for vascular access/port placement.  The patient was advised that although uncommon, leakage of an infused medication from the vein or venous access device may lead to skin breakdown and/or other tissue damage.  The patient was advised that he/she may have pain, bleeding, and/or bruising from the insertion of a needle in their vein or venous access device (port).  The patient was further advised that, in spite of proper technique, infection with redness and irritation may rarely occur at the site where the needle was inserted.  The patient was advised that if complications occur, additional medical treatment is available.  Finally, where applicable we have reviewed rare but potential immune mediated side effects including shortness of  breath, cough, chest pain (pneumonitis), abdominal pain, diarrhea (colitis), thyroiditis (hypothyroid or hyperthyroid), hepatitis and liver dysfunction, nephritis and renal dysfunction.    Discussion also included side effects specific to drugs in the treatment plan, specifically:    Treatment Plans       Name Type Plan Dates Plan Provider         Active    OP LUNG Pembrolizumab 200 mg / Pemetrexed / Carboplatin AUC=5 ONCOLOGY TREATMENT 2  9/3/2024 - Present Arbne Tim MD                      Questions answered and additional information discussed on topics including:  Anemia, Thrombocytopenia, Neutropenia, Nutrition and appetite changes, Constipation, Diarrhea, Nausea & vomiting, Mouth sores, Alopecia, Infertility, Intimate activity, Nervous system changes, Pain, Skin & nail changes, Organ toxicities, and Home care       Assessment and Plan:    Diagnoses and all orders for this visit:    1. Encounter for education (Primary)    2. Primary lung cancer, right  -     CBC & Differential; Future    3. Stage IV adenocarcinoma of lung, right  -     XR Chest PA & Lateral; Future  -     lidocaine-prilocaine (EMLA) 2.5-2.5 % cream; Apply 1 Application topically to the appropriate area as directed As Needed for Mild Pain (Apply 1 hour prior to port access).  Dispense: 30 g; Refill: 2    4. Chest pain, unspecified type  -     XR Chest PA & Lateral; Future    5. Shortness of breath  -     XR Chest PA & Lateral; Future    Other orders  -     Cancel: cyanocobalamin injection 1,000 mcg      Orders Placed This Encounter   Procedures    XR Chest PA & Lateral     Standing Status:   Future     Standing Expiration Date:   9/11/2025     Order Specific Question:   Reason for Exam:     Answer:   SOA, history of pleural effusion. NSCLC     Order Specific Question:   Release to patient     Answer:   Routine Release [[id number]]    CBC & Differential     Standing Status:   Future     Standing Expiration Date:   9/9/2025     Order  Specific Question:   Manual Differential     Answer:   No     Order Specific Question:   Release to patient     Answer:   Routine Release [[id number]]         The patient and I have reviewed their diagnosis and scheduled treatment plan. Needs assessment was completed where applicable including genetics, psychosocial needs, barriers to care, VAD evaluation, advanced care planning, survivorship, and palliative care services where indicated. Referrals have been ordered as appropriate based upon evaluation today and patient desires.   Chemo/biotherapy teaching was completed today and consent obtained. See separate documentation for further details.  Adequate time was given to answer questions.  Patient made aware of their care team members and contact information if they have questions or problems throughout the treatment course.  Discussion held and written information provided describing frequency of office visits and ongoing monitoring throughout the treatment plan.     Reviewed with patient any prescribed medication sent to pharmacy.  Education provided regarding proper storage, safe handling, and proper disposal of unused medication.  Proper handling of body fluids and waste discussed and written information provided.  If appropriate, patient had pretreatment labs drawn today.  B12 injection given today, to continue every 9 weeks while on treatment  With increased SOA, will order CXR today. Thoracentesis if needed. May need to consider Pleurx drain if continued fluid accumulation. Discussed with Dr. Tim  Follow up with Dr. Tim next week  Plan to begin chemotherapy next week    Learning assessment completed at initial patient encounter. See separate flowsheet. Chemo/biotherapy education comprehension assessed at today's visit.    I spent 78 minutes caring for Young on this date of service. This time includes time spent by me in the following activities: preparing for the visit, reviewing tests, obtaining  and/or reviewing a separately obtained history, performing a medically appropriate examination and/or evaluation, counseling and educating the patient/family/caregiver, ordering medications, tests, or procedures, referring and communicating with other health care professionals, documenting information in the medical record, and care coordination.     Karin Gipson PA-C   09/11/24

## 2024-09-11 ENCOUNTER — HOSPITAL ENCOUNTER (OUTPATIENT)
Dept: ONCOLOGY | Facility: HOSPITAL | Age: 74
Discharge: HOME | End: 2024-09-11
Payer: MEDICARE

## 2024-09-11 ENCOUNTER — NUTRITION (OUTPATIENT)
Dept: ONCOLOGY | Facility: CLINIC | Age: 74
End: 2024-09-11
Payer: MEDICARE

## 2024-09-11 ENCOUNTER — OFFICE VISIT (OUTPATIENT)
Dept: ONCOLOGY | Facility: CLINIC | Age: 74
End: 2024-09-11
Payer: MEDICARE

## 2024-09-11 ENCOUNTER — HOSPITAL ENCOUNTER (OUTPATIENT)
Dept: GENERAL RADIOLOGY | Facility: HOSPITAL | Age: 74
Discharge: HOME | End: 2024-09-11
Admitting: INTERNAL MEDICINE
Payer: MEDICARE

## 2024-09-11 ENCOUNTER — CLINICAL SUPPORT (OUTPATIENT)
Dept: ONCOLOGY | Facility: CLINIC | Age: 74
End: 2024-09-11
Payer: MEDICARE

## 2024-09-11 VITALS
BODY MASS INDEX: 25.6 KG/M2 | OXYGEN SATURATION: 94 % | DIASTOLIC BLOOD PRESSURE: 81 MMHG | WEIGHT: 178.8 LBS | HEART RATE: 91 BPM | HEIGHT: 70 IN | SYSTOLIC BLOOD PRESSURE: 125 MMHG

## 2024-09-11 DIAGNOSIS — R06.02 SHORTNESS OF BREATH: ICD-10-CM

## 2024-09-11 DIAGNOSIS — C34.91 STAGE IV ADENOCARCINOMA OF LUNG, RIGHT: ICD-10-CM

## 2024-09-11 DIAGNOSIS — J90 LARGE PLEURAL EFFUSION: ICD-10-CM

## 2024-09-11 DIAGNOSIS — C34.91 STAGE IV ADENOCARCINOMA OF LUNG, RIGHT: Primary | ICD-10-CM

## 2024-09-11 DIAGNOSIS — C34.91 PRIMARY LUNG CANCER, RIGHT: ICD-10-CM

## 2024-09-11 DIAGNOSIS — R07.9 CHEST PAIN, UNSPECIFIED TYPE: ICD-10-CM

## 2024-09-11 DIAGNOSIS — Z71.9 ENCOUNTER FOR EDUCATION: Primary | ICD-10-CM

## 2024-09-11 PROCEDURE — 99215 OFFICE O/P EST HI 40 MIN: CPT | Performed by: PHYSICIAN ASSISTANT

## 2024-09-11 PROCEDURE — 1160F RVW MEDS BY RX/DR IN RCRD: CPT | Performed by: PHYSICIAN ASSISTANT

## 2024-09-11 PROCEDURE — 71045 X-RAY EXAM CHEST 1 VIEW: CPT

## 2024-09-11 PROCEDURE — G2211 COMPLEX E/M VISIT ADD ON: HCPCS | Performed by: PHYSICIAN ASSISTANT

## 2024-09-11 PROCEDURE — 1126F AMNT PAIN NOTED NONE PRSNT: CPT | Performed by: PHYSICIAN ASSISTANT

## 2024-09-11 PROCEDURE — 96372 THER/PROPH/DIAG INJ SC/IM: CPT

## 2024-09-11 PROCEDURE — 25010000002 CYANOCOBALAMIN PER 1000 MCG: Performed by: PHYSICIAN ASSISTANT

## 2024-09-11 PROCEDURE — 1159F MED LIST DOCD IN RCRD: CPT | Performed by: PHYSICIAN ASSISTANT

## 2024-09-11 RX ORDER — ONDANSETRON 8 MG/1
8 TABLET, FILM COATED ORAL 3 TIMES DAILY PRN
Qty: 30 TABLET | Refills: 3 | Status: SHIPPED | OUTPATIENT
Start: 2024-09-11

## 2024-09-11 RX ORDER — OLANZAPINE 5 MG/1
5 TABLET ORAL NIGHTLY
Qty: 4 TABLET | Refills: 3 | Status: SHIPPED | OUTPATIENT
Start: 2024-09-11

## 2024-09-11 RX ORDER — CYANOCOBALAMIN 1000 UG/ML
1000 INJECTION, SOLUTION INTRAMUSCULAR; SUBCUTANEOUS ONCE
Status: CANCELLED | OUTPATIENT
Start: 2024-09-12 | End: 2024-09-11

## 2024-09-11 RX ORDER — DEXAMETHASONE 4 MG/1
TABLET ORAL
Qty: 6 TABLET | Refills: 3 | Status: SHIPPED | OUTPATIENT
Start: 2024-09-11

## 2024-09-11 RX ORDER — LIDOCAINE/PRILOCAINE 2.5 %-2.5%
1 CREAM (GRAM) TOPICAL AS NEEDED
Qty: 30 G | Refills: 2 | Status: SHIPPED | OUTPATIENT
Start: 2024-09-11

## 2024-09-11 RX ORDER — CYANOCOBALAMIN 1000 UG/ML
1000 INJECTION, SOLUTION INTRAMUSCULAR; SUBCUTANEOUS ONCE
Status: COMPLETED | OUTPATIENT
Start: 2024-09-11 | End: 2024-09-11

## 2024-09-11 RX ORDER — FOLIC ACID 1 MG/1
1 TABLET ORAL DAILY
Qty: 30 TABLET | Refills: 4 | Status: SHIPPED | OUTPATIENT
Start: 2024-09-11

## 2024-09-11 RX ADMIN — CYANOCOBALAMIN 1000 MCG: 1000 INJECTION INTRAMUSCULAR; SUBCUTANEOUS at 15:02

## 2024-09-11 NOTE — PROGRESS NOTES
[facility] met with pt as part of the treatment preparation process.  Pt accompanied by his wife Ruth and daughter Pamela. Introductions made and contact information provided.  Reviewed supports and services available at [facility].  Pt is retired.  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  Pt is well supported and does not identify any needs at this time.  There are some concerns about the physical environment at home.  The bathroom and bedroom are on the second floor and pt has difficulty with the stairs.  They have a bedside commode downstairs which has been helpful.  The environment may be an issue in the future depending on pt's physical status.  Pt daughter Pamela states that she has a walkout basement with bathroom that is available for pt and wife to move in if necessary.  Pt is understandably more comfortable in his own home for now.  Family is encouraged to reach out to [facility] if needs change and/or other resources are needed.  Confirmed that pt has no advance directives currently.  No interest in completing any was noted.  Pt was taken to lab for B-12 injection upon completion of [facility] appt.

## 2024-09-12 ENCOUNTER — HOSPITAL ENCOUNTER (OUTPATIENT)
Dept: INTERVENTIONAL RADIOLOGY/VASCULAR | Facility: HOSPITAL | Age: 74
Discharge: HOME | End: 2024-09-12
Admitting: INTERNAL MEDICINE
Payer: MEDICARE

## 2024-09-12 VITALS
RESPIRATION RATE: 12 BRPM | OXYGEN SATURATION: 94 % | TEMPERATURE: 98.1 F | WEIGHT: 178 LBS | SYSTOLIC BLOOD PRESSURE: 103 MMHG | BODY MASS INDEX: 25.48 KG/M2 | DIASTOLIC BLOOD PRESSURE: 68 MMHG | HEART RATE: 84 BPM | HEIGHT: 70 IN

## 2024-09-12 DIAGNOSIS — J90 LARGE PLEURAL EFFUSION: ICD-10-CM

## 2024-09-12 DIAGNOSIS — C34.91 PRIMARY LUNG CANCER, RIGHT: ICD-10-CM

## 2024-09-12 LAB
APTT PPP: 26.7 SECONDS (ref 24–31)
BASOPHILS # BLD AUTO: 0.07 10*3/MM3 (ref 0–0.2)
BASOPHILS NFR BLD AUTO: 0.6 % (ref 0–1.5)
DEPRECATED RDW RBC AUTO: 48.7 FL (ref 37–54)
EOSINOPHIL # BLD AUTO: 1 10*3/MM3 (ref 0–0.4)
EOSINOPHIL NFR BLD AUTO: 8.6 % (ref 0.3–6.2)
ERYTHROCYTE [DISTWIDTH] IN BLOOD BY AUTOMATED COUNT: 13.9 % (ref 12.3–15.4)
HCT VFR BLD AUTO: 45.4 % (ref 37.5–51)
HGB BLD-MCNC: 14.5 G/DL (ref 13–17.7)
IMM GRANULOCYTES # BLD AUTO: 0.06 10*3/MM3 (ref 0–0.05)
IMM GRANULOCYTES NFR BLD AUTO: 0.5 % (ref 0–0.5)
INR PPP: 1.03 (ref 0.93–1.1)
LYMPHOCYTES # BLD AUTO: 1.21 10*3/MM3 (ref 0.7–3.1)
LYMPHOCYTES NFR BLD AUTO: 10.4 % (ref 19.6–45.3)
MCH RBC QN AUTO: 30.3 PG (ref 26.6–33)
MCHC RBC AUTO-ENTMCNC: 31.9 G/DL (ref 31.5–35.7)
MCV RBC AUTO: 95 FL (ref 79–97)
MONOCYTES # BLD AUTO: 0.85 10*3/MM3 (ref 0.1–0.9)
MONOCYTES NFR BLD AUTO: 7.3 % (ref 5–12)
MRSA DNA SPEC QL NAA+PROBE: NORMAL
NEUTROPHILS NFR BLD AUTO: 72.6 % (ref 42.7–76)
NEUTROPHILS NFR BLD AUTO: 8.41 10*3/MM3 (ref 1.7–7)
NRBC BLD AUTO-RTO: 0 /100 WBC (ref 0–0.2)
PLATELET # BLD AUTO: 248 10*3/MM3 (ref 140–450)
PMV BLD AUTO: 9.3 FL (ref 6–12)
PROTHROMBIN TIME: 11.2 SECONDS (ref 9.6–11.7)
RBC # BLD AUTO: 4.78 10*6/MM3 (ref 4.14–5.8)
WBC NRBC COR # BLD AUTO: 11.6 10*3/MM3 (ref 3.4–10.8)

## 2024-09-12 PROCEDURE — 85730 THROMBOPLASTIN TIME PARTIAL: CPT | Performed by: RADIOLOGY

## 2024-09-12 PROCEDURE — 77001 FLUOROGUIDE FOR VEIN DEVICE: CPT

## 2024-09-12 PROCEDURE — C1894 INTRO/SHEATH, NON-LASER: HCPCS

## 2024-09-12 PROCEDURE — 99152 MOD SED SAME PHYS/QHP 5/>YRS: CPT

## 2024-09-12 PROCEDURE — 25010000002 ONDANSETRON PER 1 MG

## 2024-09-12 PROCEDURE — 87641 MR-STAPH DNA AMP PROBE: CPT | Performed by: INTERNAL MEDICINE

## 2024-09-12 PROCEDURE — 25010000002 LIDOCAINE 1 % SOLUTION

## 2024-09-12 PROCEDURE — 76937 US GUIDE VASCULAR ACCESS: CPT

## 2024-09-12 PROCEDURE — 25810000003 SODIUM CHLORIDE 0.9 % SOLUTION: Performed by: RADIOLOGY

## 2024-09-12 PROCEDURE — C1788 PORT, INDWELLING, IMP: HCPCS

## 2024-09-12 PROCEDURE — 25010000002 HEPARIN LOCK FLUSH PER 10 UNITS

## 2024-09-12 PROCEDURE — 25010000002 FENTANYL CITRATE (PF) 50 MCG/ML SOLUTION

## 2024-09-12 PROCEDURE — 99153 MOD SED SAME PHYS/QHP EA: CPT

## 2024-09-12 PROCEDURE — 85610 PROTHROMBIN TIME: CPT | Performed by: RADIOLOGY

## 2024-09-12 PROCEDURE — 25010000002 MIDAZOLAM PER 1 MG

## 2024-09-12 PROCEDURE — 25010000002 CEFAZOLIN PER 500 MG

## 2024-09-12 PROCEDURE — 85025 COMPLETE CBC W/AUTO DIFF WBC: CPT | Performed by: RADIOLOGY

## 2024-09-12 RX ORDER — SODIUM CHLORIDE 0.9 % (FLUSH) 0.9 %
10 SYRINGE (ML) INJECTION AS NEEDED
Status: DISCONTINUED | OUTPATIENT
Start: 2024-09-12 | End: 2024-09-13 | Stop reason: HOSPADM

## 2024-09-12 RX ORDER — SODIUM CHLORIDE 0.9 % (FLUSH) 0.9 %
10 SYRINGE (ML) INJECTION EVERY 12 HOURS SCHEDULED
Status: DISCONTINUED | OUTPATIENT
Start: 2024-09-12 | End: 2024-09-13 | Stop reason: HOSPADM

## 2024-09-12 RX ORDER — SODIUM CHLORIDE 9 MG/ML
75 INJECTION, SOLUTION INTRAVENOUS CONTINUOUS
Status: DISCONTINUED | OUTPATIENT
Start: 2024-09-12 | End: 2024-09-13 | Stop reason: HOSPADM

## 2024-09-12 RX ORDER — LIDOCAINE HYDROCHLORIDE AND EPINEPHRINE 10; 10 MG/ML; UG/ML
INJECTION, SOLUTION INFILTRATION; PERINEURAL AS NEEDED
Status: COMPLETED | OUTPATIENT
Start: 2024-09-12 | End: 2024-09-12

## 2024-09-12 RX ORDER — ONDANSETRON 2 MG/ML
INJECTION INTRAMUSCULAR; INTRAVENOUS AS NEEDED
Status: COMPLETED | OUTPATIENT
Start: 2024-09-12 | End: 2024-09-12

## 2024-09-12 RX ORDER — LIDOCAINE HYDROCHLORIDE 10 MG/ML
INJECTION, SOLUTION INFILTRATION; PERINEURAL AS NEEDED
Status: COMPLETED | OUTPATIENT
Start: 2024-09-12 | End: 2024-09-12

## 2024-09-12 RX ORDER — MIDAZOLAM HYDROCHLORIDE 1 MG/ML
INJECTION INTRAMUSCULAR; INTRAVENOUS AS NEEDED
Status: COMPLETED | OUTPATIENT
Start: 2024-09-12 | End: 2024-09-12

## 2024-09-12 RX ORDER — FENTANYL CITRATE 50 UG/ML
INJECTION, SOLUTION INTRAMUSCULAR; INTRAVENOUS AS NEEDED
Status: COMPLETED | OUTPATIENT
Start: 2024-09-12 | End: 2024-09-12

## 2024-09-12 RX ORDER — HEPARIN SODIUM (PORCINE) LOCK FLUSH IV SOLN 100 UNIT/ML 100 UNIT/ML
SOLUTION INTRAVENOUS AS NEEDED
Status: COMPLETED | OUTPATIENT
Start: 2024-09-12 | End: 2024-09-12

## 2024-09-12 RX ADMIN — SODIUM CHLORIDE 75 ML/HR: 9 INJECTION, SOLUTION INTRAVENOUS at 08:30

## 2024-09-12 RX ADMIN — MIDAZOLAM 1 MG: 1 INJECTION INTRAMUSCULAR; INTRAVENOUS at 10:50

## 2024-09-12 RX ADMIN — Medication 500 UNITS: at 11:05

## 2024-09-12 RX ADMIN — LIDOCAINE HYDROCHLORIDE 4 ML: 10 INJECTION, SOLUTION INFILTRATION; PERINEURAL at 10:55

## 2024-09-12 RX ADMIN — Medication 10 ML: at 08:30

## 2024-09-12 RX ADMIN — FENTANYL CITRATE 50 MCG: 50 INJECTION, SOLUTION INTRAMUSCULAR; INTRAVENOUS at 10:50

## 2024-09-12 RX ADMIN — FENTANYL CITRATE 50 MCG: 50 INJECTION, SOLUTION INTRAMUSCULAR; INTRAVENOUS at 10:51

## 2024-09-12 RX ADMIN — LIDOCAINE HYDROCHLORIDE,EPINEPHRINE BITARTRATE 10 ML: 10; .01 INJECTION, SOLUTION INFILTRATION; PERINEURAL at 10:58

## 2024-09-12 RX ADMIN — ONDANSETRON 4 MG: 2 INJECTION INTRAMUSCULAR; INTRAVENOUS at 10:50

## 2024-09-12 RX ADMIN — CEFAZOLIN 2000 MG: 1 INJECTION, POWDER, FOR SOLUTION INTRAMUSCULAR; INTRAVENOUS at 10:39

## 2024-09-12 NOTE — H&P
[facility]   Interventional Radiology H&P    Patient Name: Young Ames  : 1950  MRN: [id number]  Primary Care Physician:  Abner Funes APRN  Referring Physician: Arben Tim MD  Date of admission: 2024    Subjective   Subjective     HPI:  Young Ames is a 73 y.o. male with adenocarcinoma of the right lung.    Review of Systems:   Constitutional no fever,  no weight loss       Otolaryngeal no difficulty swallowing   Cardiovascular no chest pain   Pulmonary no cough, no sputum production   Gastrointestinal no constipation, no diarrhea                         Personal History       Past Medical/Surgical History:   Past Medical History:   Diagnosis Date    Coronary artery disease     Myocardial infarction 2024     Past Surgical History:   Procedure Laterality Date    BRONCHOSCOPY N/A 2024    Procedure: BRONCHOSCOPY WITH BRONCHIAL WASHING AND BRONCHIAL BRUSHING;  Surgeon: Kelvin Gonzalez MD;  [facility];  Service: Pulmonary;  Laterality: N/A;    BRONCHOSCOPY WITH ION ROBOTIC ASSIST N/A 2024    Procedure: BRONCHOSCOPY WITH ION ROBOT WITH CRYO BIOPSY;  Surgeon: Kelvin Gonzalez MD;  [facility];  Service: Robotics - Pulmonary;  Laterality: N/A;    CARDIAC CATHETERIZATION Right 2024    Procedure: Coronary angiography;  Surgeon: Abran Chand MD;  [facility];  Service: Cardiovascular;  Laterality: Right;    CARDIAC CATHETERIZATION N/A 2024    Procedure: Left Heart Cath;  Surgeon: Abran Chand MD;  [facility];  Service: Cardiovascular;  Laterality: N/A;       Social History:  reports that he quit smoking about 24 years ago. His smoking use included cigarettes. He started smoking about 56 years ago. He has a 32 pack-year smoking history. He has been exposed to tobacco smoke. He has never used smokeless tobacco. He reports current alcohol use of about 1.0 standard drink of alcohol per week.  "He reports that he does not use drugs.    Medications:  (Not in a hospital admission)    Current medications:  sodium chloride, 10 mL, Intravenous, Q12H      Current IV drips:  ceFAZolin, , Last Rate: 2,000 mg (09/12/24 1039)  sodium chloride, 75 mL/hr, Last Rate: 75 mL/hr (09/12/24 0830)        Allergies:  No Known Allergies    Objective    Objective     Vitals:   Temp:  [98.1 °F (36.7 °C)] 98.1 °F (36.7 °C)  Heart Rate:  [80-82] 82  Resp:  [17-25] 25  BP: (123-125)/(72-76) 123/76      Physical Exam:   Constitutional: Awake, alert, No acute distress    Respiratory: Clear to auscultation bilaterally, nonlabored respirations    Cardiovascular: RRR, no murmurs, rubs, or gallops, palpable pedal pulses bilaterally   Gastrointestinal: Positive bowel sounds, soft, nontender, nondistended        ASA SCALE ASSESSMENT:  2-Mild to moderate systemic disease, medically well controlled, with no functional limitation    MALLAMPATI CLASSIFICATION:  2-Able to visualize the soft palate, fauces, uvula. The anterior & posterior tonsilar pillars are hidden by the tongue.       Result Review        Result Review:     No results found for: \"NA\"    No results found for: \"K\"    No results found for: \"CL\"    No results found for: \"PLASMABICARB\"    No results found for: \"BUN\"    No results found for: \"CREATININE\"    No results found for: \"CALCIUM\"        No components found for: \"GLUCOSE.*\"  Results from last 7 days   Lab Units 09/12/24  0809   WBC 10*3/mm3 11.60*   HEMOGLOBIN g/dL 14.5   HEMATOCRIT % 45.4   PLATELETS 10*3/mm3 248      Results from last 7 days   Lab Units 09/12/24  0809   INR  1.03           Assessment / Plan     Assesment:   Adenocarcinoma of the right lung.      Plan:   Port placement.     The risks and benefits of the procedure were discussed with the patient.    Electronically signed by CARL Brooks, 09/12/24, 10:41 AM EDT.  "

## 2024-09-16 ENCOUNTER — PATIENT OUTREACH (OUTPATIENT)
Dept: ONCOLOGY | Facility: CLINIC | Age: 74
End: 2024-09-16
Payer: MEDICARE

## 2024-09-16 ENCOUNTER — TELEPHONE (OUTPATIENT)
Dept: ONCOLOGY | Facility: CLINIC | Age: 74
End: 2024-09-16
Payer: MEDICARE

## 2024-09-16 ENCOUNTER — HOSPITAL ENCOUNTER (OUTPATIENT)
Dept: GENERAL RADIOLOGY | Facility: HOSPITAL | Age: 74
Discharge: HOME | End: 2024-09-16
Admitting: PHYSICIAN ASSISTANT
Payer: MEDICARE

## 2024-09-16 DIAGNOSIS — J90 LARGE PLEURAL EFFUSION: ICD-10-CM

## 2024-09-16 DIAGNOSIS — C34.91 STAGE IV ADENOCARCINOMA OF LUNG, RIGHT: Primary | ICD-10-CM

## 2024-09-16 DIAGNOSIS — R06.02 SHORTNESS OF BREATH: ICD-10-CM

## 2024-09-16 DIAGNOSIS — C34.91 STAGE IV ADENOCARCINOMA OF LUNG, RIGHT: ICD-10-CM

## 2024-09-16 DIAGNOSIS — R07.9 CHEST PAIN, UNSPECIFIED TYPE: ICD-10-CM

## 2024-09-16 PROCEDURE — 71046 X-RAY EXAM CHEST 2 VIEWS: CPT

## 2024-09-17 ENCOUNTER — HOME HEALTH ADMISSION (OUTPATIENT)
Dept: HOME HEALTH SERVICES | Facility: HOME HEALTHCARE | Age: 74
End: 2024-09-17
Payer: MEDICARE

## 2024-09-17 ENCOUNTER — TELEPHONE (OUTPATIENT)
Dept: ONCOLOGY | Facility: CLINIC | Age: 74
End: 2024-09-17
Payer: MEDICARE

## 2024-09-17 LAB
MYCOBACTERIUM SPEC CULT: NORMAL
MYCOBACTERIUM SPEC CULT: NORMAL
NIGHT BLUE STAIN TISS: NORMAL
NIGHT BLUE STAIN TISS: NORMAL

## 2024-09-18 LAB
DNA RANGE(S) EXAMINED NAR: NORMAL
GENE DIS ANL INTERP-IMP: POSITIVE
GENE DIS ASSESSED: NORMAL
GENE MUT TESTED BLD/T: 2.1 M/MB
MSI CA SPEC-IMP: NORMAL
PD-L1 BY 22C3 TISS IMSTN DOC: POSITIVE
REASON FOR STUDY: NORMAL
TEMPUS GERMLINE NOTE: NORMAL
TEMPUS LCA: NORMAL
TEMPUS PD-L1 (22C3) COMBINED POSITIVE SCORE: 2
TEMPUS PD-L1 (22C3) TUMOR PROPORTION SCORE: 2 %
TEMPUS PERTINENTNEGATIVES: NORMAL
TEMPUS PORTAL: NORMAL
TEMPUS THERAPY1: NORMAL
TEMPUS THERAPYCOUNT: 1
TEMPUS TRIALCOUNT: 3
TEMPUS TRIALMATCHES1: NORMAL
TEMPUS TRIALMATCHES2: NORMAL
TEMPUS TRIALMATCHES3: NORMAL

## 2024-09-19 ENCOUNTER — HOSPITAL ENCOUNTER (OUTPATIENT)
Dept: INTERVENTIONAL RADIOLOGY/VASCULAR | Facility: HOSPITAL | Age: 74
Discharge: HOME | End: 2024-09-19
Payer: MEDICARE

## 2024-09-19 ENCOUNTER — HOSPITAL ENCOUNTER (OUTPATIENT)
Dept: ONCOLOGY | Facility: HOSPITAL | Age: 74
Discharge: HOME | End: 2024-09-19
Payer: MEDICARE

## 2024-09-19 VITALS
BODY MASS INDEX: 25.48 KG/M2 | HEART RATE: 85 BPM | RESPIRATION RATE: 19 BRPM | OXYGEN SATURATION: 92 % | TEMPERATURE: 97.8 F | WEIGHT: 178 LBS | SYSTOLIC BLOOD PRESSURE: 104 MMHG | HEIGHT: 70 IN | DIASTOLIC BLOOD PRESSURE: 67 MMHG

## 2024-09-19 VITALS
SYSTOLIC BLOOD PRESSURE: 114 MMHG | HEART RATE: 106 BPM | RESPIRATION RATE: 18 BRPM | BODY MASS INDEX: 25.51 KG/M2 | WEIGHT: 178.2 LBS | TEMPERATURE: 97.3 F | OXYGEN SATURATION: 94 % | DIASTOLIC BLOOD PRESSURE: 71 MMHG | HEIGHT: 70 IN

## 2024-09-19 DIAGNOSIS — J90 LARGE PLEURAL EFFUSION: ICD-10-CM

## 2024-09-19 DIAGNOSIS — C34.91 STAGE IV ADENOCARCINOMA OF LUNG, RIGHT: Primary | ICD-10-CM

## 2024-09-19 DIAGNOSIS — C34.91 STAGE IV ADENOCARCINOMA OF LUNG, RIGHT: ICD-10-CM

## 2024-09-19 LAB
ALP BLD-CCNC: 67 U/L (ref 53–128)
APTT PPP: 28.5 SECONDS (ref 24–31)
BASOPHILS # BLD AUTO: 0.02 10*3/MM3 (ref 0–0.2)
BASOPHILS # BLD AUTO: 0.02 10*3/MM3 (ref 0–0.2)
BASOPHILS NFR BLD AUTO: 0.1 % (ref 0–1.5)
BASOPHILS NFR BLD AUTO: 0.1 % (ref 0–1.5)
BUN BLDA-MCNC: 25 MG/DL (ref 7–22)
CALCIUM BLD QL: 8.7 MG/DL (ref 8–10.3)
CHLORIDE BLDA-SCNC: 102 MMOL/L (ref 98–108)
CO2 BLDA-SCNC: 25 MMOL/L (ref 18–33)
CREAT BLDA-MCNC: 1.3 MG/DL (ref 0.6–1.2)
DEPRECATED RDW RBC AUTO: 45.6 FL (ref 37–54)
DEPRECATED RDW RBC AUTO: 47.4 FL (ref 37–54)
EGFRCR SERPLBLD CKD-EPI 2021: 57.6 ML/MIN/1.73
EOSINOPHIL # BLD AUTO: 0.01 10*3/MM3 (ref 0–0.4)
EOSINOPHIL # BLD AUTO: 0.01 10*3/MM3 (ref 0–0.4)
EOSINOPHIL NFR BLD AUTO: 0 % (ref 0.3–6.2)
EOSINOPHIL NFR BLD AUTO: 0.1 % (ref 0.3–6.2)
ERYTHROCYTE [DISTWIDTH] IN BLOOD BY AUTOMATED COUNT: 13.3 % (ref 12.3–15.4)
ERYTHROCYTE [DISTWIDTH] IN BLOOD BY AUTOMATED COUNT: 13.9 % (ref 12.3–15.4)
GLUCOSE BLDC GLUCOMTR-MCNC: 261 MG/DL (ref 73–118)
HCT VFR BLD AUTO: 42 % (ref 37.5–51)
HCT VFR BLD AUTO: 42.9 % (ref 37.5–51)
HGB BLD-MCNC: 13.8 G/DL (ref 13–17.7)
HGB BLD-MCNC: 14.3 G/DL (ref 13–17.7)
IMM GRANULOCYTES # BLD AUTO: 0.1 10*3/MM3 (ref 0–0.05)
IMM GRANULOCYTES NFR BLD AUTO: 0.6 % (ref 0–0.5)
INR PPP: 1.1 (ref 0.93–1.1)
LYMPHOCYTES # BLD AUTO: 0.92 10*3/MM3 (ref 0.7–3.1)
LYMPHOCYTES # BLD AUTO: 0.96 10*3/MM3 (ref 0.7–3.1)
LYMPHOCYTES NFR BLD AUTO: 4.3 % (ref 19.6–45.3)
LYMPHOCYTES NFR BLD AUTO: 6.2 % (ref 19.6–45.3)
MCH RBC QN AUTO: 30.7 PG (ref 26.6–33)
MCH RBC QN AUTO: 32 PG (ref 26.6–33)
MCHC RBC AUTO-ENTMCNC: 32.9 G/DL (ref 31.5–35.7)
MCHC RBC AUTO-ENTMCNC: 33.3 G/DL (ref 31.5–35.7)
MCV RBC AUTO: 93.5 FL (ref 79–97)
MCV RBC AUTO: 96 FL (ref 79–97)
MONOCYTES # BLD AUTO: 0.25 10*3/MM3 (ref 0.1–0.9)
MONOCYTES # BLD AUTO: 0.58 10*3/MM3 (ref 0.1–0.9)
MONOCYTES NFR BLD AUTO: 1.6 % (ref 5–12)
MONOCYTES NFR BLD AUTO: 2.7 % (ref 5–12)
NEUTROPHILS NFR BLD AUTO: 14.09 10*3/MM3 (ref 1.7–7)
NEUTROPHILS NFR BLD AUTO: 19.67 10*3/MM3 (ref 1.7–7)
NEUTROPHILS NFR BLD AUTO: 91.4 % (ref 42.7–76)
NEUTROPHILS NFR BLD AUTO: 92.9 % (ref 42.7–76)
NRBC BLD AUTO-RTO: 0 /100 WBC (ref 0–0.2)
PLATELET # BLD AUTO: 273 10*3/MM3 (ref 140–450)
PLATELET # BLD AUTO: 287 10*3/MM3 (ref 140–450)
PMV BLD AUTO: 9.2 FL (ref 6–12)
PMV BLD AUTO: 9.6 FL (ref 6–12)
POC ALBUMIN: 2.9 G/L (ref 3.3–5.5)
POC ALT (SGPT): 18 U/L (ref 10–47)
POC AST (SGOT): 27 U/L (ref 11–38)
POC TOTAL BILIRUBIN: 0.4 MG/DL (ref 0.2–1.6)
POC TOTAL PROTEIN: 5.8 G/DL (ref 6.4–8.1)
POTASSIUM BLDA-SCNC: 3.9 MMOL/L (ref 3.6–5.1)
PROTHROMBIN TIME: 11.9 SECONDS (ref 9.6–11.7)
RBC # BLD AUTO: 4.47 10*6/MM3 (ref 4.14–5.8)
RBC # BLD AUTO: 4.49 10*6/MM3 (ref 4.14–5.8)
SODIUM BLD-SCNC: 140 MMOL/L (ref 128–145)
T4 FREE SERPL-MCNC: 1.92 NG/DL (ref 0.93–1.7)
TSH SERPL DL<=0.05 MIU/L-ACNC: 0.62 UIU/ML (ref 0.27–4.2)
WBC NRBC COR # BLD AUTO: 15.43 10*3/MM3 (ref 3.4–10.8)
WBC NRBC COR # BLD AUTO: 21.2 10*3/MM3 (ref 3.4–10.8)

## 2024-09-19 PROCEDURE — 85610 PROTHROMBIN TIME: CPT | Performed by: RADIOLOGY

## 2024-09-19 PROCEDURE — 25010000002 FOSAPREPITANT PER 1 MG: Performed by: INTERNAL MEDICINE

## 2024-09-19 PROCEDURE — 25010000002 LIDOCAINE 1 % SOLUTION: Performed by: RADIOLOGY

## 2024-09-19 PROCEDURE — 84443 ASSAY THYROID STIM HORMONE: CPT | Performed by: INTERNAL MEDICINE

## 2024-09-19 PROCEDURE — 25010000002 FENTANYL CITRATE (PF) 50 MCG/ML SOLUTION: Performed by: RADIOLOGY

## 2024-09-19 PROCEDURE — C1729 CATH, DRAINAGE: HCPCS

## 2024-09-19 PROCEDURE — 85730 THROMBOPLASTIN TIME PARTIAL: CPT | Performed by: RADIOLOGY

## 2024-09-19 PROCEDURE — 25010000002 PALONOSETRON 0.25 MG/5ML SOLUTION PREFILLED SYRINGE: Performed by: INTERNAL MEDICINE

## 2024-09-19 PROCEDURE — 80053 COMPREHEN METABOLIC PANEL: CPT

## 2024-09-19 PROCEDURE — 75989 ABSCESS DRAINAGE UNDER X-RAY: CPT

## 2024-09-19 PROCEDURE — 25010000002 CARBOPLATIN PER 50 MG: Performed by: INTERNAL MEDICINE

## 2024-09-19 PROCEDURE — 96375 TX/PRO/DX INJ NEW DRUG ADDON: CPT

## 2024-09-19 PROCEDURE — 25810000003 SODIUM CHLORIDE 0.9 % SOLUTION: Performed by: RADIOLOGY

## 2024-09-19 PROCEDURE — 25810000003 SODIUM CHLORIDE 0.9 % SOLUTION: Performed by: INTERNAL MEDICINE

## 2024-09-19 PROCEDURE — 85025 COMPLETE CBC W/AUTO DIFF WBC: CPT | Performed by: INTERNAL MEDICINE

## 2024-09-19 PROCEDURE — 25010000002 HEPARIN LOCK FLUSH PER 10 UNITS: Performed by: INTERNAL MEDICINE

## 2024-09-19 PROCEDURE — 96367 TX/PROPH/DG ADDL SEQ IV INF: CPT

## 2024-09-19 PROCEDURE — 25010000002 CEFAZOLIN PER 500 MG: Performed by: RADIOLOGY

## 2024-09-19 PROCEDURE — 25010000002 ONDANSETRON PER 1 MG: Performed by: RADIOLOGY

## 2024-09-19 PROCEDURE — 25810000003 SODIUM CHLORIDE 0.9 % SOLUTION 250 ML FLEX CONT: Performed by: INTERNAL MEDICINE

## 2024-09-19 PROCEDURE — 84439 ASSAY OF FREE THYROXINE: CPT | Performed by: INTERNAL MEDICINE

## 2024-09-19 PROCEDURE — 85025 COMPLETE CBC W/AUTO DIFF WBC: CPT | Performed by: RADIOLOGY

## 2024-09-19 PROCEDURE — 99153 MOD SED SAME PHYS/QHP EA: CPT

## 2024-09-19 PROCEDURE — 25010000002 MIDAZOLAM PER 1 MG: Performed by: RADIOLOGY

## 2024-09-19 PROCEDURE — 96417 CHEMO IV INFUS EACH ADDL SEQ: CPT

## 2024-09-19 PROCEDURE — 96413 CHEMO IV INFUSION 1 HR: CPT

## 2024-09-19 PROCEDURE — 25010000002 PEMETREXED PER 10 MG: Performed by: INTERNAL MEDICINE

## 2024-09-19 PROCEDURE — 99152 MOD SED SAME PHYS/QHP 5/>YRS: CPT

## 2024-09-19 PROCEDURE — 25010000002 PEMBROLIZUMAB 100 MG/4ML SOLUTION 4 ML VIAL: Performed by: INTERNAL MEDICINE

## 2024-09-19 RX ORDER — SODIUM CHLORIDE 0.9 % (FLUSH) 0.9 %
10 SYRINGE (ML) INJECTION AS NEEDED
OUTPATIENT
Start: 2024-09-19

## 2024-09-19 RX ORDER — PALONOSETRON 0.05 MG/ML
0.25 INJECTION, SOLUTION INTRAVENOUS ONCE
Status: CANCELLED | OUTPATIENT
Start: 2024-09-19

## 2024-09-19 RX ORDER — DIPHENHYDRAMINE HYDROCHLORIDE 50 MG/ML
50 INJECTION INTRAMUSCULAR; INTRAVENOUS AS NEEDED
Status: CANCELLED | OUTPATIENT
Start: 2024-09-19

## 2024-09-19 RX ORDER — SODIUM CHLORIDE 9 MG/ML
75 INJECTION, SOLUTION INTRAVENOUS CONTINUOUS
Status: DISCONTINUED | OUTPATIENT
Start: 2024-09-19 | End: 2024-09-20 | Stop reason: HOSPADM

## 2024-09-19 RX ORDER — ONDANSETRON 2 MG/ML
INJECTION INTRAMUSCULAR; INTRAVENOUS AS NEEDED
Status: COMPLETED | OUTPATIENT
Start: 2024-09-19 | End: 2024-09-19

## 2024-09-19 RX ORDER — FAMOTIDINE 10 MG/ML
20 INJECTION, SOLUTION INTRAVENOUS AS NEEDED
Status: CANCELLED | OUTPATIENT
Start: 2024-09-19

## 2024-09-19 RX ORDER — SODIUM CHLORIDE 0.9 % (FLUSH) 0.9 %
10 SYRINGE (ML) INJECTION AS NEEDED
Status: DISCONTINUED | OUTPATIENT
Start: 2024-09-19 | End: 2024-09-20 | Stop reason: HOSPADM

## 2024-09-19 RX ORDER — MIDAZOLAM HYDROCHLORIDE 1 MG/ML
INJECTION INTRAMUSCULAR; INTRAVENOUS AS NEEDED
Status: COMPLETED | OUTPATIENT
Start: 2024-09-19 | End: 2024-09-19

## 2024-09-19 RX ORDER — HEPARIN SODIUM (PORCINE) LOCK FLUSH IV SOLN 100 UNIT/ML 100 UNIT/ML
500 SOLUTION INTRAVENOUS AS NEEDED
Status: DISCONTINUED | OUTPATIENT
Start: 2024-09-19 | End: 2024-09-20 | Stop reason: HOSPADM

## 2024-09-19 RX ORDER — HEPARIN SODIUM (PORCINE) LOCK FLUSH IV SOLN 100 UNIT/ML 100 UNIT/ML
500 SOLUTION INTRAVENOUS AS NEEDED
OUTPATIENT
Start: 2024-09-19

## 2024-09-19 RX ORDER — SODIUM CHLORIDE 9 MG/ML
20 INJECTION, SOLUTION INTRAVENOUS ONCE
Status: COMPLETED | OUTPATIENT
Start: 2024-09-19 | End: 2024-09-19

## 2024-09-19 RX ORDER — PALONOSETRON 0.05 MG/ML
0.25 INJECTION, SOLUTION INTRAVENOUS ONCE
Status: COMPLETED | OUTPATIENT
Start: 2024-09-19 | End: 2024-09-19

## 2024-09-19 RX ORDER — SODIUM CHLORIDE 9 MG/ML
20 INJECTION, SOLUTION INTRAVENOUS ONCE
Status: CANCELLED | OUTPATIENT
Start: 2024-09-19

## 2024-09-19 RX ORDER — FENTANYL CITRATE 50 UG/ML
INJECTION, SOLUTION INTRAMUSCULAR; INTRAVENOUS AS NEEDED
Status: COMPLETED | OUTPATIENT
Start: 2024-09-19 | End: 2024-09-19

## 2024-09-19 RX ORDER — LIDOCAINE HYDROCHLORIDE 10 MG/ML
INJECTION, SOLUTION INFILTRATION; PERINEURAL AS NEEDED
Status: COMPLETED | OUTPATIENT
Start: 2024-09-19 | End: 2024-09-19

## 2024-09-19 RX ADMIN — Medication 10 ML: at 17:07

## 2024-09-19 RX ADMIN — CEFAZOLIN 2000 MG: 1 INJECTION, POWDER, FOR SOLUTION INTRAMUSCULAR; INTRAVENOUS at 08:26

## 2024-09-19 RX ADMIN — PEMETREXED DISODIUM 1000 MG: 500 INJECTION, POWDER, LYOPHILIZED, FOR SOLUTION INTRAVENOUS at 16:20

## 2024-09-19 RX ADMIN — FENTANYL CITRATE 50 MCG: 50 INJECTION, SOLUTION INTRAMUSCULAR; INTRAVENOUS at 08:47

## 2024-09-19 RX ADMIN — MIDAZOLAM 1 MG: 1 INJECTION INTRAMUSCULAR; INTRAVENOUS at 08:47

## 2024-09-19 RX ADMIN — CARBOPLATIN 410 MG: 600 INJECTION, SOLUTION INTRAVENOUS at 16:34

## 2024-09-19 RX ADMIN — SODIUM CHLORIDE 200 MG: 9 INJECTION, SOLUTION INTRAVENOUS at 15:46

## 2024-09-19 RX ADMIN — HEPARIN 500 UNITS: 100 SYRINGE at 17:08

## 2024-09-19 RX ADMIN — FOSAPREPITANT 100 ML: 150 INJECTION, POWDER, LYOPHILIZED, FOR SOLUTION INTRAVENOUS at 15:09

## 2024-09-19 RX ADMIN — PALONOSETRON 0.25 MG: 0.25 INJECTION, SOLUTION INTRAVENOUS at 15:07

## 2024-09-19 RX ADMIN — ONDANSETRON 4 MG: 2 INJECTION INTRAMUSCULAR; INTRAVENOUS at 08:42

## 2024-09-19 RX ADMIN — SODIUM CHLORIDE 20 ML/HR: 9 INJECTION, SOLUTION INTRAVENOUS at 15:06

## 2024-09-19 RX ADMIN — SODIUM CHLORIDE 75 ML/HR: 9 INJECTION, SOLUTION INTRAVENOUS at 07:45

## 2024-09-19 RX ADMIN — FENTANYL CITRATE 50 MCG: 50 INJECTION, SOLUTION INTRAMUSCULAR; INTRAVENOUS at 08:48

## 2024-09-19 RX ADMIN — LIDOCAINE HYDROCHLORIDE 10 ML: 10 INJECTION, SOLUTION INFILTRATION; PERINEURAL at 08:59

## 2024-09-20 ENCOUNTER — HOME CARE VISIT (OUTPATIENT)
Dept: HOME HEALTH SERVICES | Facility: HOME HEALTHCARE | Age: 74
End: 2024-09-20
Payer: MEDICARE

## 2024-09-20 VITALS
OXYGEN SATURATION: 94 % | TEMPERATURE: 97 F | DIASTOLIC BLOOD PRESSURE: 68 MMHG | HEIGHT: 71 IN | WEIGHT: 178 LBS | BODY MASS INDEX: 24.92 KG/M2 | SYSTOLIC BLOOD PRESSURE: 110 MMHG | RESPIRATION RATE: 19 BRPM | HEART RATE: 78 BPM

## 2024-09-20 PROCEDURE — G0299 HHS/HOSPICE OF RN EA 15 MIN: HCPCS

## 2024-09-23 ENCOUNTER — HOME CARE VISIT (OUTPATIENT)
Dept: HOME HEALTH SERVICES | Facility: HOME HEALTHCARE | Age: 74
End: 2024-09-23
Payer: MEDICARE

## 2024-09-23 VITALS
RESPIRATION RATE: 18 BRPM | DIASTOLIC BLOOD PRESSURE: 64 MMHG | SYSTOLIC BLOOD PRESSURE: 100 MMHG | TEMPERATURE: 96 F | HEART RATE: 74 BPM | OXYGEN SATURATION: 98 %

## 2024-09-23 PROCEDURE — G0299 HHS/HOSPICE OF RN EA 15 MIN: HCPCS

## 2024-09-25 ENCOUNTER — HOME CARE VISIT (OUTPATIENT)
Dept: HOME HEALTH SERVICES | Facility: HOME HEALTHCARE | Age: 74
End: 2024-09-25
Payer: MEDICARE

## 2024-09-25 VITALS
HEART RATE: 72 BPM | SYSTOLIC BLOOD PRESSURE: 100 MMHG | TEMPERATURE: 97.9 F | DIASTOLIC BLOOD PRESSURE: 66 MMHG | RESPIRATION RATE: 18 BRPM | OXYGEN SATURATION: 94 %

## 2024-09-25 PROCEDURE — G0299 HHS/HOSPICE OF RN EA 15 MIN: HCPCS

## 2024-09-26 ENCOUNTER — HOSPITAL ENCOUNTER (OUTPATIENT)
Dept: ONCOLOGY | Facility: HOSPITAL | Age: 74
Discharge: HOME | End: 2024-09-26
Admitting: INTERNAL MEDICINE
Payer: MEDICARE

## 2024-09-26 ENCOUNTER — OFFICE VISIT (OUTPATIENT)
Dept: ONCOLOGY | Facility: CLINIC | Age: 74
End: 2024-09-26
Payer: MEDICARE

## 2024-09-26 VITALS
OXYGEN SATURATION: 94 % | HEART RATE: 99 BPM | RESPIRATION RATE: 15 BRPM | DIASTOLIC BLOOD PRESSURE: 82 MMHG | BODY MASS INDEX: 25.25 KG/M2 | WEIGHT: 176.4 LBS | HEIGHT: 70 IN | TEMPERATURE: 98.2 F | SYSTOLIC BLOOD PRESSURE: 124 MMHG

## 2024-09-26 DIAGNOSIS — C34.91 STAGE IV ADENOCARCINOMA OF LUNG, RIGHT: Primary | ICD-10-CM

## 2024-09-26 DIAGNOSIS — G89.3 CANCER ASSOCIATED PAIN: ICD-10-CM

## 2024-09-26 PROCEDURE — 25010000002 HEPARIN LOCK FLUSH PER 10 UNITS: Performed by: INTERNAL MEDICINE

## 2024-09-26 PROCEDURE — 36591 DRAW BLOOD OFF VENOUS DEVICE: CPT

## 2024-09-26 RX ORDER — SODIUM CHLORIDE 0.9 % (FLUSH) 0.9 %
10 SYRINGE (ML) INJECTION AS NEEDED
OUTPATIENT
Start: 2024-09-26

## 2024-09-26 RX ORDER — TRAMADOL HYDROCHLORIDE 50 MG/1
50 TABLET ORAL EVERY 6 HOURS PRN
Qty: 90 TABLET | Refills: 0 | Status: SHIPPED | OUTPATIENT
Start: 2024-09-26

## 2024-09-26 RX ORDER — HEPARIN SODIUM (PORCINE) LOCK FLUSH IV SOLN 100 UNIT/ML 100 UNIT/ML
500 SOLUTION INTRAVENOUS AS NEEDED
OUTPATIENT
Start: 2024-09-26

## 2024-09-26 RX ORDER — HEPARIN SODIUM (PORCINE) LOCK FLUSH IV SOLN 100 UNIT/ML 100 UNIT/ML
500 SOLUTION INTRAVENOUS AS NEEDED
Status: DISCONTINUED | OUTPATIENT
Start: 2024-09-26 | End: 2024-09-28 | Stop reason: HOSPADM

## 2024-09-26 RX ORDER — SODIUM CHLORIDE 0.9 % (FLUSH) 0.9 %
10 SYRINGE (ML) INJECTION AS NEEDED
Status: DISCONTINUED | OUTPATIENT
Start: 2024-09-26 | End: 2024-09-28 | Stop reason: HOSPADM

## 2024-09-26 RX ADMIN — HEPARIN 500 UNITS: 100 SYRINGE at 12:18

## 2024-09-26 RX ADMIN — Medication 10 ML: at 12:16

## 2024-09-27 ENCOUNTER — HOME CARE VISIT (OUTPATIENT)
Dept: HOME HEALTH SERVICES | Facility: HOME HEALTHCARE | Age: 74
End: 2024-09-27
Payer: MEDICARE

## 2024-09-27 VITALS
RESPIRATION RATE: 18 BRPM | SYSTOLIC BLOOD PRESSURE: 100 MMHG | HEART RATE: 84 BPM | OXYGEN SATURATION: 95 % | DIASTOLIC BLOOD PRESSURE: 62 MMHG | TEMPERATURE: 98.9 F

## 2024-09-27 PROCEDURE — G0299 HHS/HOSPICE OF RN EA 15 MIN: HCPCS

## 2024-09-30 ENCOUNTER — HOME CARE VISIT (OUTPATIENT)
Dept: HOME HEALTH SERVICES | Facility: HOME HEALTHCARE | Age: 74
End: 2024-09-30
Payer: MEDICARE

## 2024-09-30 VITALS
OXYGEN SATURATION: 97 % | HEART RATE: 77 BPM | SYSTOLIC BLOOD PRESSURE: 100 MMHG | DIASTOLIC BLOOD PRESSURE: 64 MMHG | TEMPERATURE: 98 F | RESPIRATION RATE: 18 BRPM

## 2024-09-30 PROCEDURE — G0299 HHS/HOSPICE OF RN EA 15 MIN: HCPCS

## 2024-10-02 ENCOUNTER — HOME CARE VISIT (OUTPATIENT)
Dept: HOME HEALTH SERVICES | Facility: HOME HEALTHCARE | Age: 74
End: 2024-10-02
Payer: MEDICARE

## 2024-10-02 VITALS
DIASTOLIC BLOOD PRESSURE: 64 MMHG | SYSTOLIC BLOOD PRESSURE: 84 MMHG | HEART RATE: 77 BPM | BODY MASS INDEX: 24.82 KG/M2 | WEIGHT: 173 LBS | TEMPERATURE: 97 F | RESPIRATION RATE: 18 BRPM | OXYGEN SATURATION: 97 %

## 2024-10-02 PROCEDURE — G0299 HHS/HOSPICE OF RN EA 15 MIN: HCPCS

## 2024-10-04 ENCOUNTER — HOME CARE VISIT (OUTPATIENT)
Dept: HOME HEALTH SERVICES | Facility: HOME HEALTHCARE | Age: 74
End: 2024-10-04
Payer: MEDICARE

## 2024-10-04 PROCEDURE — G0299 HHS/HOSPICE OF RN EA 15 MIN: HCPCS

## 2024-10-06 VITALS
OXYGEN SATURATION: 97 % | TEMPERATURE: 97 F | DIASTOLIC BLOOD PRESSURE: 68 MMHG | RESPIRATION RATE: 18 BRPM | SYSTOLIC BLOOD PRESSURE: 100 MMHG | HEART RATE: 78 BPM

## 2024-10-07 ENCOUNTER — HOME CARE VISIT (OUTPATIENT)
Dept: HOME HEALTH SERVICES | Facility: HOME HEALTHCARE | Age: 74
End: 2024-10-07
Payer: MEDICARE

## 2024-10-07 VITALS
TEMPERATURE: 97 F | OXYGEN SATURATION: 97 % | DIASTOLIC BLOOD PRESSURE: 64 MMHG | HEART RATE: 78 BPM | RESPIRATION RATE: 18 BRPM | SYSTOLIC BLOOD PRESSURE: 100 MMHG

## 2024-10-07 PROCEDURE — G0299 HHS/HOSPICE OF RN EA 15 MIN: HCPCS

## 2024-10-08 NOTE — PROGRESS NOTES
HEMATOLOGY ONCOLOGY OUTPATIENT FOLLOW UP       Patient name: Young Ames  : 1950  MRN: [id number]  Primary Care Physician: Abner Funes APRN  Referring Physician: No ref. provider found  Reason For Consult: Adenocarcinoma of the right lung.     History of Present Illness:    2024: Mr. Ames first came to [facility] complaining of dyspnea. This had been getting worse over a short period of time. It was associated to unintended weight loss of approximately 15 lbs in around one month. He was diagnosed with pneumonia in 2024 he was treated with azithromycin and with amoxicillin/clavulanate, despite which he did not seem to recover completely. CT scan had shown dense consolidation of the right lower lobe and there was at some point suggestion of a cavitary lesion. 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 Following this a repeat scan showed a questionable 5.5 cm right lower lobe cavitary lesion. A bronchoscopy and biopsy on 2024 confirmed adenocarcinoma of the right lung. He feels reasonably well at this time. He has been breathing better, though he persists with dyspnea or exertion. He has been afebrile. He continues to cough but not more than before. He has not had abdominal pain and denies diarrhea or dysuria. On exam alert and conversant. Oriented and in no distress. No jaundice. No oral lesions and respirations are not labored. Lungs diminished bilaterally and absent on the right lower lobe. The abdomen is soft. No edema. Reviewed the images of the scans. Reviewed the laboratory exams. Discussed with him and his family. To proceed with a PET scan. Will not obtain  pulmonary function tests, as most likely he has had recurrence of a significant right pleural effusion. He is to see me with results.     2024: Feeling about the same as at the time of the last visit but has had  more dyspnea.  He was placed on new medication in spite of which, intermittently, he continues to be uncomfortable.  He is eating reasonably well and has had no weight loss.  He is also afebrile.  He denies chest pains.  No abdominal pain.  On exam he is conversant and oriented.  No distress.  No jaundice.  The lungs are diminished bilaterally but there is absence of breath sounds and a cough when he in the lower parts of the right chest.  The abdomen is soft.  There is no edema.  Laboratory exams and final reports of pathology were reviewed and discussed with him.  Discussed with him the stage of the disease, which corresponds to 4, given the pleural involvement.  To start chemotherapy and immunotherapy.  I have requested next-generation sequencing.  Port as soon as available.    9/29/2024: Feels reasonably well today.  Has had less dyspnea since the insertion of the pleural catheter as he has been able to have the fluid drained at home more frequently.  Yesterday, for the first time, he had persistent right-sided chest pain after the drainage of the fluid that is now resolved.  He continues to be reasonably active.  He continues to eat reasonably well and has had very minimal nausea.  He took the first chemotherapy without any complications.  He denies chest pain and he has less dyspnea than before.  No abdominal pain and no edema.  On exam chronically ill-appearing but in no distress.  No jaundice.  The lungs are diminished bilaterally with more pronounced reduction of the breath sounds in the right.  The heart is regular.  The abdomen is soft.  There is no edema.  Laboratory exams reviewed.  Discussed with him.  Continue same therapy for now.  I have sent a prescription for tramadol that he can take as needed for pain, when present.    10/14/2024: Breathing better.  Progressively finding less fluid in the thoracic cavity.  Is able to do more activity.  Has had enuresis once.  Also has noted tremors.  He is  eating well.  No nausea or vomiting.  No chest pains.  No abdominal pain.  On exam he does not seem in any distress.  He is conversant and well-oriented.  No jaundice or pallor.  Lungs symmetrically ventilated without any dullness to percussion on the right.  Abdomen soft.  No edema.  Laboratory exams reviewed.  Discussed with him.  My suspicion is that he is no longer producing as much pleural fluid as before and that perhaps today they will not be able to drain much.  Will obtain a scan and will see with results.  Continue for now.  I am not sure what the cause of the tremors and the enuresis is.    Past Medical History:   Diagnosis Date    Coronary artery disease     Myocardial infarction 01/31/2024     Past Surgical History:   Procedure Laterality Date    BRONCHOSCOPY N/A 8/13/2024    Procedure: BRONCHOSCOPY WITH BRONCHIAL WASHING AND BRONCHIAL BRUSHING;  Surgeon: Kelvin Gonzalez MD;  Location: [facility] ENDOSCOPY;  Service: Pulmonary;  Laterality: N/A;    BRONCHOSCOPY WITH ION ROBOTIC ASSIST N/A 8/16/2024    Procedure: BRONCHOSCOPY WITH ION ROBOT WITH CRYO BIOPSY;  Surgeon: Kelvin Gonzalez MD;  Location: [facility] ENDOSCOPY;  Service: Robotics - Pulmonary;  Laterality: N/A;    CARDIAC CATHETERIZATION Right 1/29/2024    Procedure: Coronary angiography;  Surgeon: Abran Chand MD;  Location: [facility] CATH INVASIVE LOCATION;  Service: Cardiovascular;  Laterality: Right;    CARDIAC CATHETERIZATION N/A 1/29/2024    Procedure: Left Heart Cath;  Surgeon: Abran Chand MD;  Location: [facility] CATH INVASIVE LOCATION;  Service: Cardiovascular;  Laterality: N/A;       Current Outpatient Medications:     acetaminophen (TYLENOL) 325 MG tablet, Take 2 tablets by mouth Every 4 (Four) Hours As Needed for Mild Pain., Disp: , Rfl:     amiodarone (PACERONE) 200 MG tablet, Take 1 tablet by mouth Daily., Disp: 90 tablet, Rfl: 1    apixaban (ELIQUIS) 5 MG tablet tablet, Take 1 tablet by mouth Every 12 (Twelve) Hours. Indications: Atrial  Fibrillation, Disp: 60 tablet, Rfl: 0    Budeson-Glycopyrrol-Formoterol (Breztri Aerosphere) 160-9-4.8 MCG/ACT aerosol inhaler, Inhale 2 puffs 2 (Two) Times a Day., Disp: 1 each, Rfl: 0    Cholecalciferol (VITAMIN D-3 PO), Take 1 tablet by mouth Daily. Indications: Vitamin Deficiency, Disp: , Rfl:     dexAMETHasone (DECADRON) 4 MG tablet, Take 1 tablet twice daily starting the day before chemo, day of chemo, and day after chemo.  Take with food., Disp: 6 tablet, Rfl: 3    folic acid (FOLVITE) 1 MG tablet, Take 1 tablet by mouth Daily. Start at least 7 days prior to chemotherapy until at least 3 weeks after all chemotherapy., Disp: 30 tablet, Rfl: 4    lidocaine-prilocaine (EMLA) 2.5-2.5 % cream, Apply 1 Application topically to the appropriate area as directed As Needed for Mild Pain (Apply 1 hour prior to port access)., Disp: 30 g, Rfl: 2    metoprolol tartrate (LOPRESSOR) 25 MG tablet, Take 1 tablet by mouth Every 12 (Twelve) Hours., Disp: 60 tablet, Rfl: 0    midodrine (PROAMATINE) 5 MG tablet, Take 1 tablet by mouth 3 (Three) Times a Day Before Meals. (Patient taking differently: Take 1 tablet by mouth 3 (Three) Times a Day As Needed (low blood pressure). Indications: Disorder of Low Blood Pressure), Disp: 20 tablet, Rfl: 0    multivitamin with minerals (Centrum Silver 50+Men) tablet tablet, Take 1 tablet by mouth Every Morning. Indications: Vitamin Deficiency, Disp: , Rfl:     NON FORMULARY, Take 2 tablets by mouth Every Night. Zzzquil  Indications: Sleep Disorder, Disp: , Rfl:     OLANZapine (zyPREXA) 5 MG tablet, Take 1 tablet by mouth Every Night. Take nightly x 4 starting night of chemotherapy., Disp: 4 tablet, Rfl: 3    ondansetron (ZOFRAN) 8 MG tablet, Take 1 tablet by mouth 3 (Three) Times a Day As Needed for Nausea or Vomiting., Disp: 30 tablet, Rfl: 3    pantoprazole (PROTONIX) 40 MG EC tablet, Take 1 tablet by mouth Daily., Disp: 30 tablet, Rfl: 1    simvastatin (ZOCOR) 20 MG tablet, Take 1 tablet by  mouth Every Night. Indications: High Amount of Fats in the Blood, Disp: , Rfl:     tamsulosin (FLOMAX) 0.4 MG capsule 24 hr capsule, Take 1 capsule by mouth Every Night. Indications: Benign Enlargement of Prostate, Disp: , Rfl:     thiamine (VITAMIN B-1) 100 MG tablet  tablet, Take 1 tablet by mouth Every Morning. Indications: Vitamin Deficiency, Disp: , Rfl:     traMADol (ULTRAM) 50 MG tablet, Take 1 tablet by mouth Every 6 (Six) Hours As Needed for Moderate Pain., Disp: 90 tablet, Rfl: 0    ipratropium-albuterol (DUO-NEB) 0.5-2.5 mg/3 ml nebulizer, Take 3 mL by nebulization 4 (Four) Times a Day As Needed for Wheezing (Dyspnea) for up to 30 days., Disp: 120 mL, Rfl: 3  No current facility-administered medications for this visit.    Facility-Administered Medications Ordered in Other Visits:     heparin injection 500 Units, 500 Units, Intravenous, PRMeeta POWERS Alfonso, MD, 500 Units at 10/14/24 0823    sodium chloride 0.9 % flush 10 mL, 10 mL, Intravenous, PRNMeeta Alfonso, MD, 10 mL at 10/14/24 0822    No Known Allergies    Family History   Problem Relation Age of Onset    Dementia Mother     Breast cancer Sister 74     Cancer-related family history includes Breast cancer (age of onset: 74) in his sister.    Social History     Tobacco Use    Smoking status: Former     Current packs/day: 0.00     Average packs/day: 1 pack/day for 32.0 years (32.0 ttl pk-yrs)     Types: Cigarettes     Start date:      Quit date:      Years since quittin.8     Passive exposure: Past    Smokeless tobacco: Never   Vaping Use    Vaping status: Never Used   Substance Use Topics    Alcohol use: Yes     Alcohol/week: 1.0 standard drink of alcohol     Types: 1 Cans of beer per week    Drug use: Never     Social History     Social History Narrative    Not on file      ROS:   Review of Systems   Constitutional:  Positive for unexpected weight change. Negative for activity change, appetite change, chills, diaphoresis, fatigue  "and fever.   HENT:  Negative for congestion, dental problem, drooling, ear discharge, ear pain, facial swelling, hearing loss, mouth sores, nosebleeds, postnasal drip, rhinorrhea, sinus pressure, sinus pain, sneezing, sore throat, tinnitus, trouble swallowing and voice change.    Eyes:  Negative for photophobia, pain, discharge, redness, itching and visual disturbance.   Respiratory:  Positive for cough and shortness of breath. Negative for apnea, choking, chest tightness, wheezing and stridor.    Cardiovascular:  Negative for chest pain, palpitations and leg swelling.   Gastrointestinal:  Negative for abdominal distention, abdominal pain, anal bleeding, blood in stool, constipation, diarrhea, nausea, rectal pain and vomiting.   Endocrine: Negative for cold intolerance, heat intolerance, polydipsia and polyuria.   Genitourinary:  Negative for decreased urine volume, difficulty urinating, dysuria, flank pain, frequency, genital sores, hematuria and urgency.   Musculoskeletal:  Negative for arthralgias, back pain, gait problem, joint swelling, myalgias, neck pain and neck stiffness.   Skin:  Negative for color change, pallor and rash.   Neurological:  Negative for dizziness, tremors, seizures, syncope, facial asymmetry, speech difficulty, weakness, light-headedness, numbness and headaches.   Hematological:  Negative for adenopathy. Does not bruise/bleed easily.   Psychiatric/Behavioral:  Negative for agitation, behavioral problems, confusion, decreased concentration, hallucinations, self-injury, sleep disturbance and suicidal ideas. The patient is not nervous/anxious.      Objective:  Vital signs:  Vitals:    10/14/24 0828   BP: 115/74   Pulse: 109   Resp: 18   Temp: 98.2 °F (36.8 °C)   SpO2: 95%   Weight: 80.2 kg (176 lb 12.8 oz)   Height: 177.8 cm (70\")   PainSc: 0-No pain     Body mass index is 25.37 kg/m².  ECOG  (1) Restricted in physically strenuous activity, ambulatory and able to do work of light " nature    Physical Exam:   Physical Exam  Constitutional:       General: He is not in acute distress.     Appearance: He is not ill-appearing, toxic-appearing or diaphoretic.      Comments: Slender. Well built and in no distress. No jaundice.    HENT:      Head: Normocephalic and atraumatic.      Right Ear: External ear normal.      Left Ear: External ear normal.      Nose: Nose normal.      Mouth/Throat:      Mouth: Mucous membranes are moist.      Pharynx: Oropharynx is clear.   Eyes:      General: No scleral icterus.        Right eye: No discharge.         Left eye: No discharge.      Conjunctiva/sclera: Conjunctivae normal.      Pupils: Pupils are equal, round, and reactive to light.   Cardiovascular:      Rate and Rhythm: Normal rate and regular rhythm.      Pulses: Normal pulses.      Heart sounds: Normal heart sounds. No murmur heard.     No friction rub. No gallop.   Pulmonary:      Effort: No respiratory distress.      Breath sounds: No stridor. No wheezing, rhonchi or rales.      Comments: Breath sounds diminished bilaterally.   Chest:      Chest wall: No tenderness.   Abdominal:      General: Abdomen is flat. Bowel sounds are normal. There is no distension.      Palpations: Abdomen is soft. There is no mass.      Tenderness: There is no abdominal tenderness. There is no right CVA tenderness, left CVA tenderness, guarding or rebound.   Musculoskeletal:         General: No tenderness, deformity or signs of injury.      Cervical back: No rigidity.      Right lower leg: No edema.      Left lower leg: No edema.   Lymphadenopathy:      Cervical: No cervical adenopathy.   Skin:     General: Skin is warm and dry.      Coloration: Skin is not jaundiced or pale.      Findings: No bruising or rash.   Neurological:      General: No focal deficit present.      Mental Status: He is alert and oriented to person, place, and time.      Cranial Nerves: No cranial nerve deficit.   Psychiatric:         Mood and Affect: Mood  normal.         Behavior: Behavior normal.         Thought Content: Thought content normal.         Judgment: Judgment normal.     DICKSON Tim MD performed the physical exam on 10/14/2024 as documented above.    Lab Results - Last 18 Months   Lab Units 10/14/24  0821 10/10/24  1324 09/19/24  1349   WBC 10*3/mm3 7.78 11.54* 21.20*   HEMOGLOBIN g/dL 11.6* 11.5* 14.3   HEMATOCRIT % 35.4* 34.5* 42.9   PLATELETS 10*3/mm3 330 533* 287   MCV fL 97.3* 96.1 96.0     Lab Results - Last 18 Months   Lab Units 10/10/24  1356 09/19/24  1401 08/22/24  1102 08/17/24  0929 08/15/24  0029 08/13/24  0335 08/12/24  2030   SODIUM mmol/L  --   --  141 142 143   < > 137   POTASSIUM mmol/L  --   --  4.1 3.6 4.2   < > 4.4   CHLORIDE mmol/L  --   --  104 111* 112*   < > 104   CO2 mmol/L  --   --  25.3 21.5* 21.1*   < > 20.8*   BUN mg/dL  --   --  18 30* 40*   < > 23   CREATININE mg/dL 1.40* 1.30* 1.15 1.01 1.31*   < > 1.28*   CALCIUM mg/dL  --   --  8.9 8.5* 8.0*   < > 8.6   BILIRUBIN mg/dL  --   --  0.3 0.3  --   --  0.2   ALK PHOS U/L  --   --  55 43  --   --  55   ALT (SGPT) U/L  --   --  34 41  --   --  20   AST (SGOT) U/L  --   --  21 29  --   --  39   GLUCOSE mg/dL  --   --  95 124* 147*   < > 167*    < > = values in this interval not displayed.     Lab Results   Component Value Date    GLUCOSE 95 08/22/2024    BUN 18 08/22/2024    CREATININE 1.40 (H) 10/10/2024    BCR 15.7 08/22/2024    K 4.1 08/22/2024    CO2 25.3 08/22/2024    CALCIUM 8.9 08/22/2024    ALBUMIN 3.6 08/22/2024    AST 21 08/22/2024    ALT 34 08/22/2024     Lab Results - Last 18 Months   Lab Units 09/19/24  0742 09/12/24  0809 08/12/24  1834 01/28/24  1737   INR  1.10 1.03 1.00 0.98   APTT seconds 28.5 26.7  --  24.8*     Lab Results   Component Value Date    STEVEN Positive (A) 08/12/2024     Lab Results   Component Value Date    PTT 28.5 09/19/2024    INR 1.10 09/19/2024     Assessment & Plan     cTX N2 M1 adenocarcinoma of the right lung: Next-generation  sequencing reviewed.  Positive PD-L1 expression..  The disease is negative for EGFR, ALK, ROS1, ret, BRAF.  On treatment with carboplatin/pemetrexed/pembrolizumab.  Tolerating treatment well and with evidence of response.  Right lower lobe pneumonia resolved.  Right pleural effusion: Recurrent being drained with through a Pleurx catheter.  History of tobacco smoking: Abstinent for more than 20 years.  I have reviewed the laboratory exams as well as recent imaging studies.  Discussed with him.  Continue same treatment.  See me with new scans.    Arben Tim MD on 10/14/2024 at 8:53 AM.

## 2024-10-09 ENCOUNTER — HOME CARE VISIT (OUTPATIENT)
Dept: HOME HEALTH SERVICES | Facility: HOME HEALTHCARE | Age: 74
End: 2024-10-09
Payer: MEDICARE

## 2024-10-09 VITALS
SYSTOLIC BLOOD PRESSURE: 122 MMHG | HEART RATE: 62 BPM | DIASTOLIC BLOOD PRESSURE: 70 MMHG | RESPIRATION RATE: 18 BRPM | TEMPERATURE: 97.9 F | OXYGEN SATURATION: 98 %

## 2024-10-09 PROCEDURE — G0299 HHS/HOSPICE OF RN EA 15 MIN: HCPCS

## 2024-10-09 NOTE — HOME HEALTH
Routine Visit Note: Medications reviewed. Appetite well. No issues with elimination. CP assessed.     Skill/education provided: Vital signs stable. PleurX drain completed; patient tolerated well. Output: 1000 mLs. Cardiopulmonary assessment completed. Patient denies falls and pain. Education provided about pleurX drainage amount and high risk medications. No edema present.     Patient/caregiver response: Patient verbilized understanding.     Plan for next visit: Vital signs, review medications, cardiopulmonary assessment, assess falls and pain, cp assess, pleurX catheter drain    Other pertinent info: na

## 2024-10-10 ENCOUNTER — HOSPITAL ENCOUNTER (OUTPATIENT)
Dept: ONCOLOGY | Facility: HOSPITAL | Age: 74
Discharge: HOME | End: 2024-10-10
Payer: MEDICARE

## 2024-10-10 VITALS
RESPIRATION RATE: 16 BRPM | HEART RATE: 92 BPM | TEMPERATURE: 97.9 F | HEIGHT: 70 IN | DIASTOLIC BLOOD PRESSURE: 71 MMHG | OXYGEN SATURATION: 95 % | WEIGHT: 174.8 LBS | BODY MASS INDEX: 25.03 KG/M2 | SYSTOLIC BLOOD PRESSURE: 115 MMHG

## 2024-10-10 DIAGNOSIS — C34.91 STAGE IV ADENOCARCINOMA OF LUNG, RIGHT: Primary | ICD-10-CM

## 2024-10-10 LAB
ALP BLD-CCNC: 71 U/L (ref 53–128)
BASOPHILS # BLD AUTO: 0.02 10*3/MM3 (ref 0–0.2)
BASOPHILS NFR BLD AUTO: 0.2 % (ref 0–1.5)
BUN BLDA-MCNC: 17 MG/DL (ref 7–22)
CALCIUM BLD QL: 9 MG/DL (ref 8–10.3)
CHLORIDE BLDA-SCNC: 105 MMOL/L (ref 98–108)
CO2 BLDA-SCNC: 24 MMOL/L (ref 18–33)
CREAT BLDA-MCNC: 1.4 MG/DL (ref 0.6–1.2)
DEPRECATED RDW RBC AUTO: 43.6 FL (ref 37–54)
EGFRCR SERPLBLD CKD-EPI 2021: 52.7 ML/MIN/1.73
EOSINOPHIL # BLD AUTO: 0 10*3/MM3 (ref 0–0.4)
EOSINOPHIL NFR BLD AUTO: 0 % (ref 0.3–6.2)
ERYTHROCYTE [DISTWIDTH] IN BLOOD BY AUTOMATED COUNT: 14.7 % (ref 12.3–15.4)
GLUCOSE BLDC GLUCOMTR-MCNC: 240 MG/DL (ref 73–118)
HCT VFR BLD AUTO: 34.5 % (ref 37.5–51)
HGB BLD-MCNC: 11.5 G/DL (ref 13–17.7)
LYMPHOCYTES # BLD AUTO: 0.93 10*3/MM3 (ref 0.7–3.1)
LYMPHOCYTES NFR BLD AUTO: 8.1 % (ref 19.6–45.3)
MCH RBC QN AUTO: 32 PG (ref 26.6–33)
MCHC RBC AUTO-ENTMCNC: 33.3 G/DL (ref 31.5–35.7)
MCV RBC AUTO: 96.1 FL (ref 79–97)
MONOCYTES # BLD AUTO: 0.51 10*3/MM3 (ref 0.1–0.9)
MONOCYTES NFR BLD AUTO: 4.4 % (ref 5–12)
NEUTROPHILS NFR BLD AUTO: 10.08 10*3/MM3 (ref 1.7–7)
NEUTROPHILS NFR BLD AUTO: 87.3 % (ref 42.7–76)
PLATELET # BLD AUTO: 533 10*3/MM3 (ref 140–450)
PMV BLD AUTO: 8.7 FL (ref 6–12)
POC ALBUMIN: 2.5 G/L (ref 3.3–5.5)
POC ALT (SGPT): 20 U/L (ref 10–47)
POC AST (SGOT): 24 U/L (ref 11–38)
POC TOTAL BILIRUBIN: 0.5 MG/DL (ref 0.2–1.6)
POC TOTAL PROTEIN: 5.8 G/DL (ref 6.4–8.1)
POTASSIUM BLDA-SCNC: 4.2 MMOL/L (ref 3.6–5.1)
RBC # BLD AUTO: 3.59 10*6/MM3 (ref 4.14–5.8)
SODIUM BLD-SCNC: 143 MMOL/L (ref 128–145)
WBC NRBC COR # BLD AUTO: 11.54 10*3/MM3 (ref 3.4–10.8)

## 2024-10-10 PROCEDURE — 25810000003 SODIUM CHLORIDE 0.9 % SOLUTION: Performed by: INTERNAL MEDICINE

## 2024-10-10 PROCEDURE — 96417 CHEMO IV INFUS EACH ADDL SEQ: CPT

## 2024-10-10 PROCEDURE — 96375 TX/PRO/DX INJ NEW DRUG ADDON: CPT

## 2024-10-10 PROCEDURE — 96367 TX/PROPH/DG ADDL SEQ IV INF: CPT

## 2024-10-10 PROCEDURE — 25010000002 FOSAPREPITANT PER 1 MG: Performed by: INTERNAL MEDICINE

## 2024-10-10 PROCEDURE — 25010000002 CARBOPLATIN PER 50 MG: Performed by: INTERNAL MEDICINE

## 2024-10-10 PROCEDURE — 96413 CHEMO IV INFUSION 1 HR: CPT

## 2024-10-10 PROCEDURE — 80053 COMPREHEN METABOLIC PANEL: CPT

## 2024-10-10 PROCEDURE — 25010000002 PEMBROLIZUMAB 100 MG/4ML SOLUTION 4 ML VIAL: Performed by: INTERNAL MEDICINE

## 2024-10-10 PROCEDURE — 25010000002 PEMETREXED PER 10 MG: Performed by: INTERNAL MEDICINE

## 2024-10-10 PROCEDURE — 25010000002 PALONOSETRON 0.25 MG/5ML SOLUTION PREFILLED SYRINGE: Performed by: INTERNAL MEDICINE

## 2024-10-10 PROCEDURE — 96411 CHEMO IV PUSH ADDL DRUG: CPT

## 2024-10-10 PROCEDURE — 25010000002 HEPARIN LOCK FLUSH PER 10 UNITS: Performed by: INTERNAL MEDICINE

## 2024-10-10 PROCEDURE — 85025 COMPLETE CBC W/AUTO DIFF WBC: CPT | Performed by: INTERNAL MEDICINE

## 2024-10-10 RX ORDER — DIPHENHYDRAMINE HYDROCHLORIDE 50 MG/ML
50 INJECTION INTRAMUSCULAR; INTRAVENOUS AS NEEDED
Status: CANCELLED | OUTPATIENT
Start: 2024-10-10

## 2024-10-10 RX ORDER — HEPARIN SODIUM (PORCINE) LOCK FLUSH IV SOLN 100 UNIT/ML 100 UNIT/ML
500 SOLUTION INTRAVENOUS AS NEEDED
Status: CANCELLED | OUTPATIENT
Start: 2024-10-10

## 2024-10-10 RX ORDER — PALONOSETRON 0.05 MG/ML
0.25 INJECTION, SOLUTION INTRAVENOUS ONCE
Status: CANCELLED | OUTPATIENT
Start: 2024-10-10

## 2024-10-10 RX ORDER — SODIUM CHLORIDE 9 MG/ML
20 INJECTION, SOLUTION INTRAVENOUS ONCE
Status: COMPLETED | OUTPATIENT
Start: 2024-10-10 | End: 2024-10-10

## 2024-10-10 RX ORDER — PALONOSETRON 0.05 MG/ML
0.25 INJECTION, SOLUTION INTRAVENOUS ONCE
Status: COMPLETED | OUTPATIENT
Start: 2024-10-10 | End: 2024-10-10

## 2024-10-10 RX ORDER — SODIUM CHLORIDE 0.9 % (FLUSH) 0.9 %
10 SYRINGE (ML) INJECTION AS NEEDED
Status: DISCONTINUED | OUTPATIENT
Start: 2024-10-10 | End: 2024-10-11 | Stop reason: HOSPADM

## 2024-10-10 RX ORDER — FAMOTIDINE 10 MG/ML
20 INJECTION, SOLUTION INTRAVENOUS AS NEEDED
Status: CANCELLED | OUTPATIENT
Start: 2024-10-10

## 2024-10-10 RX ORDER — HEPARIN SODIUM (PORCINE) LOCK FLUSH IV SOLN 100 UNIT/ML 100 UNIT/ML
500 SOLUTION INTRAVENOUS AS NEEDED
Status: DISCONTINUED | OUTPATIENT
Start: 2024-10-10 | End: 2024-10-11 | Stop reason: HOSPADM

## 2024-10-10 RX ORDER — SODIUM CHLORIDE 0.9 % (FLUSH) 0.9 %
10 SYRINGE (ML) INJECTION AS NEEDED
Status: CANCELLED | OUTPATIENT
Start: 2024-10-10

## 2024-10-10 RX ORDER — SODIUM CHLORIDE 9 MG/ML
20 INJECTION, SOLUTION INTRAVENOUS ONCE
Status: CANCELLED | OUTPATIENT
Start: 2024-10-10

## 2024-10-10 RX ADMIN — CARBOPLATIN 380 MG: 10 INJECTION, SOLUTION INTRAVENOUS at 16:12

## 2024-10-10 RX ADMIN — HEPARIN 500 UNITS: 100 SYRINGE at 16:44

## 2024-10-10 RX ADMIN — SODIUM CHLORIDE 20 ML/HR: 9 INJECTION, SOLUTION INTRAVENOUS at 14:42

## 2024-10-10 RX ADMIN — PALONOSETRON 0.25 MG: 0.25 INJECTION, SOLUTION INTRAVENOUS at 14:42

## 2024-10-10 RX ADMIN — PEMETREXED DISODIUM 1000 MG: 500 INJECTION, POWDER, LYOPHILIZED, FOR SOLUTION INTRAVENOUS at 15:56

## 2024-10-10 RX ADMIN — FOSAPREPITANT 100 ML: 150 INJECTION, POWDER, LYOPHILIZED, FOR SOLUTION INTRAVENOUS at 14:45

## 2024-10-10 RX ADMIN — Medication 10 ML: at 16:44

## 2024-10-10 RX ADMIN — SODIUM CHLORIDE 200 MG: 9 INJECTION, SOLUTION INTRAVENOUS at 15:21

## 2024-10-11 ENCOUNTER — HOME CARE VISIT (OUTPATIENT)
Dept: HOME HEALTH SERVICES | Facility: HOME HEALTHCARE | Age: 74
End: 2024-10-11
Payer: MEDICARE

## 2024-10-11 VITALS
SYSTOLIC BLOOD PRESSURE: 104 MMHG | TEMPERATURE: 97 F | HEART RATE: 82 BPM | RESPIRATION RATE: 18 BRPM | OXYGEN SATURATION: 98 % | DIASTOLIC BLOOD PRESSURE: 64 MMHG

## 2024-10-11 PROCEDURE — G0299 HHS/HOSPICE OF RN EA 15 MIN: HCPCS

## 2024-10-14 ENCOUNTER — HOME CARE VISIT (OUTPATIENT)
Dept: HOME HEALTH SERVICES | Facility: HOME HEALTHCARE | Age: 74
End: 2024-10-14
Payer: MEDICARE

## 2024-10-14 ENCOUNTER — HOSPITAL ENCOUNTER (OUTPATIENT)
Dept: ONCOLOGY | Facility: HOSPITAL | Age: 74
Discharge: HOME | End: 2024-10-14
Admitting: INTERNAL MEDICINE
Payer: MEDICARE

## 2024-10-14 ENCOUNTER — OFFICE VISIT (OUTPATIENT)
Dept: ONCOLOGY | Facility: CLINIC | Age: 74
End: 2024-10-14
Payer: MEDICARE

## 2024-10-14 VITALS
RESPIRATION RATE: 19 BRPM | SYSTOLIC BLOOD PRESSURE: 110 MMHG | DIASTOLIC BLOOD PRESSURE: 64 MMHG | TEMPERATURE: 97.9 F | HEART RATE: 78 BPM | OXYGEN SATURATION: 98 %

## 2024-10-14 VITALS
WEIGHT: 176.8 LBS | HEIGHT: 70 IN | TEMPERATURE: 98.2 F | BODY MASS INDEX: 25.31 KG/M2 | OXYGEN SATURATION: 95 % | DIASTOLIC BLOOD PRESSURE: 74 MMHG | RESPIRATION RATE: 18 BRPM | SYSTOLIC BLOOD PRESSURE: 115 MMHG | HEART RATE: 109 BPM

## 2024-10-14 DIAGNOSIS — C34.91 STAGE IV ADENOCARCINOMA OF LUNG, RIGHT: Primary | ICD-10-CM

## 2024-10-14 DIAGNOSIS — J90 LARGE PLEURAL EFFUSION: ICD-10-CM

## 2024-10-14 LAB
ALBUMIN SERPL-MCNC: 3.1 G/DL (ref 3.5–5.2)
ALBUMIN/GLOB SERPL: 1.5 G/DL
ALP SERPL-CCNC: 59 U/L (ref 39–117)
ALT SERPL W P-5'-P-CCNC: 18 U/L (ref 1–41)
ANION GAP SERPL CALCULATED.3IONS-SCNC: 7.3 MMOL/L (ref 5–15)
AST SERPL-CCNC: 23 U/L (ref 1–40)
BASOPHILS # BLD AUTO: 0.01 10*3/MM3 (ref 0–0.2)
BASOPHILS NFR BLD AUTO: 0.1 % (ref 0–1.5)
BILIRUB SERPL-MCNC: 0.4 MG/DL (ref 0–1.2)
BUN SERPL-MCNC: 28 MG/DL (ref 8–23)
BUN/CREAT SERPL: 22 (ref 7–25)
CALCIUM SPEC-SCNC: 8.3 MG/DL (ref 8.6–10.5)
CHLORIDE SERPL-SCNC: 105 MMOL/L (ref 98–107)
CO2 SERPL-SCNC: 26.7 MMOL/L (ref 22–29)
CREAT SERPL-MCNC: 1.27 MG/DL (ref 0.76–1.27)
DEPRECATED RDW RBC AUTO: 49.2 FL (ref 37–54)
EGFRCR SERPLBLD CKD-EPI 2021: 59.3 ML/MIN/1.73
EOSINOPHIL # BLD AUTO: 0.35 10*3/MM3 (ref 0–0.4)
EOSINOPHIL NFR BLD AUTO: 4.5 % (ref 0.3–6.2)
ERYTHROCYTE [DISTWIDTH] IN BLOOD BY AUTOMATED COUNT: 15.2 % (ref 12.3–15.4)
GLOBULIN UR ELPH-MCNC: 2.1 GM/DL
GLUCOSE SERPL-MCNC: 100 MG/DL (ref 65–99)
HCT VFR BLD AUTO: 35.4 % (ref 37.5–51)
HGB BLD-MCNC: 11.6 G/DL (ref 13–17.7)
LYMPHOCYTES # BLD AUTO: 1.1 10*3/MM3 (ref 0.7–3.1)
LYMPHOCYTES NFR BLD AUTO: 14.1 % (ref 19.6–45.3)
MCH RBC QN AUTO: 31.9 PG (ref 26.6–33)
MCHC RBC AUTO-ENTMCNC: 32.8 G/DL (ref 31.5–35.7)
MCV RBC AUTO: 97.3 FL (ref 79–97)
MONOCYTES # BLD AUTO: 0.13 10*3/MM3 (ref 0.1–0.9)
MONOCYTES NFR BLD AUTO: 1.7 % (ref 5–12)
NEUTROPHILS NFR BLD AUTO: 6.19 10*3/MM3 (ref 1.7–7)
NEUTROPHILS NFR BLD AUTO: 79.6 % (ref 42.7–76)
PLATELET # BLD AUTO: 330 10*3/MM3 (ref 140–450)
PMV BLD AUTO: 8.6 FL (ref 6–12)
POTASSIUM SERPL-SCNC: 3.9 MMOL/L (ref 3.5–5.2)
PROT SERPL-MCNC: 5.2 G/DL (ref 6–8.5)
RBC # BLD AUTO: 3.64 10*6/MM3 (ref 4.14–5.8)
SODIUM SERPL-SCNC: 139 MMOL/L (ref 136–145)
WBC NRBC COR # BLD AUTO: 7.78 10*3/MM3 (ref 3.4–10.8)

## 2024-10-14 PROCEDURE — 1126F AMNT PAIN NOTED NONE PRSNT: CPT | Performed by: INTERNAL MEDICINE

## 2024-10-14 PROCEDURE — 1160F RVW MEDS BY RX/DR IN RCRD: CPT | Performed by: INTERNAL MEDICINE

## 2024-10-14 PROCEDURE — G0299 HHS/HOSPICE OF RN EA 15 MIN: HCPCS

## 2024-10-14 PROCEDURE — 85025 COMPLETE CBC W/AUTO DIFF WBC: CPT | Performed by: INTERNAL MEDICINE

## 2024-10-14 PROCEDURE — 36591 DRAW BLOOD OFF VENOUS DEVICE: CPT

## 2024-10-14 PROCEDURE — 25010000002 HEPARIN LOCK FLUSH PER 10 UNITS: Performed by: INTERNAL MEDICINE

## 2024-10-14 PROCEDURE — 80053 COMPREHEN METABOLIC PANEL: CPT | Performed by: INTERNAL MEDICINE

## 2024-10-14 PROCEDURE — 99214 OFFICE O/P EST MOD 30 MIN: CPT | Performed by: INTERNAL MEDICINE

## 2024-10-14 PROCEDURE — 1159F MED LIST DOCD IN RCRD: CPT | Performed by: INTERNAL MEDICINE

## 2024-10-14 RX ORDER — HEPARIN SODIUM (PORCINE) LOCK FLUSH IV SOLN 100 UNIT/ML 100 UNIT/ML
500 SOLUTION INTRAVENOUS AS NEEDED
OUTPATIENT
Start: 2024-10-14

## 2024-10-14 RX ORDER — SODIUM CHLORIDE 0.9 % (FLUSH) 0.9 %
10 SYRINGE (ML) INJECTION AS NEEDED
Status: DISCONTINUED | OUTPATIENT
Start: 2024-10-14 | End: 2024-10-15 | Stop reason: HOSPADM

## 2024-10-14 RX ORDER — HEPARIN SODIUM (PORCINE) LOCK FLUSH IV SOLN 100 UNIT/ML 100 UNIT/ML
500 SOLUTION INTRAVENOUS AS NEEDED
Status: DISCONTINUED | OUTPATIENT
Start: 2024-10-14 | End: 2024-10-15 | Stop reason: HOSPADM

## 2024-10-14 RX ORDER — SODIUM CHLORIDE 0.9 % (FLUSH) 0.9 %
10 SYRINGE (ML) INJECTION AS NEEDED
OUTPATIENT
Start: 2024-10-14

## 2024-10-14 RX ADMIN — Medication 500 UNITS: at 08:23

## 2024-10-14 RX ADMIN — Medication 10 ML: at 08:22

## 2024-10-14 NOTE — PROGRESS NOTES
Pt to the clinic for port flush/labs and dr. Tim f/bradley appt. Port accessed using sterile technique with positive blood return noted. 10cc of blood wasted prior to obtaining lab specimen per orders and flushed with 20cc normal saline.  Port flushed with 20cc normal saline and 500 units heparin then deaccessed. Pt tolerated well.  Pt sent to lobby to wait to be called for Dr. Meeta weinberg/bradley appt.

## 2024-10-15 ENCOUNTER — HOME CARE VISIT (OUTPATIENT)
Dept: HOME HEALTH SERVICES | Facility: HOME HEALTHCARE | Age: 74
End: 2024-10-15
Payer: MEDICARE

## 2024-10-16 ENCOUNTER — HOME CARE VISIT (OUTPATIENT)
Dept: HOME HEALTH SERVICES | Facility: HOME HEALTHCARE | Age: 74
End: 2024-10-16
Payer: MEDICARE

## 2024-10-16 VITALS
OXYGEN SATURATION: 98 % | WEIGHT: 176 LBS | TEMPERATURE: 97 F | BODY MASS INDEX: 25.25 KG/M2 | HEART RATE: 74 BPM | RESPIRATION RATE: 18 BRPM | DIASTOLIC BLOOD PRESSURE: 72 MMHG | SYSTOLIC BLOOD PRESSURE: 108 MMHG

## 2024-10-16 PROCEDURE — G0299 HHS/HOSPICE OF RN EA 15 MIN: HCPCS

## 2024-10-18 ENCOUNTER — HOME CARE VISIT (OUTPATIENT)
Dept: HOME HEALTH SERVICES | Facility: HOME HEALTHCARE | Age: 74
End: 2024-10-18
Payer: MEDICARE

## 2024-10-18 PROCEDURE — G0299 HHS/HOSPICE OF RN EA 15 MIN: HCPCS

## 2024-10-20 VITALS
RESPIRATION RATE: 18 BRPM | OXYGEN SATURATION: 99 % | TEMPERATURE: 98.9 F | SYSTOLIC BLOOD PRESSURE: 100 MMHG | DIASTOLIC BLOOD PRESSURE: 58 MMHG | HEART RATE: 90 BPM

## 2024-10-21 ENCOUNTER — HOME CARE VISIT (OUTPATIENT)
Dept: HOME HEALTH SERVICES | Facility: HOME HEALTHCARE | Age: 74
End: 2024-10-21
Payer: MEDICARE

## 2024-10-21 VITALS
HEART RATE: 90 BPM | SYSTOLIC BLOOD PRESSURE: 100 MMHG | OXYGEN SATURATION: 98 % | TEMPERATURE: 97 F | DIASTOLIC BLOOD PRESSURE: 62 MMHG | RESPIRATION RATE: 18 BRPM

## 2024-10-21 PROCEDURE — G0299 HHS/HOSPICE OF RN EA 15 MIN: HCPCS

## 2024-10-23 ENCOUNTER — HOME CARE VISIT (OUTPATIENT)
Dept: HOME HEALTH SERVICES | Facility: HOME HEALTHCARE | Age: 74
End: 2024-10-23
Payer: MEDICARE

## 2024-10-23 VITALS
HEART RATE: 79 BPM | OXYGEN SATURATION: 100 % | SYSTOLIC BLOOD PRESSURE: 98 MMHG | DIASTOLIC BLOOD PRESSURE: 58 MMHG | RESPIRATION RATE: 18 BRPM | TEMPERATURE: 97.9 F

## 2024-10-23 PROCEDURE — G0299 HHS/HOSPICE OF RN EA 15 MIN: HCPCS

## 2024-10-25 ENCOUNTER — HOME CARE VISIT (OUTPATIENT)
Dept: HOME HEALTH SERVICES | Facility: HOME HEALTHCARE | Age: 74
End: 2024-10-25
Payer: MEDICARE

## 2024-10-25 VITALS
TEMPERATURE: 97 F | SYSTOLIC BLOOD PRESSURE: 106 MMHG | DIASTOLIC BLOOD PRESSURE: 68 MMHG | RESPIRATION RATE: 18 BRPM | HEART RATE: 78 BPM | OXYGEN SATURATION: 98 %

## 2024-10-25 PROCEDURE — G0299 HHS/HOSPICE OF RN EA 15 MIN: HCPCS

## 2024-10-28 ENCOUNTER — HOME CARE VISIT (OUTPATIENT)
Dept: HOME HEALTH SERVICES | Facility: HOME HEALTHCARE | Age: 74
End: 2024-10-28
Payer: MEDICARE

## 2024-10-28 PROCEDURE — G0299 HHS/HOSPICE OF RN EA 15 MIN: HCPCS

## 2024-10-30 ENCOUNTER — HOME CARE VISIT (OUTPATIENT)
Dept: HOME HEALTH SERVICES | Facility: HOME HEALTHCARE | Age: 74
End: 2024-10-30
Payer: MEDICARE

## 2024-10-30 VITALS
HEART RATE: 91 BPM | OXYGEN SATURATION: 97 % | RESPIRATION RATE: 18 BRPM | SYSTOLIC BLOOD PRESSURE: 104 MMHG | TEMPERATURE: 96 F | DIASTOLIC BLOOD PRESSURE: 64 MMHG

## 2024-10-30 PROCEDURE — G0299 HHS/HOSPICE OF RN EA 15 MIN: HCPCS

## 2024-10-31 ENCOUNTER — HOSPITAL ENCOUNTER (OUTPATIENT)
Dept: ONCOLOGY | Facility: HOSPITAL | Age: 74
Discharge: HOME | End: 2024-10-31
Admitting: INTERNAL MEDICINE
Payer: MEDICARE

## 2024-10-31 VITALS
RESPIRATION RATE: 14 BRPM | TEMPERATURE: 98.2 F | OXYGEN SATURATION: 98 % | HEIGHT: 70 IN | DIASTOLIC BLOOD PRESSURE: 68 MMHG | HEART RATE: 98 BPM | BODY MASS INDEX: 25.15 KG/M2 | SYSTOLIC BLOOD PRESSURE: 116 MMHG | WEIGHT: 175.7 LBS

## 2024-10-31 DIAGNOSIS — C34.91 STAGE IV ADENOCARCINOMA OF LUNG, RIGHT: Primary | ICD-10-CM

## 2024-10-31 LAB
ALBUMIN SERPL-MCNC: 3.2 G/DL (ref 3.5–5.2)
ALBUMIN/GLOB SERPL: 1.2 G/DL
ALP SERPL-CCNC: 74 U/L (ref 39–117)
ALT SERPL W P-5'-P-CCNC: 18 U/L (ref 1–41)
ANION GAP SERPL CALCULATED.3IONS-SCNC: 10.8 MMOL/L (ref 5–15)
AST SERPL-CCNC: 29 U/L (ref 1–40)
BASOPHILS # BLD AUTO: 0.02 10*3/MM3 (ref 0–0.2)
BASOPHILS NFR BLD AUTO: 0.2 % (ref 0–1.5)
BILIRUB SERPL-MCNC: 0.2 MG/DL (ref 0–1.2)
BUN SERPL-MCNC: 24 MG/DL (ref 8–23)
BUN/CREAT SERPL: 17.6 (ref 7–25)
CALCIUM SPEC-SCNC: 9 MG/DL (ref 8.6–10.5)
CHLORIDE SERPL-SCNC: 105 MMOL/L (ref 98–107)
CO2 SERPL-SCNC: 21.2 MMOL/L (ref 22–29)
CREAT SERPL-MCNC: 1.36 MG/DL (ref 0.76–1.27)
DEPRECATED RDW RBC AUTO: 62.7 FL (ref 37–54)
EGFRCR SERPLBLD CKD-EPI 2021: 54.6 ML/MIN/1.73
EOSINOPHIL # BLD AUTO: 0.01 10*3/MM3 (ref 0–0.4)
EOSINOPHIL NFR BLD AUTO: 0.1 % (ref 0.3–6.2)
ERYTHROCYTE [DISTWIDTH] IN BLOOD BY AUTOMATED COUNT: 18.7 % (ref 12.3–15.4)
GLOBULIN UR ELPH-MCNC: 2.6 GM/DL
GLUCOSE SERPL-MCNC: 236 MG/DL (ref 65–99)
HCT VFR BLD AUTO: 29.8 % (ref 37.5–51)
HGB BLD-MCNC: 9.7 G/DL (ref 13–17.7)
LYMPHOCYTES # BLD AUTO: 0.96 10*3/MM3 (ref 0.7–3.1)
LYMPHOCYTES NFR BLD AUTO: 8.3 % (ref 19.6–45.3)
MCH RBC QN AUTO: 32.4 PG (ref 26.6–33)
MCHC RBC AUTO-ENTMCNC: 32.6 G/DL (ref 31.5–35.7)
MCV RBC AUTO: 99.7 FL (ref 79–97)
MONOCYTES # BLD AUTO: 0.59 10*3/MM3 (ref 0.1–0.9)
MONOCYTES NFR BLD AUTO: 5.1 % (ref 5–12)
NEUTROPHILS NFR BLD AUTO: 10.04 10*3/MM3 (ref 1.7–7)
NEUTROPHILS NFR BLD AUTO: 86.3 % (ref 42.7–76)
PLATELET # BLD AUTO: 400 10*3/MM3 (ref 140–450)
PMV BLD AUTO: 8.5 FL (ref 6–12)
POTASSIUM SERPL-SCNC: 4.5 MMOL/L (ref 3.5–5.2)
PROT SERPL-MCNC: 5.8 G/DL (ref 6–8.5)
RBC # BLD AUTO: 2.99 10*6/MM3 (ref 4.14–5.8)
SODIUM SERPL-SCNC: 137 MMOL/L (ref 136–145)
T4 FREE SERPL-MCNC: 1.39 NG/DL (ref 0.93–1.7)
TSH SERPL DL<=0.05 MIU/L-ACNC: 0.47 UIU/ML (ref 0.27–4.2)
WBC NRBC COR # BLD AUTO: 11.62 10*3/MM3 (ref 3.4–10.8)

## 2024-10-31 PROCEDURE — 96375 TX/PRO/DX INJ NEW DRUG ADDON: CPT

## 2024-10-31 PROCEDURE — 25010000002 CARBOPLATIN PER 50 MG: Performed by: PHYSICIAN ASSISTANT

## 2024-10-31 PROCEDURE — 96372 THER/PROPH/DIAG INJ SC/IM: CPT

## 2024-10-31 PROCEDURE — 96413 CHEMO IV INFUSION 1 HR: CPT

## 2024-10-31 PROCEDURE — 96417 CHEMO IV INFUS EACH ADDL SEQ: CPT

## 2024-10-31 PROCEDURE — 85025 COMPLETE CBC W/AUTO DIFF WBC: CPT | Performed by: INTERNAL MEDICINE

## 2024-10-31 PROCEDURE — 84439 ASSAY OF FREE THYROXINE: CPT | Performed by: INTERNAL MEDICINE

## 2024-10-31 PROCEDURE — 25010000002 PEMETREXED PER 10 MG: Performed by: PHYSICIAN ASSISTANT

## 2024-10-31 PROCEDURE — 96367 TX/PROPH/DG ADDL SEQ IV INF: CPT

## 2024-10-31 PROCEDURE — 25010000002 PEMBROLIZUMAB 100 MG/4ML SOLUTION 4 ML VIAL: Performed by: PHYSICIAN ASSISTANT

## 2024-10-31 PROCEDURE — 84443 ASSAY THYROID STIM HORMONE: CPT | Performed by: INTERNAL MEDICINE

## 2024-10-31 PROCEDURE — 80053 COMPREHEN METABOLIC PANEL: CPT | Performed by: INTERNAL MEDICINE

## 2024-10-31 PROCEDURE — 25010000002 PALONOSETRON 0.25 MG/5ML SOLUTION PREFILLED SYRINGE: Performed by: PHYSICIAN ASSISTANT

## 2024-10-31 PROCEDURE — 25010000002 CYANOCOBALAMIN PER 1000 MCG: Performed by: PHYSICIAN ASSISTANT

## 2024-10-31 PROCEDURE — 96411 CHEMO IV PUSH ADDL DRUG: CPT

## 2024-10-31 PROCEDURE — 25810000003 SODIUM CHLORIDE 0.9 % SOLUTION 250 ML FLEX CONT: Performed by: PHYSICIAN ASSISTANT

## 2024-10-31 PROCEDURE — 25010000002 HEPARIN LOCK FLUSH PER 10 UNITS: Performed by: INTERNAL MEDICINE

## 2024-10-31 PROCEDURE — 25010000002 FOSAPREPITANT PER 1 MG: Performed by: PHYSICIAN ASSISTANT

## 2024-10-31 RX ORDER — FAMOTIDINE 10 MG/ML
20 INJECTION, SOLUTION INTRAVENOUS AS NEEDED
Status: CANCELLED | OUTPATIENT
Start: 2024-10-31

## 2024-10-31 RX ORDER — HEPARIN SODIUM (PORCINE) LOCK FLUSH IV SOLN 100 UNIT/ML 100 UNIT/ML
500 SOLUTION INTRAVENOUS AS NEEDED
Status: DISCONTINUED | OUTPATIENT
Start: 2024-10-31 | End: 2024-11-01 | Stop reason: HOSPADM

## 2024-10-31 RX ORDER — PALONOSETRON 0.05 MG/ML
0.25 INJECTION, SOLUTION INTRAVENOUS ONCE
Status: COMPLETED | OUTPATIENT
Start: 2024-10-31 | End: 2024-10-31

## 2024-10-31 RX ORDER — CYANOCOBALAMIN 1000 UG/ML
1000 INJECTION, SOLUTION INTRAMUSCULAR; SUBCUTANEOUS ONCE
Status: CANCELLED | OUTPATIENT
Start: 2024-10-31 | End: 2024-10-31

## 2024-10-31 RX ORDER — DIPHENHYDRAMINE HYDROCHLORIDE 50 MG/ML
50 INJECTION INTRAMUSCULAR; INTRAVENOUS AS NEEDED
Status: CANCELLED | OUTPATIENT
Start: 2024-10-31

## 2024-10-31 RX ORDER — SODIUM CHLORIDE 9 MG/ML
20 INJECTION, SOLUTION INTRAVENOUS ONCE
Status: DISCONTINUED | OUTPATIENT
Start: 2024-10-31 | End: 2024-11-01 | Stop reason: HOSPADM

## 2024-10-31 RX ORDER — HEPARIN SODIUM (PORCINE) LOCK FLUSH IV SOLN 100 UNIT/ML 100 UNIT/ML
500 SOLUTION INTRAVENOUS AS NEEDED
OUTPATIENT
Start: 2024-10-31

## 2024-10-31 RX ORDER — SODIUM CHLORIDE 9 MG/ML
20 INJECTION, SOLUTION INTRAVENOUS ONCE
Status: CANCELLED | OUTPATIENT
Start: 2024-10-31

## 2024-10-31 RX ORDER — PALONOSETRON 0.05 MG/ML
0.25 INJECTION, SOLUTION INTRAVENOUS ONCE
Status: CANCELLED | OUTPATIENT
Start: 2024-10-31

## 2024-10-31 RX ORDER — SODIUM CHLORIDE 0.9 % (FLUSH) 0.9 %
10 SYRINGE (ML) INJECTION AS NEEDED
OUTPATIENT
Start: 2024-10-31

## 2024-10-31 RX ORDER — CYANOCOBALAMIN 1000 UG/ML
1000 INJECTION, SOLUTION INTRAMUSCULAR; SUBCUTANEOUS ONCE
Status: COMPLETED | OUTPATIENT
Start: 2024-10-31 | End: 2024-10-31

## 2024-10-31 RX ORDER — SODIUM CHLORIDE 0.9 % (FLUSH) 0.9 %
10 SYRINGE (ML) INJECTION AS NEEDED
Status: DISCONTINUED | OUTPATIENT
Start: 2024-10-31 | End: 2024-11-01 | Stop reason: HOSPADM

## 2024-10-31 RX ADMIN — PALONOSETRON 0.25 MG: 0.25 INJECTION, SOLUTION INTRAVENOUS at 13:55

## 2024-10-31 RX ADMIN — Medication 10 ML: at 16:00

## 2024-10-31 RX ADMIN — HEPARIN 500 UNITS: 100 SYRINGE at 16:00

## 2024-10-31 RX ADMIN — CYANOCOBALAMIN 1000 MCG: 1000 INJECTION, SOLUTION INTRAMUSCULAR at 15:25

## 2024-10-31 RX ADMIN — CARBOPLATIN 390 MG: 10 INJECTION, SOLUTION INTRAVENOUS at 15:22

## 2024-10-31 RX ADMIN — SODIUM CHLORIDE 200 MG: 9 INJECTION, SOLUTION INTRAVENOUS at 14:34

## 2024-10-31 RX ADMIN — FOSAPREPITANT 100 ML: 150 INJECTION, POWDER, LYOPHILIZED, FOR SOLUTION INTRAVENOUS at 14:00

## 2024-10-31 RX ADMIN — PEMETREXED DISODIUM 1000 MG: 500 INJECTION, POWDER, LYOPHILIZED, FOR SOLUTION INTRAVENOUS at 15:09

## 2024-11-01 ENCOUNTER — HOME CARE VISIT (OUTPATIENT)
Dept: HOME HEALTH SERVICES | Facility: HOME HEALTHCARE | Age: 74
End: 2024-11-01
Payer: MEDICARE

## 2024-11-01 ENCOUNTER — HOSPITAL ENCOUNTER (OUTPATIENT)
Dept: PET IMAGING | Facility: HOSPITAL | Age: 74
Discharge: HOME | End: 2024-11-01
Payer: MEDICARE

## 2024-11-01 VITALS
SYSTOLIC BLOOD PRESSURE: 96 MMHG | TEMPERATURE: 98.4 F | RESPIRATION RATE: 16 BRPM | HEART RATE: 82 BPM | OXYGEN SATURATION: 99 % | DIASTOLIC BLOOD PRESSURE: 53 MMHG

## 2024-11-01 DIAGNOSIS — C34.91 STAGE IV ADENOCARCINOMA OF LUNG, RIGHT: ICD-10-CM

## 2024-11-01 DIAGNOSIS — J90 LARGE PLEURAL EFFUSION: ICD-10-CM

## 2024-11-01 PROCEDURE — 71260 CT THORAX DX C+: CPT

## 2024-11-01 PROCEDURE — G0299 HHS/HOSPICE OF RN EA 15 MIN: HCPCS

## 2024-11-01 PROCEDURE — 25510000001 IOPAMIDOL PER 1 ML: Performed by: INTERNAL MEDICINE

## 2024-11-01 RX ORDER — IOPAMIDOL 755 MG/ML
100 INJECTION, SOLUTION INTRAVASCULAR
Status: COMPLETED | OUTPATIENT
Start: 2024-11-01 | End: 2024-11-01

## 2024-11-01 RX ADMIN — IOPAMIDOL 100 ML: 755 INJECTION, SOLUTION INTRAVENOUS at 08:08

## 2024-11-04 ENCOUNTER — HOME CARE VISIT (OUTPATIENT)
Dept: HOME HEALTH SERVICES | Facility: HOME HEALTHCARE | Age: 74
End: 2024-11-04
Payer: MEDICARE

## 2024-11-04 VITALS
DIASTOLIC BLOOD PRESSURE: 64 MMHG | HEART RATE: 98 BPM | SYSTOLIC BLOOD PRESSURE: 104 MMHG | OXYGEN SATURATION: 98 % | TEMPERATURE: 97 F | RESPIRATION RATE: 18 BRPM

## 2024-11-04 PROCEDURE — G0299 HHS/HOSPICE OF RN EA 15 MIN: HCPCS

## 2024-11-06 ENCOUNTER — HOME CARE VISIT (OUTPATIENT)
Dept: HOME HEALTH SERVICES | Facility: HOME HEALTHCARE | Age: 74
End: 2024-11-06
Payer: MEDICARE

## 2024-11-06 VITALS
HEART RATE: 104 BPM | OXYGEN SATURATION: 98 % | DIASTOLIC BLOOD PRESSURE: 60 MMHG | TEMPERATURE: 97 F | SYSTOLIC BLOOD PRESSURE: 100 MMHG | RESPIRATION RATE: 18 BRPM

## 2024-11-06 PROCEDURE — G0299 HHS/HOSPICE OF RN EA 15 MIN: HCPCS

## 2024-11-08 ENCOUNTER — HOME CARE VISIT (OUTPATIENT)
Dept: HOME HEALTH SERVICES | Facility: HOME HEALTHCARE | Age: 74
End: 2024-11-08
Payer: MEDICARE

## 2024-11-08 VITALS
OXYGEN SATURATION: 99 % | HEART RATE: 90 BPM | SYSTOLIC BLOOD PRESSURE: 100 MMHG | RESPIRATION RATE: 18 BRPM | TEMPERATURE: 97 F | DIASTOLIC BLOOD PRESSURE: 62 MMHG

## 2024-11-08 PROCEDURE — G0299 HHS/HOSPICE OF RN EA 15 MIN: HCPCS

## 2024-11-08 NOTE — PROGRESS NOTES
HEMATOLOGY ONCOLOGY OUTPATIENT FOLLOW UP       Patient name: Young Ames  : 1950  MRN: [id number]  Primary Care Physician: Abner Funes APRN  Referring Physician: Abner Funes APRN  Reason For Consult: Adenocarcinoma of the right lung.     History of Present Illness:    2024: Mr. Ames first came to [facility] complaining of dyspnea. This had been getting worse over a short period of time. It was associated to unintended weight loss of approximately 15 lbs in around one month. He was diagnosed with pneumonia in 2024 he was treated with azithromycin and with amoxicillin/clavulanate, despite which he did not seem to recover completely. CT scan had shown dense consolidation of the right lower lobe and there was at some point suggestion of a cavitary lesion. 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 Following this a repeat scan showed a questionable 5.5 cm right lower lobe cavitary lesion. A bronchoscopy and biopsy on 2024 confirmed adenocarcinoma of the right lung. He feels reasonably well at this time. He has been breathing better, though he persists with dyspnea or exertion. He has been afebrile. He continues to cough but not more than before. He has not had abdominal pain and denies diarrhea or dysuria. On exam alert and conversant. Oriented and in no distress. No jaundice. No oral lesions and respirations are not labored. Lungs diminished bilaterally and absent on the right lower lobe. The abdomen is soft. No edema. Reviewed the images of the scans. Reviewed the laboratory exams. Discussed with him and his family. To proceed with a PET scan. Will not obtain  pulmonary function tests, as most likely he has had recurrence of a significant right pleural effusion. He is to see me with results.     2024: Feeling about the same as at the time of the last visit but has had  more dyspnea.  He was placed on new medication in spite of which, intermittently, he continues to be uncomfortable.  He is eating reasonably well and has had no weight loss.  He is also afebrile.  He denies chest pains.  No abdominal pain.  On exam he is conversant and oriented.  No distress.  No jaundice.  The lungs are diminished bilaterally but there is absence of breath sounds and a cough when he in the lower parts of the right chest.  The abdomen is soft.  There is no edema.  Laboratory exams and final reports of pathology were reviewed and discussed with him.  Discussed with him the stage of the disease, which corresponds to 4, given the pleural involvement.  To start chemotherapy and immunotherapy.  I have requested next-generation sequencing.  Port as soon as available.    9/29/2024: Feels reasonably well today.  Has had less dyspnea since the insertion of the pleural catheter as he has been able to have the fluid drained at home more frequently.  Yesterday, for the first time, he had persistent right-sided chest pain after the drainage of the fluid that is now resolved.  He continues to be reasonably active.  He continues to eat reasonably well and has had very minimal nausea.  He took the first chemotherapy without any complications.  He denies chest pain and he has less dyspnea than before.  No abdominal pain and no edema.  On exam chronically ill-appearing but in no distress.  No jaundice.  The lungs are diminished bilaterally with more pronounced reduction of the breath sounds in the right.  The heart is regular.  The abdomen is soft.  There is no edema.  Laboratory exams reviewed.  Discussed with him.  Continue same therapy for now.  I have sent a prescription for tramadol that he can take as needed for pain, when present.    10/14/2024: Breathing better.  Progressively finding less fluid in the thoracic cavity.  Is able to do more activity.  Has had enuresis once.  Also has noted tremors.  He is  eating well.  No nausea or vomiting.  No chest pains.  No abdominal pain.  On exam he does not seem in any distress.  He is conversant and well-oriented.  No jaundice or pallor.  Lungs symmetrically ventilated without any dullness to percussion on the right.  Abdomen soft.  No edema.  Laboratory exams reviewed.  Discussed with him.  My suspicion is that he is no longer producing as much pleural fluid as before and that perhaps today they will not be able to drain much.  Will obtain a scan and will see with results.  Continue for now.  I am not sure what the cause of the tremors and the enuresis is.    11/11/2024: Feeling well.  Continues to tolerate the treatment without difficulties.  He has no more dyspnea or pain than before.  He eats well and has had no nausea or vomiting.  As well his weight is stable.  Denies diarrhea or dysuria.  On exam he does not seem ill.  He is not jaundiced.  The lungs are diminished bilaterally but symmetrically.  Heart is regular.  The abdomen is soft.  There is no edema.  Laboratory exams reviewed.  Reviewed the images and the report of the scans.  There are some suggestion of response as the lymph nodes and the primary tumor have all decreased in size though it is difficult to tell if the primary lesion has decreased given the associated opacity surrounding it.  For now continue same treatment.  Treatment with immunotherapy after he has completed 4 cycles of chemoimmunotherapy.  See me in approximately 6 weeks.  Treatment plan placed.    Past Medical History:   Diagnosis Date    Coronary artery disease     Myocardial infarction 01/31/2024     Past Surgical History:   Procedure Laterality Date    BRONCHOSCOPY N/A 8/13/2024    Procedure: BRONCHOSCOPY WITH BRONCHIAL WASHING AND BRONCHIAL BRUSHING;  Surgeon: Kelvin Gonzalez MD;  Location: [facility] ENDOSCOPY;  Service: Pulmonary;  Laterality: N/A;    BRONCHOSCOPY WITH ION ROBOTIC ASSIST N/A 8/16/2024    Procedure: BRONCHOSCOPY WITH ION ROBOT  WITH CRYO BIOPSY;  Surgeon: Kelvin Gonzalez MD;  Location: [facility] ENDOSCOPY;  Service: Robotics - Pulmonary;  Laterality: N/A;    CARDIAC CATHETERIZATION Right 1/29/2024    Procedure: Coronary angiography;  Surgeon: Abran Chand MD;  Location: [facility] CATH INVASIVE LOCATION;  Service: Cardiovascular;  Laterality: Right;    CARDIAC CATHETERIZATION N/A 1/29/2024    Procedure: Left Heart Cath;  Surgeon: Abran Chand MD;  Location: [facility] CATH INVASIVE LOCATION;  Service: Cardiovascular;  Laterality: N/A;       Current Outpatient Medications:     acetaminophen (TYLENOL) 325 MG tablet, Take 2 tablets by mouth Every 4 (Four) Hours As Needed for Mild Pain., Disp: , Rfl:     amiodarone (PACERONE) 200 MG tablet, TAKE ONE TABLET BY MOUTH DAILY, Disp: 90 tablet, Rfl: 0    apixaban (ELIQUIS) 5 MG tablet tablet, Take 1 tablet by mouth Every 12 (Twelve) Hours. Indications: Atrial Fibrillation, Disp: 60 tablet, Rfl: 0    Budeson-Glycopyrrol-Formoterol (Breztri Aerosphere) 160-9-4.8 MCG/ACT aerosol inhaler, Inhale 2 puffs 2 (Two) Times a Day., Disp: 1 each, Rfl: 0    Cholecalciferol (VITAMIN D-3 PO), Take 1 tablet by mouth Daily. Indications: Vitamin Deficiency, Disp: , Rfl:     dexAMETHasone (DECADRON) 4 MG tablet, Take 1 tablet twice daily starting the day before chemo, day of chemo, and day after chemo.  Take with food., Disp: 6 tablet, Rfl: 3    folic acid (FOLVITE) 1 MG tablet, Take 1 tablet by mouth Daily. Start at least 7 days prior to chemotherapy until at least 3 weeks after all chemotherapy., Disp: 30 tablet, Rfl: 4    lidocaine-prilocaine (EMLA) 2.5-2.5 % cream, Apply 1 Application topically to the appropriate area as directed As Needed for Mild Pain (Apply 1 hour prior to port access)., Disp: 30 g, Rfl: 2    metoprolol tartrate (LOPRESSOR) 25 MG tablet, Take 1 tablet by mouth Every 12 (Twelve) Hours., Disp: 60 tablet, Rfl: 0    midodrine (PROAMATINE) 5 MG tablet, Take 1 tablet by mouth 3 (Three) Times a Day Before Meals.  (Patient taking differently: Take 1 tablet by mouth 3 (Three) Times a Day As Needed (low blood pressure). Indications: Disorder of Low Blood Pressure), Disp: 20 tablet, Rfl: 0    multivitamin with minerals (Centrum Silver 50+Men) tablet tablet, Take 1 tablet by mouth Every Morning. Indications: Vitamin Deficiency, Disp: , Rfl:     NON FORMULARY, Take 2 tablets by mouth Every Night. Zzzquil  Indications: Sleep Disorder, Disp: , Rfl:     OLANZapine (zyPREXA) 5 MG tablet, Take 1 tablet by mouth Every Night. Take nightly x 4 starting night of chemotherapy., Disp: 4 tablet, Rfl: 3    ondansetron (ZOFRAN) 8 MG tablet, Take 1 tablet by mouth 3 (Three) Times a Day As Needed for Nausea or Vomiting., Disp: 30 tablet, Rfl: 3    pantoprazole (PROTONIX) 40 MG EC tablet, Take 1 tablet by mouth Daily., Disp: 30 tablet, Rfl: 1    polyethylene glycol (MIRALAX) 17 g packet, Take 17 g by mouth Daily. Indications: Constipation, Disp: , Rfl:     simvastatin (ZOCOR) 20 MG tablet, Take 1 tablet by mouth Every Night. Indications: High Amount of Fats in the Blood, Disp: , Rfl:     tamsulosin (FLOMAX) 0.4 MG capsule 24 hr capsule, Take 1 capsule by mouth Every Night. Indications: Benign Enlargement of Prostate, Disp: , Rfl:     thiamine (VITAMIN B-1) 100 MG tablet  tablet, Take 1 tablet by mouth Every Morning. Indications: Vitamin Deficiency, Disp: , Rfl:     traMADol (ULTRAM) 50 MG tablet, Take 1 tablet by mouth Every 6 (Six) Hours As Needed for Moderate Pain., Disp: 90 tablet, Rfl: 0    ipratropium-albuterol (DUO-NEB) 0.5-2.5 mg/3 ml nebulizer, Take 3 mL by nebulization 4 (Four) Times a Day As Needed for Wheezing (Dyspnea) for up to 30 days., Disp: 120 mL, Rfl: 3  No current facility-administered medications for this visit.    Facility-Administered Medications Ordered in Other Visits:     heparin injection 500 Units, 500 Units, Intravenous, PRN, Arben Tim MD, 500 Units at 11/11/24 1412    sodium chloride 0.9 % flush 10 mL, 10 mL,  Intravenous, PRN, Arben Tim MD, 10 mL at 24 1404    No Known Allergies    Family History   Problem Relation Age of Onset    Dementia Mother     Breast cancer Sister 74     Cancer-related family history includes Breast cancer (age of onset: 74) in his sister.    Social History     Tobacco Use    Smoking status: Former     Current packs/day: 0.00     Average packs/day: 1 pack/day for 32.0 years (32.0 ttl pk-yrs)     Types: Cigarettes     Start date:      Quit date:      Years since quittin.8     Passive exposure: Past    Smokeless tobacco: Never   Vaping Use    Vaping status: Never Used   Substance Use Topics    Alcohol use: Yes     Alcohol/week: 1.0 standard drink of alcohol     Types: 1 Cans of beer per week    Drug use: Never     Social History     Social History Narrative    Not on file      ROS:   Review of Systems   Constitutional:  Positive for unexpected weight change. Negative for activity change, appetite change, chills, diaphoresis, fatigue and fever.   HENT:  Negative for congestion, dental problem, drooling, ear discharge, ear pain, facial swelling, hearing loss, mouth sores, nosebleeds, postnasal drip, rhinorrhea, sinus pressure, sinus pain, sneezing, sore throat, tinnitus, trouble swallowing and voice change.    Eyes:  Negative for photophobia, pain, discharge, redness, itching and visual disturbance.   Respiratory:  Positive for cough and shortness of breath. Negative for apnea, choking, chest tightness, wheezing and stridor.    Cardiovascular:  Negative for chest pain, palpitations and leg swelling.   Gastrointestinal:  Negative for abdominal distention, abdominal pain, anal bleeding, blood in stool, constipation, diarrhea, nausea, rectal pain and vomiting.   Endocrine: Negative for cold intolerance, heat intolerance, polydipsia and polyuria.   Genitourinary:  Negative for decreased urine volume, difficulty urinating, dysuria, flank pain, frequency, genital sores, hematuria  "and urgency.   Musculoskeletal:  Negative for arthralgias, back pain, gait problem, joint swelling, myalgias, neck pain and neck stiffness.   Skin:  Negative for color change, pallor and rash.   Neurological:  Negative for dizziness, tremors, seizures, syncope, facial asymmetry, speech difficulty, weakness, light-headedness, numbness and headaches.   Hematological:  Negative for adenopathy. Does not bruise/bleed easily.   Psychiatric/Behavioral:  Negative for agitation, behavioral problems, confusion, decreased concentration, hallucinations, self-injury, sleep disturbance and suicidal ideas. The patient is not nervous/anxious.      Objective:  Vital signs:  Vitals:    11/11/24 1429   BP: 126/78   Pulse: 100   Resp: 17   Temp: 98 °F (36.7 °C)   SpO2: 98%   Weight: 79.3 kg (174 lb 12.8 oz)   Height: 177.8 cm (70\")   PainSc: 0-No pain     Body mass index is 25.08 kg/m².  ECOG  (1) Restricted in physically strenuous activity, ambulatory and able to do work of light nature    Physical Exam:   Physical Exam  Constitutional:       General: He is not in acute distress.     Appearance: He is not ill-appearing, toxic-appearing or diaphoretic.      Comments: Slender. Well built and in no distress. No jaundice.    HENT:      Head: Normocephalic and atraumatic.      Right Ear: External ear normal.      Left Ear: External ear normal.      Nose: Nose normal.      Mouth/Throat:      Mouth: Mucous membranes are moist.      Pharynx: Oropharynx is clear.   Eyes:      General: No scleral icterus.        Right eye: No discharge.         Left eye: No discharge.      Conjunctiva/sclera: Conjunctivae normal.      Pupils: Pupils are equal, round, and reactive to light.   Cardiovascular:      Rate and Rhythm: Normal rate and regular rhythm.      Pulses: Normal pulses.      Heart sounds: Normal heart sounds. No murmur heard.     No friction rub. No gallop.   Pulmonary:      Effort: No respiratory distress.      Breath sounds: No stridor. No " wheezing, rhonchi or rales.      Comments: Breath sounds diminished bilaterally.   Chest:      Chest wall: No tenderness.   Abdominal:      General: Abdomen is flat. Bowel sounds are normal. There is no distension.      Palpations: Abdomen is soft. There is no mass.      Tenderness: There is no abdominal tenderness. There is no right CVA tenderness, left CVA tenderness, guarding or rebound.   Musculoskeletal:         General: No tenderness, deformity or signs of injury.      Cervical back: No rigidity.      Right lower leg: No edema.      Left lower leg: No edema.   Lymphadenopathy:      Cervical: No cervical adenopathy.   Skin:     General: Skin is warm and dry.      Coloration: Skin is not jaundiced or pale.      Findings: No bruising or rash.   Neurological:      General: No focal deficit present.      Mental Status: He is alert and oriented to person, place, and time.      Cranial Nerves: No cranial nerve deficit.   Psychiatric:         Mood and Affect: Mood normal.         Behavior: Behavior normal.         Thought Content: Thought content normal.         Judgment: Judgment normal.     DICKSON Tim MD performed the physical exam on 11/11/2024 as documented above.    Lab Results - Last 18 Months   Lab Units 11/11/24  1406 10/31/24  1202 10/14/24  0821   WBC 10*3/mm3 3.47 11.62* 7.78   HEMOGLOBIN g/dL 8.8* 9.7* 11.6*   HEMATOCRIT % 27.3* 29.8* 35.4*   PLATELETS 10*3/mm3 126* 400 330   MCV fL 99.6* 99.7* 97.3*     Lab Results - Last 18 Months   Lab Units 10/31/24  1202 10/14/24  0821 10/10/24  1356 09/19/24  1401 08/22/24  1102   SODIUM mmol/L 137 139  --   --  141   POTASSIUM mmol/L 4.5 3.9  --   --  4.1   CHLORIDE mmol/L 105 105  --   --  104   CO2 mmol/L 21.2* 26.7  --   --  25.3   BUN mg/dL 24* 28*  --   --  18   CREATININE mg/dL 1.36* 1.27 1.40*   < > 1.15   CALCIUM mg/dL 9.0 8.3*  --   --  8.9   BILIRUBIN mg/dL 0.2 0.4  --   --  0.3   ALK PHOS U/L 74 59  --   --  55   ALT (SGPT) U/L 18 18  --   --  34    AST (SGOT) U/L 29 23  --   --  21   GLUCOSE mg/dL 236* 100*  --   --  95    < > = values in this interval not displayed.     Lab Results   Component Value Date    GLUCOSE 236 (H) 10/31/2024    BUN 24 (H) 10/31/2024    CREATININE 1.36 (H) 10/31/2024    BCR 17.6 10/31/2024    K 4.5 10/31/2024    CO2 21.2 (L) 10/31/2024    CALCIUM 9.0 10/31/2024    ALBUMIN 3.2 (L) 10/31/2024    AST 29 10/31/2024    ALT 18 10/31/2024     Lab Results - Last 18 Months   Lab Units 09/19/24  0742 09/12/24  0809 08/12/24  1834 01/28/24  1737   INR  1.10 1.03 1.00 0.98   APTT seconds 28.5 26.7  --  24.8*     Lab Results   Component Value Date    STEVEN Positive (A) 08/12/2024     Lab Results   Component Value Date    PTT 28.5 09/19/2024    INR 1.10 09/19/2024     Assessment & Plan     cTX N2 M1 adenocarcinoma of the right lung: Next-generation sequencing reviewed.  Positive PD-L1 expression..  The disease is negative for EGFR, ALK, ROS1, ret, BRAF.  Continue same treatment.  Begin immunotherapy once 4 cycles of chemotherapy and immunotherapy have been delivered.  Right lower lobe pneumonia resolved.  Right pleural effusion: Continues to drain progressively smaller amounts through the Pleurx catheter.  History of tobacco smoking: Abstinent for more than 20 years.  Reviewed and discussed the laboratory exams with him.  See me in approximately 6 weeks.  Begin immunotherapy once he has completed 4 cycles of chemoimmunotherapy.    Arben Tim MD on 11/11/2024 at 1500.

## 2024-11-11 ENCOUNTER — HOSPITAL ENCOUNTER (OUTPATIENT)
Dept: ONCOLOGY | Facility: HOSPITAL | Age: 74
Discharge: HOME | End: 2024-11-11
Admitting: INTERNAL MEDICINE
Payer: MEDICARE

## 2024-11-11 ENCOUNTER — HOME CARE VISIT (OUTPATIENT)
Dept: HOME HEALTH SERVICES | Facility: HOME HEALTHCARE | Age: 74
End: 2024-11-11
Payer: MEDICARE

## 2024-11-11 ENCOUNTER — OFFICE VISIT (OUTPATIENT)
Dept: ONCOLOGY | Facility: CLINIC | Age: 74
End: 2024-11-11
Payer: MEDICARE

## 2024-11-11 VITALS
OXYGEN SATURATION: 98 % | HEART RATE: 82 BPM | TEMPERATURE: 97 F | DIASTOLIC BLOOD PRESSURE: 60 MMHG | RESPIRATION RATE: 18 BRPM | SYSTOLIC BLOOD PRESSURE: 98 MMHG

## 2024-11-11 VITALS
DIASTOLIC BLOOD PRESSURE: 78 MMHG | OXYGEN SATURATION: 98 % | RESPIRATION RATE: 17 BRPM | WEIGHT: 174.8 LBS | SYSTOLIC BLOOD PRESSURE: 126 MMHG | HEART RATE: 100 BPM | TEMPERATURE: 98 F | HEIGHT: 70 IN | BODY MASS INDEX: 25.03 KG/M2

## 2024-11-11 DIAGNOSIS — J90 LARGE PLEURAL EFFUSION: ICD-10-CM

## 2024-11-11 DIAGNOSIS — C34.91 STAGE IV ADENOCARCINOMA OF LUNG, RIGHT: Primary | ICD-10-CM

## 2024-11-11 LAB
ALBUMIN SERPL-MCNC: 3.4 G/DL (ref 3.5–5.2)
ALBUMIN/GLOB SERPL: 1.3 G/DL
ALP SERPL-CCNC: 65 U/L (ref 39–117)
ALT SERPL W P-5'-P-CCNC: 31 U/L (ref 1–41)
ANION GAP SERPL CALCULATED.3IONS-SCNC: 9.7 MMOL/L (ref 5–15)
AST SERPL-CCNC: 31 U/L (ref 1–40)
BASOPHILS # BLD AUTO: 0.01 10*3/MM3 (ref 0–0.2)
BASOPHILS NFR BLD AUTO: 0.3 % (ref 0–1.5)
BILIRUB SERPL-MCNC: 0.3 MG/DL (ref 0–1.2)
BUN SERPL-MCNC: 16 MG/DL (ref 8–23)
BUN/CREAT SERPL: 13.3 (ref 7–25)
CALCIUM SPEC-SCNC: 8.7 MG/DL (ref 8.6–10.5)
CHLORIDE SERPL-SCNC: 106 MMOL/L (ref 98–107)
CO2 SERPL-SCNC: 22.3 MMOL/L (ref 22–29)
CREAT SERPL-MCNC: 1.2 MG/DL (ref 0.76–1.27)
DEPRECATED RDW RBC AUTO: 57 FL (ref 37–54)
EGFRCR SERPLBLD CKD-EPI 2021: 63.5 ML/MIN/1.73
EOSINOPHIL # BLD AUTO: 0.06 10*3/MM3 (ref 0–0.4)
EOSINOPHIL NFR BLD AUTO: 1.7 % (ref 0.3–6.2)
ERYTHROCYTE [DISTWIDTH] IN BLOOD BY AUTOMATED COUNT: 16.9 % (ref 12.3–15.4)
GLOBULIN UR ELPH-MCNC: 2.6 GM/DL
GLUCOSE SERPL-MCNC: 120 MG/DL (ref 65–99)
HCT VFR BLD AUTO: 27.3 % (ref 37.5–51)
HGB BLD-MCNC: 8.8 G/DL (ref 13–17.7)
LYMPHOCYTES # BLD AUTO: 0.99 10*3/MM3 (ref 0.7–3.1)
LYMPHOCYTES NFR BLD AUTO: 28.5 % (ref 19.6–45.3)
MCH RBC QN AUTO: 32.1 PG (ref 26.6–33)
MCHC RBC AUTO-ENTMCNC: 32.2 G/DL (ref 31.5–35.7)
MCV RBC AUTO: 99.6 FL (ref 79–97)
MONOCYTES # BLD AUTO: 0.82 10*3/MM3 (ref 0.1–0.9)
MONOCYTES NFR BLD AUTO: 23.6 % (ref 5–12)
NEUTROPHILS NFR BLD AUTO: 1.59 10*3/MM3 (ref 1.7–7)
NEUTROPHILS NFR BLD AUTO: 45.9 % (ref 42.7–76)
PLATELET # BLD AUTO: 126 10*3/MM3 (ref 140–450)
PMV BLD AUTO: 9.7 FL (ref 6–12)
POTASSIUM SERPL-SCNC: 4.3 MMOL/L (ref 3.5–5.2)
PROT SERPL-MCNC: 6 G/DL (ref 6–8.5)
RBC # BLD AUTO: 2.74 10*6/MM3 (ref 4.14–5.8)
SODIUM SERPL-SCNC: 138 MMOL/L (ref 136–145)
WBC NRBC COR # BLD AUTO: 3.47 10*3/MM3 (ref 3.4–10.8)

## 2024-11-11 PROCEDURE — 85025 COMPLETE CBC W/AUTO DIFF WBC: CPT | Performed by: INTERNAL MEDICINE

## 2024-11-11 PROCEDURE — 1126F AMNT PAIN NOTED NONE PRSNT: CPT | Performed by: INTERNAL MEDICINE

## 2024-11-11 PROCEDURE — G0299 HHS/HOSPICE OF RN EA 15 MIN: HCPCS

## 2024-11-11 PROCEDURE — 36591 DRAW BLOOD OFF VENOUS DEVICE: CPT

## 2024-11-11 PROCEDURE — 1159F MED LIST DOCD IN RCRD: CPT | Performed by: INTERNAL MEDICINE

## 2024-11-11 PROCEDURE — 1160F RVW MEDS BY RX/DR IN RCRD: CPT | Performed by: INTERNAL MEDICINE

## 2024-11-11 PROCEDURE — 25010000002 HEPARIN LOCK FLUSH PER 10 UNITS: Performed by: INTERNAL MEDICINE

## 2024-11-11 PROCEDURE — 99214 OFFICE O/P EST MOD 30 MIN: CPT | Performed by: INTERNAL MEDICINE

## 2024-11-11 PROCEDURE — 80053 COMPREHEN METABOLIC PANEL: CPT | Performed by: INTERNAL MEDICINE

## 2024-11-11 RX ORDER — AMIODARONE HYDROCHLORIDE 200 MG/1
200 TABLET ORAL DAILY
Qty: 90 TABLET | Refills: 0 | Status: SHIPPED | OUTPATIENT
Start: 2024-11-11

## 2024-11-11 RX ORDER — SODIUM CHLORIDE 0.9 % (FLUSH) 0.9 %
10 SYRINGE (ML) INJECTION AS NEEDED
OUTPATIENT
Start: 2024-11-11

## 2024-11-11 RX ORDER — HEPARIN SODIUM (PORCINE) LOCK FLUSH IV SOLN 100 UNIT/ML 100 UNIT/ML
500 SOLUTION INTRAVENOUS AS NEEDED
Status: DISCONTINUED | OUTPATIENT
Start: 2024-11-11 | End: 2024-11-12 | Stop reason: HOSPADM

## 2024-11-11 RX ORDER — HEPARIN SODIUM (PORCINE) LOCK FLUSH IV SOLN 100 UNIT/ML 100 UNIT/ML
500 SOLUTION INTRAVENOUS AS NEEDED
OUTPATIENT
Start: 2024-11-11

## 2024-11-11 RX ORDER — SODIUM CHLORIDE 0.9 % (FLUSH) 0.9 %
10 SYRINGE (ML) INJECTION AS NEEDED
Status: DISCONTINUED | OUTPATIENT
Start: 2024-11-11 | End: 2024-11-12 | Stop reason: HOSPADM

## 2024-11-11 RX ADMIN — HEPARIN 500 UNITS: 100 SYRINGE at 14:12

## 2024-11-11 RX ADMIN — Medication 10 ML: at 14:04

## 2024-11-11 NOTE — PROGRESS NOTES
Pt to injection chair for labs and PF prior to Dr. Meeta marquez. Port accessed and flushed with good blood return noted. 10cc of blood wasted prior to specimen collection. Blood specimen obtained and sent to lab for processing per protocol.  Port flushed with saline and heparin prior to needle removal. Top of port pink, looks as if a scratch or skin tear was healing. Pt denies any injury. No drainage noted. Advised pt to keep an eye on it and call office of any redness, tenderness or drainage. He v/u. Pt to waiting room.

## 2024-11-13 ENCOUNTER — HOME CARE VISIT (OUTPATIENT)
Dept: HOME HEALTH SERVICES | Facility: HOME HEALTHCARE | Age: 74
End: 2024-11-13
Payer: MEDICARE

## 2024-11-13 VITALS
DIASTOLIC BLOOD PRESSURE: 66 MMHG | OXYGEN SATURATION: 98 % | HEART RATE: 90 BPM | SYSTOLIC BLOOD PRESSURE: 110 MMHG | TEMPERATURE: 97 F | RESPIRATION RATE: 18 BRPM

## 2024-11-13 PROCEDURE — G0299 HHS/HOSPICE OF RN EA 15 MIN: HCPCS

## 2024-11-14 ENCOUNTER — HOME CARE VISIT (OUTPATIENT)
Dept: HOME HEALTH SERVICES | Facility: HOME HEALTHCARE | Age: 74
End: 2024-11-14
Payer: MEDICARE

## 2024-11-15 ENCOUNTER — HOME CARE VISIT (OUTPATIENT)
Dept: HOME HEALTH SERVICES | Facility: HOME HEALTHCARE | Age: 74
End: 2024-11-15
Payer: MEDICARE

## 2024-11-15 VITALS
OXYGEN SATURATION: 100 % | DIASTOLIC BLOOD PRESSURE: 64 MMHG | SYSTOLIC BLOOD PRESSURE: 108 MMHG | TEMPERATURE: 97.9 F | HEART RATE: 90 BPM | RESPIRATION RATE: 18 BRPM

## 2024-11-15 PROCEDURE — G0299 HHS/HOSPICE OF RN EA 15 MIN: HCPCS

## 2024-11-18 ENCOUNTER — HOME CARE VISIT (OUTPATIENT)
Dept: HOME HEALTH SERVICES | Facility: HOME HEALTHCARE | Age: 74
End: 2024-11-18
Payer: MEDICARE

## 2024-11-18 PROCEDURE — G0299 HHS/HOSPICE OF RN EA 15 MIN: HCPCS

## 2024-11-18 NOTE — HOME HEALTH
60 Day Summary  Home Health need continues for: Pleurx drains r/t pleural effusion needing to be performed 3 times a week, r/t pt's diagnosis of lung cancer  FOC: Malignant neoplasm of lower lobe, right bronchus or lung     Primary diagnoses/co-morbidities/recent procedures in past 60 days that impact current episode: Malignant neoplasm of lower lobe, right bronchus or lung, hypotension, COPD, GERD    Current level of functional ability: needs minimal assist with ADL's, has weakness frequently  Homebound status and living arrangements: lives with wife, who is excellent provider  Goals accomplished and/or measurable progress toward unmet goals in past 60 days: pt. tolerating chemotherapy and pleurx draining  Focus of care for next 60 days for each discipline ordered: Malignant neoplasm of lower lobe, right bronchus or lung     Skin integrity/wound status: at risk, WNL at this time  Estimated date when home care services will end 60 days  SDOH changes/barriers (i.e. Caregiver availability, social isolation, environment, income, transportation access, food insecurity etc.-n/a  Need for MSW referral? No

## 2024-11-18 NOTE — Clinical Note
Drug-Drug  <jscript:void(0)>  Drug-Drug: metoprolol tartrate and amiodarone   Administration of amiodarone and metoprolol tartrate may result in severe bradycardia, hypotension and cardiac arrest.   Details Major   metoprolol tartrate (LOPRESSOR) 25 MG tablet   Long-term.     amiodarone (PACERONE) 200 MG tablet     metoprolol tartrate (LOPRESSOR) 5 MG/5ML injection - ADS Override Pull   Ended. Long-term.   Drug-Drug  <jscript:void(0)>  Drug-Drug: amiodarone and ondansetron   Additive QT interval prolongation may occur during coadministration of a moderate-risk QT-prolonging agent (eg, parenteral administration of ondansetron) and a high-risk QT-prolonging agent (eg, amiodarone).   Details Major   amiodarone (PACERONE) 200 MG tablet     ondansetron (ZOFRAN) 8 MG tablet   Drug-Drug  <jscript:void(0)>  Drug-Drug: OLANZapine and amiodarone   Additive QT interval prolongation may occur during coadministration of olanzapine, a moderate-risk QT-prolonging agent, and amiodarone.   Details Major   OLANZapine (zyPREXA) 5 MG tablet   Long-term.     amiodarone (PACERONE) 200 MG tablet

## 2024-11-20 ENCOUNTER — HOME CARE VISIT (OUTPATIENT)
Dept: HOME HEALTH SERVICES | Facility: HOME HEALTHCARE | Age: 74
End: 2024-11-20
Payer: MEDICARE

## 2024-11-20 PROCEDURE — G0299 HHS/HOSPICE OF RN EA 15 MIN: HCPCS

## 2024-11-21 ENCOUNTER — HOSPITAL ENCOUNTER (OUTPATIENT)
Dept: ONCOLOGY | Facility: HOSPITAL | Age: 74
Discharge: HOME | End: 2024-11-21
Payer: MEDICARE

## 2024-11-21 VITALS
RESPIRATION RATE: 16 BRPM | WEIGHT: 176 LBS | HEIGHT: 70 IN | SYSTOLIC BLOOD PRESSURE: 134 MMHG | TEMPERATURE: 97.3 F | OXYGEN SATURATION: 97 % | BODY MASS INDEX: 25.2 KG/M2 | HEART RATE: 98 BPM | DIASTOLIC BLOOD PRESSURE: 78 MMHG

## 2024-11-21 DIAGNOSIS — C34.91 STAGE IV ADENOCARCINOMA OF LUNG, RIGHT: Primary | ICD-10-CM

## 2024-11-21 LAB
ALP BLD-CCNC: 73 U/L (ref 53–128)
BASOPHILS # BLD AUTO: 0.02 10*3/MM3 (ref 0–0.2)
BASOPHILS NFR BLD AUTO: 0.2 % (ref 0–1.5)
BUN BLDA-MCNC: 22 MG/DL (ref 7–22)
CALCIUM BLD QL: 8.7 MG/DL (ref 8–10.3)
CHLORIDE BLDA-SCNC: 105 MMOL/L (ref 98–108)
CO2 BLDA-SCNC: 24 MMOL/L (ref 18–33)
CREAT BLDA-MCNC: 1.2 MG/DL (ref 0.6–1.2)
DEPRECATED RDW RBC AUTO: 68.5 FL (ref 37–54)
EGFRCR SERPLBLD CKD-EPI 2021: 63.5 ML/MIN/1.73
EOSINOPHIL # BLD AUTO: 0.01 10*3/MM3 (ref 0–0.4)
EOSINOPHIL NFR BLD AUTO: 0.1 % (ref 0.3–6.2)
ERYTHROCYTE [DISTWIDTH] IN BLOOD BY AUTOMATED COUNT: 19.1 % (ref 12.3–15.4)
GLUCOSE BLDC GLUCOMTR-MCNC: 235 MG/DL (ref 73–118)
HCT VFR BLD AUTO: 28.8 % (ref 37.5–51)
HGB BLD-MCNC: 9.3 G/DL (ref 13–17.7)
LYMPHOCYTES # BLD AUTO: 0.72 10*3/MM3 (ref 0.7–3.1)
LYMPHOCYTES NFR BLD AUTO: 6.9 % (ref 19.6–45.3)
MCH RBC QN AUTO: 33.3 PG (ref 26.6–33)
MCHC RBC AUTO-ENTMCNC: 32.3 G/DL (ref 31.5–35.7)
MCV RBC AUTO: 103.2 FL (ref 79–97)
MONOCYTES # BLD AUTO: 0.53 10*3/MM3 (ref 0.1–0.9)
MONOCYTES NFR BLD AUTO: 5.1 % (ref 5–12)
NEUTROPHILS NFR BLD AUTO: 87.7 % (ref 42.7–76)
NEUTROPHILS NFR BLD AUTO: 9.12 10*3/MM3 (ref 1.7–7)
PLATELET # BLD AUTO: 433 10*3/MM3 (ref 140–450)
PMV BLD AUTO: 8.4 FL (ref 6–12)
POC ALBUMIN: 2.7 G/L (ref 3.3–5.5)
POC ALT (SGPT): 24 U/L (ref 10–47)
POC AST (SGOT): 34 U/L (ref 11–38)
POC TOTAL BILIRUBIN: 0.6 MG/DL (ref 0.2–1.6)
POC TOTAL PROTEIN: 6.2 G/DL (ref 6.4–8.1)
POTASSIUM BLDA-SCNC: 4.3 MMOL/L (ref 3.6–5.1)
RBC # BLD AUTO: 2.79 10*6/MM3 (ref 4.14–5.8)
SODIUM BLD-SCNC: 142 MMOL/L (ref 128–145)
WBC NRBC COR # BLD AUTO: 10.4 10*3/MM3 (ref 3.4–10.8)

## 2024-11-21 PROCEDURE — 96375 TX/PRO/DX INJ NEW DRUG ADDON: CPT

## 2024-11-21 PROCEDURE — 85025 COMPLETE CBC W/AUTO DIFF WBC: CPT | Performed by: INTERNAL MEDICINE

## 2024-11-21 PROCEDURE — 96417 CHEMO IV INFUS EACH ADDL SEQ: CPT

## 2024-11-21 PROCEDURE — 96367 TX/PROPH/DG ADDL SEQ IV INF: CPT

## 2024-11-21 PROCEDURE — 80053 COMPREHEN METABOLIC PANEL: CPT

## 2024-11-21 PROCEDURE — 25010000002 CARBOPLATIN PER 50 MG: Performed by: NURSE PRACTITIONER

## 2024-11-21 PROCEDURE — 25010000002 HEPARIN LOCK FLUSH PER 10 UNITS: Performed by: INTERNAL MEDICINE

## 2024-11-21 PROCEDURE — 25810000003 SODIUM CHLORIDE 0.9 % SOLUTION 250 ML FLEX CONT: Performed by: NURSE PRACTITIONER

## 2024-11-21 PROCEDURE — 25010000002 PALONOSETRON 0.25 MG/5ML SOLUTION PREFILLED SYRINGE: Performed by: INTERNAL MEDICINE

## 2024-11-21 PROCEDURE — 25010000002 FOSAPREPITANT PER 1 MG: Performed by: INTERNAL MEDICINE

## 2024-11-21 PROCEDURE — 96413 CHEMO IV INFUSION 1 HR: CPT

## 2024-11-21 PROCEDURE — 25010000002 PEMETREXED PER 10 MG: Performed by: NURSE PRACTITIONER

## 2024-11-21 RX ORDER — HEPARIN SODIUM (PORCINE) LOCK FLUSH IV SOLN 100 UNIT/ML 100 UNIT/ML
500 SOLUTION INTRAVENOUS AS NEEDED
OUTPATIENT
Start: 2024-11-21

## 2024-11-21 RX ORDER — FAMOTIDINE 10 MG/ML
20 INJECTION, SOLUTION INTRAVENOUS AS NEEDED
OUTPATIENT
Start: 2024-11-21

## 2024-11-21 RX ORDER — HEPARIN SODIUM (PORCINE) LOCK FLUSH IV SOLN 100 UNIT/ML 100 UNIT/ML
500 SOLUTION INTRAVENOUS AS NEEDED
Status: DISCONTINUED | OUTPATIENT
Start: 2024-11-21 | End: 2024-11-22 | Stop reason: HOSPADM

## 2024-11-21 RX ORDER — DIPHENHYDRAMINE HYDROCHLORIDE 50 MG/ML
50 INJECTION INTRAMUSCULAR; INTRAVENOUS AS NEEDED
OUTPATIENT
Start: 2024-11-21

## 2024-11-21 RX ORDER — SODIUM CHLORIDE 0.9 % (FLUSH) 0.9 %
10 SYRINGE (ML) INJECTION AS NEEDED
Status: DISCONTINUED | OUTPATIENT
Start: 2024-11-21 | End: 2024-11-22 | Stop reason: HOSPADM

## 2024-11-21 RX ORDER — SODIUM CHLORIDE 0.9 % (FLUSH) 0.9 %
10 SYRINGE (ML) INJECTION AS NEEDED
OUTPATIENT
Start: 2024-11-21

## 2024-11-21 RX ORDER — PALONOSETRON 0.05 MG/ML
0.25 INJECTION, SOLUTION INTRAVENOUS ONCE
Status: COMPLETED | OUTPATIENT
Start: 2024-11-21 | End: 2024-11-21

## 2024-11-21 RX ORDER — SODIUM CHLORIDE 9 MG/ML
20 INJECTION, SOLUTION INTRAVENOUS ONCE
Status: DISCONTINUED | OUTPATIENT
Start: 2024-11-21 | End: 2024-11-22 | Stop reason: HOSPADM

## 2024-11-21 RX ADMIN — PALONOSETRON 0.25 MG: 0.25 INJECTION, SOLUTION INTRAVENOUS at 12:27

## 2024-11-21 RX ADMIN — SODIUM CHLORIDE 200 MG: 900 INJECTION, SOLUTION INTRAVENOUS at 13:02

## 2024-11-21 RX ADMIN — PEMETREXED DISODIUM 990 MG: 500 INJECTION, POWDER, LYOPHILIZED, FOR SOLUTION INTRAVENOUS at 13:39

## 2024-11-21 RX ADMIN — FOSAPREPITANT 100 ML: 150 INJECTION, POWDER, LYOPHILIZED, FOR SOLUTION INTRAVENOUS at 12:29

## 2024-11-21 RX ADMIN — Medication 10 ML: at 14:28

## 2024-11-21 RX ADMIN — CARBOPLATIN 430 MG: 10 INJECTION, SOLUTION INTRAVENOUS at 13:52

## 2024-11-21 RX ADMIN — HEPARIN 500 UNITS: 100 SYRINGE at 14:28

## 2024-11-22 ENCOUNTER — HOME CARE VISIT (OUTPATIENT)
Dept: HOME HEALTH SERVICES | Facility: HOME HEALTHCARE | Age: 74
End: 2024-11-22
Payer: MEDICARE

## 2024-11-22 VITALS
SYSTOLIC BLOOD PRESSURE: 104 MMHG | RESPIRATION RATE: 18 BRPM | DIASTOLIC BLOOD PRESSURE: 62 MMHG | HEART RATE: 88 BPM | OXYGEN SATURATION: 99 % | TEMPERATURE: 97.9 F

## 2024-11-22 VITALS
RESPIRATION RATE: 18 BRPM | SYSTOLIC BLOOD PRESSURE: 106 MMHG | DIASTOLIC BLOOD PRESSURE: 62 MMHG | OXYGEN SATURATION: 100 % | TEMPERATURE: 97 F | HEART RATE: 78 BPM

## 2024-11-22 PROCEDURE — G0299 HHS/HOSPICE OF RN EA 15 MIN: HCPCS

## 2024-11-25 ENCOUNTER — HOME CARE VISIT (OUTPATIENT)
Dept: HOME HEALTH SERVICES | Facility: HOME HEALTHCARE | Age: 74
End: 2024-11-25
Payer: MEDICARE

## 2024-11-25 VITALS
HEART RATE: 79 BPM | TEMPERATURE: 97.3 F | RESPIRATION RATE: 18 BRPM | DIASTOLIC BLOOD PRESSURE: 62 MMHG | OXYGEN SATURATION: 99 % | SYSTOLIC BLOOD PRESSURE: 102 MMHG

## 2024-11-25 PROCEDURE — G0299 HHS/HOSPICE OF RN EA 15 MIN: HCPCS

## 2024-11-27 ENCOUNTER — HOME CARE VISIT (OUTPATIENT)
Dept: HOME HEALTH SERVICES | Facility: HOME HEALTHCARE | Age: 74
End: 2024-11-27
Payer: MEDICARE

## 2024-11-27 ENCOUNTER — OFFICE VISIT (OUTPATIENT)
Dept: CARDIOLOGY | Facility: CLINIC | Age: 74
End: 2024-11-27
Payer: MEDICARE

## 2024-11-27 VITALS
HEART RATE: 82 BPM | HEIGHT: 70 IN | DIASTOLIC BLOOD PRESSURE: 64 MMHG | WEIGHT: 172 LBS | BODY MASS INDEX: 24.62 KG/M2 | RESPIRATION RATE: 18 BRPM | OXYGEN SATURATION: 100 % | SYSTOLIC BLOOD PRESSURE: 108 MMHG

## 2024-11-27 VITALS
DIASTOLIC BLOOD PRESSURE: 64 MMHG | RESPIRATION RATE: 18 BRPM | OXYGEN SATURATION: 100 % | HEART RATE: 78 BPM | SYSTOLIC BLOOD PRESSURE: 88 MMHG | TEMPERATURE: 97 F

## 2024-11-27 DIAGNOSIS — R07.1 CHEST PAIN ON BREATHING: Primary | ICD-10-CM

## 2024-11-27 DIAGNOSIS — I21.4 NSTEMI, INITIAL EPISODE OF CARE: ICD-10-CM

## 2024-11-27 PROCEDURE — G0299 HHS/HOSPICE OF RN EA 15 MIN: HCPCS

## 2024-11-27 RX ORDER — SOTALOL HYDROCHLORIDE 80 MG/1
80 TABLET ORAL 2 TIMES DAILY
Qty: 60 TABLET | Refills: 5 | Status: SHIPPED | OUTPATIENT
Start: 2024-11-27

## 2024-11-29 ENCOUNTER — HOME CARE VISIT (OUTPATIENT)
Dept: HOME HEALTH SERVICES | Facility: HOME HEALTHCARE | Age: 74
End: 2024-11-29
Payer: MEDICARE

## 2024-11-29 VITALS
SYSTOLIC BLOOD PRESSURE: 112 MMHG | TEMPERATURE: 99 F | HEART RATE: 83 BPM | OXYGEN SATURATION: 100 % | DIASTOLIC BLOOD PRESSURE: 60 MMHG | RESPIRATION RATE: 18 BRPM

## 2024-11-29 PROCEDURE — G0299 HHS/HOSPICE OF RN EA 15 MIN: HCPCS

## 2024-11-29 NOTE — HOME HEALTH
Routine Visit Note: Medications reviewed. Appetite poor; patient states everything tastes salty. No issues with elimination. CP assessed.     Skill/education provided: Vital signs stable. PleurX drained; patient tolerated fair; output: 50 mLs. Lung sounds assessed after procedure. Cardiopulmonary assessment completed. Patient denies falls and pain. Education provided about high risk medications. No edema present.     Patient/caregiver response: Patient verbilized understanding.     Plan for next visit: Vital signs, review medications, cardiopulmonary assessment, assess falls and pain, cp assess, pleurX drain    Other pertinent info: na

## 2024-12-02 ENCOUNTER — HOME CARE VISIT (OUTPATIENT)
Dept: HOME HEALTH SERVICES | Facility: HOME HEALTHCARE | Age: 74
End: 2024-12-02
Payer: MEDICARE

## 2024-12-02 ENCOUNTER — CLINICAL SUPPORT (OUTPATIENT)
Dept: CARDIOLOGY | Facility: CLINIC | Age: 74
End: 2024-12-02
Payer: MEDICARE

## 2024-12-02 VITALS
TEMPERATURE: 97 F | SYSTOLIC BLOOD PRESSURE: 102 MMHG | OXYGEN SATURATION: 99 % | HEART RATE: 77 BPM | DIASTOLIC BLOOD PRESSURE: 62 MMHG | RESPIRATION RATE: 18 BRPM

## 2024-12-02 PROCEDURE — G0299 HHS/HOSPICE OF RN EA 15 MIN: HCPCS

## 2024-12-03 NOTE — PROGRESS NOTES
Cardiology Clinic Note  Carlos Pavon MD, PhD    Subjective:     Encounter Date:11/27/2024      Patient ID: Young Ames is a 73 y.o. male.    Chief Complaint:  Chief Complaint   Patient presents with    Follow-up       HPI:    I the pleasure to see this 73-year-old gentleman back in clinic today after hospitalization with malignant pleural effusion status post drainage, complicated by A-fib RVR hypotension.  He has normal EF and nonobstructive CAD.  He was initiated on amiodarone for rhythm control which resulted in conversion to sinus rhythm.  He had bronchoscopy and thoracentesis demonstrating non-small cell lung cancer and has scheduled follow-up with hematology oncology and pulmonary.  He is in sinus rhythm today rates in the 80s on metoprolol and amiodarone.  He appears euvolemic today he is tolerating Eliquis, no refractory chest pain or angina unexplained syncope.  He is taking midodrine as needed for hypotension and dysautonomia    Today he is back with no new issues, discussed cessation of his amiodarone and metoprolol and lieu of sotalol for rhythm control strategy to avoid long-term complications from amiodarone therapy for which she is agreeable.  No other issues today, EKG follow-up within 3 to 5 days after starting the medication, continues on midodrine for blood pressure support with dysautonomia     Review of systems otherwise negative x 14 point review of systems except as mentioned above        Historical data copied forward from previous encounters in EMR including the history, exam, and assessment/plan has been reviewed and is unchanged unless noted otherwise.     Cardiac medicines reviewed with risk, benefits, and necessity of each discussed.     Risk and benefit of cardiac testing reviewed including death heart attack stroke pain bleeding infection need for vascular /cardiovascular surgery were discussed and the patient      Objective:         Objective    Vitals reviewed below     Physical  "Exam  Regular rate and rhythm no rubs gallops heave or lift  No clubbing cyanosis or edema  Intact grossly  Skin is warm and dry     Assessment:         Assessment    Paroxysmal Atrial fibrillation with intermittent RVR  Now converted to SR  Stop amiodarone  Stop metoprolol  Sotalol 80 twice daily  EKG in 3 to 5 days    [id number] equals 2, next year will be 3  Eliquis twice daily     Right-sided pneumonia with large right pleural effusion, malignant non-small cell lung cancer  Status post bronchoscopy and thoracentesis, follow-up with hematology oncology and pulmonary  Can hold anticoagulation if needed for any further procedures        Hypertension  Stable 120 systolic  Midodrine as needed, on metoprolol     Nonobstructive coronary artery disease  Cardiac catheterization in January 2024  20 to 30% mid L RCA lesion.  Otherwise normal coronary anatomy.  Angina free  Normal EF 60%        6-month follow-up    No further cardiac testing indicated at this time    Clinical course will really depend on prognosis from a lung cancer standpoint, therapeutic options of radiation chemotherapy or surgery depending on hematology oncology recommendations and pulmonary recommendations     Continue smoking abstinence     The pleasure to be involved in this patient's cardiovascular care.  Please call with any questions or concerns  Carlos Pavon MD, PhD    Objective:         /64 (BP Location: Right arm, Patient Position: Sitting)   Pulse 82   Resp 18   Ht 177.8 cm (70\")   Wt 78 kg (172 lb)   SpO2 100%   BMI 24.68 kg/m²     Physical Exam    Assessment:         There are no diagnoses linked to this encounter.       Plan:              The pleasure to be involved in this patient's cardiovascular care.  Please call with any questions or concerns  Carlos Pavon MD, PhD    Most recent EKG as reviewed and interpreted by me:  Procedures     Most recent echo as reviewed and interpreted by me:  Results for orders placed during " the hospital encounter of 08/12/24    Adult Transthoracic Echo Complete W/ Cont if Necessary Per Protocol    Interpretation Summary    Left ventricular ejection fraction appears to be 66 - 70%.    Left ventricular diastolic function was indeterminate.    The left atrial cavity is mildly dilated.    Left atrial volume is mildly increased.    Estimated right ventricular systolic pressure from tricuspid regurgitation is mildly elevated (35-45 mmHg).    Moderate pulmonary hypertension is present.      Most recent stress test as reviewed and interpreted by me:  Results for orders placed during the hospital encounter of 01/28/24    Stress Test With Myocardial Perfusion One Day    Interpretation Summary    Impressions are consistent with an intermediate risk study.    Left ventricular ejection fraction is normal (Calculated EF = 56%).    Myocardial perfusion imaging indicates a moderate-sized, moderately severe area of ischemia located in the inferior wall and septal wall.      Most recent cardiac catheterization as reviewed interpreted by me:  Results for orders placed during the hospital encounter of 01/28/24    Cardiac Catheterization/Vascular Study    The following portions of the patient's history were reviewed and updated as appropriate: allergies, current medications, past family history, past medical history, past social history, past surgical history, and problem list.      ROS:  14 point review of systems negative except as mentioned above    Current Outpatient Medications:     acetaminophen (TYLENOL) 325 MG tablet, Take 2 tablets by mouth Every 4 (Four) Hours As Needed for Mild Pain., Disp: , Rfl:     apixaban (ELIQUIS) 5 MG tablet tablet, Take 1 tablet by mouth Every 12 (Twelve) Hours. Indications: Atrial Fibrillation, Disp: 60 tablet, Rfl: 0    Cholecalciferol (VITAMIN D-3 PO), Take 1 tablet by mouth Daily. Indications: Vitamin Deficiency, Disp: , Rfl:     folic acid (FOLVITE) 1 MG tablet, Take 1 tablet by  mouth Daily. Start at least 7 days prior to chemotherapy until at least 3 weeks after all chemotherapy., Disp: 30 tablet, Rfl: 4    lidocaine-prilocaine (EMLA) 2.5-2.5 % cream, Apply 1 Application topically to the appropriate area as directed As Needed for Mild Pain (Apply 1 hour prior to port access)., Disp: 30 g, Rfl: 2    midodrine (PROAMATINE) 5 MG tablet, Take 1 tablet by mouth 3 (Three) Times a Day Before Meals. (Patient taking differently: Take 1 tablet by mouth 3 (Three) Times a Day As Needed (low blood pressure). Indications: Disorder of Low Blood Pressure), Disp: 20 tablet, Rfl: 0    multivitamin with minerals (Centrum Silver 50+Men) tablet tablet, Take 1 tablet by mouth Every Morning. Indications: Vitamin Deficiency, Disp: , Rfl:     NON FORMULARY, Take 2 tablets by mouth Every Night. Zzzquil  Indications: Sleep Disorder, Disp: , Rfl:     ondansetron (ZOFRAN) 8 MG tablet, Take 1 tablet by mouth 3 (Three) Times a Day As Needed for Nausea or Vomiting., Disp: 30 tablet, Rfl: 3    pantoprazole (PROTONIX) 40 MG EC tablet, Take 1 tablet by mouth Daily., Disp: 30 tablet, Rfl: 1    polyethylene glycol (MIRALAX) 17 g packet, Take 17 g by mouth Daily. Indications: Constipation, Disp: , Rfl:     simvastatin (ZOCOR) 20 MG tablet, Take 1 tablet by mouth Every Night. Indications: High Amount of Fats in the Blood, Disp: , Rfl:     tamsulosin (FLOMAX) 0.4 MG capsule 24 hr capsule, Take 1 capsule by mouth Every Night. Indications: Benign Enlargement of Prostate, Disp: , Rfl:     thiamine (VITAMIN B-1) 100 MG tablet  tablet, Take 1 tablet by mouth Every Morning. Indications: Vitamin Deficiency, Disp: , Rfl:     traMADol (ULTRAM) 50 MG tablet, Take 1 tablet by mouth Every 6 (Six) Hours As Needed for Moderate Pain., Disp: 90 tablet, Rfl: 0    ipratropium-albuterol (DUO-NEB) 0.5-2.5 mg/3 ml nebulizer, Take 3 mL by nebulization 4 (Four) Times a Day As Needed for Wheezing (Dyspnea) for up to 30 days. (Patient not taking:  Reported on 2024), Disp: 120 mL, Rfl: 3    sotalol (Betapace) 80 MG tablet, Take 1 tablet by mouth 2 (Two) Times a Day. Indications: Atrial Fibrillation, Disp: 60 tablet, Rfl: 5    Problem List:  Patient Active Problem List   Diagnosis    Chest pain    Pneumonia due to infectious organism    Pneumonia    NSTEMI, initial episode of care    Large pleural effusion    Stage IV adenocarcinoma of lung, right    Stage IV adenocarcinoma of lung, left     Past Medical History:  Past Medical History:   Diagnosis Date    Coronary artery disease     Myocardial infarction 2024     Past Surgical History:  Past Surgical History:   Procedure Laterality Date    BRONCHOSCOPY N/A 2024    Procedure: BRONCHOSCOPY WITH BRONCHIAL WASHING AND BRONCHIAL BRUSHING;  Surgeon: Kelvin Gonzalez MD;  Location: [facility] ENDOSCOPY;  Service: Pulmonary;  Laterality: N/A;    BRONCHOSCOPY WITH ION ROBOTIC ASSIST N/A 2024    Procedure: BRONCHOSCOPY WITH ION ROBOT WITH CRYO BIOPSY;  Surgeon: Kelvin Gonzalez MD;  Location: [facility] ENDOSCOPY;  Service: Robotics - Pulmonary;  Laterality: N/A;    CARDIAC CATHETERIZATION Right 2024    Procedure: Coronary angiography;  Surgeon: Abran Chand MD;  Location: [facility] CATH INVASIVE LOCATION;  Service: Cardiovascular;  Laterality: Right;    CARDIAC CATHETERIZATION N/A 2024    Procedure: Left Heart Cath;  Surgeon: Abran Chand MD;  Location: [facility] CATH INVASIVE LOCATION;  Service: Cardiovascular;  Laterality: N/A;     Social History:  Social History     Socioeconomic History    Marital status:    Tobacco Use    Smoking status: Former     Current packs/day: 0.00     Average packs/day: 1 pack/day for 32.0 years (32.0 ttl pk-yrs)     Types: Cigarettes     Start date:      Quit date: 2000     Years since quittin.9     Passive exposure: Past    Smokeless tobacco: Never   Vaping Use    Vaping status: Never Used   Substance and Sexual Activity    Alcohol use: Yes     Alcohol/week:  1.0 standard drink of alcohol     Types: 1 Cans of beer per week    Drug use: Never    Sexual activity: Defer     Allergies:  No Known Allergies  Immunizations:  Immunization History   Administered Date(s) Administered    COVID-19 (MODERNA) 12YRS+ (SPIKEVAX) 09/21/2023    COVID-19 (MODERNA) 1st,2nd,3rd Dose Monovalent 01/26/2021, 03/03/2021    COVID-19 (MODERNA) BIVALENT 12+YRS 09/16/2022    COVID-19 (MODERNA) Monovalent Original Booster 10/23/2021, 05/13/2022    Fluzone  >6mos 11/19/2015    Fluzone High-Dose 65+YRS 10/10/2016, 10/13/2017, 10/15/2018    Fluzone High-Dose 65+yrs 10/15/2020, 10/19/2021, 10/15/2022, 09/21/2023    H1N1 Inj 11/13/2009    Hepatitis B Adult/Adolescent IM 10/27/2005, 12/01/2005, 05/05/2006    Influenza MDCK Quadrivalent with Preserative 10/15/2019    Influenza Seasonal Injectable 10/31/2013, 09/16/2014    Pneumococcal Conjugate 13-Valent (PCV13) 10/17/2016    Pneumococcal Polysaccharide (PPSV23) 10/17/2017    Shingrix 07/20/2023, 01/22/2024    Tdap 08/07/2017            In-Office Procedure(s):  No orders to display        ASCVD RIsk Score::  The ASCVD Risk score (Karlos MCCALLUM, et al., 2019) failed to calculate for the following reasons:    Risk score cannot be calculated because patient has a medical history suggesting prior/existing ASCVD    Imaging:    Results for orders placed during the hospital encounter of 09/16/24    XR Chest PA & Lateral    Narrative  XR CHEST PA AND LATERAL    Date of Exam: 9/16/2024 5:08 PM EDT    Indication: SOA, history of pleural effusion. NSCLC patient is scheduled for right-sided pleural drainage catheter placement    Comparison: Chest radiograph 9/11/2024, 9/9/2024    Findings:  Continued worsening appearance of the right hemithorax now with near complete opacification. There is a persistent large right pleural effusion resulting in compressive atelectasis of the right middle lobe and right lower lobe. Right lower lobe mass is  obscured. There is worsening  consolidation in the right upper lobe consistent with pneumonia. Right-sided port catheter tip is at the cavoatrial junction. The left lung is clear. No effusion or pneumothorax on the left. Osseous structures intact.    Impression  Impression:  1. Continued worsening appearance of the right hemithorax now with near complete opacification of the right hemithorax related to large right pleural effusion with compressive atelectasis of the right middle lobe and right lower lobe. Right lower lobe  mass is obscured.  2. Worsening consolidation in right upper lobe consistent with pneumonia with only a small amount of aerated lung overlying the right upper lobe..  3. Left lung clear.        Electronically Signed: Antwon Estrella MD  9/16/2024 5:37 PM EDT  Workstation ID: [id number]       Results for orders placed during the hospital encounter of 11/01/24    CT Chest With Contrast Diagnostic    Narrative  CT CHEST W CONTRAST DIAGNOSTIC    Date of Exam: 11/1/2024 8:06 AM EDT    Indication: Stage IV adenocarcinoma of the right lung, large right pleural effusion.    Comparison: CT of the chest performed on 8/15/2024 and a whole-body PET/CT exam performed on 8/28/2024.    Technique: Axial CT images were obtained of the chest after the uneventful intravenous administration of iodinated contrast.  Sagittal and coronal reconstructions were performed.  Automated exposure control and iterative reconstruction methods were used.      Findings:  There has been interval placement of a right Pleurx catheter with the tip terminating in the right posterior costophrenic sulcus. The patient has a small to moderate right pleural effusion. There is an area of masslike consolidation in the posterior  basilar segment of the right lower lobe somewhat difficult to quantitate in size but measures approximately 7.8 cm in greatest transaxial diameter and 2.1 cm in the orthogonal plane on image 82 of series 3. On the prior study, the same area  spanned  approximately 8.9 x 3.5 cm in diameter which suggests a response to therapy. There is patchy groundglass density in the superior segment of the right lower lobe which has improved and is probably related to pneumonitis. There is compressive atelectasis  on the right lung secondary to the right pleural effusion. There is thickening of the interstitium throughout the right lung. Unilateral pulmonary edema or lymphangitic spread of tumor are in the differential diagnosis. There is bronchial wall thickening  on the right side. The tracheobronchial tree is otherwise normal. There are small nodules in the superior segment of the left lower lobe. The largest nodule measures 6 mm in diameter on image 70 of series 3. It probably has not significantly changed in  size but there is now some surrounding patchy inflammatory changes. This probably represents an infectious/inflammatory process versus less likely metastatic disease. There is underlying emphysema. There is a right suprahilar lymph node on image 55 of  series 2 suspicious for metastatic disease measuring 2.4 x 2.0 cm in diameter. There is a subcarinal lymph node which is borderline-mildly enlarged measuring 1.5 x 1.1 cm on image 58. There are a few right paratracheal lymph nodes. The largest is located  on image 44 measuring 1.5 x 0.8 cm. These demonstrated metabolic activity on the previous PET/CT exam and this is suspicious for metastatic adenopathy. The right paratracheal lymph node has decreased in size. There are atherosclerotic vascular  calcifications including involvement of the coronary arteries. The heart size is normal. There is a trace pericardial effusion. There are calcified gallstones. There is partially imaged left renal hydronephrosis which was apparent on the previous CT  study as well as the PET/CT exam. The adrenal glands are normal. There are no suspicious osteolytic or sclerotic lesions within the bony thorax. There are underlying  degenerative changes. There is a right-sided port in place.    Impression  Impression:    1. There has been interval placement of right Pleurx catheter with the tip terminating in the right posterior costophrenic sulcus. The patient has a small to moderate right pleural effusion.  2. Area of masslike consolidation in the posterior basilar segment of the right lower lobe somewhat difficult to quantitate in size. It appears reduced in size from the previous CT of the chest suggesting a response to therapy.  3. Patchy densities in the superior segment of the right lower lobe probably representing improving pneumonitis.  4. Persistent interstitial thickening throughout the right lung. This could be due to unilateral pulmonary edema or lymphangitic spread of tumor. There is also asymmetric bronchial wall thickening in the right lung.  5. Small nodules in the superior segment of the left lower lobe. These were present on the previous exam and are probably infectious/inflammatory.  6. Enlarged right hilar and mediastinal lymph nodes. These were metabolically active on the previous PET/CT exam and suspicious for metastatic disease. The right paratracheal lymph node has decreased in size.  7. Emphysema.  8. Cholelithiasis and partially imaged left renal hydronephrosis.        Electronically Signed: Misael Palacios MD  11/4/2024 8:35 PM EST  Workstation ID: [id number]      Results for orders placed during the hospital encounter of 11/01/24    CT Chest With Contrast Diagnostic    Narrative  CT CHEST W CONTRAST DIAGNOSTIC    Date of Exam: 11/1/2024 8:06 AM EDT    Indication: Stage IV adenocarcinoma of the right lung, large right pleural effusion.    Comparison: CT of the chest performed on 8/15/2024 and a whole-body PET/CT exam performed on 8/28/2024.    Technique: Axial CT images were obtained of the chest after the uneventful intravenous administration of iodinated contrast.  Sagittal and coronal reconstructions were performed.   Automated exposure control and iterative reconstruction methods were used.      Findings:  There has been interval placement of a right Pleurx catheter with the tip terminating in the right posterior costophrenic sulcus. The patient has a small to moderate right pleural effusion. There is an area of masslike consolidation in the posterior  basilar segment of the right lower lobe somewhat difficult to quantitate in size but measures approximately 7.8 cm in greatest transaxial diameter and 2.1 cm in the orthogonal plane on image 82 of series 3. On the prior study, the same area spanned  approximately 8.9 x 3.5 cm in diameter which suggests a response to therapy. There is patchy groundglass density in the superior segment of the right lower lobe which has improved and is probably related to pneumonitis. There is compressive atelectasis  on the right lung secondary to the right pleural effusion. There is thickening of the interstitium throughout the right lung. Unilateral pulmonary edema or lymphangitic spread of tumor are in the differential diagnosis. There is bronchial wall thickening  on the right side. The tracheobronchial tree is otherwise normal. There are small nodules in the superior segment of the left lower lobe. The largest nodule measures 6 mm in diameter on image 70 of series 3. It probably has not significantly changed in  size but there is now some surrounding patchy inflammatory changes. This probably represents an infectious/inflammatory process versus less likely metastatic disease. There is underlying emphysema. There is a right suprahilar lymph node on image 55 of  series 2 suspicious for metastatic disease measuring 2.4 x 2.0 cm in diameter. There is a subcarinal lymph node which is borderline-mildly enlarged measuring 1.5 x 1.1 cm on image 58. There are a few right paratracheal lymph nodes. The largest is located  on image 44 measuring 1.5 x 0.8 cm. These demonstrated metabolic activity on the  previous PET/CT exam and this is suspicious for metastatic adenopathy. The right paratracheal lymph node has decreased in size. There are atherosclerotic vascular  calcifications including involvement of the coronary arteries. The heart size is normal. There is a trace pericardial effusion. There are calcified gallstones. There is partially imaged left renal hydronephrosis which was apparent on the previous CT  study as well as the PET/CT exam. The adrenal glands are normal. There are no suspicious osteolytic or sclerotic lesions within the bony thorax. There are underlying degenerative changes. There is a right-sided port in place.    Impression  Impression:    1. There has been interval placement of right Pleurx catheter with the tip terminating in the right posterior costophrenic sulcus. The patient has a small to moderate right pleural effusion.  2. Area of masslike consolidation in the posterior basilar segment of the right lower lobe somewhat difficult to quantitate in size. It appears reduced in size from the previous CT of the chest suggesting a response to therapy.  3. Patchy densities in the superior segment of the right lower lobe probably representing improving pneumonitis.  4. Persistent interstitial thickening throughout the right lung. This could be due to unilateral pulmonary edema or lymphangitic spread of tumor. There is also asymmetric bronchial wall thickening in the right lung.  5. Small nodules in the superior segment of the left lower lobe. These were present on the previous exam and are probably infectious/inflammatory.  6. Enlarged right hilar and mediastinal lymph nodes. These were metabolically active on the previous PET/CT exam and suspicious for metastatic disease. The right paratracheal lymph node has decreased in size.  7. Emphysema.  8. Cholelithiasis and partially imaged left renal hydronephrosis.        Electronically Signed: Misael Palacios MD  11/4/2024 8:35 PM EST  Workstation ID:  [id number]      Lab Review:   Hospital Outpatient Visit on 11/21/2024   Component Date Value    Albumin 11/21/2024 2.7 (L)     Alkaline Phosphatase 11/21/2024 73     ALT (SGPT) 11/21/2024 24     AST (SGOT) 11/21/2024 34     BUN 11/21/2024 22     Calcium, POC 11/21/2024 8.7     Chloride 11/21/2024 105     Creatinine 11/21/2024 1.20     Glucose 11/21/2024 235 (H)     POC Potassium 11/21/2024 4.3     Sodium 11/21/2024 142     Total Bilirubin 11/21/2024 0.6     Total CO2 11/21/2024 24     Total Protein 11/21/2024 6.2 (L)     eGFR 11/21/2024 63.5     WBC 11/21/2024 10.40     RBC 11/21/2024 2.79 (L)     Hemoglobin 11/21/2024 9.3 (L)     Hematocrit 11/21/2024 28.8 (L)     MCV 11/21/2024 103.2 (H)     MCH 11/21/2024 33.3 (H)     MCHC 11/21/2024 32.3     RDW 11/21/2024 19.1 (H)     RDW-SD 11/21/2024 68.5 (H)     MPV 11/21/2024 8.4     Platelets 11/21/2024 433     Neutrophil % 11/21/2024 87.7 (H)     Lymphocyte % 11/21/2024 6.9 (L)     Monocyte % 11/21/2024 5.1     Eosinophil % 11/21/2024 0.1 (L)     Basophil % 11/21/2024 0.2     Neutrophils, Absolute 11/21/2024 9.12 (H)     Lymphocytes, Absolute 11/21/2024 0.72     Monocytes, Absolute 11/21/2024 0.53     Eosinophils, Absolute 11/21/2024 0.01     Basophils, Absolute 11/21/2024 0.02    Hospital Outpatient Visit on 11/11/2024   Component Date Value    Glucose 11/11/2024 120 (H)     BUN 11/11/2024 16     Creatinine 11/11/2024 1.20     Sodium 11/11/2024 138     Potassium 11/11/2024 4.3     Chloride 11/11/2024 106     CO2 11/11/2024 22.3     Calcium 11/11/2024 8.7     Total Protein 11/11/2024 6.0     Albumin 11/11/2024 3.4 (L)     ALT (SGPT) 11/11/2024 31     AST (SGOT) 11/11/2024 31     Alkaline Phosphatase 11/11/2024 65     Total Bilirubin 11/11/2024 0.3     Globulin 11/11/2024 2.6     A/G Ratio 11/11/2024 1.3     BUN/Creatinine Ratio 11/11/2024 13.3     Anion Gap 11/11/2024 9.7     eGFR 11/11/2024 63.5     WBC 11/11/2024 3.47     RBC 11/11/2024 2.74 (L)     Hemoglobin 11/11/2024  8.8 (L)     Hematocrit 11/11/2024 27.3 (L)     MCV 11/11/2024 99.6 (H)     MCH 11/11/2024 32.1     MCHC 11/11/2024 32.2     RDW 11/11/2024 16.9 (H)     RDW-SD 11/11/2024 57.0 (H)     MPV 11/11/2024 9.7     Platelets 11/11/2024 126 (L)     Neutrophil % 11/11/2024 45.9     Lymphocyte % 11/11/2024 28.5     Monocyte % 11/11/2024 23.6 (H)     Eosinophil % 11/11/2024 1.7     Basophil % 11/11/2024 0.3     Neutrophils, Absolute 11/11/2024 1.59 (L)     Lymphocytes, Absolute 11/11/2024 0.99     Monocytes, Absolute 11/11/2024 0.82     Eosinophils, Absolute 11/11/2024 0.06     Basophils, Absolute 11/11/2024 0.01    Hospital Outpatient Visit on 10/31/2024   Component Date Value    TSH 10/31/2024 0.473     Free T4 10/31/2024 1.39     WBC 10/31/2024 11.62 (H)     RBC 10/31/2024 2.99 (L)     Hemoglobin 10/31/2024 9.7 (L)     Hematocrit 10/31/2024 29.8 (L)     MCV 10/31/2024 99.7 (H)     MCH 10/31/2024 32.4     MCHC 10/31/2024 32.6     RDW 10/31/2024 18.7 (H)     RDW-SD 10/31/2024 62.7 (H)     MPV 10/31/2024 8.5     Platelets 10/31/2024 400     Neutrophil % 10/31/2024 86.3 (H)     Lymphocyte % 10/31/2024 8.3 (L)     Monocyte % 10/31/2024 5.1     Eosinophil % 10/31/2024 0.1 (L)     Basophil % 10/31/2024 0.2     Neutrophils, Absolute 10/31/2024 10.04 (H)     Lymphocytes, Absolute 10/31/2024 0.96     Monocytes, Absolute 10/31/2024 0.59     Eosinophils, Absolute 10/31/2024 0.01     Basophils, Absolute 10/31/2024 0.02     Glucose 10/31/2024 236 (H)     BUN 10/31/2024 24 (H)     Creatinine 10/31/2024 1.36 (H)     Sodium 10/31/2024 137     Potassium 10/31/2024 4.5     Chloride 10/31/2024 105     CO2 10/31/2024 21.2 (L)     Calcium 10/31/2024 9.0     Total Protein 10/31/2024 5.8 (L)     Albumin 10/31/2024 3.2 (L)     ALT (SGPT) 10/31/2024 18     AST (SGOT) 10/31/2024 29     Alkaline Phosphatase 10/31/2024 74     Total Bilirubin 10/31/2024 0.2     Globulin 10/31/2024 2.6     A/G Ratio 10/31/2024 1.2     BUN/Creatinine Ratio 10/31/2024 17.6      Anion Gap 10/31/2024 10.8     eGFR 10/31/2024 54.6 (L)    Hospital Outpatient Visit on 10/14/2024   Component Date Value    Glucose 10/14/2024 100 (H)     BUN 10/14/2024 28 (H)     Creatinine 10/14/2024 1.27     Sodium 10/14/2024 139     Potassium 10/14/2024 3.9     Chloride 10/14/2024 105     CO2 10/14/2024 26.7     Calcium 10/14/2024 8.3 (L)     Total Protein 10/14/2024 5.2 (L)     Albumin 10/14/2024 3.1 (L)     ALT (SGPT) 10/14/2024 18     AST (SGOT) 10/14/2024 23     Alkaline Phosphatase 10/14/2024 59     Total Bilirubin 10/14/2024 0.4     Globulin 10/14/2024 2.1     A/G Ratio 10/14/2024 1.5     BUN/Creatinine Ratio 10/14/2024 22.0     Anion Gap 10/14/2024 7.3     eGFR 10/14/2024 59.3 (L)     WBC 10/14/2024 7.78     RBC 10/14/2024 3.64 (L)     Hemoglobin 10/14/2024 11.6 (L)     Hematocrit 10/14/2024 35.4 (L)     MCV 10/14/2024 97.3 (H)     MCH 10/14/2024 31.9     MCHC 10/14/2024 32.8     RDW 10/14/2024 15.2     RDW-SD 10/14/2024 49.2     MPV 10/14/2024 8.6     Platelets 10/14/2024 330     Neutrophil % 10/14/2024 79.6 (H)     Lymphocyte % 10/14/2024 14.1 (L)     Monocyte % 10/14/2024 1.7 (L)     Eosinophil % 10/14/2024 4.5     Basophil % 10/14/2024 0.1     Neutrophils, Absolute 10/14/2024 6.19     Lymphocytes, Absolute 10/14/2024 1.10     Monocytes, Absolute 10/14/2024 0.13     Eosinophils, Absolute 10/14/2024 0.35     Basophils, Absolute 10/14/2024 0.01    Hospital Outpatient Visit on 10/10/2024   Component Date Value    Albumin 10/10/2024 2.5 (L)     Alkaline Phosphatase 10/10/2024 71     ALT (SGPT) 10/10/2024 20     AST (SGOT) 10/10/2024 24     BUN 10/10/2024 17     Calcium, POC 10/10/2024 9.0     Chloride 10/10/2024 105     Creatinine 10/10/2024 1.40 (H)     Glucose 10/10/2024 240 (H)     POC Potassium 10/10/2024 4.2     Sodium 10/10/2024 143     Total Bilirubin 10/10/2024 0.5     Total CO2 10/10/2024 24     Total Protein 10/10/2024 5.8 (L)     eGFR 10/10/2024 52.7 (L)     WBC 10/10/2024 11.54 (H)     RBC  10/10/2024 3.59 (L)     Hemoglobin 10/10/2024 11.5 (L)     Hematocrit 10/10/2024 34.5 (L)     MCV 10/10/2024 96.1     MCH 10/10/2024 32.0     MCHC 10/10/2024 33.3     RDW 10/10/2024 14.7     RDW-SD 10/10/2024 43.6     MPV 10/10/2024 8.7     Platelets 10/10/2024 533 (H)     Neutrophil % 10/10/2024 87.3 (H)     Lymphocyte % 10/10/2024 8.1 (L)     Monocyte % 10/10/2024 4.4 (L)     Eosinophil % 10/10/2024 0.0 (L)     Basophil % 10/10/2024 0.2     Neutrophils, Absolute 10/10/2024 10.08 (H)     Lymphocytes, Absolute 10/10/2024 0.93     Monocytes, Absolute 10/10/2024 0.51     Eosinophils, Absolute 10/10/2024 0.00     Basophils, Absolute 10/10/2024 0.02    Hospital Outpatient Visit on 09/19/2024   Component Date Value    Protime 09/19/2024 11.9 (H)     INR 09/19/2024 1.10     PTT 09/19/2024 28.5     WBC 09/19/2024 15.43 (H)     RBC 09/19/2024 4.49     Hemoglobin 09/19/2024 13.8     Hematocrit 09/19/2024 42.0     MCV 09/19/2024 93.5     MCH 09/19/2024 30.7     MCHC 09/19/2024 32.9     RDW 09/19/2024 13.3     RDW-SD 09/19/2024 45.6     MPV 09/19/2024 9.2     Platelets 09/19/2024 273     Neutrophil % 09/19/2024 91.4 (H)     Lymphocyte % 09/19/2024 6.2 (L)     Monocyte % 09/19/2024 1.6 (L)     Eosinophil % 09/19/2024 0.1 (L)     Basophil % 09/19/2024 0.1     Immature Grans % 09/19/2024 0.6 (H)     Neutrophils, Absolute 09/19/2024 14.09 (H)     Lymphocytes, Absolute 09/19/2024 0.96     Monocytes, Absolute 09/19/2024 0.25     Eosinophils, Absolute 09/19/2024 0.01     Basophils, Absolute 09/19/2024 0.02     Immature Grans, Absolute 09/19/2024 0.10 (H)     nRBC 09/19/2024 0.0    Hospital Outpatient Visit on 09/19/2024   Component Date Value    Albumin 09/19/2024 2.9 (L)     Alkaline Phosphatase 09/19/2024 67     ALT (SGPT) 09/19/2024 18     AST (SGOT) 09/19/2024 27     BUN 09/19/2024 25 (H)     Calcium, POC 09/19/2024 8.7     Chloride 09/19/2024 102     Creatinine 09/19/2024 1.30 (H)     Glucose 09/19/2024 261 (H)     POC Potassium  09/19/2024 3.9     Sodium 09/19/2024 140     Total Bilirubin 09/19/2024 0.4     Total CO2 09/19/2024 25     Total Protein 09/19/2024 5.8 (L)     eGFR 09/19/2024 57.6 (L)     TSH 09/19/2024 0.616     Free T4 09/19/2024 1.92 (H)     WBC 09/19/2024 21.20 (H)     RBC 09/19/2024 4.47     Hemoglobin 09/19/2024 14.3     Hematocrit 09/19/2024 42.9     MCV 09/19/2024 96.0     MCH 09/19/2024 32.0     MCHC 09/19/2024 33.3     RDW 09/19/2024 13.9     RDW-SD 09/19/2024 47.4     MPV 09/19/2024 9.6     Platelets 09/19/2024 287     Neutrophil % 09/19/2024 92.9 (H)     Lymphocyte % 09/19/2024 4.3 (L)     Monocyte % 09/19/2024 2.7 (L)     Eosinophil % 09/19/2024 0.0 (L)     Basophil % 09/19/2024 0.1     Neutrophils, Absolute 09/19/2024 19.67 (H)     Lymphocytes, Absolute 09/19/2024 0.92     Monocytes, Absolute 09/19/2024 0.58     Eosinophils, Absolute 09/19/2024 0.01     Basophils, Absolute 09/19/2024 0.02    Hospital Outpatient Visit on 09/12/2024   Component Date Value    Protime 09/12/2024 11.2     INR 09/12/2024 1.03     PTT 09/12/2024 26.7     WBC 09/12/2024 11.60 (H)     RBC 09/12/2024 4.78     Hemoglobin 09/12/2024 14.5     Hematocrit 09/12/2024 45.4     MCV 09/12/2024 95.0     MCH 09/12/2024 30.3     MCHC 09/12/2024 31.9     RDW 09/12/2024 13.9     RDW-SD 09/12/2024 48.7     MPV 09/12/2024 9.3     Platelets 09/12/2024 248     Neutrophil % 09/12/2024 72.6     Lymphocyte % 09/12/2024 10.4 (L)     Monocyte % 09/12/2024 7.3     Eosinophil % 09/12/2024 8.6 (H)     Basophil % 09/12/2024 0.6     Immature Grans % 09/12/2024 0.5     Neutrophils, Absolute 09/12/2024 8.41 (H)     Lymphocytes, Absolute 09/12/2024 1.21     Monocytes, Absolute 09/12/2024 0.85     Eosinophils, Absolute 09/12/2024 1.00 (H)     Basophils, Absolute 09/12/2024 0.07     Immature Grans, Absolute 09/12/2024 0.06 (H)     nRBC 09/12/2024 0.0    Appointment on 09/06/2024   Component Date Value    WBC 09/06/2024 13.01 (H)     RBC 09/06/2024 4.82     Hemoglobin  09/06/2024 15.2     Hematocrit 09/06/2024 46.0     MCV 09/06/2024 95.4     MCH 09/06/2024 31.5     MCHC 09/06/2024 33.0     RDW 09/06/2024 14.1     RDW-SD 09/06/2024 47.6     MPV 09/06/2024 9.6     Platelets 09/06/2024 274     Neutrophil % 09/06/2024 79.7 (H)     Lymphocyte % 09/06/2024 10.4 (L)     Monocyte % 09/06/2024 5.5     Eosinophil % 09/06/2024 3.9     Basophil % 09/06/2024 0.5     Neutrophils, Absolute 09/06/2024 10.38 (H)     Lymphocytes, Absolute 09/06/2024 1.35     Monocytes, Absolute 09/06/2024 0.71     Eosinophils, Absolute 09/06/2024 0.51 (H)     Basophils, Absolute 09/06/2024 0.06    Office Visit on 09/06/2024   Component Date Value    Reason for Study 09/06/2024 To identify somatic and germline mutations relevant to patient's cancer.     Genetic Diseases Assessed 09/06/2024 Cancer     Description of Ranges of* 09/06/2024 648 gene panel     Overall Interpretation 09/06/2024 positive     MSI 09/06/2024 Stable     TMB 09/06/2024 2.1     [facility] Portal 09/06/2024 [email or web address]     PD-L1 Interpretation by * 09/06/2024 positive     PD-L1 (22C3) Combined Po* 09/06/2024 2     PD-L1 (22C3) Tumor Propo* 09/06/2024 2     Pertinent Negatives 09/06/2024 EGFR, BRAF, ALK, ROS1, RET, MET, ERBB2 (HER2)     Low Coverage Regions 09/06/2024 EPHB2     Therapy Count 09/06/2024 1     [facility]: Potential Therap* 09/06/2024                      Value:Gene: [id number]^KRAS^HGNC  Variant: p.G12A  Match Type: snvIndel  Match Type Description: KRAS p.G12A  Drug Class: Unfavorable prognosis  Tissue: Non-Small Cell Lung Cancer  Evidence Status: Consensus  Evidence ID: NCCN  KDB Variant: Gain-of-function  Label: Additional Indicators  FDA Approved?: No  On label?: No    Trial Count 09/06/2024 3     Trial 1: Matched criteria 09/06/2024                      Value:Clinical Trial NCT ID: [id number]  Clinical Trial Title:  Testing the Combination of the Anti-cancer Drugs [id number] ([id number]) and Talazoparib in Patients With Advanced Solid Tumors, The ComBET Trial  Clinical Trial URL: https://clinicaltrials.gov/ct2/show/GFV38122722  Clinical Phase: Phase 2  Clinical Trial Matches: KRAS p.G12A mutation  Clinical Trial Distance and Location: [address], [place]    Trial 2: Matched criteria 09/06/2024                      Value:Clinical Trial NCT ID: [id number]  Clinical Trial Title: Study of [id number] in Patients With Advanced Solid Tumors Harboring Specific Mutations in [place]  Clinical Trial URL: https://clinicaltrials.gov/ct2/show/PGZ82214417  Clinical Phase: Phase 1  Clinical Trial Matches: KRAS p.G12A mutation  Clinical Trial Distance and Location: [address] [place], [place]    Trial 3: Matched criteria 09/06/2024                      Value:Clinical Trial NCT ID: [id number]  Clinical Trial Title: A Study to Investigate the Safety and Efficacy of [id number] as Monotherapy and in Combination in Participants With Advanced Solid Malignancies  Clinical Trial URL: https://clinicaltrials.gov/ct2/show/NUU08173152  Clinical Phase: Phase 1  Clinical Trial Matches: KRAS p.G12A mutation  Clinical Trial Distance and Location: [address] [place] [address]    Germline Variant Note 09/06/2024 No normal sample was received, therefore tumor/normal matched analysis was not performed.     MRSA PCR 09/12/2024 No MRSA Detected    There may be more visits with results that are not included.     Recent labs reviewed and interpreted for clinical significance and application            Level of Care:           Carlos Pavon MD  12/03/24  .

## 2024-12-04 ENCOUNTER — HOME CARE VISIT (OUTPATIENT)
Dept: HOME HEALTH SERVICES | Facility: HOME HEALTHCARE | Age: 74
End: 2024-12-04
Payer: MEDICARE

## 2024-12-05 ENCOUNTER — HOME CARE VISIT (OUTPATIENT)
Dept: HOME HEALTH SERVICES | Facility: HOME HEALTHCARE | Age: 74
End: 2024-12-05
Payer: MEDICARE

## 2024-12-05 VITALS
OXYGEN SATURATION: 100 % | RESPIRATION RATE: 17 BRPM | DIASTOLIC BLOOD PRESSURE: 64 MMHG | HEART RATE: 63 BPM | TEMPERATURE: 98.7 F | SYSTOLIC BLOOD PRESSURE: 118 MMHG

## 2024-12-05 PROCEDURE — G0299 HHS/HOSPICE OF RN EA 15 MIN: HCPCS

## 2024-12-09 ENCOUNTER — HOME CARE VISIT (OUTPATIENT)
Dept: HOME HEALTH SERVICES | Facility: HOME HEALTHCARE | Age: 74
End: 2024-12-09
Payer: MEDICARE

## 2024-12-09 VITALS
SYSTOLIC BLOOD PRESSURE: 116 MMHG | TEMPERATURE: 97.9 F | OXYGEN SATURATION: 98 % | DIASTOLIC BLOOD PRESSURE: 64 MMHG | RESPIRATION RATE: 18 BRPM | HEART RATE: 77 BPM

## 2024-12-09 PROCEDURE — G0299 HHS/HOSPICE OF RN EA 15 MIN: HCPCS

## 2024-12-11 ENCOUNTER — HOME CARE VISIT (OUTPATIENT)
Dept: HOME HEALTH SERVICES | Facility: HOME HEALTHCARE | Age: 74
End: 2024-12-11
Payer: MEDICARE

## 2024-12-12 ENCOUNTER — HOSPITAL ENCOUNTER (OUTPATIENT)
Dept: ONCOLOGY | Facility: HOSPITAL | Age: 74
Discharge: HOME | End: 2024-12-12
Payer: MEDICARE

## 2024-12-12 ENCOUNTER — HOSPITAL ENCOUNTER (OUTPATIENT)
Dept: GENERAL RADIOLOGY | Facility: HOSPITAL | Age: 74
Discharge: HOME | End: 2024-12-12
Admitting: INTERNAL MEDICINE
Payer: MEDICARE

## 2024-12-12 VITALS
HEIGHT: 70 IN | BODY MASS INDEX: 25.2 KG/M2 | DIASTOLIC BLOOD PRESSURE: 73 MMHG | WEIGHT: 176 LBS | OXYGEN SATURATION: 97 % | TEMPERATURE: 97.4 F | SYSTOLIC BLOOD PRESSURE: 121 MMHG | RESPIRATION RATE: 16 BRPM | HEART RATE: 87 BPM

## 2024-12-12 DIAGNOSIS — C34.92 STAGE IV ADENOCARCINOMA OF LUNG, LEFT: Primary | ICD-10-CM

## 2024-12-12 DIAGNOSIS — C34.91 STAGE IV ADENOCARCINOMA OF LUNG, RIGHT: ICD-10-CM

## 2024-12-12 DIAGNOSIS — R50.9 LOW GRADE FEVER: ICD-10-CM

## 2024-12-12 DIAGNOSIS — C34.92 STAGE IV ADENOCARCINOMA OF LUNG, LEFT: ICD-10-CM

## 2024-12-12 DIAGNOSIS — J34.89 NASAL DRAINAGE: ICD-10-CM

## 2024-12-12 LAB
ALP BLD-CCNC: 68 U/L (ref 53–128)
BASOPHILS # BLD AUTO: 0.02 10*3/MM3 (ref 0–0.2)
BASOPHILS NFR BLD AUTO: 0.3 % (ref 0–1.5)
BILIRUB UR QL STRIP: NEGATIVE
BUN BLDA-MCNC: 19 MG/DL (ref 7–22)
CALCIUM BLD QL: 9.1 MG/DL (ref 8–10.3)
CHLORIDE BLDA-SCNC: 105 MMOL/L (ref 98–108)
CLARITY UR: CLEAR
CO2 BLDA-SCNC: 26 MMOL/L (ref 18–33)
COLOR UR: YELLOW
CREAT BLDA-MCNC: 1.2 MG/DL (ref 0.6–1.2)
DEPRECATED RDW RBC AUTO: 62.3 FL (ref 37–54)
EGFRCR SERPLBLD CKD-EPI 2021: 63.5 ML/MIN/1.73
EOSINOPHIL # BLD AUTO: 0.29 10*3/MM3 (ref 0–0.4)
EOSINOPHIL NFR BLD AUTO: 4.8 % (ref 0.3–6.2)
ERYTHROCYTE [DISTWIDTH] IN BLOOD BY AUTOMATED COUNT: 17.3 % (ref 12.3–15.4)
GLUCOSE BLDC GLUCOMTR-MCNC: 103 MG/DL (ref 73–118)
GLUCOSE UR STRIP-MCNC: NEGATIVE MG/DL
HCT VFR BLD AUTO: 26 % (ref 37.5–51)
HGB BLD-MCNC: 8.4 G/DL (ref 13–17.7)
HGB UR QL STRIP.AUTO: NEGATIVE
KETONES UR QL STRIP: NEGATIVE
LEUKOCYTE ESTERASE UR QL STRIP.AUTO: NEGATIVE
LYMPHOCYTES # BLD AUTO: 1.32 10*3/MM3 (ref 0.7–3.1)
LYMPHOCYTES NFR BLD AUTO: 22 % (ref 19.6–45.3)
MCH RBC QN AUTO: 33.3 PG (ref 26.6–33)
MCHC RBC AUTO-ENTMCNC: 32.3 G/DL (ref 31.5–35.7)
MCV RBC AUTO: 103.2 FL (ref 79–97)
MONOCYTES # BLD AUTO: 1.44 10*3/MM3 (ref 0.1–0.9)
MONOCYTES NFR BLD AUTO: 24 % (ref 5–12)
NEUTROPHILS NFR BLD AUTO: 2.93 10*3/MM3 (ref 1.7–7)
NEUTROPHILS NFR BLD AUTO: 48.9 % (ref 42.7–76)
NITRITE UR QL STRIP: NEGATIVE
PH UR STRIP.AUTO: 7 [PH] (ref 5–8)
PLATELET # BLD AUTO: 382 10*3/MM3 (ref 140–450)
PMV BLD AUTO: 8.6 FL (ref 6–12)
POC ALBUMIN: 2.9 G/L (ref 3.3–5.5)
POC ALT (SGPT): 14 U/L (ref 10–47)
POC AST (SGOT): 38 U/L (ref 11–38)
POC TOTAL BILIRUBIN: 0.6 MG/DL (ref 0.2–1.6)
POC TOTAL PROTEIN: 6.5 G/DL (ref 6.4–8.1)
POTASSIUM BLDA-SCNC: 4 MMOL/L (ref 3.6–5.1)
PROT UR QL STRIP: NEGATIVE
RBC # BLD AUTO: 2.52 10*6/MM3 (ref 4.14–5.8)
SODIUM BLD-SCNC: 143 MMOL/L (ref 128–145)
SP GR UR STRIP: 1.01 (ref 1–1.03)
T4 FREE SERPL-MCNC: 3.07 NG/DL (ref 0.93–1.7)
TSH SERPL DL<=0.05 MIU/L-ACNC: 0.02 UIU/ML (ref 0.27–4.2)
UROBILINOGEN UR QL STRIP: NORMAL
WBC NRBC COR # BLD AUTO: 6 10*3/MM3 (ref 3.4–10.8)

## 2024-12-12 PROCEDURE — 96413 CHEMO IV INFUSION 1 HR: CPT

## 2024-12-12 PROCEDURE — 84443 ASSAY THYROID STIM HORMONE: CPT | Performed by: INTERNAL MEDICINE

## 2024-12-12 PROCEDURE — 80053 COMPREHEN METABOLIC PANEL: CPT

## 2024-12-12 PROCEDURE — 84439 ASSAY OF FREE THYROXINE: CPT | Performed by: INTERNAL MEDICINE

## 2024-12-12 PROCEDURE — 81003 URINALYSIS AUTO W/O SCOPE: CPT | Performed by: INTERNAL MEDICINE

## 2024-12-12 PROCEDURE — 85025 COMPLETE CBC W/AUTO DIFF WBC: CPT | Performed by: INTERNAL MEDICINE

## 2024-12-12 PROCEDURE — 71046 X-RAY EXAM CHEST 2 VIEWS: CPT

## 2024-12-12 PROCEDURE — 25010000002 HEPARIN LOCK FLUSH PER 10 UNITS: Performed by: INTERNAL MEDICINE

## 2024-12-12 PROCEDURE — 25010000002 PEMBROLIZUMAB 100 MG/4ML SOLUTION 4 ML VIAL: Performed by: INTERNAL MEDICINE

## 2024-12-12 RX ORDER — HEPARIN SODIUM (PORCINE) LOCK FLUSH IV SOLN 100 UNIT/ML 100 UNIT/ML
500 SOLUTION INTRAVENOUS AS NEEDED
Status: DISCONTINUED | OUTPATIENT
Start: 2024-12-12 | End: 2024-12-13 | Stop reason: HOSPADM

## 2024-12-12 RX ORDER — SODIUM CHLORIDE 0.9 % (FLUSH) 0.9 %
10 SYRINGE (ML) INJECTION AS NEEDED
Status: DISCONTINUED | OUTPATIENT
Start: 2024-12-12 | End: 2024-12-13 | Stop reason: HOSPADM

## 2024-12-12 RX ORDER — SODIUM CHLORIDE 9 MG/ML
20 INJECTION, SOLUTION INTRAVENOUS ONCE
Status: CANCELLED | OUTPATIENT
Start: 2024-12-12

## 2024-12-12 RX ORDER — SODIUM CHLORIDE 0.9 % (FLUSH) 0.9 %
10 SYRINGE (ML) INJECTION AS NEEDED
OUTPATIENT
Start: 2024-12-12

## 2024-12-12 RX ORDER — HEPARIN SODIUM (PORCINE) LOCK FLUSH IV SOLN 100 UNIT/ML 100 UNIT/ML
500 SOLUTION INTRAVENOUS AS NEEDED
OUTPATIENT
Start: 2024-12-12

## 2024-12-12 RX ORDER — SODIUM CHLORIDE 9 MG/ML
20 INJECTION, SOLUTION INTRAVENOUS ONCE
Status: DISCONTINUED | OUTPATIENT
Start: 2024-12-12 | End: 2024-12-13 | Stop reason: HOSPADM

## 2024-12-12 RX ADMIN — HEPARIN 500 UNITS: 100 SYRINGE at 13:25

## 2024-12-12 RX ADMIN — Medication 10 ML: at 13:25

## 2024-12-12 RX ADMIN — SODIUM CHLORIDE 200 MG: 9 INJECTION, SOLUTION INTRAVENOUS at 12:54

## 2024-12-12 NOTE — PROGRESS NOTES
"Patient is here for C1D1 of his Keytruda only. Labs within parameters, hemoglobin is 8.4 today. Patient has no complaints at this time. His wife is concerned because he has had a \"low grade fever at home, temps of 99.0-100.6\". Patient denies cough, SOB. He states he's had a runny nose and it is clear. Per Dr. Tim, okay to treat today. Get a UA and send for chest xray. Treatment given and tolerated, UA collected. Patient has next apts and sent to [place] for chest xray.  "

## 2024-12-13 ENCOUNTER — HOME CARE VISIT (OUTPATIENT)
Dept: HOME HEALTH SERVICES | Facility: HOME HEALTHCARE | Age: 74
End: 2024-12-13
Payer: MEDICARE

## 2024-12-13 VITALS
DIASTOLIC BLOOD PRESSURE: 64 MMHG | HEART RATE: 78 BPM | SYSTOLIC BLOOD PRESSURE: 110 MMHG | WEIGHT: 176 LBS | OXYGEN SATURATION: 98 % | BODY MASS INDEX: 25.25 KG/M2 | RESPIRATION RATE: 19 BRPM | TEMPERATURE: 97 F

## 2024-12-13 PROCEDURE — G0299 HHS/HOSPICE OF RN EA 15 MIN: HCPCS

## 2024-12-19 ENCOUNTER — HOME CARE VISIT (OUTPATIENT)
Dept: HOME HEALTH SERVICES | Facility: HOME HEALTHCARE | Age: 74
End: 2024-12-19
Payer: MEDICARE

## 2024-12-19 VITALS
DIASTOLIC BLOOD PRESSURE: 58 MMHG | SYSTOLIC BLOOD PRESSURE: 112 MMHG | TEMPERATURE: 98.2 F | OXYGEN SATURATION: 98 % | HEART RATE: 94 BPM | RESPIRATION RATE: 17 BRPM

## 2024-12-19 PROCEDURE — G0299 HHS/HOSPICE OF RN EA 15 MIN: HCPCS

## 2024-12-19 NOTE — HOME HEALTH
Routine Visit Note: Medications reviewed. Appetite fair; patient still has compliants of food tasting salty. No issues with elimination. CP assessed.     Skill/education provided: Vital signs stable. PleurX catheter drained; patient tolerated well. No output noted. During the procedure SN encouraged patient to deep breath and big cough; no output. Cardiopulmonary assessment completed. Patient has complaitns of productive cough and SOA upon exertion. Patient denies falls and pain. Education provided about increasing fluid intake, deep breathing and high risk medications. No edema present.     Patient/caregiver response: Patient verbilized understanding.     Plan for next visit: Vital signs, review medications, cardiopulmonary assessment, assess falls and pain, cp assess, pleurX catheter drain    Other pertinent info: na

## 2024-12-19 NOTE — PROGRESS NOTES
HEMATOLOGY ONCOLOGY OUTPATIENT FOLLOW UP       Patient name: Young Ames  : 1950  MRN: [id number]  Primary Care Physician: Abner Funes APRN  Referring Physician: Abner Funes APRN  Reason For Consult: Adenocarcinoma of the right lung.     History of Present Illness:    2024: Mr. Ames first came to [facility], [place], operated by [facility] complaining of dyspnea. This had been getting worse over a short period of time. It was associated to unintended weight loss of approximately 15 lbs in around one month. He was diagnosed with pneumonia in 2024 he was treated with azithromycin and with amoxicillin/clavulanate, despite which he did not seem to recover completely. CT scan had shown dense consolidation of the right lower lobe and there was at some point suggestion of a cavitary lesion. 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 Following this a repeat scan showed a questionable 5.5 cm right lower lobe cavitary lesion. A bronchoscopy and biopsy on 2024 confirmed adenocarcinoma of the right lung. He feels reasonably well at this time. He has been breathing better, though he persists with dyspnea or exertion. He has been afebrile. He continues to cough but not more than before. He has not had abdominal pain and denies diarrhea or dysuria. On exam alert and conversant. Oriented and in no distress. No jaundice. No oral lesions and respirations are not labored. Lungs diminished bilaterally and absent on the right lower lobe. The abdomen is soft. No edema. Reviewed the images of the scans. Reviewed the laboratory exams. Discussed with him and his family. To proceed with a PET scan. Will not obtain  pulmonary function tests, as most likely he has had recurrence of a significant right pleural effusion. He is to see me with results.     2024: Feeling about the same as at the time of the last visit but has had  more dyspnea.  He was placed on new medication in spite of which, intermittently, he continues to be uncomfortable.  He is eating reasonably well and has had no weight loss.  He is also afebrile.  He denies chest pains.  No abdominal pain.  On exam he is conversant and oriented.  No distress.  No jaundice.  The lungs are diminished bilaterally but there is absence of breath sounds and a cough when he in the lower parts of the right chest.  The abdomen is soft.  There is no edema.  Laboratory exams and final reports of pathology were reviewed and discussed with him.  Discussed with him the stage of the disease, which corresponds to 4, given the pleural involvement.  To start chemotherapy and immunotherapy.  I have requested next-generation sequencing.  Port as soon as available.    9/29/2024: Feels reasonably well today.  Has had less dyspnea since the insertion of the pleural catheter as he has been able to have the fluid drained at home more frequently.  Yesterday, for the first time, he had persistent right-sided chest pain after the drainage of the fluid that is now resolved.  He continues to be reasonably active.  He continues to eat reasonably well and has had very minimal nausea.  He took the first chemotherapy without any complications.  He denies chest pain and he has less dyspnea than before.  No abdominal pain and no edema.  On exam chronically ill-appearing but in no distress.  No jaundice.  The lungs are diminished bilaterally with more pronounced reduction of the breath sounds in the right.  The heart is regular.  The abdomen is soft.  There is no edema.  Laboratory exams reviewed.  Discussed with him.  Continue same therapy for now.  I have sent a prescription for tramadol that he can take as needed for pain, when present.    10/14/2024: Breathing better.  Progressively finding less fluid in the thoracic cavity.  Is able to do more activity.  Has had enuresis once.  Also has noted tremors.  He is  eating well.  No nausea or vomiting.  No chest pains.  No abdominal pain.  On exam he does not seem in any distress.  He is conversant and well-oriented.  No jaundice or pallor.  Lungs symmetrically ventilated without any dullness to percussion on the right.  Abdomen soft.  No edema.  Laboratory exams reviewed.  Discussed with him.  My suspicion is that he is no longer producing as much pleural fluid as before and that perhaps today they will not be able to drain much.  Will obtain a scan and will see with results.  Continue for now.  I am not sure what the cause of the tremors and the enuresis is.    11/11/2024: Feeling well.  Continues to tolerate the treatment without difficulties.  He has no more dyspnea or pain than before.  He eats well and has had no nausea or vomiting.  As well his weight is stable.  Denies diarrhea or dysuria.  On exam he does not seem ill.  He is not jaundiced.  The lungs are diminished bilaterally but symmetrically.  Heart is regular.  The abdomen is soft.  There is no edema.  Laboratory exams reviewed.  Reviewed the images and the report of the scans.  There are some suggestion of response as the lymph nodes and the primary tumor have all decreased in size though it is difficult to tell if the primary lesion has decreased given the associated opacity surrounding it.  For now continue same treatment.  Treatment with immunotherapy after he has completed 4 cycles of chemoimmunotherapy.  See me in approximately 6 weeks.  Treatment plan placed.    12/23/2024: Received the first cycle of immunotherapy without any difficulties.  Has had some episodes of fever since the last visit.  Sometimes as high as 101.3.  He has no symptoms associated to this and often is not even aware that he has a fever.  He is using a forehead thermometer that seems to be good working order.  He is energetic in general.  He has no new limitations.  He has recently hurt a hip.  He is eating reasonably well though he  persists with marked dysgeusia.  No chest pains or cough.  He has not had much drainage from the Pleurx catheter lately in the last 3 times they were not able to drain any fluid.  He has no abdominal pain, diarrhea or dysuria.  On exam alert and conversant.  Oriented and in no distress.  No jaundice.  The lungs are diminished bilaterally but in a symmetrical fashion.  I do not believe there is any fluid at this time in the right pleural space.  Heart is regular.  Abdomen is soft.  There is no edema.  Laboratory exams reviewed and discussed with him.  To remove the Pleurx catheter.  Ideally to send the tip for culture.  He is also going to have blood cultures from the port and from a peripheral vein.  He will see me in a few weeks.  Continue immunotherapy for now.    Past Medical History:   Diagnosis Date    Coronary artery disease     Myocardial infarction 01/31/2024     Past Surgical History:   Procedure Laterality Date    BRONCHOSCOPY N/A 8/13/2024    Procedure: BRONCHOSCOPY WITH BRONCHIAL WASHING AND BRONCHIAL BRUSHING;  Surgeon: Kelvin Gonzalez MD;  Location: [facility] ENDOSCOPY;  Service: Pulmonary;  Laterality: N/A;    BRONCHOSCOPY WITH ION ROBOTIC ASSIST N/A 8/16/2024    Procedure: BRONCHOSCOPY WITH ION ROBOT WITH CRYO BIOPSY;  Surgeon: Kelvin Gonzalez MD;  Location: [facility] ENDOSCOPY;  Service: Robotics - Pulmonary;  Laterality: N/A;    CARDIAC CATHETERIZATION Right 1/29/2024    Procedure: Coronary angiography;  Surgeon: Abran Chand MD;  Location: [facility] CATH INVASIVE LOCATION;  Service: Cardiovascular;  Laterality: Right;    CARDIAC CATHETERIZATION N/A 1/29/2024    Procedure: Left Heart Cath;  Surgeon: Abran Chand MD;  Location: [facility] CATH INVASIVE LOCATION;  Service: Cardiovascular;  Laterality: N/A;       Current Outpatient Medications:     acetaminophen (TYLENOL) 325 MG tablet, Take 2 tablets by mouth Every 4 (Four) Hours As Needed for Mild Pain., Disp: , Rfl:     apixaban (ELIQUIS) 5 MG tablet tablet, Take 1  tablet by mouth Every 12 (Twelve) Hours. Indications: Atrial Fibrillation, Disp: 60 tablet, Rfl: 0    Cholecalciferol (VITAMIN D-3 PO), Take 1 tablet by mouth Daily. Indications: Vitamin Deficiency, Disp: , Rfl:     folic acid (FOLVITE) 1 MG tablet, Take 1 tablet by mouth Daily. Start at least 7 days prior to chemotherapy until at least 3 weeks after all chemotherapy., Disp: 30 tablet, Rfl: 4    guaiFENesin (Mucinex) 600 MG 12 hr tablet, Take 1 tablet by mouth 2 (Two) Times a Day. Indications: Cough, Disp: , Rfl:     lidocaine-prilocaine (EMLA) 2.5-2.5 % cream, Apply 1 Application topically to the appropriate area as directed As Needed for Mild Pain (Apply 1 hour prior to port access)., Disp: 30 g, Rfl: 2    midodrine (PROAMATINE) 5 MG tablet, Take 1 tablet by mouth 3 (Three) Times a Day Before Meals. (Patient taking differently: Take 1 tablet by mouth 3 (Three) Times a Day As Needed (low blood pressure). Indications: Disorder of Low Blood Pressure), Disp: 20 tablet, Rfl: 0    multivitamin with minerals (Centrum Silver 50+Men) tablet tablet, Take 1 tablet by mouth Every Morning. Indications: Vitamin Deficiency, Disp: , Rfl:     NON FORMULARY, Take 2 tablets by mouth Every Night. Zzzquil  Indications: Sleep Disorder, Disp: , Rfl:     ondansetron (ZOFRAN) 8 MG tablet, Take 1 tablet by mouth 3 (Three) Times a Day As Needed for Nausea or Vomiting., Disp: 30 tablet, Rfl: 3    pantoprazole (PROTONIX) 40 MG EC tablet, Take 1 tablet by mouth Daily., Disp: 30 tablet, Rfl: 1    polyethylene glycol (MIRALAX) 17 g packet, Take 17 g by mouth Daily. Indications: Constipation, Disp: , Rfl:     simvastatin (ZOCOR) 20 MG tablet, Take 1 tablet by mouth Every Night. Indications: High Amount of Fats in the Blood, Disp: , Rfl:     sotalol (Betapace) 80 MG tablet, Take 1 tablet by mouth 2 (Two) Times a Day. Indications: Atrial Fibrillation, Disp: 60 tablet, Rfl: 5    tamsulosin (FLOMAX) 0.4 MG capsule 24 hr capsule, Take 1 capsule by  mouth Every Night. Indications: Benign Enlargement of Prostate, Disp: , Rfl:     thiamine (VITAMIN B-1) 100 MG tablet  tablet, Take 1 tablet by mouth Every Morning. Indications: Vitamin Deficiency, Disp: , Rfl:     traMADol (ULTRAM) 50 MG tablet, Take 1 tablet by mouth Every 6 (Six) Hours As Needed for Moderate Pain., Disp: 90 tablet, Rfl: 0    ipratropium-albuterol (DUO-NEB) 0.5-2.5 mg/3 ml nebulizer, Take 3 mL by nebulization 4 (Four) Times a Day As Needed for Wheezing (Dyspnea) for up to 30 days. (Patient not taking: Reported on 2024), Disp: 120 mL, Rfl: 3  No current facility-administered medications for this visit.    Facility-Administered Medications Ordered in Other Visits:     heparin injection 500 Units, 500 Units, Intravenous, PRN, Arben Tim MD, 500 Units at 24 1436    sodium chloride 0.9 % flush 10 mL, 10 mL, Intravenous, PRN, Arben iTm MD, 10 mL at 24 1436    No Known Allergies    Family History   Problem Relation Age of Onset    Dementia Mother     Breast cancer Sister 74     Cancer-related family history includes Breast cancer (age of onset: 74) in his sister.    Social History     Tobacco Use    Smoking status: Former     Current packs/day: 0.00     Average packs/day: 1 pack/day for 32.0 years (32.0 ttl pk-yrs)     Types: Cigarettes     Start date:      Quit date: 2000     Years since quittin.9     Passive exposure: Past    Smokeless tobacco: Never   Vaping Use    Vaping status: Never Used   Substance Use Topics    Alcohol use: Yes     Alcohol/week: 1.0 standard drink of alcohol     Types: 1 Cans of beer per week    Drug use: Never     Social History     Social History Narrative    Not on file      ROS:   Review of Systems   Constitutional:  Positive for unexpected weight change. Negative for activity change, appetite change, chills, diaphoresis, fatigue and fever.   HENT:  Negative for congestion, dental problem, drooling, ear discharge, ear pain, facial  "swelling, hearing loss, mouth sores, nosebleeds, postnasal drip, rhinorrhea, sinus pressure, sinus pain, sneezing, sore throat, tinnitus, trouble swallowing and voice change.    Eyes:  Negative for photophobia, pain, discharge, redness, itching and visual disturbance.   Respiratory:  Positive for cough and shortness of breath. Negative for apnea, choking, chest tightness, wheezing and stridor.    Cardiovascular:  Negative for chest pain, palpitations and leg swelling.   Gastrointestinal:  Negative for abdominal distention, abdominal pain, anal bleeding, blood in stool, constipation, diarrhea, nausea, rectal pain and vomiting.   Endocrine: Negative for cold intolerance, heat intolerance, polydipsia and polyuria.   Genitourinary:  Negative for decreased urine volume, difficulty urinating, dysuria, flank pain, frequency, genital sores, hematuria and urgency.   Musculoskeletal:  Negative for arthralgias, back pain, gait problem, joint swelling, myalgias, neck pain and neck stiffness.   Skin:  Negative for color change, pallor and rash.   Neurological:  Negative for dizziness, tremors, seizures, syncope, facial asymmetry, speech difficulty, weakness, light-headedness, numbness and headaches.   Hematological:  Negative for adenopathy. Does not bruise/bleed easily.   Psychiatric/Behavioral:  Negative for agitation, behavioral problems, confusion, decreased concentration, hallucinations, self-injury, sleep disturbance and suicidal ideas. The patient is not nervous/anxious.      Objective:  Vital signs:  Vitals:    12/23/24 1445   BP: 116/72   Pulse: 91   Resp: 14   Temp: 98.4 °F (36.9 °C)   SpO2: 94%   Weight: 78 kg (172 lb)   Height: 177.8 cm (70\")   PainSc: 0-No pain     Body mass index is 24.68 kg/m².  ECOG  (1) Restricted in physically strenuous activity, ambulatory and able to do work of light nature    Physical Exam:   Physical Exam  Constitutional:       General: He is not in acute distress.     Appearance: He is not " ill-appearing, toxic-appearing or diaphoretic.      Comments: Slender. Well built and in no distress. No jaundice.    HENT:      Head: Normocephalic and atraumatic.      Right Ear: External ear normal.      Left Ear: External ear normal.      Nose: Nose normal.      Mouth/Throat:      Mouth: Mucous membranes are moist.      Pharynx: Oropharynx is clear.   Eyes:      General: No scleral icterus.        Right eye: No discharge.         Left eye: No discharge.      Conjunctiva/sclera: Conjunctivae normal.      Pupils: Pupils are equal, round, and reactive to light.   Cardiovascular:      Rate and Rhythm: Normal rate and regular rhythm.      Pulses: Normal pulses.      Heart sounds: Normal heart sounds. No murmur heard.     No friction rub. No gallop.   Pulmonary:      Effort: No respiratory distress.      Breath sounds: No stridor. No wheezing, rhonchi or rales.      Comments: Breath sounds diminished bilaterally.   Chest:      Chest wall: No tenderness.   Abdominal:      General: Abdomen is flat. Bowel sounds are normal. There is no distension.      Palpations: Abdomen is soft. There is no mass.      Tenderness: There is no abdominal tenderness. There is no right CVA tenderness, left CVA tenderness, guarding or rebound.   Musculoskeletal:         General: No tenderness, deformity or signs of injury.      Cervical back: No rigidity.      Right lower leg: No edema.      Left lower leg: No edema.   Lymphadenopathy:      Cervical: No cervical adenopathy.   Skin:     General: Skin is warm and dry.      Coloration: Skin is not jaundiced or pale.      Findings: No bruising or rash.   Neurological:      General: No focal deficit present.      Mental Status: He is alert and oriented to person, place, and time.      Cranial Nerves: No cranial nerve deficit.   Psychiatric:         Mood and Affect: Mood normal.         Behavior: Behavior normal.         Thought Content: Thought content normal.         Judgment: Judgment normal.      DICKSON Tim MD performed the physical exam on 12/23/2024 as documented above.    Lab Results - Last 18 Months   Lab Units 12/23/24  1439 12/12/24  1131 11/21/24  1143   WBC 10*3/mm3 8.13 6.00 10.40   HEMOGLOBIN g/dL 8.2* 8.4* 9.3*   HEMATOCRIT % 25.9* 26.0* 28.8*   PLATELETS 10*3/mm3 340 382 433   MCV fL 97.7* 103.2* 103.2*     Lab Results - Last 18 Months   Lab Units 12/12/24  1218 11/21/24  1154 11/11/24  1406 10/31/24  1202 10/14/24  0821   SODIUM mmol/L  --   --  138 137 139   POTASSIUM mmol/L  --   --  4.3 4.5 3.9   CHLORIDE mmol/L  --   --  106 105 105   CO2 mmol/L  --   --  22.3 21.2* 26.7   BUN mg/dL  --   --  16 24* 28*   CREATININE mg/dL 1.20 1.20 1.20 1.36* 1.27   CALCIUM mg/dL  --   --  8.7 9.0 8.3*   BILIRUBIN mg/dL  --   --  0.3 0.2 0.4   ALK PHOS U/L  --   --  65 74 59   ALT (SGPT) U/L  --   --  31 18 18   AST (SGOT) U/L  --   --  31 29 23   GLUCOSE mg/dL  --   --  120* 236* 100*     Lab Results   Component Value Date    GLUCOSE 120 (H) 11/11/2024    BUN 16 11/11/2024    CREATININE 1.20 12/12/2024    BCR 13.3 11/11/2024    K 4.3 11/11/2024    CO2 22.3 11/11/2024    CALCIUM 8.7 11/11/2024    ALBUMIN 3.4 (L) 11/11/2024    AST 31 11/11/2024    ALT 31 11/11/2024     Lab Results - Last 18 Months   Lab Units 09/19/24  0742 09/12/24  0809 08/12/24  1834 01/28/24  1737   INR  1.10 1.03 1.00 0.98   APTT seconds 28.5 26.7  --  24.8*     Lab Results   Component Value Date    STEVEN Positive (A) 08/12/2024     Lab Results   Component Value Date    PTT 28.5 09/19/2024    INR 1.10 09/19/2024     Assessment & Plan     cTX N2 M1 adenocarcinoma of the right lung: Next-generation sequencing reviewed.  Positive PD-L1 expression..  The disease is negative for EGFR, ALK, ROS1, ret, BRAF.  Completed concurrent radiation and chemotherapy with carboplatin and paclitaxel.  She started consolidation therapy with pembrolizumab.  To continue same.  Fevers: Unclear source.  I have asked him to check with a different  thermometer to ensure that the one he is using is accurate.  This especially because he has no additional symptoms.  He is also going to have the Pleurx catheter removed.  Ideally we will send the tip for culture.  Have cultures from the port and from a peripheral site.  Right pleural effusion: Seems resolved.  To remove the Pleurx catheter.  History of tobacco smoking: Abstinent for more than 20 years.  Reviewed the laboratory exams and recent imaging.  Discussed with him.  He is to continue with immunotherapy.  See me in approximately 4 weeks.  Will follow cultures.    Arben Ling MD on 12/23/2024 at 1528.

## 2024-12-23 ENCOUNTER — OFFICE VISIT (OUTPATIENT)
Dept: ONCOLOGY | Facility: CLINIC | Age: 74
End: 2024-12-23
Payer: MEDICARE

## 2024-12-23 ENCOUNTER — HOSPITAL ENCOUNTER (OUTPATIENT)
Dept: ONCOLOGY | Facility: HOSPITAL | Age: 74
Discharge: HOME | End: 2024-12-23
Admitting: INTERNAL MEDICINE
Payer: MEDICARE

## 2024-12-23 VITALS
DIASTOLIC BLOOD PRESSURE: 72 MMHG | BODY MASS INDEX: 24.62 KG/M2 | SYSTOLIC BLOOD PRESSURE: 116 MMHG | HEIGHT: 70 IN | HEART RATE: 91 BPM | TEMPERATURE: 98.4 F | RESPIRATION RATE: 14 BRPM | OXYGEN SATURATION: 94 % | WEIGHT: 172 LBS

## 2024-12-23 DIAGNOSIS — C34.91 STAGE IV ADENOCARCINOMA OF LUNG, RIGHT: Primary | ICD-10-CM

## 2024-12-23 DIAGNOSIS — C34.92 STAGE IV ADENOCARCINOMA OF LUNG, LEFT: Primary | ICD-10-CM

## 2024-12-23 DIAGNOSIS — C34.91 STAGE IV ADENOCARCINOMA OF LUNG, RIGHT: ICD-10-CM

## 2024-12-23 LAB
ALBUMIN SERPL-MCNC: 3.3 G/DL (ref 3.5–5.2)
ALBUMIN/GLOB SERPL: 1 G/DL
ALP SERPL-CCNC: 71 U/L (ref 39–117)
ALT SERPL W P-5'-P-CCNC: 28 U/L (ref 1–41)
ANION GAP SERPL CALCULATED.3IONS-SCNC: 11.7 MMOL/L (ref 5–15)
AST SERPL-CCNC: 52 U/L (ref 1–40)
BASOPHILS # BLD AUTO: 0.06 10*3/MM3 (ref 0–0.2)
BASOPHILS NFR BLD AUTO: 0.7 % (ref 0–1.5)
BILIRUB SERPL-MCNC: 0.2 MG/DL (ref 0–1.2)
BUN SERPL-MCNC: 23 MG/DL (ref 8–23)
BUN/CREAT SERPL: 16.2 (ref 7–25)
CALCIUM SPEC-SCNC: 9.4 MG/DL (ref 8.6–10.5)
CHLORIDE SERPL-SCNC: 104 MMOL/L (ref 98–107)
CO2 SERPL-SCNC: 24.3 MMOL/L (ref 22–29)
CREAT SERPL-MCNC: 1.42 MG/DL (ref 0.76–1.27)
DEPRECATED RDW RBC AUTO: 52 FL (ref 37–54)
EGFRCR SERPLBLD CKD-EPI 2021: 51.9 ML/MIN/1.73
EOSINOPHIL # BLD AUTO: 0.17 10*3/MM3 (ref 0–0.4)
EOSINOPHIL NFR BLD AUTO: 2.1 % (ref 0.3–6.2)
ERYTHROCYTE [DISTWIDTH] IN BLOOD BY AUTOMATED COUNT: 15 % (ref 12.3–15.4)
GLOBULIN UR ELPH-MCNC: 3.3 GM/DL
GLUCOSE SERPL-MCNC: 116 MG/DL (ref 65–99)
HCT VFR BLD AUTO: 25.9 % (ref 37.5–51)
HGB BLD-MCNC: 8.2 G/DL (ref 13–17.7)
LYMPHOCYTES # BLD AUTO: 1.57 10*3/MM3 (ref 0.7–3.1)
LYMPHOCYTES NFR BLD AUTO: 19.3 % (ref 19.6–45.3)
MCH RBC QN AUTO: 30.9 PG (ref 26.6–33)
MCHC RBC AUTO-ENTMCNC: 31.7 G/DL (ref 31.5–35.7)
MCV RBC AUTO: 97.7 FL (ref 79–97)
MONOCYTES # BLD AUTO: 1.78 10*3/MM3 (ref 0.1–0.9)
MONOCYTES NFR BLD AUTO: 21.9 % (ref 5–12)
NEUTROPHILS NFR BLD AUTO: 4.55 10*3/MM3 (ref 1.7–7)
NEUTROPHILS NFR BLD AUTO: 56 % (ref 42.7–76)
PLATELET # BLD AUTO: 340 10*3/MM3 (ref 140–450)
PMV BLD AUTO: 8.7 FL (ref 6–12)
POTASSIUM SERPL-SCNC: 4.2 MMOL/L (ref 3.5–5.2)
PROT SERPL-MCNC: 6.6 G/DL (ref 6–8.5)
RBC # BLD AUTO: 2.65 10*6/MM3 (ref 4.14–5.8)
SODIUM SERPL-SCNC: 140 MMOL/L (ref 136–145)
WBC NRBC COR # BLD AUTO: 8.13 10*3/MM3 (ref 3.4–10.8)

## 2024-12-23 PROCEDURE — 85025 COMPLETE CBC W/AUTO DIFF WBC: CPT | Performed by: INTERNAL MEDICINE

## 2024-12-23 PROCEDURE — 99214 OFFICE O/P EST MOD 30 MIN: CPT | Performed by: INTERNAL MEDICINE

## 2024-12-23 PROCEDURE — 25010000002 HEPARIN LOCK FLUSH PER 10 UNITS: Performed by: INTERNAL MEDICINE

## 2024-12-23 PROCEDURE — 1160F RVW MEDS BY RX/DR IN RCRD: CPT | Performed by: INTERNAL MEDICINE

## 2024-12-23 PROCEDURE — 36591 DRAW BLOOD OFF VENOUS DEVICE: CPT

## 2024-12-23 PROCEDURE — 80053 COMPREHEN METABOLIC PANEL: CPT | Performed by: INTERNAL MEDICINE

## 2024-12-23 PROCEDURE — 1126F AMNT PAIN NOTED NONE PRSNT: CPT | Performed by: INTERNAL MEDICINE

## 2024-12-23 PROCEDURE — 1159F MED LIST DOCD IN RCRD: CPT | Performed by: INTERNAL MEDICINE

## 2024-12-23 RX ORDER — SODIUM CHLORIDE 0.9 % (FLUSH) 0.9 %
10 SYRINGE (ML) INJECTION AS NEEDED
OUTPATIENT
Start: 2024-12-23

## 2024-12-23 RX ORDER — HEPARIN SODIUM (PORCINE) LOCK FLUSH IV SOLN 100 UNIT/ML 100 UNIT/ML
500 SOLUTION INTRAVENOUS AS NEEDED
Status: DISCONTINUED | OUTPATIENT
Start: 2024-12-23 | End: 2024-12-24 | Stop reason: HOSPADM

## 2024-12-23 RX ORDER — SODIUM CHLORIDE 0.9 % (FLUSH) 0.9 %
10 SYRINGE (ML) INJECTION AS NEEDED
Status: DISCONTINUED | OUTPATIENT
Start: 2024-12-23 | End: 2024-12-24 | Stop reason: HOSPADM

## 2024-12-23 RX ORDER — HEPARIN SODIUM (PORCINE) LOCK FLUSH IV SOLN 100 UNIT/ML 100 UNIT/ML
500 SOLUTION INTRAVENOUS AS NEEDED
OUTPATIENT
Start: 2024-12-23

## 2024-12-23 RX ADMIN — Medication 10 ML: at 14:36

## 2024-12-23 RX ADMIN — HEPARIN 500 UNITS: 100 SYRINGE at 14:36

## 2024-12-23 NOTE — PROGRESS NOTES
Port accessed and flushed with good blood return noted. 10cc of blood wasted prior to specimen collection. Blood specimen obtained and sent to lab for processing per protocol.  Port flushed with saline and heparin prior to needle removal.

## 2024-12-27 ENCOUNTER — HOME CARE VISIT (OUTPATIENT)
Dept: HOME HEALTH SERVICES | Facility: HOME HEALTHCARE | Age: 74
End: 2024-12-27
Payer: MEDICARE

## 2024-12-27 VITALS
OXYGEN SATURATION: 97 % | RESPIRATION RATE: 18 BRPM | DIASTOLIC BLOOD PRESSURE: 62 MMHG | HEART RATE: 90 BPM | TEMPERATURE: 98.6 F | SYSTOLIC BLOOD PRESSURE: 90 MMHG

## 2024-12-27 PROCEDURE — G0299 HHS/HOSPICE OF RN EA 15 MIN: HCPCS

## 2024-12-30 ENCOUNTER — TELEPHONE (OUTPATIENT)
Dept: ONCOLOGY | Facility: CLINIC | Age: 74
End: 2024-12-30
Payer: MEDICARE

## 2024-12-30 DIAGNOSIS — R50.9 LOW GRADE FEVER: ICD-10-CM

## 2024-12-30 DIAGNOSIS — C34.91 STAGE IV ADENOCARCINOMA OF LUNG, RIGHT: Primary | ICD-10-CM

## 2024-12-30 DIAGNOSIS — J90 LARGE PLEURAL EFFUSION: ICD-10-CM

## 2024-12-30 NOTE — TELEPHONE ENCOUNTER
Caller: Norma Mendes    Relationship: Emergency Contact    Best call back number: [phone]      Who are you requesting to speak with (clinical staff, provider,  specific staff member): CLINICAL      What was the call regarding: NORMA ASKING THE ORDER FOR PT'S CATHETER REMOVAL BE PUT IN SO IT CAN BE SCHEDULED

## 2024-12-31 NOTE — TELEPHONE ENCOUNTER
Attempted to contact Norma regarding her message we received. No answer. Voicemail left stating the order for the pleural catheter removal therefore the IR department will be contacting her to schedule. I also informed her that Dr. Tim was wanting her father to have blood cultures completed therefore I requested that she contact our office to schedule a lab appt to have the labs completed or if they preferred to go to the [facility] at the hospital, they can go there as well. Callback number stated on voicemail.

## 2025-01-02 ENCOUNTER — HOSPITAL ENCOUNTER (OUTPATIENT)
Dept: ONCOLOGY | Facility: HOSPITAL | Age: 75
Discharge: HOME | End: 2025-01-02
Admitting: INTERNAL MEDICINE
Payer: MEDICARE

## 2025-01-02 VITALS
WEIGHT: 167.6 LBS | TEMPERATURE: 97.5 F | SYSTOLIC BLOOD PRESSURE: 110 MMHG | BODY MASS INDEX: 23.99 KG/M2 | OXYGEN SATURATION: 95 % | HEART RATE: 92 BPM | DIASTOLIC BLOOD PRESSURE: 70 MMHG | RESPIRATION RATE: 14 BRPM | HEIGHT: 70 IN

## 2025-01-02 DIAGNOSIS — C34.91 STAGE IV ADENOCARCINOMA OF LUNG, RIGHT: ICD-10-CM

## 2025-01-02 DIAGNOSIS — C34.92 STAGE IV ADENOCARCINOMA OF LUNG, LEFT: Primary | ICD-10-CM

## 2025-01-02 DIAGNOSIS — R50.9 LOW GRADE FEVER: ICD-10-CM

## 2025-01-02 DIAGNOSIS — J90 LARGE PLEURAL EFFUSION: ICD-10-CM

## 2025-01-02 LAB
ALP BLD-CCNC: 59 U/L (ref 53–128)
ANION GAP SERPL CALCULATED.3IONS-SCNC: 10.1 MMOL/L (ref 5–15)
BASOPHILS # BLD AUTO: 0.04 10*3/MM3 (ref 0–0.2)
BASOPHILS NFR BLD AUTO: 0.4 % (ref 0–1.5)
BUN BLDA-MCNC: 34 MG/DL (ref 7–22)
BUN SERPL-MCNC: 36 MG/DL (ref 8–23)
BUN/CREAT SERPL: 26.9 (ref 7–25)
CALCIUM BLD QL: 9.6 MG/DL (ref 8–10.3)
CALCIUM SPEC-SCNC: 9.4 MG/DL (ref 8.6–10.5)
CHLORIDE BLDA-SCNC: 101 MMOL/L (ref 98–108)
CHLORIDE SERPL-SCNC: 103 MMOL/L (ref 98–107)
CO2 BLDA-SCNC: 26 MMOL/L (ref 18–33)
CO2 SERPL-SCNC: 22.9 MMOL/L (ref 22–29)
CREAT BLDA-MCNC: 1.5 MG/DL (ref 0.6–1.2)
CREAT SERPL-MCNC: 1.34 MG/DL (ref 0.76–1.27)
DEPRECATED RDW RBC AUTO: 48 FL (ref 37–54)
EGFRCR SERPLBLD CKD-EPI 2021: 48.2 ML/MIN/1.73
EGFRCR SERPLBLD CKD-EPI 2021: 55.2 ML/MIN/1.73
EOSINOPHIL # BLD AUTO: 0.33 10*3/MM3 (ref 0–0.4)
EOSINOPHIL NFR BLD AUTO: 3.6 % (ref 0.3–6.2)
ERYTHROCYTE [DISTWIDTH] IN BLOOD BY AUTOMATED COUNT: 14.8 % (ref 12.3–15.4)
GLUCOSE BLDC GLUCOMTR-MCNC: 116 MG/DL (ref 73–118)
GLUCOSE SERPL-MCNC: 123 MG/DL (ref 65–99)
HCT VFR BLD AUTO: 26.4 % (ref 37.5–51)
HGB BLD-MCNC: 8.2 G/DL (ref 13–17.7)
LYMPHOCYTES # BLD AUTO: 1.33 10*3/MM3 (ref 0.7–3.1)
LYMPHOCYTES NFR BLD AUTO: 14.6 % (ref 19.6–45.3)
MCH RBC QN AUTO: 29.3 PG (ref 26.6–33)
MCHC RBC AUTO-ENTMCNC: 31.1 G/DL (ref 31.5–35.7)
MCV RBC AUTO: 94.3 FL (ref 79–97)
MONOCYTES # BLD AUTO: 1.46 10*3/MM3 (ref 0.1–0.9)
MONOCYTES NFR BLD AUTO: 16 % (ref 5–12)
NEUTROPHILS NFR BLD AUTO: 5.95 10*3/MM3 (ref 1.7–7)
NEUTROPHILS NFR BLD AUTO: 65.4 % (ref 42.7–76)
PLATELET # BLD AUTO: 363 10*3/MM3 (ref 140–450)
PMV BLD AUTO: 9.4 FL (ref 6–12)
POC ALBUMIN: 2.6 G/L (ref 3.3–5.5)
POC ALT (SGPT): 30 U/L (ref 10–47)
POC AST (SGOT): 56 U/L (ref 11–38)
POC TOTAL BILIRUBIN: 0.6 MG/DL (ref 0.2–1.6)
POC TOTAL PROTEIN: 6.8 G/DL (ref 6.4–8.1)
POTASSIUM BLDA-SCNC: 4.2 MMOL/L (ref 3.6–5.1)
POTASSIUM SERPL-SCNC: 4.4 MMOL/L (ref 3.5–5.2)
RBC # BLD AUTO: 2.8 10*6/MM3 (ref 4.14–5.8)
SODIUM BLD-SCNC: 141 MMOL/L (ref 128–145)
SODIUM SERPL-SCNC: 136 MMOL/L (ref 136–145)
WBC NRBC COR # BLD AUTO: 9.11 10*3/MM3 (ref 3.4–10.8)

## 2025-01-02 PROCEDURE — 85025 COMPLETE CBC W/AUTO DIFF WBC: CPT | Performed by: INTERNAL MEDICINE

## 2025-01-02 PROCEDURE — 80053 COMPREHEN METABOLIC PANEL: CPT

## 2025-01-02 PROCEDURE — 87040 BLOOD CULTURE FOR BACTERIA: CPT | Performed by: INTERNAL MEDICINE

## 2025-01-02 PROCEDURE — 25010000002 PEMBROLIZUMAB 100 MG/4ML SOLUTION 4 ML VIAL: Performed by: NURSE PRACTITIONER

## 2025-01-02 PROCEDURE — 96413 CHEMO IV INFUSION 1 HR: CPT

## 2025-01-02 PROCEDURE — 25010000002 HEPARIN LOCK FLUSH PER 10 UNITS: Performed by: INTERNAL MEDICINE

## 2025-01-02 RX ORDER — SODIUM CHLORIDE 0.9 % (FLUSH) 0.9 %
10 SYRINGE (ML) INJECTION AS NEEDED
Status: DISCONTINUED | OUTPATIENT
Start: 2025-01-02 | End: 2025-01-03 | Stop reason: HOSPADM

## 2025-01-02 RX ORDER — SODIUM CHLORIDE 9 MG/ML
20 INJECTION, SOLUTION INTRAVENOUS ONCE
Status: DISCONTINUED | OUTPATIENT
Start: 2025-01-02 | End: 2025-01-03 | Stop reason: HOSPADM

## 2025-01-02 RX ORDER — HEPARIN SODIUM (PORCINE) LOCK FLUSH IV SOLN 100 UNIT/ML 100 UNIT/ML
500 SOLUTION INTRAVENOUS AS NEEDED
OUTPATIENT
Start: 2025-01-02

## 2025-01-02 RX ORDER — SODIUM CHLORIDE 9 MG/ML
20 INJECTION, SOLUTION INTRAVENOUS ONCE
Status: CANCELLED | OUTPATIENT
Start: 2025-01-02

## 2025-01-02 RX ORDER — HEPARIN SODIUM (PORCINE) LOCK FLUSH IV SOLN 100 UNIT/ML 100 UNIT/ML
500 SOLUTION INTRAVENOUS AS NEEDED
Status: DISCONTINUED | OUTPATIENT
Start: 2025-01-02 | End: 2025-01-03 | Stop reason: HOSPADM

## 2025-01-02 RX ORDER — SODIUM CHLORIDE 0.9 % (FLUSH) 0.9 %
10 SYRINGE (ML) INJECTION AS NEEDED
OUTPATIENT
Start: 2025-01-02

## 2025-01-02 RX ADMIN — SODIUM CHLORIDE 200 MG: 9 INJECTION, SOLUTION INTRAVENOUS at 13:09

## 2025-01-02 RX ADMIN — Medication 10 ML: at 13:42

## 2025-01-02 RX ADMIN — HEPARIN 500 UNITS: 100 SYRINGE at 13:42

## 2025-01-02 NOTE — PROGRESS NOTES
Pt here for tx.  Port accessed using sterile technique with positive blood return noted.  Labs resulted and within tx parameters but Creatinine did increase.  Message sent to CARL Dave to get plan signed.  Order to send BMP to hospital but ok to treat without result and encourage pt to drink 64oz fluids a day.  Pt and wife updated of POC with understanding verbalized.  Tx given per MAR.  Pt tolerated well.  Pt d/c home.

## 2025-01-03 ENCOUNTER — HOSPITAL ENCOUNTER (OUTPATIENT)
Dept: INTERVENTIONAL RADIOLOGY/VASCULAR | Facility: HOSPITAL | Age: 75
Discharge: HOME | End: 2025-01-03
Payer: MEDICARE

## 2025-01-03 ENCOUNTER — HOME CARE VISIT (OUTPATIENT)
Dept: HOME HEALTH SERVICES | Facility: HOME HEALTHCARE | Age: 75
End: 2025-01-03
Payer: MEDICARE

## 2025-01-03 VITALS — WEIGHT: 166 LBS | BODY MASS INDEX: 23.77 KG/M2 | HEIGHT: 70 IN

## 2025-01-03 DIAGNOSIS — J90 LARGE PLEURAL EFFUSION: ICD-10-CM

## 2025-01-03 DIAGNOSIS — C34.91 STAGE IV ADENOCARCINOMA OF LUNG, RIGHT: ICD-10-CM

## 2025-01-03 DIAGNOSIS — J90 PLEURAL EFFUSION: ICD-10-CM

## 2025-01-03 DIAGNOSIS — R50.9 LOW GRADE FEVER: ICD-10-CM

## 2025-01-03 PROCEDURE — 87070 CULTURE OTHR SPECIMN AEROBIC: CPT | Performed by: INTERNAL MEDICINE

## 2025-01-03 PROCEDURE — 25010000002 LIDOCAINE 1 % SOLUTION

## 2025-01-03 RX ORDER — LIDOCAINE HYDROCHLORIDE 10 MG/ML
INJECTION, SOLUTION INFILTRATION; PERINEURAL AS NEEDED
Status: COMPLETED | OUTPATIENT
Start: 2025-01-03 | End: 2025-01-03

## 2025-01-03 RX ADMIN — LIDOCAINE HYDROCHLORIDE 9 ML: 10 INJECTION, SOLUTION INFILTRATION; PERINEURAL at 12:14

## 2025-01-03 NOTE — DISCHARGE INSTRUCTIONS
Keep dressing on, clean and dry, for the next 72 hours. After you remove the dressing, if hole is still open, apply clean bandage/dressing every other day until healed closed. If closed, you may shower as usual just do not scrub the area until it is completely healed. You also have some suture in the drain removal site that will dissolve on its' own over time.     No heavy lifting greater than 10 lbs. for the next 48 hours. Watch for signs and symptoms of infection such as fever over 101 degrees, redness/swelling around area, yellow/green drainage, etc... If you notice anything, see your physician. Otherwise, follow up with your physician as usual.    Follow up with Dr. Tim for your catheter tip culture results.    Any concerns, you can reach us at [phone], Mon-Fri, from 8am-5pm. If after hours, follow phone prompts.

## 2025-01-06 LAB — CATHETER CULTURE: ABNORMAL

## 2025-01-07 LAB
BACTERIA SPEC AEROBE CULT: NORMAL
BACTERIA SPEC AEROBE CULT: NORMAL

## 2025-01-09 ENCOUNTER — TELEPHONE (OUTPATIENT)
Dept: ONCOLOGY | Facility: CLINIC | Age: 75
End: 2025-01-09
Payer: MEDICARE

## 2025-01-09 NOTE — TELEPHONE ENCOUNTER
Caller: Norma Mendes    Relationship: Emergency Contact    Best call back number: [phone]    What test was performed: LABS    When was the test performed: 01/02 AND 01/03    Where was the test performed:

## 2025-01-10 ENCOUNTER — HOME CARE VISIT (OUTPATIENT)
Dept: HOME HEALTH SERVICES | Facility: HOME HEALTHCARE | Age: 75
End: 2025-01-10
Payer: MEDICARE

## 2025-01-10 PROCEDURE — G0299 HHS/HOSPICE OF RN EA 15 MIN: HCPCS

## 2025-01-10 NOTE — TELEPHONE ENCOUNTER
Spoke with Norma. Dr. Tim stated that he was thinking that the lab results were due to colonization of the catheter and now that it has been removed every thing should start to clear up. He is currently not having any symptoms of infection. Norma states they have been monitoring his temperature with an oral thermometer and it has reflected no fever. She also states the site looks good and reports no redness. Home health is also monitoring the site.  I let Norma know we will continue to monitor and to call of us if any symptoms start to arise.

## 2025-01-12 VITALS
TEMPERATURE: 97 F | DIASTOLIC BLOOD PRESSURE: 64 MMHG | OXYGEN SATURATION: 99 % | SYSTOLIC BLOOD PRESSURE: 104 MMHG | HEART RATE: 80 BPM | RESPIRATION RATE: 18 BRPM

## 2025-01-14 ENCOUNTER — HOME CARE VISIT (OUTPATIENT)
Dept: HOME HEALTH SERVICES | Facility: HOME HEALTHCARE | Age: 75
End: 2025-01-14
Payer: MEDICARE

## 2025-01-17 ENCOUNTER — HOME CARE VISIT (OUTPATIENT)
Dept: HOME HEALTH SERVICES | Facility: HOME HEALTHCARE | Age: 75
End: 2025-01-17
Payer: MEDICARE

## 2025-01-17 VITALS — WEIGHT: 163 LBS | BODY MASS INDEX: 24.71 KG/M2 | HEIGHT: 68 IN

## 2025-01-17 PROCEDURE — G0299 HHS/HOSPICE OF RN EA 15 MIN: HCPCS

## 2025-01-23 ENCOUNTER — HOSPITAL ENCOUNTER (OUTPATIENT)
Dept: ONCOLOGY | Facility: HOSPITAL | Age: 75
Discharge: HOME | End: 2025-01-23
Admitting: INTERNAL MEDICINE
Payer: MEDICARE

## 2025-01-23 VITALS
RESPIRATION RATE: 16 BRPM | WEIGHT: 165.1 LBS | HEART RATE: 75 BPM | BODY MASS INDEX: 23.64 KG/M2 | TEMPERATURE: 97.5 F | HEIGHT: 70 IN | OXYGEN SATURATION: 100 % | SYSTOLIC BLOOD PRESSURE: 117 MMHG | DIASTOLIC BLOOD PRESSURE: 71 MMHG

## 2025-01-23 DIAGNOSIS — C34.91 STAGE IV ADENOCARCINOMA OF LUNG, RIGHT: ICD-10-CM

## 2025-01-23 DIAGNOSIS — C34.92 STAGE IV ADENOCARCINOMA OF LUNG, LEFT: Primary | ICD-10-CM

## 2025-01-23 LAB
ALBUMIN SERPL-MCNC: 3.2 G/DL (ref 3.5–5.2)
ALBUMIN/GLOB SERPL: 1 G/DL
ALP BLD-CCNC: 64 U/L (ref 53–128)
ALP SERPL-CCNC: 61 U/L (ref 39–117)
ALT SERPL W P-5'-P-CCNC: 59 U/L (ref 1–41)
ANION GAP SERPL CALCULATED.3IONS-SCNC: 8.6 MMOL/L (ref 5–15)
AST SERPL-CCNC: 89 U/L (ref 1–40)
BASOPHILS # BLD AUTO: 0.03 10*3/MM3 (ref 0–0.2)
BASOPHILS NFR BLD AUTO: 0.4 % (ref 0–1.5)
BILIRUB SERPL-MCNC: 0.2 MG/DL (ref 0–1.2)
BUN BLDA-MCNC: 27 MG/DL (ref 7–22)
BUN SERPL-MCNC: 28 MG/DL (ref 8–23)
BUN/CREAT SERPL: 17.4 (ref 7–25)
CALCIUM BLD QL: 9.2 MG/DL (ref 8–10.3)
CALCIUM SPEC-SCNC: 9 MG/DL (ref 8.6–10.5)
CHLORIDE BLDA-SCNC: 109 MMOL/L (ref 98–108)
CHLORIDE SERPL-SCNC: 105 MMOL/L (ref 98–107)
CO2 BLDA-SCNC: 28 MMOL/L (ref 18–33)
CO2 SERPL-SCNC: 25.4 MMOL/L (ref 22–29)
CREAT BLDA-MCNC: 1.9 MG/DL (ref 0.6–1.2)
CREAT SERPL-MCNC: 1.61 MG/DL (ref 0.76–1.27)
DEPRECATED RDW RBC AUTO: 49.9 FL (ref 37–54)
EGFRCR SERPLBLD CKD-EPI 2021: 36.3 ML/MIN/1.73
EGFRCR SERPLBLD CKD-EPI 2021: 44.3 ML/MIN/1.73
EOSINOPHIL # BLD AUTO: 0.75 10*3/MM3 (ref 0–0.4)
EOSINOPHIL NFR BLD AUTO: 9.4 % (ref 0.3–6.2)
ERYTHROCYTE [DISTWIDTH] IN BLOOD BY AUTOMATED COUNT: 16 % (ref 12.3–15.4)
GLOBULIN UR ELPH-MCNC: 3.1 GM/DL
GLUCOSE BLDC GLUCOMTR-MCNC: 145 MG/DL (ref 73–118)
GLUCOSE SERPL-MCNC: 138 MG/DL (ref 65–99)
HCT VFR BLD AUTO: 28.2 % (ref 37.5–51)
HGB BLD-MCNC: 8.7 G/DL (ref 13–17.7)
LYMPHOCYTES # BLD AUTO: 1.53 10*3/MM3 (ref 0.7–3.1)
LYMPHOCYTES NFR BLD AUTO: 19.1 % (ref 19.6–45.3)
MCH RBC QN AUTO: 27.2 PG (ref 26.6–33)
MCHC RBC AUTO-ENTMCNC: 30.9 G/DL (ref 31.5–35.7)
MCV RBC AUTO: 88.1 FL (ref 79–97)
MONOCYTES # BLD AUTO: 0.9 10*3/MM3 (ref 0.1–0.9)
MONOCYTES NFR BLD AUTO: 11.2 % (ref 5–12)
NEUTROPHILS NFR BLD AUTO: 4.8 10*3/MM3 (ref 1.7–7)
NEUTROPHILS NFR BLD AUTO: 59.9 % (ref 42.7–76)
PLATELET # BLD AUTO: 257 10*3/MM3 (ref 140–450)
PMV BLD AUTO: 9 FL (ref 6–12)
POC ALBUMIN: 2.7 G/L (ref 3.3–5.5)
POC ALT (SGPT): 54 U/L (ref 10–47)
POC AST (SGOT): 86 U/L (ref 11–38)
POC TOTAL BILIRUBIN: 0.5 MG/DL (ref 0.2–1.6)
POC TOTAL PROTEIN: 6.2 G/DL (ref 6.4–8.1)
POTASSIUM BLDA-SCNC: 4.6 MMOL/L (ref 3.6–5.1)
POTASSIUM SERPL-SCNC: 4.3 MMOL/L (ref 3.5–5.2)
PROT SERPL-MCNC: 6.3 G/DL (ref 6–8.5)
RBC # BLD AUTO: 3.2 10*6/MM3 (ref 4.14–5.8)
SODIUM BLD-SCNC: 143 MMOL/L (ref 128–145)
SODIUM SERPL-SCNC: 139 MMOL/L (ref 136–145)
T4 FREE SERPL-MCNC: 1.5 NG/DL (ref 0.93–1.7)
TSH SERPL DL<=0.05 MIU/L-ACNC: <0.005 UIU/ML (ref 0.27–4.2)
WBC NRBC COR # BLD AUTO: 8.01 10*3/MM3 (ref 3.4–10.8)

## 2025-01-23 PROCEDURE — 84443 ASSAY THYROID STIM HORMONE: CPT | Performed by: INTERNAL MEDICINE

## 2025-01-23 PROCEDURE — 80053 COMPREHEN METABOLIC PANEL: CPT | Performed by: INTERNAL MEDICINE

## 2025-01-23 PROCEDURE — 85025 COMPLETE CBC W/AUTO DIFF WBC: CPT | Performed by: INTERNAL MEDICINE

## 2025-01-23 PROCEDURE — 96413 CHEMO IV INFUSION 1 HR: CPT

## 2025-01-23 PROCEDURE — 84439 ASSAY OF FREE THYROXINE: CPT | Performed by: INTERNAL MEDICINE

## 2025-01-23 PROCEDURE — 25010000002 HEPARIN LOCK FLUSH PER 10 UNITS: Performed by: INTERNAL MEDICINE

## 2025-01-23 PROCEDURE — 80053 COMPREHEN METABOLIC PANEL: CPT

## 2025-01-23 PROCEDURE — 25010000002 PEMBROLIZUMAB 100 MG/4ML SOLUTION 4 ML VIAL: Performed by: INTERNAL MEDICINE

## 2025-01-23 RX ORDER — HEPARIN SODIUM (PORCINE) LOCK FLUSH IV SOLN 100 UNIT/ML 100 UNIT/ML
500 SOLUTION INTRAVENOUS AS NEEDED
OUTPATIENT
Start: 2025-01-23

## 2025-01-23 RX ORDER — SODIUM CHLORIDE 0.9 % (FLUSH) 0.9 %
10 SYRINGE (ML) INJECTION AS NEEDED
Status: DISCONTINUED | OUTPATIENT
Start: 2025-01-23 | End: 2025-01-24 | Stop reason: HOSPADM

## 2025-01-23 RX ORDER — SODIUM CHLORIDE 9 MG/ML
20 INJECTION, SOLUTION INTRAVENOUS ONCE
Status: DISCONTINUED | OUTPATIENT
Start: 2025-01-23 | End: 2025-01-24 | Stop reason: HOSPADM

## 2025-01-23 RX ORDER — HEPARIN SODIUM (PORCINE) LOCK FLUSH IV SOLN 100 UNIT/ML 100 UNIT/ML
500 SOLUTION INTRAVENOUS AS NEEDED
Status: DISCONTINUED | OUTPATIENT
Start: 2025-01-23 | End: 2025-01-24 | Stop reason: HOSPADM

## 2025-01-23 RX ORDER — SODIUM CHLORIDE 9 MG/ML
20 INJECTION, SOLUTION INTRAVENOUS ONCE
Status: CANCELLED | OUTPATIENT
Start: 2025-01-23

## 2025-01-23 RX ORDER — SODIUM CHLORIDE 0.9 % (FLUSH) 0.9 %
10 SYRINGE (ML) INJECTION AS NEEDED
OUTPATIENT
Start: 2025-01-23

## 2025-01-23 RX ADMIN — Medication 10 ML: at 14:38

## 2025-01-23 RX ADMIN — SODIUM CHLORIDE 200 MG: 9 INJECTION, SOLUTION INTRAVENOUS at 13:57

## 2025-01-23 RX ADMIN — Medication 500 UNITS: at 14:38

## 2025-01-23 NOTE — PROGRESS NOTES
Pt here for C3D1 Keytruda. Port accessed and flushed with good blood return noted. 10cc of blood wasted prior to specimen collection. Blood specimen obtained and sent to lab for processing per protocol. Pt has no new complaints at this time. Dr. Tim notified of lab results and gave verbal order to send STAT CMP and notify him of those results. Dr. Tim notified of the CMP results and gave verbal orders to proceed with tx today and to have pt return next week for labs. Tx given per MAR and tolerated well. Pt declined AVS d/t MyChart. Port flushed with saline and heparin prior to needle removal. Pt d/c accompanied by spouse.

## 2025-01-30 ENCOUNTER — TELEPHONE (OUTPATIENT)
Dept: ONCOLOGY | Facility: CLINIC | Age: 75
End: 2025-01-30

## 2025-01-30 NOTE — TELEPHONE ENCOUNTER
LAB CALLED REGARDING POSITIVE COVID RESULT. Spoke with Ruth and she stated that Young had labs drawn with Dr. Funes's office Tuesday. I let her know that if we can get the results from their office he would not need to come in for an appointment. I left a message with Dr. Funes's office and am awaiting lab results

## 2025-01-30 NOTE — TELEPHONE ENCOUNTER
Caller: AMIRA SIERRA    Relationship: Emergency Contact    Best call back number: [phone]    What is the best time to reach you: ANYTIME    Who are you requesting to speak with (clinical staff, provider,  specific staff member): CLINICAL    What was the call regarding: PT WAS ADVISED FROM THE INFUSION DEPT, TO COME IN THIS WEEK TO HAVE LABS DRAWN REQUESTING A CALL BACK TO ADVISE IF A LAB IS NEEDED THIS WEEK

## 2025-02-10 NOTE — PROGRESS NOTES
HEMATOLOGY ONCOLOGY OUTPATIENT FOLLOW UP       Patient name: Young Ames  : 1950  MRN: [id number]  Primary Care Physician: Abner Funes APRN  Referring Physician: Abner Funes APRN  Reason For Consult: Adenocarcinoma of the right lung.     History of Present Illness:    2024: Mr. Ames first came to [facility] complaining of dyspnea. This had been getting worse over a short period of time. It was associated to unintended weight loss of approximately 15 lbs in around one month. He was diagnosed with pneumonia in 2024 he was treated with azithromycin and with amoxicillin/clavulanate, despite which he did not seem to recover completely. CT scan had shown dense consolidation of the right lower lobe and there was at some point suggestion of a cavitary lesion. 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 Following this a repeat scan showed a questionable 5.5 cm right lower lobe cavitary lesion. A bronchoscopy and biopsy on 2024 confirmed adenocarcinoma of the right lung. He feels reasonably well at this time. He has been breathing better, though he persists with dyspnea or exertion. He has been afebrile. He continues to cough but not more than before. He has not had abdominal pain and denies diarrhea or dysuria. On exam alert and conversant. Oriented and in no distress. No jaundice. No oral lesions and respirations are not labored. Lungs diminished bilaterally and absent on the right lower lobe. The abdomen is soft. No edema. Reviewed the images of the scans. Reviewed the laboratory exams. Discussed with him and his family. To proceed with a PET scan. Will not obtain  pulmonary function tests, as most likely he has had recurrence of a significant right pleural effusion. He is to see me with results.     2024: Feeling about the same as at the time of the last visit but has had  more dyspnea.  He was placed on new medication in spite of which, intermittently, he continues to be uncomfortable.  He is eating reasonably well and has had no weight loss.  He is also afebrile.  He denies chest pains.  No abdominal pain.  On exam he is conversant and oriented.  No distress.  No jaundice.  The lungs are diminished bilaterally but there is absence of breath sounds and a cough when he in the lower parts of the right chest.  The abdomen is soft.  There is no edema.  Laboratory exams and final reports of pathology were reviewed and discussed with him.  Discussed with him the stage of the disease, which corresponds to 4, given the pleural involvement.  To start chemotherapy and immunotherapy.  I have requested next-generation sequencing.  Port as soon as available.    9/29/2024: Feels reasonably well today.  Has had less dyspnea since the insertion of the pleural catheter as he has been able to have the fluid drained at home more frequently.  Yesterday, for the first time, he had persistent right-sided chest pain after the drainage of the fluid that is now resolved.  He continues to be reasonably active.  He continues to eat reasonably well and has had very minimal nausea.  He took the first chemotherapy without any complications.  He denies chest pain and he has less dyspnea than before.  No abdominal pain and no edema.  On exam chronically ill-appearing but in no distress.  No jaundice.  The lungs are diminished bilaterally with more pronounced reduction of the breath sounds in the right.  The heart is regular.  The abdomen is soft.  There is no edema.  Laboratory exams reviewed.  Discussed with him.  Continue same therapy for now.  I have sent a prescription for tramadol that he can take as needed for pain, when present.    10/14/2024: Breathing better.  Progressively finding less fluid in the thoracic cavity.  Is able to do more activity.  Has had enuresis once.  Also has noted tremors.  He is  eating well.  No nausea or vomiting.  No chest pains.  No abdominal pain.  On exam he does not seem in any distress.  He is conversant and well-oriented.  No jaundice or pallor.  Lungs symmetrically ventilated without any dullness to percussion on the right.  Abdomen soft.  No edema.  Laboratory exams reviewed.  Discussed with him.  My suspicion is that he is no longer producing as much pleural fluid as before and that perhaps today they will not be able to drain much.  Will obtain a scan and will see with results.  Continue for now.  I am not sure what the cause of the tremors and the enuresis is.    11/11/2024: Feeling well.  Continues to tolerate the treatment without difficulties.  He has no more dyspnea or pain than before.  He eats well and has had no nausea or vomiting.  As well his weight is stable.  Denies diarrhea or dysuria.  On exam he does not seem ill.  He is not jaundiced.  The lungs are diminished bilaterally but symmetrically.  Heart is regular.  The abdomen is soft.  There is no edema.  Laboratory exams reviewed.  Reviewed the images and the report of the scans.  There are some suggestion of response as the lymph nodes and the primary tumor have all decreased in size though it is difficult to tell if the primary lesion has decreased given the associated opacity surrounding it.  For now continue same treatment.  Treatment with immunotherapy after he has completed 4 cycles of chemoimmunotherapy.  See me in approximately 6 weeks.  Treatment plan placed.    12/23/2024: Received the first cycle of immunotherapy without any difficulties.  Has had some episodes of fever since the last visit.  Sometimes as high as 101.3.  He has no symptoms associated to this and often is not even aware that he has a fever.  He is using a forehead thermometer that seems to be good working order.  He is energetic in general.  He has no new limitations.  He has recently hurt a hip.  He is eating reasonably well though he  persists with marked dysgeusia.  No chest pains or cough.  He has not had much drainage from the Pleurx catheter lately in the last 3 times they were not able to drain any fluid.  He has no abdominal pain, diarrhea or dysuria.  On exam alert and conversant.  Oriented and in no distress.  No jaundice.  The lungs are diminished bilaterally but in a symmetrical fashion.  I do not believe there is any fluid at this time in the right pleural space.  Heart is regular.  Abdomen is soft.  There is no edema.  Laboratory exams reviewed and discussed with him.  To remove the Pleurx catheter.  Ideally to send the tip for culture.  He is also going to have blood cultures from the port and from a peripheral vein.  He will see me in a few weeks.  Continue immunotherapy for now.    2/13/2025: Has been feeling very well.  Has no new complaints.  His appetite has improved since he discontinued the chemotherapy and he has gained some weight.  He has no more dyspnea and has not been coughing.  His oxygen saturation has been above 90 consistently.  No chest pains or abdominal pain.  Denies diarrhea or constipation.  No dysuria.  No edema.  On exam alert and conversant.  Oriented.  No distress.  The neck is without deformity.  Palpation discloses a barely palpable thyroid.  No nodules.  No palpable lymphadenopathy.  Respirations not labored.  Lungs diminished bilaterally in a symmetrical fashion.  Heart regular.  Abdomen soft and without tumors.  No edema.  Reviewed the laboratory exams.  On 2 occasions he has had a very low TSH with slight elevations of the free T4, but without any symptoms.  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  Will investigate.    Past Medical History:   Diagnosis Date    Coronary artery disease     Myocardial infarction 01/31/2024     Past Surgical History:   Procedure Laterality Date    BRONCHOSCOPY N/A 8/13/2024    Procedure: BRONCHOSCOPY WITH BRONCHIAL WASHING AND BRONCHIAL BRUSHING;  Surgeon:  Kelvin Gonzalez MD;  Location: [facility] ENDOSCOPY;  Service: Pulmonary;  Laterality: N/A;    BRONCHOSCOPY WITH ION ROBOTIC ASSIST N/A 8/16/2024    Procedure: BRONCHOSCOPY WITH ION ROBOT WITH CRYO BIOPSY;  Surgeon: Kelvin Gonzalez MD;  Location: [facility] ENDOSCOPY;  Service: Robotics - Pulmonary;  Laterality: N/A;    CARDIAC CATHETERIZATION Right 1/29/2024    Procedure: Coronary angiography;  Surgeon: Abran Chand MD;  Location: [facility] CATH INVASIVE LOCATION;  Service: Cardiovascular;  Laterality: Right;    CARDIAC CATHETERIZATION N/A 1/29/2024    Procedure: Left Heart Cath;  Surgeon: Abran Chand MD;  Location: [facility] CATH INVASIVE LOCATION;  Service: Cardiovascular;  Laterality: N/A;       Current Outpatient Medications:     acetaminophen (TYLENOL) 325 MG tablet, Take 2 tablets by mouth Every 4 (Four) Hours As Needed for Mild Pain., Disp: , Rfl:     apixaban (ELIQUIS) 5 MG tablet tablet, Take 1 tablet by mouth Every 12 (Twelve) Hours. Indications: Atrial Fibrillation, Disp: 60 tablet, Rfl: 0    Cholecalciferol (VITAMIN D-3 PO), Take 1 tablet by mouth Daily. Indications: Vitamin Deficiency, Disp: , Rfl:     guaiFENesin (Mucinex) 600 MG 12 hr tablet, Take 1 tablet by mouth 2 (Two) Times a Day. Indications: Cough, Disp: , Rfl:     lidocaine-prilocaine (EMLA) 2.5-2.5 % cream, Apply 1 Application topically to the appropriate area as directed As Needed for Mild Pain (Apply 1 hour prior to port access)., Disp: 30 g, Rfl: 2    midodrine (PROAMATINE) 5 MG tablet, Take 1 tablet by mouth 3 (Three) Times a Day Before Meals. (Patient taking differently: Take 1 tablet by mouth 3 (Three) Times a Day As Needed (low blood pressure). Indications: Disorder of Low Blood Pressure), Disp: 20 tablet, Rfl: 0    multivitamin with minerals (Centrum Silver 50+Men) tablet tablet, Take 1 tablet by mouth Every Morning. Indications: Vitamin Deficiency, Disp: , Rfl:     NON FORMULARY, Take 2 tablets by mouth Every Night. Zzzquil  Indications: Sleep  Disorder, Disp: , Rfl:     ondansetron (ZOFRAN) 8 MG tablet, Take 1 tablet by mouth 3 (Three) Times a Day As Needed for Nausea or Vomiting., Disp: 30 tablet, Rfl: 3    pantoprazole (PROTONIX) 40 MG EC tablet, Take 1 tablet by mouth Daily., Disp: 30 tablet, Rfl: 1    polyethylene glycol (MIRALAX) 17 g packet, Take 17 g by mouth Daily. Indications: Constipation, Disp: , Rfl:     simvastatin (ZOCOR) 20 MG tablet, Take 1 tablet by mouth Every Night. Indications: High Amount of Fats in the Blood, Disp: , Rfl:     sotalol (Betapace) 80 MG tablet, Take 1 tablet by mouth 2 (Two) Times a Day. Indications: Atrial Fibrillation, Disp: 60 tablet, Rfl: 5    tamsulosin (FLOMAX) 0.4 MG capsule 24 hr capsule, Take 1 capsule by mouth Every Night. Indications: Benign Enlargement of Prostate, Disp: , Rfl:     thiamine (VITAMIN B-1) 100 MG tablet  tablet, Take 1 tablet by mouth Every Morning. Indications: Vitamin Deficiency, Disp: , Rfl:     traMADol (ULTRAM) 50 MG tablet, Take 1 tablet by mouth Every 6 (Six) Hours As Needed for Moderate Pain., Disp: 90 tablet, Rfl: 0    ipratropium-albuterol (DUO-NEB) 0.5-2.5 mg/3 ml nebulizer, Take 3 mL by nebulization 4 (Four) Times a Day As Needed for Wheezing (Dyspnea) for up to 30 days. (Patient not taking: Reported on 11/27/2024), Disp: 120 mL, Rfl: 3  No current facility-administered medications for this visit.    Facility-Administered Medications Ordered in Other Visits:     sodium chloride 0.9 % flush 10 mL, 10 mL, Intravenous, PRN, Arben Tim MD    No Known Allergies    Family History   Problem Relation Age of Onset    Dementia Mother     Breast cancer Sister 74     Cancer-related family history includes Breast cancer (age of onset: 74) in his sister.    Social History     Tobacco Use    Smoking status: Former     Current packs/day: 0.00     Average packs/day: 1 pack/day for 32.0 years (32.0 ttl pk-yrs)     Types: Cigarettes     Start date: 1968     Quit date: 2000     Years since  quittin.1     Passive exposure: Past    Smokeless tobacco: Never   Vaping Use    Vaping status: Never Used   Substance Use Topics    Alcohol use: Yes     Alcohol/week: 1.0 standard drink of alcohol     Types: 1 Cans of beer per week    Drug use: Never     Social History     Social History Narrative    Not on file      ROS:   Review of Systems   Constitutional:  Positive for unexpected weight change. Negative for activity change, appetite change, chills, diaphoresis, fatigue and fever.   HENT:  Negative for congestion, dental problem, drooling, ear discharge, ear pain, facial swelling, hearing loss, mouth sores, nosebleeds, postnasal drip, rhinorrhea, sinus pressure, sinus pain, sneezing, sore throat, tinnitus, trouble swallowing and voice change.    Eyes:  Negative for photophobia, pain, discharge, redness, itching and visual disturbance.   Respiratory:  Positive for cough and shortness of breath. Negative for apnea, choking, chest tightness, wheezing and stridor.    Cardiovascular:  Negative for chest pain, palpitations and leg swelling.   Gastrointestinal:  Negative for abdominal distention, abdominal pain, anal bleeding, blood in stool, constipation, diarrhea, nausea, rectal pain and vomiting.   Endocrine: Negative for cold intolerance, heat intolerance, polydipsia and polyuria.   Genitourinary:  Negative for decreased urine volume, difficulty urinating, dysuria, flank pain, frequency, genital sores, hematuria and urgency.   Musculoskeletal:  Negative for arthralgias, back pain, gait problem, joint swelling, myalgias, neck pain and neck stiffness.   Skin:  Negative for color change, pallor and rash.   Neurological:  Negative for dizziness, tremors, seizures, syncope, facial asymmetry, speech difficulty, weakness, light-headedness, numbness and headaches.   Hematological:  Negative for adenopathy. Does not bruise/bleed easily.   Psychiatric/Behavioral:  Negative for agitation, behavioral problems, confusion,  "decreased concentration, hallucinations, self-injury, sleep disturbance and suicidal ideas. The patient is not nervous/anxious.      Objective:  Vital signs:  Vitals:    02/13/25 1421   BP: 135/87   Pulse: 73   Resp: 19   Temp: 98.3 °F (36.8 °C)   SpO2: 99%   Weight: 81.2 kg (179 lb)   Height: 177.8 cm (70\")   PainSc: 0-No pain     Body mass index is 25.68 kg/m².  ECOG  (1) Restricted in physically strenuous activity, ambulatory and able to do work of light nature    Physical Exam:   Physical Exam  Constitutional:       General: He is not in acute distress.     Appearance: He is not ill-appearing, toxic-appearing or diaphoretic.      Comments: Slender. Well built and in no distress. No jaundice.    HENT:      Head: Normocephalic and atraumatic.      Right Ear: External ear normal.      Left Ear: External ear normal.      Nose: Nose normal.      Mouth/Throat:      Mouth: Mucous membranes are moist.      Pharynx: Oropharynx is clear.   Eyes:      General: No scleral icterus.        Right eye: No discharge.         Left eye: No discharge.      Conjunctiva/sclera: Conjunctivae normal.      Pupils: Pupils are equal, round, and reactive to light.   Cardiovascular:      Rate and Rhythm: Normal rate and regular rhythm.      Pulses: Normal pulses.      Heart sounds: Normal heart sounds. No murmur heard.     No friction rub. No gallop.   Pulmonary:      Effort: No respiratory distress.      Breath sounds: No stridor. No wheezing, rhonchi or rales.      Comments: Breath sounds diminished bilaterally.   Chest:      Chest wall: No tenderness.   Abdominal:      General: Abdomen is flat. Bowel sounds are normal. There is no distension.      Palpations: Abdomen is soft. There is no mass.      Tenderness: There is no abdominal tenderness. There is no right CVA tenderness, left CVA tenderness, guarding or rebound.   Musculoskeletal:         General: No tenderness, deformity or signs of injury.      Cervical back: No rigidity.      " Right lower leg: No edema.      Left lower leg: No edema.   Lymphadenopathy:      Cervical: No cervical adenopathy.   Skin:     General: Skin is warm and dry.      Coloration: Skin is not jaundiced or pale.      Findings: No bruising or rash.   Neurological:      General: No focal deficit present.      Mental Status: He is alert and oriented to person, place, and time.      Cranial Nerves: No cranial nerve deficit.   Psychiatric:         Mood and Affect: Mood normal.         Behavior: Behavior normal.         Thought Content: Thought content normal.         Judgment: Judgment normal.     DICKSON Tim MD performed the physical exam on 2/13/2025 as documented above.    Lab Results - Last 18 Months   Lab Units 02/13/25  1417 01/23/25  1139 01/02/25  1157   WBC 10*3/mm3 8.31 8.01 9.11   HEMOGLOBIN g/dL 10.1* 8.7* 8.2*   HEMATOCRIT % 32.7* 28.2* 26.4*   PLATELETS 10*3/mm3 250 257 363   MCV fL 90.1 88.1 94.3     Lab Results - Last 18 Months   Lab Units 01/23/25  1225 01/23/25  1148 01/02/25  1307 01/02/25  1212 12/23/24  1439 11/21/24  1154 11/11/24  1406   SODIUM mmol/L 139  --  136  --  140  --  138   POTASSIUM mmol/L 4.3  --  4.4  --  4.2  --  4.3   CHLORIDE mmol/L 105  --  103  --  104  --  106   CO2 mmol/L 25.4  --  22.9  --  24.3  --  22.3   BUN mg/dL 28*  --  36*  --  23  --  16   CREATININE mg/dL 1.61* 1.90* 1.34*   < > 1.42*   < > 1.20   CALCIUM mg/dL 9.0  --  9.4  --  9.4  --  8.7   BILIRUBIN mg/dL 0.2  --   --   --  0.2  --  0.3   ALK PHOS U/L 61  --   --   --  71  --  65   ALT (SGPT) U/L 59*  --   --   --  28  --  31   AST (SGOT) U/L 89*  --   --   --  52*  --  31   GLUCOSE mg/dL 138*  --  123*  --  116*  --  120*    < > = values in this interval not displayed.     Lab Results   Component Value Date    GLUCOSE 138 (H) 01/23/2025    BUN 28 (H) 01/23/2025    CREATININE 1.61 (H) 01/23/2025    BCR 17.4 01/23/2025    K 4.3 01/23/2025    CO2 25.4 01/23/2025    CALCIUM 9.0 01/23/2025    ALBUMIN 3.2 (L)  01/23/2025    AST 89 (H) 01/23/2025    ALT 59 (H) 01/23/2025     Lab Results - Last 18 Months   Lab Units 09/19/24  0742 09/12/24  0809 08/12/24  1834 01/28/24  1737   INR  1.10 1.03 1.00 0.98   APTT seconds 28.5 26.7  --  24.8*     Lab Results   Component Value Date    STEVEN Positive (A) 08/12/2024     Lab Results   Component Value Date    PTT 28.5 09/19/2024    INR 1.10 09/19/2024     Assessment & Plan     cTX N2 M1 adenocarcinoma of the right lung: Next-generation sequencing reviewed.  Positive PD-L1 expression..  The disease is negative for EGFR, ALK, ROS1, ret, BRAF.  Completed concurrent radiation and chemotherapy with carboplatin and paclitaxel.  She started consolidation therapy with pembrolizumab.  Has completed 3 cycles.  No indication of complications.  Hyperthyroidism?  Could this corresponds to Graves' disease?  Will investigate further.  Very low TSH with low normal to mildly elevated free T4  Fevers: Resolved.  Right pleural effusion: Resolved.  History of tobacco smoking: Abstinent for more than 20 years.  R reviewed all the laboratory exams and discussed with him.  He will return to see me in approximately 4 weeks.  Measure  thyrotropin receptor antibodies as well as thyroid function.  Obtain ultrasound of the thyroid.    Arben Tim MD on 2/13/2025 at 1502.

## 2025-02-13 ENCOUNTER — HOSPITAL ENCOUNTER (OUTPATIENT)
Dept: ONCOLOGY | Facility: HOSPITAL | Age: 75
Discharge: HOME | End: 2025-02-13
Payer: MEDICARE

## 2025-02-13 ENCOUNTER — OFFICE VISIT (OUTPATIENT)
Dept: ONCOLOGY | Facility: CLINIC | Age: 75
End: 2025-02-13
Payer: MEDICARE

## 2025-02-13 VITALS
SYSTOLIC BLOOD PRESSURE: 135 MMHG | TEMPERATURE: 98.3 F | BODY MASS INDEX: 25.62 KG/M2 | RESPIRATION RATE: 19 BRPM | OXYGEN SATURATION: 99 % | DIASTOLIC BLOOD PRESSURE: 87 MMHG | WEIGHT: 179 LBS | HEART RATE: 73 BPM | HEIGHT: 70 IN

## 2025-02-13 DIAGNOSIS — C34.91 STAGE IV ADENOCARCINOMA OF LUNG, RIGHT: ICD-10-CM

## 2025-02-13 DIAGNOSIS — C34.92 STAGE IV ADENOCARCINOMA OF LUNG, LEFT: Primary | ICD-10-CM

## 2025-02-13 DIAGNOSIS — R53.1 WEAKNESS: ICD-10-CM

## 2025-02-13 DIAGNOSIS — C34.91 PRIMARY LUNG CANCER, RIGHT: ICD-10-CM

## 2025-02-13 LAB
ALBUMIN SERPL-MCNC: 3.5 G/DL (ref 3.5–5.2)
ALBUMIN/GLOB SERPL: 1.2 G/DL
ALP SERPL-CCNC: 60 U/L (ref 39–117)
ALT SERPL W P-5'-P-CCNC: 107 U/L (ref 1–41)
ANION GAP SERPL CALCULATED.3IONS-SCNC: 9.6 MMOL/L (ref 5–15)
AST SERPL-CCNC: 167 U/L (ref 1–40)
BASOPHILS # BLD AUTO: 0.04 10*3/MM3 (ref 0–0.2)
BASOPHILS NFR BLD AUTO: 0.5 % (ref 0–1.5)
BILIRUB SERPL-MCNC: <0.2 MG/DL (ref 0–1.2)
BUN SERPL-MCNC: 28 MG/DL (ref 8–23)
BUN/CREAT SERPL: 15.2 (ref 7–25)
CALCIUM SPEC-SCNC: 9.1 MG/DL (ref 8.6–10.5)
CHLORIDE SERPL-SCNC: 104 MMOL/L (ref 98–107)
CO2 SERPL-SCNC: 25.4 MMOL/L (ref 22–29)
CREAT SERPL-MCNC: 1.84 MG/DL (ref 0.76–1.27)
DEPRECATED RDW RBC AUTO: 59.5 FL (ref 37–54)
EGFRCR SERPLBLD CKD-EPI 2021: 37.8 ML/MIN/1.73
EOSINOPHIL # BLD AUTO: 0.86 10*3/MM3 (ref 0–0.4)
EOSINOPHIL NFR BLD AUTO: 10.3 % (ref 0.3–6.2)
ERYTHROCYTE [DISTWIDTH] IN BLOOD BY AUTOMATED COUNT: 18.6 % (ref 12.3–15.4)
GLOBULIN UR ELPH-MCNC: 2.9 GM/DL
GLUCOSE BLDC GLUCOMTR-MCNC: 76 MG/DL (ref 70–105)
GLUCOSE SERPL-MCNC: 121 MG/DL (ref 65–99)
HCT VFR BLD AUTO: 32.7 % (ref 37.5–51)
HGB BLD-MCNC: 10.1 G/DL (ref 13–17.7)
LYMPHOCYTES # BLD AUTO: 1.6 10*3/MM3 (ref 0.7–3.1)
LYMPHOCYTES NFR BLD AUTO: 19.3 % (ref 19.6–45.3)
MCH RBC QN AUTO: 27.8 PG (ref 26.6–33)
MCHC RBC AUTO-ENTMCNC: 30.9 G/DL (ref 31.5–35.7)
MCV RBC AUTO: 90.1 FL (ref 79–97)
MONOCYTES # BLD AUTO: 0.74 10*3/MM3 (ref 0.1–0.9)
MONOCYTES NFR BLD AUTO: 8.9 % (ref 5–12)
NEUTROPHILS NFR BLD AUTO: 5.07 10*3/MM3 (ref 1.7–7)
NEUTROPHILS NFR BLD AUTO: 61 % (ref 42.7–76)
PLATELET # BLD AUTO: 250 10*3/MM3 (ref 140–450)
PMV BLD AUTO: 9.5 FL (ref 6–12)
POTASSIUM SERPL-SCNC: 4.5 MMOL/L (ref 3.5–5.2)
PROT SERPL-MCNC: 6.4 G/DL (ref 6–8.5)
RBC # BLD AUTO: 3.63 10*6/MM3 (ref 4.14–5.8)
SODIUM SERPL-SCNC: 139 MMOL/L (ref 136–145)
T-UPTAKE NFR SERPL: 1.25 TBI (ref 0.8–1.3)
T3FREE SERPL-MCNC: 1.03 PG/ML (ref 2–4.4)
T4 FREE SERPL-MCNC: 0.26 NG/DL (ref 0.93–1.7)
T4 SERPL-MCNC: 2.56 MCG/DL (ref 4.5–11.7)
TSH SERPL DL<=0.05 MIU/L-ACNC: 29.3 UIU/ML (ref 0.27–4.2)
WBC NRBC COR # BLD AUTO: 8.31 10*3/MM3 (ref 3.4–10.8)

## 2025-02-13 PROCEDURE — 85025 COMPLETE CBC W/AUTO DIFF WBC: CPT | Performed by: INTERNAL MEDICINE

## 2025-02-13 PROCEDURE — 1126F AMNT PAIN NOTED NONE PRSNT: CPT | Performed by: INTERNAL MEDICINE

## 2025-02-13 PROCEDURE — 84443 ASSAY THYROID STIM HORMONE: CPT | Performed by: INTERNAL MEDICINE

## 2025-02-13 PROCEDURE — 84479 ASSAY OF THYROID (T3 OR T4): CPT | Performed by: INTERNAL MEDICINE

## 2025-02-13 PROCEDURE — 25010000002 PEMBROLIZUMAB 100 MG/4ML SOLUTION 4 ML VIAL: Performed by: INTERNAL MEDICINE

## 2025-02-13 PROCEDURE — 82948 REAGENT STRIP/BLOOD GLUCOSE: CPT

## 2025-02-13 PROCEDURE — 1159F MED LIST DOCD IN RCRD: CPT | Performed by: INTERNAL MEDICINE

## 2025-02-13 PROCEDURE — 99214 OFFICE O/P EST MOD 30 MIN: CPT | Performed by: INTERNAL MEDICINE

## 2025-02-13 PROCEDURE — 25010000002 HEPARIN LOCK FLUSH PER 10 UNITS: Performed by: INTERNAL MEDICINE

## 2025-02-13 PROCEDURE — 80053 COMPREHEN METABOLIC PANEL: CPT | Performed by: INTERNAL MEDICINE

## 2025-02-13 PROCEDURE — 84439 ASSAY OF FREE THYROXINE: CPT | Performed by: INTERNAL MEDICINE

## 2025-02-13 PROCEDURE — 1160F RVW MEDS BY RX/DR IN RCRD: CPT | Performed by: INTERNAL MEDICINE

## 2025-02-13 PROCEDURE — 36415 COLL VENOUS BLD VENIPUNCTURE: CPT | Performed by: INTERNAL MEDICINE

## 2025-02-13 PROCEDURE — 84481 FREE ASSAY (FT-3): CPT | Performed by: INTERNAL MEDICINE

## 2025-02-13 PROCEDURE — 96413 CHEMO IV INFUSION 1 HR: CPT

## 2025-02-13 RX ORDER — SODIUM CHLORIDE 0.9 % (FLUSH) 0.9 %
10 SYRINGE (ML) INJECTION AS NEEDED
Status: CANCELLED | OUTPATIENT
Start: 2025-02-13

## 2025-02-13 RX ORDER — HEPARIN SODIUM (PORCINE) LOCK FLUSH IV SOLN 100 UNIT/ML 100 UNIT/ML
500 SOLUTION INTRAVENOUS AS NEEDED
Status: CANCELLED | OUTPATIENT
Start: 2025-02-13

## 2025-02-13 RX ORDER — SODIUM CHLORIDE 0.9 % (FLUSH) 0.9 %
10 SYRINGE (ML) INJECTION AS NEEDED
OUTPATIENT
Start: 2025-02-13

## 2025-02-13 RX ORDER — SODIUM CHLORIDE 0.9 % (FLUSH) 0.9 %
10 SYRINGE (ML) INJECTION AS NEEDED
Status: DISCONTINUED | OUTPATIENT
Start: 2025-02-13 | End: 2025-02-14 | Stop reason: HOSPADM

## 2025-02-13 RX ORDER — HEPARIN SODIUM (PORCINE) LOCK FLUSH IV SOLN 100 UNIT/ML 100 UNIT/ML
500 SOLUTION INTRAVENOUS AS NEEDED
OUTPATIENT
Start: 2025-02-13

## 2025-02-13 RX ORDER — SODIUM CHLORIDE 9 MG/ML
20 INJECTION, SOLUTION INTRAVENOUS ONCE
Status: CANCELLED | OUTPATIENT
Start: 2025-02-13

## 2025-02-13 RX ORDER — HEPARIN SODIUM (PORCINE) LOCK FLUSH IV SOLN 100 UNIT/ML 100 UNIT/ML
500 SOLUTION INTRAVENOUS AS NEEDED
Status: DISCONTINUED | OUTPATIENT
Start: 2025-02-13 | End: 2025-02-14 | Stop reason: HOSPADM

## 2025-02-13 RX ORDER — SODIUM CHLORIDE 9 MG/ML
20 INJECTION, SOLUTION INTRAVENOUS ONCE
Status: DISCONTINUED | OUTPATIENT
Start: 2025-02-13 | End: 2025-02-14 | Stop reason: HOSPADM

## 2025-02-13 RX ADMIN — SODIUM CHLORIDE 200 MG: 900 INJECTION, SOLUTION INTRAVENOUS at 16:01

## 2025-02-13 RX ADMIN — HEPARIN 500 UNITS: 100 SYRINGE at 16:33

## 2025-02-13 RX ADMIN — Medication 10 ML: at 16:32

## 2025-02-13 NOTE — PROGRESS NOTES
Port accessed and flushed with good blood return noted. 10cc of blood wasted prior to specimen collection. Blood specimen obtained and sent to lab for processing per protocol.  Port saline locked for possible infusion. Pt tolerated well and was sent to waiting room for MD appt.

## 2025-02-16 LAB — TSH RECEP AB SER-ACNC: <1.1 IU/L (ref 0–1.75)

## 2025-02-23 DIAGNOSIS — E03.2 HYPOTHYROIDISM DUE TO MEDICATION: Primary | ICD-10-CM

## 2025-02-23 RX ORDER — LEVOTHYROXINE SODIUM 25 UG/1
25 TABLET ORAL
Qty: 30 TABLET | Refills: 3 | Status: SHIPPED | OUTPATIENT
Start: 2025-02-23

## 2025-03-06 ENCOUNTER — HOSPITAL ENCOUNTER (OUTPATIENT)
Dept: ONCOLOGY | Facility: HOSPITAL | Age: 75
Discharge: HOME | End: 2025-03-06
Payer: MEDICARE

## 2025-03-06 VITALS
TEMPERATURE: 98 F | OXYGEN SATURATION: 95 % | RESPIRATION RATE: 16 BRPM | BODY MASS INDEX: 26.16 KG/M2 | WEIGHT: 182.7 LBS | DIASTOLIC BLOOD PRESSURE: 76 MMHG | SYSTOLIC BLOOD PRESSURE: 146 MMHG | HEART RATE: 65 BPM | HEIGHT: 70 IN

## 2025-03-06 DIAGNOSIS — C34.92 STAGE IV ADENOCARCINOMA OF LUNG, LEFT: Primary | ICD-10-CM

## 2025-03-06 DIAGNOSIS — C34.91 STAGE IV ADENOCARCINOMA OF LUNG, RIGHT: Primary | ICD-10-CM

## 2025-03-06 DIAGNOSIS — E06.9 THYROIDITIS: ICD-10-CM

## 2025-03-06 DIAGNOSIS — C34.92 STAGE IV ADENOCARCINOMA OF LUNG, LEFT: ICD-10-CM

## 2025-03-06 LAB
ALBUMIN SERPL-MCNC: 3.5 G/DL (ref 3.5–5.2)
ALBUMIN/GLOB SERPL: 1.3 G/DL
ALP SERPL-CCNC: 52 U/L (ref 39–117)
ALT SERPL W P-5'-P-CCNC: 109 U/L (ref 1–41)
ANION GAP SERPL CALCULATED.3IONS-SCNC: 10 MMOL/L (ref 5–15)
AST SERPL-CCNC: 169 U/L (ref 1–40)
BASOPHILS # BLD AUTO: 0.07 10*3/MM3 (ref 0–0.2)
BASOPHILS NFR BLD AUTO: 0.9 % (ref 0–1.5)
BILIRUB SERPL-MCNC: 0.2 MG/DL (ref 0–1.2)
BUN SERPL-MCNC: 27 MG/DL (ref 8–23)
BUN/CREAT SERPL: 15.8 (ref 7–25)
CALCIUM SPEC-SCNC: 8.8 MG/DL (ref 8.6–10.5)
CHLORIDE SERPL-SCNC: 105 MMOL/L (ref 98–107)
CO2 SERPL-SCNC: 23 MMOL/L (ref 22–29)
CREAT SERPL-MCNC: 1.71 MG/DL (ref 0.76–1.27)
DEPRECATED RDW RBC AUTO: 63.3 FL (ref 37–54)
EGFRCR SERPLBLD CKD-EPI 2021: 41.2 ML/MIN/1.73
EOSINOPHIL # BLD AUTO: 0.88 10*3/MM3 (ref 0–0.4)
EOSINOPHIL NFR BLD AUTO: 10.8 % (ref 0.3–6.2)
ERYTHROCYTE [DISTWIDTH] IN BLOOD BY AUTOMATED COUNT: 20.2 % (ref 12.3–15.4)
GLOBULIN UR ELPH-MCNC: 2.7 GM/DL
GLUCOSE SERPL-MCNC: 135 MG/DL (ref 65–99)
HCT VFR BLD AUTO: 33.9 % (ref 37.5–51)
HGB BLD-MCNC: 10.7 G/DL (ref 13–17.7)
LYMPHOCYTES # BLD AUTO: 1.77 10*3/MM3 (ref 0.7–3.1)
LYMPHOCYTES NFR BLD AUTO: 21.7 % (ref 19.6–45.3)
MCH RBC QN AUTO: 27.9 PG (ref 26.6–33)
MCHC RBC AUTO-ENTMCNC: 31.6 G/DL (ref 31.5–35.7)
MCV RBC AUTO: 88.3 FL (ref 79–97)
MONOCYTES # BLD AUTO: 0.58 10*3/MM3 (ref 0.1–0.9)
MONOCYTES NFR BLD AUTO: 7.1 % (ref 5–12)
NEUTROPHILS NFR BLD AUTO: 4.86 10*3/MM3 (ref 1.7–7)
NEUTROPHILS NFR BLD AUTO: 59.5 % (ref 42.7–76)
PLATELET # BLD AUTO: 195 10*3/MM3 (ref 140–450)
PMV BLD AUTO: 9.8 FL (ref 6–12)
POTASSIUM SERPL-SCNC: 4.4 MMOL/L (ref 3.5–5.2)
PROT SERPL-MCNC: 6.2 G/DL (ref 6–8.5)
RBC # BLD AUTO: 3.84 10*6/MM3 (ref 4.14–5.8)
SODIUM SERPL-SCNC: 138 MMOL/L (ref 136–145)
T4 FREE SERPL-MCNC: 0.15 NG/DL (ref 0.93–1.7)
TSH SERPL DL<=0.05 MIU/L-ACNC: 65.1 UIU/ML (ref 0.27–4.2)
WBC NRBC COR # BLD AUTO: 8.16 10*3/MM3 (ref 3.4–10.8)

## 2025-03-06 PROCEDURE — 96413 CHEMO IV INFUSION 1 HR: CPT

## 2025-03-06 PROCEDURE — 85025 COMPLETE CBC W/AUTO DIFF WBC: CPT | Performed by: INTERNAL MEDICINE

## 2025-03-06 PROCEDURE — 84443 ASSAY THYROID STIM HORMONE: CPT | Performed by: INTERNAL MEDICINE

## 2025-03-06 PROCEDURE — 25010000002 PEMBROLIZUMAB 100 MG/4ML SOLUTION 4 ML VIAL: Performed by: INTERNAL MEDICINE

## 2025-03-06 PROCEDURE — 25010000002 HEPARIN LOCK FLUSH PER 10 UNITS: Performed by: INTERNAL MEDICINE

## 2025-03-06 PROCEDURE — 80053 COMPREHEN METABOLIC PANEL: CPT | Performed by: INTERNAL MEDICINE

## 2025-03-06 PROCEDURE — 84439 ASSAY OF FREE THYROXINE: CPT | Performed by: INTERNAL MEDICINE

## 2025-03-06 RX ORDER — SODIUM CHLORIDE 9 MG/ML
20 INJECTION, SOLUTION INTRAVENOUS ONCE
Status: DISCONTINUED | OUTPATIENT
Start: 2025-03-06 | End: 2025-03-07 | Stop reason: HOSPADM

## 2025-03-06 RX ORDER — SODIUM CHLORIDE 0.9 % (FLUSH) 0.9 %
10 SYRINGE (ML) INJECTION AS NEEDED
OUTPATIENT
Start: 2025-03-06

## 2025-03-06 RX ORDER — SODIUM CHLORIDE 9 MG/ML
20 INJECTION, SOLUTION INTRAVENOUS ONCE
Status: CANCELLED | OUTPATIENT
Start: 2025-03-06

## 2025-03-06 RX ORDER — HEPARIN SODIUM (PORCINE) LOCK FLUSH IV SOLN 100 UNIT/ML 100 UNIT/ML
500 SOLUTION INTRAVENOUS AS NEEDED
OUTPATIENT
Start: 2025-03-06

## 2025-03-06 RX ORDER — SODIUM CHLORIDE 0.9 % (FLUSH) 0.9 %
10 SYRINGE (ML) INJECTION AS NEEDED
Status: DISCONTINUED | OUTPATIENT
Start: 2025-03-06 | End: 2025-03-07 | Stop reason: HOSPADM

## 2025-03-06 RX ORDER — HEPARIN SODIUM (PORCINE) LOCK FLUSH IV SOLN 100 UNIT/ML 100 UNIT/ML
500 SOLUTION INTRAVENOUS AS NEEDED
Status: DISCONTINUED | OUTPATIENT
Start: 2025-03-06 | End: 2025-03-07 | Stop reason: HOSPADM

## 2025-03-06 RX ADMIN — Medication 10 ML: at 15:45

## 2025-03-06 RX ADMIN — SODIUM CHLORIDE 200 MG: 9 INJECTION, SOLUTION INTRAVENOUS at 15:11

## 2025-03-06 RX ADMIN — HEPARIN 500 UNITS: 100 SYRINGE at 15:45

## 2025-03-06 NOTE — PROGRESS NOTES
Patient in clinic for C5 Keytruda.  Port accessed by Jo Ann Rice RN with sterile technique and positive blood return noted.  Blood drawn from port.  10 cc blood wasted prior to specimen collection. Specimens sent to lab for processing. CBC, stat CMP, TSH, T4 drawn. Patient feels well; he has mild arthralgias but no other complaints. Dr. Tim gave verbal order okay to treat with TSH 65.1. Dr. Tim wanted to repeat thyroid levels at the patient's next follow-up appointment. Orders entered for thyroid panel with next MD appt. Patient tolerated treatment. Discharged, declined AVS, aware of next appt.

## 2025-03-25 NOTE — PROGRESS NOTES
HEMATOLOGY ONCOLOGY OUTPATIENT FOLLOW UP       Patient name: Young Ames  : 1950  MRN: [id number]  Primary Care Physician: Abner Funes APRN  Referring Physician: Abner Funes APRN  Reason For Consult: Adenocarcinoma of the right lung.     History of Present Illness:    2024: Mr. Ames first came to [facility] complaining of dyspnea. This had been getting worse over a short period of time. It was associated to unintended weight loss of approximately 15 lbs in around one month. He was diagnosed with pneumonia in 2024 he was treated with azithromycin and with amoxicillin/clavulanate, despite which he did not seem to recover completely. CT scan had shown dense consolidation of the right lower lobe and there was at some point suggestion of a cavitary lesion. 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 Following this a repeat scan showed a questionable 5.5 cm right lower lobe cavitary lesion. A bronchoscopy and biopsy on 2024 confirmed adenocarcinoma of the right lung. He feels reasonably well at this time. He has been breathing better, though he persists with dyspnea or exertion. He has been afebrile. He continues to cough but not more than before. He has not had abdominal pain and denies diarrhea or dysuria. On exam alert and conversant. Oriented and in no distress. No jaundice. No oral lesions and respirations are not labored. Lungs diminished bilaterally and absent on the right lower lobe. The abdomen is soft. No edema. Reviewed the images of the scans. Reviewed the laboratory exams. Discussed with him and his family. To proceed with a PET scan. Will not obtain  pulmonary function tests, as most likely he has had recurrence of a significant right pleural effusion. He is to see me with results.     2024: Feeling about the same as at the time of the last visit but has had  more dyspnea.  He was placed on new medication in spite of which, intermittently, he continues to be uncomfortable.  He is eating reasonably well and has had no weight loss.  He is also afebrile.  He denies chest pains.  No abdominal pain.  On exam he is conversant and oriented.  No distress.  No jaundice.  The lungs are diminished bilaterally but there is absence of breath sounds and a cough when he in the lower parts of the right chest.  The abdomen is soft.  There is no edema.  Laboratory exams and final reports of pathology were reviewed and discussed with him.  Discussed with him the stage of the disease, which corresponds to 4, given the pleural involvement.  To start chemotherapy and immunotherapy.  I have requested next-generation sequencing.  Port as soon as available.    9/29/2024: Feels reasonably well today.  Has had less dyspnea since the insertion of the pleural catheter as he has been able to have the fluid drained at home more frequently.  Yesterday, for the first time, he had persistent right-sided chest pain after the drainage of the fluid that is now resolved.  He continues to be reasonably active.  He continues to eat reasonably well and has had very minimal nausea.  He took the first chemotherapy without any complications.  He denies chest pain and he has less dyspnea than before.  No abdominal pain and no edema.  On exam chronically ill-appearing but in no distress.  No jaundice.  The lungs are diminished bilaterally with more pronounced reduction of the breath sounds in the right.  The heart is regular.  The abdomen is soft.  There is no edema.  Laboratory exams reviewed.  Discussed with him.  Continue same therapy for now.  I have sent a prescription for tramadol that he can take as needed for pain, when present.    10/14/2024: Breathing better.  Progressively finding less fluid in the thoracic cavity.  Is able to do more activity.  Has had enuresis once.  Also has noted tremors.  He is  eating well.  No nausea or vomiting.  No chest pains.  No abdominal pain.  On exam he does not seem in any distress.  He is conversant and well-oriented.  No jaundice or pallor.  Lungs symmetrically ventilated without any dullness to percussion on the right.  Abdomen soft.  No edema.  Laboratory exams reviewed.  Discussed with him.  My suspicion is that he is no longer producing as much pleural fluid as before and that perhaps today they will not be able to drain much.  Will obtain a scan and will see with results.  Continue for now.  I am not sure what the cause of the tremors and the enuresis is.    11/11/2024: Feeling well.  Continues to tolerate the treatment without difficulties.  He has no more dyspnea or pain than before.  He eats well and has had no nausea or vomiting.  As well his weight is stable.  Denies diarrhea or dysuria.  On exam he does not seem ill.  He is not jaundiced.  The lungs are diminished bilaterally but symmetrically.  Heart is regular.  The abdomen is soft.  There is no edema.  Laboratory exams reviewed.  Reviewed the images and the report of the scans.  There are some suggestion of response as the lymph nodes and the primary tumor have all decreased in size though it is difficult to tell if the primary lesion has decreased given the associated opacity surrounding it.  For now continue same treatment.  Treatment with immunotherapy after he has completed 4 cycles of chemoimmunotherapy.  See me in approximately 6 weeks.  Treatment plan placed.    12/23/2024: Received the first cycle of immunotherapy without any difficulties.  Has had some episodes of fever since the last visit.  Sometimes as high as 101.3.  He has no symptoms associated to this and often is not even aware that he has a fever.  He is using a forehead thermometer that seems to be good working order.  He is energetic in general.  He has no new limitations.  He has recently hurt a hip.  He is eating reasonably well though he  persists with marked dysgeusia.  No chest pains or cough.  He has not had much drainage from the Pleurx catheter lately in the last 3 times they were not able to drain any fluid.  He has no abdominal pain, diarrhea or dysuria.  On exam alert and conversant.  Oriented and in no distress.  No jaundice.  The lungs are diminished bilaterally but in a symmetrical fashion.  I do not believe there is any fluid at this time in the right pleural space.  Heart is regular.  Abdomen is soft.  There is no edema.  Laboratory exams reviewed and discussed with him.  To remove the Pleurx catheter.  Ideally to send the tip for culture.  He is also going to have blood cultures from the port and from a peripheral vein.  He will see me in a few weeks.  Continue immunotherapy for now.    2/13/2025: Has been feeling very well.  Has no new complaints.  His appetite has improved since he discontinued the chemotherapy and he has gained some weight.  He has no more dyspnea and has not been coughing.  His oxygen saturation has been above 90 consistently.  No chest pains or abdominal pain.  Denies diarrhea or constipation.  No dysuria.  No edema.  On exam alert and conversant.  Oriented.  No distress.  The neck is without deformity.  Palpation discloses a barely palpable thyroid.  No nodules.  No palpable lymphadenopathy.  Respirations not labored.  Lungs diminished bilaterally in a symmetrical fashion.  Heart regular.  Abdomen soft and without tumors.  No edema.  Reviewed the laboratory exams.  On 2 occasions he has had a very low TSH with slight elevations of the free T4, but without any symptoms.  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  Will investigate.    3/27/2025: Tired and with arthralgia of the right hip and both knees. He's had difficulties with arthritis for some time. The pain in the knees is predominantly when going up steps. He has been fatigued. He continues to eat with good appetite. No new respiratory symptoms.  Denies fevers. On exam alert and conversant. Oriented and in no distress. No jaundice. O oral lesions and respirations not labored. Lungs diminished but clear bilaterally. Heart regular. Abdomen soft and not tender. No edema. Reviewed the laboratory exams. To  continue with the same treatment. A new prescription for levothyroxine 50 mcg was sent and he was given instructions. He is to see me in 6 weeks.     Past Medical History:   Diagnosis Date    Coronary artery disease     Myocardial infarction 01/31/2024     Past Surgical History:   Procedure Laterality Date    BRONCHOSCOPY N/A 8/13/2024    Procedure: BRONCHOSCOPY WITH BRONCHIAL WASHING AND BRONCHIAL BRUSHING;  Surgeon: Kelvin Gonzalez MD;  Location: [facility] ENDOSCOPY;  Service: Pulmonary;  Laterality: N/A;    BRONCHOSCOPY WITH ION ROBOTIC ASSIST N/A 8/16/2024    Procedure: BRONCHOSCOPY WITH ION ROBOT WITH CRYO BIOPSY;  Surgeon: Kelvin Gonzalez MD;  Location: [facility] ENDOSCOPY;  Service: Robotics - Pulmonary;  Laterality: N/A;    CARDIAC CATHETERIZATION Right 1/29/2024    Procedure: Coronary angiography;  Surgeon: Abran Chand MD;  Location: [facility] CATH INVASIVE LOCATION;  Service: Cardiovascular;  Laterality: Right;    CARDIAC CATHETERIZATION N/A 1/29/2024    Procedure: Left Heart Cath;  Surgeon: Abran Chand MD;  Location: [facility] CATH INVASIVE LOCATION;  Service: Cardiovascular;  Laterality: N/A;       Current Outpatient Medications:     acetaminophen (TYLENOL) 325 MG tablet, Take 2 tablets by mouth Every 4 (Four) Hours As Needed for Mild Pain., Disp: , Rfl:     apixaban (ELIQUIS) 5 MG tablet tablet, Take 1 tablet by mouth Every 12 (Twelve) Hours. Indications: Atrial Fibrillation, Disp: 60 tablet, Rfl: 0    Cholecalciferol (VITAMIN D-3 PO), Take 1 tablet by mouth Daily. Indications: Vitamin Deficiency, Disp: , Rfl:     guaiFENesin (Mucinex) 600 MG 12 hr tablet, Take 1 tablet by mouth 2 (Two) Times a Day. Indications: Cough, Disp: , Rfl:     lidocaine-prilocaine  (EMLA) 2.5-2.5 % cream, Apply 1 Application topically to the appropriate area as directed As Needed for Mild Pain (Apply 1 hour prior to port access)., Disp: 30 g, Rfl: 2    midodrine (PROAMATINE) 5 MG tablet, Take 1 tablet by mouth 3 (Three) Times a Day Before Meals. (Patient taking differently: Take 1 tablet by mouth 3 (Three) Times a Day As Needed (low blood pressure). Indications: Disorder of Low Blood Pressure), Disp: 20 tablet, Rfl: 0    multivitamin with minerals (Centrum Silver 50+Men) tablet tablet, Take 1 tablet by mouth Every Morning. Indications: Vitamin Deficiency, Disp: , Rfl:     NON FORMULARY, Take 2 tablets by mouth Every Night. Zzzquil  Indications: Sleep Disorder, Disp: , Rfl:     polyethylene glycol (MIRALAX) 17 g packet, Take 17 g by mouth Daily. Indications: Constipation, Disp: , Rfl:     simvastatin (ZOCOR) 20 MG tablet, Take 1 tablet by mouth Every Night. Indications: High Amount of Fats in the Blood, Disp: , Rfl:     sotalol (Betapace) 80 MG tablet, Take 1 tablet by mouth 2 (Two) Times a Day. Indications: Atrial Fibrillation, Disp: 60 tablet, Rfl: 5    tamsulosin (FLOMAX) 0.4 MG capsule 24 hr capsule, Take 1 capsule by mouth Every Night. Indications: Benign Enlargement of Prostate, Disp: , Rfl:     thiamine (VITAMIN B-1) 100 MG tablet  tablet, Take 1 tablet by mouth Every Morning. Indications: Vitamin Deficiency, Disp: , Rfl:     traMADol (ULTRAM) 50 MG tablet, Take 1 tablet by mouth Every 6 (Six) Hours As Needed for Moderate Pain., Disp: 90 tablet, Rfl: 0    ipratropium-albuterol (DUO-NEB) 0.5-2.5 mg/3 ml nebulizer, Take 3 mL by nebulization 4 (Four) Times a Day As Needed for Wheezing (Dyspnea) for up to 30 days. (Patient not taking: Reported on 11/27/2024), Disp: 120 mL, Rfl: 3    levothyroxine (SYNTHROID, LEVOTHROID) 50 MCG tablet, Take 1 tablet by mouth Every Morning., Disp: 30 tablet, Rfl: 3    ondansetron (ZOFRAN) 8 MG tablet, Take 1 tablet by mouth 3 (Three) Times a Day As Needed for  Nausea or Vomiting. (Patient not taking: Reported on 3/27/2025), Disp: 30 tablet, Rfl: 3    pantoprazole (PROTONIX) 40 MG EC tablet, Take 1 tablet by mouth Daily. (Patient not taking: Reported on 3/27/2025), Disp: 30 tablet, Rfl: 1  No current facility-administered medications for this visit.    Facility-Administered Medications Ordered in Other Visits:     heparin injection 500 Units, 500 Units, Intravenous, PRN, Arben Tim MD    Pembrolizumab (KEYTRUDA) 200 mg in sodium chloride 0.9 % 108 mL chemo IVPB, 200 mg, Intravenous, Once, Arben Tim MD    sodium chloride 0.9 % flush 10 mL, 10 mL, Intravenous, PRN, Arben Tim MD    sodium chloride 0.9 % infusion, 20 mL/hr, Intravenous, Once, Arben Tim MD    No Known Allergies    Family History   Problem Relation Age of Onset    Dementia Mother     Breast cancer Sister 74     Cancer-related family history includes Breast cancer (age of onset: 74) in his sister.    Social History     Tobacco Use    Smoking status: Former     Current packs/day: 0.00     Average packs/day: 1 pack/day for 32.0 years (32.0 ttl pk-yrs)     Types: Cigarettes     Start date:      Quit date:      Years since quittin.2     Passive exposure: Past    Smokeless tobacco: Never   Vaping Use    Vaping status: Never Used   Substance Use Topics    Alcohol use: Yes     Alcohol/week: 1.0 standard drink of alcohol     Types: 1 Cans of beer per week    Drug use: Never     Social History     Social History Narrative    Not on file      ROS:   Review of Systems   Constitutional:  Positive for unexpected weight change. Negative for activity change, appetite change, chills, diaphoresis, fatigue and fever.   HENT:  Negative for congestion, dental problem, drooling, ear discharge, ear pain, facial swelling, hearing loss, mouth sores, nosebleeds, postnasal drip, rhinorrhea, sinus pressure, sinus pain, sneezing, sore throat, tinnitus, trouble swallowing and voice change.    Eyes:  " Negative for photophobia, pain, discharge, redness, itching and visual disturbance.   Respiratory:  Positive for cough and shortness of breath. Negative for apnea, choking, chest tightness, wheezing and stridor.    Cardiovascular:  Negative for chest pain, palpitations and leg swelling.   Gastrointestinal:  Negative for abdominal distention, abdominal pain, anal bleeding, blood in stool, constipation, diarrhea, nausea, rectal pain and vomiting.   Endocrine: Negative for cold intolerance, heat intolerance, polydipsia and polyuria.   Genitourinary:  Negative for decreased urine volume, difficulty urinating, dysuria, flank pain, frequency, genital sores, hematuria and urgency.   Musculoskeletal:  Negative for arthralgias, back pain, gait problem, joint swelling, myalgias, neck pain and neck stiffness.   Skin:  Negative for color change, pallor and rash.   Neurological:  Negative for dizziness, tremors, seizures, syncope, facial asymmetry, speech difficulty, weakness, light-headedness, numbness and headaches.   Hematological:  Negative for adenopathy. Does not bruise/bleed easily.   Psychiatric/Behavioral:  Negative for agitation, behavioral problems, confusion, decreased concentration, hallucinations, self-injury, sleep disturbance and suicidal ideas. The patient is not nervous/anxious.      Objective:  Vital signs:  Vitals:    03/27/25 1236   BP: 124/85   Pulse: 67   SpO2: 97%   Weight: 82.7 kg (182 lb 6.4 oz)   Height: 177.8 cm (70\")   PainSc: 0-No pain     Body mass index is 26.17 kg/m².  ECOG  (1) Restricted in physically strenuous activity, ambulatory and able to do work of light nature    Physical Exam:   Physical Exam  Constitutional:       General: He is not in acute distress.     Appearance: He is not ill-appearing, toxic-appearing or diaphoretic.      Comments: Slender. Well built and in no distress. No jaundice.    HENT:      Head: Normocephalic and atraumatic.      Right Ear: External ear normal.      Left " Ear: External ear normal.      Nose: Nose normal.      Mouth/Throat:      Mouth: Mucous membranes are moist.      Pharynx: Oropharynx is clear.   Eyes:      General: No scleral icterus.        Right eye: No discharge.         Left eye: No discharge.      Conjunctiva/sclera: Conjunctivae normal.      Pupils: Pupils are equal, round, and reactive to light.   Cardiovascular:      Rate and Rhythm: Normal rate and regular rhythm.      Pulses: Normal pulses.      Heart sounds: Normal heart sounds. No murmur heard.     No friction rub. No gallop.   Pulmonary:      Effort: No respiratory distress.      Breath sounds: No stridor. No wheezing, rhonchi or rales.      Comments: Breath sounds diminished bilaterally.   Chest:      Chest wall: No tenderness.   Abdominal:      General: Abdomen is flat. Bowel sounds are normal. There is no distension.      Palpations: Abdomen is soft. There is no mass.      Tenderness: There is no abdominal tenderness. There is no right CVA tenderness, left CVA tenderness, guarding or rebound.   Musculoskeletal:         General: No tenderness, deformity or signs of injury.      Cervical back: No rigidity.      Right lower leg: No edema.      Left lower leg: No edema.   Lymphadenopathy:      Cervical: No cervical adenopathy.   Skin:     General: Skin is warm and dry.      Coloration: Skin is not jaundiced or pale.      Findings: No bruising or rash.   Neurological:      General: No focal deficit present.      Mental Status: He is alert and oriented to person, place, and time.      Cranial Nerves: No cranial nerve deficit.   Psychiatric:         Mood and Affect: Mood normal.         Behavior: Behavior normal.         Thought Content: Thought content normal.         Judgment: Judgment normal.     I Arben Tim MD performed the physical exam on 3/27/2025 as documented above.     Lab Results - Last 18 Months   Lab Units 03/27/25  1249 03/06/25  1344 02/13/25  1417   WBC 10*3/mm3 9.22 8.16 8.31    HEMOGLOBIN g/dL 11.4* 10.7* 10.1*   HEMATOCRIT % 35.7* 33.9* 32.7*   PLATELETS 10*3/mm3 208 195 250   MCV fL 89.3 88.3 90.1     Lab Results - Last 18 Months   Lab Units 03/27/25  1253 03/06/25  1344 02/13/25  1418 01/23/25  1225   SODIUM mmol/L  --  138 139 139   POTASSIUM mmol/L  --  4.4 4.5 4.3   CHLORIDE mmol/L  --  105 104 105   CO2 mmol/L  --  23.0 25.4 25.4   BUN mg/dL  --  27* 28* 28*   CREATININE mg/dL 2.10* 1.71* 1.84* 1.61*   CALCIUM mg/dL  --  8.8 9.1 9.0   BILIRUBIN mg/dL  --  0.2 <0.2 0.2   ALK PHOS U/L  --  52 60 61   ALT (SGPT) U/L  --  109* 107* 59*   AST (SGOT) U/L  --  169* 167* 89*   GLUCOSE mg/dL  --  135* 121* 138*     Lab Results   Component Value Date    GLUCOSE 135 (H) 03/06/2025    BUN 27 (H) 03/06/2025    CREATININE 2.10 (H) 03/27/2025    BCR 15.8 03/06/2025    K 4.4 03/06/2025    CO2 23.0 03/06/2025    CALCIUM 8.8 03/06/2025    ALBUMIN 3.5 03/06/2025     (H) 03/06/2025     (H) 03/06/2025     Lab Results - Last 18 Months   Lab Units 09/19/24  0742 09/12/24  0809 08/12/24  1834 01/28/24  1737   INR  1.10 1.03 1.00 0.98   APTT seconds 28.5 26.7  --  24.8*     Lab Results   Component Value Date    STEVEN Positive (A) 08/12/2024     Lab Results   Component Value Date    PTT 28.5 09/19/2024    INR 1.10 09/19/2024     Assessment & Plan     cTX N2 M1 adenocarcinoma of the right lung: Next-generation sequencing reviewed.  Positive PD-L1 expression..  The disease is negative for EGFR, ALK, ROS1, ret, BRAF.  Completed concurrent radiation and chemotherapy with carboplatin and paclitaxel.  Started consolidation with pembrolizumab in November of 2024. Tolerating treatment well.   Hypothyroidism. To continue levothyroxine, but the dose was increased to 50 mcg.   Fevers: Resolved.  Right pleural effusion: Resolved.  History of tobacco smoking: Abstinent for more than 20 years.  Reviewed the laboratory exams with him and his spouse. Discussed with him.   See me in approximately 6 weeks.  Continue same treatment.     Arben Tim MD on 3/27/2025 at 13:40  ADD: His creatinine has been rising since November of 2024, when he started the treatment with durvalumab. Will hold treatment today and bring back in on week. AC 14:08

## 2025-03-26 DIAGNOSIS — C34.92 STAGE IV ADENOCARCINOMA OF LUNG, LEFT: Primary | ICD-10-CM

## 2025-03-27 ENCOUNTER — HOSPITAL ENCOUNTER (OUTPATIENT)
Dept: ONCOLOGY | Facility: HOSPITAL | Age: 75
Discharge: HOME | End: 2025-03-27
Payer: MEDICARE

## 2025-03-27 ENCOUNTER — OFFICE VISIT (OUTPATIENT)
Dept: ONCOLOGY | Facility: CLINIC | Age: 75
End: 2025-03-27
Payer: MEDICARE

## 2025-03-27 VITALS
SYSTOLIC BLOOD PRESSURE: 124 MMHG | OXYGEN SATURATION: 97 % | DIASTOLIC BLOOD PRESSURE: 85 MMHG | HEIGHT: 70 IN | HEART RATE: 67 BPM | WEIGHT: 182.4 LBS | BODY MASS INDEX: 26.11 KG/M2

## 2025-03-27 DIAGNOSIS — C34.92 STAGE IV ADENOCARCINOMA OF LUNG, LEFT: ICD-10-CM

## 2025-03-27 DIAGNOSIS — E06.9 THYROIDITIS: ICD-10-CM

## 2025-03-27 DIAGNOSIS — E03.2 HYPOTHYROIDISM DUE TO MEDICATION: ICD-10-CM

## 2025-03-27 DIAGNOSIS — C34.92 STAGE IV ADENOCARCINOMA OF LUNG, LEFT: Primary | ICD-10-CM

## 2025-03-27 DIAGNOSIS — C34.91 STAGE IV ADENOCARCINOMA OF LUNG, RIGHT: Primary | ICD-10-CM

## 2025-03-27 LAB
ALP BLD-CCNC: 53 U/L (ref 53–128)
BASOPHILS # BLD AUTO: 0.05 10*3/MM3 (ref 0–0.2)
BASOPHILS NFR BLD AUTO: 0.5 % (ref 0–1.5)
BUN BLDA-MCNC: 29 MG/DL (ref 7–22)
CALCIUM BLD QL: 9.4 MG/DL (ref 8–10.3)
CHLORIDE BLDA-SCNC: 106 MMOL/L (ref 98–108)
CO2 BLDA-SCNC: 28 MMOL/L (ref 18–33)
CREAT BLDA-MCNC: 2.1 MG/DL (ref 0.6–1.2)
DEPRECATED RDW RBC AUTO: 68.1 FL (ref 37–54)
EGFRCR SERPLBLD CKD-EPI 2021: 32.2 ML/MIN/1.73
EOSINOPHIL # BLD AUTO: 0.84 10*3/MM3 (ref 0–0.4)
EOSINOPHIL NFR BLD AUTO: 9.1 % (ref 0.3–6.2)
ERYTHROCYTE [DISTWIDTH] IN BLOOD BY AUTOMATED COUNT: 21.3 % (ref 12.3–15.4)
GLUCOSE BLDC GLUCOMTR-MCNC: 140 MG/DL (ref 73–118)
HCT VFR BLD AUTO: 35.7 % (ref 37.5–51)
HGB BLD-MCNC: 11.4 G/DL (ref 13–17.7)
LYMPHOCYTES # BLD AUTO: 1.94 10*3/MM3 (ref 0.7–3.1)
LYMPHOCYTES NFR BLD AUTO: 21 % (ref 19.6–45.3)
MCH RBC QN AUTO: 28.5 PG (ref 26.6–33)
MCHC RBC AUTO-ENTMCNC: 31.9 G/DL (ref 31.5–35.7)
MCV RBC AUTO: 89.3 FL (ref 79–97)
MONOCYTES # BLD AUTO: 0.66 10*3/MM3 (ref 0.1–0.9)
MONOCYTES NFR BLD AUTO: 7.2 % (ref 5–12)
NEUTROPHILS NFR BLD AUTO: 5.73 10*3/MM3 (ref 1.7–7)
NEUTROPHILS NFR BLD AUTO: 62.2 % (ref 42.7–76)
PLATELET # BLD AUTO: 208 10*3/MM3 (ref 140–450)
PMV BLD AUTO: 9.7 FL (ref 6–12)
POC ALBUMIN: 3.1 G/L (ref 3.3–5.5)
POC ALT (SGPT): 94 U/L (ref 10–47)
POC AST (SGOT): 153 U/L (ref 11–38)
POC TOTAL BILIRUBIN: 0.7 MG/DL (ref 0.2–1.6)
POC TOTAL PROTEIN: 6.6 G/DL (ref 6.4–8.1)
POTASSIUM BLDA-SCNC: 5.2 MMOL/L (ref 3.6–5.1)
RBC # BLD AUTO: 4 10*6/MM3 (ref 4.14–5.8)
SODIUM BLD-SCNC: 142 MMOL/L (ref 128–145)
T4 FREE SERPL-MCNC: 0.19 NG/DL (ref 0.93–1.7)
TSH SERPL DL<=0.05 MIU/L-ACNC: 74.4 UIU/ML (ref 0.27–4.2)
WBC NRBC COR # BLD AUTO: 9.22 10*3/MM3 (ref 3.4–10.8)

## 2025-03-27 PROCEDURE — 84439 ASSAY OF FREE THYROXINE: CPT | Performed by: INTERNAL MEDICINE

## 2025-03-27 PROCEDURE — 85025 COMPLETE CBC W/AUTO DIFF WBC: CPT | Performed by: INTERNAL MEDICINE

## 2025-03-27 PROCEDURE — 84443 ASSAY THYROID STIM HORMONE: CPT | Performed by: INTERNAL MEDICINE

## 2025-03-27 PROCEDURE — 1159F MED LIST DOCD IN RCRD: CPT | Performed by: INTERNAL MEDICINE

## 2025-03-27 PROCEDURE — 25010000002 HEPARIN LOCK FLUSH PER 10 UNITS: Performed by: INTERNAL MEDICINE

## 2025-03-27 PROCEDURE — 80053 COMPREHEN METABOLIC PANEL: CPT

## 2025-03-27 PROCEDURE — 99214 OFFICE O/P EST MOD 30 MIN: CPT | Performed by: INTERNAL MEDICINE

## 2025-03-27 PROCEDURE — 36591 DRAW BLOOD OFF VENOUS DEVICE: CPT

## 2025-03-27 PROCEDURE — 1126F AMNT PAIN NOTED NONE PRSNT: CPT | Performed by: INTERNAL MEDICINE

## 2025-03-27 PROCEDURE — 1160F RVW MEDS BY RX/DR IN RCRD: CPT | Performed by: INTERNAL MEDICINE

## 2025-03-27 RX ORDER — SODIUM CHLORIDE 9 MG/ML
20 INJECTION, SOLUTION INTRAVENOUS ONCE
Status: DISCONTINUED | OUTPATIENT
Start: 2025-03-27 | End: 2025-03-29 | Stop reason: HOSPADM

## 2025-03-27 RX ORDER — HEPARIN SODIUM (PORCINE) LOCK FLUSH IV SOLN 100 UNIT/ML 100 UNIT/ML
500 SOLUTION INTRAVENOUS AS NEEDED
OUTPATIENT
Start: 2025-03-27

## 2025-03-27 RX ORDER — LEVOTHYROXINE SODIUM 50 UG/1
50 TABLET ORAL
Qty: 30 TABLET | Refills: 3 | Status: SHIPPED | OUTPATIENT
Start: 2025-03-27

## 2025-03-27 RX ORDER — SODIUM CHLORIDE 9 MG/ML
20 INJECTION, SOLUTION INTRAVENOUS ONCE
Status: CANCELLED | OUTPATIENT
Start: 2025-03-27

## 2025-03-27 RX ORDER — SODIUM CHLORIDE 9 MG/ML
20 INJECTION, SOLUTION INTRAVENOUS ONCE
OUTPATIENT
Start: 2025-04-03

## 2025-03-27 RX ORDER — SODIUM CHLORIDE 0.9 % (FLUSH) 0.9 %
10 SYRINGE (ML) INJECTION AS NEEDED
Status: DISCONTINUED | OUTPATIENT
Start: 2025-03-27 | End: 2025-03-29 | Stop reason: HOSPADM

## 2025-03-27 RX ORDER — HEPARIN SODIUM (PORCINE) LOCK FLUSH IV SOLN 100 UNIT/ML 100 UNIT/ML
500 SOLUTION INTRAVENOUS AS NEEDED
Status: DISCONTINUED | OUTPATIENT
Start: 2025-03-27 | End: 2025-03-29 | Stop reason: HOSPADM

## 2025-03-27 RX ORDER — SODIUM CHLORIDE 0.9 % (FLUSH) 0.9 %
10 SYRINGE (ML) INJECTION AS NEEDED
OUTPATIENT
Start: 2025-03-27

## 2025-03-27 RX ADMIN — Medication 10 ML: at 14:15

## 2025-03-27 RX ADMIN — HEPARIN 500 UNITS: 100 SYRINGE at 14:15

## 2025-03-27 NOTE — PROGRESS NOTES
Abdominal Hernia Repair: What to Expect at Home  Your Recovery  After surgery to repair your hernia, you are likely to have pain for a few days. You may also feel tired and have less energy than normal. This is common. You should feel better after a few days and will probably feel much better in 7 days. For several weeks you may feel discomfort or pulling in the hernia repair when you move. You may have some bruising around the area of the repair. This is normal.  This care sheet gives you a general idea about how long it will take for you to recover. But each person recovers at a different pace. Follow the steps below to get better as quickly as possible. How can you care for yourself at home? Activity    Rest when you feel tired. Getting enough sleep will help you recover. Try to walk each day. Start by walking a little more than you did the day before. Bit by bit, increase the amount you walk. Walking boosts blood flow and helps prevent pneumonia and constipation. If your doctor gives you an abdominal binder to wear, use it as directed. This is an elastic bandage that wraps around your belly and upper hips. It helps support your belly muscles after surgery. Avoid strenuous activities, such as biking, jogging, weight lifting, or aerobic exercise, until your doctor says it is okay. Avoid lifting anything that would make you strain. This may include heavy grocery bags and milk containers, a heavy briefcase or backpack, cat litter or dog food bags, a vacuum , or a child. Ask your doctor when you can drive again. Most people are able to return to work within 1 to 2 weeks after surgery. But if your job requires that you do heavy lifting or strenuous activity, you may need to take 4 to 6 weeks off from work. You may shower 24 to 48 hours after surgery, if your doctor okays it. Pat the cut (incision) dry.  Do not take a bath for the first 2 weeks, or until your doctor tells you Patient to infusion room for C6 Keytruda after visit with Dr Tim today. Cr 2.10 and per Dr Tim he ordered to hold treatment today and reschedule next week. He is scheduled to return 4/3/25 at 830am.     Port accessed and labs drawn in injection chair prior to Dr Tim's appt today.  Port accessed and flushed with good blood return noted. Port flushed with saline and heparin prior to needle removal.  Printed AVS and discharged home.    it is okay. Ask your doctor when it is okay for you to have sex. Diet    You can eat your normal diet. If your stomach is upset, try bland, low-fat foods like plain rice, broiled chicken, toast, and yogurt. Drink plenty of fluids (unless your doctor tells you not to). You may notice that your bowel movements are not regular right after your surgery. This is common. Avoid constipation and straining with bowel movements. You may want to take a fiber supplement every day. If you have not had a bowel movement after a couple of days, ask your doctor about taking a mild laxative. Medicines    Your doctor will tell you if and when you can restart your medicines. You will also be given instructions about taking any new medicines. If you stopped taking aspirin or some other blood thinner, your doctor will tell you when to start taking it again. Be safe with medicines. Take pain medicines exactly as directed. If the doctor gave you a prescription medicine for pain, take it as prescribed. If you are not taking a prescription pain medicine, ask your doctor if you can take an over-the-counter medicine. If your doctor prescribed antibiotics, take them as directed. Do not stop taking them just because you feel better. You need to take the full course of antibiotics. If you think your pain medicine is making you sick to your stomach: Take your medicine after meals (unless your doctor has told you not to). Ask your doctor for a different pain medicine. Incision care    If you have strips of tape on the cut (incision) the doctor made, leave the tape on for a week or until it falls off. Or follow your doctor's instructions for removing the tape. If you have staples closing the cut, you will need to visit your doctor in 1 to 2 weeks to have them removed. Wash the area daily with warm, soapy water, and pat it dry. Don't use hydrogen peroxide or alcohol, which can slow healing.  You may cover the area with a gauze bandage if it weeps or rubs against clothing. Change the bandage every day. Other instructions    Hold a pillow over your incision when you cough or take deep breaths. This will support your belly and decrease your pain. Do breathing exercises at home as instructed by your doctor. This will help prevent pneumonia. If you had laparoscopic surgery, you may also have pain in your shoulder. The pain usually lasts about a day or two. Follow-up care is a key part of your treatment and safety. Be sure to make and go to all appointments, and call your doctor if you are having problems. It's also a good idea to know your test results and keep a list of the medicines you take. When should you call for help? Call 911 anytime you think you may need emergency care. For example, call if:    You passed out (lost consciousness). You are short of breath. Call your doctor now or seek immediate medical care if:    You are sick to your stomach and cannot drink fluids. You have signs of a blood clot in your leg (called a deep vein thrombosis), such as:  Pain in your calf, back of the knee, thigh, or groin. Redness and swelling in your leg or groin. You have signs of infection, such as: Increased pain, swelling, warmth, or redness. Red streaks leading from the incision. Pus draining from the incision. A fever. You cannot pass stools or gas. You have pain that does not get better after you take pain medicine. You have loose stitches, or your incision comes open. Bright red blood has soaked through the bandage over your incision. Watch closely for changes in your health, and be sure to contact your doctor if you have any problems. Where can you learn more? Go to http://www.gray.com/  Enter B577 in the search box to learn more about \"Abdominal Hernia Repair: What to Expect at Home. \"  Current as of: January 20, 2022               Content Version: 13.4  © 7028-8477 [employer]. Care instructions adapted under license by [employer] (which disclaims liability or warranty for this information). If you have questions about a medical condition or this instruction, always ask your healthcare professional. [address] any warranty or liability for your use of this information. DISCHARGE SUMMARY from your Nurse      PATIENT INSTRUCTIONS    After general anesthesia or intravenous sedation, for 24 hours or while taking prescription Narcotics:  Limit your activities  Do not drive and operate hazardous machinery  Do not make important personal or business decisions  Do  not drink alcoholic beverages  If you have not urinated within 8 hours after discharge, please contact your surgeon on call. Report the following to your surgeon:  Excessive pain, swelling, redness or odor of or around the surgical area  Temperature over 100.5  Nausea and vomiting lasting longer than 4 hours or if unable to take medications  Any signs of decreased circulation or nerve impairment to extremity: change in color, persistent  numbness, tingling, coldness or increase pain  Any questions      GOOD HELP TO FIGHT AN INFECTION  Here are a few tip to help reduce the chance of getting an infection after surgery:  Wash Your Hands  Good handwashing is the most important thing you and your caregiver can do. Wash before and after caring for any wounds. Dry your hand with a clean towel. Wash with soap and water for at least 20 seconds. A TIP: sing the \"Happy Birthday\" song through one time while washing to help with the timing. Use a hand  in between washings. Shower  When your surgeon says it is OK to take a shower, use a new bar of antibacterial soap (if that is what you use, and keep that bar of soap ONLY for your use), or antibacterial body wash. Use a clean wash cloth or sponge when you bathe.   Dry off with a clean towel  after every bath - be careful around any wounds, skin staples, sutures or surgical glue over/on wounds. Do not enter swimming pools, hot tubs, lakes, rivers and/or ocean until wounds are healed and your doctor/surgeon says it is OK. Use Clean Sheets  Sleep on freshly laundered sheets after your surgery. Keep the surgery site covered with a clean, dry bandage (if instructed to do so). If the bandage becomes soiled, reapply a new, dry, clean bandage. Do not allow pets to sleep with you while your wound is healing. Lifestyle Modification and Controlling Your Blood Sugar  Smoking slows wound healing. Stop smoking and limit exposure to second-hand smoke. High blood sugar slows wound healing. Eat a well-balanced diet to provide proper nutrition while healing  Monitor your blood sugar (if you are a diabetic) and take your medications as you are suppose to so you can control you blood sugar after surgery. COUGH AND DEEP BREATHE    Breathing deeply and coughing are very important exercises to do after surgery. Deep breathing and coughing open the little air tubes and air sacks in your lungs. You take deep breaths every day. You may not even notice - it is just something you do when you sigh or yawn. It is a natural exercise you do to keep these air passages open. After surgery, take deep breaths and cough, on purpose. DIRECTIONS:  Take 10 to 15 slow deep breaths every hour while awake. Breathe in deeply, and hold it for 2 seconds. Exhale slowly through puckered lips, like blowing up a balloon. After every 4th or 5th deep breath, hug your pillow to your chest or belly and give a hard, deep cough. Yes, it will probably hurt. But doing this exercise is a very important part of healing after surgery. Take your pain medicine to help you do this exercise without too much pain. Coughing and deep breathing help prevent bronchitis and pneumonia after surgery.   If you had chest or belly surgery, use a pillow as a \"hug leelee\" and hold it tightly to your chest or belly when you cough. ANKLE PUMPS    Ankle pumps increase the circulation of oxygenated blood to your lower extremities and decrease your risk for circulation problems such as blood clots. They also stretch the muscles, tendons and ligaments in your foot and ankle, and prevent joint contracture in the ankle and foot, especially after surgeries on the legs. It is important to do ankle pump exercises regularly after surgery because immobility increases your risk for developing a blood clot. Your doctor may also have you take an Aspirin for the next few days as well. If your doctor did not ask you to take an Aspirin, consult with him before starting Aspirin therapy on your own. The exercise is quite simple. Slowly point your foot forward, feeling the muscles on the top of your lower leg stretch, and hold this position for 5 seconds. Next, pull your foot back toward you as far as possible, stretching the calf muscles, and hold that position for 5 seconds. Repeat with the other foot. Perform 10 repetitions every hour while awake for both ankles if possible (down and then up with the foot once is one repetition). You should feel gentle stretching of the muscles in your lower leg when doing this exercise. If you feel pain, or your range of motion is limited, don't push too hard. Only go the limit your joint and muscles will let you go. If you have increasing pain, progressively worsening leg warmth or swelling, STOP the exercise and call your doctor. MEDICATION AND   SIDE EFFECT GUIDE    The [employer] MEDICATION AND SIDE EFFECT GUIDE was provided to the PATIENT AND CARE PROVIDER.   Information provided includes instruction about drug purpose and common side effects for the following medications:   HYDROmorphone (DILAUDID)         These are general instructions for a healthy lifestyle:    *   Please give a list of your current medications to your Primary Care Provider. *   Please update this list whenever your medications are discontinued, doses are changed, or new medications (including over-the-counter products) are added. *   Please carry medication information at all times in case of emergency situations. About Smoking  No smoking / No tobacco products  Avoid exposure to second hand smoke     Surgeon General's Warning:  Quitting smoking now greatly reduces serious risk to your health. Obesity, smoking, and sedentary lifestyle greatly increases your risk for illness and disease. A healthy diet, regular physical exercise & weight monitoring are important for maintaining a healthy lifestyle. Congestive Heart Failure  You may be retaining fluid if you have a history of heart failure or if you experience any of the following symptoms:  Weight gain of 3 pounds or more overnight or 5 pounds in a week, increased swelling in your hands or feet or shortness of breath while lying flat in bed. Please call your doctor as soon as you notice any of these symptoms; do not wait until your next office visit. Recognize signs and symptoms of STROKE:  F -  Face looks uneven  A -  Arms unable to move or move evenly  S -  Speech slurred or non-existent  T -  Time-call 911 as soon as signs and symptoms begin-DO NOT go          back to bed or wait to see if you get better-TIME IS BRAIN. Warning Signs of HEART ATTACK   Call 911 if you have these symptoms:    Chest discomfort. Most heart attacks involve discomfort in the center of the chest that lasts more than a few minutes, or that goes away and comes back. It can feel like uncomfortable pressure, squeezing, fullness, or pain. Discomfort in other areas of the upper body. Symptoms can include pain or discomfort in one or both arms, the back, neck, jaw, or stomach.   Shortness of breath with or without chest discomfort. Other signs may include breaking out in a cold sweat, nausea, or lightheadedness. Don't wait more than five minutes to call 911 - MINUTES MATTER! Fast action can save your life. Calling 911 is almost always the fastest way to get lifesaving treatment. Emergency Medical Services staff can begin treatment when they arrive -- up to an hour sooner than if someone gets to the hospital by car. Learning About Coronavirus [phone])  Coronavirus [phone]): Overview  What is coronavirus (COVID-19)? The coronavirus disease (COVID-19) is caused by a virus. It is an illness that was first found in [place], [place], in December 2019. It has since spread worldwide. The virus can cause fever, cough, and trouble breathing. In severe cases, it can cause pneumonia and make it hard to breathe without help. It can cause death. Coronaviruses are a large group of viruses. They cause the common cold. They also cause more serious illnesses like Middle East respiratory syndrome (MERS) and severe acute respiratory syndrome (SARS). COVID-19 is caused by a novel coronavirus. That means it's a new type that has not been seen in people before. This virus spreads person-to-person through droplets from coughing and sneezing. It can also spread when you are close to someone who is infected. And it can spread when you touch something that has the virus on it, such as a doorknob or a tabletop. What can you do to protect yourself from coronavirus (COVID-19)? The best way to protect yourself from getting sick is to: Avoid areas where there is an outbreak. Avoid contact with people who may be infected. Wash your hands often with soap or alcohol-based hand sanitizers. Avoid crowds and try to stay at least 6 feet away from other people. Wash your hands often, especially after you cough or sneeze. Use soap and water, and scrub for at least 20 seconds.  If soap and water aren't available, use an alcohol-based hand . Avoid touching your mouth, nose, and eyes. What can you do to avoid spreading the virus to others? To help avoid spreading the virus to others:  Cover your mouth with a tissue when you cough or sneeze. Then throw the tissue in the trash. Use a disinfectant to clean things that you touch often. Stay home if you are sick or have been exposed to the virus. Don't go to school, work, or public areas. And don't use public transportation. If you are sick:  Leave your home only if you need to get medical care. But call the doctor's office first so they know you're coming. And wear a face mask, if you have one. If you have a face mask, wear it whenever you're around other people. It can help stop the spread of the virus when you cough or sneeze. Clean and disinfect your home every day. Use household  and disinfectant wipes or sprays. Take special care to clean things that you grab with your hands. These include doorknobs, remote controls, phones, and handles on your refrigerator and microwave. And don't forget countertops, tabletops, bathrooms, and computer keyboards. When to call for help  Call 911 anytime you think you may need emergency care. For example, call if:  You have severe trouble breathing. (You can't talk at all.)  You have constant chest pain or pressure. You are severely dizzy or lightheaded. You are confused or can't think clearly. Your face and lips have a blue color. You pass out (lose consciousness) or are very hard to wake up. Call your doctor now if you develop symptoms such as:  Shortness of breath. Fever. Cough. If you need to get care, call ahead to the doctor's office for instructions before you go. Make sure you wear a face mask, if you have one, to prevent exposing other people to the virus. Where can you get the latest information? The following health organizations are tracking and studying this virus. Their websites contain the most up-to-date information. Tim Irby also learn what to do if you think you may have been exposed to the virus. U.S. Centers for Disease Control and Prevention (CDC): The CDC provides updated news about the disease and travel advice. The website also tells you how to prevent the spread of infection. www.cdc.gov  World Health Organization [facility]): WHO offers information about the virus outbreaks. WHO also has travel advice. www.who.int  Current as of: April 1, 2020               Content Version: 12.4  © 2006-2020 [employer]. Care instructions adapted under license by your healthcare professional. If you have questions about a medical condition or this instruction, always ask your healthcare professional. [address] any warranty or liability for your use of this information. The discharge information has been reviewed with the patient and spouse. Any questions and concerns from the patient and spouse have been addressed. The patient and spouse verbalized understanding.         CONTENTS FOUND IN YOUR DISCHARGE ENVELOPE:  [x]     Surgeon and Hospital Discharge Instructions  []     [facility] Care Provider Card  [x]     Medication & Side Effect Guide            (your newly prescribed medications have been marked/highlighted showing the most common side effects from   the classes of drugs on your prescriptions)  [x]     Medication Prescription(s) x HYDROmorphone (DILAUDID)  ( [x] These have been sent electronically to your pharmacy by your surgeon,   - OR -       your surgeon has already provided these to you during a previous/pre-op office visit)  []     [address]  []     Physical Therapy Prescription  []     Follow-up Appointment Cards  []     Surgery-related Pictures/Media  []     Pain block and/or block with On-Q Catheter from Anesthesia Service (information included in your instructions above)  []     Medical device information sheets/pamphlets from their  []     School/work excuse note. []     /parent work excuse note. The following personal items collected during your admission are returned to you:   Dental Appliance: Dental Appliances: None  Vision: Visual Aid: None  Hearing Aid:    Jewelry: Jewelry: None  Clothing: Clothing: Footwear, Jacket/Coat, Pants, Shirt, Socks  Other Valuables:  Other Valuables: None  Valuables sent to safe: Personal Items Sent to Safe: NA

## 2025-04-03 ENCOUNTER — HOSPITAL ENCOUNTER (OUTPATIENT)
Dept: ONCOLOGY | Facility: HOSPITAL | Age: 75
Discharge: HOME | End: 2025-04-03
Admitting: INTERNAL MEDICINE
Payer: MEDICARE

## 2025-04-03 VITALS
HEIGHT: 70 IN | DIASTOLIC BLOOD PRESSURE: 79 MMHG | SYSTOLIC BLOOD PRESSURE: 128 MMHG | OXYGEN SATURATION: 94 % | HEART RATE: 63 BPM | WEIGHT: 182 LBS | TEMPERATURE: 97.4 F | BODY MASS INDEX: 26.05 KG/M2 | RESPIRATION RATE: 14 BRPM

## 2025-04-03 DIAGNOSIS — C34.92 STAGE IV ADENOCARCINOMA OF LUNG, LEFT: ICD-10-CM

## 2025-04-03 DIAGNOSIS — C34.91 STAGE IV ADENOCARCINOMA OF LUNG, RIGHT: Primary | ICD-10-CM

## 2025-04-03 LAB
ALBUMIN SERPL-MCNC: 3.8 G/DL (ref 3.5–5.2)
ALBUMIN/GLOB SERPL: 1.3 G/DL
ALP SERPL-CCNC: 53 U/L (ref 39–117)
ALT SERPL W P-5'-P-CCNC: 108 U/L (ref 1–41)
ANION GAP SERPL CALCULATED.3IONS-SCNC: 10.8 MMOL/L (ref 5–15)
AST SERPL-CCNC: 156 U/L (ref 1–40)
BASOPHILS # BLD AUTO: 0.09 10*3/MM3 (ref 0–0.2)
BASOPHILS NFR BLD AUTO: 0.9 % (ref 0–1.5)
BILIRUB SERPL-MCNC: 0.2 MG/DL (ref 0–1.2)
BUN SERPL-MCNC: 27 MG/DL (ref 8–23)
BUN/CREAT SERPL: 12.3 (ref 7–25)
CALCIUM SPEC-SCNC: 9.1 MG/DL (ref 8.6–10.5)
CHLORIDE SERPL-SCNC: 103 MMOL/L (ref 98–107)
CO2 SERPL-SCNC: 23.2 MMOL/L (ref 22–29)
CREAT SERPL-MCNC: 2.2 MG/DL (ref 0.76–1.27)
DEPRECATED RDW RBC AUTO: 65.7 FL (ref 37–54)
EGFRCR SERPLBLD CKD-EPI 2021: 30.5 ML/MIN/1.73
EOSINOPHIL # BLD AUTO: 0.82 10*3/MM3 (ref 0–0.4)
EOSINOPHIL NFR BLD AUTO: 8.5 % (ref 0.3–6.2)
ERYTHROCYTE [DISTWIDTH] IN BLOOD BY AUTOMATED COUNT: 20.8 % (ref 12.3–15.4)
GLOBULIN UR ELPH-MCNC: 2.9 GM/DL
GLUCOSE SERPL-MCNC: 103 MG/DL (ref 65–99)
HCT VFR BLD AUTO: 34.7 % (ref 37.5–51)
HGB BLD-MCNC: 11.2 G/DL (ref 13–17.7)
LYMPHOCYTES # BLD AUTO: 1.96 10*3/MM3 (ref 0.7–3.1)
LYMPHOCYTES NFR BLD AUTO: 20.3 % (ref 19.6–45.3)
MCH RBC QN AUTO: 28.7 PG (ref 26.6–33)
MCHC RBC AUTO-ENTMCNC: 32.3 G/DL (ref 31.5–35.7)
MCV RBC AUTO: 89 FL (ref 79–97)
MONOCYTES # BLD AUTO: 0.77 10*3/MM3 (ref 0.1–0.9)
MONOCYTES NFR BLD AUTO: 8 % (ref 5–12)
NEUTROPHILS NFR BLD AUTO: 6 10*3/MM3 (ref 1.7–7)
NEUTROPHILS NFR BLD AUTO: 62.3 % (ref 42.7–76)
PLATELET # BLD AUTO: 210 10*3/MM3 (ref 140–450)
PMV BLD AUTO: 9.6 FL (ref 6–12)
POTASSIUM SERPL-SCNC: 4.4 MMOL/L (ref 3.5–5.2)
PROT SERPL-MCNC: 6.7 G/DL (ref 6–8.5)
RBC # BLD AUTO: 3.9 10*6/MM3 (ref 4.14–5.8)
SODIUM SERPL-SCNC: 137 MMOL/L (ref 136–145)
WBC NRBC COR # BLD AUTO: 9.64 10*3/MM3 (ref 3.4–10.8)

## 2025-04-03 PROCEDURE — 36591 DRAW BLOOD OFF VENOUS DEVICE: CPT

## 2025-04-03 PROCEDURE — 80053 COMPREHEN METABOLIC PANEL: CPT | Performed by: INTERNAL MEDICINE

## 2025-04-03 PROCEDURE — 85025 COMPLETE CBC W/AUTO DIFF WBC: CPT | Performed by: INTERNAL MEDICINE

## 2025-04-03 PROCEDURE — 25010000002 HEPARIN LOCK FLUSH PER 10 UNITS: Performed by: INTERNAL MEDICINE

## 2025-04-03 RX ORDER — HEPARIN SODIUM (PORCINE) LOCK FLUSH IV SOLN 100 UNIT/ML 100 UNIT/ML
500 SOLUTION INTRAVENOUS AS NEEDED
OUTPATIENT
Start: 2025-04-03

## 2025-04-03 RX ORDER — SODIUM CHLORIDE 0.9 % (FLUSH) 0.9 %
10 SYRINGE (ML) INJECTION AS NEEDED
OUTPATIENT
Start: 2025-04-03

## 2025-04-03 RX ORDER — SODIUM CHLORIDE 0.9 % (FLUSH) 0.9 %
10 SYRINGE (ML) INJECTION AS NEEDED
Status: DISCONTINUED | OUTPATIENT
Start: 2025-04-03 | End: 2025-04-04 | Stop reason: HOSPADM

## 2025-04-03 RX ORDER — HEPARIN SODIUM (PORCINE) LOCK FLUSH IV SOLN 100 UNIT/ML 100 UNIT/ML
500 SOLUTION INTRAVENOUS AS NEEDED
Status: DISCONTINUED | OUTPATIENT
Start: 2025-04-03 | End: 2025-04-04 | Stop reason: HOSPADM

## 2025-04-03 RX ADMIN — Medication 10 ML: at 11:05

## 2025-04-03 RX ADMIN — HEPARIN 500 UNITS: 100 SYRINGE at 11:06

## 2025-04-03 NOTE — PROGRESS NOTES
Pt here for keytruda. Pt was held last week due to creatinine going up. Pt did have his synthroid increased last week at visit with Dr. Tim. Pt has no new complaints since last week. Pt stated his cough that he has had for awhile is getting better and it is infrequent and comes and goes depending on the weather. Pt stated he is able to swallow pills easier since last week. Spoke to Dr. Tim about patient's labs-Per Dr. Tim hold x 1 week. Pt verbalized understanding and was discharged with AVS.     Port accessed and flushed with good blood return noted. 10cc of blood wasted prior to specimen collection. Blood specimen obtained and sent to lab for processing per protocol.  Port flushed with saline and heparin prior to needle removal.

## 2025-04-10 ENCOUNTER — HOSPITAL ENCOUNTER (OUTPATIENT)
Dept: ONCOLOGY | Facility: HOSPITAL | Age: 75
Discharge: HOME | End: 2025-04-10
Payer: MEDICARE

## 2025-04-10 VITALS
DIASTOLIC BLOOD PRESSURE: 78 MMHG | HEIGHT: 70 IN | SYSTOLIC BLOOD PRESSURE: 131 MMHG | OXYGEN SATURATION: 94 % | BODY MASS INDEX: 26.41 KG/M2 | TEMPERATURE: 98.2 F | HEART RATE: 76 BPM | RESPIRATION RATE: 16 BRPM | WEIGHT: 184.5 LBS

## 2025-04-10 DIAGNOSIS — C34.91 STAGE IV ADENOCARCINOMA OF LUNG, RIGHT: Primary | ICD-10-CM

## 2025-04-10 DIAGNOSIS — C34.92 STAGE IV ADENOCARCINOMA OF LUNG, LEFT: ICD-10-CM

## 2025-04-10 LAB
ALP BLD-CCNC: 43 U/L (ref 53–128)
BASOPHILS # BLD AUTO: 0.08 10*3/MM3 (ref 0–0.2)
BASOPHILS NFR BLD AUTO: 0.8 % (ref 0–1.5)
BUN BLDA-MCNC: 23 MG/DL (ref 7–22)
CALCIUM BLD QL: 9.5 MG/DL (ref 8–10.3)
CHLORIDE BLDA-SCNC: 109 MMOL/L (ref 98–108)
CO2 BLDA-SCNC: 28 MMOL/L (ref 18–33)
CREAT BLDA-MCNC: 2.1 MG/DL (ref 0.6–1.2)
DEPRECATED RDW RBC AUTO: 64.8 FL (ref 37–54)
EGFRCR SERPLBLD CKD-EPI 2021: 32.2 ML/MIN/1.73
EOSINOPHIL # BLD AUTO: 0.92 10*3/MM3 (ref 0–0.4)
EOSINOPHIL NFR BLD AUTO: 8.8 % (ref 0.3–6.2)
ERYTHROCYTE [DISTWIDTH] IN BLOOD BY AUTOMATED COUNT: 20.4 % (ref 12.3–15.4)
GLUCOSE BLDC GLUCOMTR-MCNC: 122 MG/DL (ref 73–118)
HCT VFR BLD AUTO: 36.9 % (ref 37.5–51)
HGB BLD-MCNC: 11.9 G/DL (ref 13–17.7)
LYMPHOCYTES # BLD AUTO: 1.83 10*3/MM3 (ref 0.7–3.1)
LYMPHOCYTES NFR BLD AUTO: 17.5 % (ref 19.6–45.3)
MCH RBC QN AUTO: 28.6 PG (ref 26.6–33)
MCHC RBC AUTO-ENTMCNC: 32.2 G/DL (ref 31.5–35.7)
MCV RBC AUTO: 88.7 FL (ref 79–97)
MONOCYTES # BLD AUTO: 0.81 10*3/MM3 (ref 0.1–0.9)
MONOCYTES NFR BLD AUTO: 7.7 % (ref 5–12)
NEUTROPHILS NFR BLD AUTO: 6.84 10*3/MM3 (ref 1.7–7)
NEUTROPHILS NFR BLD AUTO: 65.2 % (ref 42.7–76)
PLATELET # BLD AUTO: 207 10*3/MM3 (ref 140–450)
PMV BLD AUTO: 10.1 FL (ref 6–12)
POC ALBUMIN: 3.5 G/L (ref 3.3–5.5)
POC ALT (SGPT): 86 U/L (ref 10–47)
POC AST (SGOT): 145 U/L (ref 11–38)
POC TOTAL BILIRUBIN: 0.5 MG/DL (ref 0.2–1.6)
POC TOTAL PROTEIN: 7.2 G/DL (ref 6.4–8.1)
POTASSIUM BLDA-SCNC: 5.5 MMOL/L (ref 3.6–5.1)
RBC # BLD AUTO: 4.16 10*6/MM3 (ref 4.14–5.8)
SODIUM BLD-SCNC: 141 MMOL/L (ref 128–145)
WBC NRBC COR # BLD AUTO: 10.48 10*3/MM3 (ref 3.4–10.8)

## 2025-04-10 PROCEDURE — 25010000002 PEMBROLIZUMAB 100 MG/4ML SOLUTION 4 ML VIAL: Performed by: INTERNAL MEDICINE

## 2025-04-10 PROCEDURE — 80053 COMPREHEN METABOLIC PANEL: CPT

## 2025-04-10 PROCEDURE — 85025 COMPLETE CBC W/AUTO DIFF WBC: CPT | Performed by: INTERNAL MEDICINE

## 2025-04-10 PROCEDURE — 96413 CHEMO IV INFUSION 1 HR: CPT

## 2025-04-10 RX ORDER — SODIUM CHLORIDE 0.9 % (FLUSH) 0.9 %
10 SYRINGE (ML) INJECTION AS NEEDED
OUTPATIENT
Start: 2025-04-10

## 2025-04-10 RX ORDER — HEPARIN SODIUM (PORCINE) LOCK FLUSH IV SOLN 100 UNIT/ML 100 UNIT/ML
500 SOLUTION INTRAVENOUS AS NEEDED
OUTPATIENT
Start: 2025-04-10

## 2025-04-10 RX ORDER — SODIUM CHLORIDE 9 MG/ML
20 INJECTION, SOLUTION INTRAVENOUS ONCE
Status: DISCONTINUED | OUTPATIENT
Start: 2025-04-10 | End: 2025-04-11 | Stop reason: HOSPADM

## 2025-04-10 RX ORDER — SODIUM CHLORIDE 0.9 % (FLUSH) 0.9 %
10 SYRINGE (ML) INJECTION AS NEEDED
Status: DISCONTINUED | OUTPATIENT
Start: 2025-04-10 | End: 2025-04-10

## 2025-04-10 RX ORDER — HEPARIN SODIUM (PORCINE) LOCK FLUSH IV SOLN 100 UNIT/ML 100 UNIT/ML
500 SOLUTION INTRAVENOUS AS NEEDED
Status: DISCONTINUED | OUTPATIENT
Start: 2025-04-10 | End: 2025-04-10

## 2025-04-10 RX ADMIN — SODIUM CHLORIDE 200 MG: 9 INJECTION, SOLUTION INTRAVENOUS at 11:14

## 2025-04-10 NOTE — PROGRESS NOTES
Pt here for keytruda. Pt was held last week due to creatinine. Pt has denies any new complaints since last week. Port appeared to have a dark scab,after reviewing with  plan made give the port a week to rest and give treatment through PIV.PIV started with good blood return. Labs collected and AST,ALT, and Creatinine all elevated and out of treatment parameters. Labs and plan reviewed with  and instructed to proceed with treatment.Meds given per MAR.Infusion tolerated well.PIV removed and pt discharged with wife.

## 2025-04-30 ENCOUNTER — TELEPHONE (OUTPATIENT)
Dept: ONCOLOGY | Facility: CLINIC | Age: 75
End: 2025-04-30
Payer: MEDICARE

## 2025-04-30 DIAGNOSIS — Z95.828 PORT-A-CATH IN PLACE: ICD-10-CM

## 2025-04-30 DIAGNOSIS — C34.91 STAGE IV ADENOCARCINOMA OF LUNG, RIGHT: Primary | ICD-10-CM

## 2025-04-30 NOTE — TELEPHONE ENCOUNTER
Caller: AMIRA SIERRA    Relationship: Emergency Contact    Best call back number: [phone]    What is the best time to reach you: ANYTIME    Who are you requesting to speak with (clinical staff, provider,  specific staff member): CLINICAL    What was the call regarding: AMIRA STATED PATIENT'S PORT STILL LOOKS THE SAME. SCAB CAME OFF YESTERDAY AND LOOKS LIKE A SMALL WET WOUND. PORT WAS NOT USED LAST TIME BECAUSE OF THIS. SHOULD SHE APPLY THE LIDOCAINE CREAM FOR HIS INFUSION TOMORROW 5/1?    PLEASE CALL ASAP TO ADVISE.

## 2025-05-01 ENCOUNTER — HOSPITAL ENCOUNTER (OUTPATIENT)
Dept: ONCOLOGY | Facility: HOSPITAL | Age: 75
Discharge: HOME | End: 2025-05-01
Admitting: INTERNAL MEDICINE
Payer: MEDICARE

## 2025-05-01 VITALS
HEIGHT: 70 IN | DIASTOLIC BLOOD PRESSURE: 81 MMHG | OXYGEN SATURATION: 95 % | RESPIRATION RATE: 16 BRPM | WEIGHT: 180 LBS | BODY MASS INDEX: 25.77 KG/M2 | TEMPERATURE: 97.5 F | SYSTOLIC BLOOD PRESSURE: 130 MMHG | HEART RATE: 76 BPM

## 2025-05-01 DIAGNOSIS — E03.2 HYPOTHYROIDISM DUE TO MEDICATION: ICD-10-CM

## 2025-05-01 DIAGNOSIS — C34.92 STAGE IV ADENOCARCINOMA OF LUNG, LEFT: Primary | ICD-10-CM

## 2025-05-01 DIAGNOSIS — C34.92 STAGE IV ADENOCARCINOMA OF LUNG, LEFT: ICD-10-CM

## 2025-05-01 LAB
ALBUMIN SERPL-MCNC: 4 G/DL (ref 3.5–5.2)
ALBUMIN/GLOB SERPL: 1.3 G/DL
ALP SERPL-CCNC: 66 U/L (ref 39–117)
ALT SERPL W P-5'-P-CCNC: 89 U/L (ref 1–41)
ANION GAP SERPL CALCULATED.3IONS-SCNC: 11.7 MMOL/L (ref 5–15)
AST SERPL-CCNC: 111 U/L (ref 1–40)
BASOPHILS # BLD AUTO: 0.08 10*3/MM3 (ref 0–0.2)
BASOPHILS NFR BLD AUTO: 0.7 % (ref 0–1.5)
BILIRUB SERPL-MCNC: 0.3 MG/DL (ref 0–1.2)
BUN SERPL-MCNC: 31 MG/DL (ref 8–23)
BUN/CREAT SERPL: 13.7 (ref 7–25)
CALCIUM SPEC-SCNC: 9.3 MG/DL (ref 8.6–10.5)
CHLORIDE SERPL-SCNC: 103 MMOL/L (ref 98–107)
CO2 SERPL-SCNC: 23.3 MMOL/L (ref 22–29)
CREAT SERPL-MCNC: 2.27 MG/DL (ref 0.76–1.27)
DEPRECATED RDW RBC AUTO: 58.7 FL (ref 37–54)
EGFRCR SERPLBLD CKD-EPI 2021: 29.3 ML/MIN/1.73
EOSINOPHIL # BLD AUTO: 1.01 10*3/MM3 (ref 0–0.4)
EOSINOPHIL NFR BLD AUTO: 9.1 % (ref 0.3–6.2)
ERYTHROCYTE [DISTWIDTH] IN BLOOD BY AUTOMATED COUNT: 18.1 % (ref 12.3–15.4)
GLOBULIN UR ELPH-MCNC: 3.1 GM/DL
GLUCOSE SERPL-MCNC: 126 MG/DL (ref 65–99)
HCT VFR BLD AUTO: 37.3 % (ref 37.5–51)
HGB BLD-MCNC: 12.3 G/DL (ref 13–17.7)
LYMPHOCYTES # BLD AUTO: 1.76 10*3/MM3 (ref 0.7–3.1)
LYMPHOCYTES NFR BLD AUTO: 15.8 % (ref 19.6–45.3)
MCH RBC QN AUTO: 30.2 PG (ref 26.6–33)
MCHC RBC AUTO-ENTMCNC: 33 G/DL (ref 31.5–35.7)
MCV RBC AUTO: 91.6 FL (ref 79–97)
MONOCYTES # BLD AUTO: 0.81 10*3/MM3 (ref 0.1–0.9)
MONOCYTES NFR BLD AUTO: 7.3 % (ref 5–12)
NEUTROPHILS NFR BLD AUTO: 67.1 % (ref 42.7–76)
NEUTROPHILS NFR BLD AUTO: 7.49 10*3/MM3 (ref 1.7–7)
PLATELET # BLD AUTO: 228 10*3/MM3 (ref 140–450)
PMV BLD AUTO: 9 FL (ref 6–12)
POTASSIUM SERPL-SCNC: 5 MMOL/L (ref 3.5–5.2)
PROT SERPL-MCNC: 7.1 G/DL (ref 6–8.5)
RBC # BLD AUTO: 4.07 10*6/MM3 (ref 4.14–5.8)
SODIUM SERPL-SCNC: 138 MMOL/L (ref 136–145)
T4 FREE SERPL-MCNC: 0.5 NG/DL (ref 0.93–1.7)
TSH SERPL DL<=0.05 MIU/L-ACNC: 78.1 UIU/ML (ref 0.27–4.2)
WBC NRBC COR # BLD AUTO: 11.15 10*3/MM3 (ref 3.4–10.8)

## 2025-05-01 PROCEDURE — 80053 COMPREHEN METABOLIC PANEL: CPT | Performed by: INTERNAL MEDICINE

## 2025-05-01 PROCEDURE — 84439 ASSAY OF FREE THYROXINE: CPT | Performed by: INTERNAL MEDICINE

## 2025-05-01 PROCEDURE — 85025 COMPLETE CBC W/AUTO DIFF WBC: CPT | Performed by: INTERNAL MEDICINE

## 2025-05-01 PROCEDURE — 96413 CHEMO IV INFUSION 1 HR: CPT

## 2025-05-01 PROCEDURE — 84443 ASSAY THYROID STIM HORMONE: CPT | Performed by: INTERNAL MEDICINE

## 2025-05-01 RX ORDER — SODIUM CHLORIDE 9 MG/ML
20 INJECTION, SOLUTION INTRAVENOUS ONCE
Status: DISCONTINUED | OUTPATIENT
Start: 2025-05-01 | End: 2025-05-02 | Stop reason: HOSPADM

## 2025-05-01 RX ORDER — LEVOTHYROXINE SODIUM 75 UG/1
75 TABLET ORAL
Qty: 30 TABLET | Refills: 1 | Status: SHIPPED | OUTPATIENT
Start: 2025-05-01

## 2025-05-01 RX ORDER — SODIUM CHLORIDE 9 MG/ML
20 INJECTION, SOLUTION INTRAVENOUS ONCE
Status: CANCELLED | OUTPATIENT
Start: 2025-05-01

## 2025-05-01 RX ADMIN — SODIUM CHLORIDE 200 MG: 9 INJECTION, SOLUTION INTRAVENOUS at 10:24

## 2025-05-01 NOTE — PATIENT INSTRUCTIONS
Please take the levothyroxine on empty stomach 1 hour before meal with water only and four hours away from calcium or iron Supplements.

## 2025-05-01 NOTE — TELEPHONE ENCOUNTER
Called and spoke with Mrs. Ames regarding the port placement removal order. I informed her that we are aware that it is already scheduled however we wanted to speak with her as well. She confirmed and stated she is aware as well as she was assuming that his port would not be used today for treatment which it was not. I confirmed. No questions at this time. I advised for her to contact the office if she has any questions. She confirmed.

## 2025-05-01 NOTE — PROGRESS NOTES
Pt here for tx.  Pt has port but has been red with scabbed area for last month and is getting removed on the 12th.  PIV started and labs drawn.  Pt tolerated well.  Pt states he has been feeling well and only c/o dealing with allergy symptoms because he has been working outside a lot.  Has some congestion but has been using mucinex, claritin, and nebulizer treatments and seems to be improving.  Denies any fevers. Pt is taking synthroid 50mcg daily. Message sent to DIMITRI Frazier to review.  Karin spoke with Dr. Tim and OK to treat today, Encourage fluids and new prescription for synthroid will be ordered.  Labs will be rechecked at f/u appt on 5/15. Please take the levothyroxine on empty stomach 1 hour before meal with water only and four hours away from calcium or iron Supplements   Pt and spouse update of POC with understanding verbalized.  Tx given per MAR.  Pt tolerated well and d/c home.

## 2025-05-12 ENCOUNTER — HOSPITAL ENCOUNTER (OUTPATIENT)
Dept: INTERVENTIONAL RADIOLOGY/VASCULAR | Facility: HOSPITAL | Age: 75
Discharge: HOME | End: 2025-05-12
Payer: MEDICARE

## 2025-05-12 DIAGNOSIS — C34.91 STAGE IV ADENOCARCINOMA OF LUNG, RIGHT: ICD-10-CM

## 2025-05-12 DIAGNOSIS — Z95.828 PORT-A-CATH IN PLACE: ICD-10-CM

## 2025-05-12 RX ORDER — SODIUM CHLORIDE 0.9 % (FLUSH) 0.9 %
10 SYRINGE (ML) INJECTION AS NEEDED
Status: DISCONTINUED | OUTPATIENT
Start: 2025-05-12 | End: 2025-05-13 | Stop reason: HOSPADM

## 2025-05-12 RX ORDER — SODIUM CHLORIDE 9 MG/ML
75 INJECTION, SOLUTION INTRAVENOUS CONTINUOUS
Status: DISPENSED | OUTPATIENT
Start: 2025-05-12 | End: 2025-05-12

## 2025-05-12 RX ORDER — SODIUM CHLORIDE 0.9 % (FLUSH) 0.9 %
10 SYRINGE (ML) INJECTION EVERY 12 HOURS SCHEDULED
Status: DISCONTINUED | OUTPATIENT
Start: 2025-05-12 | End: 2025-05-13 | Stop reason: HOSPADM

## 2025-05-13 RX ORDER — SOTALOL HYDROCHLORIDE 80 MG/1
TABLET ORAL
Qty: 60 TABLET | Refills: 4 | Status: SHIPPED | OUTPATIENT
Start: 2025-05-13

## 2025-05-13 NOTE — PROGRESS NOTES
HEMATOLOGY ONCOLOGY OUTPATIENT FOLLOW UP       Patient name: Young Ames  : 1950  MRN: [id number]  Primary Care Physician: Abner Funes APRN  Referring Physician: Abner Funes APRN  Reason For Consult: Adenocarcinoma of the right lung.     History of Present Illness:    2024: Mr. Ames first came to [facility] complaining of dyspnea. This had been getting worse over a short period of time. It was associated to unintended weight loss of approximately 15 lbs in around one month. He was diagnosed with pneumonia in 2024 he was treated with azithromycin and with amoxicillin/clavulanate, despite which he did not seem to recover completely. CT scan had shown dense consolidation of the right lower lobe and there was at some point suggestion of a cavitary lesion. 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 Following this a repeat scan showed a questionable 5.5 cm right lower lobe cavitary lesion. A bronchoscopy and biopsy on 2024 confirmed adenocarcinoma of the right lung. He feels reasonably well at this time. He has been breathing better, though he persists with dyspnea or exertion. He has been afebrile. He continues to cough but not more than before. He has not had abdominal pain and denies diarrhea or dysuria. On exam alert and conversant. Oriented and in no distress. No jaundice. No oral lesions and respirations are not labored. Lungs diminished bilaterally and absent on the right lower lobe. The abdomen is soft. No edema. Reviewed the images of the scans. Reviewed the laboratory exams. Discussed with him and his family. To proceed with a PET scan. Will not obtain  pulmonary function tests, as most likely he has had recurrence of a significant right pleural effusion. He is to see me with results.     2024: Feeling about the same as at the time of the last visit but has had  more dyspnea.  He was placed on new medication in spite of which, intermittently, he continues to be uncomfortable.  He is eating reasonably well and has had no weight loss.  He is also afebrile.  He denies chest pains.  No abdominal pain.  On exam he is conversant and oriented.  No distress.  No jaundice.  The lungs are diminished bilaterally but there is absence of breath sounds and a cough when he in the lower parts of the right chest.  The abdomen is soft.  There is no edema.  Laboratory exams and final reports of pathology were reviewed and discussed with him.  Discussed with him the stage of the disease, which corresponds to 4, given the pleural involvement.  To start chemotherapy and immunotherapy.  I have requested next-generation sequencing.  Port as soon as available.    9/29/2024: Feels reasonably well today.  Has had less dyspnea since the insertion of the pleural catheter as he has been able to have the fluid drained at home more frequently.  Yesterday, for the first time, he had persistent right-sided chest pain after the drainage of the fluid that is now resolved.  He continues to be reasonably active.  He continues to eat reasonably well and has had very minimal nausea.  He took the first chemotherapy without any complications.  He denies chest pain and he has less dyspnea than before.  No abdominal pain and no edema.  On exam chronically ill-appearing but in no distress.  No jaundice.  The lungs are diminished bilaterally with more pronounced reduction of the breath sounds in the right.  The heart is regular.  The abdomen is soft.  There is no edema.  Laboratory exams reviewed.  Discussed with him.  Continue same therapy for now.  I have sent a prescription for tramadol that he can take as needed for pain, when present.    10/14/2024: Breathing better.  Progressively finding less fluid in the thoracic cavity.  Is able to do more activity.  Has had enuresis once.  Also has noted tremors.  He is  eating well.  No nausea or vomiting.  No chest pains.  No abdominal pain.  On exam he does not seem in any distress.  He is conversant and well-oriented.  No jaundice or pallor.  Lungs symmetrically ventilated without any dullness to percussion on the right.  Abdomen soft.  No edema.  Laboratory exams reviewed.  Discussed with him.  My suspicion is that he is no longer producing as much pleural fluid as before and that perhaps today they will not be able to drain much.  Will obtain a scan and will see with results.  Continue for now.  I am not sure what the cause of the tremors and the enuresis is.    11/11/2024: Feeling well.  Continues to tolerate the treatment without difficulties.  He has no more dyspnea or pain than before.  He eats well and has had no nausea or vomiting.  As well his weight is stable.  Denies diarrhea or dysuria.  On exam he does not seem ill.  He is not jaundiced.  The lungs are diminished bilaterally but symmetrically.  Heart is regular.  The abdomen is soft.  There is no edema.  Laboratory exams reviewed.  Reviewed the images and the report of the scans.  There are some suggestion of response as the lymph nodes and the primary tumor have all decreased in size though it is difficult to tell if the primary lesion has decreased given the associated opacity surrounding it.  For now continue same treatment.  Treatment with immunotherapy after he has completed 4 cycles of chemoimmunotherapy.  See me in approximately 6 weeks.  Treatment plan placed.    12/23/2024: Received the first cycle of immunotherapy without any difficulties.  Has had some episodes of fever since the last visit.  Sometimes as high as 101.3.  He has no symptoms associated to this and often is not even aware that he has a fever.  He is using a forehead thermometer that seems to be good working order.  He is energetic in general.  He has no new limitations.  He has recently hurt a hip.  He is eating reasonably well though he  persists with marked dysgeusia.  No chest pains or cough.  He has not had much drainage from the Pleurx catheter lately in the last 3 times they were not able to drain any fluid.  He has no abdominal pain, diarrhea or dysuria.  On exam alert and conversant.  Oriented and in no distress.  No jaundice.  The lungs are diminished bilaterally but in a symmetrical fashion.  I do not believe there is any fluid at this time in the right pleural space.  Heart is regular.  Abdomen is soft.  There is no edema.  Laboratory exams reviewed and discussed with him.  To remove the Pleurx catheter.  Ideally to send the tip for culture.  He is also going to have blood cultures from the port and from a peripheral vein.  He will see me in a few weeks.  Continue immunotherapy for now.    2/13/2025: Has been feeling very well.  Has no new complaints.  His appetite has improved since he discontinued the chemotherapy and he has gained some weight.  He has no more dyspnea and has not been coughing.  His oxygen saturation has been above 90 consistently.  No chest pains or abdominal pain.  Denies diarrhea or constipation.  No dysuria.  No edema.  On exam alert and conversant.  Oriented.  No distress.  The neck is without deformity.  Palpation discloses a barely palpable thyroid.  No nodules.  No palpable lymphadenopathy.  Respirations not labored.  Lungs diminished bilaterally in a symmetrical fashion.  Heart regular.  Abdomen soft and without tumors.  No edema.  Reviewed the laboratory exams.  On 2 occasions he has had a very low TSH with slight elevations of the free T4, but without any symptoms.  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  Will investigate.    3/27/2025: Tired and with arthralgia of the right hip and both knees. He's had difficulties with arthritis for some time. The pain in the knees is predominantly when going up steps. He has been fatigued. He continues to eat with good appetite. No new respiratory symptoms.  Denies fevers. On exam alert and conversant. Oriented and in no distress. No jaundice. O oral lesions and respirations not labored. Lungs diminished but clear bilaterally. Heart regular. Abdomen soft and not tender. No edema. Reviewed the laboratory exams. To  continue with the same treatment. A new prescription for levothyroxine 50 mcg was sent and he was given instructions. He is to see me in 6 weeks.     5/15/40201: Feels about the same as at the time of the previous visit. His wife has noted that his oxygen saturation has decreased from the high 90's to the mid 90's and he feels more dyspneic. No chest pain but he has been coughing more and expectorates without hemoptysis. No dysphagia. Denies abdominal pain and has not had diarrhea. No dysuria. No edema. No skin rash. On exam alert and conversant. Oriented and in no distress. No jaundice. No oral lesions and respirations not labored. Lungs diminished bilaterally and symmetrically; there does not appear to be more pleural fluid. No edema. Reviewed the laboratory exams. His renal function has declined again. Will investigate further. No immunotherapy for now.     Past Medical History:   Diagnosis Date    Coronary artery disease     Myocardial infarction 01/31/2024     Past Surgical History:   Procedure Laterality Date    BRONCHOSCOPY N/A 8/13/2024    Procedure: BRONCHOSCOPY WITH BRONCHIAL WASHING AND BRONCHIAL BRUSHING;  Surgeon: Kelvin Gonzalez MD;  Location: [facility] ENDOSCOPY;  Service: Pulmonary;  Laterality: N/A;    BRONCHOSCOPY WITH ION ROBOTIC ASSIST N/A 8/16/2024    Procedure: BRONCHOSCOPY WITH ION ROBOT WITH CRYO BIOPSY;  Surgeon: Kelvin Gonzalez MD;  Location: [facility] ENDOSCOPY;  Service: Robotics - Pulmonary;  Laterality: N/A;    CARDIAC CATHETERIZATION Right 1/29/2024    Procedure: Coronary angiography;  Surgeon: Abran Chand MD;  Location: [facility] CATH INVASIVE LOCATION;  Service: Cardiovascular;  Laterality: Right;    CARDIAC CATHETERIZATION N/A 1/29/2024     Procedure: Left Heart Cath;  Surgeon: Abran Chand MD;  Location: [facility] CATH INVASIVE LOCATION;  Service: Cardiovascular;  Laterality: N/A;       Current Outpatient Medications:     acetaminophen (TYLENOL) 325 MG tablet, Take 2 tablets by mouth Every 4 (Four) Hours As Needed for Mild Pain., Disp: , Rfl:     apixaban (ELIQUIS) 5 MG tablet tablet, Take 1 tablet by mouth Every 12 (Twelve) Hours. Indications: Atrial Fibrillation, Disp: 60 tablet, Rfl: 0    bacitracin 500 UNIT/GM ointment, , Disp: , Rfl:     cephalexin (KEFLEX) 500 MG capsule, Take 1 capsule by mouth 4 (Four) Times a Day., Disp: , Rfl:     Cholecalciferol (VITAMIN D-3 PO), Take 1 tablet by mouth Daily. Indications: Vitamin Deficiency, Disp: , Rfl:     levothyroxine (SYNTHROID, LEVOTHROID) 75 MCG tablet, Take 1 tablet by mouth Every Morning., Disp: 30 tablet, Rfl: 1    lidocaine-prilocaine (EMLA) 2.5-2.5 % cream, Apply 1 Application topically to the appropriate area as directed As Needed for Mild Pain (Apply 1 hour prior to port access)., Disp: 30 g, Rfl: 2    midodrine (PROAMATINE) 5 MG tablet, Take 1 tablet by mouth 3 (Three) Times a Day Before Meals. (Patient taking differently: Take 1 tablet by mouth 3 (Three) Times a Day As Needed (low blood pressure). Indications: Disorder of Low Blood Pressure), Disp: 20 tablet, Rfl: 0    multivitamin with minerals (Centrum Silver 50+Men) tablet tablet, Take 1 tablet by mouth Every Morning. Indications: Vitamin Deficiency, Disp: , Rfl:     NON FORMULARY, Take 2 tablets by mouth Every Night. Zzzquil  Indications: Sleep Disorder, Disp: , Rfl:     ondansetron (ZOFRAN) 8 MG tablet, Take 1 tablet by mouth 3 (Three) Times a Day As Needed for Nausea or Vomiting. (Patient taking differently: Take 1 tablet by mouth 3 (Three) Times a Day As Needed for Nausea or Vomiting.), Disp: 30 tablet, Rfl: 3    polyethylene glycol (MIRALAX) 17 g packet, Take 17 g by mouth Daily. Indications: Constipation, Disp: , Rfl:     simvastatin  (ZOCOR) 20 MG tablet, Take 1 tablet by mouth Every Night. Indications: High Amount of Fats in the Blood, Disp: , Rfl:     sotalol (BETAPACE) 80 MG tablet, TAKE ONE TABLET BY MOUTH TWICE DAILY INDICATIONS: ATRIAL FIBRILLATION, Disp: 60 tablet, Rfl: 4    tamsulosin (FLOMAX) 0.4 MG capsule 24 hr capsule, Take 1 capsule by mouth Every Night. Indications: Benign Enlargement of Prostate, Disp: , Rfl:     thiamine (VITAMIN B-1) 100 MG tablet  tablet, Take 1 tablet by mouth Every Morning. Indications: Vitamin Deficiency, Disp: , Rfl:     traMADol (ULTRAM) 50 MG tablet, Take 1 tablet by mouth Every 6 (Six) Hours As Needed for Moderate Pain., Disp: 90 tablet, Rfl: 0    guaiFENesin (Mucinex) 600 MG 12 hr tablet, Take 1 tablet by mouth 2 (Two) Times a Day. Indications: Cough (Patient not taking: Reported on 5/15/2025), Disp: , Rfl:     ipratropium-albuterol (DUO-NEB) 0.5-2.5 mg/3 ml nebulizer, Take 3 mL by nebulization 4 (Four) Times a Day As Needed for Wheezing (Dyspnea) for up to 30 days. (Patient not taking: Reported on 2024), Disp: 120 mL, Rfl: 3    pantoprazole (PROTONIX) 40 MG EC tablet, Take 1 tablet by mouth Daily. (Patient not taking: Reported on 5/15/2025), Disp: 30 tablet, Rfl: 1    No Known Allergies    Family History   Problem Relation Age of Onset    Dementia Mother     Breast cancer Sister 74     Cancer-related family history includes Breast cancer (age of onset: 74) in his sister.    Social History     Tobacco Use    Smoking status: Former     Current packs/day: 0.00     Average packs/day: 1 pack/day for 32.0 years (32.0 ttl pk-yrs)     Types: Cigarettes     Start date:      Quit date:      Years since quittin.3     Passive exposure: Past    Smokeless tobacco: Never   Vaping Use    Vaping status: Never Used   Substance Use Topics    Alcohol use: Yes     Alcohol/week: 1.0 standard drink of alcohol     Types: 1 Cans of beer per week    Drug use: Never     Social History     Social History  Narrative    Not on file      ROS:   Review of Systems   Constitutional:  Positive for unexpected weight change. Negative for activity change, appetite change, chills, diaphoresis, fatigue and fever.   HENT:  Negative for congestion, dental problem, drooling, ear discharge, ear pain, facial swelling, hearing loss, mouth sores, nosebleeds, postnasal drip, rhinorrhea, sinus pressure, sinus pain, sneezing, sore throat, tinnitus, trouble swallowing and voice change.    Eyes:  Negative for photophobia, pain, discharge, redness, itching and visual disturbance.   Respiratory:  Positive for cough and shortness of breath. Negative for apnea, choking, chest tightness, wheezing and stridor.    Cardiovascular:  Negative for chest pain, palpitations and leg swelling.   Gastrointestinal:  Negative for abdominal distention, abdominal pain, anal bleeding, blood in stool, constipation, diarrhea, nausea, rectal pain and vomiting.   Endocrine: Negative for cold intolerance, heat intolerance, polydipsia and polyuria.   Genitourinary:  Negative for decreased urine volume, difficulty urinating, dysuria, flank pain, frequency, genital sores, hematuria and urgency.   Musculoskeletal:  Negative for arthralgias, back pain, gait problem, joint swelling, myalgias, neck pain and neck stiffness.   Skin:  Negative for color change, pallor and rash.   Neurological:  Negative for dizziness, tremors, seizures, syncope, facial asymmetry, speech difficulty, weakness, light-headedness, numbness and headaches.   Hematological:  Negative for adenopathy. Does not bruise/bleed easily.   Psychiatric/Behavioral:  Negative for agitation, behavioral problems, confusion, decreased concentration, hallucinations, self-injury, sleep disturbance and suicidal ideas. The patient is not nervous/anxious.      Objective:  Vital signs:  Vitals:    05/15/25 1200   BP: 114/74   Pulse: 71   Resp: 14   Temp: 98 °F (36.7 °C)   SpO2: 94%   Weight: 80.6 kg (177 lb 9.6 oz)  "  Height: 177.8 cm (70\")   PainSc: 0-No pain     Body mass index is 25.48 kg/m².  ECOG  (1) Restricted in physically strenuous activity, ambulatory and able to do work of light nature    Physical Exam:   Physical Exam  Constitutional:       General: He is not in acute distress.     Appearance: He is not ill-appearing, toxic-appearing or diaphoretic.      Comments: Slender. Well built and in no distress. No jaundice.    HENT:      Head: Normocephalic and atraumatic.      Right Ear: External ear normal.      Left Ear: External ear normal.      Nose: Nose normal.      Mouth/Throat:      Mouth: Mucous membranes are moist.      Pharynx: Oropharynx is clear.   Eyes:      General: No scleral icterus.        Right eye: No discharge.         Left eye: No discharge.      Conjunctiva/sclera: Conjunctivae normal.      Pupils: Pupils are equal, round, and reactive to light.   Cardiovascular:      Rate and Rhythm: Normal rate and regular rhythm.      Pulses: Normal pulses.      Heart sounds: Normal heart sounds. No murmur heard.     No friction rub. No gallop.   Pulmonary:      Effort: No respiratory distress.      Breath sounds: No stridor. No wheezing, rhonchi or rales.      Comments: Breath sounds diminished bilaterally.   Chest:      Chest wall: No tenderness.   Abdominal:      General: Abdomen is flat. Bowel sounds are normal. There is no distension.      Palpations: Abdomen is soft. There is no mass.      Tenderness: There is no abdominal tenderness. There is no right CVA tenderness, left CVA tenderness, guarding or rebound.   Musculoskeletal:         General: No tenderness, deformity or signs of injury.      Cervical back: No rigidity.      Right lower leg: No edema.      Left lower leg: No edema.   Lymphadenopathy:      Cervical: No cervical adenopathy.   Skin:     General: Skin is warm and dry.      Coloration: Skin is not jaundiced or pale.      Findings: No bruising or rash.   Neurological:      General: No focal " deficit present.      Mental Status: He is alert and oriented to person, place, and time.      Cranial Nerves: No cranial nerve deficit.   Psychiatric:         Mood and Affect: Mood normal.         Behavior: Behavior normal.         Thought Content: Thought content normal.         Judgment: Judgment normal.     DICKSON Tim MD performed the physical exam on 5/15/2025 as documented above.     Lab Results - Last 18 Months   Lab Units 05/15/25  1155 05/01/25  0849 04/10/25  0840   WBC 10*3/mm3 10.83* 11.15* 10.48   HEMOGLOBIN g/dL 11.9* 12.3* 11.9*   HEMATOCRIT % 35.4* 37.3* 36.9*   PLATELETS 10*3/mm3 259 228 207   MCV fL 92.7 91.6 88.7     Lab Results - Last 18 Months   Lab Units 05/01/25  0849 04/10/25  0921 04/03/25  1001 03/27/25  1253 03/06/25  1344   SODIUM mmol/L 138  --  137  --  138   POTASSIUM mmol/L 5.0  --  4.4  --  4.4   CHLORIDE mmol/L 103  --  103  --  105   CO2 mmol/L 23.3  --  23.2  --  23.0   BUN mg/dL 31*  --  27*  --  27*   CREATININE mg/dL 2.27* 2.10* 2.20*   < > 1.71*   CALCIUM mg/dL 9.3  --  9.1  --  8.8   BILIRUBIN mg/dL 0.3  --  0.2  --  0.2   ALK PHOS U/L 66  --  53  --  52   ALT (SGPT) U/L 89*  --  108*  --  109*   AST (SGOT) U/L 111*  --  156*  --  169*   GLUCOSE mg/dL 126*  --  103*  --  135*    < > = values in this interval not displayed.     Lab Results   Component Value Date    GLUCOSE 126 (H) 05/01/2025    BUN 31 (H) 05/01/2025    CREATININE 2.27 (H) 05/01/2025    BCR 13.7 05/01/2025    K 5.0 05/01/2025    CO2 23.3 05/01/2025    CALCIUM 9.3 05/01/2025    ALBUMIN 4.0 05/01/2025     (H) 05/01/2025    ALT 89 (H) 05/01/2025     Lab Results - Last 18 Months   Lab Units 09/19/24  0742 09/12/24  0809 08/12/24  1834 01/28/24  1737   INR  1.10 1.03 1.00 0.98   APTT seconds 28.5 26.7  --  24.8*     Lab Results   Component Value Date    STEVEN Positive (A) 08/12/2024     Lab Results   Component Value Date    PTT 28.5 09/19/2024    INR 1.10 09/19/2024     Assessment & Plan     cTX N2 M1  adenocarcinoma of the right lung: Next-generation sequencing reviewed.  Positive PD-L1 expression..  The disease is negative for EGFR, ALK, ROS1, ret, BRAF.  Completed concurrent radiation and chemotherapy with carboplatin and paclitaxel.  Started consolidation with pembrolizumab in November of 2024. No major side effects from treatment but there has been a slow tendency to increase of the creatinine. Will investigate further. No immunotherapy for ow.   Hypothyroidism. To continue levothyroxine, but the dose was increased to 50 mcg.   Fevers: Resolved.  Right pleural effusion: Resolved.  History of tobacco smoking: Abstinent for more than 20 years.  Reviewed with him and his spouse all the laboratory exams.   See me in 3 weeks with scans and laboratory exams.     Arben Tmi MD on 5/15/2025 at 12:40 PM.

## 2025-05-15 ENCOUNTER — APPOINTMENT (OUTPATIENT)
Dept: LAB | Facility: HOSPITAL | Age: 75
End: 2025-05-15
Payer: MEDICARE

## 2025-05-15 ENCOUNTER — APPOINTMENT (OUTPATIENT)
Dept: ONCOLOGY | Facility: HOSPITAL | Age: 75
End: 2025-05-15
Payer: MEDICARE

## 2025-05-15 ENCOUNTER — HOSPITAL ENCOUNTER (OUTPATIENT)
Dept: ONCOLOGY | Facility: HOSPITAL | Age: 75
Discharge: HOME | End: 2025-05-15
Payer: MEDICARE

## 2025-05-15 ENCOUNTER — OFFICE VISIT (OUTPATIENT)
Dept: ONCOLOGY | Facility: CLINIC | Age: 75
End: 2025-05-15
Payer: MEDICARE

## 2025-05-15 VITALS
HEIGHT: 70 IN | HEART RATE: 71 BPM | OXYGEN SATURATION: 94 % | BODY MASS INDEX: 25.43 KG/M2 | DIASTOLIC BLOOD PRESSURE: 74 MMHG | SYSTOLIC BLOOD PRESSURE: 114 MMHG | TEMPERATURE: 98 F | WEIGHT: 177.6 LBS | RESPIRATION RATE: 14 BRPM

## 2025-05-15 DIAGNOSIS — E03.2 HYPOTHYROIDISM DUE TO MEDICATION: ICD-10-CM

## 2025-05-15 DIAGNOSIS — C34.92 STAGE IV ADENOCARCINOMA OF LUNG, LEFT: Primary | ICD-10-CM

## 2025-05-15 DIAGNOSIS — C34.91 STAGE IV ADENOCARCINOMA OF LUNG, RIGHT: ICD-10-CM

## 2025-05-15 DIAGNOSIS — C34.92 STAGE IV ADENOCARCINOMA OF LUNG, LEFT: ICD-10-CM

## 2025-05-15 DIAGNOSIS — C34.91 PRIMARY LUNG CANCER, RIGHT: ICD-10-CM

## 2025-05-15 LAB
ALBUMIN SERPL-MCNC: 4.1 G/DL (ref 3.5–5.2)
ALBUMIN/GLOB SERPL: 1.3 G/DL
ALP SERPL-CCNC: 67 U/L (ref 39–117)
ALT SERPL W P-5'-P-CCNC: 75 U/L (ref 1–41)
ANION GAP SERPL CALCULATED.3IONS-SCNC: 13.2 MMOL/L (ref 5–15)
AST SERPL-CCNC: 88 U/L (ref 1–40)
BASOPHILS # BLD AUTO: 0.06 10*3/MM3 (ref 0–0.2)
BASOPHILS NFR BLD AUTO: 0.6 % (ref 0–1.5)
BILIRUB SERPL-MCNC: 0.4 MG/DL (ref 0–1.2)
BILIRUB UR QL STRIP: NEGATIVE
BUN SERPL-MCNC: 27 MG/DL (ref 8–23)
BUN/CREAT SERPL: 12.1 (ref 7–25)
CALCIUM SPEC-SCNC: 9.2 MG/DL (ref 8.6–10.5)
CHLORIDE SERPL-SCNC: 103 MMOL/L (ref 98–107)
CLARITY UR: CLEAR
CO2 SERPL-SCNC: 20.8 MMOL/L (ref 22–29)
COLOR UR: YELLOW
CREAT SERPL-MCNC: 2.24 MG/DL (ref 0.76–1.27)
DEPRECATED RDW RBC AUTO: 55.1 FL (ref 37–54)
EGFRCR SERPLBLD CKD-EPI 2021: 29.8 ML/MIN/1.73
EOSINOPHIL # BLD AUTO: 1.11 10*3/MM3 (ref 0–0.4)
EOSINOPHIL NFR BLD AUTO: 10.2 % (ref 0.3–6.2)
ERYTHROCYTE [DISTWIDTH] IN BLOOD BY AUTOMATED COUNT: 16.9 % (ref 12.3–15.4)
GLOBULIN UR ELPH-MCNC: 3.1 GM/DL
GLUCOSE SERPL-MCNC: 93 MG/DL (ref 65–99)
GLUCOSE UR STRIP-MCNC: NEGATIVE MG/DL
HCT VFR BLD AUTO: 35.4 % (ref 37.5–51)
HGB BLD-MCNC: 11.9 G/DL (ref 13–17.7)
HGB UR QL STRIP.AUTO: NEGATIVE
KETONES UR QL STRIP: NEGATIVE
LEUKOCYTE ESTERASE UR QL STRIP.AUTO: NEGATIVE
LYMPHOCYTES # BLD AUTO: 1.86 10*3/MM3 (ref 0.7–3.1)
LYMPHOCYTES NFR BLD AUTO: 17.2 % (ref 19.6–45.3)
MCH RBC QN AUTO: 31.2 PG (ref 26.6–33)
MCHC RBC AUTO-ENTMCNC: 33.6 G/DL (ref 31.5–35.7)
MCV RBC AUTO: 92.7 FL (ref 79–97)
MONOCYTES # BLD AUTO: 0.84 10*3/MM3 (ref 0.1–0.9)
MONOCYTES NFR BLD AUTO: 7.8 % (ref 5–12)
NEUTROPHILS NFR BLD AUTO: 6.96 10*3/MM3 (ref 1.7–7)
NEUTROPHILS NFR BLD AUTO: 64.2 % (ref 42.7–76)
NITRITE UR QL STRIP: NEGATIVE
PH UR STRIP.AUTO: 6 [PH] (ref 5–8)
PLATELET # BLD AUTO: 259 10*3/MM3 (ref 140–450)
PMV BLD AUTO: 9.1 FL (ref 6–12)
POTASSIUM SERPL-SCNC: 4.9 MMOL/L (ref 3.5–5.2)
PROT SERPL-MCNC: 7.2 G/DL (ref 6–8.5)
PROT UR QL STRIP: NEGATIVE
RBC # BLD AUTO: 3.82 10*6/MM3 (ref 4.14–5.8)
SODIUM SERPL-SCNC: 137 MMOL/L (ref 136–145)
SP GR UR STRIP: 1.01 (ref 1–1.03)
T4 FREE SERPL-MCNC: 0.72 NG/DL (ref 0.92–1.68)
TSH SERPL DL<=0.05 MIU/L-ACNC: 68.2 UIU/ML (ref 0.27–4.2)
UROBILINOGEN UR QL STRIP: NORMAL
WBC NRBC COR # BLD AUTO: 10.83 10*3/MM3 (ref 3.4–10.8)

## 2025-05-15 PROCEDURE — 36415 COLL VENOUS BLD VENIPUNCTURE: CPT | Performed by: INTERNAL MEDICINE

## 2025-05-15 PROCEDURE — 80053 COMPREHEN METABOLIC PANEL: CPT | Performed by: INTERNAL MEDICINE

## 2025-05-15 PROCEDURE — 81003 URINALYSIS AUTO W/O SCOPE: CPT | Performed by: INTERNAL MEDICINE

## 2025-05-15 PROCEDURE — 85025 COMPLETE CBC W/AUTO DIFF WBC: CPT | Performed by: INTERNAL MEDICINE

## 2025-05-15 PROCEDURE — 1126F AMNT PAIN NOTED NONE PRSNT: CPT | Performed by: INTERNAL MEDICINE

## 2025-05-15 PROCEDURE — 84439 ASSAY OF FREE THYROXINE: CPT | Performed by: INTERNAL MEDICINE

## 2025-05-15 PROCEDURE — 84443 ASSAY THYROID STIM HORMONE: CPT | Performed by: INTERNAL MEDICINE

## 2025-05-15 PROCEDURE — 1160F RVW MEDS BY RX/DR IN RCRD: CPT | Performed by: INTERNAL MEDICINE

## 2025-05-15 PROCEDURE — 99214 OFFICE O/P EST MOD 30 MIN: CPT | Performed by: INTERNAL MEDICINE

## 2025-05-15 PROCEDURE — 1159F MED LIST DOCD IN RCRD: CPT | Performed by: INTERNAL MEDICINE

## 2025-05-15 RX ORDER — GINSENG 100 MG
CAPSULE ORAL
COMMUNITY
Start: 2025-05-12

## 2025-05-15 RX ORDER — CEPHALEXIN 500 MG/1
500 CAPSULE ORAL 4 TIMES DAILY
COMMUNITY
Start: 2025-05-12 | End: 2025-05-19

## 2025-05-15 NOTE — PROGRESS NOTES
Pt here for labs/ Scheduled followup with Dr Tim,  pt and spouse report that pt on antibiotics for infusaport at present.  Labs drawn via venipuncture.  Pt to waiting room for MD visit.

## 2025-05-16 LAB
KAPPA LC FREE SER-MCNC: 65.9 MG/L (ref 3.3–19.4)
KAPPA LC FREE/LAMBDA FREE SER: 1.97 {RATIO} (ref 0.26–1.65)
LAMBDA LC FREE SERPL-MCNC: 33.5 MG/L (ref 5.7–26.3)

## 2025-05-19 ENCOUNTER — HOSPITAL ENCOUNTER (OUTPATIENT)
Dept: INTERVENTIONAL RADIOLOGY/VASCULAR | Facility: HOSPITAL | Age: 75
Discharge: HOME | End: 2025-05-19
Admitting: RADIOLOGY
Payer: MEDICARE

## 2025-05-19 DIAGNOSIS — Z95.828 PORT-A-CATH IN PLACE: ICD-10-CM

## 2025-05-19 DIAGNOSIS — C34.91 STAGE IV ADENOCARCINOMA OF LUNG, RIGHT: ICD-10-CM

## 2025-05-19 LAB
ALBUMIN SERPL ELPH-MCNC: 3.3 G/DL (ref 2.9–4.4)
ALBUMIN/GLOB SERPL: 1.1 {RATIO} (ref 0.7–1.7)
ALPHA1 GLOB SERPL ELPH-MCNC: 0.3 G/DL (ref 0–0.4)
ALPHA2 GLOB SERPL ELPH-MCNC: 0.8 G/DL (ref 0.4–1)
B-GLOBULIN SERPL ELPH-MCNC: 0.9 G/DL (ref 0.7–1.3)
GAMMA GLOB SERPL ELPH-MCNC: 1.1 G/DL (ref 0.4–1.8)
GLOBULIN SER-MCNC: 3.1 G/DL (ref 2.2–3.9)
IGA SERPL-MCNC: 304 MG/DL (ref 61–437)
IGG SERPL-MCNC: 1165 MG/DL (ref 603–1613)
IGM SERPL-MCNC: 103 MG/DL (ref 15–143)
INTERPRETATION SERPL IEP-IMP: NORMAL
LABORATORY COMMENT REPORT: NORMAL
M PROTEIN SERPL ELPH-MCNC: NORMAL G/DL
PROT SERPL-MCNC: 6.4 G/DL (ref 6–8.5)

## 2025-05-19 PROCEDURE — G0463 HOSPITAL OUTPT CLINIC VISIT: HCPCS

## 2025-05-19 NOTE — NURSING NOTE
Pt came in today for a right chest port site check. Area looks a little red and the skin has eroded to where a small area of port is visible. Dr. Burns reports that infusaport should be removed. Message sent to Dr. Tim. Port site was then cleaned with chlorhexidine and a sterile dressing of bacitracin and coverderm applied over port site. Gatito and his wife were provided education to leave new dressing in place until returning here to IR on Wednesday.

## 2025-05-21 ENCOUNTER — HOSPITAL ENCOUNTER (OUTPATIENT)
Dept: INTERVENTIONAL RADIOLOGY/VASCULAR | Facility: HOSPITAL | Age: 75
Discharge: HOME | End: 2025-05-21
Payer: MEDICARE

## 2025-05-21 ENCOUNTER — APPOINTMENT (OUTPATIENT)
Dept: CT IMAGING | Facility: HOSPITAL | Age: 75
DRG: 178 | End: 2025-05-21
Payer: MEDICARE

## 2025-05-21 ENCOUNTER — HOSPITAL ENCOUNTER (OUTPATIENT)
Dept: GENERAL RADIOLOGY | Facility: HOSPITAL | Age: 75
Discharge: HOME | End: 2025-05-21
Payer: MEDICARE

## 2025-05-21 ENCOUNTER — HOSPITAL ENCOUNTER (INPATIENT)
Facility: HOSPITAL | Age: 75
LOS: 4 days | Discharge: HOME | DRG: 178 | End: 2025-05-25
Attending: EMERGENCY MEDICINE | Admitting: INTERNAL MEDICINE
Payer: MEDICARE

## 2025-05-21 VITALS
SYSTOLIC BLOOD PRESSURE: 131 MMHG | HEIGHT: 70 IN | HEART RATE: 69 BPM | OXYGEN SATURATION: 92 % | RESPIRATION RATE: 17 BRPM | BODY MASS INDEX: 25.34 KG/M2 | DIASTOLIC BLOOD PRESSURE: 79 MMHG | TEMPERATURE: 97.7 F | WEIGHT: 177 LBS

## 2025-05-21 DIAGNOSIS — Z95.828 PORT-A-CATH IN PLACE: ICD-10-CM

## 2025-05-21 DIAGNOSIS — R13.19 ESOPHAGEAL DYSPHAGIA: ICD-10-CM

## 2025-05-21 DIAGNOSIS — C34.91 STAGE IV ADENOCARCINOMA OF LUNG, RIGHT: ICD-10-CM

## 2025-05-21 DIAGNOSIS — R09.02 HYPOXIA: ICD-10-CM

## 2025-05-21 DIAGNOSIS — N18.4 CKD (CHRONIC KIDNEY DISEASE) STAGE 4, GFR 15-29 ML/MIN: ICD-10-CM

## 2025-05-21 DIAGNOSIS — C34.92 STAGE IV ADENOCARCINOMA OF LUNG, LEFT: ICD-10-CM

## 2025-05-21 DIAGNOSIS — J18.9 PNEUMONIA OF LEFT LUNG DUE TO INFECTIOUS ORGANISM, UNSPECIFIED PART OF LUNG: Primary | ICD-10-CM

## 2025-05-21 DIAGNOSIS — C34.91 MALIGNANT NEOPLASM OF RIGHT LUNG, UNSPECIFIED PART OF LUNG: ICD-10-CM

## 2025-05-21 LAB
ALBUMIN SERPL-MCNC: 3.7 G/DL (ref 3.5–5.2)
ALBUMIN/GLOB SERPL: 1.5 G/DL
ALP SERPL-CCNC: 60 U/L (ref 39–117)
ALT SERPL W P-5'-P-CCNC: 65 U/L (ref 1–41)
ANION GAP SERPL CALCULATED.3IONS-SCNC: 10.6 MMOL/L (ref 5–15)
APTT PPP: 29.2 SECONDS (ref 22.7–35.4)
ARTERIAL PATENCY WRIST A: POSITIVE
AST SERPL-CCNC: 76 U/L (ref 1–40)
ATMOSPHERIC PRESS: ABNORMAL MM[HG]
BASE EXCESS BLDA CALC-SCNC: -4.6 MMOL/L (ref 0–3)
BASOPHILS # BLD AUTO: 0.13 10*3/MM3 (ref 0–0.2)
BASOPHILS NFR BLD AUTO: 0.8 % (ref 0–1.5)
BDY SITE: ABNORMAL
BILIRUB SERPL-MCNC: 0.3 MG/DL (ref 0–1.2)
BUN SERPL-MCNC: 29 MG/DL (ref 8–23)
BUN/CREAT SERPL: 12.3 (ref 7–25)
CALCIUM SPEC-SCNC: 8.7 MG/DL (ref 8.6–10.5)
CHLORIDE SERPL-SCNC: 102 MMOL/L (ref 98–107)
CO2 BLDA-SCNC: 21.6 MMOL/L (ref 22–29)
CO2 SERPL-SCNC: 20.4 MMOL/L (ref 22–29)
CREAT SERPL-MCNC: 2.36 MG/DL (ref 0.76–1.27)
DEPRECATED RDW RBC AUTO: 53.1 FL (ref 37–54)
EGFRCR SERPLBLD CKD-EPI 2021: 28 ML/MIN/1.73
EOSINOPHIL # BLD AUTO: 1.35 10*3/MM3 (ref 0–0.4)
EOSINOPHIL NFR BLD AUTO: 8.4 % (ref 0.3–6.2)
ERYTHROCYTE [DISTWIDTH] IN BLOOD BY AUTOMATED COUNT: 15.5 % (ref 12.3–15.4)
GLOBULIN UR ELPH-MCNC: 2.5 GM/DL
GLUCOSE SERPL-MCNC: 106 MG/DL (ref 65–99)
HCO3 BLDA-SCNC: 20.5 MMOL/L (ref 21–28)
HCT VFR BLD AUTO: 35.6 % (ref 37.5–51)
HEMODILUTION: YES
HGB BLD-MCNC: 11.5 G/DL (ref 13–17.7)
IMM GRANULOCYTES # BLD AUTO: 0.09 10*3/MM3 (ref 0–0.05)
IMM GRANULOCYTES NFR BLD AUTO: 0.6 % (ref 0–0.5)
INHALED O2 CONCENTRATION: 32 %
INR PPP: 1.18 (ref 0.9–1.1)
LYMPHOCYTES # BLD AUTO: 2.49 10*3/MM3 (ref 0.7–3.1)
LYMPHOCYTES NFR BLD AUTO: 15.6 % (ref 19.6–45.3)
MCH RBC QN AUTO: 30.1 PG (ref 26.6–33)
MCHC RBC AUTO-ENTMCNC: 32.3 G/DL (ref 31.5–35.7)
MCV RBC AUTO: 93.2 FL (ref 79–97)
MODALITY: ABNORMAL
MONOCYTES # BLD AUTO: 1.25 10*3/MM3 (ref 0.1–0.9)
MONOCYTES NFR BLD AUTO: 7.8 % (ref 5–12)
MRSA DNA SPEC QL NAA+PROBE: NORMAL
NEUTROPHILS NFR BLD AUTO: 10.69 10*3/MM3 (ref 1.7–7)
NEUTROPHILS NFR BLD AUTO: 66.8 % (ref 42.7–76)
NRBC BLD AUTO-RTO: 0 /100 WBC (ref 0–0.2)
PCO2 BLDA: 37 MM HG (ref 35–48)
PH BLDA: 7.35 PH UNITS (ref 7.35–7.45)
PLATELET # BLD AUTO: 299 10*3/MM3 (ref 140–450)
PMV BLD AUTO: 9.1 FL (ref 6–12)
PO2 BLD: 250 MM[HG] (ref 0–500)
PO2 BLDA: 80.1 MM HG (ref 83–108)
POTASSIUM SERPL-SCNC: 4.4 MMOL/L (ref 3.5–5.2)
PROT SERPL-MCNC: 6.2 G/DL (ref 6–8.5)
PROTHROMBIN TIME: 14.9 SECONDS (ref 11.7–14.2)
RBC # BLD AUTO: 3.82 10*6/MM3 (ref 4.14–5.8)
SAO2 % BLDCOA: 95.2 % (ref 94–98)
SODIUM SERPL-SCNC: 133 MMOL/L (ref 136–145)
T4 FREE SERPL-MCNC: 0.76 NG/DL (ref 0.92–1.68)
TSH SERPL DL<=0.05 MIU/L-ACNC: 62.2 UIU/ML (ref 0.27–4.2)
WBC NRBC COR # BLD AUTO: 16 10*3/MM3 (ref 3.4–10.8)
WHOLE BLOOD HOLD SPECIMEN: NORMAL

## 2025-05-21 PROCEDURE — 84443 ASSAY THYROID STIM HORMONE: CPT

## 2025-05-21 PROCEDURE — 25010000002 CEFTAZIDIME 2 G RECONSTITUTED SOLUTION 1 EACH VIAL: Performed by: EMERGENCY MEDICINE

## 2025-05-21 PROCEDURE — 87641 MR-STAPH DNA AMP PROBE: CPT | Performed by: INTERNAL MEDICINE

## 2025-05-21 PROCEDURE — 85610 PROTHROMBIN TIME: CPT | Performed by: RADIOLOGY

## 2025-05-21 PROCEDURE — 85730 THROMBOPLASTIN TIME PARTIAL: CPT | Performed by: RADIOLOGY

## 2025-05-21 PROCEDURE — 93005 ELECTROCARDIOGRAM TRACING: CPT | Performed by: EMERGENCY MEDICINE

## 2025-05-21 PROCEDURE — 25010000002 LIDOCAINE 1 % SOLUTION: Performed by: RADIOLOGY

## 2025-05-21 PROCEDURE — 85025 COMPLETE CBC W/AUTO DIFF WBC: CPT | Performed by: RADIOLOGY

## 2025-05-21 PROCEDURE — 84439 ASSAY OF FREE THYROXINE: CPT

## 2025-05-21 PROCEDURE — 71045 X-RAY EXAM CHEST 1 VIEW: CPT

## 2025-05-21 PROCEDURE — 77001 FLUOROGUIDE FOR VEIN DEVICE: CPT

## 2025-05-21 PROCEDURE — 25810000003 SODIUM CHLORIDE 0.9 % SOLUTION: Performed by: RADIOLOGY

## 2025-05-21 PROCEDURE — 36600 WITHDRAWAL OF ARTERIAL BLOOD: CPT | Performed by: EMERGENCY MEDICINE

## 2025-05-21 PROCEDURE — 25010000002 ONDANSETRON PER 1 MG: Performed by: RADIOLOGY

## 2025-05-21 PROCEDURE — 0JPT0WZ REMOVAL OF TOTALLY IMPLANTABLE VASCULAR ACCESS DEVICE FROM TRUNK SUBCUTANEOUS TISSUE AND FASCIA, OPEN APPROACH: ICD-10-PCS | Performed by: RADIOLOGY

## 2025-05-21 PROCEDURE — 25010000002 CEFAZOLIN PER 500 MG: Performed by: RADIOLOGY

## 2025-05-21 PROCEDURE — 25010000002 MIDAZOLAM PER 1 MG: Performed by: RADIOLOGY

## 2025-05-21 PROCEDURE — 80053 COMPREHEN METABOLIC PANEL: CPT | Performed by: EMERGENCY MEDICINE

## 2025-05-21 PROCEDURE — 25010000002 METHYLPREDNISOLONE PER 40 MG

## 2025-05-21 PROCEDURE — 99152 MOD SED SAME PHYS/QHP 5/>YRS: CPT

## 2025-05-21 PROCEDURE — 02PY33Z REMOVAL OF INFUSION DEVICE FROM GREAT VESSEL, PERCUTANEOUS APPROACH: ICD-10-PCS | Performed by: RADIOLOGY

## 2025-05-21 PROCEDURE — 71250 CT THORAX DX C-: CPT

## 2025-05-21 PROCEDURE — 87641 MR-STAPH DNA AMP PROBE: CPT

## 2025-05-21 PROCEDURE — 99153 MOD SED SAME PHYS/QHP EA: CPT

## 2025-05-21 PROCEDURE — 87040 BLOOD CULTURE FOR BACTERIA: CPT | Performed by: EMERGENCY MEDICINE

## 2025-05-21 PROCEDURE — 25010000002 FENTANYL CITRATE (PF) 50 MCG/ML SOLUTION: Performed by: RADIOLOGY

## 2025-05-21 PROCEDURE — 02H633Z INSERTION OF INFUSION DEVICE INTO RIGHT ATRIUM, PERCUTANEOUS APPROACH: ICD-10-PCS | Performed by: RADIOLOGY

## 2025-05-21 PROCEDURE — 83605 ASSAY OF LACTIC ACID: CPT

## 2025-05-21 PROCEDURE — 99285 EMERGENCY DEPT VISIT HI MDM: CPT

## 2025-05-21 PROCEDURE — 82803 BLOOD GASES ANY COMBINATION: CPT | Performed by: EMERGENCY MEDICINE

## 2025-05-21 PROCEDURE — 36415 COLL VENOUS BLD VENIPUNCTURE: CPT

## 2025-05-21 RX ORDER — SODIUM CHLORIDE 0.9 % (FLUSH) 0.9 %
10 SYRINGE (ML) INJECTION AS NEEDED
Status: DISCONTINUED | OUTPATIENT
Start: 2025-05-21 | End: 2025-05-22 | Stop reason: HOSPADM

## 2025-05-21 RX ORDER — MIDODRINE HYDROCHLORIDE 5 MG/1
5 TABLET ORAL 3 TIMES DAILY PRN
Status: DISCONTINUED | OUTPATIENT
Start: 2025-05-21 | End: 2025-05-25 | Stop reason: HOSPADM

## 2025-05-21 RX ORDER — ATORVASTATIN CALCIUM 10 MG/1
10 TABLET, FILM COATED ORAL DAILY
Status: CANCELLED | OUTPATIENT
Start: 2025-05-21

## 2025-05-21 RX ORDER — LORATADINE 10 MG/1
10 TABLET ORAL DAILY
COMMUNITY

## 2025-05-21 RX ORDER — CEPHALEXIN 500 MG/1
500 CAPSULE ORAL 2 TIMES DAILY
Status: ON HOLD | COMMUNITY
Start: 2025-05-13 | End: 2025-05-22

## 2025-05-21 RX ORDER — TAMSULOSIN HYDROCHLORIDE 0.4 MG/1
0.4 CAPSULE ORAL NIGHTLY
Status: DISCONTINUED | OUTPATIENT
Start: 2025-05-22 | End: 2025-05-25 | Stop reason: HOSPADM

## 2025-05-21 RX ORDER — ACETAMINOPHEN 325 MG/1
650 TABLET ORAL EVERY 4 HOURS PRN
Status: DISCONTINUED | OUTPATIENT
Start: 2025-05-21 | End: 2025-05-25 | Stop reason: HOSPADM

## 2025-05-21 RX ORDER — ACETAMINOPHEN 325 MG/1
650 TABLET ORAL EVERY 4 HOURS PRN
Status: CANCELLED | OUTPATIENT
Start: 2025-05-21

## 2025-05-21 RX ORDER — LIDOCAINE HYDROCHLORIDE 10 MG/ML
INJECTION, SOLUTION INFILTRATION; PERINEURAL AS NEEDED
Status: COMPLETED | OUTPATIENT
Start: 2025-05-21 | End: 2025-05-21

## 2025-05-21 RX ORDER — METHYLPREDNISOLONE SODIUM SUCCINATE 40 MG/ML
40 INJECTION, POWDER, LYOPHILIZED, FOR SOLUTION INTRAMUSCULAR; INTRAVENOUS EVERY 12 HOURS
Status: DISCONTINUED | OUTPATIENT
Start: 2025-05-21 | End: 2025-05-24

## 2025-05-21 RX ORDER — ACETAMINOPHEN 650 MG/1
650 SUPPOSITORY RECTAL EVERY 4 HOURS PRN
Status: CANCELLED | OUTPATIENT
Start: 2025-05-21

## 2025-05-21 RX ORDER — SODIUM CHLORIDE 9 MG/ML
40 INJECTION, SOLUTION INTRAVENOUS AS NEEDED
Status: DISCONTINUED | OUTPATIENT
Start: 2025-05-21 | End: 2025-05-25 | Stop reason: HOSPADM

## 2025-05-21 RX ORDER — SOTALOL HYDROCHLORIDE 80 MG/1
80 TABLET ORAL 2 TIMES DAILY
COMMUNITY

## 2025-05-21 RX ORDER — LEVOTHYROXINE SODIUM 75 UG/1
75 TABLET ORAL
Status: DISCONTINUED | OUTPATIENT
Start: 2025-05-22 | End: 2025-05-25 | Stop reason: HOSPADM

## 2025-05-21 RX ORDER — MIDAZOLAM HYDROCHLORIDE 1 MG/ML
INJECTION, SOLUTION INTRAMUSCULAR; INTRAVENOUS AS NEEDED
Status: COMPLETED | OUTPATIENT
Start: 2025-05-21 | End: 2025-05-21

## 2025-05-21 RX ORDER — FENTANYL CITRATE 50 UG/ML
INJECTION, SOLUTION INTRAMUSCULAR; INTRAVENOUS AS NEEDED
Status: COMPLETED | OUTPATIENT
Start: 2025-05-21 | End: 2025-05-21

## 2025-05-21 RX ORDER — NITROGLYCERIN 0.4 MG/1
0.4 TABLET SUBLINGUAL
Status: DISCONTINUED | OUTPATIENT
Start: 2025-05-21 | End: 2025-05-25 | Stop reason: HOSPADM

## 2025-05-21 RX ORDER — ONDANSETRON 2 MG/ML
INJECTION INTRAMUSCULAR; INTRAVENOUS AS NEEDED
Status: COMPLETED | OUTPATIENT
Start: 2025-05-21 | End: 2025-05-21

## 2025-05-21 RX ORDER — BISACODYL 10 MG
10 SUPPOSITORY, RECTAL RECTAL DAILY PRN
Status: DISCONTINUED | OUTPATIENT
Start: 2025-05-21 | End: 2025-05-25 | Stop reason: HOSPADM

## 2025-05-21 RX ORDER — IPRATROPIUM BROMIDE AND ALBUTEROL SULFATE 2.5; .5 MG/3ML; MG/3ML
3 SOLUTION RESPIRATORY (INHALATION) EVERY 6 HOURS PRN
Status: CANCELLED | OUTPATIENT
Start: 2025-05-21

## 2025-05-21 RX ORDER — ONDANSETRON 2 MG/ML
4 INJECTION INTRAMUSCULAR; INTRAVENOUS EVERY 6 HOURS PRN
Status: CANCELLED | OUTPATIENT
Start: 2025-05-21

## 2025-05-21 RX ORDER — ACETAMINOPHEN 160 MG/5ML
650 SOLUTION ORAL EVERY 4 HOURS PRN
Status: CANCELLED | OUTPATIENT
Start: 2025-05-21

## 2025-05-21 RX ORDER — SODIUM CHLORIDE 9 MG/ML
75 INJECTION, SOLUTION INTRAVENOUS CONTINUOUS
Status: DISPENSED | OUTPATIENT
Start: 2025-05-22 | End: 2025-05-23

## 2025-05-21 RX ORDER — BISACODYL 5 MG/1
5 TABLET, DELAYED RELEASE ORAL DAILY PRN
Status: DISCONTINUED | OUTPATIENT
Start: 2025-05-21 | End: 2025-05-25 | Stop reason: HOSPADM

## 2025-05-21 RX ORDER — PANTOPRAZOLE SODIUM 40 MG/1
40 TABLET, DELAYED RELEASE ORAL DAILY
Status: CANCELLED | OUTPATIENT
Start: 2025-05-22

## 2025-05-21 RX ORDER — PANTOPRAZOLE SODIUM 40 MG/1
40 TABLET, DELAYED RELEASE ORAL DAILY
COMMUNITY

## 2025-05-21 RX ORDER — SODIUM CHLORIDE 0.9 % (FLUSH) 0.9 %
10 SYRINGE (ML) INJECTION EVERY 12 HOURS SCHEDULED
Status: DISCONTINUED | OUTPATIENT
Start: 2025-05-22 | End: 2025-05-25 | Stop reason: HOSPADM

## 2025-05-21 RX ORDER — ONDANSETRON 4 MG/1
4 TABLET, ORALLY DISINTEGRATING ORAL EVERY 6 HOURS PRN
Status: DISCONTINUED | OUTPATIENT
Start: 2025-05-21 | End: 2025-05-25 | Stop reason: HOSPADM

## 2025-05-21 RX ORDER — IPRATROPIUM BROMIDE AND ALBUTEROL SULFATE 2.5; .5 MG/3ML; MG/3ML
3 SOLUTION RESPIRATORY (INHALATION)
Status: DISCONTINUED | OUTPATIENT
Start: 2025-05-21 | End: 2025-05-25 | Stop reason: HOSPADM

## 2025-05-21 RX ORDER — GUAIFENESIN 600 MG/1
1200 TABLET, EXTENDED RELEASE ORAL EVERY 12 HOURS SCHEDULED
Status: DISCONTINUED | OUTPATIENT
Start: 2025-05-22 | End: 2025-05-25 | Stop reason: HOSPADM

## 2025-05-21 RX ORDER — HYDRALAZINE HYDROCHLORIDE 20 MG/ML
10 INJECTION INTRAMUSCULAR; INTRAVENOUS EVERY 6 HOURS PRN
Status: CANCELLED | OUTPATIENT
Start: 2025-05-21

## 2025-05-21 RX ORDER — POLYETHYLENE GLYCOL 3350 17 G/17G
17 POWDER, FOR SOLUTION ORAL DAILY PRN
Status: DISCONTINUED | OUTPATIENT
Start: 2025-05-21 | End: 2025-05-25 | Stop reason: HOSPADM

## 2025-05-21 RX ORDER — SOTALOL HYDROCHLORIDE 80 MG/1
80 TABLET ORAL 2 TIMES DAILY
Status: DISCONTINUED | OUTPATIENT
Start: 2025-05-22 | End: 2025-05-25 | Stop reason: HOSPADM

## 2025-05-21 RX ORDER — SOTALOL HYDROCHLORIDE 80 MG/1
80 TABLET ORAL 2 TIMES DAILY
Status: CANCELLED | OUTPATIENT
Start: 2025-05-21

## 2025-05-21 RX ORDER — SODIUM CHLORIDE 0.9 % (FLUSH) 0.9 %
10 SYRINGE (ML) INJECTION AS NEEDED
Status: DISCONTINUED | OUTPATIENT
Start: 2025-05-21 | End: 2025-05-25 | Stop reason: HOSPADM

## 2025-05-21 RX ORDER — SODIUM CHLORIDE 0.9 % (FLUSH) 0.9 %
10 SYRINGE (ML) INJECTION EVERY 12 HOURS SCHEDULED
Status: CANCELLED | OUTPATIENT
Start: 2025-05-21

## 2025-05-21 RX ORDER — IPRATROPIUM BROMIDE AND ALBUTEROL SULFATE 2.5; .5 MG/3ML; MG/3ML
3 SOLUTION RESPIRATORY (INHALATION) EVERY 6 HOURS PRN
COMMUNITY

## 2025-05-21 RX ORDER — ONDANSETRON 2 MG/ML
4 INJECTION INTRAMUSCULAR; INTRAVENOUS EVERY 6 HOURS PRN
Status: DISCONTINUED | OUTPATIENT
Start: 2025-05-21 | End: 2025-05-25 | Stop reason: HOSPADM

## 2025-05-21 RX ORDER — ATORVASTATIN CALCIUM 10 MG/1
10 TABLET, FILM COATED ORAL DAILY
Status: DISCONTINUED | OUTPATIENT
Start: 2025-05-22 | End: 2025-05-25 | Stop reason: HOSPADM

## 2025-05-21 RX ORDER — SODIUM CHLORIDE 9 MG/ML
40 INJECTION, SOLUTION INTRAVENOUS AS NEEDED
Status: CANCELLED | OUTPATIENT
Start: 2025-05-21

## 2025-05-21 RX ORDER — AMOXICILLIN 250 MG
2 CAPSULE ORAL 2 TIMES DAILY PRN
Status: DISCONTINUED | OUTPATIENT
Start: 2025-05-21 | End: 2025-05-25 | Stop reason: HOSPADM

## 2025-05-21 RX ORDER — PANTOPRAZOLE SODIUM 40 MG/1
40 TABLET, DELAYED RELEASE ORAL DAILY
Status: DISCONTINUED | OUTPATIENT
Start: 2025-05-22 | End: 2025-05-25 | Stop reason: HOSPADM

## 2025-05-21 RX ORDER — MULTIPLE VITAMINS W/ MINERALS TAB 9MG-400MCG
1 TAB ORAL EVERY MORNING
Status: DISCONTINUED | OUTPATIENT
Start: 2025-05-22 | End: 2025-05-25 | Stop reason: HOSPADM

## 2025-05-21 RX ORDER — MIDODRINE HYDROCHLORIDE 5 MG/1
5 TABLET ORAL 3 TIMES DAILY PRN
COMMUNITY

## 2025-05-21 RX ORDER — SODIUM CHLORIDE 0.9 % (FLUSH) 0.9 %
10 SYRINGE (ML) INJECTION AS NEEDED
Status: CANCELLED | OUTPATIENT
Start: 2025-05-21

## 2025-05-21 RX ORDER — SODIUM CHLORIDE 9 MG/ML
75 INJECTION, SOLUTION INTRAVENOUS CONTINUOUS
Status: DISPENSED | OUTPATIENT
Start: 2025-05-21 | End: 2025-05-21

## 2025-05-21 RX ORDER — LEVOTHYROXINE SODIUM 75 UG/1
75 TABLET ORAL
Status: CANCELLED | OUTPATIENT
Start: 2025-05-22

## 2025-05-21 RX ORDER — TAMSULOSIN HYDROCHLORIDE 0.4 MG/1
0.4 CAPSULE ORAL NIGHTLY
Status: CANCELLED | OUTPATIENT
Start: 2025-05-21

## 2025-05-21 RX ORDER — SODIUM CHLORIDE 0.9 % (FLUSH) 0.9 %
10 SYRINGE (ML) INJECTION EVERY 12 HOURS SCHEDULED
Status: DISCONTINUED | OUTPATIENT
Start: 2025-05-21 | End: 2025-05-22 | Stop reason: HOSPADM

## 2025-05-21 RX ADMIN — CEFAZOLIN 2000 MG: 1 INJECTION, POWDER, FOR SOLUTION INTRAMUSCULAR; INTRAVENOUS at 13:04

## 2025-05-21 RX ADMIN — CEFTAZIDIME 2000 MG: 2 INJECTION, POWDER, FOR SOLUTION INTRAVENOUS at 16:02

## 2025-05-21 RX ADMIN — SODIUM CHLORIDE 75 ML/HR: 9 INJECTION, SOLUTION INTRAVENOUS at 11:20

## 2025-05-21 RX ADMIN — FENTANYL CITRATE 50 MCG: 50 INJECTION, SOLUTION INTRAMUSCULAR; INTRAVENOUS at 13:09

## 2025-05-21 RX ADMIN — METHYLPREDNISOLONE SODIUM SUCCINATE 40 MG: 40 INJECTION, POWDER, FOR SOLUTION INTRAMUSCULAR; INTRAVENOUS at 17:10

## 2025-05-21 RX ADMIN — FENTANYL CITRATE 50 MCG: 50 INJECTION, SOLUTION INTRAMUSCULAR; INTRAVENOUS at 13:20

## 2025-05-21 RX ADMIN — LIDOCAINE HYDROCHLORIDE 5 ML: 10 INJECTION, SOLUTION INFILTRATION; PERINEURAL at 14:06

## 2025-05-21 RX ADMIN — Medication 10 ML: at 11:20

## 2025-05-21 RX ADMIN — MIDAZOLAM 1 MG: 1 INJECTION INTRAMUSCULAR; INTRAVENOUS at 13:09

## 2025-05-21 RX ADMIN — ONDANSETRON 4 MG: 2 INJECTION INTRAMUSCULAR; INTRAVENOUS at 13:04

## 2025-05-21 NOTE — H&P
[facility]   Interventional Radiology H&P    Patient Name: Young Ames  : 1950  MRN: [id number]  Primary Care Physician:  Abner Funes APRN  Referring Physician: Arben Tim MD  Date of admission: 2025    Subjective   Subjective     HPI:  Young Ames is a 74 y.o. male with a Hx of Lung Ca who presents for Rt Port Removal and possible placement of a new VAD on the Left.    Review of Systems:   Constitutional no fever,  no weight loss       Otolaryngeal no difficulty swallowing   Cardiovascular no chest pain   Pulmonary no cough, no sputum production   Gastrointestinal no constipation, no diarrhea                         Personal History       Past Medical/Surgical History:   Past Medical History:   Diagnosis Date    Coronary artery disease     Myocardial infarction 2024     Past Surgical History:   Procedure Laterality Date    BRONCHOSCOPY N/A 2024    Procedure: BRONCHOSCOPY WITH BRONCHIAL WASHING AND BRONCHIAL BRUSHING;  Surgeon: Kelvin Gonzalez MD;  Location: [facility] ENDOSCOPY;  Service: Pulmonary;  Laterality: N/A;    BRONCHOSCOPY WITH ION ROBOTIC ASSIST N/A 2024    Procedure: BRONCHOSCOPY WITH ION ROBOT WITH CRYO BIOPSY;  Surgeon: Kelvin Gonzalez MD;  Location: [facility] ENDOSCOPY;  Service: Robotics - Pulmonary;  Laterality: N/A;    CARDIAC CATHETERIZATION Right 2024    Procedure: Coronary angiography;  Surgeon: Abran Chand MD;  Location: [facility] CATH INVASIVE LOCATION;  Service: Cardiovascular;  Laterality: Right;    CARDIAC CATHETERIZATION N/A 2024    Procedure: Left Heart Cath;  Surgeon: Abran Chand MD;  Location: [facility] CATH INVASIVE LOCATION;  Service: Cardiovascular;  Laterality: N/A;       Social History:  reports that he quit smoking about 25 years ago. His smoking use included cigarettes. He started smoking about 57 years ago. He has a 32 pack-year smoking history. He has been exposed to tobacco smoke. He has never used smokeless tobacco. He reports  "current alcohol use of about 1.0 standard drink of alcohol per week. He reports that he does not use drugs.    Medications:  (Not in a hospital admission)    Current medications:  sodium chloride, 10 mL, Intravenous, Q12H      Current IV drips:  sodium chloride, 75 mL/hr, Last Rate: 75 mL/hr (05/21/25 1120)        Allergies:  No Known Allergies    Objective    Objective     Vitals:   Temp:  [97.7 °F (36.5 °C)] 97.7 °F (36.5 °C)  Heart Rate:  [62] 62  Resp:  [13] 13  BP: (128)/(79) 128/79  Flow (L/min) (Oxygen Therapy):  [2] 2      Physical Exam:   Constitutional: Awake, alert, No acute distress    Respiratory: Clear to auscultation bilaterally, nonlabored respirations    Cardiovascular: RRR, no murmurs, rubs, or gallops, palpable pedal pulses bilaterally   Gastrointestinal: Positive bowel sounds, soft, nontender, nondistended        ASA SCALE ASSESSMENT:  3-Severe systemic disease that results in functional limitation    MALLAMPATI CLASSIFICATION:  3-Only the soft palate and base of uvula are visible       Result Review        Result Review:     No results found for: \"NA\"    No results found for: \"K\"    No results found for: \"CL\"    No results found for: \"PLASMABICARB\"    No results found for: \"BUN\"    No results found for: \"CREATININE\"    No results found for: \"CALCIUM\"        No components found for: \"GLUCOSE.*\"  Results from last 7 days   Lab Units 05/21/25  1104   WBC 10*3/mm3 16.00*   HEMOGLOBIN g/dL 11.5*   HEMATOCRIT % 35.6*   PLATELETS 10*3/mm3 299      Results from last 7 days   Lab Units 05/21/25  1104   INR  1.18*           Assessment / Plan     Assesment:   Right chest port skin wound      Plan:   Port removal with possible new port placement    The risks and benefits of the procedure were discussed with the patient.    Electronically signed by Darlnee Wood MD, 05/21/25, 11:55 AM EDT.   "

## 2025-05-21 NOTE — ED PROVIDER NOTES
Subjective   History of Present Illness  Chief complaint sent from interventional radiology for low oxygen saturation and pneumonia    History of present illness 74-year-old male whose had lung cancer in the right lung treated with Keytruda since this past fall who is scheduled in interventional radiology to have the port taken out of the chest because of some problems he has been having currently has a PICC line before the procedure his sats were noted to be in the 89% range and he was having trouble breathing they took an x-ray which showed left-sided pneumonia and he got sent to the ER.  Patient placed on 2 L nasal cannula prior to arrival.  He states he has been sick last few days with some congestion cough and just feeling rundown but denies fever chills or sweats.  No chest pain neck arm jaw pain no leg pain or swelling.  No recent hospitalization or antibiotic use.  Family reports that his kidney function has been going up and creatinine has been up in the 2 range and his Keytruda is on hold currently      Review of Systems   Constitutional:  Positive for fatigue. Negative for chills and fever.   HENT:  Positive for congestion.    Respiratory:  Positive for cough and shortness of breath. Negative for chest tightness.    Cardiovascular:  Negative for chest pain.   Gastrointestinal:  Negative for abdominal pain and vomiting.   Genitourinary:  Negative for difficulty urinating and dysuria.   Skin:  Negative for rash.   Neurological:  Positive for weakness.   Psychiatric/Behavioral:  Negative for confusion.        Past Medical History:   Diagnosis Date    Coronary artery disease     Myocardial infarction 01/31/2024       No Known Allergies    Past Surgical History:   Procedure Laterality Date    BRONCHOSCOPY N/A 8/13/2024    Procedure: BRONCHOSCOPY WITH BRONCHIAL WASHING AND BRONCHIAL BRUSHING;  Surgeon: Kelvin Gonzalez MD;  Location: [facility] ENDOSCOPY;  Service: Pulmonary;  Laterality: N/A;    BRONCHOSCOPY WITH ION  ROBOTIC ASSIST N/A 2024    Procedure: BRONCHOSCOPY WITH ION ROBOT WITH CRYO BIOPSY;  Surgeon: Kelvin Gonzalez MD;  Location: [facility] ENDOSCOPY;  Service: Robotics - Pulmonary;  Laterality: N/A;    CARDIAC CATHETERIZATION Right 2024    Procedure: Coronary angiography;  Surgeon: Abran Chand MD;  Location: [facility] CATH INVASIVE LOCATION;  Service: Cardiovascular;  Laterality: Right;    CARDIAC CATHETERIZATION N/A 2024    Procedure: Left Heart Cath;  Surgeon: Abran Chand MD;  Location: [facility] CATH INVASIVE LOCATION;  Service: Cardiovascular;  Laterality: N/A;       Family History   Problem Relation Age of Onset    Dementia Mother     Breast cancer Sister 74       Social History     Socioeconomic History    Marital status:    Tobacco Use    Smoking status: Former     Current packs/day: 0.00     Average packs/day: 1 pack/day for 32.0 years (32.0 ttl pk-yrs)     Types: Cigarettes     Start date:      Quit date:      Years since quittin.4     Passive exposure: Past    Smokeless tobacco: Never   Vaping Use    Vaping status: Never Used   Substance and Sexual Activity    Alcohol use: Yes     Alcohol/week: 1.0 standard drink of alcohol     Types: 1 Cans of beer per week    Drug use: Never    Sexual activity: Defer     Prior to Admission medications    Medication Sig Start Date End Date Taking? Authorizing Provider   acetaminophen (TYLENOL) 325 MG tablet Take 2 tablets by mouth Every 4 (Four) Hours As Needed for Mild Pain. 24   Evelyn Felder MD   apixaban (ELIQUIS) 5 MG tablet tablet Take 1 tablet by mouth Every 12 (Twelve) Hours. Indications: Atrial Fibrillation 24   Asya Marques APRN   bacitracin 500 UNIT/GM ointment  25   ProviderOswaldo MD   Cholecalciferol (VITAMIN D-3 PO) Take 1 tablet by mouth Daily. Indications: Vitamin Deficiency 24   Oswaldo Simpson MD   guaiFENesin (Mucinex) 600 MG 12 hr tablet Take 1 tablet by mouth 2 (Two) Times a Day.  Indications: Cough  Patient not taking: Reported on 5/15/2025 12/16/24   Oswaldo Simpson MD   ipratropium-albuterol (DUO-NEB) 0.5-2.5 mg/3 ml nebulizer Take 3 mL by nebulization 4 (Four) Times a Day As Needed for Wheezing (Dyspnea) for up to 30 days.  Patient not taking: Reported on 11/27/2024 9/5/24 10/5/24  Kelvin Gonzalez MD   levothyroxine (SYNTHROID, LEVOTHROID) 75 MCG tablet Take 1 tablet by mouth Every Morning. 5/1/25   Karin Gipson PA-C   lidocaine-prilocaine (EMLA) 2.5-2.5 % cream Apply 1 Application topically to the appropriate area as directed As Needed for Mild Pain (Apply 1 hour prior to port access). 9/11/24   Karin Gipson PA-C   midodrine (PROAMATINE) 5 MG tablet Take 1 tablet by mouth 3 (Three) Times a Day Before Meals.  Patient taking differently: Take 1 tablet by mouth 3 (Three) Times a Day As Needed (low blood pressure). Indications: Disorder of Low Blood Pressure 8/17/24   Asya Marques APRN   multivitamin with minerals (Centrum Silver 50+Men) tablet tablet Take 1 tablet by mouth Every Morning. Indications: Vitamin Deficiency    Oswaldo Simpson MD   NON FORMULARY Take 2 tablets by mouth Every Night. Zzzquil  Indications: Sleep Disorder 9/20/24   Oswaldo Simpson MD   ondansetron (ZOFRAN) 8 MG tablet Take 1 tablet by mouth 3 (Three) Times a Day As Needed for Nausea or Vomiting.  Patient taking differently: Take 1 tablet by mouth 3 (Three) Times a Day As Needed for Nausea or Vomiting. 9/11/24   Arben Tim MD   pantoprazole (PROTONIX) 40 MG EC tablet Take 1 tablet by mouth Daily.  Patient not taking: Reported on 5/15/2025 1/29/24   Evelyn Felder MD   polyethylene glycol (MIRALAX) 17 g packet Take 17 g by mouth Daily. Indications: Constipation 10/29/24   Oswaldo Simpson MD   simvastatin (ZOCOR) 20 MG tablet Take 1 tablet by mouth Every Night. Indications: High Amount of Fats in the Blood 1/18/24   Provider, MD Oswaldo   sotalol (BETAPACE) 80 MG  tablet TAKE ONE TABLET BY MOUTH TWICE DAILY INDICATIONS: ATRIAL FIBRILLATION 5/13/25   Carlos Pavon MD   tamsulosin (FLOMAX) 0.4 MG capsule 24 hr capsule Take 1 capsule by mouth Every Night. Indications: Benign Enlargement of Prostate 1/18/24   Provider, MD Oswaldo   thiamine (VITAMIN B-1) 100 MG tablet  tablet Take 1 tablet by mouth Every Morning. Indications: Vitamin Deficiency    Provider, MD Oswaldo   traMADol (ULTRAM) 50 MG tablet Take 1 tablet by mouth Every 6 (Six) Hours As Needed for Moderate Pain. 9/26/24   Arben Tim MD          Objective   Physical Exam  Constitutional this is a 74-year-old gentleman awake alert no acute distress sats in the mid 90s on 2 L nasal cannula.  HEENT extraocular muscles are intact pupils equal round reactive sclera clear the mouth is clear neck is supple no adenopathy no JV no bruits lungs rhonchi throughout the left lung no retraction no use of accessories.  Heart was regular without murmur rub abdomen was soft nontender good bowel sounds no pulsatile masses patient has a PICC line in place extremities no edema cords or Homans' sign or evidence of DVT skin warm dry without rashes neurologic awake alert and follows commands no facial asymmetry speech normal without focal weakness.  Procedures           ED Course      Results for orders placed or performed during the hospital encounter of 05/21/25   ECG 12 Lead Dyspnea    Collection Time: 05/21/25  3:16 PM   Result Value Ref Range    QT Interval 465 ms    QTC Interval 487 ms   Blood Gas, Arterial -    Collection Time: 05/21/25  3:30 PM    Specimen: Arterial Blood   Result Value Ref Range    Site Left Radial     Pipe's Test Positive     pH, Arterial 7.351 7.350 - 7.450 pH units    pCO2, Arterial 37.0 35.0 - 48.0 mm Hg    pO2, Arterial 80.1 (L) 83.0 - 108.0 mm Hg    HCO3, Arterial 20.5 (L) 21.0 - 28.0 mmol/L    Base Excess, Arterial -4.6 (L) 0.0 - 3.0 mmol/L    O2 Saturation, Arterial 95.2 94.0 - 98.0 %     CO2 Content 21.6 (L) 22 - 29 mmol/L    Barometric Pressure for Blood Gas      Modality Cannula     FIO2 32 %    Hemodilution Yes     PO2/FIO2 250 0 - 500   Comprehensive Metabolic Panel    Collection Time: 05/21/25  3:36 PM    Specimen: Blood   Result Value Ref Range    Glucose 106 (H) 65 - 99 mg/dL    BUN 29 (H) 8 - 23 mg/dL    Creatinine 2.36 (H) 0.76 - 1.27 mg/dL    Sodium 133 (L) 136 - 145 mmol/L    Potassium 4.4 3.5 - 5.2 mmol/L    Chloride 102 98 - 107 mmol/L    CO2 20.4 (L) 22.0 - 29.0 mmol/L    Calcium 8.7 8.6 - 10.5 mg/dL    Total Protein 6.2 6.0 - 8.5 g/dL    Albumin 3.7 3.5 - 5.2 g/dL    ALT (SGPT) 65 (H) 1 - 41 U/L    AST (SGOT) 76 (H) 1 - 40 U/L    Alkaline Phosphatase 60 39 - 117 U/L    Total Bilirubin 0.3 0.0 - 1.2 mg/dL    Globulin 2.5 gm/dL    A/G Ratio 1.5 g/dL    BUN/Creatinine Ratio 12.3 7.0 - 25.0    Anion Gap 10.6 5.0 - 15.0 mmol/L    eGFR 28.0 (L) >60.0 mL/min/1.73   Lavender Top    Collection Time: 05/21/25  3:36 PM   Result Value Ref Range    Extra Tube hold for add-on      IR Removal Tunnel CV With Port Different Site  Result Date: 5/21/2025  1. Uncomplicated removal, right chest wall [id number]. As mentioned above, the incision was sealed using Dermabond, while the skin ulceration/wound is set to heal via secondary intention. Dressing should be changed every 1 to 2 days using sterile technique. The patient is to return to Interventional Radiology for follow-up to evaluate for adequate healing. 2. Technically successful placement, 4 [place] single lumen power injectable [id number] utilizing the right upper extremity brachial vein approach with both sonographic and fluoroscopic guidance utilized. Electronically Signed: Darlene Wood MD  5/21/2025 3:29 PM EDT  Workstation ID: [id number]    IR PICC W Imaging Guidance  Result Date: 5/21/2025  1. Uncomplicated removal, right chest wall [id number]. As mentioned above, the incision was sealed using Dermabond, while the skin  ulceration/wound is set to heal via secondary intention. Dressing should be changed every 1 to 2 days using sterile technique. The patient is to return to Interventional Radiology for follow-up to evaluate for adequate healing. 2. Technically successful placement, 4 Citizen of [place] single lumen power injectable Nshjmj-s-Lhql utilizing the right upper extremity brachial vein approach with both sonographic and fluoroscopic guidance utilized. Electronically Signed: Darlene Wood MD  5/21/2025 3:29 PM EDT  Workstation ID: [id number]    XR Chest 1 View  Result Date: 5/21/2025  Impression: 1. Extensive airspace disease throughout the left lung compatible with pneumonia. 2. Chronic airspace disease at the right lung base mildly improved from prior study. 3. Small bilateral pleural effusions. 4. Stable right port catheter. Electronically Signed: Antwon Estrella MD  5/21/2025 12:11 PM EDT  Workstation ID: [id number]    Medications   cefTAZidime (FORTAZ) 2,000 mg in sodium chloride 0.9 % 100 mL MBP (2,000 mg Intravenous New Bag 5/21/25 1602)     EKG my interpretation normal sinus rhythm rate of 66 normal axis hypertrophy QTc of 470 normal EKG unchanged on 8/17/2024                                       CBC done as an outpatient this morning reviewed by me showed a white count of 16,000 hemoglobin 11.5 platelets of 299,000            Medical Decision Making  Medical Decision Making.  Patient had a IV established prior to arrival.  The patient here was placed on monitor my review of sinus rhythm and EKG my interpretation normal sinus rhythm rate of 66 normal axis hypertrophy QTc of 470 normal EKG unchanged from 8/17/2024.  Normal.  Chest x-ray my independent review the patient has a large left-sided pneumonia.  I do not see any evidence of a pneumothorax or large pleural effusion.  Radiology review is the same.  Patient CBC reviewed from earlier today her white count was 16,000 hemoglobin 11.5 platelets okay at 299,000.  Other labs  independent reviewed by me comprehensive metabolic profile microalbumin 29 creatinine 2.3 and a sodium of 133.  Blood sugars on 2 L of oxygen pH 7.35 pCO2 37 pO2 of 80.  Blood cultures are pending.  Patient is started on Fortaz 2 g IV.  Patient's had no hypotension lactate is currently pending.  I do not see any evidence this DVT or pulm embolism or pneumothorax or failure acute myocardial infarction pericardial effusion based on my history and physical and clinical findings although not a complete list of all possibilities.  I talked to the nurse practitioner Dr. Ryan Case discussed patient be admitted to hospital for further care.  Stable otherwise unremarkable ER course.  Lactate 1.7.  Family made aware of the findings stable unremarkable course.    Problems Addressed:  Hypoxia: complicated acute illness or injury  Malignant neoplasm of right lung, unspecified part of lung: complicated acute illness or injury  Pneumonia of left lung due to infectious organism, unspecified part of lung: complicated acute illness or injury    Amount and/or Complexity of Data Reviewed  Labs: ordered. Decision-making details documented in ED Course.  Radiology: independent interpretation performed. Decision-making details documented in ED Course.  ECG/medicine tests: ordered and independent interpretation performed. Decision-making details documented in ED Course.    Risk  Decision regarding hospitalization.        Final diagnoses:   Pneumonia of left lung due to infectious organism, unspecified part of lung   Hypoxia   Malignant neoplasm of right lung, unspecified part of lung       ED Disposition  ED Disposition       ED Disposition   Decision to Admit    Condition   --    Comment   Level of Care: Telemetry [5]   Admitting Physician: JESUS RYAN [[id number]]   Attending Physician: JESUS RYAN [[id number]]                 No follow-up provider specified.       Medication List        ASK your doctor about these medications       ipratropium-albuterol 0.5-2.5 mg/3 ml nebulizer  Commonly known as: DUO-NEB  Ask about: Which instructions should I use?     midodrine 5 MG tablet  Commonly known as: PROAMATINE  Ask about: Which instructions should I use?     pantoprazole 40 MG EC tablet  Commonly known as: PROTONIX  Ask about: Which instructions should I use?     sotalol 80 MG tablet  Commonly known as: BETAPACE  Ask about: Which instructions should I use?                 Joe Wise MD  05/21/25 1813       Joe Wise MD  05/21/25 2124

## 2025-05-21 NOTE — Clinical Note
Level of Care: Med/Surg [1]   Diagnosis: Pneumonia [[id number]]   Admitting Physician: JESUS RYAN [[id number]]   Attending Physician: JESUS RYAN [[id number]]

## 2025-05-21 NOTE — NURSING NOTE
Pt transferred to ER per Dr. Tim request after instructions given for PICC placement.  Transported via wheelchair on 2Lnc.  Report given to Triage RN. Wife and daughter accompanied us.

## 2025-05-22 ENCOUNTER — APPOINTMENT (OUTPATIENT)
Dept: CT IMAGING | Facility: HOSPITAL | Age: 75
DRG: 178 | End: 2025-05-22
Payer: MEDICARE

## 2025-05-22 ENCOUNTER — APPOINTMENT (OUTPATIENT)
Dept: ULTRASOUND IMAGING | Facility: HOSPITAL | Age: 75
DRG: 178 | End: 2025-05-22
Payer: MEDICARE

## 2025-05-22 ENCOUNTER — APPOINTMENT (OUTPATIENT)
Dept: GENERAL RADIOLOGY | Facility: HOSPITAL | Age: 75
DRG: 178 | End: 2025-05-22
Payer: MEDICARE

## 2025-05-22 ENCOUNTER — ANESTHESIA EVENT (OUTPATIENT)
Dept: GASTROENTEROLOGY | Facility: HOSPITAL | Age: 75
End: 2025-05-22
Payer: MEDICARE

## 2025-05-22 ENCOUNTER — INPATIENT HOSPITAL (OUTPATIENT)
Dept: URBAN - METROPOLITAN AREA HOSPITAL 84 | Facility: HOSPITAL | Age: 75
End: 2025-05-22
Payer: MEDICARE

## 2025-05-22 DIAGNOSIS — R13.10 DYSPHAGIA, UNSPECIFIED: ICD-10-CM

## 2025-05-22 DIAGNOSIS — Z79.01 LONG TERM (CURRENT) USE OF ANTICOAGULANTS: ICD-10-CM

## 2025-05-22 DIAGNOSIS — K59.00 CONSTIPATION, UNSPECIFIED: ICD-10-CM

## 2025-05-22 PROBLEM — R09.02 HYPOXIA: Status: ACTIVE | Noted: 2025-05-22

## 2025-05-22 PROBLEM — E03.9 HYPOTHYROIDISM (ACQUIRED): Status: ACTIVE | Noted: 2025-05-22

## 2025-05-22 PROBLEM — N13.30 HYDRONEPHROSIS: Status: ACTIVE | Noted: 2025-05-22

## 2025-05-22 PROBLEM — I48.91 ATRIAL FIBRILLATION: Status: ACTIVE | Noted: 2025-05-22

## 2025-05-22 PROBLEM — N18.4 CKD (CHRONIC KIDNEY DISEASE) STAGE 4, GFR 15-29 ML/MIN: Status: ACTIVE | Noted: 2025-05-22

## 2025-05-22 LAB
ANION GAP SERPL CALCULATED.3IONS-SCNC: 11.8 MMOL/L (ref 5–15)
APPEARANCE FLD: ABNORMAL
BASOPHILS # BLD AUTO: 0.03 10*3/MM3 (ref 0–0.2)
BASOPHILS NFR BLD AUTO: 0.2 % (ref 0–1.5)
BUN SERPL-MCNC: 33 MG/DL (ref 8–23)
BUN/CREAT SERPL: 14.1 (ref 7–25)
CALCIUM SPEC-SCNC: 8.6 MG/DL (ref 8.6–10.5)
CHLORIDE SERPL-SCNC: 103 MMOL/L (ref 98–107)
CO2 SERPL-SCNC: 19.2 MMOL/L (ref 22–29)
COLOR FLD: ABNORMAL
CREAT SERPL-MCNC: 2.34 MG/DL (ref 0.76–1.27)
D-LACTATE SERPL-SCNC: 0.3 MMOL/L (ref 0.3–2)
D-LACTATE SERPL-SCNC: <0.3 MMOL/L (ref 0.3–2)
DEPRECATED RDW RBC AUTO: 53.4 FL (ref 37–54)
EGFRCR SERPLBLD CKD-EPI 2021: 28.3 ML/MIN/1.73
EOSINOPHIL # BLD AUTO: 0.01 10*3/MM3 (ref 0–0.4)
EOSINOPHIL NFR BLD AUTO: 0.1 % (ref 0.3–6.2)
ERYTHROCYTE [DISTWIDTH] IN BLOOD BY AUTOMATED COUNT: 15.5 % (ref 12.3–15.4)
GLUCOSE FLD-MCNC: 177 MG/DL
GLUCOSE SERPL-MCNC: 148 MG/DL (ref 65–99)
HCT VFR BLD AUTO: 29.1 % (ref 37.5–51)
HGB BLD-MCNC: 9.5 G/DL (ref 13–17.7)
IMM GRANULOCYTES # BLD AUTO: 0.09 10*3/MM3 (ref 0–0.05)
IMM GRANULOCYTES NFR BLD AUTO: 0.7 % (ref 0–0.5)
L PNEUMO1 AG UR QL IA: NEGATIVE
LDH FLD-CCNC: 398 U/L
LYMPHOCYTES # BLD AUTO: 1.65 10*3/MM3 (ref 0.7–3.1)
LYMPHOCYTES NFR BLD AUTO: 12.3 % (ref 19.6–45.3)
LYMPHOCYTES NFR FLD MANUAL: 37 %
MCH RBC QN AUTO: 30.6 PG (ref 26.6–33)
MCHC RBC AUTO-ENTMCNC: 32.6 G/DL (ref 31.5–35.7)
MCV RBC AUTO: 93.9 FL (ref 79–97)
MONOCYTES # BLD AUTO: 0.58 10*3/MM3 (ref 0.1–0.9)
MONOCYTES NFR BLD AUTO: 4.3 % (ref 5–12)
MONOCYTES NFR FLD: 5 %
MRSA DNA SPEC QL NAA+PROBE: NORMAL
NEUTROPHILS NFR BLD AUTO: 11.1 10*3/MM3 (ref 1.7–7)
NEUTROPHILS NFR BLD AUTO: 82.4 % (ref 42.7–76)
NEUTROPHILS NFR FLD MANUAL: 58 %
NRBC BLD AUTO-RTO: 0 /100 WBC (ref 0–0.2)
NUC CELL # FLD: 568 /MM3
PH FLD: 7.5 [PH] (ref 6.5–7.5)
PLATELET # BLD AUTO: 266 10*3/MM3 (ref 140–450)
PMV BLD AUTO: 9.6 FL (ref 6–12)
POTASSIUM SERPL-SCNC: 4.9 MMOL/L (ref 3.5–5.2)
PROT FLD-MCNC: 4 G/DL
QT INTERVAL: 465 MS
QTC INTERVAL: 487 MS
RBC # BLD AUTO: 3.1 10*6/MM3 (ref 4.14–5.8)
RBC # FLD AUTO: ABNORMAL /MM3
S PNEUM AG SPEC QL LA: NEGATIVE
SODIUM SERPL-SCNC: 134 MMOL/L (ref 136–145)
WBC NRBC COR # BLD AUTO: 13.46 10*3/MM3 (ref 3.4–10.8)

## 2025-05-22 PROCEDURE — 76775 US EXAM ABDO BACK WALL LIM: CPT

## 2025-05-22 PROCEDURE — 74176 CT ABD & PELVIS W/O CONTRAST: CPT

## 2025-05-22 PROCEDURE — 88108 CYTOPATH CONCENTRATE TECH: CPT | Performed by: INTERNAL MEDICINE

## 2025-05-22 PROCEDURE — 87070 CULTURE OTHR SPECIMN AEROBIC: CPT | Performed by: INTERNAL MEDICINE

## 2025-05-22 PROCEDURE — 94664 DEMO&/EVAL PT USE INHALER: CPT

## 2025-05-22 PROCEDURE — 94799 UNLISTED PULMONARY SVC/PX: CPT

## 2025-05-22 PROCEDURE — 99222 1ST HOSP IP/OBS MODERATE 55: CPT

## 2025-05-22 PROCEDURE — 80048 BASIC METABOLIC PNL TOTAL CA: CPT

## 2025-05-22 PROCEDURE — 25010000002 CEFTAZIDIME 2 G RECONSTITUTED SOLUTION 1 EACH VIAL

## 2025-05-22 PROCEDURE — 71045 X-RAY EXAM CHEST 1 VIEW: CPT

## 2025-05-22 PROCEDURE — 25810000003 SODIUM CHLORIDE 0.9 % SOLUTION

## 2025-05-22 PROCEDURE — 83986 ASSAY PH BODY FLUID NOS: CPT | Performed by: INTERNAL MEDICINE

## 2025-05-22 PROCEDURE — 84157 ASSAY OF PROTEIN OTHER: CPT | Performed by: INTERNAL MEDICINE

## 2025-05-22 PROCEDURE — 87205 SMEAR GRAM STAIN: CPT | Performed by: INTERNAL MEDICINE

## 2025-05-22 PROCEDURE — 97162 PT EVAL MOD COMPLEX 30 MIN: CPT

## 2025-05-22 PROCEDURE — 94761 N-INVAS EAR/PLS OXIMETRY MLT: CPT

## 2025-05-22 PROCEDURE — 89051 BODY FLUID CELL COUNT: CPT | Performed by: INTERNAL MEDICINE

## 2025-05-22 PROCEDURE — 92610 EVALUATE SWALLOWING FUNCTION: CPT

## 2025-05-22 PROCEDURE — 82945 GLUCOSE OTHER FLUID: CPT | Performed by: INTERNAL MEDICINE

## 2025-05-22 PROCEDURE — 25010000002 METHYLPREDNISOLONE PER 40 MG

## 2025-05-22 PROCEDURE — 87899 AGENT NOS ASSAY W/OPTIC: CPT

## 2025-05-22 PROCEDURE — 0W9B3ZZ DRAINAGE OF LEFT PLEURAL CAVITY, PERCUTANEOUS APPROACH: ICD-10-PCS | Performed by: INTERNAL MEDICINE

## 2025-05-22 PROCEDURE — 83615 LACTATE (LD) (LDH) ENZYME: CPT | Performed by: INTERNAL MEDICINE

## 2025-05-22 PROCEDURE — 85025 COMPLETE CBC W/AUTO DIFF WBC: CPT

## 2025-05-22 PROCEDURE — 87449 NOS EACH ORGANISM AG IA: CPT

## 2025-05-22 RX ADMIN — GUAIFENESIN 1200 MG: 600 TABLET, EXTENDED RELEASE ORAL at 20:47

## 2025-05-22 RX ADMIN — IPRATROPIUM BROMIDE AND ALBUTEROL SULFATE 3 ML: .5; 3 SOLUTION RESPIRATORY (INHALATION) at 15:30

## 2025-05-22 RX ADMIN — Medication 10 ML: at 20:47

## 2025-05-22 RX ADMIN — METHYLPREDNISOLONE SODIUM SUCCINATE 40 MG: 40 INJECTION, POWDER, FOR SOLUTION INTRAMUSCULAR; INTRAVENOUS at 05:41

## 2025-05-22 RX ADMIN — SOTALOL HYDROCHLORIDE 80 MG: 80 TABLET ORAL at 20:47

## 2025-05-22 RX ADMIN — GUAIFENESIN 1200 MG: 600 TABLET, EXTENDED RELEASE ORAL at 00:04

## 2025-05-22 RX ADMIN — Medication 10 ML: at 00:05

## 2025-05-22 RX ADMIN — PANTOPRAZOLE SODIUM 40 MG: 40 TABLET, DELAYED RELEASE ORAL at 08:45

## 2025-05-22 RX ADMIN — IPRATROPIUM BROMIDE AND ALBUTEROL SULFATE 3 ML: .5; 3 SOLUTION RESPIRATORY (INHALATION) at 20:18

## 2025-05-22 RX ADMIN — SOTALOL HYDROCHLORIDE 80 MG: 80 TABLET ORAL at 08:45

## 2025-05-22 RX ADMIN — Medication 10 ML: at 08:48

## 2025-05-22 RX ADMIN — LEVOTHYROXINE SODIUM 75 MCG: 0.07 TABLET ORAL at 05:41

## 2025-05-22 RX ADMIN — TAMSULOSIN HYDROCHLORIDE 0.4 MG: 0.4 CAPSULE ORAL at 00:04

## 2025-05-22 RX ADMIN — ATORVASTATIN CALCIUM 10 MG: 10 TABLET ORAL at 08:45

## 2025-05-22 RX ADMIN — Medication 1 TABLET: at 08:45

## 2025-05-22 RX ADMIN — TAMSULOSIN HYDROCHLORIDE 0.4 MG: 0.4 CAPSULE ORAL at 20:50

## 2025-05-22 RX ADMIN — GUAIFENESIN 1200 MG: 600 TABLET, EXTENDED RELEASE ORAL at 08:50

## 2025-05-22 RX ADMIN — POLYETHYLENE GLYCOL 3350 17 G: 17 POWDER, FOR SOLUTION ORAL at 09:03

## 2025-05-22 RX ADMIN — SODIUM CHLORIDE 75 ML/HR: 9 INJECTION, SOLUTION INTRAVENOUS at 21:01

## 2025-05-22 RX ADMIN — IPRATROPIUM BROMIDE AND ALBUTEROL SULFATE 3 ML: .5; 3 SOLUTION RESPIRATORY (INHALATION) at 07:50

## 2025-05-22 RX ADMIN — METHYLPREDNISOLONE SODIUM SUCCINATE 40 MG: 40 INJECTION, POWDER, FOR SOLUTION INTRAMUSCULAR; INTRAVENOUS at 18:21

## 2025-05-22 RX ADMIN — SODIUM CHLORIDE 75 ML/HR: 9 INJECTION, SOLUTION INTRAVENOUS at 00:09

## 2025-05-22 RX ADMIN — CEFTAZIDIME 2000 MG: 2 INJECTION, POWDER, FOR SOLUTION INTRAVENOUS at 14:41

## 2025-05-22 RX ADMIN — MIDODRINE HYDROCHLORIDE 5 MG: 5 TABLET ORAL at 20:50

## 2025-05-22 RX ADMIN — SENNOSIDES AND DOCUSATE SODIUM 2 TABLET: 50; 8.6 TABLET ORAL at 21:00

## 2025-05-22 NOTE — THERAPY EVALUATION
Acute Care - Speech Language Pathology   Swallow Initial Evaluation [facility]     Patient Name: Young Ames  : 1950  MRN: [id number]  Today's Date: 2025               Admit Date: 2025    Visit Dx:     ICD-10-CM ICD-9-CM   1. Pneumonia of left lung due to infectious organism, unspecified part of lung  J18.9 486   2. Hypoxia  R09.02 799.02   3. Malignant neoplasm of right lung, unspecified part of lung  C34.91 162.9     Patient Active Problem List   Diagnosis    Chest pain    Pneumonia due to infectious organism    Pneumonia    NSTEMI, initial episode of care    Pleural effusion    Stage IV adenocarcinoma of lung, right    Stage IV adenocarcinoma of lung, left    CKD (chronic kidney disease) stage 4, GFR 15-29 ml/min    Hydronephrosis    Atrial fibrillation    Chronic anticoagulation    Hypoxia    Hypothyroidism (acquired)     Past Medical History:   Diagnosis Date    Coronary artery disease     Myocardial infarction 2024     Past Surgical History:   Procedure Laterality Date    BRONCHOSCOPY N/A 2024    Procedure: BRONCHOSCOPY WITH BRONCHIAL WASHING AND BRONCHIAL BRUSHING;  Surgeon: Kelvin Gonzalez MD;  Location: [facility] ENDOSCOPY;  Service: Pulmonary;  Laterality: N/A;    BRONCHOSCOPY WITH ION ROBOTIC ASSIST N/A 2024    Procedure: BRONCHOSCOPY WITH ION ROBOT WITH CRYO BIOPSY;  Surgeon: Kelvin Gonzalez MD;  Location: [facility] ENDOSCOPY;  Service: Robotics - Pulmonary;  Laterality: N/A;    CARDIAC CATHETERIZATION Right 2024    Procedure: Coronary angiography;  Surgeon: Abran Chand MD;  Location: [facility] CATH INVASIVE LOCATION;  Service: Cardiovascular;  Laterality: Right;    CARDIAC CATHETERIZATION N/A 2024    Procedure: Left Heart Cath;  Surgeon: Abran Chand MD;  Location: [facility] CATH INVASIVE LOCATION;  Service: Cardiovascular;  Laterality: N/A;       SLP Recommendation and Plan  SLP Swallowing Diagnosis: swallow WFL/no suspected pharyngeal impairment (25 1000)  SLP Diet  Recommendation: regular textures, thin liquids (05/22/25 1000)  Recommended Precautions and Strategies: upright posture during/after eating, small bites of food and sips of liquid, alternate between small bites of food and sips of liquid, general aspiration precautions, reflux precautions (05/22/25 1000)  SLP Rec. for Method of Medication Administration: meds whole, meds crushed, with thin liquids, with puree, as tolerated (05/22/25 1000)     Monitor for Signs of Aspiration: yes, notify SLP if any concerns (05/22/25 1000)  Recommended Diagnostics: reassess via clinical swallow evaluation (05/22/25 1000)  Swallow Criteria for Skilled Therapeutic Interventions Met: no problems identified which require skilled intervention (05/22/25 1000)  Anticipated Discharge Disposition (SLP): unknown (05/22/25 1000)  Rehab Potential/Prognosis, Swallowing: good, to achieve stated therapy goals (05/22/25 1000)  Therapy Frequency (Swallow): evaluation only (05/22/25 1000)     Oral Care Recommendations: Oral Care BID/PRN (05/22/25 1000)  Demonstrates Need for Referral to Another Service: gastroenterology, dedicated esophageal assessment (05/22/25 1000)                                            SWALLOW EVALUATION (Last 72 Hours)       SLP Adult Swallow Evaluation       Row Name 05/22/25 1000       Rehab Evaluation    Document Type evaluation  -MB    Subjective Information no complaints  -MB    Patient Observations alert;cooperative;agree to therapy  -MB    Patient Effort good  -MB    Comment Pt was seen for skilled ST targeting clinical evaluation of swallow at bedside. Pt alert and ambulatory; positioned self up in bed after cleaning up in bathroom with family present upon entry. Pt on 2L nasal cannula. Oriented x4. Oral mechanism examination completed, which revealed dentition WFL with missing lower molars; diadochokinetics and voice WFL. SLP provided the following: x2 thin liquid by cup; x4 thin liquid by straw; soft to chew x1;  "regular x1; Pt exhibited adequate labial seal, functional rotary mastication, no overt s/sx of aspiration; reported feeling globus sensation. Pt describes typical symptoms that are consistent with esophageal dysfunction, such as globus, regurgitation of PO, and hx of reflux. Family reports a family hx of EGD with dilation, but pt denies that he has received this procedure in the past. Pt/family denies hx of radiation. Based on pt's WFL baseline mentation and performance, it is recommended that he resume a regular solid/ thin liquid diet. Pt and family provided with general safe swallow precautions and verbalized understanding.     Recommend:   - Regular solids/ thin liquids.   - GI consult based on reported/observed s/sx.  - ST will sign off at this time.   - Please reconsult ST if further issues arise.  -MB       General Information    Patient Profile Reviewed yes  -MB    Pertinent History Of Current Problem Per H&P: \"Patient is a 74 y.o. male with pmh of right lung cancer treated with Keytruda last dose 5/1/25 who was found to be hypoxic during removal of his right chest port in interventional radiology.  Xray was taken and noted left sided pneumonia with o2 sats in 89% on room air.  He did have right arm PICC placed today.  He reports increasing shortness of air and chills and cough and fatigue.  No know fever or sweats, congestion, chest pain. Family reported his kidney function has been decreasing and Keytruda is currently on hold.\" ST consulted for dysphagia evaluation.  -MB    Current Method of Nutrition regular textures;thin liquids  -MB    Precautions/Limitations, Vision WFL;for purposes of eval  -MB    Precautions/Limitations, Hearing WFL;for purposes of eval  -MB    Prior Level of Function-Communication WFL  -MB    Prior Level of Function-Swallowing no diet consistency restrictions  -MB    Plans/Goals Discussed with patient and family  -MB    Barriers to Rehab none identified  -MB       Oral Motor " Structure and Function    Dentition Assessment natural, present and adequate;missing teeth  -MB    Secretion Management WNL/WFL  -MB    Mucosal Quality moist, healthy  -MB       Oral Musculature and Cranial Nerve Assessment    Oral Motor General Assessment WFL  -MB       General Eating/Swallowing Observations    Respiratory Support Currently in Use nasal cannula  -MB    O2 Liters 2L  -MB    Eating/Swallowing Skills self-fed  -MB    Positioning During Eating upright in bed  -MB    Utensils Used cup;straw  -MB    Consistencies Trialed regular textures;soft to chew textures;thin liquids  -MB       Clinical Swallow Eval    Oral Prep Phase WFL  -MB    Oral Transit WFL  -MB    Oral Residue WFL  -MB    Pharyngeal Phase no overt signs/symptoms of pharyngeal impairment  -MB    Esophageal Phase suspected esophageal impairment  -MB    Clinical Swallow Evaluation Summary see comment  -MB       Esophageal Phase Concerns    Esophageal Phase Concerns sensation of material sticking;other (see comments)  reports of retrograde flow  -MB    Sensation of Material Sticking all consistencies  -MB       SLP Evaluation Clinical Impression    SLP Swallowing Diagnosis swallow WFL/no suspected pharyngeal impairment  -MB    Functional Impact no impact on function  -MB    Rehab Potential/Prognosis, Swallowing good, to achieve stated therapy goals  -MB    Swallow Criteria for Skilled Therapeutic Interventions Met no problems identified which require skilled intervention  -MB       Recommendations    Therapy Frequency (Swallow) evaluation only  -MB    SLP Diet Recommendation regular textures;thin liquids  -MB    Recommended Diagnostics reassess via clinical swallow evaluation  -MB    Recommended Precautions and Strategies upright posture during/after eating;small bites of food and sips of liquid;alternate between small bites of food and sips of liquid;general aspiration precautions;reflux precautions  -MB    Oral Care Recommendations Oral Care  BID/PRN  -MB    SLP Rec. for Method of Medication Administration meds whole;meds crushed;with thin liquids;with puree;as tolerated  -MB    Monitor for Signs of Aspiration yes;notify SLP if any concerns  -MB    Anticipated Discharge Disposition (SLP) unknown  -MB    Demonstrates Need for Referral to Another Service gastroenterology;dedicated esophageal assessment  -MB              User Key  (r) = Recorded By, (t) = Taken By, (c) = Cosigned By      Initials Name Effective Dates    Adrian Ruano, VERITO 04/30/24 -                     EDUCATION  The patient has been educated in the following areas:   Dysphagia (Swallowing Impairment).                Time Calculation:                VERITO Higgins  5/22/2025

## 2025-05-22 NOTE — CASE MANAGEMENT/SOCIAL WORK
Discharge Planning Assessment   [facility]     Patient Name: Young Ames  MRN: [id number]  Today's Date: 5/22/2025    Admit Date: 5/21/2025    Plan: Return home with family. Watch for new home O2 needs.   Discharge Needs Assessment       Row Name 05/22/25 1028       Living Environment    People in Home spouse    Name(s) of People in Home yazmin Miranda    Current Living Arrangements home    Potentially Unsafe Housing Conditions none    In the past 12 months has the electric, gas, oil, or water company threatened to shut off services in your home? No    Primary Care Provided by self    Provides Primary Care For no one    Family Caregiver if Needed spouse    Family Caregiver Names yazmin Miranda    Quality of Family Relationships helpful;involved;supportive    Able to Return to Prior Arrangements yes       Resource/Environmental Concerns    Resource/Environmental Concerns none    Transportation Concerns none       Transportation Needs    In the past 12 months, has lack of transportation kept you from medical appointments or from getting medications? no    In the past 12 months, has lack of transportation kept you from meetings, work, or from getting things needed for daily living? No       Food Insecurity    Within the past 12 months, you worried that your food would run out before you got the money to buy more. Never true    Within the past 12 months, the food you bought just didn't last and you didn't have money to get more. Never true       Transition Planning    Patient/Family Anticipates Transition to home with family    Patient/Family Anticipated Services at Transition none    Transportation Anticipated car, drives self;family or friend will provide       Discharge Needs Assessment    Readmission Within the Last 30 Days no previous admission in last 30 days    Equipment Currently Used at Home nebulizer    Concerns to be Addressed discharge planning    Anticipated Changes Related to Illness none    Equipment Needed After  Discharge oxygen                   Discharge Plan       Row Name 05/22/25 1029       Plan    Plan Return home with family. Watch for new home O2 needs.    Patient/Family in Agreement with Plan yes    Plan Comments CM met with pt at bedside to discuss discharge needs. Pt is A&O, lives at home with wife Ruth, drives and is IADLs. PCP and pharmacy verified- pt declined enrollment in [facility]. No issues stated affording food/medications or transport, and home environment is safe. Current DME: nebulizer. No current HHC and none anticipated at discharge. Family will provide transportation home.                  Demographic Summary       Row Name 05/22/25 1028       General Information    Admission Type inpatient    Arrived From emergency department    Required Notices Provided Important Message from Medicare    Referral Source admission list    Reason for Consult discharge planning    Preferred Language English       Contact Information    Permission Granted to Share Info With                    Functional Status       Row Name 05/22/25 1028       Functional Status    Usual Activity Tolerance good    Current Activity Tolerance fair       Functional Status, IADL    Medications independent    Meal Preparation independent    Housekeeping independent    Laundry independent    Shopping independent       Mental Status    General Appearance WDL WDL       Mental Status Summary    Recent Changes in Mental Status/Cognitive Functioning no changes       Employment/    Current or Previous Occupation not applicable                Patient Forms       Row Name 05/22/25 0811       Patient Forms    Important Message from Medicare (IMM) Delivered  IMM 5/21 per MIKAELA Wick RN      Office phone: [phone]  Office fax: [phone]

## 2025-05-22 NOTE — PLAN OF CARE
Goal Outcome Evaluation:  Plan of Care Reviewed With: patient, spouse, child           Outcome Evaluation: Young Ames is a 74 y.o. male with medical hx of R lung CA who was found to  be hypoxic with PNA during removal of his right chest port during IR. He also has dysphagia with suspicion for esophageal obstruction. PT is requested for function below baseline.  At baseline, pt lives with wife in a 2 story home [address] and with bathroom on 2nd floor, which has15 steps.  Pt is typically independent for all mobility, ADLs and IADLs without AD and is an active .  He has had no falls.  He enjoys gardening and was able to plant it this spring.   At time of PT evaluation, he is A&O x 4 with no c/o pain.  He is currently on 2L, with O2 saturation at 97%. He does not use O2 at baseline.  He demonstrates independent bed mobility and transfers. He ambulates independently 120' without AD with no LOB. He does desaturate to 85% on RA, improving to 88% at rest and 98% with placement of 2L.  Pt was encouraged to be OOB 2-3 x day while in the hospital and verbalized understanding and agreement. He may require a 6 minute walk test if he continues to require O2.  He appears at baseline with no ongoing PT needs.

## 2025-05-22 NOTE — CONSULTS
FIRST UROLOGY CONSULT      Patient Identification:  NAME:  Young Ames  Age:  74 y.o.   Sex:  male   :  1950   MRN:  [id number]     Chief complaint: Large volume urinary retention with bilateral hydronephrosis    History of present illness:      Young Ames is a 74 y.o. male with past urological history. Current medical history of lung cancer who presented to interventional radiology for planned port removal however was experiencing difficulty breathing and low oxygen saturation. Chest Xray was obtained showing pneumonia. Patient was transferred to the ER at that time. Diagnosed and admitted with pneumonia, hypoxia, and malignant neoplasm of the right lung. Kidney function was found to be worsening therefore US was performed showing bilateral hydronephrosis. Further workup with CT showed a distended bladder with bilateral hydronephrosis. Patient reports weak urinary stream and nocturia (up every 10mins at times) Urology was consulted for large volume urinary retention.        Past medical history:  Past Medical History:   Diagnosis Date    Coronary artery disease     Myocardial infarction 2024       Past surgical history:  Past Surgical History:   Procedure Laterality Date    BRONCHOSCOPY N/A 2024    Procedure: BRONCHOSCOPY WITH BRONCHIAL WASHING AND BRONCHIAL BRUSHING;  Surgeon: Kelvin Gonzalez MD;  Location: [facility] ENDOSCOPY;  Service: Pulmonary;  Laterality: N/A;    BRONCHOSCOPY WITH ION ROBOTIC ASSIST N/A 2024    Procedure: BRONCHOSCOPY WITH ION ROBOT WITH CRYO BIOPSY;  Surgeon: Kelvin Gonzalez MD;  Location: [facility] ENDOSCOPY;  Service: Robotics - Pulmonary;  Laterality: N/A;    CARDIAC CATHETERIZATION Right 2024    Procedure: Coronary angiography;  Surgeon: Abran Chand MD;  Location: [facility] CATH INVASIVE LOCATION;  Service: Cardiovascular;  Laterality: Right;    CARDIAC CATHETERIZATION N/A 2024    Procedure: Left Heart Cath;  Surgeon: Abran Chand MD;  Location: [facility]  CATH INVASIVE LOCATION;  Service: Cardiovascular;  Laterality: N/A;       Allergies:  Patient has no known allergies.    Home medications:  Medications Prior to Admission   Medication Sig Dispense Refill Last Dose/Taking    apixaban (ELIQUIS) 5 MG tablet tablet Take 1 tablet by mouth Every 12 (Twelve) Hours. Indications: Atrial Fibrillation 60 tablet 0 Past Week    bacitracin 500 UNIT/GM ointment Apply 1 Application topically to the appropriate area as directed 2 (Two) Times a Day As Needed for Wound Care.   Past Week    Cholecalciferol (VITAMIN D-3 PO) Take 1 tablet by mouth Daily. Indications: Vitamin Deficiency   5/20/2025    guaiFENesin (Mucinex) 600 MG 12 hr tablet Take 1 tablet by mouth 2 (Two) Times a Day.   5/20/2025    levothyroxine (SYNTHROID, LEVOTHROID) 75 MCG tablet Take 1 tablet by mouth Every Morning. 30 tablet 1 5/21/2025    loratadine (Claritin) 10 MG tablet Take 1 tablet by mouth Daily.   Taking    multivitamin with minerals (Centrum Silver 50+Men) tablet tablet Take 1 tablet by mouth Every Morning. Indications: Vitamin Deficiency   5/20/2025    NON FORMULARY Take 2 tablets by mouth Every Night. Zzzquil  Indications: Sleep Disorder   5/20/2025    pantoprazole (PROTONIX) 40 MG EC tablet Take 1 tablet by mouth Daily.   5/21/2025    simvastatin (ZOCOR) 20 MG tablet Take 1 tablet by mouth Every Night. Indications: High Amount of Fats in the Blood   5/20/2025    sotalol (BETAPACE) 80 MG tablet Take 1 tablet by mouth 2 (Two) Times a Day.   5/21/2025    tamsulosin (FLOMAX) 0.4 MG capsule 24 hr capsule Take 1 capsule by mouth Every Night. Indications: Benign Enlargement of Prostate   5/20/2025    thiamine (VITAMIN B-1) 100 MG tablet  tablet Take 1 tablet by mouth Daily. Indications: Vitamin Deficiency   5/20/2025    acetaminophen (TYLENOL) 325 MG tablet Take 2 tablets by mouth Every 4 (Four) Hours As Needed for Mild Pain.       ipratropium-albuterol (DUO-NEB) 0.5-2.5 mg/3 ml nebulizer Take 3 mL by  nebulization Every 6 (Six) Hours As Needed for Wheezing.       midodrine (PROAMATINE) 5 MG tablet Take 1 tablet by mouth 3 (Three) Times a Day As Needed.           Hospital medications:  atorvastatin, 10 mg, Oral, Daily  cefTAZidime, 2,000 mg, Intravenous, Q24H  guaiFENesin, 1,200 mg, Oral, Q12H  ipratropium-albuterol, 3 mL, Nebulization, 4x Daily - RT  levothyroxine, 75 mcg, Oral, Q AM  methylPREDNISolone sodium succinate, 40 mg, Intravenous, Q12H  multivitamin with minerals, 1 tablet, Oral, QAM  pantoprazole, 40 mg, Oral, Daily  sodium chloride, 10 mL, Intravenous, Q12H  sotalol, 80 mg, Oral, BID  tamsulosin, 0.4 mg, Oral, Nightly      sodium chloride, 75 mL/hr, Last Rate: 75 mL/hr (25)        acetaminophen    senna-docusate sodium **AND** polyethylene glycol **AND** bisacodyl **AND** bisacodyl    midodrine    nitroglycerin    ondansetron ODT **OR** ondansetron    sodium chloride    sodium chloride    Family history:  Family History   Problem Relation Age of Onset    Dementia Mother     Breast cancer Sister 74       Social history:  Social History     Tobacco Use    Smoking status: Former     Current packs/day: 0.00     Average packs/day: 1 pack/day for 32.0 years (32.0 ttl pk-yrs)     Types: Cigarettes     Start date:      Quit date: 2000     Years since quittin.4     Passive exposure: Past    Smokeless tobacco: Never   Vaping Use    Vaping status: Never Used   Substance Use Topics    Alcohol use: Yes     Alcohol/week: 1.0 standard drink of alcohol     Types: 1 Cans of beer per week    Drug use: Never       Review of systems:      12 point negative except as in HPI    Objective:  TMax 24 hours:   Temp (24hrs), Av.7 °F (36.5 °C), Min:97.6 °F (36.4 °C), Max:98 °F (36.7 °C)      Vitals Ranges:   Temp:  [97.6 °F (36.4 °C)-98 °F (36.7 °C)] 98 °F (36.7 °C)  Heart Rate:  [64-84] 72  Resp:  [4-21] 19  BP: ()/(63-87) 105/69    Intake/Output Last 3 shifts:  I/O last 3 completed shifts:  In:  340 [P.O.:240; IV Piggyback:100]  Out: -      Physical Exam:    General Appearance:    Alert, cooperative, NAD       Lungs:     Respirations unlabored, no wheezing   Abdomen:     Soft, NDNT, no palpable bladder distension, nontender   :    penis normal, testes descended bilaterally, no masses, no tenderness   Neuro/Psych:   Orientation intact, mood/affect pleasant       Results review:   I reviewed the patient's new clinical results.    Data review:  Lab Results (last 24 hours)       Procedure Component Value Units Date/Time    POC Lactate [[id number]]  (Normal) Collected: 05/21/25 1544    Specimen: Blood Updated: 05/22/25 0949     Lactate 0.3 mmol/L      Comment: Serial Number: [id number]:  [id number]       POC Lactate [[id number]]  (Abnormal) Collected: 05/21/25 1542    Specimen: Blood Updated: 05/22/25 0949     Lactate <0.3 mmol/L      Comment: Serial Number: [id number]:  [id number]       Basic Metabolic Panel [[id number]]  (Abnormal) Collected: 05/22/25 0547    Specimen: Blood Updated: 05/22/25 0732     Glucose 148 mg/dL      BUN 33 mg/dL      Creatinine 2.34 mg/dL      Sodium 134 mmol/L      Potassium 4.9 mmol/L      Chloride 103 mmol/L      CO2 19.2 mmol/L      Calcium 8.6 mg/dL      BUN/Creatinine Ratio 14.1     Anion Gap 11.8 mmol/L      eGFR 28.3 mL/min/1.73     Narrative:      GFR Categories in Chronic Kidney Disease (CKD)              GFR Category          GFR (mL/min/1.73)    Interpretation  G1                    90 or greater        Normal or high (1)  G2                    60-89                Mild decrease (1)  G3a                   45-59                Mild to moderate decrease  G3b                   30-44                Moderate to severe decrease  G4                    15-29                Severe decrease  G5                    14 or less           Kidney failure    (1)In the absence of evidence of kidney disease, neither GFR category G1 or G2 fulfill the criteria for CKD.    eGFR  calculation 2021 CKD-EPI creatinine equation, which does not include race as a factor    CBC & Differential [[id number]]  (Abnormal) Collected: 05/22/25 0547    Specimen: Blood Updated: 05/22/25 0720    Narrative:      The following orders were created for panel order CBC & Differential.  Procedure                               Abnormality         Status                     ---------                               -----------         ------                     CBC Auto Differential[[id number]]        Abnormal            Final result                 Please view results for these tests on the individual orders.    CBC Auto Differential [[id number]]  (Abnormal) Collected: 05/22/25 0547    Specimen: Blood Updated: 05/22/25 0720     WBC 13.46 10*3/mm3      RBC 3.10 10*6/mm3      Hemoglobin 9.5 g/dL      Comment: Result checked          Hematocrit 29.1 %      MCV 93.9 fL      MCH 30.6 pg      MCHC 32.6 g/dL      RDW 15.5 %      RDW-SD 53.4 fl      MPV 9.6 fL      Platelets 266 10*3/mm3      Neutrophil % 82.4 %      Lymphocyte % 12.3 %      Monocyte % 4.3 %      Eosinophil % 0.1 %      Basophil % 0.2 %      Immature Grans % 0.7 %      Neutrophils, Absolute 11.10 10*3/mm3      Lymphocytes, Absolute 1.65 10*3/mm3      Monocytes, Absolute 0.58 10*3/mm3      Eosinophils, Absolute 0.01 10*3/mm3      Basophils, Absolute 0.03 10*3/mm3      Immature Grans, Absolute 0.09 10*3/mm3      nRBC 0.0 /100 WBC     S. Pneumo Ag Urine or CSF - Urine, Urine, Clean Catch [[id number]]  (Normal) Collected: 05/22/25 0130    Specimen: Urine, Clean Catch Updated: 05/22/25 0241     Strep Pneumo Ag Negative    Legionella Antigen, Urine - Urine, Urine, Clean Catch [[id number]]  (Normal) Collected: 05/22/25 0130    Specimen: Urine, Clean Catch Updated: 05/22/25 0241     LEGIONELLA ANTIGEN, URINE Negative    MRSA Screen, PCR (Inpatient) - Swab, Nares [[id number]]  (Normal) Collected: 05/21/25 2300    Specimen: Swab from Nares Updated: 05/22/25 0112     MRSA  PCR No MRSA Detected    Narrative:      The negative predictive value of this diagnostic test is high and should only be used to consider de-escalating anti-MRSA therapy. A positive result may indicate colonization with MRSA and must be correlated clinically.    TSH [[id number]]  (Abnormal) Collected: 05/21/25 1536    Specimen: Blood Updated: 05/21/25 1752     TSH 62.200 uIU/mL     T4, Free [[id number]]  (Abnormal) Collected: 05/21/25 1536    Specimen: Blood Updated: 05/21/25 1752     Free T4 0.76 ng/dL     Comprehensive Metabolic Panel [[id number]]  (Abnormal) Collected: 05/21/25 1536    Specimen: Blood Updated: 05/21/25 1612     Glucose 106 mg/dL      BUN 29 mg/dL      Creatinine 2.36 mg/dL      Sodium 133 mmol/L      Potassium 4.4 mmol/L      Chloride 102 mmol/L      CO2 20.4 mmol/L      Calcium 8.7 mg/dL      Total Protein 6.2 g/dL      Albumin 3.7 g/dL      ALT (SGPT) 65 U/L      AST (SGOT) 76 U/L      Alkaline Phosphatase 60 U/L      Total Bilirubin 0.3 mg/dL      Globulin 2.5 gm/dL      A/G Ratio 1.5 g/dL      BUN/Creatinine Ratio 12.3     Anion Gap 10.6 mmol/L      eGFR 28.0 mL/min/1.73     Narrative:      GFR Categories in Chronic Kidney Disease (CKD)              GFR Category          GFR (mL/min/1.73)    Interpretation  G1                    90 or greater        Normal or high (1)  G2                    60-89                Mild decrease (1)  G3a                   45-59                Mild to moderate decrease  G3b                   30-44                Moderate to severe decrease  G4                    15-29                Severe decrease  G5                    14 or less           Kidney failure    (1)In the absence of evidence of kidney disease, neither GFR category G1 or G2 fulfill the criteria for CKD.    eGFR calculation 2021 CKD-EPI creatinine equation, which does not include race as a factor    Blood Culture - Blood, Arm, Right [[id number]] Collected: 05/21/25 1552    Specimen: Blood from Arm, Right  Updated: 05/21/25 1555    Extra Tubes [[id number]] Collected: 05/21/25 1536    Specimen: Blood, Venous Line Updated: 05/21/25 1545    Narrative:      The following orders were created for panel order Extra Tubes.  Procedure                               Abnormality         Status                     ---------                               -----------         ------                     Lavender Top[[id number]]                                     Final result                 Please view results for these tests on the individual orders.    Lavender Top [[id number]] Collected: 05/21/25 1536    Specimen: Blood Updated: 05/21/25 1545     Extra Tube hold for add-on     Comment: Auto resulted       Blood Culture - Blood, Blood, PICC Line [[id number]] Collected: 05/21/25 1539    Specimen: Blood, PICC Line Updated: 05/21/25 1543    Blood Gas, Arterial - [[id number]]  (Abnormal) Collected: 05/21/25 1530    Specimen: Arterial Blood Updated: 05/21/25 1532     Site Left Radial     Pipe's Test Positive     pH, Arterial 7.351 pH units      pCO2, Arterial 37.0 mm Hg      pO2, Arterial 80.1 mm Hg      HCO3, Arterial 20.5 mmol/L      Base Excess, Arterial -4.6 mmol/L      Comment: Serial Number: [id number]:  [id number]        O2 Saturation, Arterial 95.2 %      CO2 Content 21.6 mmol/L      Barometric Pressure for Blood Gas --     Comment: N/A        Modality Cannula     FIO2 32 %      Hemodilution Yes     PO2/FIO2 250             Imaging:  Imaging Results (Last 24 Hours)       Procedure Component Value Units Date/Time    XR Chest 1 View [[id number]] Resulted: 05/22/25 1333     Updated: 05/22/25 1242    CT Abdomen Pelvis Without Contrast [[id number]] Collected: 05/22/25 1143     Updated: 05/22/25 1152    Narrative:      CT ABDOMEN PELVIS WO CONTRAST    Date of Exam: 5/22/2025 11:30 AM EDT    Indication: hydronephrosis.    Comparison: Renal ultrasound from today, PET/CT dated 8/28/2024    Technique: Axial CT images were obtained of the abdomen  and pelvis without the administration of contrast. Sagittal and coronal reconstructions were performed.  Automated exposure control and iterative reconstruction methods were used.    Findings:    Liver: The liver is unremarkable in morphology. Evaluation for focal liver lesions is limited without IV contrast. No biliary dilation is seen.    Gallbladder: Cholelithiasis.    Pancreas: Unremarkable.    Spleen: Unremarkable.    Adrenal glands: Unremarkable.    Genitourinary tract: Urinary bladder is markedly distended with urine. There is moderate bilateral hydroureteronephrosis. No urinary tract calculi are seen. Appearance is most compatible with urinary retention or bladder outlet obstruction. Patient would   likely benefit from Rogers catheterization. Prostate gland is prominent.    Gastrointestinal tract: Limited evaluation of the hollow viscera due to lack of IV contrast administration. No findings to suggest bowel obstruction.    Appendix: No findings to suggest acute appendicitis.    Other findings: No free air or free fluid is identified. No pathologically enlarged lymph nodes are seen. Vascular calcifications are present.    Bones and soft tissues: No acute or suspicious osseous or soft tissue lesion is identified.    Lung bases: Moderate left and small right pleural effusions with bibasilar opacities which may be related to atelectasis, infiltrates, or neoplastic process. Please correlate with chest CT from 5/21/2025.      Impression:      Impression:  1.Urinary bladder is markedly distended with urine. There is moderate bilateral hydroureteronephrosis. No urinary tract calculi are seen. Appearance is most compatible with urinary retention or bladder outlet obstruction. Patient would likely benefit   from Rogers catheterization.  2.Moderate left and small right pleural effusions with bibasilar opacities which may be related to atelectasis, infiltrates, or neoplastic process. Please correlate with chest CT from  5/21/2025.  3.Cholelithiasis.  4.Additional findings as detailed above.       Electronically Signed: Shahab Haro MD    5/22/2025 11:50 AM EDT    Workstation ID: [id number]    US Renal Bilateral [[id number]] Collected: 05/22/25 0259     Updated: 05/22/25 0304    Narrative:      US RENAL BILATERAL    Date of Exam: 5/22/2025 2:15 AM EDT    Indication: worsening kidney function.  hydronephrosis.    Comparison: PET/CT 8/28/2024.    Technique: Grayscale and color Doppler ultrasound evaluation of the kidneys and urinary bladder was performed.      Findings:  The right kidney measures 11.1 x 5.7 x 5.3 cm and the left kidney measures 11.7 x 5.5 x 6.9 cm. Kidney vascularity appears grossly intact. There is moderate bilateral hydronephrosis. Kidney parenchyma is echogenic. There is no solid kidney mass.  No   echogenic shadowing stone.  No hydronephrosis.    The urinary bladder is markedly distended with a volume of 2163 mL. This is reportedly post void, but does appear unchanged from prior PET/CT performed 8/28/2024.      Impression:      Impression:  1.Moderate bilateral hydronephrosis.  2.Markedly distended urinary bladder.  3.Echogenic kidneys, which could indicate medical renal disease.            Electronically Signed: Emigdio Epps MD    5/22/2025 3:02 AM EDT    Workstation ID: [id number]    CT Chest Without Contrast Diagnostic [[id number]] Collected: 05/21/25 1757     Updated: 05/21/25 1816    Narrative:      CT CHEST WO CONTRAST DIAGNOSTIC    Date of Exam: 5/21/2025 5:48 PM EDT    Indication: PNA on XRAY, lung ca.    Comparison: Chest CT [address]. Chest radiograph 5/21/2025.    Technique: Axial CT images were obtained of the chest without contrast administration.  Sagittal and coronal reconstructions were performed.  Automated exposure control and iterative reconstruction methods were used.      Findings:    Thyroid and thoracic inlet: Similar mildly enlarged multinodular thyroid gland.    Lymph nodes: Interval increased  size of mediastinal lymphadenopathy, for example a prevascular node measures 2 cm in short axis, which was previously subcentimeter in size. There is also suggestion of increased left hilar adenopathy, not well   characterized without intravenous contrast. Interval development of left axillary lymphadenopathy measuring up to 12 mm in short axis. Interval development of left supraclavicular lymphadenopathy measuring up to 13 mm in short axis. Calcified mediastinal   and hilar nodes, likely sequelae of prior granulomatous disease.    Cardiovascular: Normal appearing heart size. Increased size of a small pericardial effusion. Aorta and main pulmonary artery diameters are within normal range.. There are coronary artery calcifications. Aortic atherosclerosis. Right PICC terminates in   the lower SVC.    Esophagus: No significant abnormality.    Lung parenchyma: Emphysema. Interval decrease interlobular septal thickening in the right lung with persistent peripheral linear opacities in the right lower lobe which could represent atelectasis and/or scarring. Superimposed lymphangitic carcinomatosis   is not entirely excluded. Similar-appearing masslike consolidation in the posterior basal right lower lobe. Interval development of interlobular septal thickening in the left lung. New perifissural nodular opacity in the left lower lobe measuring 10 mm   on series 5, image 64. Consolidative opacity in the left lower lobe.    Airways: Trace secretions in the trachea. Bronchial wall thickening.    Pleura: Interval removal of right Pleurx catheter since prior chest CT dated 11/1/2024. Decreased size of a small right pleural effusion. Interval development of a moderate left pleural effusion.    Chest wall and osseous structures: Degenerative changes of the imaged spine. No acute osseous abnormality.    Included abdomen: Partially imaged severe bilateral hydronephrosis. Cholelithiasis.      Impression:      Impression:  Compared to  chest CT dated 11/1/2024, interval development of a moderate left pleural effusion, indeterminate for malignant pleural effusion, and interlobular septal thickening in the left lung which 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 versus   pulmonary edema.    Consolidative opacities in the left lower lobe and a perifissural nodular opacity in the left lower lobe, which may be infectious/inflammatory or may represent atelectasis. Underlying malignancy is not excluded.    Interval increase in mediastinal adenopathy and interval development of left hilar, left axillary, left supraclavicular lymphadenopathy, suspicious for increased metastatic lymphadenopathy.    Interval decrease size of a small right pleural effusion. Interval decrease in interlobular septal thickening in the right lung with persistent peripheral linear opacities in the right lower lobe which could represent atelectasis and/or scarring.   Superimposed lymphangitic carcinomatosis is not entirely excluded.    Similar-appearing masslike consolidation in the posterior basal right lower lobe.    Partially imaged severe bilateral hydronephrosis        Electronically Signed: Dru Kruse MD    5/21/2025 6:14 PM EDT    Workstation ID: [id number]             Assessment:     Large volume Urinary retention  Bilateral hydronephrosis  [place]  BPH with obstruction    Plan:   - No acute urologic surgical intervention recommended.   - CT images reviewed showing enlarged prostate, bladder outlet obstruction, distended bladder and bilateral hydronephrosis.  - Continue   - Tamsulosin 0.4-0.8 mg     - Place indwelling catheter  - Rogers will likely remain in place at discharge.  -Consider repeat TOOTIE in a day or two.  - Trend Cr to Alejandro  - Will plan outpatient work up for obstructive BPH.      Ce Neri, APRN  05/22/25  13:34 EDT    Plan reviewed and discussed with Dr. Hernández.

## 2025-05-22 NOTE — H&P
Patient Care Team:  Abner Funes APRN as PCP - General (Family Medicine)  Brittany Canchola, LINDA as Nurse Navigator  Arben Tim MD as Consulting Physician (Hematology and Oncology)    Chief complaint hypoxia    Subjective     Patient is a 74 y.o. male with pmh of right lung cancer treated with Keytruda last dose 5/1/25 who was found to be hypoxic during removal of his right chest port in interventional radiology.  Xray was taken and noted left sided pneumonia with o2 sats in 89% on room air.  He did have right arm PICC placed today.  He reports increasing shortness of air and chills and cough and fatigue.  No know fever or sweats, congestion, chest pain. Family reported his kidney function has been decreasing and Keytruda is currently on hold.      In the Er, pO2 80, , Creatinine 2.36, BUN 29, GFR 28.  AST and ALT mildly elevated but improving. WBC 16, HGB 11.5  EKG SR 66 .  Blood cultures x2 pending. CT chest noted consolidative opacities in LLL.  Small right pleural effusion and moderate pleural effusion in Left lung.  Severe bilateral hydronephrosis.  He was given IV Fortax and solumedrol.  He will be admitted for IV antibiotics and pulmonary consultation for pneumonia in an immunocompromised patient.    Review of Systems   Constitutional:  Positive for chills. Negative for fever.   HENT:  Negative for congestion.    Respiratory:  Positive for shortness of breath and wheezing.    Cardiovascular:  Negative for chest pain.   Gastrointestinal:  Negative for nausea and vomiting.   Genitourinary:  Negative for difficulty urinating.   Neurological:  Positive for weakness.          History  Past Medical History:   Diagnosis Date    Coronary artery disease     Myocardial infarction 01/31/2024     Past Surgical History:   Procedure Laterality Date    BRONCHOSCOPY N/A 8/13/2024    Procedure: BRONCHOSCOPY WITH BRONCHIAL WASHING AND BRONCHIAL BRUSHING;  Surgeon: Kelvin Gonzalez MD;  Location: [facility]  ENDOSCOPY;  Service: Pulmonary;  Laterality: N/A;    BRONCHOSCOPY WITH ION ROBOTIC ASSIST N/A 2024    Procedure: BRONCHOSCOPY WITH ION ROBOT WITH CRYO BIOPSY;  Surgeon: Kelvin Gonzalez MD;  Location: [facility] ENDOSCOPY;  Service: Robotics - Pulmonary;  Laterality: N/A;    CARDIAC CATHETERIZATION Right 2024    Procedure: Coronary angiography;  Surgeon: Abran Chand MD;  Location: [facility] CATH INVASIVE LOCATION;  Service: Cardiovascular;  Laterality: Right;    CARDIAC CATHETERIZATION N/A 2024    Procedure: Left Heart Cath;  Surgeon: Abran Chand MD;  Location: [facility] CATH INVASIVE LOCATION;  Service: Cardiovascular;  Laterality: N/A;     Family History   Problem Relation Age of Onset    Dementia Mother     Breast cancer Sister 74     Social History     Tobacco Use    Smoking status: Former     Current packs/day: 0.00     Average packs/day: 1 pack/day for 32.0 years (32.0 ttl pk-yrs)     Types: Cigarettes     Start date:      Quit date:      Years since quittin.4     Passive exposure: Past    Smokeless tobacco: Never   Vaping Use    Vaping status: Never Used   Substance Use Topics    Alcohol use: Yes     Alcohol/week: 1.0 standard drink of alcohol     Types: 1 Cans of beer per week    Drug use: Never     Medications Prior to Admission   Medication Sig Dispense Refill Last Dose/Taking    apixaban (ELIQUIS) 5 MG tablet tablet Take 1 tablet by mouth Every 12 (Twelve) Hours. Indications: Atrial Fibrillation 60 tablet 0 Past Week    bacitracin 500 UNIT/GM ointment Apply 1 Application topically to the appropriate area as directed 2 (Two) Times a Day As Needed for Wound Care.   Past Week    Cholecalciferol (VITAMIN D-3 PO) Take 1 tablet by mouth Daily. Indications: Vitamin Deficiency   2025    guaiFENesin (Mucinex) 600 MG 12 hr tablet Take 1 tablet by mouth 2 (Two) Times a Day.   2025    levothyroxine (SYNTHROID, LEVOTHROID) 75 MCG tablet Take 1 tablet by mouth Every Morning. 30 tablet 1  5/21/2025    loratadine (Claritin) 10 MG tablet Take 1 tablet by mouth Daily.   Taking    multivitamin with minerals (Centrum Silver 50+Men) tablet tablet Take 1 tablet by mouth Every Morning. Indications: Vitamin Deficiency   5/20/2025    NON FORMULARY Take 2 tablets by mouth Every Night. Zzzquil  Indications: Sleep Disorder   5/20/2025    pantoprazole (PROTONIX) 40 MG EC tablet Take 1 tablet by mouth Daily.   5/21/2025    simvastatin (ZOCOR) 20 MG tablet Take 1 tablet by mouth Every Night. Indications: High Amount of Fats in the Blood   5/20/2025    sotalol (BETAPACE) 80 MG tablet Take 1 tablet by mouth 2 (Two) Times a Day.   5/21/2025    tamsulosin (FLOMAX) 0.4 MG capsule 24 hr capsule Take 1 capsule by mouth Every Night. Indications: Benign Enlargement of Prostate   5/20/2025    thiamine (VITAMIN B-1) 100 MG tablet  tablet Take 1 tablet by mouth Daily. Indications: Vitamin Deficiency   5/20/2025    acetaminophen (TYLENOL) 325 MG tablet Take 2 tablets by mouth Every 4 (Four) Hours As Needed for Mild Pain.       ipratropium-albuterol (DUO-NEB) 0.5-2.5 mg/3 ml nebulizer Take 3 mL by nebulization Every 6 (Six) Hours As Needed for Wheezing.       midodrine (PROAMATINE) 5 MG tablet Take 1 tablet by mouth 3 (Three) Times a Day As Needed.        Allergies:  Patient has no known allergies.    Objective     Vital Signs  Temp:  [97.6 °F (36.4 °C)-97.7 °F (36.5 °C)] 97.6 °F (36.4 °C)  Heart Rate:  [62-97] 79  Resp:  [4-20] 20  BP: ()/(63-87) 133/87     Physical Exam:      General Appearance:    Alert, cooperative, in no acute distress   Head:    Normocephalic, without obvious abnormality, atraumatic   Eyes:            Lids and lashes normal, conjunctivae and sclerae normal, no   icterus, no pallor, corneas clear, PERRLA   Ears:    Ears appear intact with no abnormalities noted   Throat:   No oral lesions, no thrush, oral mucosa moist   Neck:   No adenopathy, supple, trachea midline, no thyromegaly, no   carotid bruit,  no JVD   Lungs:     Diminished bases, occassional scattered expiratory wheeze, faint crackles in bilateral bases,respirations regular, even and                  unlabored    Heart:    Regular rhythm and normal rate, normal S1 and S2,             murmur, no gallop, no rub, no click   Chest Wall:    No abnormalities observed   Abdomen:     Normal bowel sounds, no masses, no organomegaly, soft        non-tender, non-distended, no guarding, no rebound                tenderness   Extremities:   Moves all extremities well, trace BLE edema, no cyanosis, no             redness   Pulses:   Pulses palpable and equal bilaterally   Skin:   No bleeding, bruising or rash   Lymph nodes:   No palpable adenopathy   Neurologic:   Cranial nerves 2 - 12 grossly intact, sensation intact, DTR       present and equal bilaterally       Results Review:     Imaging Results (Last 24 Hours)       Procedure Component Value Units Date/Time    CT Chest Without Contrast Diagnostic [[id number]] Collected: 05/21/25 1757     Updated: 05/21/25 1816    Narrative:      CT CHEST WO CONTRAST DIAGNOSTIC    Date of Exam: 5/21/2025 5:48 PM EDT    Indication: PNA on XRAY, lung ca.    Comparison: Chest CT [address]. Chest radiograph 5/21/2025.    Technique: Axial CT images were obtained of the chest without contrast administration.  Sagittal and coronal reconstructions were performed.  Automated exposure control and iterative reconstruction methods were used.      Findings:    Thyroid and thoracic inlet: Similar mildly enlarged multinodular thyroid gland.    Lymph nodes: Interval increased size of mediastinal lymphadenopathy, for example a prevascular node measures 2 cm in short axis, which was previously subcentimeter in size. There is also suggestion of increased left hilar adenopathy, not well   characterized without intravenous contrast. Interval development of left axillary lymphadenopathy measuring up to 12 mm in short axis. Interval development of left  supraclavicular lymphadenopathy measuring up to 13 mm in short axis. Calcified mediastinal   and hilar nodes, likely sequelae of prior granulomatous disease.    Cardiovascular: Normal appearing heart size. Increased size of a small pericardial effusion. Aorta and main pulmonary artery diameters are within normal range.. There are coronary artery calcifications. Aortic atherosclerosis. Right PICC terminates in   the lower SVC.    Esophagus: No significant abnormality.    Lung parenchyma: Emphysema. Interval decrease interlobular septal thickening in the right lung with persistent peripheral linear opacities in the right lower lobe which could represent atelectasis and/or scarring. Superimposed lymphangitic carcinomatosis   is not entirely excluded. Similar-appearing masslike consolidation in the posterior basal right lower lobe. Interval development of interlobular septal thickening in the left lung. New perifissural nodular opacity in the left lower lobe measuring 10 mm   on series 5, image 64. Consolidative opacity in the left lower lobe.    Airways: Trace secretions in the trachea. Bronchial wall thickening.    Pleura: Interval removal of right Pleurx catheter since prior chest CT dated 11/1/2024. Decreased size of a small right pleural effusion. Interval development of a moderate left pleural effusion.    Chest wall and osseous structures: Degenerative changes of the imaged spine. No acute osseous abnormality.    Included abdomen: Partially imaged severe bilateral hydronephrosis. Cholelithiasis.      Impression:      Impression:  Compared to chest CT dated 11/1/2024, interval development of a moderate left pleural effusion, indeterminate for malignant pleural effusion, and interlobular septal thickening in the left lung which 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 versus   pulmonary edema.    Consolidative opacities in the left lower lobe and a perifissural nodular opacity in the left lower lobe, which may be  infectious/inflammatory or may represent atelectasis. Underlying malignancy is not excluded.    Interval increase in mediastinal adenopathy and interval development of left hilar, left axillary, left supraclavicular lymphadenopathy, suspicious for increased metastatic lymphadenopathy.    Interval decrease size of a small right pleural effusion. Interval decrease in interlobular septal thickening in the right lung with persistent peripheral linear opacities in the right lower lobe which could represent atelectasis and/or scarring.   Superimposed lymphangitic carcinomatosis is not entirely excluded.    Similar-appearing masslike consolidation in the posterior basal right lower lobe.    Partially imaged severe bilateral hydronephrosis        Electronically Signed: Dru Kruse MD    5/21/2025 6:14 PM EDT    Workstation ID: [id number]             Lab Results (last 24 hours)       Procedure Component Value Units Date/Time    MRSA Screen, PCR (Inpatient) - Swab, Nares [[id number]]  (Normal) Collected: 05/21/25 2300    Specimen: Swab from Nares Updated: 05/22/25 0112     MRSA PCR No MRSA Detected    Narrative:      The negative predictive value of this diagnostic test is high and should only be used to consider de-escalating anti-MRSA therapy. A positive result may indicate colonization with MRSA and must be correlated clinically.    TSH [[id number]]  (Abnormal) Collected: 05/21/25 1536    Specimen: Blood Updated: 05/21/25 1752     TSH 62.200 uIU/mL     T4, Free [[id number]]  (Abnormal) Collected: 05/21/25 1536    Specimen: Blood Updated: 05/21/25 1752     Free T4 0.76 ng/dL     Comprehensive Metabolic Panel [[id number]]  (Abnormal) Collected: 05/21/25 1536    Specimen: Blood Updated: 05/21/25 1612     Glucose 106 mg/dL      BUN 29 mg/dL      Creatinine 2.36 mg/dL      Sodium 133 mmol/L      Potassium 4.4 mmol/L      Chloride 102 mmol/L      CO2 20.4 mmol/L      Calcium 8.7 mg/dL      Total Protein 6.2 g/dL      Albumin 3.7  g/dL      ALT (SGPT) 65 U/L      AST (SGOT) 76 U/L      Alkaline Phosphatase 60 U/L      Total Bilirubin 0.3 mg/dL      Globulin 2.5 gm/dL      A/G Ratio 1.5 g/dL      BUN/Creatinine Ratio 12.3     Anion Gap 10.6 mmol/L      eGFR 28.0 mL/min/1.73     Narrative:      GFR Categories in Chronic Kidney Disease (CKD)              GFR Category          GFR (mL/min/1.73)    Interpretation  G1                    90 or greater        Normal or high (1)  G2                    60-89                Mild decrease (1)  G3a                   45-59                Mild to moderate decrease  G3b                   30-44                Moderate to severe decrease  G4                    15-29                Severe decrease  G5                    14 or less           Kidney failure    (1)In the absence of evidence of kidney disease, neither GFR category G1 or G2 fulfill the criteria for CKD.    eGFR calculation 2021 CKD-EPI creatinine equation, which does not include race as a factor    Blood Culture - Blood, Arm, Right [[id number]] Collected: 05/21/25 1552    Specimen: Blood from Arm, Right Updated: 05/21/25 1555    Extra Tubes [[id number]] Collected: 05/21/25 1536    Specimen: Blood, Venous Line Updated: 05/21/25 1545    Narrative:      The following orders were created for panel order Extra Tubes.  Procedure                               Abnormality         Status                     ---------                               -----------         ------                     Lavender Top[[id number]]                                     Final result                 Please view results for these tests on the individual orders.    Lavender Top [[id number]] Collected: 05/21/25 1536    Specimen: Blood Updated: 05/21/25 1545     Extra Tube hold for add-on     Comment: Auto resulted       Blood Culture - Blood, Blood, PICC Line [[id number]] Collected: 05/21/25 1539    Specimen: Blood, PICC Line Updated: 05/21/25 1543    Blood Gas, Arterial - [[id number]]   (Abnormal) Collected: 05/21/25 1530    Specimen: Arterial Blood Updated: 05/21/25 1532     Site Left Radial     Pipe's Test Positive     pH, Arterial 7.351 pH units      pCO2, Arterial 37.0 mm Hg      pO2, Arterial 80.1 mm Hg      HCO3, Arterial 20.5 mmol/L      Base Excess, Arterial -4.6 mmol/L      Comment: Serial Number: [id number]:  [id number]        O2 Saturation, Arterial 95.2 %      CO2 Content 21.6 mmol/L      Barometric Pressure for Blood Gas --     Comment: N/A        Modality Cannula     FIO2 32 %      Hemodilution Yes     PO2/FIO2 250             I reviewed the patient's new clinical results.    Assessment & Plan       Pneumonia    Pleural effusion    Stage IV adenocarcinoma of lung, right    CKD (chronic kidney disease) stage 4, GFR 15-29 ml/min    Hydronephrosis    Atrial fibrillation    Chronic anticoagulation    Hypoxia    Hypothyroidism (acquired)      Pneumonia, moderate Left pleural effusion, hypoxia   -consult pulmonary   -Eliquis on hold until further determine if need bronch/thoracentesis   -MRSA swab, legionella, strep pneumo pending   -Fortaz   -mucinex, solumedrol 40 q 12h    CKD- slowly worsening; Hydronephrosis   -consult Nephrology.   -patient could not recall who he saw 1 x previously prior to cancer diagnosis   -CT abd   -renal ultrasound   -check PVR   -low dose IV fluids to see if improvement.  Watch for fluid overload.    Afib- currently in Sinus rhythm   -continue home sotalol   -prn midodrine for autonomic dysfunction   -Eliquis on hold until further determine if need bronch/thoracentesis    Hypothyroidism   -continue levothyroxine. Noted slight TSH improvement.    Lung Cancer- Followed by Dr. Tim.    GI: Protonix  VTE: SCD- will need Eliquis  restarted when appropriate.    CODE STATUS:  Code status (Patient has no pulse and is not breathing):  CPR (Attempt to Resuscitate)  Medical Interventions (Patient has pulse or is breathing):  Full Support  Level of Support Discussed  with:  Patient    Admission Status:  I believe this patient meets inpatient status    Expected length of stay:  2 midnights or greater    I discussed the patient's findings and my recommendations with patient.     Laura Portillo, CARL  05/22/25  01:20 EDT

## 2025-05-22 NOTE — THERAPY EVALUATION
Patient Name: Young Ames  : 1950    MRN: [id number]                              Today's Date: 2025       Admit Date: 2025    Visit Dx:     ICD-10-CM ICD-9-CM   1. Pneumonia of left lung due to infectious organism, unspecified part of lung  J18.9 486   2. Hypoxia  R09.02 799.02   3. Malignant neoplasm of right lung, unspecified part of lung  C34.91 162.9   4. Stage IV adenocarcinoma of lung, left  C34.92 162.9   5. Esophageal dysphagia  R13.19 787.29     Patient Active Problem List   Diagnosis    Chest pain    Pneumonia due to infectious organism    Pneumonia    NSTEMI, initial episode of care    Pleural effusion    Stage IV adenocarcinoma of lung, right    Stage IV adenocarcinoma of lung, left    CKD (chronic kidney disease) stage 4, GFR 15-29 ml/min    Hydronephrosis    Atrial fibrillation    Chronic anticoagulation    Hypoxia    Hypothyroidism (acquired)    Dysphagia     Past Medical History:   Diagnosis Date    Coronary artery disease     Myocardial infarction 2024     Past Surgical History:   Procedure Laterality Date    BRONCHOSCOPY N/A 2024    Procedure: BRONCHOSCOPY WITH BRONCHIAL WASHING AND BRONCHIAL BRUSHING;  Surgeon: Kelvin Gonzalez MD;  Location: [facility] ENDOSCOPY;  Service: Pulmonary;  Laterality: N/A;    BRONCHOSCOPY WITH ION ROBOTIC ASSIST N/A 2024    Procedure: BRONCHOSCOPY WITH ION ROBOT WITH CRYO BIOPSY;  Surgeon: Kelvin Gonzalez MD;  Location: [facility] ENDOSCOPY;  Service: Robotics - Pulmonary;  Laterality: N/A;    CARDIAC CATHETERIZATION Right 2024    Procedure: Coronary angiography;  Surgeon: Abran Chand MD;  Location: [facility] CATH INVASIVE LOCATION;  Service: Cardiovascular;  Laterality: Right;    CARDIAC CATHETERIZATION N/A 2024    Procedure: Left Heart Cath;  Surgeon: Abran Chand MD;  Location: [facility] CATH INVASIVE LOCATION;  Service: Cardiovascular;  Laterality: N/A;      General Information       Row Name 25 1523          Physical Therapy  Time and Intention    Document Type evaluation  -CL     Mode of Treatment individual therapy;physical therapy  -CL       Row Name 05/22/25 1523          General Information    Patient Profile Reviewed yes  -CL     Prior Level of Function independent:;ADL's;grooming;driving;yard work;gait;community mobility;transfer  -CL     Existing Precautions/Restrictions no known precautions/restrictions  -CL     Barriers to Rehab medically complex  -CL       Row Name 05/22/25 1523          Living Environment    Current Living Arrangements home  -CL     People in Home spouse  -CL     Name(s) of People in Home Wife: Ruth  -CL       Row Name 05/22/25 1523          Home Main Entrance    Number of Stairs, Main Entrance four  -CL     Stair Railings, Main Entrance railings safe and in good condition  -CL       Row Name 05/22/25 1523          Stairs Within Home, Primary    Number of Stairs, Within Home, Primary other (see comments)  15  -CL     Stair Railings, Within Home, Primary railings safe and in good condition  -CL       Row Name 05/22/25 1523          Cognition    Orientation Status (Cognition) oriented x 4  -CL               User Key  (r) = Recorded By, (t) = Taken By, (c) = Cosigned By      Initials Name Provider Type    CL Francy Low PT Physical Therapist                   Mobility       Row Name 05/22/25 1525          Bed Mobility    Bed Mobility bed mobility (all) activities  -CL     All Activities, [place] (Bed Mobility) independent  -CL       Row Name 05/22/25 1525          Bed-Chair Transfer    Bed-Chair [place] (Transfers) independent  -CL       Row Name 05/22/25 1525          Sit-Stand Transfer    Sit-Stand [place] (Transfers) independent  -CL       Row Name 05/22/25 1525          Gait/Stairs (Locomotion)    [place] Level (Gait) independent  -CL     Patient was able to Ambulate yes  -CL     Distance in Feet (Gait) 120  -CL               User Key  (r) = Recorded By, (t) = Taken By, (c) =  Cosigned By      Initials Name Provider Type    Francy Graf PT Physical Therapist                   Obj/Interventions       Row Name 05/22/25 1526          Range of Motion Comprehensive    General Range of Motion no range of motion deficits identified  -CL       Row Name 05/22/25 1526          Strength Comprehensive (MMT)    General Manual Muscle Testing (MMT) Assessment no strength deficits identified  -CL       Row Name 05/22/25 1526          Balance    Balance Assessment sitting static balance;standing static balance;sitting dynamic balance;standing dynamic balance  -CL     Static Sitting Balance independent  -CL     Dynamic Sitting Balance independent  -CL     Position, Sitting Balance unsupported;sitting in chair;sitting edge of bed  -CL     Static Standing Balance independent  -CL     Dynamic Standing Balance independent  -CL     Position/Device Used, Standing Balance unsupported  -CL       Row Name 05/22/25 1526          Sensory Assessment (Somatosensory)    Sensory Assessment (Somatosensory) sensation intact  -CL               User Key  (r) = Recorded By, (t) = Taken By, (c) = Cosigned By      Initials Name Provider Type    Francy Graf, PT Physical Therapist                   Goals/Plan    No documentation.                  Clinical Impression       Row Name 05/22/25 1526          Pain    Pre/Posttreatment Pain Comment Pt reports that he is sore from just having urinary catheter placed but no other pain  -       Row Name 05/22/25 1526          Plan of Care Review    Plan of Care Reviewed With patient;spouse;child  -     Outcome Evaluation Young Ames is a 74 y.o. male with medical hx of R lung CA who was found to  be hypoxic with PNA during removal of his right chest port during IR. He also has dysphagia with suspicion for esophageal obstruction. PT is requested for function below baseline.  At baseline, pt lives with wife in a 2 story home 3-[address] and with bathroom on 2nd floor,  which has15 steps.  Pt is typically independent for all mobility, ADLs and IADLs without AD and is an active .  He has had no falls.  He enjoys gardening and was able to plant it this spring.   At time of PT evaluation, he is A&O x 4 with no c/o pain.  He is currently on 2L, with O2 saturation at 97%. He does not use O2 at baseline.  He demonstrates independent bed mobility and transfers. He ambulates independently 120' without AD with no LOB. He does desaturate to 85% on RA, improving to 88% at rest and 98% with placement of 2L.  Pt was encouraged to be OOB 2-3 x day while in the hospital and verbalized understanding and agreement. He may require a 6 minute walk test if he continues to require O2.  He appears at baseline with no ongoing PT needs.  -CL       Row Name 05/22/25 1526          Therapy Assessment/Plan (PT)    Criteria for Skilled Interventions Met (PT) does not meet criteria for skilled intervention  -CL     Therapy Frequency (PT) evaluation only  -CL       Row Name 05/22/25 1526          Vital Signs    Pre Systolic BP Rehab 94  -CL     Pre Treatment Diastolic BP 58  -CL     Intra Systolic BP Rehab 107  -CL     Intra Treatment Diastolic BP 60  -CL     Pretreatment Heart Rate (beats/min) 77  -CL     Intratreatment Heart Rate (beats/min) 81  -CL     Pretreatment Resp Rate (breaths/min) 16  -CL     Intratreatment Resp Rate (breaths/min) 21  -CL     Pre SpO2 (%) 97  -CL     O2 Delivery Pre Treatment supplemental O2  -CL     Intra SpO2 (%) 85  -CL     O2 Delivery Intra Treatment room air  -CL     Post SpO2 (%) 98  -CL     O2 Delivery Post Treatment supplemental O2  -CL     Pre Patient Position Supine  -CL     Intra Patient Position Standing  -CL     Post Patient Position Supine  -CL       Row Name 05/22/25 1526          Positioning and Restraints    Pre-Treatment Position in bed  -CL     Post Treatment Position bed  -CL     In Bed notified nsg;supine;call light within reach;with family/caregiver  -CL                User Key  (r) = Recorded By, (t) = Taken By, (c) = Cosigned By      Initials Name Provider Type    CL Francy Low, PT Physical Therapist                   Outcome Measures       Row Name 05/22/25 1538 05/22/25 0852       How much help from another person do you currently need...    Turning from your back to your side while in flat bed without using bedrails? 4  -CL 4  -LF    Moving from lying on back to sitting on the side of a flat bed without bedrails? 4  -CL 4  -LF    Moving to and from a bed to a chair (including a wheelchair)? 4  -CL 4  -LF    Standing up from a chair using your arms (e.g., wheelchair, bedside chair)? 4  -CL 4  -LF    Climbing 3-5 steps with a railing? 3  -CL 3  -LF    To walk in hospital room? 4  -CL 4  -LF    AM-PAC 6 Clicks Score (PT) 23  -CL 23  -LF    Highest Level of Mobility Goal Walk 25 Feet or More-7  -CL Walk 25 Feet or More-7  -LF      Row Name 05/22/25 1538          Functional Assessment    Outcome Measure Options AM-PAC 6 Clicks Basic Mobility (PT)  -               User Key  (r) = Recorded By, (t) = Taken By, (c) = Cosigned By      Initials Name Provider Type     Anette Walters, RN Registered Nurse     Francy Low, PT Physical Therapist                                 Physical Therapy Education       Title: PT OT SLP Therapies (Done)       Topic: Physical Therapy (Done)       Point: Mobility training (Done)       Learning Progress Summary            Patient Acceptance, E,TB, VU by  at 5/22/2025 1539                                      User Key       Initials Effective Dates Name Provider Type Discipline    CL 03/02/22 -  Francy Low, PT Physical Therapist PT                  PT Recommendation and Plan     Outcome Evaluation: Young Ames is a 74 y.o. male with medical hx of R lung CA who was found to  be hypoxic with PNA during removal of his right chest port during IR. He also has dysphagia with suspicion for esophageal obstruction. PT is  requested for function below baseline.  At baseline, pt lives with wife in a 2 story home [address] and with bathroom on 2nd floor, which has15 steps.  Pt is typically independent for all mobility, ADLs and IADLs without AD and is an active .  He has had no falls.  He enjoys gardening and was able to plant it this spring.   At time of PT evaluation, he is A&O x 4 with no c/o pain.  He is currently on 2L, with O2 saturation at 97%. He does not use O2 at baseline.  He demonstrates independent bed mobility and transfers. He ambulates independently 120' without AD with no LOB. He does desaturate to 85% on RA, improving to 88% at rest and 98% with placement of 2L.  Pt was encouraged to be OOB 2-3 x day while in the hospital and verbalized understanding and agreement. He may require a 6 minute walk test if he continues to require O2.  He appears at baseline with no ongoing PT needs.     Time Calculation:   PT Evaluation Complexity  History, PT Evaluation Complexity: 3 or more personal factors and/or comorbidities  Examination of Body Systems (PT Eval Complexity): total of 3 or more elements  Clinical Presentation (PT Evaluation Complexity): evolving  Clinical Decision Making (PT Evaluation Complexity): moderate complexity  Overall Complexity (PT Evaluation Complexity): moderate complexity     PT Charges       Row Name 05/22/25 1539             Time Calculation    Start Time 1458  -CL      Stop Time 1518  -CL      Time Calculation (min) 20 min  -CL      PT Received On 05/22/25  -CL         Time Calculation- PT    Total Timed Code Minutes- PT 0 minute(s)  -CL                User Key  (r) = Recorded By, (t) = Taken By, (c) = Cosigned By      Initials Name Provider Type    Francy Graf, PT Physical Therapist                  Therapy Charges for Today       Code Description Service Date Service Provider Modifiers Qty    96833096380 HC PT EVAL MOD COMPLEXITY 4 5/22/2025 Francy Low PT GP 1            PT  G-Codes  Outcome Measure Options: AM-PAC 6 Clicks Basic Mobility (PT)  AM-PAC 6 Clicks Score (PT): 23       Francy Low, PT  5/22/2025

## 2025-05-22 NOTE — PLAN OF CARE
Goal Outcome Evaluation:  Plan of Care Reviewed With: patient        Progress: no change        Pt currently resting abed. No complaint or distress noted. VSS cont plan of care.

## 2025-05-22 NOTE — CONSULTS
"[facility]: [facility]         CONSULT NOTE    Patient Identification:  Young Ames  74 y.o.  male  1950  [id number]            Requesting physician: Attending physician    Reason for Consultation: Pneumonia, immunocompromise, lung cancer on Keytruda      History of Present Illness:  74-year-old male with history of lung cancer \"Ruda, and CKD who presented 5/21/2025 with complaints of cough, shortness of breath and hypoxia.  He had Wzwffs-y-Ydfl removed 5/21/2025 by IR.    US Renal Bilateral  Result Date: 5/22/2025  Impression: 1.Moderate bilateral hydronephrosis. 2.Markedly distended urinary bladder. 3.Echogenic kidneys, which could indicate medical renal disease. Electronically Signed: Emigdio Epps MD  5/22/2025 3:02 AM EDT  Workstation ID: [id number]    CT Chest Without Contrast Diagnostic  Result Date: 5/21/2025  Impression: Compared to chest CT dated 11/1/2024, interval development of a moderate left pleural effusion, indeterminate for malignant pleural effusion, and interlobular septal thickening in the left lung which 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 versus pulmonary edema. Consolidative opacities in the left lower lobe and a perifissural nodular opacity in the left lower lobe, which may be infectious/inflammatory or may represent atelectasis. Underlying malignancy is not excluded. Interval increase in mediastinal adenopathy and interval development of left hilar, left axillary, left supraclavicular lymphadenopathy, suspicious for increased metastatic lymphadenopathy. Interval decrease size of a small right pleural effusion. Interval decrease in interlobular septal thickening in the right lung with persistent peripheral linear opacities in the right lower lobe which could represent atelectasis and/or scarring. Superimposed lymphangitic carcinomatosis is not entirely excluded. Similar-appearing masslike consolidation in the posterior basal right lower lobe. Partially imaged severe " bilateral hydronephrosis Electronically Signed: Dru Kruse MD  5/21/2025 6:14 PM EDT  Workstation ID: [id number]    IR Removal Tunnel CV With Port Different Site  Result Date: 5/21/2025  1. Uncomplicated removal, right chest wall Smvopn-v-Rlad. As mentioned above, the incision was sealed using Dermabond, while the skin ulceration/wound is set to heal via secondary intention. Dressing should be changed every 1 to 2 days using sterile technique. The patient is to return to Interventional Radiology for follow-up to evaluate for adequate healing. 2. Technically successful placement, 4 Citizen of [place] single lumen power injectable Nnfvat-e-Rzez utilizing the right upper extremity brachial vein approach with both sonographic and fluoroscopic guidance utilized. Electronically Signed: Darlene Wood MD  5/21/2025 3:29 PM EDT  Workstation ID: [id number]    IR PICC W Imaging Guidance  Result Date: 5/21/2025  1. Uncomplicated removal, right chest wall Irmrlj-v-Dcgv. As mentioned above, the incision was sealed using Dermabond, while the skin ulceration/wound is set to heal via secondary intention. Dressing should be changed every 1 to 2 days using sterile technique. The patient is to return to Interventional Radiology for follow-up to evaluate for adequate healing. 2. Technically successful placement, 4 Citizen of [place] single lumen power injectable Wnvskw-z-Ucgf utilizing the right upper extremity brachial vein approach with both sonographic and fluoroscopic guidance utilized. Electronically Signed: Darlene Wood MD  5/21/2025 3:29 PM EDT  Workstation ID: [id number]    XR Chest 1 View  Result Date: 5/21/2025  Impression: 1. Extensive airspace disease throughout the left lung compatible with pneumonia. 2. Chronic airspace disease at the right lung base mildly improved from prior study. 3. Small bilateral pleural effusions. 4. Stable right port catheter. Electronically Signed: Antwon Estrella MD  5/21/2025 12:11 PM EDT  Workstation ID:  [id number]        Assessment:  Pneumonia due to unspecified pathogen: Negative nasal MRSA probe, urinary antigen for Legionella and Streptococcus pneumonia  Left pleural effusion  New left lower lobe opacity with perifissural nodular opacity  Increased mediastinal adenopathy with increased lymph nodes in the left axilla, left axillary and left supraclavicular  Hypoxia    Stage IV right lung cancer status post concomitant radiation chemotherapy, diagnosed on bronchoscopy August 2024  CAD  CKD  Chronic A-fib on chronic anticoagulation Eliquis  Hypothyroidism  Chronic anemia    Former smoker: Quit 2000 after 32 pack years        Recommendations:  Bedside left thoracentesis today     Bronchoscopy tomorrow with plans for cultures and biopsy to rule out infections or lymphangitic carcinomatosis     antibiotics: Ceftazidime    IV Solu-Medrol  Mucinex  Oxygen supplement and titration to maintain saturation 90 to 95%: Currently on 2 L per nasal cannula  Bronchodilators    Atorvastatin, sotalol    Thyroid replacement: Levothyroxine  Protonix    I personally reviewed the radiological studies      Review of Sytems:  Constitutional: Negative for chills, and fever and positive for malaise/fatigue.   HENT: Negative.    Eyes: Negative.    Cardiovascular: Negative.    Respiratory: Positive for cough and shortness of breath.    Skin: Negative.    Musculoskeletal: Negative.    Gastrointestinal: Negative.    Genitourinary: Negative.    Neurological: Negative.    Psychiatric/Behavioral: Negative.    Past Medical History:  Past Medical History:   Diagnosis Date    Coronary artery disease     Myocardial infarction 01/31/2024       Past Surgical History:  Past Surgical History:   Procedure Laterality Date    BRONCHOSCOPY N/A 8/13/2024    Procedure: BRONCHOSCOPY WITH BRONCHIAL WASHING AND BRONCHIAL BRUSHING;  Surgeon: Kelvin Gonzalez MD;  Location: [facility] ENDOSCOPY;  Service: Pulmonary;  Laterality: N/A;    BRONCHOSCOPY WITH ION ROBOTIC ASSIST  N/A 8/16/2024    Procedure: BRONCHOSCOPY WITH ION ROBOT WITH CRYO BIOPSY;  Surgeon: Kelvin Gonzalez MD;  Location: [facility] ENDOSCOPY;  Service: Robotics - Pulmonary;  Laterality: N/A;    CARDIAC CATHETERIZATION Right 1/29/2024    Procedure: Coronary angiography;  Surgeon: Abran Chand MD;  Location: [facility] CATH INVASIVE LOCATION;  Service: Cardiovascular;  Laterality: Right;    CARDIAC CATHETERIZATION N/A 1/29/2024    Procedure: Left Heart Cath;  Surgeon: Abran Chand MD;  Location: [facility] CATH INVASIVE LOCATION;  Service: Cardiovascular;  Laterality: N/A;        Home Meds:  Medications Prior to Admission   Medication Sig Dispense Refill Last Dose/Taking    apixaban (ELIQUIS) 5 MG tablet tablet Take 1 tablet by mouth Every 12 (Twelve) Hours. Indications: Atrial Fibrillation 60 tablet 0 Past Week    bacitracin 500 UNIT/GM ointment Apply 1 Application topically to the appropriate area as directed 2 (Two) Times a Day As Needed for Wound Care.   Past Week    Cholecalciferol (VITAMIN D-3 PO) Take 1 tablet by mouth Daily. Indications: Vitamin Deficiency   5/20/2025    guaiFENesin (Mucinex) 600 MG 12 hr tablet Take 1 tablet by mouth 2 (Two) Times a Day.   5/20/2025    levothyroxine (SYNTHROID, LEVOTHROID) 75 MCG tablet Take 1 tablet by mouth Every Morning. 30 tablet 1 5/21/2025    loratadine (Claritin) 10 MG tablet Take 1 tablet by mouth Daily.   Taking    multivitamin with minerals (Centrum Silver 50+Men) tablet tablet Take 1 tablet by mouth Every Morning. Indications: Vitamin Deficiency   5/20/2025    NON FORMULARY Take 2 tablets by mouth Every Night. Zzzquil  Indications: Sleep Disorder   5/20/2025    pantoprazole (PROTONIX) 40 MG EC tablet Take 1 tablet by mouth Daily.   5/21/2025    simvastatin (ZOCOR) 20 MG tablet Take 1 tablet by mouth Every Night. Indications: High Amount of Fats in the Blood   5/20/2025    sotalol (BETAPACE) 80 MG tablet Take 1 tablet by mouth 2 (Two) Times a Day.   5/21/2025    tamsulosin (FLOMAX)  "0.4 MG capsule 24 hr capsule Take 1 capsule by mouth Every Night. Indications: Benign Enlargement of Prostate   2025    thiamine (VITAMIN B-1) 100 MG tablet  tablet Take 1 tablet by mouth Daily. Indications: Vitamin Deficiency   2025    acetaminophen (TYLENOL) 325 MG tablet Take 2 tablets by mouth Every 4 (Four) Hours As Needed for Mild Pain.       ipratropium-albuterol (DUO-NEB) 0.5-2.5 mg/3 ml nebulizer Take 3 mL by nebulization Every 6 (Six) Hours As Needed for Wheezing.       midodrine (PROAMATINE) 5 MG tablet Take 1 tablet by mouth 3 (Three) Times a Day As Needed.          Allergies:  No Known Allergies    Social History:   Social History     Socioeconomic History    Marital status:    Tobacco Use    Smoking status: Former     Current packs/day: 0.00     Average packs/day: 1 pack/day for 32.0 years (32.0 ttl pk-yrs)     Types: Cigarettes     Start date:      Quit date:      Years since quittin.4     Passive exposure: Past    Smokeless tobacco: Never   Vaping Use    Vaping status: Never Used   Substance and Sexual Activity    Alcohol use: Yes     Alcohol/week: 1.0 standard drink of alcohol     Types: 1 Cans of beer per week    Drug use: Never    Sexual activity: Defer       Family History:  Family History   Problem Relation Age of Onset    Dementia Mother     Breast cancer Sister 74       Physical Exam:  /78 (BP Location: Left arm, Patient Position: Sitting)   Pulse 82   Temp 98 °F (36.7 °C) (Oral)   Resp 18   Ht 180.3 cm (71\")   Wt 79.4 kg (175 lb 0.7 oz)   SpO2 93%   BMI 24.41 kg/m²  Body mass index is 24.41 kg/m². 93% 79.4 kg (175 lb 0.7 oz)  General Appearance:  Alert   HEENT:  Normocephalic, without obvious abnormality, Conjunctiva/corneas clear,.   Nares normal, no drainage     Neck:  Supple, symmetrical, trachea midline. No JVD.  Lungs /Chest wall:   Bilateral basal rhonchi, respirations unlabored, symmetrical wall movement.     Heart:  Regular rate and rhythm, " S1 S2 normal  Abdomen: Soft, non-tender, no masses, no organomegaly.    Extremities: No edema, no clubbing or cyanosis    LABS:  Lab Results   Component Value Date    CALCIUM 8.6 05/22/2025     Results from last 7 days   Lab Units 05/22/25  0547 05/21/25  1536 05/21/25  1104 05/15/25  1155   SODIUM mmol/L 134* 133*  --  137   POTASSIUM mmol/L 4.9 4.4  --  4.9   CHLORIDE mmol/L 103 102  --  103   CO2 mmol/L 19.2* 20.4*  --  20.8*   BUN mg/dL 33* 29*  --  27*   CREATININE mg/dL 2.34* 2.36*  --  2.24*   GLUCOSE mg/dL 148* 106*  --  93   CALCIUM mg/dL 8.6 8.7  --  9.2   WBC 10*3/mm3 13.46*  --  16.00* 10.83*   HEMOGLOBIN g/dL 9.5*  --  11.5* 11.9*   PLATELETS 10*3/mm3 266  --  299 259   ALT (SGPT) U/L  --  65*  --  75*   AST (SGOT) U/L  --  76*  --  88*     Lab Results   Component Value Date    TROPONINT 53 (C) 08/12/2024                 Results from last 7 days   Lab Units 05/21/25  1530   PH, ARTERIAL pH units 7.351   PCO2, ARTERIAL mm Hg 37.0   PO2 ART mm Hg 80.1*   O2 SATURATION ART % 95.2   MODALITY  Cannula         Results from last 7 days   Lab Units 05/21/25  1104   INR  1.18*         Lab Results   Component Value Date    TSH 62.200 (H) 05/21/2025     Estimated Creatinine Clearance: 30.6 mL/min (A) (by C-G formula based on SCr of 2.34 mg/dL (H)).  Results from last 7 days   Lab Units 05/15/25  1257   NITRITE UA  Negative        Imaging:  Imaging Results (Last 24 Hours)       Procedure Component Value Units Date/Time    US Renal Bilateral [[id number]] Collected: 05/22/25 0259     Updated: 05/22/25 0304    Narrative:      US RENAL BILATERAL    Date of Exam: 5/22/2025 2:15 AM EDT    Indication: worsening kidney function.  hydronephrosis.    Comparison: PET/CT 8/28/2024.    Technique: Grayscale and color Doppler ultrasound evaluation of the kidneys and urinary bladder was performed.      Findings:  The right kidney measures 11.1 x 5.7 x 5.3 cm and the left kidney measures 11.7 x 5.5 x 6.9 cm. Kidney vascularity  appears grossly intact. There is moderate bilateral hydronephrosis. Kidney parenchyma is echogenic. There is no solid kidney mass.  No   echogenic shadowing stone.  No hydronephrosis.    The urinary bladder is markedly distended with a volume of 2163 mL. This is reportedly post void, but does appear unchanged from prior PET/CT performed 8/28/2024.      Impression:      Impression:  1.Moderate bilateral hydronephrosis.  2.Markedly distended urinary bladder.  3.Echogenic kidneys, which could indicate medical renal disease.            Electronically Signed: Emigdio Epps MD    5/22/2025 3:02 AM EDT    Workstation ID: [id number]    CT Chest Without Contrast Diagnostic [[id number]] Collected: 05/21/25 1757     Updated: 05/21/25 1816    Narrative:      CT CHEST WO CONTRAST DIAGNOSTIC    Date of Exam: 5/21/2025 5:48 PM EDT    Indication: PNA on XRAY, lung ca.    Comparison: Chest CT [address]. Chest radiograph 5/21/2025.    Technique: Axial CT images were obtained of the chest without contrast administration.  Sagittal and coronal reconstructions were performed.  Automated exposure control and iterative reconstruction methods were used.      Findings:    Thyroid and thoracic inlet: Similar mildly enlarged multinodular thyroid gland.    Lymph nodes: Interval increased size of mediastinal lymphadenopathy, for example a prevascular node measures 2 cm in short axis, which was previously subcentimeter in size. There is also suggestion of increased left hilar adenopathy, not well   characterized without intravenous contrast. Interval development of left axillary lymphadenopathy measuring up to 12 mm in short axis. Interval development of left supraclavicular lymphadenopathy measuring up to 13 mm in short axis. Calcified mediastinal   and hilar nodes, likely sequelae of prior granulomatous disease.    Cardiovascular: Normal appearing heart size. Increased size of a small pericardial effusion. Aorta and main pulmonary artery diameters  are within normal range.. There are coronary artery calcifications. Aortic atherosclerosis. Right PICC terminates in   the lower SVC.    Esophagus: No significant abnormality.    Lung parenchyma: Emphysema. Interval decrease interlobular septal thickening in the right lung with persistent peripheral linear opacities in the right lower lobe which could represent atelectasis and/or scarring. Superimposed lymphangitic carcinomatosis   is not entirely excluded. Similar-appearing masslike consolidation in the posterior basal right lower lobe. Interval development of interlobular septal thickening in the left lung. New perifissural nodular opacity in the left lower lobe measuring 10 mm   on series 5, image 64. Consolidative opacity in the left lower lobe.    Airways: Trace secretions in the trachea. Bronchial wall thickening.    Pleura: Interval removal of right Pleurx catheter since prior chest CT dated 11/1/2024. Decreased size of a small right pleural effusion. Interval development of a moderate left pleural effusion.    Chest wall and osseous structures: Degenerative changes of the imaged spine. No acute osseous abnormality.    Included abdomen: Partially imaged severe bilateral hydronephrosis. Cholelithiasis.      Impression:      Impression:  Compared to chest CT dated 11/1/2024, interval development of a moderate left pleural effusion, indeterminate for malignant pleural effusion, and interlobular septal thickening in the left lung which 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 versus   pulmonary edema.    Consolidative opacities in the left lower lobe and a perifissural nodular opacity in the left lower lobe, which may be infectious/inflammatory or may represent atelectasis. Underlying malignancy is not excluded.    Interval increase in mediastinal adenopathy and interval development of left hilar, left axillary, left supraclavicular lymphadenopathy, suspicious for increased metastatic  lymphadenopathy.    Interval decrease size of a small right pleural effusion. Interval decrease in interlobular septal thickening in the right lung with persistent peripheral linear opacities in the right lower lobe which could represent atelectasis and/or scarring.   Superimposed lymphangitic carcinomatosis is not entirely excluded.    Similar-appearing masslike consolidation in the posterior basal right lower lobe.    Partially imaged severe bilateral hydronephrosis        Electronically Signed: Dru Kruse MD    5/21/2025 6:14 PM EDT    Workstation ID: [id number]              Current Meds:   SCHEDULE  atorvastatin, 10 mg, Oral, Daily  cefTAZidime, 2,000 mg, Intravenous, Q24H  guaiFENesin, 1,200 mg, Oral, Q12H  ipratropium-albuterol, 3 mL, Nebulization, 4x Daily - RT  levothyroxine, 75 mcg, Oral, Q AM  methylPREDNISolone sodium succinate, 40 mg, Intravenous, Q12H  multivitamin with minerals, 1 tablet, Oral, QAM  pantoprazole, 40 mg, Oral, Daily  sodium chloride, 10 mL, Intravenous, Q12H  sotalol, 80 mg, Oral, BID  tamsulosin, 0.4 mg, Oral, Nightly      Infusions  sodium chloride, 75 mL/hr, Last Rate: 75 mL/hr (05/22/25 0009)      PRNs    acetaminophen    senna-docusate sodium **AND** polyethylene glycol **AND** bisacodyl **AND** bisacodyl    midodrine    nitroglycerin    ondansetron ODT **OR** ondansetron    sodium chloride    sodium chloride        Jason Dozier MD  5/22/2025  09:49 EDT      Much of this encounter note is an electronic transcription/translation of spoken language to printed text using Dragon Software.

## 2025-05-22 NOTE — CONSULTS
GI CONSULT  NOTE:    Referring Provider:  Evelyn Felder MD    Chief complaint: Dysphagia    Subjective .     History of present illness: Young Ames is a 74 y.o. male with PMH MI with no intervention required 1/2024, A-fib on Eliquis, CKD, hypothyroidism, and right lung cancer treated with Keytruda, last dose 5/1/25. He presented to the hospital, , for removal of right chest port.  Hypoxic, chest x-ray at that time showed pneumonia and O2 sats in 89% on room air, and left pleural effusion.  GI was consulted for dysphagia.    Patient had thoracentesis today, and he feels like he is breathing better.  At time of exam he is wearing 2 L of oxygen, he does not wear oxygen at home.  Patient reports that for the past few weeks he has been regurgitating his food.  He has never had an EGD.  He takes pantoprazole daily, 40 mg.  He has no known family history of esophageal, gastric or colorectal cancer.  He struggles with constipation, going as long as 3 to 4 days without a bowel movement.  He takes MiraLAX if he feels like he needs it.  He denies having any melena or BRBPR.  From a lung cancer standpoint he is on Keytruda every 3 weeks, last dose 5/1.  He is followed by Dr. Tim.  Patient met with pulmonary team who is planning bronchoscopy tomorrow.      Endo History:  No record of previous EGD or colonoscopy, nothing in [facility].     Past Medical History:  Past Medical History:   Diagnosis Date    Coronary artery disease     Myocardial infarction 01/31/2024       Past Surgical History:  Past Surgical History:   Procedure Laterality Date    BRONCHOSCOPY N/A 8/13/2024    Procedure: BRONCHOSCOPY WITH BRONCHIAL WASHING AND BRONCHIAL BRUSHING;  Surgeon: Kelvin Gonzalez MD;  Location: [facility] ENDOSCOPY;  Service: Pulmonary;  Laterality: N/A;    BRONCHOSCOPY WITH ION ROBOTIC ASSIST N/A 8/16/2024    Procedure: BRONCHOSCOPY WITH ION ROBOT WITH CRYO BIOPSY;  Surgeon: Kelvin Gonzalez MD;  Location: [facility] ENDOSCOPY;  Service: Robotics -  Pulmonary;  Laterality: N/A;    CARDIAC CATHETERIZATION Right 2024    Procedure: Coronary angiography;  Surgeon: Abran Chand MD;  Location:  [facility] CATH INVASIVE [facility];  Service: Cardiovascular;  Laterality: Right;    CARDIAC CATHETERIZATION N/A 2024    Procedure: Left Heart Cath;  Surgeon: Abran Chand MD;  Location:  [facility];  Service: Cardiovascular;  Laterality: N/A;       Social History:  Social History     Tobacco Use    Smoking status: Former     Current packs/day: 0.00     Average packs/day: 1 pack/day for 32.0 years (32.0 ttl pk-yrs)     Types: Cigarettes     Start date:      Quit date:      Years since quittin.4     Passive exposure: Past    Smokeless tobacco: Never   Vaping Use    Vaping status: Never Used   Substance Use Topics    Alcohol use: Yes     Alcohol/week: 1.0 standard drink of alcohol     Types: 1 Cans of beer per week    Drug use: Never       Family History:  Family History   Problem Relation Age of Onset    Dementia Mother     Breast cancer Sister 74       Medications:  Medications Prior to Admission   Medication Sig Dispense Refill Last Dose/Taking    apixaban (ELIQUIS) 5 MG tablet tablet Take 1 tablet by mouth Every 12 (Twelve) Hours. Indications: Atrial Fibrillation 60 tablet 0 Past Week    bacitracin 500 UNIT/GM ointment Apply 1 Application topically to the appropriate area as directed 2 (Two) Times a Day As Needed for Wound Care.   Past Week    Cholecalciferol (VITAMIN D-3 PO) Take 1 tablet by mouth Daily. Indications: Vitamin Deficiency   2025    guaiFENesin (Mucinex) 600 MG 12 hr tablet Take 1 tablet by mouth 2 (Two) Times a Day.   2025    levothyroxine (SYNTHROID, LEVOTHROID) 75 MCG tablet Take 1 tablet by mouth Every Morning. 30 tablet 1 2025    loratadine (Claritin) 10 MG tablet Take 1 tablet by mouth Daily.   Taking    multivitamin with minerals (Centrum Silver 50+Men) tablet tablet Take 1 tablet by mouth Every  Morning. Indications: Vitamin Deficiency   5/20/2025    NON FORMULARY Take 2 tablets by mouth Every Night. Zzzquil  Indications: Sleep Disorder   5/20/2025    pantoprazole (PROTONIX) 40 MG EC tablet Take 1 tablet by mouth Daily.   5/21/2025    simvastatin (ZOCOR) 20 MG tablet Take 1 tablet by mouth Every Night. Indications: High Amount of Fats in the Blood   5/20/2025    sotalol (BETAPACE) 80 MG tablet Take 1 tablet by mouth 2 (Two) Times a Day.   5/21/2025    tamsulosin (FLOMAX) 0.4 MG capsule 24 hr capsule Take 1 capsule by mouth Every Night. Indications: Benign Enlargement of Prostate   5/20/2025    thiamine (VITAMIN B-1) 100 MG tablet  tablet Take 1 tablet by mouth Daily. Indications: Vitamin Deficiency   5/20/2025    acetaminophen (TYLENOL) 325 MG tablet Take 2 tablets by mouth Every 4 (Four) Hours As Needed for Mild Pain.       ipratropium-albuterol (DUO-NEB) 0.5-2.5 mg/3 ml nebulizer Take 3 mL by nebulization Every 6 (Six) Hours As Needed for Wheezing.       midodrine (PROAMATINE) 5 MG tablet Take 1 tablet by mouth 3 (Three) Times a Day As Needed.          Scheduled Meds:atorvastatin, 10 mg, Oral, Daily  cefTAZidime, 2,000 mg, Intravenous, Q24H  guaiFENesin, 1,200 mg, Oral, Q12H  ipratropium-albuterol, 3 mL, Nebulization, 4x Daily - RT  levothyroxine, 75 mcg, Oral, Q AM  methylPREDNISolone sodium succinate, 40 mg, Intravenous, Q12H  multivitamin with minerals, 1 tablet, Oral, QAM  pantoprazole, 40 mg, Oral, Daily  sodium chloride, 10 mL, Intravenous, Q12H  sotalol, 80 mg, Oral, BID  tamsulosin, 0.4 mg, Oral, Nightly      Continuous Infusions:sodium chloride, 75 mL/hr, Last Rate: 75 mL/hr (05/22/25 0009)      PRN Meds:.  acetaminophen    senna-docusate sodium **AND** polyethylene glycol **AND** bisacodyl **AND** bisacodyl    midodrine    nitroglycerin    ondansetron ODT **OR** ondansetron    sodium chloride    sodium chloride    ALLERGIES:  Patient has no known allergies.    ROS:  Review of Systems    The  following systems were reviewed and negative;   Constitution:  No fevers, chills, no unintentional weight loss  Skin: no rash, no jaundice  Eyes:  No blurry vision, no eye pain  HENT:  No change in hearing or smell  Resp:  No dyspnea or cough  CV:  No chest pain or palpitations  :  No dysuria, hematuria  Musculoskeletal:  No leg cramps or arthralgias  Neuro:  No tremor, no numbness  Psych:  No depression or confusion    Objective     Vital Signs:   Vitals:    05/22/25 0750 05/22/25 0753 05/22/25 0845 05/22/25 1159   BP:   110/78 105/69   BP Location:   Left arm    Patient Position:   Sitting    Pulse: 75  82 72   Resp: 18 20 18 19   Temp:       TempSrc:       SpO2: 93%   95%   Weight:       Height:           Physical Exam:     General Appearance:    Awake and alert, in no acute distress   Head:    Normocephalic, without obvious abnormality, atraumatic   Eyes:            Conjunctivae normal, anicteric sclerae, pupils equal   Ears:    Ears appear intact with no abnormalities noted   Throat:   No oral lesions, no thrush, oral mucosa moist   Neck:   Supple, no JVD   Lungs:     respirations regular, even and unlabored       Chest Wall:    No abnormalities observed   Abdomen:     soft, nontender, no rebound or guarding, nondistended   Rectal:     Deferred   Extremities:   Moves all extremities, no edema, no cyanosis   Pulses:   Pulses palpable and equal bilaterally   Skin:   No rash, no jaundice, normal palpation             Results Review:   I reviewed the patient's labs and imaging.  CBC    Results from last 7 days   Lab Units 05/22/25  0547 05/21/25  1104   WBC 10*3/mm3 13.46* 16.00*   HEMOGLOBIN g/dL 9.5* 11.5*   PLATELETS 10*3/mm3 266 299     CMP   Results from last 7 days   Lab Units 05/22/25  0547 05/21/25  1536   SODIUM mmol/L 134* 133*   POTASSIUM mmol/L 4.9 4.4   CHLORIDE mmol/L 103 102   CO2 mmol/L 19.2* 20.4*   BUN mg/dL 33* 29*   CREATININE mg/dL 2.34* 2.36*   GLUCOSE mg/dL 148* 106*   ALBUMIN g/dL  --  3.7  "  BILIRUBIN mg/dL  --  0.3   ALK PHOS U/L  --  60   AST (SGOT) U/L  --  76*   ALT (SGPT) U/L  --  65*     Cr Clearance Estimated Creatinine Clearance: 30.6 mL/min (A) (by C-G formula based on SCr of 2.34 mg/dL (H)).  Coag   Results from last 7 days   Lab Units 05/21/25  1104   INR  1.18*   APTT seconds 29.2     HbA1C No results found for: \"HGBA1C\"  Blood Glucose No results found for: \"POCGLU\"  Infection     UA      Radiology(recent) CT Abdomen Pelvis Without Contrast  Result Date: 5/22/2025  Impression: 1.Urinary bladder is markedly distended with urine. There is moderate bilateral hydroureteronephrosis. No urinary tract calculi are seen. Appearance is most compatible with urinary retention or bladder outlet obstruction. Patient would likely benefit from Rogers catheterization. 2.Moderate left and small right pleural effusions with bibasilar opacities which may be related to atelectasis, infiltrates, or neoplastic process. Please correlate with chest CT from 5/21/2025. 3.Cholelithiasis. 4.Additional findings as detailed above. Electronically Signed: Shahab Haro MD  5/22/2025 11:50 AM EDT  Workstation ID: [id number]    US Renal Bilateral  Result Date: 5/22/2025  Impression: 1.Moderate bilateral hydronephrosis. 2.Markedly distended urinary bladder. 3.Echogenic kidneys, which could indicate medical renal disease. Electronically Signed: Emigdio Epps MD  5/22/2025 3:02 AM EDT  Workstation ID: [id number]    CT Chest Without Contrast Diagnostic  Result Date: 5/21/2025  Impression: Compared to chest CT dated 11/1/2024, interval development of a moderate left pleural effusion, indeterminate for malignant pleural effusion, and interlobular septal thickening in the left lung which 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 versus pulmonary edema. Consolidative opacities in the left lower lobe and a perifissural nodular opacity in the left lower lobe, which may be infectious/inflammatory or may represent atelectasis. Underlying " malignancy is not excluded. Interval increase in mediastinal adenopathy and interval development of left hilar, left axillary, left supraclavicular lymphadenopathy, suspicious for increased metastatic lymphadenopathy. Interval decrease size of a small right pleural effusion. Interval decrease in interlobular septal thickening in the right lung with persistent peripheral linear opacities in the right lower lobe which could represent atelectasis and/or scarring. Superimposed lymphangitic carcinomatosis is not entirely excluded. Similar-appearing masslike consolidation in the posterior basal right lower lobe. Partially imaged severe bilateral hydronephrosis Electronically Signed: Dru Kruse MD  5/21/2025 6:14 PM EDT  Workstation ID: [id number]    IR Removal Tunnel CV With Port Different Site  Result Date: 5/21/2025  1. Uncomplicated removal, right chest wall Srakvp-q-[id number]. As mentioned above, the incision was sealed using Dermabond, while the skin ulceration/wound is set to heal via secondary intention. Dressing should be changed every 1 to 2 days using sterile technique. The patient is to return to Interventional Radiology for follow-up to evaluate for adequate healing. 2. Technically successful placement, 4 Japanese single lumen power injectable Rwvlfw-s-Lnkp utilizing the right upper extremity brachial vein approach with both sonographic and fluoroscopic guidance utilized. Electronically Signed: Darlene Wood MD  5/21/2025 3:29 PM EDT  Workstation ID: [id number]    IR PICC W Imaging Guidance  Result Date: 5/21/2025  1. Uncomplicated removal, right chest wall Ibpyry-z-Wxmy. As mentioned above, the incision was sealed using Dermabond, while the skin ulceration/wound is set to heal via secondary intention. Dressing should be changed every 1 to 2 days using sterile technique. The patient is to return to Interventional Radiology for follow-up to evaluate for adequate healing. 2. Technically successful placement, 4  Romansh single lumen power injectable Frdury-d-Ucua utilizing the right upper extremity brachial vein approach with both sonographic and fluoroscopic guidance utilized. Electronically Signed: Darlene Wood MD  5/21/2025 3:29 PM EDT  Workstation ID: [id number]    XR Chest 1 View  Result Date: 5/21/2025  Impression: 1. Extensive airspace disease throughout the left lung compatible with pneumonia. 2. Chronic airspace disease at the right lung base mildly improved from prior study. 3. Small bilateral pleural effusions. 4. Stable right port catheter. Electronically Signed: Antwon Estrella MD  5/21/2025 12:11 PM EDT  Workstation ID: [id number]        ASSESSMENT AND PLAN:  74 y.o. male with PMH MI with no intervention required 1/2024, A-fib on Eliquis, CKD, hypothyroidism, and right lung cancer treated with Keytruda, last dose 5/1/25. He presented to the hospital, IR, for removal of right chest port.  Hypoxic, chest x-ray at that time showed pneumonia and O2 sats in 89% on room air, and left pleural effusion.  GI was consulted for dysphagia.    Problem List:  Dysphagia  Pneumonia, moderate left pleural effusion s/p thoracentesis  Right lung cancer on Keytruda, last dose 5/1/2025 every 3 week.  Followed by Dr. Tim  A-amy on Eliquis, being held  Constipation    Plan:  - Continue to hold Eliquis.  - Okay for diet today.  N.p.o. after midnight.  Will plan EGD at time of bronchoscopy tomorrow.  - PPI daily.  - MiraLAX daily while in the hospital.  Discussed with the patient he could consider titrating MiraLAX versus adding daily Metamucil to help with constipation.    I discussed the patients findings and my recommendations with the patient. Patient was seen with GI attending, addendum to follow. We appreciate the referral.    Electronically signed by Tommie Vaca PA-C, 05/22/25, 1:05 PM EDT.

## 2025-05-22 NOTE — PLAN OF CARE
Goal Outcome Evaluation:  Plan of Care Reviewed With: patient, spouse, child        Progress: no change                                Problem: Adult Inpatient Plan of Care  Goal: Plan of Care Review  Outcome: Progressing  Flowsheets (Taken 5/22/2025 1815)  Progress: no change  Plan of Care Reviewed With:   patient   spouse   child     Problem: Breathing Pattern Ineffective  Goal: Effective Breathing Pattern  Outcome: Progressing  Intervention: Promote Improved Breathing Pattern  Recent Flowsheet Documentation  Taken 5/22/2025 0852 by Anette Walters RN  Head of Bed (HOB) Positioning: HOB elevated     Problem: Comorbidity Management  Goal: Maintenance of COPD Symptom Control  Outcome: Progressing  Intervention: Maintain COPD (Chronic Obstructive Pulmonary Disease) Symptom Control  Recent Flowsheet Documentation  Taken 5/22/2025 0852 by Anette Walters RN  Medication Review/Management: medications reviewed     Problem: Adult Inpatient Plan of Care  Goal: Absence of Hospital-Acquired Illness or Injury  Intervention: Identify and Manage Fall Risk  Recent Flowsheet Documentation  Taken 5/22/2025 1204 by Anette Walters RN  Safety Promotion/Fall Prevention: safety round/check completed  Taken 5/22/2025 1053 by Anette Walters RN  Safety Promotion/Fall Prevention: safety round/check completed  Taken 5/22/2025 0852 by Anette Walters RN  Safety Promotion/Fall Prevention: safety round/check completed  Taken 5/22/2025 0822 by Anette Walters RN  Safety Promotion/Fall Prevention: safety round/check completed  Taken 5/22/2025 0651 by Anette Walters RN  Safety Promotion/Fall Prevention: safety round/check completed  Intervention: Prevent Skin Injury  Recent Flowsheet Documentation  Taken 5/22/2025 1053 by Anette Walters RN  Body Position: sitting up in bed  Taken 5/22/2025 0852 by Anette Walters RN  Body Position: sitting up in bed  Skin Protection: transparent dressing maintained  Intervention: Prevent and Manage VTE (Venous Thromboembolism)  Risk  Recent Flowsheet Documentation  Taken 5/22/2025 0852 by Anette Walters RN  VTE Prevention/Management: SCDs (sequential compression devices) off  Goal: Optimal Comfort and Wellbeing  Intervention: Provide Person-Centered Care  Recent Flowsheet Documentation  Taken 5/22/2025 0852 by Anette Walters RN  Trust Relationship/Rapport:   care explained   choices provided   emotional support provided   empathic listening provided   questions answered   questions encouraged   reassurance provided   thoughts/feelings acknowledged  Goal: Plan of Care Review  Recent Flowsheet Documentation  Taken 5/22/2025 1815 by Anette Walters RN  Progress: no change  Plan of Care Reviewed With:   patient   spouse   child  Goal: Absence of Hospital-Acquired Illness or Injury  Intervention: Identify and Manage Fall Risk  Recent Flowsheet Documentation  Taken 5/22/2025 1204 by Anette Walters RN  Safety Promotion/Fall Prevention: safety round/check completed  Taken 5/22/2025 1053 by Anette Walters RN  Safety Promotion/Fall Prevention: safety round/check completed  Taken 5/22/2025 0852 by Anette Walters RN  Safety Promotion/Fall Prevention: safety round/check completed  Taken 5/22/2025 0822 by Anette Walters RN  Safety Promotion/Fall Prevention: safety round/check completed  Taken 5/22/2025 0651 by Anette Walters RN  Safety Promotion/Fall Prevention: safety round/check completed  Intervention: Prevent Skin Injury  Recent Flowsheet Documentation  Taken 5/22/2025 1053 by Anette Walters RN  Body Position: sitting up in bed  Taken 5/22/2025 0852 by Anette Walters RN  Body Position: sitting up in bed  Skin Protection: transparent dressing maintained  Intervention: Prevent and Manage VTE (Venous Thromboembolism) Risk  Recent Flowsheet Documentation  Taken 5/22/2025 0852 by Anette Walters RN  VTE Prevention/Management: SCDs (sequential compression devices) off  Goal: Optimal Comfort and Wellbeing  Intervention: Provide Person-Centered Care  Recent Flowsheet  Documentation  Taken 5/22/2025 0852 by Anette Walters, RN  Trust Relationship/Rapport:   care explained   choices provided   emotional support provided   empathic listening provided   questions answered   questions encouraged   reassurance provided   thoughts/feelings acknowledged     Patient complains of no pain this shift. Patient had bedside thoracentesis with Dr. Dozier.   Patient has a FC and a PICC.     Patient currently resting in bed, appears comfortable, no distress noted at this time.

## 2025-05-22 NOTE — PROCEDURES
Bedside left thoracentesis with ultrasound guidance    Indications:  Clinically significant Effusion    Pre-operative Diagnosis: Effusion    Post-operative Diagnosis: Effusion    Procedure Details   Informed consent was obtained for the procedure, including sedation/analgesia if needed.  Risks of lung perforation, hemorrhage, arrhythmia, and adverse drug reaction were discussed.   Timeout was done on the standard manner.    After sterile skin prep, using standard technique,  Ultrasound was used to identify the fluid location which was marked.  Lidocaine 1% local skin infiltration was used,  the needle was introduced above the rib identifying the fluid'  Using 8 Romanian thoracentesis catheter,  was placed in the left posterior fifth rib space.    The catheter was removed and sterile dressing applied    Findings:  950 ml of serosanguineous fluid obtained    Estimated Blood Loss:  Minimal           Specimens: 120 mL of pleural fluid sent for studies                Complications:  None; patient tolerated the procedure well.                    Condition: stable    Post op plan:  CXR   The dressing can be removed 5/24/2025      Attending Attestation: I performed the procedure.

## 2025-05-22 NOTE — PROGRESS NOTES
LOS: 1 day   Patient Care Team:  Abner Funes APRN as PCP - General (Family Medicine)  Brittany Canchola, RN as Nurse Navigator  Arben Tim MD as Consulting Physician (Hematology and Oncology)    Subjective     Interval History: Improving    Patient Complaints: Cough is looser.  Complains of dysphagia for several weeks.  He states all solids feel like they get stuck at the epigastrium.  He has some trouble with liquids.  He has lost weight due to his fear of choking on food.    History taken from: patient    Review of Systems   Constitutional:  Positive for activity change, appetite change and fatigue. Negative for chills, diaphoresis and fever.   HENT:  Positive for trouble swallowing. Negative for congestion and facial swelling.    Eyes:  Negative for visual disturbance.   Respiratory:  Positive for cough and shortness of breath.    Cardiovascular:  Negative for chest pain and leg swelling.   Gastrointestinal:  Negative for abdominal pain and nausea.   Genitourinary:  Negative for dysuria.   Musculoskeletal:  Negative for arthralgias and back pain.   Neurological:  Negative for tremors, syncope and weakness.   Psychiatric/Behavioral:  Negative for confusion.            Objective     Vital Signs  Temp:  [97.6 °F (36.4 °C)-98 °F (36.7 °C)] 98 °F (36.7 °C)  Heart Rate:  [64-97] 82  Resp:  [4-21] 18  BP: ()/(63-87) 110/78    Physical Exam:     General Appearance:    Alert, cooperative, in no acute distress,   Head:    Normocephalic, without obvious abnormality, atraumatic   Eyes:            Lids and lashes normal, conjunctivae and sclerae normal, no   icterus, no pallor, corneas clear, PERRLA   Ears:    Ears appear intact with no abnormalities noted   Throat:   No oral lesions, no thrush, oral mucosa moist   Neck:   No adenopathy, supple, trachea midline, no thyromegaly, no   carotid bruit, no JVD   Lungs:     Clear to auscultation,respirations regular, even and                  unlabored    Heart:     Regular rhythm and normal rate, normal S1 and S2, no            murmur, no gallop, no rub, no click   Chest Wall:    No abnormalities observed   Abdomen:     Normal bowel sounds, no masses, no organomegaly, soft        Non-tender non-distended, no guarding,   Extremities:   Moves all extremities well, no edema, no cyanosis, no             Redness   Pulses:   Pulses palpable and equal bilaterally   Skin:   No bleeding, bruising or rash   Lymph nodes:   No palpable adenopathy   Neurologic:   Cranial nerves 2 - 12 grossly intact, sensation intact, DTR       present and equal bilaterally        Results Review:    Lab Results (last 24 hours)       Procedure Component Value Units Date/Time    POC Lactate [[id number]]  (Normal) Collected: 05/21/25 1544    Specimen: Blood Updated: 05/22/25 0949     Lactate 0.3 mmol/L      Comment: Serial Number: [id number]:  [id number]       POC Lactate [[id number]]  (Abnormal) Collected: 05/21/25 1542    Specimen: Blood Updated: 05/22/25 0949     Lactate <0.3 mmol/L      Comment: Serial Number: [id number]:  [id number]       Basic Metabolic Panel [[id number]]  (Abnormal) Collected: 05/22/25 0547    Specimen: Blood Updated: 05/22/25 0732     Glucose 148 mg/dL      BUN 33 mg/dL      Creatinine 2.34 mg/dL      Sodium 134 mmol/L      Potassium 4.9 mmol/L      Chloride 103 mmol/L      CO2 19.2 mmol/L      Calcium 8.6 mg/dL      BUN/Creatinine Ratio 14.1     Anion Gap 11.8 mmol/L      eGFR 28.3 mL/min/1.73     Narrative:      GFR Categories in Chronic Kidney Disease (CKD)              GFR Category          GFR (mL/min/1.73)    Interpretation  G1                    90 or greater        Normal or high (1)  G2                    60-89                Mild decrease (1)  G3a                   45-59                Mild to moderate decrease  G3b                   30-44                Moderate to severe decrease  G4                    15-29                Severe decrease  G5                     14 or less           Kidney failure    (1)In the absence of evidence of kidney disease, neither GFR category G1 or G2 fulfill the criteria for CKD.    eGFR calculation 2021 CKD-EPI creatinine equation, which does not include race as a factor    CBC & Differential [[id number]]  (Abnormal) Collected: 05/22/25 0547    Specimen: Blood Updated: 05/22/25 0720    Narrative:      The following orders were created for panel order CBC & Differential.  Procedure                               Abnormality         Status                     ---------                               -----------         ------                     CBC Auto Differential[[id number]]        Abnormal            Final result                 Please view results for these tests on the individual orders.    CBC Auto Differential [[id number]]  (Abnormal) Collected: 05/22/25 0547    Specimen: Blood Updated: 05/22/25 0720     WBC 13.46 10*3/mm3      RBC 3.10 10*6/mm3      Hemoglobin 9.5 g/dL      Comment: Result checked          Hematocrit 29.1 %      MCV 93.9 fL      MCH 30.6 pg      MCHC 32.6 g/dL      RDW 15.5 %      RDW-SD 53.4 fl      MPV 9.6 fL      Platelets 266 10*3/mm3      Neutrophil % 82.4 %      Lymphocyte % 12.3 %      Monocyte % 4.3 %      Eosinophil % 0.1 %      Basophil % 0.2 %      Immature Grans % 0.7 %      Neutrophils, Absolute 11.10 10*3/mm3      Lymphocytes, Absolute 1.65 10*3/mm3      Monocytes, Absolute 0.58 10*3/mm3      Eosinophils, Absolute 0.01 10*3/mm3      Basophils, Absolute 0.03 10*3/mm3      Immature Grans, Absolute 0.09 10*3/mm3      nRBC 0.0 /100 WBC     S. Pneumo Ag Urine or CSF - Urine, Urine, Clean Catch [[id number]]  (Normal) Collected: 05/22/25 0130    Specimen: Urine, Clean Catch Updated: 05/22/25 0241     Strep Pneumo Ag Negative    Legionella Antigen, Urine - Urine, Urine, Clean Catch [[id number]]  (Normal) Collected: 05/22/25 0130    Specimen: Urine, Clean Catch Updated: 05/22/25 0241     LEGIONELLA ANTIGEN, URINE Negative     MRSA Screen, PCR (Inpatient) - Swab, Nares [[id number]]  (Normal) Collected: 05/21/25 2300    Specimen: Swab from Nares Updated: 05/22/25 0112     MRSA PCR No MRSA Detected    Narrative:      The negative predictive value of this diagnostic test is high and should only be used to consider de-escalating anti-MRSA therapy. A positive result may indicate colonization with MRSA and must be correlated clinically.    TSH [[id number]]  (Abnormal) Collected: 05/21/25 1536    Specimen: Blood Updated: 05/21/25 1752     TSH 62.200 uIU/mL     T4, Free [[id number]]  (Abnormal) Collected: 05/21/25 1536    Specimen: Blood Updated: 05/21/25 1752     Free T4 0.76 ng/dL     Comprehensive Metabolic Panel [[id number]]  (Abnormal) Collected: 05/21/25 1536    Specimen: Blood Updated: 05/21/25 1612     Glucose 106 mg/dL      BUN 29 mg/dL      Creatinine 2.36 mg/dL      Sodium 133 mmol/L      Potassium 4.4 mmol/L      Chloride 102 mmol/L      CO2 20.4 mmol/L      Calcium 8.7 mg/dL      Total Protein 6.2 g/dL      Albumin 3.7 g/dL      ALT (SGPT) 65 U/L      AST (SGOT) 76 U/L      Alkaline Phosphatase 60 U/L      Total Bilirubin 0.3 mg/dL      Globulin 2.5 gm/dL      A/G Ratio 1.5 g/dL      BUN/Creatinine Ratio 12.3     Anion Gap 10.6 mmol/L      eGFR 28.0 mL/min/1.73     Narrative:      GFR Categories in Chronic Kidney Disease (CKD)              GFR Category          GFR (mL/min/1.73)    Interpretation  G1                    90 or greater        Normal or high (1)  G2                    60-89                Mild decrease (1)  G3a                   45-59                Mild to moderate decrease  G3b                   30-44                Moderate to severe decrease  G4                    15-29                Severe decrease  G5                    14 or less           Kidney failure    (1)In the absence of evidence of kidney disease, neither GFR category G1 or G2 fulfill the criteria for CKD.    eGFR calculation 2021 CKD-EPI creatinine  equation, which does not include race as a factor    Blood Culture - Blood, Arm, Right [[id number]] Collected: 05/21/25 1552    Specimen: Blood from Arm, Right Updated: 05/21/25 1555    Extra Tubes [[id number]] Collected: 05/21/25 1536    Specimen: Blood, Venous Line Updated: 05/21/25 1545    Narrative:      The following orders were created for panel order Extra Tubes.  Procedure                               Abnormality         Status                     ---------                               -----------         ------                     Lavender Top[[id number]]                                     Final result                 Please view results for these tests on the individual orders.    Lavender Top [[id number]] Collected: 05/21/25 1536    Specimen: Blood Updated: 05/21/25 1545     Extra Tube hold for add-on     Comment: Auto resulted       Blood Culture - Blood, Blood, PICC Line [[id number]] Collected: 05/21/25 1539    Specimen: Blood, PICC Line Updated: 05/21/25 1543    Blood Gas, Arterial - [[id number]]  (Abnormal) Collected: 05/21/25 1530    Specimen: Arterial Blood Updated: 05/21/25 1532     Site Left Radial     Pipe's Test Positive     pH, Arterial 7.351 pH units      pCO2, Arterial 37.0 mm Hg      pO2, Arterial 80.1 mm Hg      HCO3, Arterial 20.5 mmol/L      Base Excess, Arterial -4.6 mmol/L      Comment: Serial Number: [id number]:  [id number]        O2 Saturation, Arterial 95.2 %      CO2 Content 21.6 mmol/L      Barometric Pressure for Blood Gas --     Comment: N/A        Modality Cannula     FIO2 32 %      Hemodilution Yes     PO2/FIO2 250             Imaging Results (Last 24 Hours)       Procedure Component Value Units Date/Time    CT Abdomen Pelvis Without Contrast [[id number]] Resulted: 05/22/25 1130     Updated: 05/22/25 1131    US Renal Bilateral [[id number]] Collected: 05/22/25 0259     Updated: 05/22/25 0304    Narrative:      US RENAL BILATERAL    Date of Exam: 5/22/2025 2:15 AM EDT    Indication:  worsening kidney function.  hydronephrosis.    Comparison: PET/CT 8/28/2024.    Technique: Grayscale and color Doppler ultrasound evaluation of the kidneys and urinary bladder was performed.      Findings:  The right kidney measures 11.1 x 5.7 x 5.3 cm and the left kidney measures 11.7 x 5.5 x 6.9 cm. Kidney vascularity appears grossly intact. There is moderate bilateral hydronephrosis. Kidney parenchyma is echogenic. There is no solid kidney mass.  No   echogenic shadowing stone.  No hydronephrosis.    The urinary bladder is markedly distended with a volume of 2163 mL. This is reportedly post void, but does appear unchanged from prior PET/CT performed 8/28/2024.      Impression:      Impression:  1.Moderate bilateral hydronephrosis.  2.Markedly distended urinary bladder.  3.Echogenic kidneys, which could indicate medical renal disease.            Electronically Signed: Emigdio Epps MD    5/22/2025 3:02 AM EDT    Workstation ID: [id number]    CT Chest Without Contrast Diagnostic [[id number]] Collected: 05/21/25 1757     Updated: 05/21/25 1816    Narrative:      CT CHEST WO CONTRAST DIAGNOSTIC    Date of Exam: 5/21/2025 5:48 PM EDT    Indication: PNA on XRAY, lung ca.    Comparison: Chest CT [address]. Chest radiograph 5/21/2025.    Technique: Axial CT images were obtained of the chest without contrast administration.  Sagittal and coronal reconstructions were performed.  Automated exposure control and iterative reconstruction methods were used.      Findings:    Thyroid and thoracic inlet: Similar mildly enlarged multinodular thyroid gland.    Lymph nodes: Interval increased size of mediastinal lymphadenopathy, for example a prevascular node measures 2 cm in short axis, which was previously subcentimeter in size. There is also suggestion of increased left hilar adenopathy, not well   characterized without intravenous contrast. Interval development of left axillary lymphadenopathy measuring up to 12 mm in short axis.  Interval development of left supraclavicular lymphadenopathy measuring up to 13 mm in short axis. Calcified mediastinal   and hilar nodes, likely sequelae of prior granulomatous disease.    Cardiovascular: Normal appearing heart size. Increased size of a small pericardial effusion. Aorta and main pulmonary artery diameters are within normal range.. There are coronary artery calcifications. Aortic atherosclerosis. Right PICC terminates in   the lower SVC.    Esophagus: No significant abnormality.    Lung parenchyma: Emphysema. Interval decrease interlobular septal thickening in the right lung with persistent peripheral linear opacities in the right lower lobe which could represent atelectasis and/or scarring. Superimposed lymphangitic carcinomatosis   is not entirely excluded. Similar-appearing masslike consolidation in the posterior basal right lower lobe. Interval development of interlobular septal thickening in the left lung. New perifissural nodular opacity in the left lower lobe measuring 10 mm   on series 5, image 64. Consolidative opacity in the left lower lobe.    Airways: Trace secretions in the trachea. Bronchial wall thickening.    Pleura: Interval removal of right Pleurx catheter since prior chest CT dated 11/1/2024. Decreased size of a small right pleural effusion. Interval development of a moderate left pleural effusion.    Chest wall and osseous structures: Degenerative changes of the imaged spine. No acute osseous abnormality.    Included abdomen: Partially imaged severe bilateral hydronephrosis. Cholelithiasis.      Impression:      Impression:  Compared to chest CT dated 11/1/2024, interval development of a moderate left pleural effusion, indeterminate for malignant pleural effusion, and interlobular septal thickening in the left lung which 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 versus   pulmonary edema.    Consolidative opacities in the left lower lobe and a perifissural nodular opacity in the  left lower lobe, which may be infectious/inflammatory or may represent atelectasis. Underlying malignancy is not excluded.    Interval increase in mediastinal adenopathy and interval development of left hilar, left axillary, left supraclavicular lymphadenopathy, suspicious for increased metastatic lymphadenopathy.    Interval decrease size of a small right pleural effusion. Interval decrease in interlobular septal thickening in the right lung with persistent peripheral linear opacities in the right lower lobe which could represent atelectasis and/or scarring.   Superimposed lymphangitic carcinomatosis is not entirely excluded.    Similar-appearing masslike consolidation in the posterior basal right lower lobe.    Partially imaged severe bilateral hydronephrosis        Electronically Signed: Dru Kruse MD    5/21/2025 6:14 PM EDT    Workstation ID: [id number]                 I reviewed the patient's new clinical results.    Medication Review:   Scheduled Meds:atorvastatin, 10 mg, Oral, Daily  cefTAZidime, 2,000 mg, Intravenous, Q24H  guaiFENesin, 1,200 mg, Oral, Q12H  ipratropium-albuterol, 3 mL, Nebulization, 4x Daily - RT  levothyroxine, 75 mcg, Oral, Q AM  methylPREDNISolone sodium succinate, 40 mg, Intravenous, Q12H  multivitamin with minerals, 1 tablet, Oral, QAM  pantoprazole, 40 mg, Oral, Daily  sodium chloride, 10 mL, Intravenous, Q12H  sotalol, 80 mg, Oral, BID  tamsulosin, 0.4 mg, Oral, Nightly      Continuous Infusions:sodium chloride, 75 mL/hr, Last Rate: 75 mL/hr (05/22/25 0009)      PRN Meds:.  acetaminophen    senna-docusate sodium **AND** polyethylene glycol **AND** bisacodyl **AND** bisacodyl    midodrine    nitroglycerin    ondansetron ODT **OR** ondansetron    sodium chloride    sodium chloride     Assessment & Plan       Pneumonia    Pleural effusion    Stage IV adenocarcinoma of lung, right    Stage IV adenocarcinoma of lung, left    CKD (chronic kidney disease) stage 4, GFR 15-29 ml/min     Hydronephrosis    Atrial fibrillation    Chronic anticoagulation    Hypoxia    Hypothyroidism (acquired)    Dysphagia    -Respiratory status improved with treatment of pneumonia  - Consulting GI for complaint of dysphagia, suspicious for esophageal obstruction  - Continue current dose of levothyroxine as it has been too soon to see full effect of recent dose change  - Currently in sinus rhythm; continue sotalol; holding anticoagulation until after decision is made about any procedures (bronchoscopy, EGD, etc.)  - Keytruda on hold.  - SCDs for DVT prophylaxis  - Noncontrasted abdominal CT to further evaluate hydronephrosis; continue IV fluids    CODE Status:    Code status (Patient has no pulse and is not breathing):  CPR (Attempt to Resuscitate)  Medical Interventions (Patient has pulse or is breathing):  Full support  Level of support discussed with:  Patient    Admission status:  I believe this patient meets inpatient status  Expected length of stay:  2 midnights or greater  I discussed the patient's findings and my recommendations with the patient.    Plan for disposition: To be determined    Evelyn Felder MD  05/22/25  11:42 EDT

## 2025-05-23 ENCOUNTER — TELEPHONE (OUTPATIENT)
Dept: ONCOLOGY | Facility: CLINIC | Age: 75
End: 2025-05-23
Payer: MEDICARE

## 2025-05-23 ENCOUNTER — APPOINTMENT (OUTPATIENT)
Dept: GENERAL RADIOLOGY | Facility: HOSPITAL | Age: 75
DRG: 178 | End: 2025-05-23
Payer: MEDICARE

## 2025-05-23 ENCOUNTER — INPATIENT HOSPITAL (OUTPATIENT)
Dept: URBAN - METROPOLITAN AREA HOSPITAL 84 | Facility: HOSPITAL | Age: 75
End: 2025-05-23
Payer: MEDICARE

## 2025-05-23 ENCOUNTER — ANESTHESIA (OUTPATIENT)
Dept: GASTROENTEROLOGY | Facility: HOSPITAL | Age: 75
End: 2025-05-23
Payer: MEDICARE

## 2025-05-23 DIAGNOSIS — R13.10 DYSPHAGIA, UNSPECIFIED: ICD-10-CM

## 2025-05-23 DIAGNOSIS — K44.9 DIAPHRAGMATIC HERNIA WITHOUT OBSTRUCTION OR GANGRENE: ICD-10-CM

## 2025-05-23 DIAGNOSIS — K21.9 GASTRO-ESOPHAGEAL REFLUX DISEASE WITHOUT ESOPHAGITIS: ICD-10-CM

## 2025-05-23 DIAGNOSIS — C15.5 MALIGNANT NEOPLASM OF LOWER THIRD OF ESOPHAGUS: ICD-10-CM

## 2025-05-23 LAB
ALBUMIN SERPL-MCNC: 3.6 G/DL (ref 3.5–5.2)
ANION GAP SERPL CALCULATED.3IONS-SCNC: 13.9 MMOL/L (ref 5–15)
B PARAPERT DNA SPEC QL NAA+PROBE: NOT DETECTED
B PERT DNA SPEC QL NAA+PROBE: NOT DETECTED
BASOPHILS # BLD AUTO: 0.02 10*3/MM3 (ref 0–0.2)
BASOPHILS NFR BLD AUTO: 0.1 % (ref 0–1.5)
BUN SERPL-MCNC: 39 MG/DL (ref 8–23)
BUN/CREAT SERPL: 17 (ref 7–25)
C PNEUM DNA NPH QL NAA+NON-PROBE: NOT DETECTED
CALCIUM SPEC-SCNC: 8.8 MG/DL (ref 8.6–10.5)
CHLORIDE SERPL-SCNC: 105 MMOL/L (ref 98–107)
CO2 SERPL-SCNC: 18.1 MMOL/L (ref 22–29)
CREAT SERPL-MCNC: 2.29 MG/DL (ref 0.76–1.27)
DEPRECATED RDW RBC AUTO: 53.6 FL (ref 37–54)
EGFRCR SERPLBLD CKD-EPI 2021: 29 ML/MIN/1.73
EOSINOPHIL # BLD AUTO: 0.01 10*3/MM3 (ref 0–0.4)
EOSINOPHIL NFR BLD AUTO: 0.1 % (ref 0.3–6.2)
ERYTHROCYTE [DISTWIDTH] IN BLOOD BY AUTOMATED COUNT: 15.6 % (ref 12.3–15.4)
FLUAV SUBTYP SPEC NAA+PROBE: NOT DETECTED
FLUBV RNA ISLT QL NAA+PROBE: NOT DETECTED
GLUCOSE BLDC GLUCOMTR-MCNC: 190 MG/DL (ref 70–105)
GLUCOSE BLDC GLUCOMTR-MCNC: 197 MG/DL (ref 70–105)
GLUCOSE SERPL-MCNC: 135 MG/DL (ref 65–99)
HADV DNA SPEC NAA+PROBE: NOT DETECTED
HCOV 229E RNA SPEC QL NAA+PROBE: NOT DETECTED
HCOV HKU1 RNA SPEC QL NAA+PROBE: NOT DETECTED
HCOV NL63 RNA SPEC QL NAA+PROBE: NOT DETECTED
HCOV OC43 RNA SPEC QL NAA+PROBE: NOT DETECTED
HCT VFR BLD AUTO: 26.6 % (ref 37.5–51)
HGB BLD-MCNC: 8.8 G/DL (ref 13–17.7)
HMPV RNA NPH QL NAA+NON-PROBE: NOT DETECTED
HPIV1 RNA ISLT QL NAA+PROBE: NOT DETECTED
HPIV2 RNA SPEC QL NAA+PROBE: NOT DETECTED
HPIV3 RNA NPH QL NAA+PROBE: NOT DETECTED
HPIV4 P GENE NPH QL NAA+PROBE: NOT DETECTED
IMM GRANULOCYTES # BLD AUTO: 0.12 10*3/MM3 (ref 0–0.05)
IMM GRANULOCYTES NFR BLD AUTO: 0.7 % (ref 0–0.5)
LYMPHOCYTES # BLD AUTO: 1.78 10*3/MM3 (ref 0.7–3.1)
LYMPHOCYTES NFR BLD AUTO: 10 % (ref 19.6–45.3)
M PNEUMO IGG SER IA-ACNC: NOT DETECTED
MAGNESIUM SERPL-MCNC: 2 MG/DL (ref 1.6–2.4)
MCH RBC QN AUTO: 31.1 PG (ref 26.6–33)
MCHC RBC AUTO-ENTMCNC: 33.1 G/DL (ref 31.5–35.7)
MCV RBC AUTO: 94 FL (ref 79–97)
MONOCYTES # BLD AUTO: 0.84 10*3/MM3 (ref 0.1–0.9)
MONOCYTES NFR BLD AUTO: 4.7 % (ref 5–12)
NEUTROPHILS NFR BLD AUTO: 15 10*3/MM3 (ref 1.7–7)
NEUTROPHILS NFR BLD AUTO: 84.4 % (ref 42.7–76)
NRBC BLD AUTO-RTO: 0 /100 WBC (ref 0–0.2)
PHOSPHATE SERPL-MCNC: 5.1 MG/DL (ref 2.5–4.5)
PLATELET # BLD AUTO: 254 10*3/MM3 (ref 140–450)
PMV BLD AUTO: 9.4 FL (ref 6–12)
POTASSIUM SERPL-SCNC: 4.7 MMOL/L (ref 3.5–5.2)
RBC # BLD AUTO: 2.83 10*6/MM3 (ref 4.14–5.8)
RHINOVIRUS RNA SPEC NAA+PROBE: NOT DETECTED
RSV RNA NPH QL NAA+NON-PROBE: NOT DETECTED
SARS-COV-2 RNA RESP QL NAA+PROBE: NOT DETECTED
SODIUM SERPL-SCNC: 137 MMOL/L (ref 136–145)
URATE SERPL-MCNC: 9.5 MG/DL (ref 3.4–7)
WBC NRBC COR # BLD AUTO: 17.77 10*3/MM3 (ref 3.4–10.8)

## 2025-05-23 PROCEDURE — 88305 TISSUE EXAM BY PATHOLOGIST: CPT | Performed by: INTERNAL MEDICINE

## 2025-05-23 PROCEDURE — 87102 FUNGUS ISOLATION CULTURE: CPT | Performed by: INTERNAL MEDICINE

## 2025-05-23 PROCEDURE — 94664 DEMO&/EVAL PT USE INHALER: CPT

## 2025-05-23 PROCEDURE — 87116 MYCOBACTERIA CULTURE: CPT | Performed by: INTERNAL MEDICINE

## 2025-05-23 PROCEDURE — 87798 DETECT AGENT NOS DNA AMP: CPT | Performed by: INTERNAL MEDICINE

## 2025-05-23 PROCEDURE — 0DB38ZX EXCISION OF LOWER ESOPHAGUS, VIA NATURAL OR ARTIFICIAL OPENING ENDOSCOPIC, DIAGNOSTIC: ICD-10-PCS | Performed by: INTERNAL MEDICINE

## 2025-05-23 PROCEDURE — 88334 PATH CONSLTJ SURG CYTO XM EA: CPT | Performed by: INTERNAL MEDICINE

## 2025-05-23 PROCEDURE — 80069 RENAL FUNCTION PANEL: CPT | Performed by: INTERNAL MEDICINE

## 2025-05-23 PROCEDURE — 94761 N-INVAS EAR/PLS OXIMETRY MLT: CPT

## 2025-05-23 PROCEDURE — 85025 COMPLETE CBC W/AUTO DIFF WBC: CPT

## 2025-05-23 PROCEDURE — 88341 IMHCHEM/IMCYTCHM EA ADD ANTB: CPT | Performed by: INTERNAL MEDICINE

## 2025-05-23 PROCEDURE — 0B9J8ZX DRAINAGE OF LEFT LOWER LUNG LOBE, VIA NATURAL OR ARTIFICIAL OPENING ENDOSCOPIC, DIAGNOSTIC: ICD-10-PCS | Performed by: INTERNAL MEDICINE

## 2025-05-23 PROCEDURE — 43239 EGD BIOPSY SINGLE/MULTIPLE: CPT | Performed by: INTERNAL MEDICINE

## 2025-05-23 PROCEDURE — 0202U NFCT DS 22 TRGT SARS-COV-2: CPT | Performed by: INTERNAL MEDICINE

## 2025-05-23 PROCEDURE — 25010000002 METHYLPREDNISOLONE PER 40 MG

## 2025-05-23 PROCEDURE — 0BDJ8ZX EXTRACTION OF LEFT LOWER LUNG LOBE, VIA NATURAL OR ARTIFICIAL OPENING ENDOSCOPIC, DIAGNOSTIC: ICD-10-PCS | Performed by: INTERNAL MEDICINE

## 2025-05-23 PROCEDURE — 25810000003 SODIUM CHLORIDE 0.9 % SOLUTION: Performed by: INTERNAL MEDICINE

## 2025-05-23 PROCEDURE — 94799 UNLISTED PULMONARY SVC/PX: CPT

## 2025-05-23 PROCEDURE — 88342 IMHCHEM/IMCYTCHM 1ST ANTB: CPT | Performed by: INTERNAL MEDICINE

## 2025-05-23 PROCEDURE — 25010000002 LIDOCAINE 1 % SOLUTION: Performed by: INTERNAL MEDICINE

## 2025-05-23 PROCEDURE — 25010000002 CEFTAZIDIME 2 G RECONSTITUTED SOLUTION 1 EACH VIAL: Performed by: INTERNAL MEDICINE

## 2025-05-23 PROCEDURE — 25010000002 METHYLPREDNISOLONE PER 40 MG: Performed by: INTERNAL MEDICINE

## 2025-05-23 PROCEDURE — 83735 ASSAY OF MAGNESIUM: CPT | Performed by: INTERNAL MEDICINE

## 2025-05-23 PROCEDURE — 87071 CULTURE AEROBIC QUANT OTHER: CPT | Performed by: INTERNAL MEDICINE

## 2025-05-23 PROCEDURE — 93005 ELECTROCARDIOGRAM TRACING: CPT | Performed by: INTERNAL MEDICINE

## 2025-05-23 PROCEDURE — 93010 ELECTROCARDIOGRAM REPORT: CPT | Performed by: INTERNAL MEDICINE

## 2025-05-23 PROCEDURE — 25010000002 PROPOFOL 10 MG/ML EMULSION: Performed by: NURSE ANESTHETIST, CERTIFIED REGISTERED

## 2025-05-23 PROCEDURE — 88333 PATH CONSLTJ SURG CYTO XM 1: CPT | Performed by: INTERNAL MEDICINE

## 2025-05-23 PROCEDURE — 87252 VIRUS INOCULATION TISSUE: CPT | Performed by: INTERNAL MEDICINE

## 2025-05-23 PROCEDURE — 25810000003 SODIUM CHLORIDE 0.9 % SOLUTION: Performed by: NURSE ANESTHETIST, CERTIFIED REGISTERED

## 2025-05-23 PROCEDURE — 25010000002 LIDOCAINE 2% SOLUTION: Performed by: NURSE ANESTHETIST, CERTIFIED REGISTERED

## 2025-05-23 PROCEDURE — 87206 SMEAR FLUORESCENT/ACID STAI: CPT | Performed by: INTERNAL MEDICINE

## 2025-05-23 PROCEDURE — 84550 ASSAY OF BLOOD/URIC ACID: CPT | Performed by: INTERNAL MEDICINE

## 2025-05-23 PROCEDURE — 25810000003 SODIUM CHLORIDE 0.9 % SOLUTION

## 2025-05-23 PROCEDURE — 82948 REAGENT STRIP/BLOOD GLUCOSE: CPT

## 2025-05-23 PROCEDURE — 71045 X-RAY EXAM CHEST 1 VIEW: CPT

## 2025-05-23 PROCEDURE — 43450 DILATE ESOPHAGUS 1/MULT PASS: CPT | Performed by: INTERNAL MEDICINE

## 2025-05-23 PROCEDURE — 88108 CYTOPATH CONCENTRATE TECH: CPT | Performed by: INTERNAL MEDICINE

## 2025-05-23 PROCEDURE — 99221 1ST HOSP IP/OBS SF/LOW 40: CPT | Performed by: INTERNAL MEDICINE

## 2025-05-23 RX ORDER — HYDRALAZINE HYDROCHLORIDE 20 MG/ML
5 INJECTION INTRAMUSCULAR; INTRAVENOUS
Status: DISCONTINUED | OUTPATIENT
Start: 2025-05-23 | End: 2025-05-23 | Stop reason: HOSPADM

## 2025-05-23 RX ORDER — ONDANSETRON 2 MG/ML
4 INJECTION INTRAMUSCULAR; INTRAVENOUS ONCE AS NEEDED
Status: DISCONTINUED | OUTPATIENT
Start: 2025-05-23 | End: 2025-05-23 | Stop reason: HOSPADM

## 2025-05-23 RX ORDER — EPHEDRINE SULFATE 5 MG/ML
5 INJECTION INTRAVENOUS ONCE AS NEEDED
Status: DISCONTINUED | OUTPATIENT
Start: 2025-05-23 | End: 2025-05-23 | Stop reason: HOSPADM

## 2025-05-23 RX ORDER — LIDOCAINE HYDROCHLORIDE 20 MG/ML
JELLY TOPICAL AS NEEDED
Status: DISCONTINUED | OUTPATIENT
Start: 2025-05-23 | End: 2025-05-23 | Stop reason: HOSPADM

## 2025-05-23 RX ORDER — LIDOCAINE HYDROCHLORIDE 10 MG/ML
INJECTION, SOLUTION INFILTRATION; PERINEURAL AS NEEDED
Status: DISCONTINUED | OUTPATIENT
Start: 2025-05-23 | End: 2025-05-23 | Stop reason: HOSPADM

## 2025-05-23 RX ORDER — DIPHENHYDRAMINE HYDROCHLORIDE 50 MG/ML
12.5 INJECTION, SOLUTION INTRAMUSCULAR; INTRAVENOUS
Status: DISCONTINUED | OUTPATIENT
Start: 2025-05-23 | End: 2025-05-23 | Stop reason: HOSPADM

## 2025-05-23 RX ORDER — SODIUM CHLORIDE 9 MG/ML
75 INJECTION, SOLUTION INTRAVENOUS CONTINUOUS
Status: DISCONTINUED | OUTPATIENT
Start: 2025-05-23 | End: 2025-05-24

## 2025-05-23 RX ORDER — IPRATROPIUM BROMIDE AND ALBUTEROL SULFATE 2.5; .5 MG/3ML; MG/3ML
3 SOLUTION RESPIRATORY (INHALATION) ONCE AS NEEDED
Status: DISCONTINUED | OUTPATIENT
Start: 2025-05-23 | End: 2025-05-23 | Stop reason: HOSPADM

## 2025-05-23 RX ORDER — MIDODRINE HYDROCHLORIDE 5 MG/1
5 TABLET ORAL ONCE
Status: COMPLETED | OUTPATIENT
Start: 2025-05-23 | End: 2025-05-23

## 2025-05-23 RX ORDER — SODIUM CHLORIDE 9 MG/ML
INJECTION, SOLUTION INTRAVENOUS CONTINUOUS PRN
Status: DISCONTINUED | OUTPATIENT
Start: 2025-05-23 | End: 2025-05-23 | Stop reason: SURG

## 2025-05-23 RX ORDER — SODIUM CHLORIDE 9 MG/ML
75 INJECTION, SOLUTION INTRAVENOUS CONTINUOUS
Status: DISCONTINUED | OUTPATIENT
Start: 2025-05-23 | End: 2025-05-23 | Stop reason: SDUPTHER

## 2025-05-23 RX ORDER — NICOTINE 21 MG/24HR
1 PATCH, TRANSDERMAL 24 HOURS TRANSDERMAL
Status: DISCONTINUED | OUTPATIENT
Start: 2025-05-23 | End: 2025-05-25 | Stop reason: HOSPADM

## 2025-05-23 RX ORDER — LABETALOL HYDROCHLORIDE 5 MG/ML
5 INJECTION, SOLUTION INTRAVENOUS
Status: DISCONTINUED | OUTPATIENT
Start: 2025-05-23 | End: 2025-05-23 | Stop reason: HOSPADM

## 2025-05-23 RX ORDER — MIDODRINE HYDROCHLORIDE 5 MG/1
5 TABLET ORAL
Status: DISCONTINUED | OUTPATIENT
Start: 2025-05-23 | End: 2025-05-24

## 2025-05-23 RX ORDER — LIDOCAINE HYDROCHLORIDE 20 MG/ML
INJECTION, SOLUTION INFILTRATION; PERINEURAL AS NEEDED
Status: DISCONTINUED | OUTPATIENT
Start: 2025-05-23 | End: 2025-05-23 | Stop reason: SURG

## 2025-05-23 RX ORDER — PROPOFOL 10 MG/ML
VIAL (ML) INTRAVENOUS AS NEEDED
Status: DISCONTINUED | OUTPATIENT
Start: 2025-05-23 | End: 2025-05-23 | Stop reason: SURG

## 2025-05-23 RX ADMIN — PROPOFOL 30 MG: 10 INJECTION, EMULSION INTRAVENOUS at 08:13

## 2025-05-23 RX ADMIN — GUAIFENESIN 1200 MG: 600 TABLET, EXTENDED RELEASE ORAL at 12:00

## 2025-05-23 RX ADMIN — SODIUM CHLORIDE 500 ML: 0.9 INJECTION, SOLUTION INTRAVENOUS at 01:38

## 2025-05-23 RX ADMIN — PROPOFOL 30 MG: 10 INJECTION, EMULSION INTRAVENOUS at 08:17

## 2025-05-23 RX ADMIN — MIDODRINE HYDROCHLORIDE 5 MG: 5 TABLET ORAL at 14:15

## 2025-05-23 RX ADMIN — PROPOFOL 30 MG: 10 INJECTION, EMULSION INTRAVENOUS at 08:01

## 2025-05-23 RX ADMIN — PROPOFOL 30 MG: 10 INJECTION, EMULSION INTRAVENOUS at 08:02

## 2025-05-23 RX ADMIN — Medication 10 ML: at 12:02

## 2025-05-23 RX ADMIN — ATORVASTATIN CALCIUM 10 MG: 10 TABLET ORAL at 12:00

## 2025-05-23 RX ADMIN — Medication 10 ML: at 21:07

## 2025-05-23 RX ADMIN — SODIUM CHLORIDE: 9 INJECTION, SOLUTION INTRAVENOUS at 07:53

## 2025-05-23 RX ADMIN — SODIUM CHLORIDE 75 ML/HR: 9 INJECTION, SOLUTION INTRAVENOUS at 00:38

## 2025-05-23 RX ADMIN — CEFTAZIDIME 2000 MG: 2 INJECTION, POWDER, FOR SOLUTION INTRAVENOUS at 17:02

## 2025-05-23 RX ADMIN — SOTALOL HYDROCHLORIDE 80 MG: 80 TABLET ORAL at 21:06

## 2025-05-23 RX ADMIN — GUAIFENESIN 1200 MG: 600 TABLET, EXTENDED RELEASE ORAL at 21:06

## 2025-05-23 RX ADMIN — SODIUM CHLORIDE 75 ML/HR: 9 INJECTION, SOLUTION INTRAVENOUS at 12:01

## 2025-05-23 RX ADMIN — IPRATROPIUM BROMIDE AND ALBUTEROL SULFATE 3 ML: .5; 3 SOLUTION RESPIRATORY (INHALATION) at 18:17

## 2025-05-23 RX ADMIN — IPRATROPIUM BROMIDE AND ALBUTEROL SULFATE 3 ML: .5; 3 SOLUTION RESPIRATORY (INHALATION) at 15:03

## 2025-05-23 RX ADMIN — MIDODRINE HYDROCHLORIDE 5 MG: 5 TABLET ORAL at 01:38

## 2025-05-23 RX ADMIN — METHYLPREDNISOLONE SODIUM SUCCINATE 40 MG: 40 INJECTION, POWDER, FOR SOLUTION INTRAMUSCULAR; INTRAVENOUS at 17:02

## 2025-05-23 RX ADMIN — PROPOFOL 30 MG: 10 INJECTION, EMULSION INTRAVENOUS at 08:15

## 2025-05-23 RX ADMIN — PROPOFOL 40 MG: 10 INJECTION, EMULSION INTRAVENOUS at 08:10

## 2025-05-23 RX ADMIN — SOTALOL HYDROCHLORIDE 80 MG: 80 TABLET ORAL at 12:00

## 2025-05-23 RX ADMIN — TAMSULOSIN HYDROCHLORIDE 0.4 MG: 0.4 CAPSULE ORAL at 21:06

## 2025-05-23 RX ADMIN — PANTOPRAZOLE SODIUM 40 MG: 40 TABLET, DELAYED RELEASE ORAL at 12:00

## 2025-05-23 RX ADMIN — PROPOFOL 30 MG: 10 INJECTION, EMULSION INTRAVENOUS at 08:19

## 2025-05-23 RX ADMIN — Medication 1 TABLET: at 12:00

## 2025-05-23 RX ADMIN — IPRATROPIUM BROMIDE AND ALBUTEROL SULFATE 3 ML: .5; 3 SOLUTION RESPIRATORY (INHALATION) at 11:13

## 2025-05-23 RX ADMIN — METHYLPREDNISOLONE SODIUM SUCCINATE 40 MG: 40 INJECTION, POWDER, FOR SOLUTION INTRAMUSCULAR; INTRAVENOUS at 06:02

## 2025-05-23 RX ADMIN — PROPOFOL 70 MG: 10 INJECTION, EMULSION INTRAVENOUS at 08:00

## 2025-05-23 RX ADMIN — MIDODRINE HYDROCHLORIDE 5 MG: 5 TABLET ORAL at 17:01

## 2025-05-23 RX ADMIN — LIDOCAINE HYDROCHLORIDE 100 MG: 20 INJECTION, SOLUTION INFILTRATION; PERINEURAL at 08:00

## 2025-05-23 NOTE — CONSULTS
Hematology/Oncology Inpatient Consultation    Patient name: Young Ames  : 1950  MRN: [id number]  Referring Provider: Sharona HENRY  Reason for Consultation: Lung Cancer on Keytruda     Chief complaint: cough, congestion, generalized weakness     History of present illness:    Young Ames is a 74 y.o. 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  He was having his chest port removed.  Interventional radiology on 2025 was found to be hypoxic.  Chest x-ray was taken and noted left-sided pneumonia.  Was placed on Fortaz and infectious diseases been consulted.  Gastroenterology was consulted for dysphagia, pulmonary has been consulted for possible bronchoscopy, urology has been consulted for hydronephrosis, nephrology has been consulted for elevated creatinine.  25  Hematology/Oncology was consulted .  He is a known patient in our office with adenocarcinoma of the right and left lung positive PDL-L1 expression.  He completed concurrent radiation and chemotherapy with carboplatin and paclitaxel.  Had been on Keytruda.  Present time of consultation show a creatinine of 2.29, WBC 17.77, hemoglobin 8.8 platelets 254,000    He/She  has a past medical history of Coronary artery disease and Myocardial infarction (2024).    PCP: Abner Funes APRN    History:  Past Medical History:   Diagnosis Date    Coronary artery disease     Myocardial infarction 2024   ,   Past Surgical History:   Procedure Laterality Date    BRONCHOSCOPY N/A 2024    Procedure: BRONCHOSCOPY WITH BRONCHIAL WASHING AND BRONCHIAL BRUSHING;  Surgeon: Kelvin Gonzalez MD;  Location: [facility] ENDOSCOPY;  Service: Pulmonary;  Laterality: N/A;     BRONCHOSCOPY WITH ION ROBOTIC ASSIST N/A 2024    Procedure: BRONCHOSCOPY WITH ION ROBOT WITH CRYO BIOPSY;  Surgeon: Kelvin Gonzalez MD;  Location: [facility] ENDOSCOPY;  Service: Robotics - Pulmonary;  Laterality: N/A;    CARDIAC CATHETERIZATION Right 2024    Procedure: Coronary angiography;  Surgeon: Abran Chand MD;  Location: [facility] CATH INVASIVE LOCATION;  Service: Cardiovascular;  Laterality: Right;    CARDIAC CATHETERIZATION N/A 2024    Procedure: Left Heart Cath;  Surgeon: Abran Chand MD;  Location: [facility] CATH INVASIVE LOCATION;  Service: Cardiovascular;  Laterality: N/A;   ,   Family History   Problem Relation Age of Onset    Dementia Mother     Breast cancer Sister 74   ,   Social History     Tobacco Use    Smoking status: Former     Current packs/day: 0.00     Average packs/day: 1 pack/day for 32.0 years (32.0 ttl pk-yrs)     Types: Cigarettes     Start date:      Quit date:      Years since quittin.4     Passive exposure: Past    Smokeless tobacco: Never   Vaping Use    Vaping status: Never Used   Substance Use Topics    Alcohol use: Yes     Alcohol/week: 1.0 standard drink of alcohol     Types: 1 Cans of beer per week    Drug use: Never   ,   Medications Prior to Admission   Medication Sig Dispense Refill Last Dose/Taking    apixaban (ELIQUIS) 5 MG tablet tablet Take 1 tablet by mouth Every 12 (Twelve) Hours. Indications: Atrial Fibrillation 60 tablet 0 Past Week    bacitracin 500 UNIT/GM ointment Apply 1 Application topically to the appropriate area as directed 2 (Two) Times a Day As Needed for Wound Care.   Past Week    Cholecalciferol (VITAMIN D-3 PO) Take 1 tablet by mouth Daily. Indications: Vitamin Deficiency   2025    guaiFENesin (Mucinex) 600 MG 12 hr tablet Take 1 tablet by mouth 2 (Two) Times a Day.   2025    levothyroxine (SYNTHROID, LEVOTHROID) 75 MCG tablet Take 1 tablet by mouth Every Morning. 30 tablet 1 2025    loratadine (Claritin) 10 MG tablet  Take 1 tablet by mouth Daily.   Taking    multivitamin with minerals (Centrum Silver 50+Men) tablet tablet Take 1 tablet by mouth Every Morning. Indications: Vitamin Deficiency   5/20/2025    NON FORMULARY Take 2 tablets by mouth Every Night. Zzzquil  Indications: Sleep Disorder   5/20/2025    pantoprazole (PROTONIX) 40 MG EC tablet Take 1 tablet by mouth Daily.   5/21/2025    simvastatin (ZOCOR) 20 MG tablet Take 1 tablet by mouth Every Night. Indications: High Amount of Fats in the Blood   5/20/2025    sotalol (BETAPACE) 80 MG tablet Take 1 tablet by mouth 2 (Two) Times a Day.   5/21/2025    tamsulosin (FLOMAX) 0.4 MG capsule 24 hr capsule Take 1 capsule by mouth Every Night. Indications: Benign Enlargement of Prostate   5/20/2025    thiamine (VITAMIN B-1) 100 MG tablet  tablet Take 1 tablet by mouth Daily. Indications: Vitamin Deficiency   5/20/2025    acetaminophen (TYLENOL) 325 MG tablet Take 2 tablets by mouth Every 4 (Four) Hours As Needed for Mild Pain.       ipratropium-albuterol (DUO-NEB) 0.5-2.5 mg/3 ml nebulizer Take 3 mL by nebulization Every 6 (Six) Hours As Needed for Wheezing.       midodrine (PROAMATINE) 5 MG tablet Take 1 tablet by mouth 3 (Three) Times a Day As Needed.      , Scheduled Meds:  atorvastatin, 10 mg, Oral, Daily  cefTAZidime, 2,000 mg, Intravenous, Q24H  guaiFENesin, 1,200 mg, Oral, Q12H  ipratropium-albuterol, 3 mL, Nebulization, 4x Daily - RT  levothyroxine, 75 mcg, Oral, Q AM  methylPREDNISolone sodium succinate, 40 mg, Intravenous, Q12H  midodrine, 5 mg, Oral, TID AC  multivitamin with minerals, 1 tablet, Oral, QAM  pantoprazole, 40 mg, Oral, Daily  sodium chloride, 10 mL, Intravenous, Q12H  sotalol, 80 mg, Oral, BID  tamsulosin, 0.4 mg, Oral, Nightly    , Continuous Infusions:  sodium chloride, 75 mL/hr, Last Rate: 75 mL/hr (05/23/25 1201)    , PRN Meds:    acetaminophen    senna-docusate sodium **AND** polyethylene glycol **AND** bisacodyl **AND** bisacodyl    midodrine     "nitroglycerin    ondansetron ODT **OR** ondansetron    sodium chloride    sodium chloride   Allergies:  Patient has no known allergies.    Subjective     ROS:  Review of Systems     Objective   Vital Signs:   BP 98/58 (BP Location: Left arm, Patient Position: Lying)   Pulse 70   Temp 97.8 °F (36.6 °C) (Oral)   Resp 18   Ht 180.3 cm (71\")   Wt 79.4 kg (175 lb 0.7 oz)   SpO2 93%   BMI 24.41 kg/m²     Physical Exam: (performed by MD)  Physical Exam    Results Review:  Lab Results (last 48 hours)       Procedure Component Value Units Date/Time    POC Glucose Once [[id number]]  (Abnormal) Collected: 05/23/25 1357    Specimen: Blood Updated: 05/23/25 1359     Glucose 190 mg/dL      Comment: Serial Number: [id number]:  [id number]       Respiratory Panel PCR w/COVID-19(SARS-CoV-2) SARAH/ELENI/NEY/PAD/COR/LUCRETIA In-House, NP Swab in UTM/VTM, 2 HR TAT - Lavage, Lung, Left Lower Lobe [[id number]]  (Normal) Collected: 05/23/25 0817    Specimen: Lavage from Lung, Left Lower Lobe Updated: 05/23/25 1102     ADENOVIRUS, PCR Not Detected     Coronavirus 229E Not Detected     Coronavirus HKU1 Not Detected     Coronavirus NL63 Not Detected     Coronavirus OC43 Not Detected     COVID19 Not Detected     Human Metapneumovirus Not Detected     Human Rhinovirus/Enterovirus Not Detected     Influenza A PCR Not Detected     Influenza B PCR Not Detected     Parainfluenza Virus 1 Not Detected     Parainfluenza Virus 2 Not Detected     Parainfluenza Virus 3 Not Detected     Parainfluenza Virus 4 Not Detected     RSV, PCR Not Detected     Bordetella pertussis pcr Not Detected     Bordetella parapertussis PCR Not Detected     Chlamydophila pneumoniae PCR Not Detected     Mycoplasma pneumo by PCR Not Detected    Narrative:      In the setting of a positive respiratory panel with a viral infection PLUS a negative procalcitonin without other underlying concern for bacterial infection, consider observing off antibiotics or discontinuation of " antibiotics and continue supportive care. If the respiratory panel is positive for atypical bacterial infection (Bordetella pertussis, Chlamydophila pneumoniae, or Mycoplasma pneumoniae), consider antibiotic de-escalation to target atypical bacterial infection.    Non-gynecologic Cytology [[id number]] Collected: 05/22/25 1223    Specimen: Pleural Fluid from Pleural Cavity Updated: 05/23/25 1012    Tissue Pathology Exam [[id number]] Collected: 05/23/25 0803    Specimen: Tissue from Esophagus, Distal; Tissue from Lung, Left Lower Lobe Updated: 05/23/25 1009     Case Report --     Surgical Pathology Report                         Case: [id number]                                Authorizing Provider:  Jason Dozier MD         Collected:           05/23/2025 08:03 AM        Ordering Location:     [facility]  Received:            05/23/2025 10:08 AM                               SUITES                                                                     Pathologist:           Jevon Hsu MD                                                           Intraop:               Al Reese MD                                                          Specimens:   1) - Esophagus, Distal, @39 cm r/o Barretts                                                       2) - Lung, Left Lower Lobe, touch prep x 1 and cell block                                 Intraoperative Consultation --     TouchPrep:   Positive for malignancy.  Positive for malignancy.    Initial cytology interpretation performed by Al Reese MD.        Non-gynecologic Cytology [[id number]] Collected: 05/23/25 0817    Specimen: Lavage from Lung, Left Lower Lobe Updated: 05/23/25 1006    Virus Culture - Lavage, Lung, Left Lower Lobe [[id number]] Collected: 05/23/25 0817    Specimen: Lavage from Lung, Left Lower Lobe Updated: 05/23/25 1002    Pneumocystis PCR - Lavage, Lung, Left Lower Lobe [[id number]] Collected: 05/23/25 0817    Specimen: Lavage from  Lung, Left Lower Lobe Updated: 05/23/25 1002    Fungus Culture - Lavage, Lung, Left Lower Lobe [[id number]] Collected: 05/23/25 0817    Specimen: Lavage from Lung, Left Lower Lobe Updated: 05/23/25 1002    AFB Culture - Lavage, Lung, Left Lower Lobe [[id number]] Collected: 05/23/25 0817    Specimen: Lavage from Lung, Left Lower Lobe Updated: 05/23/25 1002    BAL Culture, Quantitative - Lavage, Lung, Left Lower Lobe [[id number]] Collected: 05/23/25 0817    Specimen: Lavage from Lung, Left Lower Lobe Updated: 05/23/25 1002    Body Fluid Culture - Body Fluid, Pleural Cavity [[id number]] Collected: 05/22/25 1222    Specimen: Body Fluid from Pleural Cavity Updated: 05/23/25 0846     Body Fluid Culture No growth     Gram Stain Few (2+) WBCs per low power field      No organisms seen    Uric Acid [[id number]]  (Abnormal) Collected: 05/23/25 0608    Specimen: Blood Updated: 05/23/25 0823     Uric Acid 9.5 mg/dL     Renal Function Panel [[id number]]  (Abnormal) Collected: 05/23/25 0608    Specimen: Blood Updated: 05/23/25 0726     Glucose 135 mg/dL      BUN 39 mg/dL      Creatinine 2.29 mg/dL      Sodium 137 mmol/L      Potassium 4.7 mmol/L      Chloride 105 mmol/L      CO2 18.1 mmol/L      Calcium 8.8 mg/dL      Albumin 3.6 g/dL      Phosphorus 5.1 mg/dL      Anion Gap 13.9 mmol/L      BUN/Creatinine Ratio 17.0     eGFR 29.0 mL/min/1.73     Narrative:      GFR Categories in Chronic Kidney Disease (CKD)              GFR Category          GFR (mL/min/1.73)    Interpretation  G1                    90 or greater        Normal or high (1)  G2                    60-89                Mild decrease (1)  G3a                   45-59                Mild to moderate decrease  G3b                   30-44                Moderate to severe decrease  G4                    15-29                Severe decrease  G5                    14 or less           Kidney failure    (1)In the absence of evidence of kidney disease, neither GFR category G1 or  G2 fulfill the criteria for CKD.    eGFR calculation 2021 CKD-EPI creatinine equation, which does not include race as a factor    Magnesium [[id number]]  (Normal) Collected: 05/23/25 0608    Specimen: Blood Updated: 05/23/25 0726     Magnesium 2.0 mg/dL     CBC & Differential [[id number]]  (Abnormal) Collected: 05/23/25 0608    Specimen: Blood Updated: 05/23/25 0703    Narrative:      The following orders were created for panel order CBC & Differential.  Procedure                               Abnormality         Status                     ---------                               -----------         ------                     CBC Auto Differential[[id number]]        Abnormal            Final result                 Please view results for these tests on the individual orders.    CBC Auto Differential [[id number]]  (Abnormal) Collected: 05/23/25 0608    Specimen: Blood Updated: 05/23/25 0703     WBC 17.77 10*3/mm3      RBC 2.83 10*6/mm3      Hemoglobin 8.8 g/dL      Hematocrit 26.6 %      MCV 94.0 fL      MCH 31.1 pg      MCHC 33.1 g/dL      RDW 15.6 %      RDW-SD 53.6 fl      MPV 9.4 fL      Platelets 254 10*3/mm3      Neutrophil % 84.4 %      Lymphocyte % 10.0 %      Monocyte % 4.7 %      Eosinophil % 0.1 %      Basophil % 0.1 %      Immature Grans % 0.7 %      Neutrophils, Absolute 15.00 10*3/mm3      Lymphocytes, Absolute 1.78 10*3/mm3      Monocytes, Absolute 0.84 10*3/mm3      Eosinophils, Absolute 0.01 10*3/mm3      Basophils, Absolute 0.02 10*3/mm3      Immature Grans, Absolute 0.12 10*3/mm3      nRBC 0.0 /100 WBC     Lactate Dehydrogenase, Body Fluid - Body Fluid, Pleural Cavity [[id number]] Collected: 05/22/25 1222    Specimen: Body Fluid from Pleural Cavity Updated: 05/22/25 2003     Lactate Dehydrogenase (LD), Fluid 398 U/L     Narrative:      No Reference Ranges Established.    Serous fluid LDH greater than 60 percent of the serum LDH or serous fluid LDH two-thirds of the upper limit of normal for serum LDH  suggests the fluid is an exudate.     1. Pleural TP/Serum TP >0.5  2. Pleural LD/Serum LD >0.6  3. Pleural LD >2/3 of the upper limit of normal for serum LDH    This test was developed, it performance characteristics determined and judged suitable for clinical purposes by [facility].  It has not been cleared or approved by the FDA.  The laboratory is regulated under CLIA as qualified to perform high-complexity testing.     Glucose, Body Fluid - Pleural Fluid, Pleural Cavity [[id number]] Collected: 05/22/25 1223    Specimen: Pleural Fluid from Pleural Cavity Updated: 05/22/25 2002     Glucose, Fluid 177 mg/dL     Narrative:      No Reference Ranges Established.    Serous fluid glucose less than 60 mg/dL or less than 30 mg/dL below serum glucose suggests an infectious or malignant exudate.     This test was developed, it performance characteristics determined and judged suitable for clinical purposes by [facility].  It has not been cleared or approved by the FDA.  The laboratory is regulated under CLIA as qualified to perform high-complexity testing.     Protein, Body Fluid - Pleural Fluid, Pleural Cavity [[id number]] Collected: 05/22/25 1223    Specimen: Pleural Fluid from Pleural Cavity Updated: 05/22/25 2002     Protein, Total, Fluid 4.0 g/dL     Narrative:      No Reference Ranges Established.    A serous fluid total fluid (TP) greater than 50 percent of the serum TP suggests the fluid is an exudate.      1. Pleural TP/Serum TP >0.5  2. Pleural LD/Serum LD >0.6  3. Pleural LD >2/3 of the upper limit of normal for serum LDH    This test was developed, it performance characteristics determined and judged suitable for clinical purposes by [facility].  It has not been cleared or approved by the FDA.  The laboratory is regulated under CLIA as qualified to perform high-complexity testing.     Blood Culture - Blood, Arm, Right [[id number]]   (Normal) Collected: 05/21/25 1552    Specimen: Blood from Arm, Right Updated: 05/22/25 1600     Blood Culture No growth at 24 hours    Body Fluid Cell Count With Differential - Body Fluid, Pleural Cavity [[id number]]  (Abnormal) Collected: 05/22/25 1222    Specimen: Body Fluid from Pleural Cavity Updated: 05/22/25 1553    Narrative:      The following orders were created for panel order Body Fluid Cell Count With Differential - Body Fluid, Pleural Cavity.  Procedure                               Abnormality         Status                     ---------                               -----------         ------                     Body fluid cell count - ...[[id number]]  Abnormal            Final result               Body fluid differential ...[[id number]]                      Final result                 Please view results for these tests on the individual orders.    Body fluid differential - Body Fluid, Pleural Cavity [[id number]] Collected: 05/22/25 1222    Specimen: Body Fluid from Pleural Cavity Updated: 05/22/25 1553     Neutrophils, Fluid % 58 %      Lymphocytes, Fluid % 37 %      Monocytes, Fluid % 5 %     Narrative:      Concentrated Smear by Cytocentrifuge Prep.    Blood Culture - Blood, Blood, PICC Line [[id number]]  (Normal) Collected: 05/21/25 1539    Specimen: Blood, PICC Line Updated: 05/22/25 1545     Blood Culture No growth at 24 hours    Narrative:      Less than seven (7) mL's of blood was collected.  Insufficient quantity may yield false negative results.    Body fluid cell count - Body Fluid, Pleural Cavity [[id number]]  (Abnormal) Collected: 05/22/25 1222    Specimen: Body Fluid from Pleural Cavity Updated: 05/22/25 1504     Color, Fluid Red     Appearance, Fluid Turbid     Comment: Result checked          RBC, Fluid 31,000 /mm3      Nucleated Cells, Fluid 568 /mm3     Narrative:      No reference range established. Physician to interpret results with clinical findings.    pH, Body Fluid - Body Fluid,  Pleural Cavity [[id number]]  (Normal) Collected: 05/22/25 1222    Specimen: Body Fluid from Pleural Cavity Updated: 05/22/25 1442     pH, Fluid 7.50    POC Lactate [[id number]]  (Normal) Collected: 05/21/25 1544    Specimen: Blood Updated: 05/22/25 0949     Lactate 0.3 mmol/L      Comment: Serial Number: [id number]:  [id number]       POC Lactate [[id number]]  (Abnormal) Collected: 05/21/25 1542    Specimen: Blood Updated: 05/22/25 0949     Lactate <0.3 mmol/L      Comment: Serial Number: [id number]:  [id number]       Basic Metabolic Panel [[id number]]  (Abnormal) Collected: 05/22/25 0547    Specimen: Blood Updated: 05/22/25 0732     Glucose 148 mg/dL      BUN 33 mg/dL      Creatinine 2.34 mg/dL      Sodium 134 mmol/L      Potassium 4.9 mmol/L      Chloride 103 mmol/L      CO2 19.2 mmol/L      Calcium 8.6 mg/dL      BUN/Creatinine Ratio 14.1     Anion Gap 11.8 mmol/L      eGFR 28.3 mL/min/1.73     Narrative:      GFR Categories in Chronic Kidney Disease (CKD)              GFR Category          GFR (mL/min/1.73)    Interpretation  G1                    90 or greater        Normal or high (1)  G2                    60-89                Mild decrease (1)  G3a                   45-59                Mild to moderate decrease  G3b                   30-44                Moderate to severe decrease  G4                    15-29                Severe decrease  G5                    14 or less           Kidney failure    (1)In the absence of evidence of kidney disease, neither GFR category G1 or G2 fulfill the criteria for CKD.    eGFR calculation 2021 CKD-EPI creatinine equation, which does not include race as a factor    CBC & Differential [[id number]]  (Abnormal) Collected: 05/22/25 0547    Specimen: Blood Updated: 05/22/25 0720    Narrative:      The following orders were created for panel order CBC & Differential.  Procedure                               Abnormality         Status                     ---------                                -----------         ------                     CBC Auto Differential[[id number]]        Abnormal            Final result                 Please view results for these tests on the individual orders.    CBC Auto Differential [[id number]]  (Abnormal) Collected: 05/22/25 0547    Specimen: Blood Updated: 05/22/25 0720     WBC 13.46 10*3/mm3      RBC 3.10 10*6/mm3      Hemoglobin 9.5 g/dL      Comment: Result checked          Hematocrit 29.1 %      MCV 93.9 fL      MCH 30.6 pg      MCHC 32.6 g/dL      RDW 15.5 %      RDW-SD 53.4 fl      MPV 9.6 fL      Platelets 266 10*3/mm3      Neutrophil % 82.4 %      Lymphocyte % 12.3 %      Monocyte % 4.3 %      Eosinophil % 0.1 %      Basophil % 0.2 %      Immature Grans % 0.7 %      Neutrophils, Absolute 11.10 10*3/mm3      Lymphocytes, Absolute 1.65 10*3/mm3      Monocytes, Absolute 0.58 10*3/mm3      Eosinophils, Absolute 0.01 10*3/mm3      Basophils, Absolute 0.03 10*3/mm3      Immature Grans, Absolute 0.09 10*3/mm3      nRBC 0.0 /100 WBC     S. Pneumo Ag Urine or CSF - Urine, Urine, Clean Catch [[id number]]  (Normal) Collected: 05/22/25 0130    Specimen: Urine, Clean Catch Updated: 05/22/25 0241     Strep Pneumo Ag Negative    Legionella Antigen, Urine - Urine, Urine, Clean Catch [[id number]]  (Normal) Collected: 05/22/25 0130    Specimen: Urine, Clean Catch Updated: 05/22/25 0241     LEGIONELLA ANTIGEN, URINE Negative    MRSA Screen, PCR (Inpatient) - Swab, Nares [[id number]]  (Normal) Collected: 05/21/25 2300    Specimen: Swab from Nares Updated: 05/22/25 0112     MRSA PCR No MRSA Detected    Narrative:      The negative predictive value of this diagnostic test is high and should only be used to consider de-escalating anti-MRSA therapy. A positive result may indicate colonization with MRSA and must be correlated clinically.    TSH [[id number]]  (Abnormal) Collected: 05/21/25 1536    Specimen: Blood Updated: 05/21/25 1752     TSH 62.200 uIU/mL     T4, Free  [[id number]]  (Abnormal) Collected: 05/21/25 1536    Specimen: Blood Updated: 05/21/25 1752     Free T4 0.76 ng/dL     Comprehensive Metabolic Panel [[id number]]  (Abnormal) Collected: 05/21/25 1536    Specimen: Blood Updated: 05/21/25 1612     Glucose 106 mg/dL      BUN 29 mg/dL      Creatinine 2.36 mg/dL      Sodium 133 mmol/L      Potassium 4.4 mmol/L      Chloride 102 mmol/L      CO2 20.4 mmol/L      Calcium 8.7 mg/dL      Total Protein 6.2 g/dL      Albumin 3.7 g/dL      ALT (SGPT) 65 U/L      AST (SGOT) 76 U/L      Alkaline Phosphatase 60 U/L      Total Bilirubin 0.3 mg/dL      Globulin 2.5 gm/dL      A/G Ratio 1.5 g/dL      BUN/Creatinine Ratio 12.3     Anion Gap 10.6 mmol/L      eGFR 28.0 mL/min/1.73     Narrative:      GFR Categories in Chronic Kidney Disease (CKD)              GFR Category          GFR (mL/min/1.73)    Interpretation  G1                    90 or greater        Normal or high (1)  G2                    60-89                Mild decrease (1)  G3a                   45-59                Mild to moderate decrease  G3b                   30-44                Moderate to severe decrease  G4                    15-29                Severe decrease  G5                    14 or less           Kidney failure    (1)In the absence of evidence of kidney disease, neither GFR category G1 or G2 fulfill the criteria for CKD.    eGFR calculation 2021 CKD-EPI creatinine equation, which does not include race as a factor    Extra Tubes [[id number]] Collected: 05/21/25 1536    Specimen: Blood, Venous Line Updated: 05/21/25 1545    Narrative:      The following orders were created for panel order Extra Tubes.  Procedure                               Abnormality         Status                     ---------                               -----------         ------                     Lavender Top[[id number]]                                     Final result                 Please view results for these tests on the  individual orders.    Lavender Top [[id number]] Collected: 05/21/25 1536    Specimen: Blood Updated: 05/21/25 1545     Extra Tube hold for add-on     Comment: Auto resulted       Blood Gas, Arterial - [[id number]]  (Abnormal) Collected: 05/21/25 1530    Specimen: Arterial Blood Updated: 05/21/25 1532     Site Left Radial     Pipe's Test Positive     pH, Arterial 7.351 pH units      pCO2, Arterial 37.0 mm Hg      pO2, Arterial 80.1 mm Hg      HCO3, Arterial 20.5 mmol/L      Base Excess, Arterial -4.6 mmol/L      Comment: Serial Number: [id number]:  [id number]        O2 Saturation, Arterial 95.2 %      CO2 Content 21.6 mmol/L      Barometric Pressure for Blood Gas --     Comment: N/A        Modality Cannula     FIO2 32 %      Hemodilution Yes     PO2/FIO2 250             Pending Results:     Imaging Reviewed:   XR Chest 1 View  Result Date: 5/23/2025  Impression: 1. No pneumothorax status post lung biopsy. 2. Persistent bibasilar airspace disease and small bilateral pleural effusions. 3. Stable asymmetric interstitial thickening throughout the left lung. Electronically Signed: Antwon Estrella MD  5/23/2025 9:05 AM EDT  Workstation ID: [id number]    XR Chest 1 View  Result Date: 5/22/2025  Impression: 1. Improved left lung aeration since 5/21/2025. No significant pleural fluid is evident. 2. Interstitial thickening bilaterally, left greater than right may represent atypical distribution of edema 3. No visible pneumothorax status post thoracentesis. Electronically Signed: Mar Aly MD  5/22/2025 1:35 PM EDT  Workstation ID: [id number]    CT Abdomen Pelvis Without Contrast  Result Date: 5/22/2025  Impression: 1.Urinary bladder is markedly distended with urine. There is moderate bilateral hydroureteronephrosis. No urinary tract calculi are seen. Appearance is most compatible with urinary retention or bladder outlet obstruction. Patient would likely benefit from Rogers catheterization. 2.Moderate left and small right pleural  effusions with bibasilar opacities which may be related to atelectasis, infiltrates, or neoplastic process. Please correlate with chest CT from 5/21/2025. 3.Cholelithiasis. 4.Additional findings as detailed above. Electronically Signed: Shahab Haro MD  5/22/2025 11:50 AM EDT  Workstation ID: [id number]    US Renal Bilateral  Result Date: 5/22/2025  Impression: 1.Moderate bilateral hydronephrosis. 2.Markedly distended urinary bladder. 3.Echogenic kidneys, which could indicate medical renal disease. Electronically Signed: Emigdio Epps MD  5/22/2025 3:02 AM EDT  Workstation ID: [id number]    CT Chest Without Contrast Diagnostic  Result Date: 5/21/2025  Impression: Compared to chest CT dated 11/1/2024, interval development of a moderate left pleural effusion, indeterminate for malignant pleural effusion, and interlobular septal thickening in the left lung which 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 versus pulmonary edema. Consolidative opacities in the left lower lobe and a perifissural nodular opacity in the left lower lobe, which may be infectious/inflammatory or may represent atelectasis. Underlying malignancy is not excluded. Interval increase in mediastinal adenopathy and interval development of left hilar, left axillary, left supraclavicular lymphadenopathy, suspicious for increased metastatic lymphadenopathy. Interval decrease size of a small right pleural effusion. Interval decrease in interlobular septal thickening in the right lung with persistent peripheral linear opacities in the right lower lobe which could represent atelectasis and/or scarring. Superimposed lymphangitic carcinomatosis is not entirely excluded. Similar-appearing masslike consolidation in the posterior basal right lower lobe. Partially imaged severe bilateral hydronephrosis Electronically Signed: Dru Kruse MD  5/21/2025 6:14 PM EDT  Workstation ID: [id number]    IR Removal Tunnel CV With Port Different Site  Result Date:  5/21/2025  1. Uncomplicated removal, right chest wall Zibchq-g-Ooss. As mentioned above, the incision was sealed using Dermabond, while the skin ulceration/wound is set to heal via secondary intention. Dressing should be changed every 1 to 2 days using sterile technique. The patient is to return to Interventional Radiology for follow-up to evaluate for adequate healing. 2. Technically successful placement, 4 Japanese single lumen power injectable Cqovwb-j-Kptz utilizing the right upper extremity brachial vein approach with both sonographic and fluoroscopic guidance utilized. Electronically Signed: Darlene Wood MD  5/21/2025 3:29 PM EDT  Workstation ID: [id number]    IR PICC W Imaging Guidance  Result Date: 5/21/2025  1. Uncomplicated removal, right chest wall Dajzml-y-Iikk. As mentioned above, the incision was sealed using Dermabond, while the skin ulceration/wound is set to heal via secondary intention. Dressing should be changed every 1 to 2 days using sterile technique. The patient is to return to Interventional Radiology for follow-up to evaluate for adequate healing. 2. Technically successful placement, 4 Japanese single lumen power injectable Adogcf-g-Gqaq utilizing the right upper extremity brachial vein approach with both sonographic and fluoroscopic guidance utilized. Electronically Signed: Darlene Wood MD  5/21/2025 3:29 PM EDT  Workstation ID: [id number]    XR Chest 1 View  Result Date: 5/21/2025  Impression: 1. Extensive airspace disease throughout the left lung compatible with pneumonia. 2. Chronic airspace disease at the right lung base mildly improved from prior study. 3. Small bilateral pleural effusions. 4. Stable right port catheter. Electronically Signed: Antwon Estrella MD  5/21/2025 12:11 PM EDT  Workstation ID: [id number]           Assessment & Plan   ASSESSMENT  1.Left pleural effusion, consolidative opacities left lower lobe per radiology elevated WBC, pneumonia versus carcinomatosis, infectious  disease consulted for antibiotics, pulmonary consulted for possible bronchoscopy, supportive care per primary team  2.  Carcinoma of the right lung and left lung, completed XRT and chemotherapy was receiving Keytruda currently on hold for infection, await evaluation from bronchoscopy for carcinomatosis  3.Hydronephrosis urology consulted  4.  Dysphagia, gastroenterology consulted  5.  Acute renal insufficiency, nephrology consulted per primary team  6, Anemia, hemoglobin 8.8, multifactorial including neoplastic process on immunotherapy and current infection transfuse below 7.0 g/dL  7.  History of atrial fibrillation on Eliquis 5 mg p.o. twice daily, currently on hold for possible bronchoscopy    PLAN  As above    Electronically signed by Jessica Falk, CARL, 05/23/25, 3:09 PM EDT.    Above note was prepared for Dr. Arben Tim -physical exam and review of systems were performed by physician.     5/23/2025: I was asked to see Mr. Ames who has been a patient of mine since September 2025.  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  He has received treatment with carboplatin/pemetrexed/pembrolizumab.  Recently he has been on pembrolizumab only.  On May 22, 2025 he was at the hospital having explantation of a right port.  He was noted to be dyspneic and tachypneic and his oxygen saturation was down to 89%.  Chest x-ray revealed a left-sided pneumonia and he was sent to the emergency room.  Since yesterday he has been on ceftazidime and he feels much better.  Whereas yesterday he was dyspneic with minimal exertion, today he was able to get up and move around, even walking.  He has not been febrile but he had been coughing and expectorating some.  He had had no chest pains.  He was eating reasonably well and his weight had been within the same range.  On exam he is a well-built and slender man who is well-oriented and conversant.  He is in good spirits and does not seem  acutely ill.  He is neither jaundiced nor pale.  The lungs are markedly diminished bilaterally but rather clear.  He is heart is regular.  His abdomen is flat and soft and the liver and spleen are not enlarged.  There is no edema.  He had imaging studies of the chest.  As compared to previous scans he had experienced increase in mediastinal adenopathy and interval development of left hilar and left axillary as well as left supraclavicular lymphadenopathy that were suggested of malignant progression.  He underwent upper gastrointestinal endoscopy that revealed a short segment Schroeder's esophagus and then had bronchoscopy with biopsies of one of the lesions.  The final report of pathology is not yet available but the initial interpretation is of malignancy.  For now continue with antibiotics.  Await the final report of the biopsy to decide on subsequent treatment.  Discussed with him.  I reviewed the record with Ms.Essing Arnold HENRY and concur with her note. I formulated the analysis and the plans.     Arben Tim MD on 5/23/2025 at 16:53

## 2025-05-23 NOTE — PLAN OF CARE
Goal Outcome Evaluation:  Plan of Care Reviewed With: patient        Progress: no change          Pt currently resting abed. Pt hypotensive this shift MD contacted new orders placed for midodrine and 500ml bolus. VS now wnl. No distress noted. Cont plan of care.

## 2025-05-23 NOTE — CASE MANAGEMENT/SOCIAL WORK
Continued Stay Note  KATIE Marrero     Patient Name: Young Ames  MRN: [id number]  Today's Date: 5/23/2025    Admit Date: 5/21/2025    Plan: Return home with family. Watch for new home O2 needs.   Discharge Plan       Row Name 05/23/25 0167       Plan    Plan Comments DC barriers: elevated BUN/creat, elevated WBC, IV fluids, IV abx, EDG/bronch today, BAL cultures pending, esophageal biopsy results pending, FC anchored. Oncology, GI, urology, nephrology, pulm following.                 Nickie Wick RN     Office phone: [phone]  Office fax: [phone]

## 2025-05-23 NOTE — OP NOTE
ESOPHAGOGASTRODUODENOSCOPY Procedure Report    Patient Name:  Young Ames  YOB: 1950    Date of Surgery:  5/23/2025     Preop diagnosis:  Dysphagia  Gastroesophageal reflux disease    Postoperative diagnosis:  Short segment Schroeder's esophagus  Small hiatal hernia    TX ESOPHAGOGASTRODUODENOSCOPY TRANSORAL DIAGNOSTIC [[id number]]    Procedure(s):  BRONCHOSCOPY with Biopsy, Pathology and Cryo requested  ESOPHAGOGASTRODUODENOSCOPY    Staff:  Surgeon(s):  Carlos Calero MD Dairi, Youssef, MD      Anesthesia: Monitored Anesthesia Care    DNR status: Patient wishes full code during the procedure    Implants:    Nothing was implanted during the procedure    Specimen:        See Below    Estimated blood loss:   None    Complications:  None    Description of Procedure:  Informed consent was obtained for the procedure, including sedation.  Risks of perforation, hemorrhage, adverse drug reaction and aspiration were discussed.  The patient was brought into the endoscopy suite. Continuous cardiopulmonary monitoring was performed. The patient was placed in the left lateral decubitus position.  The bite block was inserted into the patient's mouth. After adequate sedation was attained, the Olympus gastroscope was inserted into the patient's mouth and advanced to the second portion of the duodenum without difficulty.  Circumferential examination was performed. A retroflex exam was performed in the patient's stomach.  Narrowband imaging was used to examine the distal esophagus and GE junction.  On completion of the exam, the bowel was decompressed, the scope was removed from the patient, the patient tolerated the procedure well, there were no immediate post-operative complications.     Examination of the esophagus: Excessive mucus and secretions in the esophagus suggesting poor motility.  Short segment Schroeder's noted from 38 to 39 cm.  3 cm hiatal hernia noted from 39 to 42 cm.  A 48 Salvadorean nonguided  bougie was advanced blindly through the esophagus with no resistance noted and removed.  Second look in the esophagus was normal.  Biopsies of the Schroeder's segment was performed to rule out dysplasia  Examination of the stomach: Normal  Examination of the duodenum: Normal    Impression:  EGD shows short segment Schroeder's esophagus and small hiatal hernia.  He may have some esophageal dysmotility as well.    Recommendations:  Follow-up on pathology  Repeat EGD in 5 years if medically appropriate  Pantoprazole 40 mg daily  Gastroesophageal reflux precautions  Follow-up with GI in the office in 2 months  GI will see as needed while hospitalized, please call with questions    We appreciate the referral    Electronically signed by Carlos Calero MD, 05/23/25, 8:09 AM EDT.

## 2025-05-23 NOTE — PROGRESS NOTES
Daily Progress Note          Assessment    Pneumonia due to unspecified pathogen: Negative nasal MRSA probe, urinary antigen for Legionella and Streptococcus pneumonia  Left pleural effusion  New left lower lobe opacity with perifissural nodular opacity  Increased mediastinal adenopathy with increased lymph nodes in the left axilla, left axillary and left supraclavicular  Hypoxia     Stage IV right lung cancer status post concomitant radiation chemotherapy, diagnosed on bronchoscopy August 2024  CAD  CKD  Chronic A-fib on chronic anticoagulation Eliquis  Hypothyroidism  Chronic anemia     Former smoker: Quit 2000 after 32 pack years           Recommendations:  Status post bedside left thoracentesis 5/22/2025 with removal of 950 mL of serosanguineous fluid     Bronchoscopy today with plans for cultures and biopsy to rule out infections or lymphangitic carcinomatosis      antibiotics: Ceftazidime     IV Solu-Medrol  Mucinex  Oxygen supplement and titration to maintain saturation 90 to 95%: Currently on 2 L per nasal cannula  Bronchodilators     Atorvastatin, sotalol     Thyroid replacement: Levothyroxine  Protonix     I personally reviewed the radiological studies             LOS: 2 days     Subjective     Cough and shortness of breath    Objective     Vital signs for last 24 hours:  Vitals:    05/22/25 2022 05/22/25 2358 05/23/25 0600 05/23/25 0748   BP:  (!) 88/51 98/62 107/55   BP Location:  Left arm Left arm Left arm   Patient Position:  Lying Lying Lying   Pulse: 79 65 65 70   Resp: 13 20 19 18   Temp:  97.5 °F (36.4 °C) 98.2 °F (36.8 °C) 98.1 °F (36.7 °C)   TempSrc:  Oral Oral Oral   SpO2: 94% 91% 94% 94%   Weight:       Height:           Intake/Output last 3 shifts:  I/O last 3 completed shifts:  In: 360 [P.O.:360]  Out: 3625 [Urine:3625]  Intake/Output this shift:  No intake/output data recorded.      Radiology  Imaging Results (Last 24 Hours)       Procedure Component Value Units Date/Time    XR Chest 1 View  [[id number]] Collected: 05/22/25 1333     Updated: 05/22/25 1337    Narrative:      XR CHEST 1 VW    Date of Exam: 5/22/2025 12:30 PM EDT    Indication: Thoracentesis    Comparison: CT chest 5/21/2025. AP chest 5/21/2025    Findings:  Interstitial thickening is present bilaterally, asymmetrically more prominent on the left. Aeration in the left lung appears improved. Suspected trace fluid within the minor fissure of the right lung. Right arm approach PICC tip terminates at the   cavoatrial junction. Stable mild cardiac enlargement. No pneumothorax. No significant pleural fluid is identified      Impression:      Impression:    1. Improved left lung aeration since 5/21/2025. No significant pleural fluid is evident.  2. Interstitial thickening bilaterally, left greater than right may represent atypical distribution of edema  3. No visible pneumothorax status post thoracentesis.      Electronically Signed: Mar Aly MD    5/22/2025 1:35 PM EDT    Workstation ID: [id number]    CT Abdomen Pelvis Without Contrast [[id number]] Collected: 05/22/25 1143     Updated: 05/22/25 1152    Narrative:      CT ABDOMEN PELVIS WO CONTRAST    Date of Exam: 5/22/2025 11:30 AM EDT    Indication: hydronephrosis.    Comparison: Renal ultrasound from today, PET/CT dated 8/28/2024    Technique: Axial CT images were obtained of the abdomen and pelvis without the administration of contrast. Sagittal and coronal reconstructions were performed.  Automated exposure control and iterative reconstruction methods were used.    Findings:    Liver: The liver is unremarkable in morphology. Evaluation for focal liver lesions is limited without IV contrast. No biliary dilation is seen.    Gallbladder: Cholelithiasis.    Pancreas: Unremarkable.    Spleen: Unremarkable.    Adrenal glands: Unremarkable.    Genitourinary tract: Urinary bladder is markedly distended with urine. There is moderate bilateral hydroureteronephrosis. No urinary tract calculi are  seen. Appearance is most compatible with urinary retention or bladder outlet obstruction. Patient would   likely benefit from Rogers catheterization. Prostate gland is prominent.    Gastrointestinal tract: Limited evaluation of the hollow viscera due to lack of IV contrast administration. No findings to suggest bowel obstruction.    Appendix: No findings to suggest acute appendicitis.    Other findings: No free air or free fluid is identified. No pathologically enlarged lymph nodes are seen. Vascular calcifications are present.    Bones and soft tissues: No acute or suspicious osseous or soft tissue lesion is identified.    Lung bases: Moderate left and small right pleural effusions with bibasilar opacities which may be related to atelectasis, infiltrates, or neoplastic process. Please correlate with chest CT from 5/21/2025.      Impression:      Impression:  1.Urinary bladder is markedly distended with urine. There is moderate bilateral hydroureteronephrosis. No urinary tract calculi are seen. Appearance is most compatible with urinary retention or bladder outlet obstruction. Patient would likely benefit   from Rogers catheterization.  2.Moderate left and small right pleural effusions with bibasilar opacities which may be related to atelectasis, infiltrates, or neoplastic process. Please correlate with chest CT from 5/21/2025.  3.Cholelithiasis.  4.Additional findings as detailed above.       Electronically Signed: Shahab Haro MD    5/22/2025 11:50 AM EDT    Workstation ID: [id number]            Labs:  Results from last 7 days   Lab Units 05/23/25  0608   WBC 10*3/mm3 17.77*   HEMOGLOBIN g/dL 8.8*   HEMATOCRIT % 26.6*   PLATELETS 10*3/mm3 254     Results from last 7 days   Lab Units 05/23/25  0608 05/22/25  0547 05/21/25  1536   SODIUM mmol/L 137   < > 133*   POTASSIUM mmol/L 4.7   < > 4.4   CHLORIDE mmol/L 105   < > 102   CO2 mmol/L 18.1*   < > 20.4*   BUN mg/dL 39*   < > 29*   CREATININE mg/dL 2.29*   < > 2.36*    CALCIUM mg/dL 8.8   < > 8.7   BILIRUBIN mg/dL  --   --  0.3   ALK PHOS U/L  --   --  60   ALT (SGPT) U/L  --   --  65*   AST (SGOT) U/L  --   --  76*   GLUCOSE mg/dL 135*   < > 106*    < > = values in this interval not displayed.     Results from last 7 days   Lab Units 05/21/25  1530   PH, ARTERIAL pH units 7.351   PO2 ART mm Hg 80.1*   PCO2, ARTERIAL mm Hg 37.0   HCO3 ART mmol/L 20.5*     Results from last 7 days   Lab Units 05/23/25  0608 05/21/25  1536   ALBUMIN g/dL 3.6 3.7             Results from last 7 days   Lab Units 05/23/25  0608   MAGNESIUM mg/dL 2.0     Results from last 7 days   Lab Units 05/21/25  1104   INR  1.18*   APTT seconds 29.2     Results from last 7 days   Lab Units 05/21/25  1536   TSH uIU/mL 62.200*   FREE T4 ng/dL 0.76*           Meds:   SCHEDULE  atorvastatin, 10 mg, Oral, Daily  cefTAZidime, 2,000 mg, Intravenous, Q24H  guaiFENesin, 1,200 mg, Oral, Q12H  ipratropium-albuterol, 3 mL, Nebulization, 4x Daily - RT  levothyroxine, 75 mcg, Oral, Q AM  methylPREDNISolone sodium succinate, 40 mg, Intravenous, Q12H  multivitamin with minerals, 1 tablet, Oral, QAM  pantoprazole, 40 mg, Oral, Daily  sodium chloride, 10 mL, Intravenous, Q12H  sotalol, 80 mg, Oral, BID  tamsulosin, 0.4 mg, Oral, Nightly      Infusions  sodium chloride, 75 mL/hr, Last Rate: Stopped (05/23/25 0713)      PRNs    acetaminophen    senna-docusate sodium **AND** polyethylene glycol **AND** bisacodyl **AND** bisacodyl    midodrine    nitroglycerin    ondansetron ODT **OR** ondansetron    sodium chloride    sodium chloride    Physical Exam:  General Appearance:  Alert   HEENT:  Normocephalic, without obvious abnormality, Conjunctiva/corneas clear,.   Nares normal, no drainage     Neck:  Supple, symmetrical, trachea midline.   Lungs /Chest wall:   Bilateral basal rhonchi, respirations unlabored, symmetrical wall movement.     Heart:  Regular rate and rhythm, S1 S2 normal  Abdomen: Soft, non-tender, no masses, no  organomegaly.    Extremities: No edema, no clubbing or cyanosis     ROS  Constitutional: Negative for chills, fever and malaise/fatigue.   HENT: Negative.    Eyes: Negative.    Cardiovascular: Negative.    Respiratory: Positive for cough and shortness of breath.    Skin: Negative.    Musculoskeletal: Negative.    Gastrointestinal: Negative.    Genitourinary: Negative.    Neurological: Negative.    Psychiatric/Behavioral: Negative.      I reviewed the recent clinical results  I personally reviewed the latest radiological studies    Part of this note may be an electronic transcription/translation of spoken language to printed text using the Dragon Dictation System.

## 2025-05-23 NOTE — PROGRESS NOTES
"NEPHROLOGY PROGRESS NOTE------[facility]    [facility]  [phone]  Aman Gregory MD      Patient Care Team:  Abner Funes APRN as PCP - General (Family Medicine)  Brittany Canchola, RN as Nurse Navigator  Arben Tim MD as Consulting Physician (Hematology and Oncology)  Aman Gregory MD as Consulting Physician (Nephrology)      Provider:  Aman Gregory MD  Patient Name: Young Ames  :  1950    SUBJECTIVE:  Follow-up MARIA DEL ROSARIO/CKD  No chest pain or shortness of breath  Rogers in place  Seen by urologist    Medication:  atorvastatin, 10 mg, Oral, Daily  cefTAZidime, 2,000 mg, Intravenous, Q24H  guaiFENesin, 1,200 mg, Oral, Q12H  ipratropium-albuterol, 3 mL, Nebulization, 4x Daily - RT  levothyroxine, 75 mcg, Oral, Q AM  methylPREDNISolone sodium succinate, 40 mg, Intravenous, Q12H  multivitamin with minerals, 1 tablet, Oral, QAM  pantoprazole, 40 mg, Oral, Daily  sodium chloride, 10 mL, Intravenous, Q12H  sotalol, 80 mg, Oral, BID  tamsulosin, 0.4 mg, Oral, Nightly      sodium chloride, 75 mL/hr, Last Rate: 75 mL/hr (25 0038)        OBJECTIVE    Vital Sign Min/Max for last 24 hours  Temp  Min: 97.5 °F (36.4 °C)  Max: 98.2 °F (36.8 °C)   BP  Min: 88/51  Max: 110/78   Pulse  Min: 65  Max: 82   Resp  Min: 12  Max: 20   SpO2  Min: 91 %  Max: 98 %   No data recorded   No data recorded     Flowsheet Rows      Flowsheet Row First Filed Value   Admission Height 177.8 cm (70\") Documented at 2025 1501   Admission Weight 80.3 kg (177 lb 0.5 oz) Documented at 2025 1501            I/O this shift:  In: -   Out: 500 [Urine:500]  I/O last 3 completed shifts:  In: 460 [P.O.:360; IV Piggyback:100]  Out: 2500 [Urine:2500]    Physical Exam:  General Appearance: alert, appears stated age and cooperative  Head: normocephalic, without obvious abnormality and atraumatic  Eyes: conjunctivae and sclerae normal and no icterus  Neck: supple and no " "JVD  Lungs: clear to auscultation and respirations regular  Heart: regular rhythm & normal rate and normal S1, S2  Chest: Wall no abnormalities observed  Abdomen: normal bowel sounds and soft, nontender  Extremities: moves extremities well, no edema, no cyanosis and no redness  Skin: no bleeding, bruising or rash, turgor normal, color normal and no lesions noted  Neurologic: Alert, and oriented. No focal deficits    Labs:    WBC WBC   Date Value Ref Range Status   05/22/2025 13.46 (H) 3.40 - 10.80 10*3/mm3 Final   05/21/2025 16.00 (H) 3.40 - 10.80 10*3/mm3 Final      HGB Hemoglobin   Date Value Ref Range Status   05/22/2025 9.5 (L) 13.0 - 17.7 g/dL Final     Comment:     Result checked     05/21/2025 11.5 (L) 13.0 - 17.7 g/dL Final      HCT Hematocrit   Date Value Ref Range Status   05/22/2025 29.1 (L) 37.5 - 51.0 % Final   05/21/2025 35.6 (L) 37.5 - 51.0 % Final      Platelets No results found for: \"LABPLAT\"   MCV MCV   Date Value Ref Range Status   05/22/2025 93.9 79.0 - 97.0 fL Final   05/21/2025 93.2 79.0 - 97.0 fL Final          Sodium Sodium   Date Value Ref Range Status   05/22/2025 134 (L) 136 - 145 mmol/L Final   05/21/2025 133 (L) 136 - 145 mmol/L Final      Potassium Potassium   Date Value Ref Range Status   05/22/2025 4.9 3.5 - 5.2 mmol/L Final   05/21/2025 4.4 3.5 - 5.2 mmol/L Final      Chloride Chloride   Date Value Ref Range Status   05/22/2025 103 98 - 107 mmol/L Final   05/21/2025 102 98 - 107 mmol/L Final      CO2 CO2   Date Value Ref Range Status   05/22/2025 19.2 (L) 22.0 - 29.0 mmol/L Final   05/21/2025 20.4 (L) 22.0 - 29.0 mmol/L Final      BUN BUN   Date Value Ref Range Status   05/22/2025 33 (H) 8 - 23 mg/dL Final   05/21/2025 29 (H) 8 - 23 mg/dL Final      Creatinine Creatinine   Date Value Ref Range Status   05/22/2025 2.34 (H) 0.76 - 1.27 mg/dL Final   05/21/2025 2.36 (H) 0.76 - 1.27 mg/dL Final      Calcium Calcium   Date Value Ref Range Status   05/22/2025 8.6 8.6 - 10.5 mg/dL Final " "  05/21/2025 8.7 8.6 - 10.5 mg/dL Final      PO4 No components found for: \"PO4\"   Albumin Albumin   Date Value Ref Range Status   05/21/2025 3.7 3.5 - 5.2 g/dL Final      Magnesium No results found for: \"MG\"   Uric Acid No components found for: \"URIC ACID\"     Imaging Results (Last 72 Hours)       Procedure Component Value Units Date/Time    XR Chest 1 View [[id number]] Collected: 05/22/25 1333     Updated: 05/22/25 1337    Narrative:      XR CHEST 1 VW    Date of Exam: 5/22/2025 12:30 PM EDT    Indication: Thoracentesis    Comparison: CT chest 5/21/2025. AP chest 5/21/2025    Findings:  Interstitial thickening is present bilaterally, asymmetrically more prominent on the left. Aeration in the left lung appears improved. Suspected trace fluid within the minor fissure of the right lung. Right arm approach PICC tip terminates at the   cavoatrial junction. Stable mild cardiac enlargement. No pneumothorax. No significant pleural fluid is identified      Impression:      Impression:    1. Improved left lung aeration since 5/21/2025. No significant pleural fluid is evident.  2. Interstitial thickening bilaterally, left greater than right may represent atypical distribution of edema  3. No visible pneumothorax status post thoracentesis.      Electronically Signed: Mar Aly MD    5/22/2025 1:35 PM EDT    Workstation ID: [id number]    CT Abdomen Pelvis Without Contrast [[id number]] Collected: 05/22/25 1143     Updated: 05/22/25 1152    Narrative:      CT ABDOMEN PELVIS WO CONTRAST    Date of Exam: 5/22/2025 11:30 AM EDT    Indication: hydronephrosis.    Comparison: Renal ultrasound from today, PET/CT dated 8/28/2024    Technique: Axial CT images were obtained of the abdomen and pelvis without the administration of contrast. Sagittal and coronal reconstructions were performed.  Automated exposure control and iterative reconstruction methods were used.    Findings:    Liver: The liver is unremarkable in morphology. Evaluation " for focal liver lesions is limited without IV contrast. No biliary dilation is seen.    Gallbladder: Cholelithiasis.    Pancreas: Unremarkable.    Spleen: Unremarkable.    Adrenal glands: Unremarkable.    Genitourinary tract: Urinary bladder is markedly distended with urine. There is moderate bilateral hydroureteronephrosis. No urinary tract calculi are seen. Appearance is most compatible with urinary retention or bladder outlet obstruction. Patient would   likely benefit from Rogers catheterization. Prostate gland is prominent.    Gastrointestinal tract: Limited evaluation of the hollow viscera due to lack of IV contrast administration. No findings to suggest bowel obstruction.    Appendix: No findings to suggest acute appendicitis.    Other findings: No free air or free fluid is identified. No pathologically enlarged lymph nodes are seen. Vascular calcifications are present.    Bones and soft tissues: No acute or suspicious osseous or soft tissue lesion is identified.    Lung bases: Moderate left and small right pleural effusions with bibasilar opacities which may be related to atelectasis, infiltrates, or neoplastic process. Please correlate with chest CT from 5/21/2025.      Impression:      Impression:  1.Urinary bladder is markedly distended with urine. There is moderate bilateral hydroureteronephrosis. No urinary tract calculi are seen. Appearance is most compatible with urinary retention or bladder outlet obstruction. Patient would likely benefit   from Rogers catheterization.  2.Moderate left and small right pleural effusions with bibasilar opacities which may be related to atelectasis, infiltrates, or neoplastic process. Please correlate with chest CT from 5/21/2025.  3.Cholelithiasis.  4.Additional findings as detailed above.       Electronically Signed: Shahab Haro MD    5/22/2025 11:50 AM EDT    Workstation ID: [id number]    US Renal Bilateral [[id number]] Collected: 05/22/25 0259     Updated: 05/22/25  0304    Narrative:      US RENAL BILATERAL    Date of Exam: 5/22/2025 2:15 AM EDT    Indication: worsening kidney function.  hydronephrosis.    Comparison: PET/CT 8/28/2024.    Technique: Grayscale and color Doppler ultrasound evaluation of the kidneys and urinary bladder was performed.      Findings:  The right kidney measures 11.1 x 5.7 x 5.3 cm and the left kidney measures 11.7 x 5.5 x 6.9 cm. Kidney vascularity appears grossly intact. There is moderate bilateral hydronephrosis. Kidney parenchyma is echogenic. There is no solid kidney mass.  No   echogenic shadowing stone.  No hydronephrosis.    The urinary bladder is markedly distended with a volume of 2163 mL. This is reportedly post void, but does appear unchanged from prior PET/CT performed 8/28/2024.      Impression:      Impression:  1.Moderate bilateral hydronephrosis.  2.Markedly distended urinary bladder.  3.Echogenic kidneys, which could indicate medical renal disease.            Electronically Signed: Emigdio Epps MD    5/22/2025 3:02 AM EDT    Workstation ID: [id number]    CT Chest Without Contrast Diagnostic [[id number]] Collected: 05/21/25 1757     Updated: 05/21/25 1816    Narrative:      CT CHEST WO CONTRAST DIAGNOSTIC    Date of Exam: 5/21/2025 5:48 PM EDT    Indication: PNA on XRAY, lung ca.    Comparison: Chest CT [id number]. Chest radiograph 5/21/2025.    Technique: Axial CT images were obtained of the chest without contrast administration.  Sagittal and coronal reconstructions were performed.  Automated exposure control and iterative reconstruction methods were used.      Findings:    Thyroid and thoracic inlet: Similar mildly enlarged multinodular thyroid gland.    Lymph nodes: Interval increased size of mediastinal lymphadenopathy, for example a prevascular node measures 2 cm in short axis, which was previously subcentimeter in size. There is also suggestion of increased left hilar adenopathy, not well   characterized without intravenous  contrast. Interval development of left axillary lymphadenopathy measuring up to 12 mm in short axis. Interval development of left supraclavicular lymphadenopathy measuring up to 13 mm in short axis. Calcified mediastinal   and hilar nodes, likely sequelae of prior granulomatous disease.    Cardiovascular: Normal appearing heart size. Increased size of a small pericardial effusion. Aorta and main pulmonary artery diameters are within normal range.. There are coronary artery calcifications. Aortic atherosclerosis. Right PICC terminates in   the lower SVC.    Esophagus: No significant abnormality.    Lung parenchyma: Emphysema. Interval decrease interlobular septal thickening in the right lung with persistent peripheral linear opacities in the right lower lobe which could represent atelectasis and/or scarring. Superimposed lymphangitic carcinomatosis   is not entirely excluded. Similar-appearing masslike consolidation in the posterior basal right lower lobe. Interval development of interlobular septal thickening in the left lung. New perifissural nodular opacity in the left lower lobe measuring 10 mm   on series 5, image 64. Consolidative opacity in the left lower lobe.    Airways: Trace secretions in the trachea. Bronchial wall thickening.    Pleura: Interval removal of right Pleurx catheter since prior chest CT dated 11/1/2024. Decreased size of a small right pleural effusion. Interval development of a moderate left pleural effusion.    Chest wall and osseous structures: Degenerative changes of the imaged spine. No acute osseous abnormality.    Included abdomen: Partially imaged severe bilateral hydronephrosis. Cholelithiasis.      Impression:      Impression:  Compared to chest CT dated 11/1/2024, interval development of a moderate left pleural effusion, indeterminate for malignant pleural effusion, and interlobular septal thickening in the left lung which 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 versus   pulmonary  edema.    Consolidative opacities in the left lower lobe and a perifissural nodular opacity in the left lower lobe, which may be infectious/inflammatory or may represent atelectasis. Underlying malignancy is not excluded.    Interval increase in mediastinal adenopathy and interval development of left hilar, left axillary, left supraclavicular lymphadenopathy, suspicious for increased metastatic lymphadenopathy.    Interval decrease size of a small right pleural effusion. Interval decrease in interlobular septal thickening in the right lung with persistent peripheral linear opacities in the right lower lobe which could represent atelectasis and/or scarring.   Superimposed lymphangitic carcinomatosis is not entirely excluded.    Similar-appearing masslike consolidation in the posterior basal right lower lobe.    Partially imaged severe bilateral hydronephrosis        Electronically Signed: Dru Kruse MD    5/21/2025 6:14 PM EDT    Workstation ID: [id number]            Results for orders placed during the hospital encounter of 05/21/25    XR Chest 1 View    Narrative  XR CHEST 1 VW    Date of Exam: 5/22/2025 12:30 PM EDT    Indication: Thoracentesis    Comparison: CT chest 5/21/2025. AP chest 5/21/2025    Findings:  Interstitial thickening is present bilaterally, asymmetrically more prominent on the left. Aeration in the left lung appears improved. Suspected trace fluid within the minor fissure of the right lung. Right arm approach PICC tip terminates at the  cavoatrial junction. Stable mild cardiac enlargement. No pneumothorax. No significant pleural fluid is identified    Impression  Impression:    1. Improved left lung aeration since 5/21/2025. No significant pleural fluid is evident.  2. Interstitial thickening bilaterally, left greater than right may represent atypical distribution of edema  3. No visible pneumothorax status post thoracentesis.      Electronically Signed: Mar Aly MD  5/22/2025 1:35 PM  EDT  Workstation ID: [id number]      Results for orders placed during the hospital encounter of 05/21/25    XR Chest 1 View    Narrative  XR CHEST 1 VW    Date of Exam: 5/21/2025 11:50 AM EDT    Indication: elevate wbc/ soa    Comparison: PA and lateral chest radiograph 12/12/2024    Findings:  There is extensive airspace disease throughout the left lung compatible with pneumonia which is new from the prior study. No definite consolidation the right lung. There is chronic airspace disease at the right lung base which is mildly improved from the  prior study. There is a right-sided port catheter with the tip at the cavoatrial junction. Stable right perihilar scarring. Small bilateral pleural effusions suspected. No pneumothorax.    Impression  Impression:  1. Extensive airspace disease throughout the left lung compatible with pneumonia.  2. Chronic airspace disease at the right lung base mildly improved from prior study.  3. Small bilateral pleural effusions.  4. Stable right port catheter.          Electronically Signed: Antwon Estrella MD  5/21/2025 12:11 PM EDT  Workstation ID: [id number]      Results for orders placed during the hospital encounter of 12/12/24    XR Chest PA & Lateral    Narrative  XR CHEST PA AND LATERAL    Date of Exam: 12/12/2024 2:13 PM EST    Indication: low grade fever, nasal drainage    Comparison: CT chest 11/1/2024.    Findings:  Chronic right basilar pleural thickening and pleural fluid collection with indwelling pleural catheter, corresponding to prior CT chest. Interstitial thickening is demonstrated within the right mid to lower lung zone, also corresponding to prior CT  chest. Left lung appears clear. Heart size is normal. The descending thoracic aorta appears tortuous. Right chest wall montserrat catheter extends to the caval atrial junction. No visible pneumothorax    Impression  Impression:  Right mid to lower lung zone interstitial thickening with small right basilar pleural fluid and  indwelling pleural catheter, not thought to be significantly changed when compared to the CT chest of 11/1/2024.  No superimposed acute airspace disease is seen.      Electronically Signed: Mar Aly MD  12/13/2024 4:32 PM EST  Workstation ID: [id number]            ASSESSMENT / PLAN      Pneumonia    Pleural effusion    Stage IV adenocarcinoma of lung, right    Stage IV adenocarcinoma of lung, left    CKD (chronic kidney disease) stage 4, GFR 15-29 ml/min    Hydronephrosis    Atrial fibrillation    Chronic anticoagulation    Hypoxia    Hypothyroidism (acquired)    Dysphagia    MARIA DEL ROSARIO-due to obstructive uropathy and her volume depletion.  Place Lam catheter.  Ask urology to see.  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  Recent UA bland  Obstructive uropathy-renal ultrasound with bilateral moderate hydronephrosis and distended bladder.  Will place Lam.  History of lung cancer-on Keytruda, followed by Dr. Tim  Anemia, chronic  Mild hyponatremia-monitor  Pneumonia, moderate left pleural effusion-per pulmonary.  Plans noted for bronch tomorrow  Hyperlipidemia  A-fib  Hypothyroidism    CR still up, but stable  Keep lam/ appreciate Urology eval  Add midodrine for hypotension  Labs in am        Aman Gregory MD  [facility]  [phone]  05/23/25  06:46 EDT

## 2025-05-23 NOTE — OP NOTE
Bronchoscopy Procedure Note    Procedure:  Bronchoscopy, Diagnostic  Bronchoalveolar lavage, BAL from left lower lobe  Transbronchial biopsy with cryoprobe from left lower lobe x 4    Pre-Operative Diagnosis: Pneumonia, history of stage IV lung carcinoma    Post-Operative Diagnosis: Same and positive for non-small cell lung carcinoma    Indication: History of stage IV lung carcinoma status post radiation, new infiltrate in the left lower lobe    Anesthesia: Monitored Anesthesia Care (MAC)    Procedure Details: Patient was consented for the procedure with all risk and benefit of the procedure explained in detail.  Patient was given the opportunity to ask questions and all concerns were answered.    Timeout was done in the standard manner   the bronchoscope was inserted into the main airway via the oropharynx. An anatomical survey was done of the main airways and the subsegmental bronchus of the 5 lobes.  The findings are consistent of no endobronchial lesion was seen.      A diagnostic BAL was performed left lower lobe instilling 90 mL of sterile saline and return of 35 mL.    Using cryoprobe transbronchial biopsy of the left lower lobe was performed x 4.  Rapid onsite pathology evaluation indicated the presence of non-small cell lung carcinoma    The minor bleeding postbiopsy was controlled by instilling cold saline.  By the end of procedure there was no active bleeding    Estimated Blood Loss: Minimal                         Complications:  None; patient tolerated the procedure well.           Disposition: PACU - hemodynamically stable.    Post op plan:  Resume p.o. after 2 hours  Follow-up results  Chest x-ray to rule out pneumothorax    Patient tolerated the procedure well.

## 2025-05-23 NOTE — TELEPHONE ENCOUNTER
Hub staff attempted to follow warm transfer process and was unsuccessful     Caller: MARVIN     Relationship to patient: KATIE VERDUGO     Best call back number: [phone]    Patient is needing: CALLING WITH IN PT CONSULT.

## 2025-05-23 NOTE — ANESTHESIA POSTPROCEDURE EVALUATION
Patient: Young Ames    Procedure Summary       Date: 05/23/25 Room / Location: [facility] ENDOSCOPY 2 / [facility] ENDOSCOPY    Anesthesia Start: 0755 Anesthesia Stop: 0827    Procedures:       BRONCHOSCOPY with bronchoalveolar lavage and biopsy x 1 area (Bronchus)      ESOPHAGOGASTRODUODENOSCOPY with biopsy x 1 area and dilatation (48FR non-guided bougie) Diagnosis:       Pneumonia of left lung due to infectious organism, unspecified part of lung      Stage IV adenocarcinoma of lung, left      (Pneumonia of left lung due to infectious organism, unspecified part of lung [J18.9])      (Stage IV adenocarcinoma of lung, left [C34.92])    Surgeons: Jason Dozier MD; Carlos Calero MD Provider: Edward Gibbons MD    Anesthesia Type: general, MAC ASA Status: 3            Anesthesia Type: general, MAC    Vitals  Vitals Value Taken Time   BP 95/51 05/23/25 09:18   Temp     Pulse 66 05/23/25 09:18   Resp 18 05/23/25 08:32   SpO2 93 % 05/23/25 09:18   Vitals shown include unfiled device data.        Post Anesthesia Care and Evaluation    Patient location during evaluation: PACU  Patient participation: complete - patient participated  Level of consciousness: awake  Pain scale: See nurse's notes for pain score.  Pain management: adequate    Airway patency: patent  Anesthetic complications: No anesthetic complications  PONV Status: none  Cardiovascular status: acceptable  Respiratory status: acceptable and spontaneous ventilation  Hydration status: acceptable    Comments: Patient seen and examined postoperatively; vital signs stable; SpO2 greater than or equal to 90%; cardiopulmonary status stable; nausea/vomiting adequately controlled; pain adequately controlled; no apparent anesthesia complications; patient discharged from anesthesia care when discharge criteria were met

## 2025-05-23 NOTE — PROGRESS NOTES
LOS: 2 days   Patient Care Team:  Abner Funes APRN as PCP - General (Family Medicine)  Brittany Canchola, RN as Nurse Navigator  Arben Tim MD as Consulting Physician (Hematology and Oncology)  Aman Gregory MD as Consulting Physician (Nephrology)    Subjective     Interval History: Improving, post EGD and bronchoscopy    Patient Complaints: Stable overnight    History taken from: patient    Review of Systems   Constitutional:  Positive for activity change, appetite change and fatigue. Negative for chills, diaphoresis and fever.   HENT:  Positive for trouble swallowing. Negative for congestion and facial swelling.    Eyes:  Negative for visual disturbance.   Respiratory:  Positive for cough and shortness of breath.    Cardiovascular:  Negative for chest pain and leg swelling.   Gastrointestinal:  Negative for abdominal pain and nausea.   Genitourinary:  Negative for dysuria.   Musculoskeletal:  Negative for arthralgias and back pain.   Neurological:  Negative for tremors, syncope and weakness.   Psychiatric/Behavioral:  Negative for confusion.            Objective     Vital Signs  Temp:  [97.5 °F (36.4 °C)-98.2 °F (36.8 °C)] 98.2 °F (36.8 °C)  Heart Rate:  [65-82] 65  Resp:  [12-20] 19  BP: ()/(51-78) 98/62    Physical Exam:     General Appearance:    Alert, cooperative, in no acute distress,   Head:    Normocephalic, without obvious abnormality, atraumatic   Eyes:            Lids and lashes normal, conjunctivae and sclerae normal, no   icterus, no pallor, corneas clear, PERRLA   Ears:    Ears appear intact with no abnormalities noted   Throat:   No oral lesions, no thrush, oral mucosa moist   Neck:   No adenopathy, supple, trachea midline, no thyromegaly, no   carotid bruit, no JVD   Lungs:     Clear to auscultation,respirations regular, even and                  unlabored    Heart:    Regular rhythm and normal rate, normal S1 and S2, no            murmur, no gallop, no rub, no click   Chest  Wall:    No abnormalities observed   Abdomen:     Normal bowel sounds, no masses, no organomegaly, soft        Non-tender non-distended, no guarding,   Extremities:   Moves all extremities well, no edema, no cyanosis, no             Redness   Pulses:   Pulses palpable and equal bilaterally   Skin:   No bleeding, bruising or rash   Lymph nodes:   No palpable adenopathy   Neurologic:   Cranial nerves 2 - 12 grossly intact, sensation intact, DTR       present and equal bilaterally        Results Review:    Lab Results (last 24 hours)       Procedure Component Value Units Date/Time    Body Fluid Culture - Body Fluid, Pleural Cavity [[id number]] Collected: 05/22/25 1222    Specimen: Body Fluid from Pleural Cavity Updated: 05/23/25 0615     Body Fluid Culture No growth at less than 24 hours     Gram Stain Few (2+) WBCs per low power field      No organisms seen    Lactate Dehydrogenase, Body Fluid - Body Fluid, Pleural Cavity [[id number]] Collected: 05/22/25 1222    Specimen: Body Fluid from Pleural Cavity Updated: 05/22/25 2003     Lactate Dehydrogenase (LD), Fluid 398 U/L     Narrative:      No Reference Ranges Established.    Serous fluid LDH greater than 60 percent of the serum LDH or serous fluid LDH two-thirds of the upper limit of normal for serum LDH suggests the fluid is an exudate.     1. Pleural TP/Serum TP >0.5  2. Pleural LD/Serum LD >0.6  3. Pleural LD >2/3 of the upper limit of normal for serum LDH    This test was developed, it performance characteristics determined and judged suitable for clinical purposes by [facility].  It has not been cleared or approved by the FDA.  The laboratory is regulated under CLIA as qualified to perform high-complexity testing.     Glucose, Body Fluid - Pleural Fluid, Pleural Cavity [[id number]] Collected: 05/22/25 1223    Specimen: Pleural Fluid from Pleural Cavity Updated: 05/22/25 2002     Glucose, Fluid 177 mg/dL     Narrative:      No Reference  Ranges Established.    Serous fluid glucose less than 60 mg/dL or less than 30 mg/dL below serum glucose suggests an infectious or malignant exudate.     This test was developed, it performance characteristics determined and judged suitable for clinical purposes by [facility].  It has not been cleared or approved by the FDA.  The laboratory is regulated under CLIA as qualified to perform high-complexity testing.     Protein, Body Fluid - Pleural Fluid, Pleural Cavity [[id number]] Collected: 05/22/25 1223    Specimen: Pleural Fluid from Pleural Cavity Updated: 05/22/25 2002     Protein, Total, Fluid 4.0 g/dL     Narrative:      No Reference Ranges Established.    A serous fluid total fluid (TP) greater than 50 percent of the serum TP suggests the fluid is an exudate.      1. Pleural TP/Serum TP >0.5  2. Pleural LD/Serum LD >0.6  3. Pleural LD >2/3 of the upper limit of normal for serum LDH    This test was developed, it performance characteristics determined and judged suitable for clinical purposes by [facility].  It has not been cleared or approved by the FDA.  The laboratory is regulated under CLIA as qualified to perform high-complexity testing.     Blood Culture - Blood, Arm, Right [[id number]]  (Normal) Collected: 05/21/25 1552    Specimen: Blood from Arm, Right Updated: 05/22/25 1600     Blood Culture No growth at 24 hours    Body Fluid Cell Count With Differential - Body Fluid, Pleural Cavity [[id number]]  (Abnormal) Collected: 05/22/25 1222    Specimen: Body Fluid from Pleural Cavity Updated: 05/22/25 1553    Narrative:      The following orders were created for panel order Body Fluid Cell Count With Differential - Body Fluid, Pleural Cavity.  Procedure                               Abnormality         Status                     ---------                               -----------         ------                     Body fluid cell count - ...[[id number]]   Abnormal            Final result               Body fluid differential ...[[id number]]                      Final result                 Please view results for these tests on the individual orders.    Body fluid differential - Body Fluid, Pleural Cavity [[id number]] Collected: 05/22/25 1222    Specimen: Body Fluid from Pleural Cavity Updated: 05/22/25 1553     Neutrophils, Fluid % 58 %      Lymphocytes, Fluid % 37 %      Monocytes, Fluid % 5 %     Narrative:      Concentrated Smear by Cytocentrifuge Prep.    Blood Culture - Blood, Blood, PICC Line [[id number]]  (Normal) Collected: 05/21/25 1539    Specimen: Blood, PICC Line Updated: 05/22/25 1545     Blood Culture No growth at 24 hours    Narrative:      Less than seven (7) mL's of blood was collected.  Insufficient quantity may yield false negative results.    Body fluid cell count - Body Fluid, Pleural Cavity [[id number]]  (Abnormal) Collected: 05/22/25 1222    Specimen: Body Fluid from Pleural Cavity Updated: 05/22/25 1504     Color, Fluid Red     Appearance, Fluid Turbid     Comment: Result checked          RBC, Fluid 31,000 /mm3      Nucleated Cells, Fluid 568 /mm3     Narrative:      No reference range established. Physician to interpret results with clinical findings.    pH, Body Fluid - Body Fluid, Pleural Cavity [[id number]]  (Normal) Collected: 05/22/25 1222    Specimen: Body Fluid from Pleural Cavity Updated: 05/22/25 1442     pH, Fluid 7.50    Non-gynecologic Cytology [[id number]] Collected: 05/22/25 1223    Specimen: Pleural Fluid from Pleural Cavity Updated: 05/22/25 1410    POC Lactate [[id number]]  (Normal) Collected: 05/21/25 1544    Specimen: Blood Updated: 05/22/25 0949     Lactate 0.3 mmol/L      Comment: Serial Number: [id number]:  [id number]       POC Lactate [[id number]]  (Abnormal) Collected: 05/21/25 1542    Specimen: Blood Updated: 05/22/25 0949     Lactate <0.3 mmol/L      Comment: Serial Number: [id number]:  [id number]       Basic  Metabolic Panel [[id number]]  (Abnormal) Collected: 05/22/25 0547    Specimen: Blood Updated: 05/22/25 0732     Glucose 148 mg/dL      BUN 33 mg/dL      Creatinine 2.34 mg/dL      Sodium 134 mmol/L      Potassium 4.9 mmol/L      Chloride 103 mmol/L      CO2 19.2 mmol/L      Calcium 8.6 mg/dL      BUN/Creatinine Ratio 14.1     Anion Gap 11.8 mmol/L      eGFR 28.3 mL/min/1.73     Narrative:      GFR Categories in Chronic Kidney Disease (CKD)              GFR Category          GFR (mL/min/1.73)    Interpretation  G1                    90 or greater        Normal or high (1)  G2                    60-89                Mild decrease (1)  G3a                   45-59                Mild to moderate decrease  G3b                   30-44                Moderate to severe decrease  G4                    15-29                Severe decrease  G5                    14 or less           Kidney failure    (1)In the absence of evidence of kidney disease, neither GFR category G1 or G2 fulfill the criteria for CKD.    eGFR calculation 2021 CKD-EPI creatinine equation, which does not include race as a factor    CBC & Differential [[id number]]  (Abnormal) Collected: 05/22/25 0547    Specimen: Blood Updated: 05/22/25 0720    Narrative:      The following orders were created for panel order CBC & Differential.  Procedure                               Abnormality         Status                     ---------                               -----------         ------                     CBC Auto Differential[[id number]]        Abnormal            Final result                 Please view results for these tests on the individual orders.    CBC Auto Differential [[id number]]  (Abnormal) Collected: 05/22/25 0547    Specimen: Blood Updated: 05/22/25 0720     WBC 13.46 10*3/mm3      RBC 3.10 10*6/mm3      Hemoglobin 9.5 g/dL      Comment: Result checked          Hematocrit 29.1 %      MCV 93.9 fL      MCH 30.6 pg      MCHC 32.6 g/dL      RDW 15.5 %       RDW-SD 53.4 fl      MPV 9.6 fL      Platelets 266 10*3/mm3      Neutrophil % 82.4 %      Lymphocyte % 12.3 %      Monocyte % 4.3 %      Eosinophil % 0.1 %      Basophil % 0.2 %      Immature Grans % 0.7 %      Neutrophils, Absolute 11.10 10*3/mm3      Lymphocytes, Absolute 1.65 10*3/mm3      Monocytes, Absolute 0.58 10*3/mm3      Eosinophils, Absolute 0.01 10*3/mm3      Basophils, Absolute 0.03 10*3/mm3      Immature Grans, Absolute 0.09 10*3/mm3      nRBC 0.0 /100 WBC              Imaging Results (Last 24 Hours)       Procedure Component Value Units Date/Time    XR Chest 1 View [[id number]] Collected: 05/22/25 1333     Updated: 05/22/25 1337    Narrative:      XR CHEST 1 VW    Date of Exam: 5/22/2025 12:30 PM EDT    Indication: Thoracentesis    Comparison: CT chest 5/21/2025. AP chest 5/21/2025    Findings:  Interstitial thickening is present bilaterally, asymmetrically more prominent on the left. Aeration in the left lung appears improved. Suspected trace fluid within the minor fissure of the right lung. Right arm approach PICC tip terminates at the   cavoatrial junction. Stable mild cardiac enlargement. No pneumothorax. No significant pleural fluid is identified      Impression:      Impression:    1. Improved left lung aeration since 5/21/2025. No significant pleural fluid is evident.  2. Interstitial thickening bilaterally, left greater than right may represent atypical distribution of edema  3. No visible pneumothorax status post thoracentesis.      Electronically Signed: Mar Aly MD    5/22/2025 1:35 PM EDT    Workstation ID: [id number]    CT Abdomen Pelvis Without Contrast [[id number]] Collected: 05/22/25 1143     Updated: 05/22/25 1152    Narrative:      CT ABDOMEN PELVIS WO CONTRAST    Date of Exam: 5/22/2025 11:30 AM EDT    Indication: hydronephrosis.    Comparison: Renal ultrasound from today, PET/CT dated 8/28/2024    Technique: Axial CT images were obtained of the abdomen and pelvis without the  administration of contrast. Sagittal and coronal reconstructions were performed.  Automated exposure control and iterative reconstruction methods were used.    Findings:    Liver: The liver is unremarkable in morphology. Evaluation for focal liver lesions is limited without IV contrast. No biliary dilation is seen.    Gallbladder: Cholelithiasis.    Pancreas: Unremarkable.    Spleen: Unremarkable.    Adrenal glands: Unremarkable.    Genitourinary tract: Urinary bladder is markedly distended with urine. There is moderate bilateral hydroureteronephrosis. No urinary tract calculi are seen. Appearance is most compatible with urinary retention or bladder outlet obstruction. Patient would   likely benefit from Rogers catheterization. Prostate gland is prominent.    Gastrointestinal tract: Limited evaluation of the hollow viscera due to lack of IV contrast administration. No findings to suggest bowel obstruction.    Appendix: No findings to suggest acute appendicitis.    Other findings: No free air or free fluid is identified. No pathologically enlarged lymph nodes are seen. Vascular calcifications are present.    Bones and soft tissues: No acute or suspicious osseous or soft tissue lesion is identified.    Lung bases: Moderate left and small right pleural effusions with bibasilar opacities which may be related to atelectasis, infiltrates, or neoplastic process. Please correlate with chest CT from 5/21/2025.      Impression:      Impression:  1.Urinary bladder is markedly distended with urine. There is moderate bilateral hydroureteronephrosis. No urinary tract calculi are seen. Appearance is most compatible with urinary retention or bladder outlet obstruction. Patient would likely benefit   from Rogers catheterization.  2.Moderate left and small right pleural effusions with bibasilar opacities which may be related to atelectasis, infiltrates, or neoplastic process. Please correlate with chest CT from  5/21/2025.  3.Cholelithiasis.  4.Additional findings as detailed above.       Electronically Signed: Shahab Haro MD    5/22/2025 11:50 AM EDT    Workstation ID: [id number]                 I reviewed the patient's new clinical results.    Medication Review:   Scheduled Meds:atorvastatin, 10 mg, Oral, Daily  cefTAZidime, 2,000 mg, Intravenous, Q24H  guaiFENesin, 1,200 mg, Oral, Q12H  ipratropium-albuterol, 3 mL, Nebulization, 4x Daily - RT  levothyroxine, 75 mcg, Oral, Q AM  methylPREDNISolone sodium succinate, 40 mg, Intravenous, Q12H  multivitamin with minerals, 1 tablet, Oral, QAM  pantoprazole, 40 mg, Oral, Daily  sodium chloride, 10 mL, Intravenous, Q12H  sotalol, 80 mg, Oral, BID  tamsulosin, 0.4 mg, Oral, Nightly      Continuous Infusions:sodium chloride, 75 mL/hr, Last Rate: 75 mL/hr (05/23/25 0038)      PRN Meds:.  acetaminophen    senna-docusate sodium **AND** polyethylene glycol **AND** bisacodyl **AND** bisacodyl    midodrine    nitroglycerin    ondansetron ODT **OR** ondansetron    sodium chloride    sodium chloride     Assessment & Plan       Pneumonia    Pleural effusion    Stage IV adenocarcinoma of lung, right    Stage IV adenocarcinoma of lung, left    CKD (chronic kidney disease) stage 4, GFR 15-29 ml/min    Hydronephrosis    Atrial fibrillation    Chronic anticoagulation    Hypoxia    Hypothyroidism (acquired)    Dysphagia    -Respiratory status improving with treatment of pneumonia, chest CT showed pleural effusion, status post thoracentesis with 950 cc removal, bronchoscopy this a.m., follow BAL, pulmonology following  - Status post EGD this a.m. for possible esophageal stricture  - Continue current dose of levothyroxine as it has been too soon to see full effect of recent dose change  - Currently in sinus rhythm; continue sotalol; holding anticoagulation until after decision is made about any procedures (bronchoscopy, EGD, etc.)  - Keytruda on hold, consult oncology, Dr. Barahona  - SCDs for DVT  prophylaxis  - Noncontrasted abdominal CT to further evaluate hydronephrosis; continue IV fluids    CODE Status:    Code status (Patient has no pulse and is not breathing):  CPR (Attempt to Resuscitate)  Medical Interventions (Patient has pulse or is breathing):  Full support  Level of support discussed with:  Patient    Admission status:  I believe this patient meets inpatient status  Expected length of stay:  2 midnights or greater  I discussed the patient's findings and my recommendations with the patient.    Plan for disposition: To be determined    CARL Quintero  05/23/25  06:47 EDT

## 2025-05-23 NOTE — PROGRESS NOTES
"  [facility] DAILY PROGRESS NOTE    Patient Identification  Name: Young Ames  Age: 74 y.o.  Sex: male  :  1950  MRN: [id number]    Date: 2025             Subjective:  Interval History: Rogers in place    Objective:    Scheduled Meds:atorvastatin, 10 mg, Oral, Daily  cefTAZidime, 2,000 mg, Intravenous, Q24H  guaiFENesin, 1,200 mg, Oral, Q12H  ipratropium-albuterol, 3 mL, Nebulization, 4x Daily - RT  levothyroxine, 75 mcg, Oral, Q AM  methylPREDNISolone sodium succinate, 40 mg, Intravenous, Q12H  multivitamin with minerals, 1 tablet, Oral, QAM  pantoprazole, 40 mg, Oral, Daily  sodium chloride, 10 mL, Intravenous, Q12H  sotalol, 80 mg, Oral, BID  tamsulosin, 0.4 mg, Oral, Nightly      Continuous Infusions:sodium chloride, 75 mL/hr, Last Rate: Stopped (25 0713)      PRN Meds:  acetaminophen    senna-docusate sodium **AND** polyethylene glycol **AND** bisacodyl **AND** bisacodyl    midodrine    nitroglycerin    ondansetron ODT **OR** ondansetron    sodium chloride    sodium chloride    Vital signs in last 24 hours:  Temp:  [97.5 °F (36.4 °C)-98.2 °F (36.8 °C)] 97.8 °F (36.6 °C)  Heart Rate:  [62-79] 68  Resp:  [12-20] 16  BP: ()/(44-69) 98/58    Intake/Output:    Intake/Output Summary (Last 24 hours) at 2025 1150  Last data filed at 2025 1104  Gross per 24 hour   Intake 529 ml   Output 3775 ml   Net -3246 ml       Exam:  BP 98/58 (BP Location: Left arm, Patient Position: Lying)   Pulse 68   Temp 97.8 °F (36.6 °C) (Oral)   Resp 16   Ht 180.3 cm (71\")   Wt 79.4 kg (175 lb 0.7 oz)   SpO2 97%   BMI 24.41 kg/m²     General Appearance:    Alert, cooperative, NAD   Lungs:     Respirations unlabored, no audible wheezing    Heart:    No cyanosis   Abdomen:     Soft, ND    :    No suprapubic distention, Rogers clear            Data Review:  All labs (24hrs):   Recent Results (from the past 24 hours)   Body Fluid Culture - Body Fluid, Pleural Cavity    Collection Time: 25 12:22 " PM    Specimen: Pleural Cavity; Body Fluid   Result Value Ref Range    Body Fluid Culture No growth     Gram Stain Few (2+) WBCs per low power field     Gram Stain No organisms seen    Lactate Dehydrogenase, Body Fluid - Body Fluid, Pleural Cavity    Collection Time: 05/22/25 12:22 PM    Specimen: Pleural Cavity; Body Fluid   Result Value Ref Range    Lactate Dehydrogenase (LD), Fluid 398 U/L   pH, Body Fluid - Body Fluid, Pleural Cavity    Collection Time: 05/22/25 12:22 PM    Specimen: Pleural Cavity; Body Fluid   Result Value Ref Range    pH, Fluid 7.50 6.50 - 7.50   Body fluid cell count - Body Fluid, Pleural Cavity    Collection Time: 05/22/25 12:22 PM    Specimen: Pleural Cavity; Body Fluid   Result Value Ref Range    Color, Fluid Red     Appearance, Fluid Turbid (A) Clear    RBC, Fluid 31,000 /mm3    Nucleated Cells, Fluid 568 /mm3   Body fluid differential - Body Fluid, Pleural Cavity    Collection Time: 05/22/25 12:22 PM    Specimen: Pleural Cavity; Body Fluid   Result Value Ref Range    Neutrophils, Fluid % 58 %    Lymphocytes, Fluid % 37 %    Monocytes, Fluid % 5 %   Glucose, Body Fluid - Pleural Fluid, Pleural Cavity    Collection Time: 05/22/25 12:23 PM    Specimen: Pleural Cavity; Pleural Fluid   Result Value Ref Range    Glucose, Fluid 177 mg/dL   Protein, Body Fluid - Pleural Fluid, Pleural Cavity    Collection Time: 05/22/25 12:23 PM    Specimen: Pleural Cavity; Pleural Fluid   Result Value Ref Range    Protein, Total, Fluid 4.0 g/dL   ECG 12 Lead QT Measurement    Collection Time: 05/23/25 12:13 AM   Result Value Ref Range    QT Interval 455 ms    QTC Interval 490 ms   Uric Acid    Collection Time: 05/23/25  6:08 AM    Specimen: Blood   Result Value Ref Range    Uric Acid 9.5 (H) 3.4 - 7.0 mg/dL   Renal Function Panel    Collection Time: 05/23/25  6:08 AM    Specimen: Blood   Result Value Ref Range    Glucose 135 (H) 65 - 99 mg/dL    BUN 39 (H) 8 - 23 mg/dL    Creatinine 2.29 (H) 0.76 - 1.27 mg/dL     Sodium 137 136 - 145 mmol/L    Potassium 4.7 3.5 - 5.2 mmol/L    Chloride 105 98 - 107 mmol/L    CO2 18.1 (L) 22.0 - 29.0 mmol/L    Calcium 8.8 8.6 - 10.5 mg/dL    Albumin 3.6 3.5 - 5.2 g/dL    Phosphorus 5.1 (H) 2.5 - 4.5 mg/dL    Anion Gap 13.9 5.0 - 15.0 mmol/L    BUN/Creatinine Ratio 17.0 7.0 - 25.0    eGFR 29.0 (L) >60.0 mL/min/1.73   Magnesium    Collection Time: 05/23/25  6:08 AM    Specimen: Blood   Result Value Ref Range    Magnesium 2.0 1.6 - 2.4 mg/dL   CBC Auto Differential    Collection Time: 05/23/25  6:08 AM    Specimen: Blood   Result Value Ref Range    WBC 17.77 (H) 3.40 - 10.80 10*3/mm3    RBC 2.83 (L) 4.14 - 5.80 10*6/mm3    Hemoglobin 8.8 (L) 13.0 - 17.7 g/dL    Hematocrit 26.6 (L) 37.5 - 51.0 %    MCV 94.0 79.0 - 97.0 fL    MCH 31.1 26.6 - 33.0 pg    MCHC 33.1 31.5 - 35.7 g/dL    RDW 15.6 (H) 12.3 - 15.4 %    RDW-SD 53.6 37.0 - 54.0 fl    MPV 9.4 6.0 - 12.0 fL    Platelets 254 140 - 450 10*3/mm3    Neutrophil % 84.4 (H) 42.7 - 76.0 %    Lymphocyte % 10.0 (L) 19.6 - 45.3 %    Monocyte % 4.7 (L) 5.0 - 12.0 %    Eosinophil % 0.1 (L) 0.3 - 6.2 %    Basophil % 0.1 0.0 - 1.5 %    Immature Grans % 0.7 (H) 0.0 - 0.5 %    Neutrophils, Absolute 15.00 (H) 1.70 - 7.00 10*3/mm3    Lymphocytes, Absolute 1.78 0.70 - 3.10 10*3/mm3    Monocytes, Absolute 0.84 0.10 - 0.90 10*3/mm3    Eosinophils, Absolute 0.01 0.00 - 0.40 10*3/mm3    Basophils, Absolute 0.02 0.00 - 0.20 10*3/mm3    Immature Grans, Absolute 0.12 (H) 0.00 - 0.05 10*3/mm3    nRBC 0.0 0.0 - 0.2 /100 WBC   Tissue Pathology Exam    Collection Time: 05/23/25  8:03 AM    Specimen: A: Esophagus, Distal; Tissue    B: Lung, Left Lower Lobe; Tissue   Result Value Ref Range    Case Report       Surgical Pathology Report                         Case: [id number]                                  Authorizing Provider:  Jason Dozier MD         Collected:           05/23/2025 08:03 AM          Ordering Location:     [facility]  Received:             05/23/2025 10:08 AM                                 SUITES                                                                       Pathologist:           Jevon Hsu MD                                                             Intraop:               Al Reese MD                                                            Specimens:   1) - Esophagus, Distal, @39 cm r/o Barretts                                                         2) - Lung, Left Lower Lobe, touch prep x 1 and cell block                                  Intraoperative Consultation       TouchPrep:   Positive for malignancy.  Positive for malignancy.    Initial cytology interpretation performed by Al Reese MD.       Respiratory Panel PCR w/COVID-19(SARS-CoV-2) SARAH/ELENI/NEY/PAD/COR/LUCRETIA In-House, NP Swab in UTM/VTM, 2 HR TAT - Lavage, Lung, Left Lower Lobe    Collection Time: 05/23/25  8:17 AM    Specimen: Lung, Left Lower Lobe; Lavage   Result Value Ref Range    ADENOVIRUS, PCR Not Detected Not Detected    Coronavirus 229E Not Detected Not Detected    Coronavirus HKU1 Not Detected Not Detected    Coronavirus NL63 Not Detected Not Detected    Coronavirus OC43 Not Detected Not Detected    COVID19 Not Detected Not Detected - Ref. Range    Human Metapneumovirus Not Detected Not Detected    Human Rhinovirus/Enterovirus Not Detected Not Detected    Influenza A PCR Not Detected Not Detected    Influenza B PCR Not Detected Not Detected    Parainfluenza Virus 1 Not Detected Not Detected    Parainfluenza Virus 2 Not Detected Not Detected    Parainfluenza Virus 3 Not Detected Not Detected    Parainfluenza Virus 4 Not Detected Not Detected    RSV, PCR Not Detected Not Detected    Bordetella pertussis pcr Not Detected Not Detected    Bordetella parapertussis PCR Not Detected Not Detected    Chlamydophila pneumoniae PCR Not Detected Not Detected    Mycoplasma pneumo by PCR Not Detected Not Detected      Imaging Results (Last 24 Hours)       Procedure  Component Value Units Date/Time    XR Chest 1 View [[id number]] Collected: 05/23/25 0902     Updated: 05/23/25 0907    Narrative:      XR CHEST 1 VW    Date of Exam: 5/23/2025 8:42 AM EDT    Indication: Status post left lung biopsy, rule out pneumothorax    Comparison: CT chest 5/21/2025, chest radiograph 5/22/2025    Findings:  No pneumothorax status post lung biopsy. Right upper extremity PICC again noted with the tip at the cavoatrial junction. Heart size is normal. Asymmetric interstitial thickening throughout the left lung is stable. Persistent bibasilar airspace disease   and small bilateral pleural effusions.      Impression:      Impression:  1. No pneumothorax status post lung biopsy.  2. Persistent bibasilar airspace disease and small bilateral pleural effusions.  3. Stable asymmetric interstitial thickening throughout the left lung.          Electronically Signed: Antwon Estrella MD    5/23/2025 9:05 AM EDT    Workstation ID: [id number]    XR Chest 1 View [[id number]] Collected: 05/22/25 1333     Updated: 05/22/25 1337    Narrative:      XR CHEST 1 VW    Date of Exam: 5/22/2025 12:30 PM EDT    Indication: Thoracentesis    Comparison: CT chest 5/21/2025. AP chest 5/21/2025    Findings:  Interstitial thickening is present bilaterally, asymmetrically more prominent on the left. Aeration in the left lung appears improved. Suspected trace fluid within the minor fissure of the right lung. Right arm approach PICC tip terminates at the   cavoatrial junction. Stable mild cardiac enlargement. No pneumothorax. No significant pleural fluid is identified      Impression:      Impression:    1. Improved left lung aeration since 5/21/2025. No significant pleural fluid is evident.  2. Interstitial thickening bilaterally, left greater than right may represent atypical distribution of edema  3. No visible pneumothorax status post thoracentesis.      Electronically Signed: Mar Aly MD    5/22/2025 1:35 PM EDT    Workstation  ID: [id number]    CT Abdomen Pelvis Without Contrast [[id number]] Collected: 05/22/25 1143     Updated: 05/22/25 1152    Narrative:      CT ABDOMEN PELVIS WO CONTRAST    Date of Exam: 5/22/2025 11:30 AM EDT    Indication: hydronephrosis.    Comparison: Renal ultrasound from today, PET/CT dated 8/28/2024    Technique: Axial CT images were obtained of the abdomen and pelvis without the administration of contrast. Sagittal and coronal reconstructions were performed.  Automated exposure control and iterative reconstruction methods were used.    Findings:    Liver: The liver is unremarkable in morphology. Evaluation for focal liver lesions is limited without IV contrast. No biliary dilation is seen.    Gallbladder: Cholelithiasis.    Pancreas: Unremarkable.    Spleen: Unremarkable.    Adrenal glands: Unremarkable.    Genitourinary tract: Urinary bladder is markedly distended with urine. There is moderate bilateral hydroureteronephrosis. No urinary tract calculi are seen. Appearance is most compatible with urinary retention or bladder outlet obstruction. Patient would   likely benefit from Rogers catheterization. Prostate gland is prominent.    Gastrointestinal tract: Limited evaluation of the hollow viscera due to lack of IV contrast administration. No findings to suggest bowel obstruction.    Appendix: No findings to suggest acute appendicitis.    Other findings: No free air or free fluid is identified. No pathologically enlarged lymph nodes are seen. Vascular calcifications are present.    Bones and soft tissues: No acute or suspicious osseous or soft tissue lesion is identified.    Lung bases: Moderate left and small right pleural effusions with bibasilar opacities which may be related to atelectasis, infiltrates, or neoplastic process. Please correlate with chest CT from 5/21/2025.      Impression:      Impression:  1.Urinary bladder is markedly distended with urine. There is moderate bilateral hydroureteronephrosis. No  urinary tract calculi are seen. Appearance is most compatible with urinary retention or bladder outlet obstruction. Patient would likely benefit   from Rogers catheterization.  2.Moderate left and small right pleural effusions with bibasilar opacities which may be related to atelectasis, infiltrates, or neoplastic process. Please correlate with chest CT from 5/21/2025.  3.Cholelithiasis.  4.Additional findings as detailed above.       Electronically Signed: Shahab Haro MD    5/22/2025 11:50 AM EDT    Workstation ID: [id number]             Assessment:    Pneumonia    Pleural effusion    Stage IV adenocarcinoma of lung, right    Stage IV adenocarcinoma of lung, left    CKD (chronic kidney disease) stage 4, GFR 15-29 ml/min    Hydronephrosis    Atrial fibrillation    Chronic anticoagulation    Hypoxia    Hypothyroidism (acquired)    Dysphagia      Large volume retention with resulting bilateral hydronephrosis  [facility]    Plan:    CT images reviewed very distended bladder resulting moderate bilateral hydronephrosis  He will need Rogers at least 1 to 2 weeks prior to considering a voiding trial, we will take care of this in the office, and he will need office cystoscopy and TRUS prior to void trial for workup for BPH intervention  Trend creatinine and follow to [place], once Cr levels off will consider renal ultrasound, okay for discharge over the weekend if creatinine improved and we will see him in the office for further management, otherwise we will see again on Tuesday    Emigdio Hernández MD  [facility]  [facility], [address]  [place] [address]  Office: [phone]  05/23/25  11:50 EDT

## 2025-05-23 NOTE — ANESTHESIA PREPROCEDURE EVALUATION
Anesthesia Evaluation     Patient summary reviewed and Nursing notes reviewed   NPO Solid Status: > 8 hours  NPO Liquid Status: > 8 hours           Airway   Mallampati: II  TM distance: >3 FB  Neck ROM: full  No difficulty expected  Dental - normal exam     Pulmonary    (+) pleural effusion, lung cancer,  Cardiovascular     (+) past MI , CAD, dysrhythmias      Neuro/Psych  GI/Hepatic/Renal/Endo    (+) renal disease-, thyroid problem     Musculoskeletal     Abdominal    Substance History      OB/GYN          Other      history of cancer    ROS/Med Hx Other: ·  Left ventricular ejection fraction appears to be 66 - 70%.  ·  Left ventricular diastolic function was indeterminate.  ·  The left atrial cavity is mildly dilated.  ·  Left atrial volume is mildly increased.  ·  Estimated right ventricular systolic pressure from tricuspid regurgitation is mildly elevated (35-45 mmHg).  ·  Moderate pulmonary hypertension is present.                Anesthesia Plan    ASA 3     general and MAC     intravenous induction     Anesthetic plan, risks, benefits, and alternatives have been provided, discussed and informed consent has been obtained with: patient.    Plan discussed with CRNA.    CODE STATUS:    Code Status (Patient has no pulse and is not breathing): CPR (Attempt to Resuscitate)  Medical Interventions (Patient has pulse or is breathing): Full Support

## 2025-05-23 NOTE — PLAN OF CARE
Problem: Adult Inpatient Plan of Care  Goal: Plan of Care Review  Outcome: Progressing  Goal: Patient-Specific Goal (Individualized)  Outcome: Progressing  Goal: Absence of Hospital-Acquired Illness or Injury  Outcome: Progressing  Intervention: Identify and Manage Fall Risk  Recent Flowsheet Documentation  Taken 5/23/2025 0948 by An Andres RN  Safety Promotion/Fall Prevention: safety round/check completed  Taken 5/23/2025 0722 by An Andres RN  Safety Promotion/Fall Prevention:   safety round/check completed   patient off unit  Intervention: Prevent Skin Injury  Recent Flowsheet Documentation  Taken 5/23/2025 0948 by An Andres RN  Body Position: position changed independently  Intervention: Prevent and Manage VTE (Venous Thromboembolism) Risk  Recent Flowsheet Documentation  Taken 5/23/2025 0948 by An Andres RN  VTE Prevention/Management:   bilateral   SCDs (sequential compression devices) off  Intervention: Prevent Infection  Recent Flowsheet Documentation  Taken 5/23/2025 0948 by An Andres RN  Infection Prevention: single patient room provided  Goal: Optimal Comfort and Wellbeing  Outcome: Progressing  Intervention: Provide Person-Centered Care  Recent Flowsheet Documentation  Taken 5/23/2025 0948 by An Andres RN  Trust Relationship/Rapport:   care explained   choices provided   emotional support provided   empathic listening provided  Goal: Readiness for Transition of Care  Outcome: Progressing  Goal: Plan of Care Review  Outcome: Progressing  Goal: Patient-Specific Goal (Individualized)  Outcome: Progressing  Goal: Absence of Hospital-Acquired Illness or Injury  Outcome: Progressing  Intervention: Identify and Manage Fall Risk  Recent Flowsheet Documentation  Taken 5/23/2025 0948 by An Andres RN  Safety Promotion/Fall Prevention: safety round/check completed  Taken 5/23/2025 0722 by An Andres RN  Safety Promotion/Fall  Prevention:   safety round/check completed   patient off unit  Intervention: Prevent Skin Injury  Recent Flowsheet Documentation  Taken 5/23/2025 0948 by An Andres RN  Body Position: position changed independently  Intervention: Prevent and Manage VTE (Venous Thromboembolism) Risk  Recent Flowsheet Documentation  Taken 5/23/2025 0948 by An Andres RN  VTE Prevention/Management:   bilateral   SCDs (sequential compression devices) off  Intervention: Prevent Infection  Recent Flowsheet Documentation  Taken 5/23/2025 0948 by An Andres RN  Infection Prevention: single patient room provided  Goal: Optimal Comfort and Wellbeing  Outcome: Progressing  Intervention: Provide Person-Centered Care  Recent Flowsheet Documentation  Taken 5/23/2025 0948 by An Andres RN  Trust Relationship/Rapport:   care explained   choices provided   emotional support provided   empathic listening provided  Goal: Readiness for Transition of Care  Outcome: Progressing     Problem: Breathing Pattern Ineffective  Goal: Effective Breathing Pattern  Outcome: Progressing     Problem: Comorbidity Management  Goal: Maintenance of COPD Symptom Control  Outcome: Progressing  Intervention: Maintain COPD (Chronic Obstructive Pulmonary Disease) Symptom Control  Recent Flowsheet Documentation  Taken 5/23/2025 0948 by An Andres RN  Medication Review/Management: medications reviewed   Goal Outcome Evaluation:   Pt has been relaxing in bed with family at bedside. He also completed his EGD and bronchoscopy today. His PICC line remains in place at this time. His lam catheter remains in place at this time as well. He has been encouraged to use his IS as well. Pt remains on NS at 75ml/hr per providers orders. He remains on 2L NC PRN as well. He also completed his abx. No concerns at this time and call light within reach.

## 2025-05-24 LAB
ALBUMIN SERPL-MCNC: 3.2 G/DL (ref 3.5–5.2)
ANION GAP SERPL CALCULATED.3IONS-SCNC: 9.4 MMOL/L (ref 5–15)
BASOPHILS # BLD AUTO: 0.01 10*3/MM3 (ref 0–0.2)
BASOPHILS NFR BLD AUTO: 0.1 % (ref 0–1.5)
BUN SERPL-MCNC: 47 MG/DL (ref 8–23)
BUN/CREAT SERPL: 20.9 (ref 7–25)
CALCIUM SPEC-SCNC: 8.2 MG/DL (ref 8.6–10.5)
CHLORIDE SERPL-SCNC: 107 MMOL/L (ref 98–107)
CO2 SERPL-SCNC: 18.6 MMOL/L (ref 22–29)
CREAT SERPL-MCNC: 2.25 MG/DL (ref 0.76–1.27)
DEPRECATED RDW RBC AUTO: 56 FL (ref 37–54)
EGFRCR SERPLBLD CKD-EPI 2021: 29.7 ML/MIN/1.73
EOSINOPHIL # BLD AUTO: 0 10*3/MM3 (ref 0–0.4)
EOSINOPHIL NFR BLD AUTO: 0 % (ref 0.3–6.2)
ERYTHROCYTE [DISTWIDTH] IN BLOOD BY AUTOMATED COUNT: 15.9 % (ref 12.3–15.4)
GLUCOSE SERPL-MCNC: 164 MG/DL (ref 65–99)
HCT VFR BLD AUTO: 23.7 % (ref 37.5–51)
HGB BLD-MCNC: 7.6 G/DL (ref 13–17.7)
IMM GRANULOCYTES # BLD AUTO: 0.11 10*3/MM3 (ref 0–0.05)
IMM GRANULOCYTES NFR BLD AUTO: 0.9 % (ref 0–0.5)
LYMPHOCYTES # BLD AUTO: 1.52 10*3/MM3 (ref 0.7–3.1)
LYMPHOCYTES NFR BLD AUTO: 12 % (ref 19.6–45.3)
MCH RBC QN AUTO: 30.5 PG (ref 26.6–33)
MCHC RBC AUTO-ENTMCNC: 32.1 G/DL (ref 31.5–35.7)
MCV RBC AUTO: 95.2 FL (ref 79–97)
MONOCYTES # BLD AUTO: 0.62 10*3/MM3 (ref 0.1–0.9)
MONOCYTES NFR BLD AUTO: 4.9 % (ref 5–12)
NEUTROPHILS NFR BLD AUTO: 10.37 10*3/MM3 (ref 1.7–7)
NEUTROPHILS NFR BLD AUTO: 82.1 % (ref 42.7–76)
NRBC BLD AUTO-RTO: 0 /100 WBC (ref 0–0.2)
PHOSPHATE SERPL-MCNC: 3.3 MG/DL (ref 2.5–4.5)
PLATELET # BLD AUTO: 183 10*3/MM3 (ref 140–450)
PMV BLD AUTO: 9.4 FL (ref 6–12)
POTASSIUM SERPL-SCNC: 4.7 MMOL/L (ref 3.5–5.2)
RBC # BLD AUTO: 2.49 10*6/MM3 (ref 4.14–5.8)
SODIUM SERPL-SCNC: 135 MMOL/L (ref 136–145)
WBC NRBC COR # BLD AUTO: 12.63 10*3/MM3 (ref 3.4–10.8)

## 2025-05-24 PROCEDURE — 94799 UNLISTED PULMONARY SVC/PX: CPT

## 2025-05-24 PROCEDURE — 93010 ELECTROCARDIOGRAM REPORT: CPT | Performed by: INTERNAL MEDICINE

## 2025-05-24 PROCEDURE — 94640 AIRWAY INHALATION TREATMENT: CPT

## 2025-05-24 PROCEDURE — 94761 N-INVAS EAR/PLS OXIMETRY MLT: CPT

## 2025-05-24 PROCEDURE — 25010000002 CALCIUM GLUCONATE-NACL 1-0.675 GM/50ML-% SOLUTION: Performed by: INTERNAL MEDICINE

## 2025-05-24 PROCEDURE — 25810000003 SODIUM CHLORIDE 0.9 % SOLUTION: Performed by: FAMILY MEDICINE

## 2025-05-24 PROCEDURE — 99232 SBSQ HOSP IP/OBS MODERATE 35: CPT | Performed by: INTERNAL MEDICINE

## 2025-05-24 PROCEDURE — 93005 ELECTROCARDIOGRAM TRACING: CPT | Performed by: INTERNAL MEDICINE

## 2025-05-24 PROCEDURE — 94664 DEMO&/EVAL PT USE INHALER: CPT

## 2025-05-24 PROCEDURE — 80069 RENAL FUNCTION PANEL: CPT | Performed by: INTERNAL MEDICINE

## 2025-05-24 PROCEDURE — 85025 COMPLETE CBC W/AUTO DIFF WBC: CPT | Performed by: INTERNAL MEDICINE

## 2025-05-24 PROCEDURE — 25010000002 CEFTAZIDIME 2 G RECONSTITUTED SOLUTION 1 EACH VIAL: Performed by: INTERNAL MEDICINE

## 2025-05-24 PROCEDURE — 25010000002 METHYLPREDNISOLONE PER 40 MG: Performed by: INTERNAL MEDICINE

## 2025-05-24 RX ORDER — CALCIUM GLUCONATE 20 MG/ML
1000 INJECTION, SOLUTION INTRAVENOUS EVERY 12 HOURS
Status: COMPLETED | OUTPATIENT
Start: 2025-05-24 | End: 2025-05-24

## 2025-05-24 RX ORDER — PREDNISONE 20 MG/1
20 TABLET ORAL
Status: DISCONTINUED | OUTPATIENT
Start: 2025-05-25 | End: 2025-05-25 | Stop reason: HOSPADM

## 2025-05-24 RX ORDER — SODIUM BICARBONATE 650 MG/1
1300 TABLET ORAL 3 TIMES DAILY
Status: DISCONTINUED | OUTPATIENT
Start: 2025-05-24 | End: 2025-05-25 | Stop reason: HOSPADM

## 2025-05-24 RX ORDER — SODIUM CHLORIDE 9 MG/ML
75 INJECTION, SOLUTION INTRAVENOUS CONTINUOUS
Status: DISPENSED | OUTPATIENT
Start: 2025-05-24 | End: 2025-05-25

## 2025-05-24 RX ORDER — MIDODRINE HYDROCHLORIDE 5 MG/1
10 TABLET ORAL
Status: DISCONTINUED | OUTPATIENT
Start: 2025-05-24 | End: 2025-05-25 | Stop reason: HOSPADM

## 2025-05-24 RX ORDER — INDOMETHACIN 25 MG/1
50 CAPSULE ORAL ONCE
Status: COMPLETED | OUTPATIENT
Start: 2025-05-24 | End: 2025-05-24

## 2025-05-24 RX ADMIN — SOTALOL HYDROCHLORIDE 80 MG: 80 TABLET ORAL at 08:21

## 2025-05-24 RX ADMIN — MIDODRINE HYDROCHLORIDE 10 MG: 5 TABLET ORAL at 11:40

## 2025-05-24 RX ADMIN — ATORVASTATIN CALCIUM 10 MG: 10 TABLET ORAL at 08:21

## 2025-05-24 RX ADMIN — IPRATROPIUM BROMIDE AND ALBUTEROL SULFATE 3 ML: .5; 3 SOLUTION RESPIRATORY (INHALATION) at 15:23

## 2025-05-24 RX ADMIN — Medication 1 TABLET: at 08:21

## 2025-05-24 RX ADMIN — SODIUM CHLORIDE 75 ML/HR: 9 INJECTION, SOLUTION INTRAVENOUS at 01:21

## 2025-05-24 RX ADMIN — SODIUM BICARBONATE 50 MEQ: 84 INJECTION INTRAVENOUS at 10:35

## 2025-05-24 RX ADMIN — GUAIFENESIN 1200 MG: 600 TABLET, EXTENDED RELEASE ORAL at 21:07

## 2025-05-24 RX ADMIN — SODIUM BICARBONATE 1300 MG: 650 TABLET ORAL at 10:35

## 2025-05-24 RX ADMIN — SODIUM BICARBONATE 1300 MG: 650 TABLET ORAL at 16:35

## 2025-05-24 RX ADMIN — IPRATROPIUM BROMIDE AND ALBUTEROL SULFATE 3 ML: .5; 3 SOLUTION RESPIRATORY (INHALATION) at 19:43

## 2025-05-24 RX ADMIN — LEVOTHYROXINE SODIUM 75 MCG: 0.07 TABLET ORAL at 05:06

## 2025-05-24 RX ADMIN — IPRATROPIUM BROMIDE AND ALBUTEROL SULFATE 3 ML: .5; 3 SOLUTION RESPIRATORY (INHALATION) at 11:28

## 2025-05-24 RX ADMIN — TAMSULOSIN HYDROCHLORIDE 0.4 MG: 0.4 CAPSULE ORAL at 21:07

## 2025-05-24 RX ADMIN — MIDODRINE HYDROCHLORIDE 10 MG: 5 TABLET ORAL at 16:35

## 2025-05-24 RX ADMIN — Medication 10 ML: at 21:07

## 2025-05-24 RX ADMIN — Medication 10 ML: at 08:21

## 2025-05-24 RX ADMIN — CALCIUM GLUCONATE 1000 MG: 20 INJECTION, SOLUTION INTRAVENOUS at 21:07

## 2025-05-24 RX ADMIN — IPRATROPIUM BROMIDE AND ALBUTEROL SULFATE 3 ML: .5; 3 SOLUTION RESPIRATORY (INHALATION) at 07:33

## 2025-05-24 RX ADMIN — SODIUM BICARBONATE 1300 MG: 650 TABLET ORAL at 21:07

## 2025-05-24 RX ADMIN — SENNOSIDES AND DOCUSATE SODIUM 2 TABLET: 50; 8.6 TABLET ORAL at 08:26

## 2025-05-24 RX ADMIN — CALCIUM GLUCONATE 1000 MG: 20 INJECTION, SOLUTION INTRAVENOUS at 10:35

## 2025-05-24 RX ADMIN — GUAIFENESIN 1200 MG: 600 TABLET, EXTENDED RELEASE ORAL at 08:21

## 2025-05-24 RX ADMIN — PANTOPRAZOLE SODIUM 40 MG: 40 TABLET, DELAYED RELEASE ORAL at 08:20

## 2025-05-24 RX ADMIN — MIDODRINE HYDROCHLORIDE 5 MG: 5 TABLET ORAL at 08:20

## 2025-05-24 RX ADMIN — SOTALOL HYDROCHLORIDE 80 MG: 80 TABLET ORAL at 21:06

## 2025-05-24 RX ADMIN — APIXABAN 5 MG: 5 TABLET, FILM COATED ORAL at 21:07

## 2025-05-24 RX ADMIN — METHYLPREDNISOLONE SODIUM SUCCINATE 40 MG: 40 INJECTION, POWDER, FOR SOLUTION INTRAMUSCULAR; INTRAVENOUS at 05:06

## 2025-05-24 RX ADMIN — CEFTAZIDIME 2000 MG: 2 INJECTION, POWDER, FOR SOLUTION INTRAVENOUS at 14:44

## 2025-05-24 NOTE — PROGRESS NOTES
Daily Progress Note          Assessment    Pneumonia due to unspecified pathogen: Negative nasal MRSA probe, urinary antigen for Legionella and Streptococcus pneumonia  Left pleural effusion  New left lower lobe opacity with perifissural nodular opacity  Increased mediastinal adenopathy with increased lymph nodes in the left axilla, left axillary and left supraclavicular  Hypoxia     Stage IV right lung cancer status post concomitant radiation chemotherapy, diagnosed on bronchoscopy August 2024  CAD  CKD  Chronic A-fib on chronic anticoagulation Eliquis  Hypothyroidism  Chronic anemia     Former smoker: Quit 2000 after 32 pack years           Recommendations:  Respiratory status is improving the patient can be discharged from pulmonary standpoint     Post bronchoscopy 5/23/2025 with the primary results positive for lung malignancy final report     status post bedside left thoracentesis 5/22/2025 with removal of 950 mL of serosanguineous fluid        antibiotics: Ceftazidime     IV Solu-Medrol changed to prednisone 20 mg daily for 3 days  Mucinex  Oxygen supplement and titration to maintain saturation 90 to 95%: Currently on room air     bronchodilators     Atorvastatin, sotalol     Thyroid replacement: Levothyroxine  Protonix     I personally reviewed the radiological studies             LOS: 3 days     Subjective     Less cough and improved shortness of breath    Objective     Vital signs for last 24 hours:  Vitals:    05/24/25 0244 05/24/25 0725 05/24/25 0733 05/24/25 0737   BP: 94/58 100/61     BP Location: Left arm Left arm     Patient Position: Lying Lying     Pulse: 64 66 74 58   Resp: 17 20 16 16   Temp: 98.1 °F (36.7 °C) 97.8 °F (36.6 °C)     TempSrc: Oral Oral     SpO2: 92% 93% 91% 100%   Weight:       Height:           Intake/Output last 3 shifts:  I/O last 3 completed shifts:  In: 769 [P.O.:240; I.V.:529]  Out: 2275 [Urine:2275]  Intake/Output this shift:  No intake/output data  recorded.      Radiology  Imaging Results (Last 24 Hours)       ** No results found for the last 24 hours. **            Labs:  Results from last 7 days   Lab Units 05/24/25  0244   WBC 10*3/mm3 12.63*   HEMOGLOBIN g/dL 7.6*   HEMATOCRIT % 23.7*   PLATELETS 10*3/mm3 183     Results from last 7 days   Lab Units 05/24/25  0244 05/22/25  0547 05/21/25  1536   SODIUM mmol/L 135*   < > 133*   POTASSIUM mmol/L 4.7   < > 4.4   CHLORIDE mmol/L 107   < > 102   CO2 mmol/L 18.6*   < > 20.4*   BUN mg/dL 47*   < > 29*   CREATININE mg/dL 2.25*   < > 2.36*   CALCIUM mg/dL 8.2*   < > 8.7   BILIRUBIN mg/dL  --   --  0.3   ALK PHOS U/L  --   --  60   ALT (SGPT) U/L  --   --  65*   AST (SGOT) U/L  --   --  76*   GLUCOSE mg/dL 164*   < > 106*    < > = values in this interval not displayed.     Results from last 7 days   Lab Units 05/21/25  1530   PH, ARTERIAL pH units 7.351   PO2 ART mm Hg 80.1*   PCO2, ARTERIAL mm Hg 37.0   HCO3 ART mmol/L 20.5*     Results from last 7 days   Lab Units 05/24/25  0244 05/23/25  0608 05/21/25  1536   ALBUMIN g/dL 3.2* 3.6 3.7             Results from last 7 days   Lab Units 05/23/25  0608   MAGNESIUM mg/dL 2.0     Results from last 7 days   Lab Units 05/21/25  1104   INR  1.18*   APTT seconds 29.2     Results from last 7 days   Lab Units 05/21/25  1536   TSH uIU/mL 62.200*   FREE T4 ng/dL 0.76*           Meds:   SCHEDULE  atorvastatin, 10 mg, Oral, Daily  calcium gluconate, 1,000 mg, Intravenous, Q12H  cefTAZidime, 2,000 mg, Intravenous, Q24H  guaiFENesin, 1,200 mg, Oral, Q12H  ipratropium-albuterol, 3 mL, Nebulization, 4x Daily - RT  levothyroxine, 75 mcg, Oral, Q AM  methylPREDNISolone sodium succinate, 40 mg, Intravenous, Q12H  midodrine, 10 mg, Oral, TID AC  multivitamin with minerals, 1 tablet, Oral, QAM  nicotine, 1 patch, Transdermal, Q24H  pantoprazole, 40 mg, Oral, Daily  sodium bicarbonate, 1,300 mg, Oral, TID  sodium chloride, 10 mL, Intravenous, Q12H  sotalol, 80 mg, Oral, BID  tamsulosin,  0.4 mg, Oral, Nightly      Infusions  sodium chloride, 75 mL/hr, Last Rate: 75 mL/hr (05/24/25 1032)      PRNs    acetaminophen    senna-docusate sodium **AND** polyethylene glycol **AND** bisacodyl **AND** bisacodyl    midodrine    nitroglycerin    ondansetron ODT **OR** ondansetron    sodium chloride    sodium chloride    Physical Exam:  General Appearance:  Alert   HEENT:  Normocephalic, without obvious abnormality, Conjunctiva/corneas clear,.   Nares normal, no drainage     Neck:  Supple, symmetrical, trachea midline.   Lungs /Chest wall:   Mild bilateral basal rhonchi, respirations unlabored, symmetrical wall movement.     Heart:  Regular rate and rhythm, S1 S2 normal  Abdomen: Soft, non-tender, no masses, no organomegaly.    Extremities: No edema, no clubbing or cyanosis     ROS  Constitutional: Negative for chills, fever and malaise/fatigue.   HENT: Negative.    Eyes: Negative.    Cardiovascular: Negative.    Respiratory: Positive for improving cough and shortness of breath.    Skin: Negative.    Musculoskeletal: Negative.    Gastrointestinal: Negative.    Genitourinary: Negative.    Neurological: Negative.    Psychiatric/Behavioral: Negative.      I reviewed the recent clinical results  I personally reviewed the latest radiological studies    Part of this note may be an electronic transcription/translation of spoken language to printed text using the Dragon Dictation System.

## 2025-05-24 NOTE — PROGRESS NOTES
LOS: 3 days   Patient Care Team:  Abner Funes APRN as PCP - General (Family Medicine)  Brittany Canchola, RN as Nurse Navigator  Arben Tim MD as Consulting Physician (Hematology and Oncology)  Aman Gregory MD as Consulting Physician (Nephrology)    Subjective:  Overall better    Objective:   Afebrile      Review of Systems:   Review of Systems   Respiratory:  Positive for shortness of breath.    Neurological:  Positive for weakness.           Vital Signs  Temp:  [97.7 °F (36.5 °C)-98.1 °F (36.7 °C)] 97.8 °F (36.6 °C)  Heart Rate:  [58-76] 58  Resp:  [16-22] 16  BP: ()/(52-61) 100/61    Physical Exam:  Physical Exam  Vitals and nursing note reviewed.   Cardiovascular:      Rate and Rhythm: Rhythm irregular.      Heart sounds: Normal heart sounds.   Pulmonary:      Breath sounds: Normal breath sounds.   Skin:     General: Skin is warm.   Neurological:      Mental Status: He is alert.          Radiology:  XR Chest 1 View  Result Date: 5/23/2025  Impression: 1. No pneumothorax status post lung biopsy. 2. Persistent bibasilar airspace disease and small bilateral pleural effusions. 3. Stable asymmetric interstitial thickening throughout the left lung. Electronically Signed: Antwon Estrella MD  5/23/2025 9:05 AM EDT  Workstation ID: [id number]    XR Chest 1 View  Result Date: 5/22/2025  Impression: 1. Improved left lung aeration since 5/21/2025. No significant pleural fluid is evident. 2. Interstitial thickening bilaterally, left greater than right may represent atypical distribution of edema 3. No visible pneumothorax status post thoracentesis. Electronically Signed: Mar Aly MD  5/22/2025 1:35 PM EDT  Workstation ID: [id number]    CT Abdomen Pelvis Without Contrast  Result Date: 5/22/2025  Impression: 1.Urinary bladder is markedly distended with urine. There is moderate bilateral hydroureteronephrosis. No urinary tract calculi are seen. Appearance is most compatible with urinary retention or  bladder outlet obstruction. Patient would likely benefit from Rogers catheterization. 2.Moderate left and small right pleural effusions with bibasilar opacities which may be related to atelectasis, infiltrates, or neoplastic process. Please correlate with chest CT from 5/21/2025. 3.Cholelithiasis. 4.Additional findings as detailed above. Electronically Signed: Shahab Haro MD  5/22/2025 11:50 AM EDT  Workstation ID: [id number]    US Renal Bilateral  Result Date: 5/22/2025  Impression: 1.Moderate bilateral hydronephrosis. 2.Markedly distended urinary bladder. 3.Echogenic kidneys, which could indicate medical renal disease. Electronically Signed: Emigdio Epps MD  5/22/2025 3:02 AM EDT  Workstation ID: [id number]    CT Chest Without Contrast Diagnostic  Result Date: 5/21/2025  Impression: Compared to chest CT dated 11/1/2024, interval development of a moderate left pleural effusion, indeterminate for malignant pleural effusion, and interlobular septal thickening in the left lung which 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 versus pulmonary edema. Consolidative opacities in the left lower lobe and a perifissural nodular opacity in the left lower lobe, which may be infectious/inflammatory or may represent atelectasis. Underlying malignancy is not excluded. Interval increase in mediastinal adenopathy and interval development of left hilar, left axillary, left supraclavicular lymphadenopathy, suspicious for increased metastatic lymphadenopathy. Interval decrease size of a small right pleural effusion. Interval decrease in interlobular septal thickening in the right lung with persistent peripheral linear opacities in the right lower lobe which could represent atelectasis and/or scarring. Superimposed lymphangitic carcinomatosis is not entirely excluded. Similar-appearing masslike consolidation in the posterior basal right lower lobe. Partially imaged severe bilateral hydronephrosis Electronically Signed: Dru Kruse,  MD  5/21/2025 6:14 PM EDT  Workstation ID: [id number]    IR Removal Tunnel CV With Port Different Site  Result Date: 5/21/2025  1. Uncomplicated removal, right chest wall Qknxge-i-Bema. As mentioned above, the incision was sealed using Dermabond, while the skin ulceration/wound is set to heal via secondary intention. Dressing should be changed every 1 to 2 days using sterile technique. The patient is to return to Interventional Radiology for follow-up to evaluate for adequate healing. 2. Technically successful placement, 4 [place] single lumen power injectable Rhqwzr-t-Zefs utilizing the right upper extremity brachial vein approach with both sonographic and fluoroscopic guidance utilized. Electronically Signed: Darlene Wood MD  5/21/2025 3:29 PM EDT  Workstation ID: [id number]    IR PICC W Imaging Guidance  Result Date: 5/21/2025  1. Uncomplicated removal, right chest wall Yjrsyv-p-Ppfv. As mentioned above, the incision was sealed using Dermabond, while the skin ulceration/wound is set to heal via secondary intention. Dressing should be changed every 1 to 2 days using sterile technique. The patient is to return to Interventional Radiology for follow-up to evaluate for adequate healing. 2. Technically successful placement, 4 [place] single lumen power injectable Orjfcn-f-Ybek utilizing the right upper extremity brachial vein approach with both sonographic and fluoroscopic guidance utilized. Electronically Signed: Darlene Wood MD  5/21/2025 3:29 PM EDT  Workstation ID: [id number]    XR Chest 1 View  Result Date: 5/21/2025  Impression: 1. Extensive airspace disease throughout the left lung compatible with pneumonia. 2. Chronic airspace disease at the right lung base mildly improved from prior study. 3. Small bilateral pleural effusions. 4. Stable right port catheter. Electronically Signed: Antwon Estrella MD  5/21/2025 12:11 PM EDT  Workstation ID: [id number]         Results Review:     I reviewed the patient's new clinical  results.  I reviewed the patient's new imaging results and agree with the interpretation.    Medication Review:   Scheduled Meds:atorvastatin, 10 mg, Oral, Daily  calcium gluconate, 1,000 mg, Intravenous, Q12H  cefTAZidime, 2,000 mg, Intravenous, Q24H  guaiFENesin, 1,200 mg, Oral, Q12H  ipratropium-albuterol, 3 mL, Nebulization, 4x Daily - RT  levothyroxine, 75 mcg, Oral, Q AM  methylPREDNISolone sodium succinate, 40 mg, Intravenous, Q12H  midodrine, 10 mg, Oral, TID AC  multivitamin with minerals, 1 tablet, Oral, QAM  nicotine, 1 patch, Transdermal, Q24H  pantoprazole, 40 mg, Oral, Daily  sodium bicarbonate, 1,300 mg, Oral, TID  sodium chloride, 10 mL, Intravenous, Q12H  sotalol, 80 mg, Oral, BID  tamsulosin, 0.4 mg, Oral, Nightly      Continuous Infusions:sodium chloride, 75 mL/hr, Last Rate: 75 mL/hr (05/24/25 1032)      PRN Meds:.  acetaminophen    senna-docusate sodium **AND** polyethylene glycol **AND** bisacodyl **AND** bisacodyl    midodrine    nitroglycerin    ondansetron ODT **OR** ondansetron    sodium chloride    sodium chloride    Labs:    CBC    Results from last 7 days   Lab Units 05/24/25  0244 05/23/25  0608 05/22/25  0547 05/21/25  1104   WBC 10*3/mm3 12.63* 17.77* 13.46* 16.00*   HEMOGLOBIN g/dL 7.6* 8.8* 9.5* 11.5*   PLATELETS 10*3/mm3 183 254 266 299     BMP   Results from last 7 days   Lab Units 05/24/25  0244 05/23/25  0608 05/22/25  0547 05/21/25  1536   SODIUM mmol/L 135* 137 134* 133*   POTASSIUM mmol/L 4.7 4.7 4.9 4.4   CHLORIDE mmol/L 107 105 103 102   CO2 mmol/L 18.6* 18.1* 19.2* 20.4*   BUN mg/dL 47* 39* 33* 29*   CREATININE mg/dL 2.25* 2.29* 2.34* 2.36*   GLUCOSE mg/dL 164* 135* 148* 106*   MAGNESIUM mg/dL  --  2.0  --   --    PHOSPHORUS mg/dL 3.3 5.1*  --   --      Cr Clearance Estimated Creatinine Clearance: 31.9 mL/min (A) (by C-G formula based on SCr of 2.25 mg/dL (H)).  Coag   Results from last 7 days   Lab Units 05/21/25  1104   INR  1.18*   APTT seconds 29.2     HbA1C No results  "found for: \"HGBA1C\"  Blood Glucose   Glucose   Date/Time Value Ref Range Status   05/23/2025 1951 197 (H) 70 - 105 mg/dL Final     Comment:     Serial Number: [id number]:  [id number]   05/23/2025 1357 190 (H) 70 - 105 mg/dL Final     Comment:     Serial Number: [id number]:  [id number]     Infection   Results from last 7 days   Lab Units 05/22/25  1222 05/21/25  1552 05/21/25  1539   BLOODCX   --  No growth at 2 days No growth at 2 days   BODYFLDCX  No growth at 2 days  --   --      CMP   Results from last 7 days   Lab Units 05/24/25  0244 05/23/25  0608 05/22/25  0547 05/21/25  1536   SODIUM mmol/L 135* 137 134* 133*   POTASSIUM mmol/L 4.7 4.7 4.9 4.4   CHLORIDE mmol/L 107 105 103 102   CO2 mmol/L 18.6* 18.1* 19.2* 20.4*   BUN mg/dL 47* 39* 33* 29*   CREATININE mg/dL 2.25* 2.29* 2.34* 2.36*   GLUCOSE mg/dL 164* 135* 148* 106*   ALBUMIN g/dL 3.2* 3.6  --  3.7   BILIRUBIN mg/dL  --   --   --  0.3   ALK PHOS U/L  --   --   --  60   AST (SGOT) U/L  --   --   --  76*   ALT (SGPT) U/L  --   --   --  65*     UA      Radiology(recent) XR Chest 1 View  Result Date: 5/23/2025  Impression: 1. No pneumothorax status post lung biopsy. 2. Persistent bibasilar airspace disease and small bilateral pleural effusions. 3. Stable asymmetric interstitial thickening throughout the left lung. Electronically Signed: Antwon Estrella MD  5/23/2025 9:05 AM EDT  Workstation ID: [id number]    XR Chest 1 View  Result Date: 5/22/2025  Impression: 1. Improved left lung aeration since 5/21/2025. No significant pleural fluid is evident. 2. Interstitial thickening bilaterally, left greater than right may represent atypical distribution of edema 3. No visible pneumothorax status post thoracentesis. Electronically Signed: Mar Aly MD  5/22/2025 1:35 PM EDT  Workstation ID: [id number]    CT Abdomen Pelvis Without Contrast  Result Date: 5/22/2025  Impression: 1.Urinary bladder is markedly distended with urine. There is moderate bilateral " hydroureteronephrosis. No urinary tract calculi are seen. Appearance is most compatible with urinary retention or bladder outlet obstruction. Patient would likely benefit from Rogers catheterization. 2.Moderate left and small right pleural effusions with bibasilar opacities which may be related to atelectasis, infiltrates, or neoplastic process. Please correlate with chest CT from 5/21/2025. 3.Cholelithiasis. 4.Additional findings as detailed above. Electronically Signed: Shahab Haro MD  5/22/2025 11:50 AM EDT  Workstation ID: [id number]     Assessment:    Acute renal failure with acute kidney injury superimposed upon chronic kidney disease stage IIIa  Postobstructive pneumonia, gram-negative species  Acute metabolic acidosis  History of stage IV adenocarcinoma of the lung bilateral  Obstructive uropathy  Anemia of chronic kidney disease  Paroxysmal atrial fibrillation  Hypercoagulable state secondary to atrial fibrillation  Acquired hypothyroidism  Hypocalcemia  Hypertension associated chronic kidney disease stage IIIa  Dyslipidemia  Chronic oral anticoagulation    Plan:  Care as outlined//renal support//gentle fluids        Misael Apple MD  05/24/25  10:58 EDT

## 2025-05-24 NOTE — PROGRESS NOTES
"NEPHROLOGY PROGRESS NOTE------[facility]    [facility]  [phone]  Gualberto Still MD      Patient Care Team:  Abner Funes APRN as PCP - General (Family Medicine)  Brittany Canchola, RN as Nurse Navigator  Arben Tim MD as Consulting Physician (Hematology and Oncology)  Aman Gregory MD as Consulting Physician (Nephrology)      Provider:  Gualberto Still MD  Patient Name: Young Ames  :  1950    SUBJECTIVE:    F/U ARF/MARIA DEL ROSARIO/CRF/CKD    Little discomfort from lam. No SOB, CP, edema.     Medication:  atorvastatin, 10 mg, Oral, Daily  cefTAZidime, 2,000 mg, Intravenous, Q24H  guaiFENesin, 1,200 mg, Oral, Q12H  ipratropium-albuterol, 3 mL, Nebulization, 4x Daily - RT  levothyroxine, 75 mcg, Oral, Q AM  methylPREDNISolone sodium succinate, 40 mg, Intravenous, Q12H  midodrine, 5 mg, Oral, TID AC  multivitamin with minerals, 1 tablet, Oral, QAM  nicotine, 1 patch, Transdermal, Q24H  pantoprazole, 40 mg, Oral, Daily  sodium chloride, 10 mL, Intravenous, Q12H  sotalol, 80 mg, Oral, BID  tamsulosin, 0.4 mg, Oral, Nightly      sodium chloride, 75 mL/hr, Last Rate: 75 mL/hr (25 0121)        OBJECTIVE    Vital Sign Min/Max for last 24 hours  Temp  Min: 97.7 °F (36.5 °C)  Max: 98.1 °F (36.7 °C)   BP  Min: 90/52  Max: 100/61   Pulse  Min: 58  Max: 76   Resp  Min: 16  Max: 22   SpO2  Min: 91 %  Max: 100 %   No data recorded   No data recorded     Flowsheet Rows      Flowsheet Row First Filed Value   Admission Height 177.8 cm (70\") Documented at 2025 1501   Admission Weight 80.3 kg (177 lb 0.5 oz) Documented at 2025 1501            No intake/output data recorded.  I/O last 3 completed shifts:  In: 769 [P.O.:240; I.V.:529]  Out: 2275 [Urine:2275]    Physical Exam:  General Appearance: alert, appears stated age and cooperative  Head: normocephalic, without obvious abnormality and atraumatic  Eyes: conjunctivae and sclerae " "normal and no icterus  Neck: supple and no JVD  Lungs: clear to auscultation and respirations regular  Heart: regular rhythm & normal rate and normal S1, S2 +ANDREW  Chest: Wall no abnormalities observed  Abdomen: normal bowel sounds and soft, nontender  Extremities: moves extremities well, no edema, no cyanosis and no redness  Skin: no bleeding, bruising or rash, turgor normal, color normal and no lesions noted  Neurologic: Alert, and oriented. No focal deficits    Labs:    WBC WBC   Date Value Ref Range Status   05/24/2025 12.63 (H) 3.40 - 10.80 10*3/mm3 Final   05/23/2025 17.77 (H) 3.40 - 10.80 10*3/mm3 Final   05/22/2025 13.46 (H) 3.40 - 10.80 10*3/mm3 Final   05/21/2025 16.00 (H) 3.40 - 10.80 10*3/mm3 Final      HGB Hemoglobin   Date Value Ref Range Status   05/24/2025 7.6 (L) 13.0 - 17.7 g/dL Final   05/23/2025 8.8 (L) 13.0 - 17.7 g/dL Final   05/22/2025 9.5 (L) 13.0 - 17.7 g/dL Final     Comment:     Result checked     05/21/2025 11.5 (L) 13.0 - 17.7 g/dL Final      HCT Hematocrit   Date Value Ref Range Status   05/24/2025 23.7 (L) 37.5 - 51.0 % Final   05/23/2025 26.6 (L) 37.5 - 51.0 % Final   05/22/2025 29.1 (L) 37.5 - 51.0 % Final   05/21/2025 35.6 (L) 37.5 - 51.0 % Final      Platelets No results found for: \"LABPLAT\"   MCV MCV   Date Value Ref Range Status   05/24/2025 95.2 79.0 - 97.0 fL Final   05/23/2025 94.0 79.0 - 97.0 fL Final   05/22/2025 93.9 79.0 - 97.0 fL Final   05/21/2025 93.2 79.0 - 97.0 fL Final          Sodium Sodium   Date Value Ref Range Status   05/24/2025 135 (L) 136 - 145 mmol/L Final   05/23/2025 137 136 - 145 mmol/L Final   05/22/2025 134 (L) 136 - 145 mmol/L Final   05/21/2025 133 (L) 136 - 145 mmol/L Final      Potassium Potassium   Date Value Ref Range Status   05/24/2025 4.7 3.5 - 5.2 mmol/L Final   05/23/2025 4.7 3.5 - 5.2 mmol/L Final   05/22/2025 4.9 3.5 - 5.2 mmol/L Final   05/21/2025 4.4 3.5 - 5.2 mmol/L Final      Chloride Chloride   Date Value Ref Range Status   05/24/2025 " "107 98 - 107 mmol/L Final   05/23/2025 105 98 - 107 mmol/L Final   05/22/2025 103 98 - 107 mmol/L Final   05/21/2025 102 98 - 107 mmol/L Final      CO2 CO2   Date Value Ref Range Status   05/24/2025 18.6 (L) 22.0 - 29.0 mmol/L Final   05/23/2025 18.1 (L) 22.0 - 29.0 mmol/L Final   05/22/2025 19.2 (L) 22.0 - 29.0 mmol/L Final   05/21/2025 20.4 (L) 22.0 - 29.0 mmol/L Final      BUN BUN   Date Value Ref Range Status   05/24/2025 47 (H) 8 - 23 mg/dL Final   05/23/2025 39 (H) 8 - 23 mg/dL Final   05/22/2025 33 (H) 8 - 23 mg/dL Final   05/21/2025 29 (H) 8 - 23 mg/dL Final      Creatinine Creatinine   Date Value Ref Range Status   05/24/2025 2.25 (H) 0.76 - 1.27 mg/dL Final   05/23/2025 2.29 (H) 0.76 - 1.27 mg/dL Final   05/22/2025 2.34 (H) 0.76 - 1.27 mg/dL Final   05/21/2025 2.36 (H) 0.76 - 1.27 mg/dL Final      Calcium Calcium   Date Value Ref Range Status   05/24/2025 8.2 (L) 8.6 - 10.5 mg/dL Final   05/23/2025 8.8 8.6 - 10.5 mg/dL Final   05/22/2025 8.6 8.6 - 10.5 mg/dL Final   05/21/2025 8.7 8.6 - 10.5 mg/dL Final      PO4 No components found for: \"PO4\"   Albumin Albumin   Date Value Ref Range Status   05/24/2025 3.2 (L) 3.5 - 5.2 g/dL Final   05/23/2025 3.6 3.5 - 5.2 g/dL Final   05/21/2025 3.7 3.5 - 5.2 g/dL Final      Magnesium Magnesium   Date Value Ref Range Status   05/23/2025 2.0 1.6 - 2.4 mg/dL Final      Uric Acid No components found for: \"URIC ACID\"     Imaging Results (Last 72 Hours)       Procedure Component Value Units Date/Time    XR Chest 1 View [[id number]] Collected: 05/23/25 0902     Updated: 05/23/25 0907    Narrative:      XR CHEST 1 VW    Date of Exam: 5/23/2025 8:42 AM EDT    Indication: Status post left lung biopsy, rule out pneumothorax    Comparison: CT chest 5/21/2025, chest radiograph 5/22/2025    Findings:  No pneumothorax status post lung biopsy. Right upper extremity PICC again noted with the tip at the cavoatrial junction. Heart size is normal. Asymmetric interstitial thickening " throughout the left lung is stable. Persistent bibasilar airspace disease   and small bilateral pleural effusions.      Impression:      Impression:  1. No pneumothorax status post lung biopsy.  2. Persistent bibasilar airspace disease and small bilateral pleural effusions.  3. Stable asymmetric interstitial thickening throughout the left lung.          Electronically Signed: Antwon Estrella MD    5/23/2025 9:05 AM EDT    Workstation ID: [id number]    XR Chest 1 View [[id number]] Collected: 05/22/25 1333     Updated: 05/22/25 1337    Narrative:      XR CHEST 1 VW    Date of Exam: 5/22/2025 12:30 PM EDT    Indication: Thoracentesis    Comparison: CT chest 5/21/2025. AP chest 5/21/2025    Findings:  Interstitial thickening is present bilaterally, asymmetrically more prominent on the left. Aeration in the left lung appears improved. Suspected trace fluid within the minor fissure of the right lung. Right arm approach PICC tip terminates at the   cavoatrial junction. Stable mild cardiac enlargement. No pneumothorax. No significant pleural fluid is identified      Impression:      Impression:    1. Improved left lung aeration since 5/21/2025. No significant pleural fluid is evident.  2. Interstitial thickening bilaterally, left greater than right may represent atypical distribution of edema  3. No visible pneumothorax status post thoracentesis.      Electronically Signed: Mar Aly MD    5/22/2025 1:35 PM EDT    Workstation ID: [id number]    CT Abdomen Pelvis Without Contrast [[id number]] Collected: 05/22/25 1143     Updated: 05/22/25 1152    Narrative:      CT ABDOMEN PELVIS WO CONTRAST    Date of Exam: 5/22/2025 11:30 AM EDT    Indication: hydronephrosis.    Comparison: Renal ultrasound from today, PET/CT dated 8/28/2024    Technique: Axial CT images were obtained of the abdomen and pelvis without the administration of contrast. Sagittal and coronal reconstructions were performed.  Automated exposure control and iterative  reconstruction methods were used.    Findings:    Liver: The liver is unremarkable in morphology. Evaluation for focal liver lesions is limited without IV contrast. No biliary dilation is seen.    Gallbladder: Cholelithiasis.    Pancreas: Unremarkable.    Spleen: Unremarkable.    Adrenal glands: Unremarkable.    Genitourinary tract: Urinary bladder is markedly distended with urine. There is moderate bilateral hydroureteronephrosis. No urinary tract calculi are seen. Appearance is most compatible with urinary retention or bladder outlet obstruction. Patient would   likely benefit from Rogers catheterization. Prostate gland is prominent.    Gastrointestinal tract: Limited evaluation of the hollow viscera due to lack of IV contrast administration. No findings to suggest bowel obstruction.    Appendix: No findings to suggest acute appendicitis.    Other findings: No free air or free fluid is identified. No pathologically enlarged lymph nodes are seen. Vascular calcifications are present.    Bones and soft tissues: No acute or suspicious osseous or soft tissue lesion is identified.    Lung bases: Moderate left and small right pleural effusions with bibasilar opacities which may be related to atelectasis, infiltrates, or neoplastic process. Please correlate with chest CT from 5/21/2025.      Impression:      Impression:  1.Urinary bladder is markedly distended with urine. There is moderate bilateral hydroureteronephrosis. No urinary tract calculi are seen. Appearance is most compatible with urinary retention or bladder outlet obstruction. Patient would likely benefit   from Rogers catheterization.  2.Moderate left and small right pleural effusions with bibasilar opacities which may be related to atelectasis, infiltrates, or neoplastic process. Please correlate with chest CT from 5/21/2025.  3.Cholelithiasis.  4.Additional findings as detailed above.       Electronically Signed: Shahab Haro MD    5/22/2025 11:50 AM EDT     Workstation ID: [id number]    US Renal Bilateral [[id number]] Collected: 05/22/25 0259     Updated: 05/22/25 0304    Narrative:      US RENAL BILATERAL    Date of Exam: 5/22/2025 2:15 AM EDT    Indication: worsening kidney function.  hydronephrosis.    Comparison: PET/CT 8/28/2024.    Technique: Grayscale and color Doppler ultrasound evaluation of the kidneys and urinary bladder was performed.      Findings:  The right kidney measures 11.1 x 5.7 x 5.3 cm and the left kidney measures 11.7 x 5.5 x 6.9 cm. Kidney vascularity appears grossly intact. There is moderate bilateral hydronephrosis. Kidney parenchyma is echogenic. There is no solid kidney mass.  No   echogenic shadowing stone.  No hydronephrosis.    The urinary bladder is markedly distended with a volume of 2163 mL. This is reportedly post void, but does appear unchanged from prior PET/CT performed 8/28/2024.      Impression:      Impression:  1.Moderate bilateral hydronephrosis.  2.Markedly distended urinary bladder.  3.Echogenic kidneys, which could indicate medical renal disease.            Electronically Signed: Emigdio Epps MD    5/22/2025 3:02 AM EDT    Workstation ID: [id number]    CT Chest Without Contrast Diagnostic [[id number]] Collected: 05/21/25 1757     Updated: 05/21/25 1816    Narrative:      CT CHEST WO CONTRAST DIAGNOSTIC    Date of Exam: 5/21/2025 5:48 PM EDT    Indication: PNA on XRAY, lung ca.    Comparison: Chest CT [address]. Chest radiograph 5/21/2025.    Technique: Axial CT images were obtained of the chest without contrast administration.  Sagittal and coronal reconstructions were performed.  Automated exposure control and iterative reconstruction methods were used.      Findings:    Thyroid and thoracic inlet: Similar mildly enlarged multinodular thyroid gland.    Lymph nodes: Interval increased size of mediastinal lymphadenopathy, for example a prevascular node measures 2 cm in short axis, which was previously subcentimeter in size. There  is also suggestion of increased left hilar adenopathy, not well   characterized without intravenous contrast. Interval development of left axillary lymphadenopathy measuring up to 12 mm in short axis. Interval development of left supraclavicular lymphadenopathy measuring up to 13 mm in short axis. Calcified mediastinal   and hilar nodes, likely sequelae of prior granulomatous disease.    Cardiovascular: Normal appearing heart size. Increased size of a small pericardial effusion. Aorta and main pulmonary artery diameters are within normal range.. There are coronary artery calcifications. Aortic atherosclerosis. Right PICC terminates in   the lower SVC.    Esophagus: No significant abnormality.    Lung parenchyma: Emphysema. Interval decrease interlobular septal thickening in the right lung with persistent peripheral linear opacities in the right lower lobe which could represent atelectasis and/or scarring. Superimposed lymphangitic carcinomatosis   is not entirely excluded. Similar-appearing masslike consolidation in the posterior basal right lower lobe. Interval development of interlobular septal thickening in the left lung. New perifissural nodular opacity in the left lower lobe measuring 10 mm   on series 5, image 64. Consolidative opacity in the left lower lobe.    Airways: Trace secretions in the trachea. Bronchial wall thickening.    Pleura: Interval removal of right Pleurx catheter since prior chest CT dated 11/1/2024. Decreased size of a small right pleural effusion. Interval development of a moderate left pleural effusion.    Chest wall and osseous structures: Degenerative changes of the imaged spine. No acute osseous abnormality.    Included abdomen: Partially imaged severe bilateral hydronephrosis. Cholelithiasis.      Impression:      Impression:  Compared to chest CT dated 11/1/2024, interval development of a moderate left pleural effusion, indeterminate for malignant pleural effusion, and interlobular  septal thickening in the left lung which 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 versus   pulmonary edema.    Consolidative opacities in the left lower lobe and a perifissural nodular opacity in the left lower lobe, which may be infectious/inflammatory or may represent atelectasis. Underlying malignancy is not excluded.    Interval increase in mediastinal adenopathy and interval development of left hilar, left axillary, left supraclavicular lymphadenopathy, suspicious for increased metastatic lymphadenopathy.    Interval decrease size of a small right pleural effusion. Interval decrease in interlobular septal thickening in the right lung with persistent peripheral linear opacities in the right lower lobe which could represent atelectasis and/or scarring.   Superimposed lymphangitic carcinomatosis is not entirely excluded.    Similar-appearing masslike consolidation in the posterior basal right lower lobe.    Partially imaged severe bilateral hydronephrosis        Electronically Signed: Dru Kruse MD    5/21/2025 6:14 PM EDT    Workstation ID: [id number]            Results for orders placed during the hospital encounter of 05/21/25    XR Chest 1 View    Narrative  XR CHEST 1 VW    Date of Exam: 5/23/2025 8:42 AM EDT    Indication: Status post left lung biopsy, rule out pneumothorax    Comparison: CT chest 5/21/2025, chest radiograph 5/22/2025    Findings:  No pneumothorax status post lung biopsy. Right upper extremity PICC again noted with the tip at the cavoatrial junction. Heart size is normal. Asymmetric interstitial thickening throughout the left lung is stable. Persistent bibasilar airspace disease  and small bilateral pleural effusions.    Impression  Impression:  1. No pneumothorax status post lung biopsy.  2. Persistent bibasilar airspace disease and small bilateral pleural effusions.  3. Stable asymmetric interstitial thickening throughout the left lung.          Electronically Signed: Antwon Estrella  MD  5/23/2025 9:05 AM EDT  Workstation ID: [id number]      XR Chest 1 View    Narrative  XR CHEST 1 VW    Date of Exam: 5/22/2025 12:30 PM EDT    Indication: Thoracentesis    Comparison: CT chest 5/21/2025. AP chest 5/21/2025    Findings:  Interstitial thickening is present bilaterally, asymmetrically more prominent on the left. Aeration in the left lung appears improved. Suspected trace fluid within the minor fissure of the right lung. Right arm approach PICC tip terminates at the  cavoatrial junction. Stable mild cardiac enlargement. No pneumothorax. No significant pleural fluid is identified    Impression  Impression:    1. Improved left lung aeration since 5/21/2025. No significant pleural fluid is evident.  2. Interstitial thickening bilaterally, left greater than right may represent atypical distribution of edema  3. No visible pneumothorax status post thoracentesis.      Electronically Signed: Mar Aly MD  5/22/2025 1:35 PM EDT  Workstation ID: [id number]      Results for orders placed during the hospital encounter of 05/21/25    XR Chest 1 View    Narrative  XR CHEST 1 VW    Date of Exam: 5/21/2025 11:50 AM EDT    Indication: elevate wbc/ soa    Comparison: PA and lateral chest radiograph 12/12/2024    Findings:  There is extensive airspace disease throughout the left lung compatible with pneumonia which is new from the prior study. No definite consolidation the right lung. There is chronic airspace disease at the right lung base which is mildly improved from the  prior study. There is a right-sided port catheter with the tip at the cavoatrial junction. Stable right perihilar scarring. Small bilateral pleural effusions suspected. No pneumothorax.    Impression  Impression:  1. Extensive airspace disease throughout the left lung compatible with pneumonia.  2. Chronic airspace disease at the right lung base mildly improved from prior study.  3. Small bilateral pleural effusions.  4. Stable right port  catheter.          Electronically Signed: Antwon Estrella MD  5/21/2025 12:11 PM EDT  Workstation ID: [id number]            ASSESSMENT / PLAN      Pneumonia    Pleural effusion    Stage IV adenocarcinoma of lung, right    Stage IV adenocarcinoma of lung, left    CKD (chronic kidney disease) stage 4, GFR 15-29 ml/min    Hydronephrosis    Atrial fibrillation    Chronic anticoagulation    Hypoxia    Hypothyroidism (acquired)    Dysphagia    ARF/MARIA DEL ROSARIO/CRF/CKD---------Nonoliguric. BUN up. Cr down. Follow for PRBC need to help renal perfusion. Avoid hypotension. ARF/MARIA DEL ROSARIO secondary to obstructive uropathy and prerenal state. CRF/CKD STG 3 secondary to HTN NS. Dose meds for CrCl less than 10 cc/min until ARF/MARIA DEL ROSARIO is resolved    2. OBSTRUCTIVE UROPATHY----per Urology. Rogers in place. On Flomax    3. HTN WITH CKD------BP still soft Increase Midodrine and continue NS. No ACE-I/ARB/DRI/diuretic    4. ADENOCARCINOMA OF THE LUNG    5. ANEMIA OF CKD-----H/H down. Follow for PRBC need    6. ACIDOSIS-----Metabolic. No elevation in AGAP. +Type 4 RTA. Give po NaHCO3    7. ATRIAL FIBRILLATION----Rate controlled    8. HYPERLIPIDEMIA---On Statin    9. HYPOTHYROIDISM----Clinically stable on Levothyroxine    10. HYPOCALCEMIA------Replace IV    11. HYPOALBUMINEMIA-----Nutritional supplements              Gualberto Still MD  [facility]  [phone]  05/24/25  08:55 EDT

## 2025-05-24 NOTE — PLAN OF CARE
Problem: Adult Inpatient Plan of Care  Goal: Plan of Care Review  Outcome: Progressing  Flowsheets (Taken 5/24/2025 1710)  Progress: no change  Outcome Evaluation: Patient resting abed. Patient has no complaints at this time.  Plan of Care Reviewed With: patient  Goal: Patient-Specific Goal (Individualized)  Outcome: Progressing  Goal: Absence of Hospital-Acquired Illness or Injury  Outcome: Progressing  Intervention: Identify and Manage Fall Risk  Recent Flowsheet Documentation  Taken 5/24/2025 1415 by Steven Hardy RN  Safety Promotion/Fall Prevention: safety round/check completed  Taken 5/24/2025 1220 by Steven Hardy RN  Safety Promotion/Fall Prevention: safety round/check completed  Taken 5/24/2025 1032 by Steven Hardy RN  Safety Promotion/Fall Prevention: safety round/check completed  Taken 5/24/2025 0821 by Steven Hardy RN  Safety Promotion/Fall Prevention: safety round/check completed  Intervention: Prevent Skin Injury  Recent Flowsheet Documentation  Taken 5/24/2025 0821 by Steven Hardy RN  Skin Protection: transparent dressing maintained  Intervention: Prevent and Manage VTE (Venous Thromboembolism) Risk  Recent Flowsheet Documentation  Taken 5/24/2025 0821 by Steven Hardy RN  VTE Prevention/Management:   bilateral   SCDs (sequential compression devices) off  Goal: Optimal Comfort and Wellbeing  Outcome: Progressing  Intervention: Provide Person-Centered Care  Recent Flowsheet Documentation  Taken 5/24/2025 0821 by Steven Hardy RN  Trust Relationship/Rapport:   choices provided   care explained   questions answered   questions encouraged  Goal: Readiness for Transition of Care  Outcome: Progressing  Goal: Plan of Care Review  Outcome: Progressing  Flowsheets (Taken 5/24/2025 1710)  Progress: no change  Outcome Evaluation: Patient resting abed. Patient has no complaints at this time.  Plan of Care Reviewed With: patient  Goal: Patient-Specific Goal (Individualized)  Outcome:  Progressing  Goal: Absence of Hospital-Acquired Illness or Injury  Outcome: Progressing  Intervention: Identify and Manage Fall Risk  Recent Flowsheet Documentation  Taken 5/24/2025 1415 by Steven Hardy RN  Safety Promotion/Fall Prevention: safety round/check completed  Taken 5/24/2025 1220 by Steven Hardy RN  Safety Promotion/Fall Prevention: safety round/check completed  Taken 5/24/2025 1032 by Steven Hardy RN  Safety Promotion/Fall Prevention: safety round/check completed  Taken 5/24/2025 0821 by Steven Hardy RN  Safety Promotion/Fall Prevention: safety round/check completed  Intervention: Prevent Skin Injury  Recent Flowsheet Documentation  Taken 5/24/2025 0821 by Steven Hardy RN  Skin Protection: transparent dressing maintained  Intervention: Prevent and Manage VTE (Venous Thromboembolism) Risk  Recent Flowsheet Documentation  Taken 5/24/2025 0821 by Steven Hardy RN  VTE Prevention/Management:   bilateral   SCDs (sequential compression devices) off  Goal: Optimal Comfort and Wellbeing  Outcome: Progressing  Intervention: Provide Person-Centered Care  Recent Flowsheet Documentation  Taken 5/24/2025 0821 by Steven Hardy RN  Trust Relationship/Rapport:   choices provided   care explained   questions answered   questions encouraged  Goal: Readiness for Transition of Care  Outcome: Progressing   Goal Outcome Evaluation:  Plan of Care Reviewed With: patient        Progress: no change  Outcome Evaluation: Patient resting abed. Patient has no complaints at this time.

## 2025-05-24 NOTE — PROGRESS NOTES
Hematology/Oncology Inpatient Progress Note    PATIENT NAME: Young Ames  : 1950  MRN: [id number]    CHIEF COMPLAINT: Metastatic non-small cell lung cancer    HISTORY OF PRESENT ILLNESS:      2024: Mr. Ames first came to [facility] complaining of dyspnea. This had been getting worse over a short period of time. It was associated to unintended weight loss of approximately 15 lbs in around one month. He was diagnosed with pneumonia in 2024 he was treated with azithromycin and with amoxicillin/clavulanate, despite which he did not seem to recover completely. CT scan had shown dense consolidation of the right lower lobe and there was at some point suggestion of a cavitary lesion. 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 Following this a repeat scan showed a questionable 5.5 cm right lower lobe cavitary lesion. A bronchoscopy and biopsy on 2024 confirmed adenocarcinoma of the right lung. He feels reasonably well at this time. He has been breathing better, though he persists with dyspnea or exertion. He has been afebrile. He continues to cough but not more than before. He has not had abdominal pain and denies diarrhea or dysuria. On exam alert and conversant. Oriented and in no distress. No jaundice. No oral lesions and respirations are not labored. Lungs diminished bilaterally and absent on the right lower lobe. The abdomen is soft. No edema. Reviewed the images of the scans. Reviewed the laboratory exams. Discussed with him and his family. To proceed with a PET scan. Will not obtain  pulmonary function tests, as most likely he has had recurrence of a significant right pleural effusion. He is to see me with results.      2024: Feeling about the same as at the time of the last visit but has had more dyspnea.  He was placed on new medication in spite of which, intermittently,  he continues to be uncomfortable.  He is eating reasonably well and has had no weight loss.  He is also afebrile.  He denies chest pains.  No abdominal pain.  On exam he is conversant and oriented.  No distress.  No jaundice.  The lungs are diminished bilaterally but there is absence of breath sounds and a cough when he in the lower parts of the right chest.  The abdomen is soft.  There is no edema.  Laboratory exams and final reports of pathology were reviewed and discussed with him.  Discussed with him the stage of the disease, which corresponds to 4, given the pleural involvement.  To start chemotherapy and immunotherapy.  I have requested next-generation sequencing.  Port as soon as available.     9/29/2024: Feels reasonably well today.  Has had less dyspnea since the insertion of the pleural catheter as he has been able to have the fluid drained at home more frequently.  Yesterday, for the first time, he had persistent right-sided chest pain after the drainage of the fluid that is now resolved.  He continues to be reasonably active.  He continues to eat reasonably well and has had very minimal nausea.  He took the first chemotherapy without any complications.  He denies chest pain and he has less dyspnea than before.  No abdominal pain and no edema.  On exam chronically ill-appearing but in no distress.  No jaundice.  The lungs are diminished bilaterally with more pronounced reduction of the breath sounds in the right.  The heart is regular.  The abdomen is soft.  There is no edema.  Laboratory exams reviewed.  Discussed with him.  Continue same therapy for now.  I have sent a prescription for tramadol that he can take as needed for pain, when present.     10/14/2024: Breathing better.  Progressively finding less fluid in the thoracic cavity.  Is able to do more activity.  Has had enuresis once.  Also has noted tremors.  He is eating well.  No nausea or vomiting.  No chest pains.  No abdominal pain.  On exam he  does not seem in any distress.  He is conversant and well-oriented.  No jaundice or pallor.  Lungs symmetrically ventilated without any dullness to percussion on the right.  Abdomen soft.  No edema.  Laboratory exams reviewed.  Discussed with him.  My suspicion is that he is no longer producing as much pleural fluid as before and that perhaps today they will not be able to drain much.  Will obtain a scan and will see with results.  Continue for now.  I am not sure what the cause of the tremors and the enuresis is.     11/11/2024: Feeling well.  Continues to tolerate the treatment without difficulties.  He has no more dyspnea or pain than before.  He eats well and has had no nausea or vomiting.  As well his weight is stable.  Denies diarrhea or dysuria.  On exam he does not seem ill.  He is not jaundiced.  The lungs are diminished bilaterally but symmetrically.  Heart is regular.  The abdomen is soft.  There is no edema.  Laboratory exams reviewed.  Reviewed the images and the report of the scans.  There are some suggestion of response as the lymph nodes and the primary tumor have all decreased in size though it is difficult to tell if the primary lesion has decreased given the associated opacity surrounding it.  For now continue same treatment.  Treatment with immunotherapy after he has completed 4 cycles of chemoimmunotherapy.  See me in approximately 6 weeks.  Treatment plan placed.     12/23/2024: Received the first cycle of immunotherapy without any difficulties.  Has had some episodes of fever since the last visit.  Sometimes as high as 101.3.  He has no symptoms associated to this and often is not even aware that he has a fever.  He is using a forehead thermometer that seems to be good working order.  He is energetic in general.  He has no new limitations.  He has recently hurt a hip.  He is eating reasonably well though he persists with marked dysgeusia.  No chest pains or cough.  He has not had much drainage  from the Pleurx catheter lately in the last 3 times they were not able to drain any fluid.  He has no abdominal pain, diarrhea or dysuria.  On exam alert and conversant.  Oriented and in no distress.  No jaundice.  The lungs are diminished bilaterally but in a symmetrical fashion.  I do not believe there is any fluid at this time in the right pleural space.  Heart is regular.  Abdomen is soft.  There is no edema.  Laboratory exams reviewed and discussed with him.  To remove the Pleurx catheter.  Ideally to send the tip for culture.  He is also going to have blood cultures from the port and from a peripheral vein.  He will see me in a few weeks.  Continue immunotherapy for now.     2/13/2025: Has been feeling very well.  Has no new complaints.  His appetite has improved since he discontinued the chemotherapy and he has gained some weight.  He has no more dyspnea and has not been coughing.  His oxygen saturation has been above 90 consistently.  No chest pains or abdominal pain.  Denies diarrhea or constipation.  No dysuria.  No edema.  On exam alert and conversant.  Oriented.  No distress.  The neck is without deformity.  Palpation discloses a barely palpable thyroid.  No nodules.  No palpable lymphadenopathy.  Respirations not labored.  Lungs diminished bilaterally in a symmetrical fashion.  Heart regular.  Abdomen soft and without tumors.  No edema.  Reviewed the laboratory exams.  On 2 occasions he has had a very low TSH with slight elevations of the free T4, but without any symptoms.  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  Will investigate.     3/27/2025: Tired and with arthralgia of the right hip and both knees. He's had difficulties with arthritis for some time. The pain in the knees is predominantly when going up steps. He has been fatigued. He continues to eat with good appetite. No new respiratory symptoms. Denies fevers. On exam alert and conversant. Oriented and in no distress. No jaundice. O  oral lesions and respirations not labored. Lungs diminished but clear bilaterally. Heart regular. Abdomen soft and not tender. No edema. Reviewed the laboratory exams. To  continue with the same treatment. A new prescription for levothyroxine 50 mcg was sent and he was given instructions. He is to see me in 6 weeks.      5/15/08173: Feels about the same as at the time of the previous visit. His wife has noted that his oxygen saturation has decreased from the high 90's to the mid 90's and he feels more dyspneic. No chest pain but he has been coughing more and expectorates without hemoptysis. No dysphagia. Denies abdominal pain and has not had diarrhea. No dysuria. No edema. No skin rash. On exam alert and conversant. Oriented and in no distress. No jaundice. No oral lesions and respirations not labored. Lungs diminished bilaterally and symmetrically; there does not appear to be more pleural fluid. No edema. Reviewed the laboratory exams. His renal function has declined again. Will investigate further. No immunotherapy for now.     5/23/2025: I was asked to see Mr. Ames who has been a patient of mine since September 2025.  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  He has received treatment with carboplatin/pemetrexed/pembrolizumab.  Recently he has been on pembrolizumab only.  On May 22, 2025 he was at the hospital having explantation of a right port.  He was noted to be dyspneic and tachypneic and his oxygen saturation was down to 89%.  Chest x-ray revealed a left-sided pneumonia and he was sent to the emergency room.  Since yesterday he has been on ceftazidime and he feels much better.  Whereas yesterday he was dyspneic with minimal exertion, today he was able to get up and move around, even walking.  He has not been febrile but he had been coughing and expectorating some.  He had had no chest pains.  He was eating reasonably well and his weight had been within the same range.  On  exam he is a well-built and slender man who is well-oriented and conversant.  He is in good spirits and does not seem acutely ill.  He is neither jaundiced nor pale.  The lungs are markedly diminished bilaterally but rather clear.  He is heart is regular.  His abdomen is flat and soft and the liver and spleen are not enlarged.  There is no edema.  He had imaging studies of the chest.  As compared to previous scans he had experienced increase in mediastinal adenopathy and interval development of left hilar and left axillary as well as left supraclavicular lymphadenopathy that were suggested of malignant progression.  He underwent upper gastrointestinal endoscopy that revealed a short segment Schroeder's esophagus and then had bronchoscopy with biopsies of one of the lesions.  The final report of pathology is not yet available but the initial interpretation is of malignancy.  For now continue with antibiotics.  Await the final report of the biopsy to decide on subsequent treatment.  Discussed with him.      Subjective   5/23/2025: Feeling much better. Was able to sleep and has been able to eat. Not as much dyspnea or cough.   ROS:  Review of Systems   Constitutional:  Positive for fatigue. Negative for activity change, appetite change, chills, diaphoresis, fever and unexpected weight change.   HENT:  Negative for congestion, dental problem, drooling, ear discharge, ear pain, facial swelling, hearing loss, mouth sores, nosebleeds, postnasal drip, rhinorrhea, sinus pressure, sinus pain, sneezing, sore throat, tinnitus, trouble swallowing and voice change.    Eyes:  Negative for photophobia, pain, discharge, redness, itching and visual disturbance.   Respiratory:  Positive for cough and shortness of breath. Negative for apnea, choking, chest tightness, wheezing and stridor.    Cardiovascular:  Negative for chest pain, palpitations and leg swelling.   Gastrointestinal:  Negative for abdominal distention, abdominal pain,  anal bleeding, blood in stool, constipation, diarrhea, nausea, rectal pain and vomiting.   Endocrine: Negative for cold intolerance, heat intolerance, polydipsia and polyuria.   Genitourinary:  Negative for decreased urine volume, difficulty urinating, dysuria, flank pain, frequency, genital sores, hematuria and urgency.   Musculoskeletal:  Negative for arthralgias, back pain, gait problem, joint swelling, myalgias, neck pain and neck stiffness.   Skin:  Negative for color change, pallor and rash.   Neurological:  Negative for dizziness, tremors, seizures, syncope, facial asymmetry, speech difficulty, weakness, light-headedness, numbness and headaches.   Hematological:  Negative for adenopathy. Does not bruise/bleed easily.   Psychiatric/Behavioral:  Negative for agitation, behavioral problems, confusion, decreased concentration, hallucinations, self-injury, sleep disturbance and suicidal ideas. The patient is not nervous/anxious.       MEDICATIONS:    Scheduled Meds:  atorvastatin, 10 mg, Oral, Daily  calcium gluconate, 1,000 mg, Intravenous, Q12H  cefTAZidime, 2,000 mg, Intravenous, Q24H  guaiFENesin, 1,200 mg, Oral, Q12H  ipratropium-albuterol, 3 mL, Nebulization, 4x Daily - RT  levothyroxine, 75 mcg, Oral, Q AM  midodrine, 10 mg, Oral, TID AC  multivitamin with minerals, 1 tablet, Oral, QAM  nicotine, 1 patch, Transdermal, Q24H  pantoprazole, 40 mg, Oral, Daily  [START ON 5/25/2025] predniSONE, 20 mg, Oral, Daily With Breakfast  sodium bicarbonate, 1,300 mg, Oral, TID  sodium chloride, 10 mL, Intravenous, Q12H  sotalol, 80 mg, Oral, BID  tamsulosin, 0.4 mg, Oral, Nightly       Continuous Infusions:  sodium chloride, 75 mL/hr, Last Rate: 75 mL/hr (05/24/25 1032)       PRN Meds:    acetaminophen    senna-docusate sodium **AND** polyethylene glycol **AND** bisacodyl **AND** bisacodyl    midodrine    nitroglycerin    ondansetron ODT **OR** ondansetron    sodium chloride    sodium chloride     ALLERGIES:  No Known  "Allergies    Objective    VITALS:   /65   Pulse 66   Temp 97.8 °F (36.6 °C) (Oral)   Resp 16   Ht 180.3 cm (71\")   Wt 79.4 kg (175 lb 0.7 oz)   SpO2 100%   BMI 24.41 kg/m²     PHYSICAL EXAM: (performed by MD)  Physical Exam  Constitutional:       General: He is not in acute distress.     Appearance: He is not ill-appearing, toxic-appearing or diaphoretic.      Comments: Slender. Well built. No distress.    HENT:      Head: Normocephalic and atraumatic.      Right Ear: External ear normal.      Left Ear: External ear normal.      Nose: Nose normal.      Mouth/Throat:      Mouth: Mucous membranes are moist.      Pharynx: Oropharynx is clear.   Eyes:      General: No scleral icterus.        Right eye: No discharge.         Left eye: No discharge.      Conjunctiva/sclera: Conjunctivae normal.      Pupils: Pupils are equal, round, and reactive to light.   Cardiovascular:      Rate and Rhythm: Normal rate and regular rhythm.      Pulses: Normal pulses.      Heart sounds: Normal heart sounds. No murmur heard.     No friction rub. No gallop.   Pulmonary:      Effort: No respiratory distress.      Breath sounds: No stridor. No wheezing, rhonchi or rales.   Chest:      Chest wall: No tenderness.   Abdominal:      General: Abdomen is flat. Bowel sounds are normal. There is no distension.      Palpations: Abdomen is soft. There is no mass.      Tenderness: There is no abdominal tenderness. There is no right CVA tenderness, left CVA tenderness, guarding or rebound.   Musculoskeletal:         General: No tenderness, deformity or signs of injury.      Cervical back: No rigidity.      Right lower leg: No edema.      Left lower leg: No edema.   Lymphadenopathy:      Cervical: No cervical adenopathy.   Skin:     General: Skin is warm and dry.      Coloration: Skin is not jaundiced or pale.      Findings: No bruising or rash.   Neurological:      General: No focal deficit present.      Mental Status: He is alert and " oriented to person, place, and time.   Psychiatric:         Mood and Affect: Mood normal.         Behavior: Behavior normal.         Thought Content: Thought content normal.         Judgment: Judgment normal.     DICKSON Tim MD performed the physical exam on 5/24/2025 as documented above.     RECENT LABS:  Lab Results (last 24 hours)       Procedure Component Value Units Date/Time    AFB Culture - Lavage, Lung, Left Lower Lobe [[id number]] Collected: 05/23/25 0817    Specimen: Lavage from Lung, Left Lower Lobe Updated: 05/24/25 0918     AFB Stain No acid fast bacilli seen on concentrated smear    Body Fluid Culture - Body Fluid, Pleural Cavity [[id number]] Collected: 05/22/25 1222    Specimen: Body Fluid from Pleural Cavity Updated: 05/24/25 0722     Body Fluid Culture No growth at 2 days     Gram Stain Few (2+) WBCs per low power field      No organisms seen    Renal Function Panel [[id number]]  (Abnormal) Collected: 05/24/25 0244    Specimen: Blood Updated: 05/24/25 0348     Glucose 164 mg/dL      BUN 47 mg/dL      Creatinine 2.25 mg/dL      Sodium 135 mmol/L      Potassium 4.7 mmol/L      Chloride 107 mmol/L      CO2 18.6 mmol/L      Calcium 8.2 mg/dL      Albumin 3.2 g/dL      Phosphorus 3.3 mg/dL      Anion Gap 9.4 mmol/L      BUN/Creatinine Ratio 20.9     eGFR 29.7 mL/min/1.73     Narrative:      GFR Categories in Chronic Kidney Disease (CKD)              GFR Category          GFR (mL/min/1.73)    Interpretation  G1                    90 or greater        Normal or high (1)  G2                    60-89                Mild decrease (1)  G3a                   45-59                Mild to moderate decrease  G3b                   30-44                Moderate to severe decrease  G4                    15-29                Severe decrease  G5                    14 or less           Kidney failure    (1)In the absence of evidence of kidney disease, neither GFR category G1 or G2 fulfill the criteria for  CKD.    eGFR calculation 2021 CKD-EPI creatinine equation, which does not include race as a factor    CBC & Differential [[id number]]  (Abnormal) Collected: 05/24/25 0244    Specimen: Blood Updated: 05/24/25 0322    Narrative:      The following orders were created for panel order CBC & Differential.  Procedure                               Abnormality         Status                     ---------                               -----------         ------                     CBC Auto Differential[[id number]]        Abnormal            Final result                 Please view results for these tests on the individual orders.    CBC Auto Differential [[id number]]  (Abnormal) Collected: 05/24/25 0244    Specimen: Blood Updated: 05/24/25 0322     WBC 12.63 10*3/mm3      RBC 2.49 10*6/mm3      Hemoglobin 7.6 g/dL      Hematocrit 23.7 %      MCV 95.2 fL      MCH 30.5 pg      MCHC 32.1 g/dL      RDW 15.9 %      RDW-SD 56.0 fl      MPV 9.4 fL      Platelets 183 10*3/mm3      Neutrophil % 82.1 %      Lymphocyte % 12.0 %      Monocyte % 4.9 %      Eosinophil % 0.0 %      Basophil % 0.1 %      Immature Grans % 0.9 %      Neutrophils, Absolute 10.37 10*3/mm3      Lymphocytes, Absolute 1.52 10*3/mm3      Monocytes, Absolute 0.62 10*3/mm3      Eosinophils, Absolute 0.00 10*3/mm3      Basophils, Absolute 0.01 10*3/mm3      Immature Grans, Absolute 0.11 10*3/mm3      nRBC 0.0 /100 WBC     POC Glucose Once [[id number]]  (Abnormal) Collected: 05/23/25 1951    Specimen: Blood Updated: 05/23/25 1953     Glucose 197 mg/dL      Comment: Serial Number: [id number]:  [id number]       Blood Culture - Blood, Arm, Right [[id number]]  (Normal) Collected: 05/21/25 1552    Specimen: Blood from Arm, Right Updated: 05/23/25 1600     Blood Culture No growth at 2 days    Blood Culture - Blood, Blood, PICC Line [[id number]]  (Normal) Collected: 05/21/25 1539    Specimen: Blood, PICC Line Updated: 05/23/25 1545     Blood Culture No growth at 2 days     Narrative:      Less than seven (7) mL's of blood was collected.  Insufficient quantity may yield false negative results.    POC Glucose Once [[id number]]  (Abnormal) Collected: 05/23/25 1357    Specimen: Blood Updated: 05/23/25 1359     Glucose 190 mg/dL      Comment: Serial Number: [id number]:  [id number]             IMAGING REVIEWED:  XR Chest 1 View  Result Date: 5/23/2025  Impression: 1. No pneumothorax status post lung biopsy. 2. Persistent bibasilar airspace disease and small bilateral pleural effusions. 3. Stable asymmetric interstitial thickening throughout the left lung. Electronically Signed: Antwon Estrella MD  5/23/2025 9:05 AM EDT  Workstation ID: [id number]    XR Chest 1 View  Result Date: 5/22/2025  Impression: 1. Improved left lung aeration since 5/21/2025. No significant pleural fluid is evident. 2. Interstitial thickening bilaterally, left greater than right may represent atypical distribution of edema 3. No visible pneumothorax status post thoracentesis. Electronically Signed: aMr Aly MD  5/22/2025 1:35 PM EDT  Workstation ID: [id number]    Assessment & Plan   ASSESSMENT:  Progressive metastatic non-small cell lung cancer. Awaiting the final report of pathology. He had a good initial response to treatment but has evident progression at this time. Consider treatment with docetaxel and ramucirumab.   Pneumonia. Recovering well. No evidence of complications at this time. Discussed with him and his family.   Normocytic anemia: No need for transfusion. But will need to follow.   Persists with leukocytosis and neutrophilia. Continue antibiotics. Will follow.     PLAN:  As above.     Arben Tim MD on 5/24/2025 at 12:40 PM.

## 2025-05-24 NOTE — PLAN OF CARE
Problem: Adult Inpatient Plan of Care  Goal: Absence of Hospital-Acquired Illness or Injury  Intervention: Identify and Manage Fall Risk  Recent Flowsheet Documentation  Taken 5/24/2025 0128 by Lizbet Wang RN  Safety Promotion/Fall Prevention: safety round/check completed  Taken 5/23/2025 2337 by Lizbet Wang RN  Safety Promotion/Fall Prevention: safety round/check completed  Taken 5/23/2025 2230 by Lizbet Wang RN  Safety Promotion/Fall Prevention: safety round/check completed  Taken 5/23/2025 2100 by Lizbet Wang RN  Safety Promotion/Fall Prevention:   safety round/check completed   activity supervised   lighting adjusted   nonskid shoes/slippers when out of bed  Intervention: Prevent Skin Injury  Recent Flowsheet Documentation  Taken 5/23/2025 2337 by Lizbet Wang RN  Body Position: position changed independently  Taken 5/23/2025 2100 by Lizbet Wang RN  Body Position: position changed independently  Goal: Optimal Comfort and Wellbeing  Intervention: Provide Person-Centered Care  Recent Flowsheet Documentation  Taken 5/23/2025 2100 by Lizbet Wang RN  Trust Relationship/Rapport:   care explained   choices provided  Goal: Absence of Hospital-Acquired Illness or Injury  Intervention: Identify and Manage Fall Risk  Recent Flowsheet Documentation  Taken 5/24/2025 0128 by Lizbet Wang RN  Safety Promotion/Fall Prevention: safety round/check completed  Taken 5/23/2025 2337 by Lizbet Wang RN  Safety Promotion/Fall Prevention: safety round/check completed  Taken 5/23/2025 2230 by Lizbet Wang RN  Safety Promotion/Fall Prevention: safety round/check completed  Taken 5/23/2025 2100 by Lizbet Wang RN  Safety Promotion/Fall Prevention:   safety round/check completed   activity supervised   lighting adjusted   nonskid shoes/slippers when out of bed  Intervention: Prevent Skin Injury  Recent Flowsheet Documentation  Taken 5/23/2025 2337 by Lizbet Wang RN  Body  Position: position changed independently  Taken 5/23/2025 2100 by Lizbet Wang, RN  Body Position: position changed independently  Goal: Optimal Comfort and Wellbeing  Intervention: Provide Person-Centered Care  Recent Flowsheet Documentation  Taken 5/23/2025 2100 by Lizbet Wang, RN  Trust Relationship/Rapport:   care explained   choices provided   Goal Outcome Evaluation:      VSS on RA.  No c/o pain.  IVF infusing.  Rogers catheter in place.  Call light within reach.

## 2025-05-25 VITALS
HEIGHT: 71 IN | BODY MASS INDEX: 24.51 KG/M2 | TEMPERATURE: 97.8 F | SYSTOLIC BLOOD PRESSURE: 117 MMHG | WEIGHT: 175.04 LBS | RESPIRATION RATE: 16 BRPM | HEART RATE: 68 BPM | OXYGEN SATURATION: 100 % | DIASTOLIC BLOOD PRESSURE: 74 MMHG

## 2025-05-25 PROBLEM — J18.9 PNEUMONIA, UNSPECIFIED ORGANISM: Status: ACTIVE | Noted: 2025-05-25

## 2025-05-25 LAB
ALBUMIN SERPL-MCNC: 3 G/DL (ref 3.5–5.2)
ALBUMIN/GLOB SERPL: 1.6 G/DL
ALP SERPL-CCNC: 45 U/L (ref 39–117)
ALT SERPL W P-5'-P-CCNC: 28 U/L (ref 1–41)
ANION GAP SERPL CALCULATED.3IONS-SCNC: 9.6 MMOL/L (ref 5–15)
AST SERPL-CCNC: 24 U/L (ref 1–40)
BACTERIA FLD CULT: NORMAL
BACTERIA SPEC AEROBE CULT: NORMAL
BASOPHILS # BLD AUTO: 0.03 10*3/MM3 (ref 0–0.2)
BASOPHILS NFR BLD AUTO: 0.3 % (ref 0–1.5)
BILIRUB SERPL-MCNC: <0.2 MG/DL (ref 0–1.2)
BUN SERPL-MCNC: 40 MG/DL (ref 8–23)
BUN/CREAT SERPL: 22.1 (ref 7–25)
CA-I SERPL ISE-MCNC: 1.18 MMOL/L (ref 1.15–1.3)
CALCIUM SPEC-SCNC: 8.3 MG/DL (ref 8.6–10.5)
CHLORIDE SERPL-SCNC: 108 MMOL/L (ref 98–107)
CO2 SERPL-SCNC: 20.4 MMOL/L (ref 22–29)
CREAT SERPL-MCNC: 1.81 MG/DL (ref 0.76–1.27)
DEPRECATED RDW RBC AUTO: 55.8 FL (ref 37–54)
EGFRCR SERPLBLD CKD-EPI 2021: 38.5 ML/MIN/1.73
EOSINOPHIL # BLD AUTO: 0.34 10*3/MM3 (ref 0–0.4)
EOSINOPHIL NFR BLD AUTO: 3.5 % (ref 0.3–6.2)
ERYTHROCYTE [DISTWIDTH] IN BLOOD BY AUTOMATED COUNT: 15.9 % (ref 12.3–15.4)
GLOBULIN UR ELPH-MCNC: 1.9 GM/DL
GLUCOSE SERPL-MCNC: 96 MG/DL (ref 65–99)
GRAM STN SPEC: NORMAL
GRAM STN SPEC: NORMAL
HCT VFR BLD AUTO: 25.7 % (ref 37.5–51)
HGB BLD-MCNC: 8.2 G/DL (ref 13–17.7)
IMM GRANULOCYTES # BLD AUTO: 0.08 10*3/MM3 (ref 0–0.05)
IMM GRANULOCYTES NFR BLD AUTO: 0.8 % (ref 0–0.5)
LYMPHOCYTES # BLD AUTO: 2.11 10*3/MM3 (ref 0.7–3.1)
LYMPHOCYTES NFR BLD AUTO: 21.5 % (ref 19.6–45.3)
MAGNESIUM SERPL-MCNC: 1.7 MG/DL (ref 1.6–2.4)
MCH RBC QN AUTO: 30.6 PG (ref 26.6–33)
MCHC RBC AUTO-ENTMCNC: 31.9 G/DL (ref 31.5–35.7)
MCV RBC AUTO: 95.9 FL (ref 79–97)
MONOCYTES # BLD AUTO: 0.88 10*3/MM3 (ref 0.1–0.9)
MONOCYTES NFR BLD AUTO: 9 % (ref 5–12)
NEUTROPHILS NFR BLD AUTO: 6.37 10*3/MM3 (ref 1.7–7)
NEUTROPHILS NFR BLD AUTO: 64.9 % (ref 42.7–76)
NRBC BLD AUTO-RTO: 0 /100 WBC (ref 0–0.2)
PHOSPHATE SERPL-MCNC: 3.2 MG/DL (ref 2.5–4.5)
PLATELET # BLD AUTO: 173 10*3/MM3 (ref 140–450)
PMV BLD AUTO: 9.2 FL (ref 6–12)
POTASSIUM SERPL-SCNC: 4 MMOL/L (ref 3.5–5.2)
PROT SERPL-MCNC: 4.9 G/DL (ref 6–8.5)
QT INTERVAL: 442 MS
QT INTERVAL: 455 MS
QT INTERVAL: 455 MS
QT INTERVAL: 467 MS
QTC INTERVAL: 480 MS
QTC INTERVAL: 482 MS
QTC INTERVAL: 490 MS
QTC INTERVAL: 493 MS
RBC # BLD AUTO: 2.68 10*6/MM3 (ref 4.14–5.8)
SODIUM SERPL-SCNC: 138 MMOL/L (ref 136–145)
WBC NRBC COR # BLD AUTO: 9.81 10*3/MM3 (ref 3.4–10.8)

## 2025-05-25 PROCEDURE — 94799 UNLISTED PULMONARY SVC/PX: CPT

## 2025-05-25 PROCEDURE — 94761 N-INVAS EAR/PLS OXIMETRY MLT: CPT

## 2025-05-25 PROCEDURE — 63710000001 PREDNISONE PER 1 MG: Performed by: INTERNAL MEDICINE

## 2025-05-25 PROCEDURE — 25010000002 MAGNESIUM SULFATE 2 GM/50ML SOLUTION: Performed by: INTERNAL MEDICINE

## 2025-05-25 PROCEDURE — 25810000003 SODIUM CHLORIDE 0.9 % SOLUTION: Performed by: INTERNAL MEDICINE

## 2025-05-25 PROCEDURE — 82330 ASSAY OF CALCIUM: CPT | Performed by: INTERNAL MEDICINE

## 2025-05-25 PROCEDURE — 84100 ASSAY OF PHOSPHORUS: CPT | Performed by: INTERNAL MEDICINE

## 2025-05-25 PROCEDURE — 85025 COMPLETE CBC W/AUTO DIFF WBC: CPT | Performed by: INTERNAL MEDICINE

## 2025-05-25 PROCEDURE — 99232 SBSQ HOSP IP/OBS MODERATE 35: CPT | Performed by: INTERNAL MEDICINE

## 2025-05-25 PROCEDURE — 83735 ASSAY OF MAGNESIUM: CPT | Performed by: INTERNAL MEDICINE

## 2025-05-25 PROCEDURE — 80053 COMPREHEN METABOLIC PANEL: CPT | Performed by: INTERNAL MEDICINE

## 2025-05-25 PROCEDURE — 94664 DEMO&/EVAL PT USE INHALER: CPT

## 2025-05-25 RX ORDER — PREDNISONE 20 MG/1
20 TABLET ORAL
Qty: 2 TABLET | Refills: 0 | Status: SHIPPED | OUTPATIENT
Start: 2025-05-26 | End: 2025-05-25

## 2025-05-25 RX ORDER — SODIUM BICARBONATE 650 MG/1
1300 TABLET ORAL 3 TIMES DAILY
Qty: 180 TABLET | Refills: 0 | Status: SHIPPED | OUTPATIENT
Start: 2025-05-25

## 2025-05-25 RX ORDER — PREDNISONE 20 MG/1
20 TABLET ORAL
Qty: 2 TABLET | Refills: 0 | Status: SHIPPED | OUTPATIENT
Start: 2025-05-26 | End: 2025-05-28

## 2025-05-25 RX ORDER — MAGNESIUM SULFATE HEPTAHYDRATE 40 MG/ML
2 INJECTION, SOLUTION INTRAVENOUS ONCE
Status: COMPLETED | OUTPATIENT
Start: 2025-05-25 | End: 2025-05-25

## 2025-05-25 RX ORDER — SODIUM BICARBONATE 650 MG/1
1300 TABLET ORAL 3 TIMES DAILY
Qty: 180 TABLET | Refills: 0 | Status: SHIPPED | OUTPATIENT
Start: 2025-05-25 | End: 2025-05-25

## 2025-05-25 RX ADMIN — GUAIFENESIN 1200 MG: 600 TABLET, EXTENDED RELEASE ORAL at 08:22

## 2025-05-25 RX ADMIN — SODIUM BICARBONATE 1300 MG: 650 TABLET ORAL at 08:22

## 2025-05-25 RX ADMIN — MIDODRINE HYDROCHLORIDE 10 MG: 5 TABLET ORAL at 08:22

## 2025-05-25 RX ADMIN — Medication 10 ML: at 08:23

## 2025-05-25 RX ADMIN — SODIUM CHLORIDE 75 ML/HR: 9 INJECTION, SOLUTION INTRAVENOUS at 08:37

## 2025-05-25 RX ADMIN — LEVOTHYROXINE SODIUM 75 MCG: 0.07 TABLET ORAL at 05:25

## 2025-05-25 RX ADMIN — Medication 1 TABLET: at 08:22

## 2025-05-25 RX ADMIN — MIDODRINE HYDROCHLORIDE 10 MG: 5 TABLET ORAL at 10:27

## 2025-05-25 RX ADMIN — SOTALOL HYDROCHLORIDE 80 MG: 80 TABLET ORAL at 08:22

## 2025-05-25 RX ADMIN — MAGNESIUM SULFATE IN WATER FOR 2 G: 40 INJECTION INTRAVENOUS at 10:26

## 2025-05-25 RX ADMIN — APIXABAN 5 MG: 5 TABLET, FILM COATED ORAL at 08:22

## 2025-05-25 RX ADMIN — ATORVASTATIN CALCIUM 10 MG: 10 TABLET ORAL at 08:22

## 2025-05-25 RX ADMIN — PREDNISONE 20 MG: 20 TABLET ORAL at 08:22

## 2025-05-25 RX ADMIN — IPRATROPIUM BROMIDE AND ALBUTEROL SULFATE 3 ML: .5; 3 SOLUTION RESPIRATORY (INHALATION) at 07:30

## 2025-05-25 RX ADMIN — IPRATROPIUM BROMIDE AND ALBUTEROL SULFATE 3 ML: .5; 3 SOLUTION RESPIRATORY (INHALATION) at 12:13

## 2025-05-25 RX ADMIN — PANTOPRAZOLE SODIUM 40 MG: 40 TABLET, DELAYED RELEASE ORAL at 08:22

## 2025-05-25 NOTE — PROGRESS NOTES
"NEPHROLOGY PROGRESS NOTE------[facility]    [facility]  [phone]  Gualberto Still MD      Patient Care Team:  Abner Fuens APRN as PCP - General (Family Medicine)  Brittany Canchola, RN as Nurse Navigator  Arben Tim MD as Consulting Physician (Hematology and Oncology)  Aman Gregory MD as Consulting Physician (Nephrology)      Provider:  Gualberto Still MD  Patient Name: Young Ames  :  1950    SUBJECTIVE:    F/U ARF/MARIA DEL ROSARIO/CRF/CKD    Feeling good. Breathing ok. No discomfort from catheter. No edema. Appetite ok.     Medication:  apixaban, 5 mg, Oral, Q12H  atorvastatin, 10 mg, Oral, Daily  cefTAZidime, 2,000 mg, Intravenous, Q24H  guaiFENesin, 1,200 mg, Oral, Q12H  ipratropium-albuterol, 3 mL, Nebulization, 4x Daily - RT  levothyroxine, 75 mcg, Oral, Q AM  midodrine, 10 mg, Oral, TID AC  multivitamin with minerals, 1 tablet, Oral, QAM  nicotine, 1 patch, Transdermal, Q24H  pantoprazole, 40 mg, Oral, Daily  predniSONE, 20 mg, Oral, Daily With Breakfast  sodium bicarbonate, 1,300 mg, Oral, TID  sodium chloride, 10 mL, Intravenous, Q12H  sotalol, 80 mg, Oral, BID  tamsulosin, 0.4 mg, Oral, Nightly      sodium chloride, 75 mL/hr, Last Rate: 75 mL/hr (25 1032)        OBJECTIVE    Vital Sign Min/Max for last 24 hours  Temp  Min: 97.4 °F (36.3 °C)  Max: 98.1 °F (36.7 °C)   BP  Min: 88/57  Max: 117/70   Pulse  Min: 57  Max: 71   Resp  Min: 12  Max: 24   SpO2  Min: 90 %  Max: 100 %   No data recorded   No data recorded     Flowsheet Rows      Flowsheet Row First Filed Value   Admission Height 177.8 cm (70\") Documented at 2025 1501   Admission Weight 80.3 kg (177 lb 0.5 oz) Documented at 2025 1501            No intake/output data recorded.  I/O last 3 completed shifts:  In: 1640 [P.O.:240; I.V.:1400]  Out: 3300 [Urine:3300]    Physical Exam:  General Appearance: alert, appears stated age and cooperative  Head: " "normocephalic, without obvious abnormality and atraumatic  Eyes: conjunctivae and sclerae normal and no icterus  Neck: supple and no JVD  Lungs: clear to auscultation and respirations regular  Heart: regular rhythm & normal rate and normal S1, S2 +ANDREW  Chest: Wall no abnormalities observed  Abdomen: normal bowel sounds and soft, nontender  Extremities: moves extremities well, no edema, no cyanosis and no redness  Skin: no bleeding, bruising or rash, turgor normal, color normal and no lesions noted  Neurologic: Alert, and oriented. No focal deficits    Labs:    WBC WBC   Date Value Ref Range Status   05/25/2025 9.81 3.40 - 10.80 10*3/mm3 Final   05/24/2025 12.63 (H) 3.40 - 10.80 10*3/mm3 Final   05/23/2025 17.77 (H) 3.40 - 10.80 10*3/mm3 Final      HGB Hemoglobin   Date Value Ref Range Status   05/25/2025 8.2 (L) 13.0 - 17.7 g/dL Final   05/24/2025 7.6 (L) 13.0 - 17.7 g/dL Final   05/23/2025 8.8 (L) 13.0 - 17.7 g/dL Final      HCT Hematocrit   Date Value Ref Range Status   05/25/2025 25.7 (L) 37.5 - 51.0 % Final   05/24/2025 23.7 (L) 37.5 - 51.0 % Final   05/23/2025 26.6 (L) 37.5 - 51.0 % Final      Platelets No results found for: \"LABPLAT\"   MCV MCV   Date Value Ref Range Status   05/25/2025 95.9 79.0 - 97.0 fL Final   05/24/2025 95.2 79.0 - 97.0 fL Final   05/23/2025 94.0 79.0 - 97.0 fL Final          Sodium Sodium   Date Value Ref Range Status   05/25/2025 138 136 - 145 mmol/L Final   05/24/2025 135 (L) 136 - 145 mmol/L Final   05/23/2025 137 136 - 145 mmol/L Final      Potassium Potassium   Date Value Ref Range Status   05/25/2025 4.0 3.5 - 5.2 mmol/L Final   05/24/2025 4.7 3.5 - 5.2 mmol/L Final   05/23/2025 4.7 3.5 - 5.2 mmol/L Final      Chloride Chloride   Date Value Ref Range Status   05/25/2025 108 (H) 98 - 107 mmol/L Final   05/24/2025 107 98 - 107 mmol/L Final   05/23/2025 105 98 - 107 mmol/L Final      CO2 CO2   Date Value Ref Range Status   05/25/2025 20.4 (L) 22.0 - 29.0 mmol/L Final   05/24/2025 18.6 " "(L) 22.0 - 29.0 mmol/L Final   05/23/2025 18.1 (L) 22.0 - 29.0 mmol/L Final      BUN BUN   Date Value Ref Range Status   05/25/2025 40 (H) 8 - 23 mg/dL Final   05/24/2025 47 (H) 8 - 23 mg/dL Final   05/23/2025 39 (H) 8 - 23 mg/dL Final      Creatinine Creatinine   Date Value Ref Range Status   05/25/2025 1.81 (H) 0.76 - 1.27 mg/dL Final   05/24/2025 2.25 (H) 0.76 - 1.27 mg/dL Final   05/23/2025 2.29 (H) 0.76 - 1.27 mg/dL Final      Calcium Calcium   Date Value Ref Range Status   05/25/2025 8.3 (L) 8.6 - 10.5 mg/dL Final   05/24/2025 8.2 (L) 8.6 - 10.5 mg/dL Final   05/23/2025 8.8 8.6 - 10.5 mg/dL Final      PO4 No components found for: \"PO4\"   Albumin Albumin   Date Value Ref Range Status   05/25/2025 3.0 (L) 3.5 - 5.2 g/dL Final   05/24/2025 3.2 (L) 3.5 - 5.2 g/dL Final   05/23/2025 3.6 3.5 - 5.2 g/dL Final      Magnesium Magnesium   Date Value Ref Range Status   05/25/2025 1.7 1.6 - 2.4 mg/dL Final   05/23/2025 2.0 1.6 - 2.4 mg/dL Final      Uric Acid No components found for: \"URIC ACID\"     Imaging Results (Last 72 Hours)       Procedure Component Value Units Date/Time    XR Chest 1 View [[id number]] Collected: 05/23/25 0902     Updated: 05/23/25 0907    Narrative:      XR CHEST 1 VW    Date of Exam: 5/23/2025 8:42 AM EDT    Indication: Status post left lung biopsy, rule out pneumothorax    Comparison: CT chest 5/21/2025, chest radiograph 5/22/2025    Findings:  No pneumothorax status post lung biopsy. Right upper extremity PICC again noted with the tip at the cavoatrial junction. Heart size is normal. Asymmetric interstitial thickening throughout the left lung is stable. Persistent bibasilar airspace disease   and small bilateral pleural effusions.      Impression:      Impression:  1. No pneumothorax status post lung biopsy.  2. Persistent bibasilar airspace disease and small bilateral pleural effusions.  3. Stable asymmetric interstitial thickening throughout the left lung.          Electronically Signed: Antwon" MD Delores    5/23/2025 9:05 AM EDT    Workstation ID: [id number]    XR Chest 1 View [[id number]] Collected: 05/22/25 1333     Updated: 05/22/25 1337    Narrative:      XR CHEST 1 VW    Date of Exam: 5/22/2025 12:30 PM EDT    Indication: Thoracentesis    Comparison: CT chest 5/21/2025. AP chest 5/21/2025    Findings:  Interstitial thickening is present bilaterally, asymmetrically more prominent on the left. Aeration in the left lung appears improved. Suspected trace fluid within the minor fissure of the right lung. Right arm approach PICC tip terminates at the   cavoatrial junction. Stable mild cardiac enlargement. No pneumothorax. No significant pleural fluid is identified      Impression:      Impression:    1. Improved left lung aeration since 5/21/2025. No significant pleural fluid is evident.  2. Interstitial thickening bilaterally, left greater than right may represent atypical distribution of edema  3. No visible pneumothorax status post thoracentesis.      Electronically Signed: Mar Aly MD    5/22/2025 1:35 PM EDT    Workstation ID: [id number]    CT Abdomen Pelvis Without Contrast [[id number]] Collected: 05/22/25 1143     Updated: 05/22/25 1152    Narrative:      CT ABDOMEN PELVIS WO CONTRAST    Date of Exam: 5/22/2025 11:30 AM EDT    Indication: hydronephrosis.    Comparison: Renal ultrasound from today, PET/CT dated 8/28/2024    Technique: Axial CT images were obtained of the abdomen and pelvis without the administration of contrast. Sagittal and coronal reconstructions were performed.  Automated exposure control and iterative reconstruction methods were used.    Findings:    Liver: The liver is unremarkable in morphology. Evaluation for focal liver lesions is limited without IV contrast. No biliary dilation is seen.    Gallbladder: Cholelithiasis.    Pancreas: Unremarkable.    Spleen: Unremarkable.    Adrenal glands: Unremarkable.    Genitourinary tract: Urinary bladder is markedly distended with urine.  There is moderate bilateral hydroureteronephrosis. No urinary tract calculi are seen. Appearance is most compatible with urinary retention or bladder outlet obstruction. Patient would   likely benefit from Rogers catheterization. Prostate gland is prominent.    Gastrointestinal tract: Limited evaluation of the hollow viscera due to lack of IV contrast administration. No findings to suggest bowel obstruction.    Appendix: No findings to suggest acute appendicitis.    Other findings: No free air or free fluid is identified. No pathologically enlarged lymph nodes are seen. Vascular calcifications are present.    Bones and soft tissues: No acute or suspicious osseous or soft tissue lesion is identified.    Lung bases: Moderate left and small right pleural effusions with bibasilar opacities which may be related to atelectasis, infiltrates, or neoplastic process. Please correlate with chest CT from 5/21/2025.      Impression:      Impression:  1.Urinary bladder is markedly distended with urine. There is moderate bilateral hydroureteronephrosis. No urinary tract calculi are seen. Appearance is most compatible with urinary retention or bladder outlet obstruction. Patient would likely benefit   from Rogers catheterization.  2.Moderate left and small right pleural effusions with bibasilar opacities which may be related to atelectasis, infiltrates, or neoplastic process. Please correlate with chest CT from 5/21/2025.  3.Cholelithiasis.  4.Additional findings as detailed above.       Electronically Signed: Shahab Haro MD    5/22/2025 11:50 AM EDT    Workstation ID: [id number]            Results for orders placed during the hospital encounter of 05/21/25    XR Chest 1 View    Narrative  XR CHEST 1 VW    Date of Exam: 5/23/2025 8:42 AM EDT    Indication: Status post left lung biopsy, rule out pneumothorax    Comparison: CT chest 5/21/2025, chest radiograph 5/22/2025    Findings:  No pneumothorax status post lung biopsy. Right  upper extremity PICC again noted with the tip at the cavoatrial junction. Heart size is normal. Asymmetric interstitial thickening throughout the left lung is stable. Persistent bibasilar airspace disease  and small bilateral pleural effusions.    Impression  Impression:  1. No pneumothorax status post lung biopsy.  2. Persistent bibasilar airspace disease and small bilateral pleural effusions.  3. Stable asymmetric interstitial thickening throughout the left lung.          Electronically Signed: Antwon Estrella MD  5/23/2025 9:05 AM EDT  Workstation ID: [id number]      XR Chest 1 View    Narrative  XR CHEST 1 VW    Date of Exam: 5/22/2025 12:30 PM EDT    Indication: Thoracentesis    Comparison: CT chest 5/21/2025. AP chest 5/21/2025    Findings:  Interstitial thickening is present bilaterally, asymmetrically more prominent on the left. Aeration in the left lung appears improved. Suspected trace fluid within the minor fissure of the right lung. Right arm approach PICC tip terminates at the  cavoatrial junction. Stable mild cardiac enlargement. No pneumothorax. No significant pleural fluid is identified    Impression  Impression:    1. Improved left lung aeration since 5/21/2025. No significant pleural fluid is evident.  2. Interstitial thickening bilaterally, left greater than right may represent atypical distribution of edema  3. No visible pneumothorax status post thoracentesis.      Electronically Signed: Mar Aly MD  5/22/2025 1:35 PM EDT  Workstation ID: [id number]      Results for orders placed during the hospital encounter of 05/21/25    XR Chest 1 View    Narrative  XR CHEST 1 VW    Date of Exam: 5/21/2025 11:50 AM EDT    Indication: elevate wbc/ soa    Comparison: PA and lateral chest radiograph 12/12/2024    Findings:  There is extensive airspace disease throughout the left lung compatible with pneumonia which is new from the prior study. No definite consolidation the right lung. There is chronic airspace  disease at the right lung base which is mildly improved from the  prior study. There is a right-sided port catheter with the tip at the cavoatrial junction. Stable right perihilar scarring. Small bilateral pleural effusions suspected. No pneumothorax.    Impression  Impression:  1. Extensive airspace disease throughout the left lung compatible with pneumonia.  2. Chronic airspace disease at the right lung base mildly improved from prior study.  3. Small bilateral pleural effusions.  4. Stable right port catheter.          Electronically Signed: Antwon Estrella MD  5/21/2025 12:11 PM EDT  Workstation ID: [id number]            ASSESSMENT / PLAN      Pneumonia    Pleural effusion    Stage IV adenocarcinoma of lung, right    Stage IV adenocarcinoma of lung, left    CKD (chronic kidney disease) stage 4, GFR 15-29 ml/min    Hydronephrosis    Atrial fibrillation    Chronic anticoagulation    Hypoxia    Hypothyroidism (acquired)    Dysphagia    ARF/MARIA DEL ROSARIO/CRF/CKD---------Nonoliguric. BUN/Cr down. Follow for PRBC need to help renal perfusion. Avoid hypotension. ARF/MARIA DEL ROSARIO secondary to obstructive uropathy and prerenal state. CRF/CKD STG 3 secondary to HTN NS. Dose meds for CrCl less than 10 cc/min until ARF/MARIA DEL ROSARIO is resolved    2. OBSTRUCTIVE UROPATHY----per Urology. Rogers in place. On Flomax    3. HTN WITH CKD------BP still soft Increase Midodrine and continue NS. No ACE-I/ARB/DRI/diuretic    4. ADENOCARCINOMA OF THE LUNG    5. ANEMIA OF CKD-----H/H down. Follow for PRBC need    6. ACIDOSIS-----Metabolic. No elevation in AGAP. +Type 4 RTA. Incrase po NaHCO3    7. ATRIAL FIBRILLATION----Rate controlled    8. HYPERLIPIDEMIA---On Statin    9. HYPOTHYROIDISM----Clinically stable on Levothyroxine    10. HYPOCALCEMIA------Replaced    11. HYPOALBUMINEMIA-----Nutritional supplements    12. HYPOMAGNESEMIA-----Replace IV      Okay from RENAL standpoint to d/c today with close outpatient follow up      Gualberto Still MD  [facility]  [phone]  05/25/25  09:16 EDT

## 2025-05-25 NOTE — PLAN OF CARE
Problem: Adult Inpatient Plan of Care  Goal: Absence of Hospital-Acquired Illness or Injury  Intervention: Identify and Manage Fall Risk  Recent Flowsheet Documentation  Taken 5/25/2025 0200 by Lizbet Wang RN  Safety Promotion/Fall Prevention: safety round/check completed  Taken 5/24/2025 2341 by Lizbet Wang RN  Safety Promotion/Fall Prevention: safety round/check completed  Taken 5/24/2025 2200 by Lizbet Wang RN  Safety Promotion/Fall Prevention: safety round/check completed  Taken 5/24/2025 1922 by Lizbet Wang RN  Safety Promotion/Fall Prevention:   activity supervised   fall prevention program maintained   lighting adjusted   safety round/check completed  Intervention: Prevent Skin Injury  Recent Flowsheet Documentation  Taken 5/24/2025 2341 by Lizbet Wang RN  Body Position: position changed independently  Taken 5/24/2025 1922 by Lizbet Wang RN  Body Position: position changed independently  Goal: Absence of Hospital-Acquired Illness or Injury  Intervention: Identify and Manage Fall Risk  Recent Flowsheet Documentation  Taken 5/25/2025 0200 by Lizbet Wang RN  Safety Promotion/Fall Prevention: safety round/check completed  Taken 5/24/2025 2341 by Lizbet Wang RN  Safety Promotion/Fall Prevention: safety round/check completed  Taken 5/24/2025 2200 by Lizbet Wang RN  Safety Promotion/Fall Prevention: safety round/check completed  Taken 5/24/2025 1922 by Lizbet Wang RN  Safety Promotion/Fall Prevention:   activity supervised   fall prevention program maintained   lighting adjusted   safety round/check completed  Intervention: Prevent Skin Injury  Recent Flowsheet Documentation  Taken 5/24/2025 2341 by Lizbet Wang RN  Body Position: position changed independently  Taken 5/24/2025 1922 by Lizbet Wang RN  Body Position: position changed independently   Goal Outcome Evaluation:      Patient resting throughout this shift.  No complaints.  Rogers in  place.  IVF infusing and abxs infused.  Call light within reach.

## 2025-05-25 NOTE — PROGRESS NOTES
Hematology/Oncology Inpatient Progress Note    PATIENT NAME: Young Ames  : 1950  MRN: [id number]    CHIEF COMPLAINT: Metastatic non-small cell lung cancer    HISTORY OF PRESENT ILLNESS:      2024: Mr. Ames first came to [facility] complaining of dyspnea. This had been getting worse over a short period of time. It was associated to unintended weight loss of approximately 15 lbs in around one month. He was diagnosed with pneumonia in 2024 he was treated with azithromycin and with amoxicillin/clavulanate, despite which he did not seem to recover completely. CT scan had shown dense consolidation of the right lower lobe and there was at some point suggestion of a cavitary lesion. 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 Following this a repeat scan showed a questionable 5.5 cm right lower lobe cavitary lesion. A bronchoscopy and biopsy on 2024 confirmed adenocarcinoma of the right lung. He feels reasonably well at this time. He has been breathing better, though he persists with dyspnea or exertion. He has been afebrile. He continues to cough but not more than before. He has not had abdominal pain and denies diarrhea or dysuria. On exam alert and conversant. Oriented and in no distress. No jaundice. No oral lesions and respirations are not labored. Lungs diminished bilaterally and absent on the right lower lobe. The abdomen is soft. No edema. Reviewed the images of the scans. Reviewed the laboratory exams. Discussed with him and his family. To proceed with a PET scan. Will not obtain  pulmonary function tests, as most likely he has had recurrence of a significant right pleural effusion. He is to see me with results.      2024: Feeling about the same as at the time of the last visit but has had more dyspnea.  He was placed on new medication in spite of which, intermittently,  he continues to be uncomfortable.  He is eating reasonably well and has had no weight loss.  He is also afebrile.  He denies chest pains.  No abdominal pain.  On exam he is conversant and oriented.  No distress.  No jaundice.  The lungs are diminished bilaterally but there is absence of breath sounds and a cough when he in the lower parts of the right chest.  The abdomen is soft.  There is no edema.  Laboratory exams and final reports of pathology were reviewed and discussed with him.  Discussed with him the stage of the disease, which corresponds to 4, given the pleural involvement.  To start chemotherapy and immunotherapy.  I have requested next-generation sequencing.  Port as soon as available.     9/29/2024: Feels reasonably well today.  Has had less dyspnea since the insertion of the pleural catheter as he has been able to have the fluid drained at home more frequently.  Yesterday, for the first time, he had persistent right-sided chest pain after the drainage of the fluid that is now resolved.  He continues to be reasonably active.  He continues to eat reasonably well and has had very minimal nausea.  He took the first chemotherapy without any complications.  He denies chest pain and he has less dyspnea than before.  No abdominal pain and no edema.  On exam chronically ill-appearing but in no distress.  No jaundice.  The lungs are diminished bilaterally with more pronounced reduction of the breath sounds in the right.  The heart is regular.  The abdomen is soft.  There is no edema.  Laboratory exams reviewed.  Discussed with him.  Continue same therapy for now.  I have sent a prescription for tramadol that he can take as needed for pain, when present.     10/14/2024: Breathing better.  Progressively finding less fluid in the thoracic cavity.  Is able to do more activity.  Has had enuresis once.  Also has noted tremors.  He is eating well.  No nausea or vomiting.  No chest pains.  No abdominal pain.  On exam he  does not seem in any distress.  He is conversant and well-oriented.  No jaundice or pallor.  Lungs symmetrically ventilated without any dullness to percussion on the right.  Abdomen soft.  No edema.  Laboratory exams reviewed.  Discussed with him.  My suspicion is that he is no longer producing as much pleural fluid as before and that perhaps today they will not be able to drain much.  Will obtain a scan and will see with results.  Continue for now.  I am not sure what the cause of the tremors and the enuresis is.     11/11/2024: Feeling well.  Continues to tolerate the treatment without difficulties.  He has no more dyspnea or pain than before.  He eats well and has had no nausea or vomiting.  As well his weight is stable.  Denies diarrhea or dysuria.  On exam he does not seem ill.  He is not jaundiced.  The lungs are diminished bilaterally but symmetrically.  Heart is regular.  The abdomen is soft.  There is no edema.  Laboratory exams reviewed.  Reviewed the images and the report of the scans.  There are some suggestion of response as the lymph nodes and the primary tumor have all decreased in size though it is difficult to tell if the primary lesion has decreased given the associated opacity surrounding it.  For now continue same treatment.  Treatment with immunotherapy after he has completed 4 cycles of chemoimmunotherapy.  See me in approximately 6 weeks.  Treatment plan placed.     12/23/2024: Received the first cycle of immunotherapy without any difficulties.  Has had some episodes of fever since the last visit.  Sometimes as high as 101.3.  He has no symptoms associated to this and often is not even aware that he has a fever.  He is using a forehead thermometer that seems to be good working order.  He is energetic in general.  He has no new limitations.  He has recently hurt a hip.  He is eating reasonably well though he persists with marked dysgeusia.  No chest pains or cough.  He has not had much drainage  from the Pleurx catheter lately in the last 3 times they were not able to drain any fluid.  He has no abdominal pain, diarrhea or dysuria.  On exam alert and conversant.  Oriented and in no distress.  No jaundice.  The lungs are diminished bilaterally but in a symmetrical fashion.  I do not believe there is any fluid at this time in the right pleural space.  Heart is regular.  Abdomen is soft.  There is no edema.  Laboratory exams reviewed and discussed with him.  To remove the Pleurx catheter.  Ideally to send the tip for culture.  He is also going to have blood cultures from the port and from a peripheral vein.  He will see me in a few weeks.  Continue immunotherapy for now.     2/13/2025: Has been feeling very well.  Has no new complaints.  His appetite has improved since he discontinued the chemotherapy and he has gained some weight.  He has no more dyspnea and has not been coughing.  His oxygen saturation has been above 90 consistently.  No chest pains or abdominal pain.  Denies diarrhea or constipation.  No dysuria.  No edema.  On exam alert and conversant.  Oriented.  No distress.  The neck is without deformity.  Palpation discloses a barely palpable thyroid.  No nodules.  No palpable lymphadenopathy.  Respirations not labored.  Lungs diminished bilaterally in a symmetrical fashion.  Heart regular.  Abdomen soft and without tumors.  No edema.  Reviewed the laboratory exams.  On 2 occasions he has had a very low TSH with slight elevations of the free T4, but without any symptoms.  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  Will investigate.     3/27/2025: Tired and with arthralgia of the right hip and both knees. He's had difficulties with arthritis for some time. The pain in the knees is predominantly when going up steps. He has been fatigued. He continues to eat with good appetite. No new respiratory symptoms. Denies fevers. On exam alert and conversant. Oriented and in no distress. No jaundice. O  oral lesions and respirations not labored. Lungs diminished but clear bilaterally. Heart regular. Abdomen soft and not tender. No edema. Reviewed the laboratory exams. To  continue with the same treatment. A new prescription for levothyroxine 50 mcg was sent and he was given instructions. He is to see me in 6 weeks.      5/15/56360: Feels about the same as at the time of the previous visit. His wife has noted that his oxygen saturation has decreased from the high 90's to the mid 90's and he feels more dyspneic. No chest pain but he has been coughing more and expectorates without hemoptysis. No dysphagia. Denies abdominal pain and has not had diarrhea. No dysuria. No edema. No skin rash. On exam alert and conversant. Oriented and in no distress. No jaundice. No oral lesions and respirations not labored. Lungs diminished bilaterally and symmetrically; there does not appear to be more pleural fluid. No edema. Reviewed the laboratory exams. His renal function has declined again. Will investigate further. No immunotherapy for now.     5/23/2025: I was asked to see Mr. Ames who has been a patient of mine since September 2025.  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  He has received treatment with carboplatin/pemetrexed/pembrolizumab.  Recently he has been on pembrolizumab only.  On May 22, 2025 he was at the hospital having explantation of a right port.  He was noted to be dyspneic and tachypneic and his oxygen saturation was down to 89%.  Chest x-ray revealed a left-sided pneumonia and he was sent to the emergency room.  Since yesterday he has been on ceftazidime and he feels much better.  Whereas yesterday he was dyspneic with minimal exertion, today he was able to get up and move around, even walking.  He has not been febrile but he had been coughing and expectorating some.  He had had no chest pains.  He was eating reasonably well and his weight had been within the same range.  On  exam he is a well-built and slender man who is well-oriented and conversant.  He is in good spirits and does not seem acutely ill.  He is neither jaundiced nor pale.  The lungs are markedly diminished bilaterally but rather clear.  He is heart is regular.  His abdomen is flat and soft and the liver and spleen are not enlarged.  There is no edema.  He had imaging studies of the chest.  As compared to previous scans he had experienced increase in mediastinal adenopathy and interval development of left hilar and left axillary as well as left supraclavicular lymphadenopathy that were suggested of malignant progression.  He underwent upper gastrointestinal endoscopy that revealed a short segment Schroeder's esophagus and then had bronchoscopy with biopsies of one of the lesions.  The final report of pathology is not yet available but the initial interpretation is of malignancy.  For now continue with antibiotics.  Await the final report of the biopsy to decide on subsequent treatment.  Discussed with him.      Subjective   5/24/2025: Progressively stronger and wanting to go home. Able to eat and no nausea or vomiting.   ROS:  Review of Systems   Constitutional:  Positive for fatigue. Negative for activity change, appetite change, chills, diaphoresis, fever and unexpected weight change.   HENT:  Negative for congestion, dental problem, drooling, ear discharge, ear pain, facial swelling, hearing loss, mouth sores, nosebleeds, postnasal drip, rhinorrhea, sinus pressure, sinus pain, sneezing, sore throat, tinnitus, trouble swallowing and voice change.    Eyes:  Negative for photophobia, pain, discharge, redness, itching and visual disturbance.   Respiratory:  Positive for cough and shortness of breath. Negative for apnea, choking, chest tightness, wheezing and stridor.    Cardiovascular:  Negative for chest pain, palpitations and leg swelling.   Gastrointestinal:  Negative for abdominal distention, abdominal pain, anal  bleeding, blood in stool, constipation, diarrhea, nausea, rectal pain and vomiting.   Endocrine: Negative for cold intolerance, heat intolerance, polydipsia and polyuria.   Genitourinary:  Negative for decreased urine volume, difficulty urinating, dysuria, flank pain, frequency, genital sores, hematuria and urgency.   Musculoskeletal:  Negative for arthralgias, back pain, gait problem, joint swelling, myalgias, neck pain and neck stiffness.   Skin:  Negative for color change, pallor and rash.   Neurological:  Negative for dizziness, tremors, seizures, syncope, facial asymmetry, speech difficulty, weakness, light-headedness, numbness and headaches.   Hematological:  Negative for adenopathy. Does not bruise/bleed easily.   Psychiatric/Behavioral:  Negative for agitation, behavioral problems, confusion, decreased concentration, hallucinations, self-injury, sleep disturbance and suicidal ideas. The patient is not nervous/anxious.       MEDICATIONS:    Scheduled Meds:  apixaban, 5 mg, Oral, Q12H  atorvastatin, 10 mg, Oral, Daily  cefTAZidime, 2,000 mg, Intravenous, Q24H  guaiFENesin, 1,200 mg, Oral, Q12H  ipratropium-albuterol, 3 mL, Nebulization, 4x Daily - RT  levothyroxine, 75 mcg, Oral, Q AM  magnesium sulfate, 2 g, Intravenous, Once  midodrine, 10 mg, Oral, TID AC  multivitamin with minerals, 1 tablet, Oral, QAM  nicotine, 1 patch, Transdermal, Q24H  pantoprazole, 40 mg, Oral, Daily  predniSONE, 20 mg, Oral, Daily With Breakfast  sodium bicarbonate, 1,300 mg, Oral, TID  sodium chloride, 10 mL, Intravenous, Q12H  sotalol, 80 mg, Oral, BID  tamsulosin, 0.4 mg, Oral, Nightly       Continuous Infusions:  sodium chloride, 75 mL/hr, Last Rate: 75 mL/hr (05/24/25 1032)       PRN Meds:    acetaminophen    senna-docusate sodium **AND** polyethylene glycol **AND** bisacodyl **AND** bisacodyl    midodrine    nitroglycerin    ondansetron ODT **OR** ondansetron    sodium chloride    sodium chloride     ALLERGIES:  No Known  "Allergies    Objective    VITALS:   /70 (BP Location: Left arm, Patient Position: Sitting)   Pulse 65   Temp 97.4 °F (36.3 °C) (Oral)   Resp 24   Ht 180.3 cm (71\")   Wt 79.4 kg (175 lb 0.7 oz)   SpO2 90%   BMI 24.41 kg/m²     PHYSICAL EXAM: (performed by MD)  Physical Exam  Constitutional:       General: He is not in acute distress.     Appearance: He is not ill-appearing, toxic-appearing or diaphoretic.      Comments: Slender. Well built. No distress.    HENT:      Head: Normocephalic and atraumatic.      Right Ear: External ear normal.      Left Ear: External ear normal.      Nose: Nose normal.      Mouth/Throat:      Mouth: Mucous membranes are moist.      Pharynx: Oropharynx is clear.   Eyes:      General: No scleral icterus.        Right eye: No discharge.         Left eye: No discharge.      Conjunctiva/sclera: Conjunctivae normal.      Pupils: Pupils are equal, round, and reactive to light.   Cardiovascular:      Rate and Rhythm: Normal rate and regular rhythm.      Pulses: Normal pulses.      Heart sounds: Normal heart sounds. No murmur heard.     No friction rub. No gallop.   Pulmonary:      Effort: No respiratory distress.      Breath sounds: No stridor. No wheezing, rhonchi or rales.   Chest:      Chest wall: No tenderness.   Abdominal:      General: Abdomen is flat. Bowel sounds are normal. There is no distension.      Palpations: Abdomen is soft. There is no mass.      Tenderness: There is no abdominal tenderness. There is no right CVA tenderness, left CVA tenderness, guarding or rebound.   Musculoskeletal:         General: No tenderness, deformity or signs of injury.      Cervical back: No rigidity.      Right lower leg: No edema.      Left lower leg: No edema.   Lymphadenopathy:      Cervical: No cervical adenopathy.   Skin:     General: Skin is warm and dry.      Coloration: Skin is not jaundiced or pale.      Findings: No bruising or rash.   Neurological:      General: No focal deficit " present.      Mental Status: He is alert and oriented to person, place, and time.   Psychiatric:         Mood and Affect: Mood normal.         Behavior: Behavior normal.         Thought Content: Thought content normal.         Judgment: Judgment normal.     DICKSON Tim MD performed the physical exam on 5/25/2025 as documented above.     RECENT LABS:  Lab Results (last 24 hours)       Procedure Component Value Units Date/Time    BAL Culture, Quantitative - Lavage, Lung, Left Lower Lobe [[id number]] Collected: 05/23/25 0817    Specimen: Lavage from Lung, Left Lower Lobe Updated: 05/25/25 1006     BAL Culture 25,000 CFU/mL Normal respiratory scottie. No S. aureus or Pseudomonas aeruginosa detected. Final report.    Body Fluid Culture - Body Fluid, Pleural Cavity [[id number]] Collected: 05/22/25 1222    Specimen: Body Fluid from Pleural Cavity Updated: 05/25/25 0718     Body Fluid Culture No growth at 3 days     Gram Stain Few (2+) WBCs per low power field      No organisms seen    Comprehensive Metabolic Panel [[id number]]  (Abnormal) Collected: 05/25/25 0526    Specimen: Blood Updated: 05/25/25 0641     Glucose 96 mg/dL      BUN 40 mg/dL      Creatinine 1.81 mg/dL      Sodium 138 mmol/L      Potassium 4.0 mmol/L      Chloride 108 mmol/L      CO2 20.4 mmol/L      Calcium 8.3 mg/dL      Total Protein 4.9 g/dL      Albumin 3.0 g/dL      ALT (SGPT) 28 U/L      AST (SGOT) 24 U/L      Alkaline Phosphatase 45 U/L      Total Bilirubin <0.2 mg/dL      Globulin 1.9 gm/dL      A/G Ratio 1.6 g/dL      BUN/Creatinine Ratio 22.1     Anion Gap 9.6 mmol/L      eGFR 38.5 mL/min/1.73     Narrative:      GFR Categories in Chronic Kidney Disease (CKD)              GFR Category          GFR (mL/min/1.73)    Interpretation  G1                    90 or greater        Normal or high (1)  G2                    60-89                Mild decrease (1)  G3a                   45-59                Mild to moderate decrease  G3b                    30-44                Moderate to severe decrease  G4                    15-29                Severe decrease  G5                    14 or less           Kidney failure    (1)In the absence of evidence of kidney disease, neither GFR category G1 or G2 fulfill the criteria for CKD.    eGFR calculation 2021 CKD-EPI creatinine equation, which does not include race as a factor    Magnesium [[id number]]  (Normal) Collected: 05/25/25 0526    Specimen: Blood Updated: 05/25/25 0637     Magnesium 1.7 mg/dL     Phosphorus [[id number]]  (Normal) Collected: 05/25/25 0526    Specimen: Blood Updated: 05/25/25 0628     Phosphorus 3.2 mg/dL     Calcium, Ionized [[id number]]  (Normal) Collected: 05/25/25 0526    Specimen: Blood Updated: 05/25/25 0623     Ionized Calcium 1.18 mmol/L     CBC & Differential [[id number]]  (Abnormal) Collected: 05/25/25 0526    Specimen: Blood Updated: 05/25/25 0601    Narrative:      The following orders were created for panel order CBC & Differential.  Procedure                               Abnormality         Status                     ---------                               -----------         ------                     CBC Auto Differential[[id number]]        Abnormal            Final result                 Please view results for these tests on the individual orders.    CBC Auto Differential [[id number]]  (Abnormal) Collected: 05/25/25 0526    Specimen: Blood Updated: 05/25/25 0601     WBC 9.81 10*3/mm3      RBC 2.68 10*6/mm3      Hemoglobin 8.2 g/dL      Hematocrit 25.7 %      MCV 95.9 fL      MCH 30.6 pg      MCHC 31.9 g/dL      RDW 15.9 %      RDW-SD 55.8 fl      MPV 9.2 fL      Platelets 173 10*3/mm3      Neutrophil % 64.9 %      Lymphocyte % 21.5 %      Monocyte % 9.0 %      Eosinophil % 3.5 %      Basophil % 0.3 %      Immature Grans % 0.8 %      Neutrophils, Absolute 6.37 10*3/mm3      Lymphocytes, Absolute 2.11 10*3/mm3      Monocytes, Absolute 0.88 10*3/mm3      Eosinophils, Absolute 0.34  10*3/mm3      Basophils, Absolute 0.03 10*3/mm3      Immature Grans, Absolute 0.08 10*3/mm3      nRBC 0.0 /100 WBC     Blood Culture - Blood, Arm, Right [[id number]]  (Normal) Collected: 05/21/25 1552    Specimen: Blood from Arm, Right Updated: 05/24/25 1600     Blood Culture No growth at 3 days    Blood Culture - Blood, Blood, PICC Line [[id number]]  (Normal) Collected: 05/21/25 1539    Specimen: Blood, PICC Line Updated: 05/24/25 1545     Blood Culture No growth at 3 days    Narrative:      Less than seven (7) mL's of blood was collected.  Insufficient quantity may yield false negative results.          IMAGING REVIEWED:  No radiology results for the last day    Assessment & Plan   ASSESSMENT:  Progressive metastatic non-small cell lung cancer. Awaiting the final report of pathology. He had a good initial response to treatment but has evident progression at this time. Consider treatment with docetaxel and ramucirumab.   Pneumonia. Recovering well. No evidence of complications at this time. Continue antibiotics.   Normocytic anemia: Improved.   The leukocytosis has improved.     PLAN:  As above.     Arben Tim MD on 5/25/2025 at 10:26.

## 2025-05-25 NOTE — DISCHARGE SUMMARY
Date of Discharge:  5/25/2025    Discharge Diagnosis:   Active Hospital Problems    Diagnosis  POA    **Pneumonia [J18.9]  Yes    CKD (chronic kidney disease) stage 4, GFR 15-29 ml/min [N18.4]  Yes    Hydronephrosis [N13.30]  Yes    Atrial fibrillation [I48.91]  Yes    Chronic anticoagulation [Z79.01]  Not Applicable    Hypoxia [R09.02]  Yes    Hypothyroidism (acquired) [E03.9]  Yes    Dysphagia [R13.10]  Yes    Stage IV adenocarcinoma of lung, left [C34.92]  Unknown    Stage IV adenocarcinoma of lung, right [C34.91]  Yes    Pleural effusion [J90]  Yes      Resolved Hospital Problems   No resolved problems to display.       Presenting Problem/History of Present Illness  Active Hospital Problems    Diagnosis  POA    **Pneumonia [J18.9]  Yes    Pneumonia, unspecified organism [J18.9]  Yes    CKD (chronic kidney disease) stage 4, GFR 15-29 ml/min [N18.4]  Yes    Hydronephrosis [N13.30]  Yes    Atrial fibrillation [I48.91]  Yes    Chronic anticoagulation [Z79.01]  Not Applicable    Hypoxia [R09.02]  Yes    Hypothyroidism (acquired) [E03.9]  Yes    Dysphagia [R13.10]  Yes    Stage IV adenocarcinoma of lung, left [C34.92]  Unknown    Stage IV adenocarcinoma of lung, right [C34.91]  Yes    Pleural effusion [J90]  Yes      Resolved Hospital Problems   No resolved problems to display.          Hospital Course  Pleasant 75 yo male with history of right lung cancer treated with Keytruda was found to be hypoxic during removal of right chest port in IR. X-ray revealed left sided pneumonia. He was treated by pulmonary and is s/p bronchoscopy on 05/23/2025 with biopsy positive for lung malignancy. He was transitioned to oral Cefdinir and prednisone 20mg for 3 days.   During hospitalization he underwent swallow study secondary to dysphagia. EGD showed short segment Schroeder's esophagus.  Urology was consulted secondary to large volume retention resulting in bilateral hydronephrosis. A lam catheter was placed that will need to be  removed as an outpatient 1-2 weeks prior to voiding trial at the time of cystoscopy ad TRUS.  He is to follow up with PCP in 1-2 weeks, oncology, and urology.    Procedures Performed    Procedure(s):  BRONCHOSCOPY with bronchoalveolar lavage and biopsy x 1 area  ESOPHAGOGASTRODUODENOSCOPY with biopsy x 1 area and dilatation (48FR non-guided bougie)  -------------------    Procedure(s):  ESOPHAGOGASTRODUODENOSCOPY  -------------------  05/23 0757 Bronchoscopy  05/22 1224 Thoracentesis at Bedside    Consults:   Consults       Date and Time Order Name Status Description    5/23/2025 10:39 AM Hematology & Oncology Inpatient Consult Completed     5/22/2025 12:19 PM Inpatient Urology Consult Completed     5/22/2025  9:41 AM Inpatient Gastroenterology Consult Completed     5/21/2025 11:14 PM Inpatient Nephrology Consult Completed     5/21/2025  4:53 PM Inpatient Pulmonology Consult Completed     5/21/2025  3:42 PM Family Medicine Consult              Pertinent Test Results:    Lab Results (most recent)       Procedure Component Value Units Date/Time    Body Fluid Culture - Body Fluid, Pleural Cavity [[id number]] Collected: 05/22/25 1222    Specimen: Body Fluid from Pleural Cavity Updated: 05/25/25 0718     Body Fluid Culture No growth at 3 days     Gram Stain Few (2+) WBCs per low power field      No organisms seen    Comprehensive Metabolic Panel [[id number]]  (Abnormal) Collected: 05/25/25 0526    Specimen: Blood Updated: 05/25/25 0641     Glucose 96 mg/dL      BUN 40 mg/dL      Creatinine 1.81 mg/dL      Sodium 138 mmol/L      Potassium 4.0 mmol/L      Chloride 108 mmol/L      CO2 20.4 mmol/L      Calcium 8.3 mg/dL      Total Protein 4.9 g/dL      Albumin 3.0 g/dL      ALT (SGPT) 28 U/L      AST (SGOT) 24 U/L      Alkaline Phosphatase 45 U/L      Total Bilirubin <0.2 mg/dL      Globulin 1.9 gm/dL      A/G Ratio 1.6 g/dL      BUN/Creatinine Ratio 22.1     Anion Gap 9.6 mmol/L      eGFR 38.5 mL/min/1.73     Narrative:       GFR Categories in Chronic Kidney Disease (CKD)              GFR Category          GFR (mL/min/1.73)    Interpretation  G1                    90 or greater        Normal or high (1)  G2                    60-89                Mild decrease (1)  G3a                   45-59                Mild to moderate decrease  G3b                   30-44                Moderate to severe decrease  G4                    15-29                Severe decrease  G5                    14 or less           Kidney failure    (1)In the absence of evidence of kidney disease, neither GFR category G1 or G2 fulfill the criteria for CKD.    eGFR calculation 2021 CKD-EPI creatinine equation, which does not include race as a factor    Magnesium [[id number]]  (Normal) Collected: 05/25/25 0526    Specimen: Blood Updated: 05/25/25 0637     Magnesium 1.7 mg/dL     Phosphorus [[id number]]  (Normal) Collected: 05/25/25 0526    Specimen: Blood Updated: 05/25/25 0628     Phosphorus 3.2 mg/dL     Calcium, Ionized [[id number]]  (Normal) Collected: 05/25/25 0526    Specimen: Blood Updated: 05/25/25 0623     Ionized Calcium 1.18 mmol/L     CBC & Differential [[id number]]  (Abnormal) Collected: 05/25/25 0526    Specimen: Blood Updated: 05/25/25 0601    Narrative:      The following orders were created for panel order CBC & Differential.  Procedure                               Abnormality         Status                     ---------                               -----------         ------                     CBC Auto Differential[[id number]]        Abnormal            Final result                 Please view results for these tests on the individual orders.    CBC Auto Differential [[id number]]  (Abnormal) Collected: 05/25/25 0526    Specimen: Blood Updated: 05/25/25 0601     WBC 9.81 10*3/mm3      RBC 2.68 10*6/mm3      Hemoglobin 8.2 g/dL      Hematocrit 25.7 %      MCV 95.9 fL      MCH 30.6 pg      MCHC 31.9 g/dL      RDW 15.9 %      RDW-SD 55.8 fl      MPV 9.2  fL      Platelets 173 10*3/mm3      Neutrophil % 64.9 %      Lymphocyte % 21.5 %      Monocyte % 9.0 %      Eosinophil % 3.5 %      Basophil % 0.3 %      Immature Grans % 0.8 %      Neutrophils, Absolute 6.37 10*3/mm3      Lymphocytes, Absolute 2.11 10*3/mm3      Monocytes, Absolute 0.88 10*3/mm3      Eosinophils, Absolute 0.34 10*3/mm3      Basophils, Absolute 0.03 10*3/mm3      Immature Grans, Absolute 0.08 10*3/mm3      nRBC 0.0 /100 WBC     Blood Culture - Blood, Arm, Right [[id number]]  (Normal) Collected: 05/21/25 1552    Specimen: Blood from Arm, Right Updated: 05/24/25 1600     Blood Culture No growth at 3 days    Blood Culture - Blood, Blood, PICC Line [[id number]]  (Normal) Collected: 05/21/25 1539    Specimen: Blood, PICC Line Updated: 05/24/25 1545     Blood Culture No growth at 3 days    Narrative:      Less than seven (7) mL's of blood was collected.  Insufficient quantity may yield false negative results.    BAL Culture, Quantitative - Lavage, Lung, Left Lower Lobe [[id number]]  (Normal) Collected: 05/23/25 0817    Specimen: Lavage from Lung, Left Lower Lobe Updated: 05/24/25 1234     BAL Culture Culture in progress    AFB Culture - Lavage, Lung, Left Lower Lobe [[id number]] Collected: 05/23/25 0817    Specimen: Lavage from Lung, Left Lower Lobe Updated: 05/24/25 0918     AFB Stain No acid fast bacilli seen on concentrated smear    Renal Function Panel [[id number]]  (Abnormal) Collected: 05/24/25 0244    Specimen: Blood Updated: 05/24/25 0348     Glucose 164 mg/dL      BUN 47 mg/dL      Creatinine 2.25 mg/dL      Sodium 135 mmol/L      Potassium 4.7 mmol/L      Chloride 107 mmol/L      CO2 18.6 mmol/L      Calcium 8.2 mg/dL      Albumin 3.2 g/dL      Phosphorus 3.3 mg/dL      Anion Gap 9.4 mmol/L      BUN/Creatinine Ratio 20.9     eGFR 29.7 mL/min/1.73     Narrative:      GFR Categories in Chronic Kidney Disease (CKD)              GFR Category          GFR (mL/min/1.73)    Interpretation  G1                     90 or greater        Normal or high (1)  G2                    60-89                Mild decrease (1)  G3a                   45-59                Mild to moderate decrease  G3b                   30-44                Moderate to severe decrease  G4                    15-29                Severe decrease  G5                    14 or less           Kidney failure    (1)In the absence of evidence of kidney disease, neither GFR category G1 or G2 fulfill the criteria for CKD.    eGFR calculation 2021 CKD-EPI creatinine equation, which does not include race as a factor    CBC & Differential [[id number]]  (Abnormal) Collected: 05/24/25 0244    Specimen: Blood Updated: 05/24/25 0322    Narrative:      The following orders were created for panel order CBC & Differential.  Procedure                               Abnormality         Status                     ---------                               -----------         ------                     CBC Auto Differential[[id number]]        Abnormal            Final result                 Please view results for these tests on the individual orders.    CBC Auto Differential [[id number]]  (Abnormal) Collected: 05/24/25 0244    Specimen: Blood Updated: 05/24/25 0322     WBC 12.63 10*3/mm3      RBC 2.49 10*6/mm3      Hemoglobin 7.6 g/dL      Hematocrit 23.7 %      MCV 95.2 fL      MCH 30.5 pg      MCHC 32.1 g/dL      RDW 15.9 %      RDW-SD 56.0 fl      MPV 9.4 fL      Platelets 183 10*3/mm3      Neutrophil % 82.1 %      Lymphocyte % 12.0 %      Monocyte % 4.9 %      Eosinophil % 0.0 %      Basophil % 0.1 %      Immature Grans % 0.9 %      Neutrophils, Absolute 10.37 10*3/mm3      Lymphocytes, Absolute 1.52 10*3/mm3      Monocytes, Absolute 0.62 10*3/mm3      Eosinophils, Absolute 0.00 10*3/mm3      Basophils, Absolute 0.01 10*3/mm3      Immature Grans, Absolute 0.11 10*3/mm3      nRBC 0.0 /100 WBC     POC Glucose Once [[id number]]  (Abnormal) Collected: 05/23/25 1951    Specimen: Blood  Updated: 05/23/25 1953     Glucose 197 mg/dL      Comment: Serial Number: [id number]:  [id number]       POC Glucose Once [[id number]]  (Abnormal) Collected: 05/23/25 1357    Specimen: Blood Updated: 05/23/25 1359     Glucose 190 mg/dL      Comment: Serial Number: [id number]:  [id number]       Respiratory Panel PCR w/COVID-19(SARS-CoV-2) SARAH/ELENI/NEY/PAD/COR/LUCRETIA In-House, NP Swab in UTM/VTM, 2 HR TAT - Lavage, Lung, Left Lower Lobe [[id number]]  (Normal) Collected: 05/23/25 0817    Specimen: Lavage from Lung, Left Lower Lobe Updated: 05/23/25 1102     ADENOVIRUS, PCR Not Detected     Coronavirus 229E Not Detected     Coronavirus HKU1 Not Detected     Coronavirus NL63 Not Detected     Coronavirus OC43 Not Detected     COVID19 Not Detected     Human Metapneumovirus Not Detected     Human Rhinovirus/Enterovirus Not Detected     Influenza A PCR Not Detected     Influenza B PCR Not Detected     Parainfluenza Virus 1 Not Detected     Parainfluenza Virus 2 Not Detected     Parainfluenza Virus 3 Not Detected     Parainfluenza Virus 4 Not Detected     RSV, PCR Not Detected     Bordetella pertussis pcr Not Detected     Bordetella parapertussis PCR Not Detected     Chlamydophila pneumoniae PCR Not Detected     Mycoplasma pneumo by PCR Not Detected    Narrative:      In the setting of a positive respiratory panel with a viral infection PLUS a negative procalcitonin without other underlying concern for bacterial infection, consider observing off antibiotics or discontinuation of antibiotics and continue supportive care. If the respiratory panel is positive for atypical bacterial infection (Bordetella pertussis, Chlamydophila pneumoniae, or Mycoplasma pneumoniae), consider antibiotic de-escalation to target atypical bacterial infection.    Non-gynecologic Cytology [[id number]] Collected: 05/22/25 1223    Specimen: Pleural Fluid from Pleural Cavity Updated: 05/23/25 1012    Tissue Pathology Exam [[id number]] Collected:  05/23/25 0803    Specimen: Tissue from Esophagus, Distal; Tissue from Lung, Left Lower Lobe Updated: 05/23/25 1009     Case Report --     Surgical Pathology Report                         Case: [id number]                                Authorizing Provider:  Jason Dozier MD         Collected:           05/23/2025 08:03 AM        Ordering Location:     [facility]  Received:            05/23/2025 10:08 AM                               SUITES                                                                     Pathologist:           Jevon Hsu MD                                                           Intraop:               Al Reese MD                                                          Specimens:   1) - Esophagus, Distal, @39 cm r/o Barretts                                                       2) - Lung, Left Lower Lobe, touch prep x 1 and cell block                                 Intraoperative Consultation --     TouchPrep:   Positive for malignancy.  Positive for malignancy.    Initial cytology interpretation performed by Al Reese MD.        Non-gynecologic Cytology [[id number]] Collected: 05/23/25 0817    Specimen: Lavage from Lung, Left Lower Lobe Updated: 05/23/25 1006    Virus Culture - Lavage, Lung, Left Lower Lobe [[id number]] Collected: 05/23/25 0817    Specimen: Lavage from Lung, Left Lower Lobe Updated: 05/23/25 1002    Pneumocystis PCR - Lavage, Lung, Left Lower Lobe [[id number]] Collected: 05/23/25 0817    Specimen: Lavage from Lung, Left Lower Lobe Updated: 05/23/25 1002    Fungus Culture - Lavage, Lung, Left Lower Lobe [[id number]] Collected: 05/23/25 0817    Specimen: Lavage from Lung, Left Lower Lobe Updated: 05/23/25 1002    Uric Acid [[id number]]  (Abnormal) Collected: 05/23/25 0608    Specimen: Blood Updated: 05/23/25 0823     Uric Acid 9.5 mg/dL     Renal Function Panel [[id number]]  (Abnormal) Collected: 05/23/25 0608    Specimen: Blood Updated: 05/23/25 0726      Glucose 135 mg/dL      BUN 39 mg/dL      Creatinine 2.29 mg/dL      Sodium 137 mmol/L      Potassium 4.7 mmol/L      Chloride 105 mmol/L      CO2 18.1 mmol/L      Calcium 8.8 mg/dL      Albumin 3.6 g/dL      Phosphorus 5.1 mg/dL      Anion Gap 13.9 mmol/L      BUN/Creatinine Ratio 17.0     eGFR 29.0 mL/min/1.73     Narrative:      GFR Categories in Chronic Kidney Disease (CKD)              GFR Category          GFR (mL/min/1.73)    Interpretation  G1                    90 or greater        Normal or high (1)  G2                    60-89                Mild decrease (1)  G3a                   45-59                Mild to moderate decrease  G3b                   30-44                Moderate to severe decrease  G4                    15-29                Severe decrease  G5                    14 or less           Kidney failure    (1)In the absence of evidence of kidney disease, neither GFR category G1 or G2 fulfill the criteria for CKD.    eGFR calculation 2021 CKD-EPI creatinine equation, which does not include race as a factor    Magnesium [[id number]]  (Normal) Collected: 05/23/25 0608    Specimen: Blood Updated: 05/23/25 0726     Magnesium 2.0 mg/dL     Lactate Dehydrogenase, Body Fluid - Body Fluid, Pleural Cavity [[id number]] Collected: 05/22/25 1222    Specimen: Body Fluid from Pleural Cavity Updated: 05/22/25 2003     Lactate Dehydrogenase (LD), Fluid 398 U/L     Narrative:      No Reference Ranges Established.    Serous fluid LDH greater than 60 percent of the serum LDH or serous fluid LDH two-thirds of the upper limit of normal for serum LDH suggests the fluid is an exudate.     1. Pleural TP/Serum TP >0.5  2. Pleural LD/Serum LD >0.6  3. Pleural LD >2/3 of the upper limit of normal for serum LDH    This test was developed, it performance characteristics determined and judged suitable for clinical purposes by [facility].  It has not been cleared or approved by the FDA.  The  laboratory is regulated under CLIA as qualified to perform high-complexity testing.     Glucose, Body Fluid - Pleural Fluid, Pleural Cavity [[id number]] Collected: 05/22/25 1223    Specimen: Pleural Fluid from Pleural Cavity Updated: 05/22/25 2002     Glucose, Fluid 177 mg/dL     Narrative:      No Reference Ranges Established.    Serous fluid glucose less than 60 mg/dL or less than 30 mg/dL below serum glucose suggests an infectious or malignant exudate.     This test was developed, it performance characteristics determined and judged suitable for clinical purposes by [facility].  It has not been cleared or approved by the FDA.  The laboratory is regulated under CLIA as qualified to perform high-complexity testing.     Protein, Body Fluid - Pleural Fluid, Pleural Cavity [[id number]] Collected: 05/22/25 1223    Specimen: Pleural Fluid from Pleural Cavity Updated: 05/22/25 2002     Protein, Total, Fluid 4.0 g/dL     Narrative:      No Reference Ranges Established.    A serous fluid total fluid (TP) greater than 50 percent of the serum TP suggests the fluid is an exudate.      1. Pleural TP/Serum TP >0.5  2. Pleural LD/Serum LD >0.6  3. Pleural LD >2/3 of the upper limit of normal for serum LDH    This test was developed, it performance characteristics determined and judged suitable for clinical purposes by [facility].  It has not been cleared or approved by the FDA.  The laboratory is regulated under CLIA as qualified to perform high-complexity testing.     Body Fluid Cell Count With Differential - Body Fluid, Pleural Cavity [[id number]]  (Abnormal) Collected: 05/22/25 1222    Specimen: Body Fluid from Pleural Cavity Updated: 05/22/25 9223    Narrative:      The following orders were created for panel order Body Fluid Cell Count With Differential - Body Fluid, Pleural Cavity.  Procedure                               Abnormality         Status                      ---------                               -----------         ------                     Body fluid cell count - ...[[id number]]  Abnormal            Final result               Body fluid differential ...[[id number]]                      Final result                 Please view results for these tests on the individual orders.    Body fluid differential - Body Fluid, Pleural Cavity [[id number]] Collected: 05/22/25 1222    Specimen: Body Fluid from Pleural Cavity Updated: 05/22/25 1553     Neutrophils, Fluid % 58 %      Lymphocytes, Fluid % 37 %      Monocytes, Fluid % 5 %     Narrative:      Concentrated Smear by Cytocentrifuge Prep.    Body fluid cell count - Body Fluid, Pleural Cavity [[id number]]  (Abnormal) Collected: 05/22/25 1222    Specimen: Body Fluid from Pleural Cavity Updated: 05/22/25 1504     Color, Fluid Red     Appearance, Fluid Turbid     Comment: Result checked          RBC, Fluid 31,000 /mm3      Nucleated Cells, Fluid 568 /mm3     Narrative:      No reference range established. Physician to interpret results with clinical findings.    pH, Body Fluid - Body Fluid, Pleural Cavity [[id number]]  (Normal) Collected: 05/22/25 1222    Specimen: Body Fluid from Pleural Cavity Updated: 05/22/25 1442     pH, Fluid 7.50    POC Lactate [[id number]]  (Normal) Collected: 05/21/25 1544    Specimen: Blood Updated: 05/22/25 0949     Lactate 0.3 mmol/L      Comment: Serial Number: [id number]:  [id number]       POC Lactate [[id number]]  (Abnormal) Collected: 05/21/25 1542    Specimen: Blood Updated: 05/22/25 0949     Lactate <0.3 mmol/L      Comment: Serial Number: [id number]:  [id number]       Basic Metabolic Panel [[id number]]  (Abnormal) Collected: 05/22/25 0547    Specimen: Blood Updated: 05/22/25 0732     Glucose 148 mg/dL      BUN 33 mg/dL      Creatinine 2.34 mg/dL      Sodium 134 mmol/L      Potassium 4.9 mmol/L      Chloride 103 mmol/L      CO2 19.2 mmol/L      Calcium 8.6 mg/dL      BUN/Creatinine  Ratio 14.1     Anion Gap 11.8 mmol/L      eGFR 28.3 mL/min/1.73     Narrative:      GFR Categories in Chronic Kidney Disease (CKD)              GFR Category          GFR (mL/min/1.73)    Interpretation  G1                    90 or greater        Normal or high (1)  G2                    60-89                Mild decrease (1)  G3a                   45-59                Mild to moderate decrease  G3b                   30-44                Moderate to severe decrease  G4                    15-29                Severe decrease  G5                    14 or less           Kidney failure    (1)In the absence of evidence of kidney disease, neither GFR category G1 or G2 fulfill the criteria for CKD.    eGFR calculation 2021 CKD-EPI creatinine equation, which does not include race as a factor    S. Pneumo Ag Urine or CSF - Urine, Urine, Clean Catch [[id number]]  (Normal) Collected: 05/22/25 0130    Specimen: Urine, Clean Catch Updated: 05/22/25 0241     Strep Pneumo Ag Negative    Legionella Antigen, Urine - Urine, Urine, Clean Catch [[id number]]  (Normal) Collected: 05/22/25 0130    Specimen: Urine, Clean Catch Updated: 05/22/25 0241     LEGIONELLA ANTIGEN, URINE Negative    MRSA Screen, PCR (Inpatient) - Swab, Nares [[id number]]  (Normal) Collected: 05/21/25 2300    Specimen: Swab from Nares Updated: 05/22/25 0112     MRSA PCR No MRSA Detected    Narrative:      The negative predictive value of this diagnostic test is high and should only be used to consider de-escalating anti-MRSA therapy. A positive result may indicate colonization with MRSA and must be correlated clinically.    TSH [[id number]]  (Abnormal) Collected: 05/21/25 1536    Specimen: Blood Updated: 05/21/25 1752     TSH 62.200 uIU/mL     T4, Free [[id number]]  (Abnormal) Collected: 05/21/25 1536    Specimen: Blood Updated: 05/21/25 1752     Free T4 0.76 ng/dL     Comprehensive Metabolic Panel [[id number]]  (Abnormal) Collected: 05/21/25 1536    Specimen: Blood  Updated: 05/21/25 1612     Glucose 106 mg/dL      BUN 29 mg/dL      Creatinine 2.36 mg/dL      Sodium 133 mmol/L      Potassium 4.4 mmol/L      Chloride 102 mmol/L      CO2 20.4 mmol/L      Calcium 8.7 mg/dL      Total Protein 6.2 g/dL      Albumin 3.7 g/dL      ALT (SGPT) 65 U/L      AST (SGOT) 76 U/L      Alkaline Phosphatase 60 U/L      Total Bilirubin 0.3 mg/dL      Globulin 2.5 gm/dL      A/G Ratio 1.5 g/dL      BUN/Creatinine Ratio 12.3     Anion Gap 10.6 mmol/L      eGFR 28.0 mL/min/1.73     Narrative:      GFR Categories in Chronic Kidney Disease (CKD)              GFR Category          GFR (mL/min/1.73)    Interpretation  G1                    90 or greater        Normal or high (1)  G2                    60-89                Mild decrease (1)  G3a                   45-59                Mild to moderate decrease  G3b                   30-44                Moderate to severe decrease  G4                    15-29                Severe decrease  G5                    14 or less           Kidney failure    (1)In the absence of evidence of kidney disease, neither GFR category G1 or G2 fulfill the criteria for CKD.    eGFR calculation 2021 CKD-EPI creatinine equation, which does not include race as a factor    Extra Tubes [[id number]] Collected: 05/21/25 1536    Specimen: Blood, Venous Line Updated: 05/21/25 1545    Narrative:      The following orders were created for panel order Extra Tubes.  Procedure                               Abnormality         Status                     ---------                               -----------         ------                     Lavender Top[[id number]]                                     Final result                 Please view results for these tests on the individual orders.    Lavender Top [[id number]] Collected: 05/21/25 1536    Specimen: Blood Updated: 05/21/25 1545     Extra Tube hold for add-on     Comment: Auto resulted       Blood Gas, Arterial - [[id number]]  (Abnormal)  Collected: 05/21/25 1530    Specimen: Arterial Blood Updated: 05/21/25 1532     Site Left Radial     Pipe's Test Positive     pH, Arterial 7.351 pH units      pCO2, Arterial 37.0 mm Hg      pO2, Arterial 80.1 mm Hg      HCO3, Arterial 20.5 mmol/L      Base Excess, Arterial -4.6 mmol/L      Comment: Serial Number: [id number]:  [id number]        O2 Saturation, Arterial 95.2 %      CO2 Content 21.6 mmol/L      Barometric Pressure for Blood Gas --     Comment: N/A        Modality Cannula     FIO2 32 %      Hemodilution Yes     PO2/FIO2 250             Results for orders placed during the hospital encounter of 08/12/24    Adult Transthoracic Echo Complete W/ Cont if Necessary Per Protocol    Interpretation Summary    Left ventricular ejection fraction appears to be 66 - 70%.    Left ventricular diastolic function was indeterminate.    The left atrial cavity is mildly dilated.    Left atrial volume is mildly increased.    Estimated right ventricular systolic pressure from tricuspid regurgitation is mildly elevated (35-45 mmHg).    Moderate pulmonary hypertension is present.              Condition on Discharge:  stable    Vital Signs  Temp:  [97.4 °F (36.3 °C)-98.1 °F (36.7 °C)] 97.4 °F (36.3 °C)  Heart Rate:  [57-71] 65  Resp:  [12-24] 24  BP: ()/(57-70) 117/70    Physical Exam:     General Appearance:    Alert, cooperative, in no acute distress   Head:    Normocephalic, without obvious abnormality, atraumatic   Eyes:            Lids and lashes normal, conjunctivae and sclerae normal, no   icterus, no pallor, corneas clear   Ears:    Ears appear intact with no abnormalities noted   Throat:   No oral lesions, no thrush, oral mucosa moist   Neck:   No adenopathy, supple, trachea midline, no thyromegaly, no   carotid bruit, no JVD   Lungs:     Clear to auscultation,respirations regular, even and                unlabored    Heart:    Regular rhythm and normal rate, normal S1 and S2, no          murmur, no gallop, no  rub, no click   Chest Wall:    No abnormalities observed   Abdomen:     Normal bowel sounds, no masses, no organomegaly, soft      non-tender, non-distended, no guarding, no rebound                tenderness   Extremities:   Moves all extremities well, no edema, no cyanosis, no           redness   Pulses:   Pulses palpable and equal bilaterally   Skin:   No bleeding, bruising or rash   Lymph nodes:   No palpable adenopathy           Discharge Disposition  Home or Self Care    Discharge Medications     Discharge Medications        New Medications        Instructions Start Date   predniSONE 20 MG tablet  Commonly known as: DELTASONE   20 mg, Oral, Daily With Breakfast   Start Date: May 26, 2025     sodium bicarbonate 650 MG tablet   1,300 mg, Oral, 3 Times Daily             Continue These Medications        Instructions Start Date   acetaminophen 325 MG tablet  Commonly known as: TYLENOL   650 mg, Oral, Every 4 Hours PRN      apixaban 5 MG tablet tablet  Commonly known as: ELIQUIS   5 mg, Oral, Every 12 Hours Scheduled      bacitracin 500 UNIT/GM ointment   Apply 1 Application topically to the appropriate area as directed 2 (Two) Times a Day As Needed for Wound Care.      Centrum Silver 50+Men tablet tablet  Generic drug: multivitamin with minerals   1 tablet, Every Morning      Claritin 10 MG tablet  Generic drug: loratadine   10 mg, Daily      ipratropium-albuterol 0.5-2.5 mg/3 ml nebulizer  Commonly known as: DUO-NEB   3 mL, Nebulization, Every 6 Hours PRN      levothyroxine 75 MCG tablet  Commonly known as: SYNTHROID, LEVOTHROID   75 mcg, Oral, Every Early Morning      midodrine 5 MG tablet  Commonly known as: PROAMATINE   5 mg, Oral, 3 Times Daily PRN      Mucinex 600 MG 12 hr tablet  Generic drug: guaiFENesin   600 mg, 2 Times Daily      NON FORMULARY   2 tablets, Nightly      pantoprazole 40 MG EC tablet  Commonly known as: PROTONIX   40 mg, Daily      simvastatin 20 MG tablet  Commonly known as: ZOCOR   1  tablet, Nightly      sotalol 80 MG tablet  Commonly known as: BETAPACE   80 mg, 2 Times Daily      tamsulosin 0.4 MG capsule 24 hr capsule  Commonly known as: FLOMAX   1 capsule, Nightly      thiamine 100 MG tablet  tablet  Commonly known as: VITAMIN B-1   1 tablet, Daily      VITAMIN D-3 PO   1 tablet, Daily               Discharge Diet:   Diet Instructions       Diet: Cardiac Diets; Healthy Heart (2-3 Na+); Regular (IDDSI 7); Thin (IDDSI 0)      Discharge Diet: Cardiac Diets    Cardiac Diet: Healthy Heart (2-3 Na+)    Texture: Regular (IDDSI 7)    Fluid Consistency: Thin (IDDSI 0)            Activity at Discharge:   Activity Instructions       Activity as Tolerated              Follow-up Appointments  Future Appointments   Date Time Provider Department Center   5/27/2025 11:00 AM NEY CC CT [facility] PET [facility]   5/28/2025 12:30 PM [facility] IR 1 BH [facility] IR [facility]Y   6/2/2025  8:00 AM [facility] PORT CHAIR [facility] CC NA [facility]   6/2/2025  8:30 AM Arben Tim MD [facility]     Additional Instructions for the Follow-ups that You Need to Schedule       Discharge Follow-up with PCP   As directed       Currently Documented PCP:    Abner Funes APRN    PCP Phone Number:    [phone]     Follow Up Details: 1-2 weeks        Discharge Follow-up with Specialty: oncology   As directed      Specialty: oncology        Discharge Follow-up with Specified Provider: nephrology   As directed      To: nephrology        Discharge Follow-up with Specified Provider: urology   As directed      To: urology        Basic Metabolic Panel    Jun 01, 2025 (Approximate)      Release to patient: Routine Release                Test Results Pending at Discharge  Pending Results       Procedure [Order ID] Specimen - Date/Time    Fungus Culture - Lavage, Lung, Left Lower Lobe [[id number]] Collected: 05/23/25 0817    Specimen: Lavage from Lung, Left Lower Lobe Updated: 05/23/25 1002    Pneumocystis PCR - Lavage, Lung, Left Lower Lobe [[id number]] Collected:  05/23/25 0817    Specimen: Lavage from Lung, Left Lower Lobe Updated: 05/23/25 1002    Virus Culture - Lavage, Lung, Left Lower Lobe [[id number]] Collected: 05/23/25 0817    Specimen: Lavage from Lung, Left Lower Lobe Updated: 05/23/25 1002             Julia Hernandez PA-C  05/25/25  11:52 EDT    Time: Discharge 25 min

## 2025-05-25 NOTE — PLAN OF CARE
Problem: Adult Inpatient Plan of Care  Goal: Absence of Hospital-Acquired Illness or Injury  Intervention: Identify and Manage Fall Risk  Recent Flowsheet Documentation  Taken 5/25/2025 1200 by Steven Hardy RN  Safety Promotion/Fall Prevention: safety round/check completed  Taken 5/25/2025 1026 by Steven Hardy RN  Safety Promotion/Fall Prevention: safety round/check completed  Taken 5/25/2025 0822 by Steven Hardy RN  Safety Promotion/Fall Prevention:   safety round/check completed   room organization consistent   nonskid shoes/slippers when out of bed   clutter free environment maintained  Intervention: Prevent Skin Injury  Recent Flowsheet Documentation  Taken 5/25/2025 0822 by Steven Hardy RN  Skin Protection:   protective footwear used   transparent dressing maintained  Intervention: Prevent and Manage VTE (Venous Thromboembolism) Risk  Recent Flowsheet Documentation  Taken 5/25/2025 0822 by Steven Hardy RN  VTE Prevention/Management:   bilateral   SCDs (sequential compression devices) off  Intervention: Prevent Infection  Recent Flowsheet Documentation  Taken 5/25/2025 0822 by Steven Hardy RN  Infection Prevention:   single patient room provided   rest/sleep promoted   personal protective equipment utilized   hand hygiene promoted  Goal: Optimal Comfort and Wellbeing  Intervention: Provide Person-Centered Care  Recent Flowsheet Documentation  Taken 5/25/2025 0822 by Steven Hardy RN  Trust Relationship/Rapport:   care explained   choices provided   questions answered   questions encouraged  Goal: Absence of Hospital-Acquired Illness or Injury  Intervention: Identify and Manage Fall Risk  Recent Flowsheet Documentation  Taken 5/25/2025 1200 by Steven Hardy RN  Safety Promotion/Fall Prevention: safety round/check completed  Taken 5/25/2025 1026 by Steven Hardy RN  Safety Promotion/Fall Prevention: safety round/check completed  Taken 5/25/2025 0822 by Steven Hardy RN  Safety  Promotion/Fall Prevention:   safety round/check completed   room organization consistent   nonskid shoes/slippers when out of bed   clutter free environment maintained  Intervention: Prevent Skin Injury  Recent Flowsheet Documentation  Taken 5/25/2025 0822 by Steven Hardy RN  Skin Protection:   protective footwear used   transparent dressing maintained  Intervention: Prevent and Manage VTE (Venous Thromboembolism) Risk  Recent Flowsheet Documentation  Taken 5/25/2025 0822 by Steven Hardy RN  VTE Prevention/Management:   bilateral   SCDs (sequential compression devices) off  Intervention: Prevent Infection  Recent Flowsheet Documentation  Taken 5/25/2025 0822 by Steven Hardy RN  Infection Prevention:   single patient room provided   rest/sleep promoted   personal protective equipment utilized   hand hygiene promoted  Goal: Optimal Comfort and Wellbeing  Intervention: Provide Person-Centered Care  Recent Flowsheet Documentation  Taken 5/25/2025 0822 by Steven Hardy RN  Trust Relationship/Rapport:   care explained   choices provided   questions answered   questions encouraged   Goal Outcome Evaluation:  Plan of Care Reviewed With: patient        Progress: no change  Outcome Evaluation: Patient resting abed. Patient has no complaints at this time.

## 2025-05-25 NOTE — PROGRESS NOTES
Daily Progress Note          Assessment    Pneumonia due to unspecified pathogen: Negative nasal MRSA probe, urinary antigen for Legionella and Streptococcus pneumonia  Left pleural effusion  New left lower lobe opacity with perifissural nodular opacity  Increased mediastinal adenopathy with increased lymph nodes in the left axilla, left axillary and left supraclavicular  Hypoxia     Stage IV right lung cancer status post concomitant radiation chemotherapy, diagnosed on bronchoscopy August 2024  CAD  CKD  Chronic A-fib on chronic anticoagulation Eliquis  Hypothyroidism  Chronic anemia     Former smoker: Quit 2000 after 32 pack years           Recommendations:  Respiratory status is improving the patient can be discharged from pulmonary standpoint with follow-up in the office in 2 weeks    Post bronchoscopy 5/23/2025 with the primary results positive for lung malignancy final report and cultures are negative to date    status post bedside left thoracentesis 5/22/2025 with removal of 950 mL of serosanguineous fluid        antibiotics: Ceftazidime     IV Solu-Medrol changed to prednisone 20 mg daily for 3 days to complete 5/26/2025  Mucinex  Oxygen supplement and titration to maintain saturation 90 to 95%: Currently on room air     bronchodilators     Atorvastatin, sotalol  Chronic anticoagulation: Eliquis  Atorvastatin     Thyroid replacement: Levothyroxine  Protonix     I personally reviewed the radiological studies             LOS: 4 days     Subjective     Less cough and improved shortness of breath    Objective     Vital signs for last 24 hours:  Vitals:    05/25/25 0734 05/25/25 0749 05/25/25 1156 05/25/25 1213   BP:  117/70 117/74    BP Location:  Left arm Left arm    Patient Position:  Sitting Sitting    Pulse: 60 65 61 66   Resp: 16 24 18 16   Temp:  97.4 °F (36.3 °C) 97.8 °F (36.6 °C)    TempSrc:  Oral Oral    SpO2: 96% 90% 91% 91%   Weight:       Height:           Intake/Output last 3 shifts:  I/O last 3  completed shifts:  In: 1640 [P.O.:240; I.V.:1400]  Out: 3300 [Urine:3300]  Intake/Output this shift:  No intake/output data recorded.      Radiology  Imaging Results (Last 24 Hours)       ** No results found for the last 24 hours. **            Labs:  Results from last 7 days   Lab Units 05/25/25  0526   WBC 10*3/mm3 9.81   HEMOGLOBIN g/dL 8.2*   HEMATOCRIT % 25.7*   PLATELETS 10*3/mm3 173     Results from last 7 days   Lab Units 05/25/25  0526   SODIUM mmol/L 138   POTASSIUM mmol/L 4.0   CHLORIDE mmol/L 108*   CO2 mmol/L 20.4*   BUN mg/dL 40*   CREATININE mg/dL 1.81*   CALCIUM mg/dL 8.3*   BILIRUBIN mg/dL <0.2   ALK PHOS U/L 45   ALT (SGPT) U/L 28   AST (SGOT) U/L 24   GLUCOSE mg/dL 96     Results from last 7 days   Lab Units 05/21/25  1530   PH, ARTERIAL pH units 7.351   PO2 ART mm Hg 80.1*   PCO2, ARTERIAL mm Hg 37.0   HCO3 ART mmol/L 20.5*     Results from last 7 days   Lab Units 05/25/25  0526 05/24/25  0244 05/23/25  0608   ALBUMIN g/dL 3.0* 3.2* 3.6             Results from last 7 days   Lab Units 05/25/25  0526   MAGNESIUM mg/dL 1.7     Results from last 7 days   Lab Units 05/21/25  1104   INR  1.18*   APTT seconds 29.2     Results from last 7 days   Lab Units 05/21/25  1536   TSH uIU/mL 62.200*   FREE T4 ng/dL 0.76*           Meds:   SCHEDULE  apixaban, 5 mg, Oral, Q12H  atorvastatin, 10 mg, Oral, Daily  cefTAZidime, 2,000 mg, Intravenous, Q24H  guaiFENesin, 1,200 mg, Oral, Q12H  ipratropium-albuterol, 3 mL, Nebulization, 4x Daily - RT  levothyroxine, 75 mcg, Oral, Q AM  midodrine, 10 mg, Oral, TID AC  multivitamin with minerals, 1 tablet, Oral, QAM  nicotine, 1 patch, Transdermal, Q24H  pantoprazole, 40 mg, Oral, Daily  predniSONE, 20 mg, Oral, Daily With Breakfast  sodium bicarbonate, 1,300 mg, Oral, TID  sodium chloride, 10 mL, Intravenous, Q12H  sotalol, 80 mg, Oral, BID  tamsulosin, 0.4 mg, Oral, Nightly      Infusions       PRNs    acetaminophen    senna-docusate sodium **AND** polyethylene glycol  **AND** bisacodyl **AND** bisacodyl    midodrine    nitroglycerin    ondansetron ODT **OR** ondansetron    sodium chloride    sodium chloride    Physical Exam:  General Appearance:  Alert   HEENT:  Normocephalic, without obvious abnormality, Conjunctiva/corneas clear,.   Nares normal, no drainage     Neck:  Supple, symmetrical, trachea midline.   Lungs /Chest wall:   Mild bilateral basal rhonchi, respirations unlabored, symmetrical wall movement.     Heart:  Regular rate and rhythm, S1 S2 normal  Abdomen: Soft, non-tender, no masses, no organomegaly.    Extremities: No edema, no clubbing or cyanosis     ROS  Constitutional: Negative for chills, fever and malaise/fatigue.   HENT: Negative.    Eyes: Negative.    Cardiovascular: Negative.    Respiratory: Positive for improving cough and shortness of breath.    Skin: Negative.    Musculoskeletal: Negative.    Gastrointestinal: Negative.    Genitourinary: Negative.    Neurological: Negative.    Psychiatric/Behavioral: Negative.      I reviewed the recent clinical results  I personally reviewed the latest radiological studies    Part of this note may be an electronic transcription/translation of spoken language to printed text using the Dragon Dictation System.

## 2025-05-26 ENCOUNTER — READMISSION MANAGEMENT (OUTPATIENT)
Dept: CALL CENTER | Facility: HOSPITAL | Age: 75
End: 2025-05-26
Payer: MEDICARE

## 2025-05-26 LAB
BACTERIA SPEC AEROBE CULT: NORMAL
BACTERIA SPEC AEROBE CULT: NORMAL

## 2025-05-26 NOTE — OUTREACH NOTE
Prep Survey      Flowsheet Row Responses   [facility] facility patient discharged from? [facility]   Is LACE score < 7 ? No   Eligibility Readm Mgmt   Discharge diagnosis Pneumonia   Does the patient have one of the following disease processes/diagnoses(primary or secondary)? Pneumonia   Does the patient have Home health ordered? No   Is there a DME ordered? No   Medication alerts for this patient see AVS   Prep survey completed? Yes            Nickie HELMS - Registered Nurse              Nickie HELMS - Registered Nurse

## 2025-05-27 LAB
P JIROVECII DNA L RESP QL NAA+NON-PROBE: NEGATIVE
REF LAB TEST METHOD: NORMAL

## 2025-05-27 NOTE — PAYOR COMM NOTE
"This is discharge notification for Young Ames.   DC'd 5/25/25 routine home.    AUTHORIZATION : [id number]   THANK YOU.      Swathi Mills, RN, CCM  Utilization Nurse  [address] [address]   [phone]   [phone]     Young Ames (74 y.o. Male)       Date of Birth   1950    Social Security Number       Address   [address] [place] [address] [place] [address]    Home Phone   [phone]    N   [id number]       Mormon   None    Marital Status                               Admission Date   5/21/2025    Admission Type   Emergency    Admitting Provider   Evelyn Felder MD    Attending Provider       Department, Room/Bed   [facility] MEDICAL INPATIENT, 202/1       Discharge Date   5/25/2025    Discharge Disposition   Home or Self Care    Discharge Destination                                 Attending Provider: (none)   Allergies: No Known Allergies    Isolation: None   Infection: None   Code Status: Prior    Ht: 180.3 cm (71\")   Wt: 79.4 kg (175 lb 0.7 oz)    Admission Cmt: None   Principal Problem: Pneumonia [J18.9]                   Active Insurance as of 5/21/2025       Primary Coverage       Payor Plan Insurance Group Employer/Plan Group    AETNA MEDICARE REPLACEMENT AETNA MEDICARE AD[place]ANT[id number]       Payor Plan Address Payor Plan Phone Number Payor Plan Fax Number Effective Dates    [address] [phone]  1/1/2024 - None Entered    [facility] [id number]         Subscriber Name Subscriber Birth Date Member ID       YOUNG AMES 1950 [id number]                     Emergency Contacts        (Rel.) Home Phone Work Phone Mobile Phone    AMIRA AMES (Spouse) -- -- [phone]    Norma Mendes (Daughter) -- -- [phone]                 Discharge Summary        Julia Hernandez PA-C at 05/25/25 0849       Attestation signed by Misael Apple MD at 05/25/25 1225      I have reviewed this documentation and " agree.                        Date of Discharge:  5/25/2025    Discharge Diagnosis:   Active Hospital Problems    Diagnosis  POA    **Pneumonia [J18.9]  Yes    CKD (chronic kidney disease) stage 4, GFR 15-29 ml/min [N18.4]  Yes    Hydronephrosis [N13.30]  Yes    Atrial fibrillation [I48.91]  Yes    Chronic anticoagulation [Z79.01]  Not Applicable    Hypoxia [R09.02]  Yes    Hypothyroidism (acquired) [E03.9]  Yes    Dysphagia [R13.10]  Yes    Stage IV adenocarcinoma of lung, left [C34.92]  Unknown    Stage IV adenocarcinoma of lung, right [C34.91]  Yes    Pleural effusion [J90]  Yes      Resolved Hospital Problems   No resolved problems to display.       Presenting Problem/History of Present Illness  Active Hospital Problems    Diagnosis  POA    **Pneumonia [J18.9]  Yes    Pneumonia, unspecified organism [J18.9]  Yes    CKD (chronic kidney disease) stage 4, GFR 15-29 ml/min [N18.4]  Yes    Hydronephrosis [N13.30]  Yes    Atrial fibrillation [I48.91]  Yes    Chronic anticoagulation [Z79.01]  Not Applicable    Hypoxia [R09.02]  Yes    Hypothyroidism (acquired) [E03.9]  Yes    Dysphagia [R13.10]  Yes    Stage IV adenocarcinoma of lung, left [C34.92]  Unknown    Stage IV adenocarcinoma of lung, right [C34.91]  Yes    Pleural effusion [J90]  Yes      Resolved Hospital Problems   No resolved problems to display.          Hospital Course  Pleasant 73 yo male with history of right lung cancer treated with Keytruda was found to be hypoxic during removal of right chest port in IR. X-ray revealed left sided pneumonia. He was treated by pulmonary and is s/p bronchoscopy on 05/23/2025 with biopsy positive for lung malignancy. He was transitioned to oral Cefdinir and prednisone 20mg for 3 days.   During hospitalization he underwent swallow study secondary to dysphagia. EGD showed short segment Schroeder's esophagus.  Urology was consulted secondary to large volume retention resulting in bilateral hydronephrosis. A lam catheter was  placed that will need to be removed as an outpatient 1-2 weeks prior to voiding trial at the time of cystoscopy ad TRUS.  He is to follow up with PCP in 1-2 weeks, oncology, and urology.    Procedures Performed    Procedure(s):  BRONCHOSCOPY with bronchoalveolar lavage and biopsy x 1 area  ESOPHAGOGASTRODUODENOSCOPY with biopsy x 1 area and dilatation (48FR non-guided bougie)  -------------------    Procedure(s):  ESOPHAGOGASTRODUODENOSCOPY  -------------------  05/23 0757 Bronchoscopy  05/22 1224 Thoracentesis at Bedside    Consults:   Consults       Date and Time Order Name Status Description    5/23/2025 10:39 AM Hematology & Oncology Inpatient Consult Completed     5/22/2025 12:19 PM Inpatient Urology Consult Completed     5/22/2025  9:41 AM Inpatient Gastroenterology Consult Completed     5/21/2025 11:14 PM Inpatient Nephrology Consult Completed     5/21/2025  4:53 PM Inpatient Pulmonology Consult Completed     5/21/2025  3:42 PM Family Medicine Consult              Pertinent Test Results:    Lab Results (most recent)       Procedure Component Value Units Date/Time    Body Fluid Culture - Body Fluid, Pleural Cavity [[id number]] Collected: 05/22/25 1222    Specimen: Body Fluid from Pleural Cavity Updated: 05/25/25 0718     Body Fluid Culture No growth at 3 days     Gram Stain Few (2+) WBCs per low power field      No organisms seen    Comprehensive Metabolic Panel [[id number]]  (Abnormal) Collected: 05/25/25 0526    Specimen: Blood Updated: 05/25/25 0641     Glucose 96 mg/dL      BUN 40 mg/dL      Creatinine 1.81 mg/dL      Sodium 138 mmol/L      Potassium 4.0 mmol/L      Chloride 108 mmol/L      CO2 20.4 mmol/L      Calcium 8.3 mg/dL      Total Protein 4.9 g/dL      Albumin 3.0 g/dL      ALT (SGPT) 28 U/L      AST (SGOT) 24 U/L      Alkaline Phosphatase 45 U/L      Total Bilirubin <0.2 mg/dL      Globulin 1.9 gm/dL      A/G Ratio 1.6 g/dL      BUN/Creatinine Ratio 22.1     Anion Gap 9.6 mmol/L      eGFR 38.5  mL/min/1.73     Narrative:      GFR Categories in Chronic Kidney Disease (CKD)              GFR Category          GFR (mL/min/1.73)    Interpretation  G1                    90 or greater        Normal or high (1)  G2                    60-89                Mild decrease (1)  G3a                   45-59                Mild to moderate decrease  G3b                   30-44                Moderate to severe decrease  G4                    15-29                Severe decrease  G5                    14 or less           Kidney failure    (1)In the absence of evidence of kidney disease, neither GFR category G1 or G2 fulfill the criteria for CKD.    eGFR calculation 2021 CKD-EPI creatinine equation, which does not include race as a factor    Magnesium [[id number]]  (Normal) Collected: 05/25/25 0526    Specimen: Blood Updated: 05/25/25 0637     Magnesium 1.7 mg/dL     Phosphorus [[id number]]  (Normal) Collected: 05/25/25 0526    Specimen: Blood Updated: 05/25/25 0628     Phosphorus 3.2 mg/dL     Calcium, Ionized [[id number]]  (Normal) Collected: 05/25/25 0526    Specimen: Blood Updated: 05/25/25 0623     Ionized Calcium 1.18 mmol/L     CBC & Differential [[id number]]  (Abnormal) Collected: 05/25/25 0526    Specimen: Blood Updated: 05/25/25 0601    Narrative:      The following orders were created for panel order CBC & Differential.  Procedure                               Abnormality         Status                     ---------                               -----------         ------                     CBC Auto Differential[[id number]]        Abnormal            Final result                 Please view results for these tests on the individual orders.    CBC Auto Differential [[id number]]  (Abnormal) Collected: 05/25/25 0526    Specimen: Blood Updated: 05/25/25 0601     WBC 9.81 10*3/mm3      RBC 2.68 10*6/mm3      Hemoglobin 8.2 g/dL      Hematocrit 25.7 %      MCV 95.9 fL      MCH 30.6 pg      MCHC 31.9 g/dL      RDW 15.9 %       RDW-SD 55.8 fl      MPV 9.2 fL      Platelets 173 10*3/mm3      Neutrophil % 64.9 %      Lymphocyte % 21.5 %      Monocyte % 9.0 %      Eosinophil % 3.5 %      Basophil % 0.3 %      Immature Grans % 0.8 %      Neutrophils, Absolute 6.37 10*3/mm3      Lymphocytes, Absolute 2.11 10*3/mm3      Monocytes, Absolute 0.88 10*3/mm3      Eosinophils, Absolute 0.34 10*3/mm3      Basophils, Absolute 0.03 10*3/mm3      Immature Grans, Absolute 0.08 10*3/mm3      nRBC 0.0 /100 WBC     Blood Culture - Blood, Arm, Right [[id number]]  (Normal) Collected: 05/21/25 1552    Specimen: Blood from Arm, Right Updated: 05/24/25 1600     Blood Culture No growth at 3 days    Blood Culture - Blood, Blood, PICC Line [[id number]]  (Normal) Collected: 05/21/25 1539    Specimen: Blood, PICC Line Updated: 05/24/25 1545     Blood Culture No growth at 3 days    Narrative:      Less than seven (7) mL's of blood was collected.  Insufficient quantity may yield false negative results.    BAL Culture, Quantitative - Lavage, Lung, Left Lower Lobe [[id number]]  (Normal) Collected: 05/23/25 0817    Specimen: Lavage from Lung, Left Lower Lobe Updated: 05/24/25 1234     BAL Culture Culture in progress    AFB Culture - Lavage, Lung, Left Lower Lobe [[id number]] Collected: 05/23/25 0817    Specimen: Lavage from Lung, Left Lower Lobe Updated: 05/24/25 0918     AFB Stain No acid fast bacilli seen on concentrated smear    Renal Function Panel [[id number]]  (Abnormal) Collected: 05/24/25 0244    Specimen: Blood Updated: 05/24/25 0348     Glucose 164 mg/dL      BUN 47 mg/dL      Creatinine 2.25 mg/dL      Sodium 135 mmol/L      Potassium 4.7 mmol/L      Chloride 107 mmol/L      CO2 18.6 mmol/L      Calcium 8.2 mg/dL      Albumin 3.2 g/dL      Phosphorus 3.3 mg/dL      Anion Gap 9.4 mmol/L      BUN/Creatinine Ratio 20.9     eGFR 29.7 mL/min/1.73     Narrative:      GFR Categories in Chronic Kidney Disease (CKD)              GFR Category          GFR (mL/min/1.73)     Interpretation  G1                    90 or greater        Normal or high (1)  G2                    60-89                Mild decrease (1)  G3a                   45-59                Mild to moderate decrease  G3b                   30-44                Moderate to severe decrease  G4                    15-29                Severe decrease  G5                    14 or less           Kidney failure    (1)In the absence of evidence of kidney disease, neither GFR category G1 or G2 fulfill the criteria for CKD.    eGFR calculation 2021 CKD-EPI creatinine equation, which does not include race as a factor    CBC & Differential [[id number]]  (Abnormal) Collected: 05/24/25 0244    Specimen: Blood Updated: 05/24/25 0322    Narrative:      The following orders were created for panel order CBC & Differential.  Procedure                               Abnormality         Status                     ---------                               -----------         ------                     CBC Auto Differential[[id number]]        Abnormal            Final result                 Please view results for these tests on the individual orders.    CBC Auto Differential [[id number]]  (Abnormal) Collected: 05/24/25 0244    Specimen: Blood Updated: 05/24/25 0322     WBC 12.63 10*3/mm3      RBC 2.49 10*6/mm3      Hemoglobin 7.6 g/dL      Hematocrit 23.7 %      MCV 95.2 fL      MCH 30.5 pg      MCHC 32.1 g/dL      RDW 15.9 %      RDW-SD 56.0 fl      MPV 9.4 fL      Platelets 183 10*3/mm3      Neutrophil % 82.1 %      Lymphocyte % 12.0 %      Monocyte % 4.9 %      Eosinophil % 0.0 %      Basophil % 0.1 %      Immature Grans % 0.9 %      Neutrophils, Absolute 10.37 10*3/mm3      Lymphocytes, Absolute 1.52 10*3/mm3      Monocytes, Absolute 0.62 10*3/mm3      Eosinophils, Absolute 0.00 10*3/mm3      Basophils, Absolute 0.01 10*3/mm3      Immature Grans, Absolute 0.11 10*3/mm3      nRBC 0.0 /100 WBC     POC Glucose Once [[id number]]  (Abnormal) Collected:  05/23/25 1951    Specimen: Blood Updated: 05/23/25 1953     Glucose 197 mg/dL      Comment: Serial Number: [id number]:  [id number]       POC Glucose Once [[id number]]  (Abnormal) Collected: 05/23/25 1357    Specimen: Blood Updated: 05/23/25 1359     Glucose 190 mg/dL      Comment: Serial Number: [id number]:  [id number]       Respiratory Panel PCR w/COVID-19(SARS-CoV-2) SARAH/ELENI/NEY/PAD/COR/LUCRETIA In-House, NP Swab in UTM/VTM, 2 HR TAT - Lavage, Lung, Left Lower Lobe [[id number]]  (Normal) Collected: 05/23/25 0817    Specimen: Lavage from Lung, Left Lower Lobe Updated: 05/23/25 1102     ADENOVIRUS, PCR Not Detected     Coronavirus 229E Not Detected     Coronavirus HKU1 Not Detected     Coronavirus NL63 Not Detected     Coronavirus OC43 Not Detected     COVID19 Not Detected     Human Metapneumovirus Not Detected     Human Rhinovirus/Enterovirus Not Detected     Influenza A PCR Not Detected     Influenza B PCR Not Detected     Parainfluenza Virus 1 Not Detected     Parainfluenza Virus 2 Not Detected     Parainfluenza Virus 3 Not Detected     Parainfluenza Virus 4 Not Detected     RSV, PCR Not Detected     Bordetella pertussis pcr Not Detected     Bordetella parapertussis PCR Not Detected     Chlamydophila pneumoniae PCR Not Detected     Mycoplasma pneumo by PCR Not Detected    Narrative:      In the setting of a positive respiratory panel with a viral infection PLUS a negative procalcitonin without other underlying concern for bacterial infection, consider observing off antibiotics or discontinuation of antibiotics and continue supportive care. If the respiratory panel is positive for atypical bacterial infection (Bordetella pertussis, Chlamydophila pneumoniae, or Mycoplasma pneumoniae), consider antibiotic de-escalation to target atypical bacterial infection.    Non-gynecologic Cytology [[id number]] Collected: 05/22/25 1223    Specimen: Pleural Fluid from Pleural Cavity Updated: 05/23/25 1012    Tissue Pathology  Exam [[id number]] Collected: 05/23/25 0803    Specimen: Tissue from Esophagus, Distal; Tissue from Lung, Left Lower Lobe Updated: 05/23/25 1009     Case Report --     Surgical Pathology Report                         Case: [id number]                                Authorizing Provider:  Jason Dozier MD         Collected:           05/23/2025 08:03 AM        Ordering Location:     [facility]  Received:            05/23/2025 10:08 AM                               SUITES                                                                     Pathologist:           Jevon Hsu MD                                                           Intraop:               Al Reese MD                                                          Specimens:   1) - Esophagus, Distal, @39 cm r/o Barretts                                                       2) - Lung, Left Lower Lobe, touch prep x 1 and cell block                                 Intraoperative Consultation --     TouchPrep:   Positive for malignancy.  Positive for malignancy.    Initial cytology interpretation performed by Al Reese MD.        Non-gynecologic Cytology [[id number]] Collected: 05/23/25 0817    Specimen: Lavage from Lung, Left Lower Lobe Updated: 05/23/25 1006    Virus Culture - Lavage, Lung, Left Lower Lobe [[id number]] Collected: 05/23/25 0817    Specimen: Lavage from Lung, Left Lower Lobe Updated: 05/23/25 1002    Pneumocystis PCR - Lavage, Lung, Left Lower Lobe [[id number]] Collected: 05/23/25 0817    Specimen: Lavage from Lung, Left Lower Lobe Updated: 05/23/25 1002    Fungus Culture - Lavage, Lung, Left Lower Lobe [[id number]] Collected: 05/23/25 0817    Specimen: Lavage from Lung, Left Lower Lobe Updated: 05/23/25 1002    Uric Acid [[id number]]  (Abnormal) Collected: 05/23/25 0608    Specimen: Blood Updated: 05/23/25 0823     Uric Acid 9.5 mg/dL     Renal Function Panel [[id number]]  (Abnormal) Collected: 05/23/25 0608    Specimen:  Blood Updated: 05/23/25 0726     Glucose 135 mg/dL      BUN 39 mg/dL      Creatinine 2.29 mg/dL      Sodium 137 mmol/L      Potassium 4.7 mmol/L      Chloride 105 mmol/L      CO2 18.1 mmol/L      Calcium 8.8 mg/dL      Albumin 3.6 g/dL      Phosphorus 5.1 mg/dL      Anion Gap 13.9 mmol/L      BUN/Creatinine Ratio 17.0     eGFR 29.0 mL/min/1.73     Narrative:      GFR Categories in Chronic Kidney Disease (CKD)              GFR Category          GFR (mL/min/1.73)    Interpretation  G1                    90 or greater        Normal or high (1)  G2                    60-89                Mild decrease (1)  G3a                   45-59                Mild to moderate decrease  G3b                   30-44                Moderate to severe decrease  G4                    15-29                Severe decrease  G5                    14 or less           Kidney failure    (1)In the absence of evidence of kidney disease, neither GFR category G1 or G2 fulfill the criteria for CKD.    eGFR calculation 2021 CKD-EPI creatinine equation, which does not include race as a factor    Magnesium [[id number]]  (Normal) Collected: 05/23/25 0608    Specimen: Blood Updated: 05/23/25 0726     Magnesium 2.0 mg/dL     Lactate Dehydrogenase, Body Fluid - Body Fluid, Pleural Cavity [[id number]] Collected: 05/22/25 1222    Specimen: Body Fluid from Pleural Cavity Updated: 05/22/25 2003     Lactate Dehydrogenase (LD), Fluid 398 U/L     Narrative:      No Reference Ranges Established.    Serous fluid LDH greater than 60 percent of the serum LDH or serous fluid LDH two-thirds of the upper limit of normal for serum LDH suggests the fluid is an exudate.     1. Pleural TP/Serum TP >0.5  2. Pleural LD/Serum LD >0.6  3. Pleural LD >2/3 of the upper limit of normal for serum LDH    This test was developed, it performance characteristics determined and judged suitable for clinical purposes by [facility].  It has not been cleared or  approved by the FDA.  The laboratory is regulated under CLIA as qualified to perform high-complexity testing.     Glucose, Body Fluid - Pleural Fluid, Pleural Cavity [[id number]] Collected: 05/22/25 1223    Specimen: Pleural Fluid from Pleural Cavity Updated: 05/22/25 2002     Glucose, Fluid 177 mg/dL     Narrative:      No Reference Ranges Established.    Serous fluid glucose less than 60 mg/dL or less than 30 mg/dL below serum glucose suggests an infectious or malignant exudate.     This test was developed, it performance characteristics determined and judged suitable for clinical purposes by [facility].  It has not been cleared or approved by the FDA.  The laboratory is regulated under CLIA as qualified to perform high-complexity testing.     Protein, Body Fluid - Pleural Fluid, Pleural Cavity [[id number]] Collected: 05/22/25 1223    Specimen: Pleural Fluid from Pleural Cavity Updated: 05/22/25 2002     Protein, Total, Fluid 4.0 g/dL     Narrative:      No Reference Ranges Established.    A serous fluid total fluid (TP) greater than 50 percent of the serum TP suggests the fluid is an exudate.      1. Pleural TP/Serum TP >0.5  2. Pleural LD/Serum LD >0.6  3. Pleural LD >2/3 of the upper limit of normal for serum LDH    This test was developed, it performance characteristics determined and judged suitable for clinical purposes by [facility].  It has not been cleared or approved by the FDA.  The laboratory is regulated under CLIA as qualified to perform high-complexity testing.     Body Fluid Cell Count With Differential - Body Fluid, Pleural Cavity [[id number]]  (Abnormal) Collected: 05/22/25 1222    Specimen: Body Fluid from Pleural Cavity Updated: 05/22/25 8155    Narrative:      The following orders were created for panel order Body Fluid Cell Count With Differential - Body Fluid, Pleural Cavity.  Procedure                               Abnormality         Status                      ---------                               -----------         ------                     Body fluid cell count - ...[[id number]]  Abnormal            Final result               Body fluid differential ...[[id number]]                      Final result                 Please view results for these tests on the individual orders.    Body fluid differential - Body Fluid, Pleural Cavity [[id number]] Collected: 05/22/25 1222    Specimen: Body Fluid from Pleural Cavity Updated: 05/22/25 1553     Neutrophils, Fluid % 58 %      Lymphocytes, Fluid % 37 %      Monocytes, Fluid % 5 %     Narrative:      Concentrated Smear by Cytocentrifuge Prep.    Body fluid cell count - Body Fluid, Pleural Cavity [[id number]]  (Abnormal) Collected: 05/22/25 1222    Specimen: Body Fluid from Pleural Cavity Updated: 05/22/25 1504     Color, Fluid Red     Appearance, Fluid Turbid     Comment: Result checked          RBC, Fluid 31,000 /mm3      Nucleated Cells, Fluid 568 /mm3     Narrative:      No reference range established. Physician to interpret results with clinical findings.    pH, Body Fluid - Body Fluid, Pleural Cavity [[id number]]  (Normal) Collected: 05/22/25 1222    Specimen: Body Fluid from Pleural Cavity Updated: 05/22/25 1442     pH, Fluid 7.50    POC Lactate [[id number]]  (Normal) Collected: 05/21/25 1544    Specimen: Blood Updated: 05/22/25 0949     Lactate 0.3 mmol/L      Comment: Serial Number: [id number]:  [id number]       POC Lactate [[id number]]  (Abnormal) Collected: 05/21/25 1542    Specimen: Blood Updated: 05/22/25 0949     Lactate <0.3 mmol/L      Comment: Serial Number: [id number]:  [id number]       Basic Metabolic Panel [[id number]]  (Abnormal) Collected: 05/22/25 0547    Specimen: Blood Updated: 05/22/25 0732     Glucose 148 mg/dL      BUN 33 mg/dL      Creatinine 2.34 mg/dL      Sodium 134 mmol/L      Potassium 4.9 mmol/L      Chloride 103 mmol/L      CO2 19.2 mmol/L      Calcium 8.6 mg/dL       BUN/Creatinine Ratio 14.1     Anion Gap 11.8 mmol/L      eGFR 28.3 mL/min/1.73     Narrative:      GFR Categories in Chronic Kidney Disease (CKD)              GFR Category          GFR (mL/min/1.73)    Interpretation  G1                    90 or greater        Normal or high (1)  G2                    60-89                Mild decrease (1)  G3a                   45-59                Mild to moderate decrease  G3b                   30-44                Moderate to severe decrease  G4                    15-29                Severe decrease  G5                    14 or less           Kidney failure    (1)In the absence of evidence of kidney disease, neither GFR category G1 or G2 fulfill the criteria for CKD.    eGFR calculation 2021 CKD-EPI creatinine equation, which does not include race as a factor    S. Pneumo Ag Urine or CSF - Urine, Urine, Clean Catch [[id number]]  (Normal) Collected: 05/22/25 0130    Specimen: Urine, Clean Catch Updated: 05/22/25 0241     Strep Pneumo Ag Negative    Legionella Antigen, Urine - Urine, Urine, Clean Catch [[id number]]  (Normal) Collected: 05/22/25 0130    Specimen: Urine, Clean Catch Updated: 05/22/25 0241     LEGIONELLA ANTIGEN, URINE Negative    MRSA Screen, PCR (Inpatient) - Swab, Nares [[id number]]  (Normal) Collected: 05/21/25 2300    Specimen: Swab from Nares Updated: 05/22/25 0112     MRSA PCR No MRSA Detected    Narrative:      The negative predictive value of this diagnostic test is high and should only be used to consider de-escalating anti-MRSA therapy. A positive result may indicate colonization with MRSA and must be correlated clinically.    TSH [[id number]]  (Abnormal) Collected: 05/21/25 1536    Specimen: Blood Updated: 05/21/25 1752     TSH 62.200 uIU/mL     T4, Free [[id number]]  (Abnormal) Collected: 05/21/25 1536    Specimen: Blood Updated: 05/21/25 1752     Free T4 0.76 ng/dL     Comprehensive Metabolic Panel [[id number]]  (Abnormal) Collected: 05/21/25 1536     Specimen: Blood Updated: 05/21/25 1612     Glucose 106 mg/dL      BUN 29 mg/dL      Creatinine 2.36 mg/dL      Sodium 133 mmol/L      Potassium 4.4 mmol/L      Chloride 102 mmol/L      CO2 20.4 mmol/L      Calcium 8.7 mg/dL      Total Protein 6.2 g/dL      Albumin 3.7 g/dL      ALT (SGPT) 65 U/L      AST (SGOT) 76 U/L      Alkaline Phosphatase 60 U/L      Total Bilirubin 0.3 mg/dL      Globulin 2.5 gm/dL      A/G Ratio 1.5 g/dL      BUN/Creatinine Ratio 12.3     Anion Gap 10.6 mmol/L      eGFR 28.0 mL/min/1.73     Narrative:      GFR Categories in Chronic Kidney Disease (CKD)              GFR Category          GFR (mL/min/1.73)    Interpretation  G1                    90 or greater        Normal or high (1)  G2                    60-89                Mild decrease (1)  G3a                   45-59                Mild to moderate decrease  G3b                   30-44                Moderate to severe decrease  G4                    15-29                Severe decrease  G5                    14 or less           Kidney failure    (1)In the absence of evidence of kidney disease, neither GFR category G1 or G2 fulfill the criteria for CKD.    eGFR calculation 2021 CKD-EPI creatinine equation, which does not include race as a factor    Extra Tubes [[id number]] Collected: 05/21/25 1536    Specimen: Blood, Venous Line Updated: 05/21/25 1545    Narrative:      The following orders were created for panel order Extra Tubes.  Procedure                               Abnormality         Status                     ---------                               -----------         ------                     Lavender Top[[id number]]                                     Final result                 Please view results for these tests on the individual orders.    Lavender Top [[id number]] Collected: 05/21/25 1536    Specimen: Blood Updated: 05/21/25 1545     Extra Tube hold for add-on     Comment: Auto resulted       Blood Gas, Arterial -  [[id number]]  (Abnormal) Collected: 05/21/25 1530    Specimen: Arterial Blood Updated: 05/21/25 1532     Site Left Radial     Pipe's Test Positive     pH, Arterial 7.351 pH units      pCO2, Arterial 37.0 mm Hg      pO2, Arterial 80.1 mm Hg      HCO3, Arterial 20.5 mmol/L      Base Excess, Arterial -4.6 mmol/L      Comment: Serial Number: [id number]:  [id number]        O2 Saturation, Arterial 95.2 %      CO2 Content 21.6 mmol/L      Barometric Pressure for Blood Gas --     Comment: N/A        Modality Cannula     FIO2 32 %      Hemodilution Yes     PO2/FIO2 250             Results for orders placed during the hospital encounter of 08/12/24    Adult Transthoracic Echo Complete W/ Cont if Necessary Per Protocol    Interpretation Summary    Left ventricular ejection fraction appears to be 66 - 70%.    Left ventricular diastolic function was indeterminate.    The left atrial cavity is mildly dilated.    Left atrial volume is mildly increased.    Estimated right ventricular systolic pressure from tricuspid regurgitation is mildly elevated (35-45 mmHg).    Moderate pulmonary hypertension is present.              Condition on Discharge:  stable    Vital Signs  Temp:  [97.4 °F (36.3 °C)-98.1 °F (36.7 °C)] 97.4 °F (36.3 °C)  Heart Rate:  [57-71] 65  Resp:  [12-24] 24  BP: ()/(57-70) 117/70    Physical Exam:     General Appearance:    Alert, cooperative, in no acute distress   Head:    Normocephalic, without obvious abnormality, atraumatic   Eyes:            Lids and lashes normal, conjunctivae and sclerae normal, no   icterus, no pallor, corneas clear   Ears:    Ears appear intact with no abnormalities noted   Throat:   No oral lesions, no thrush, oral mucosa moist   Neck:   No adenopathy, supple, trachea midline, no thyromegaly, no   carotid bruit, no JVD   Lungs:     Clear to auscultation,respirations regular, even and                unlabored    Heart:    Regular rhythm and normal rate, normal S1 and S2, no           murmur, no gallop, no rub, no click   Chest Wall:    No abnormalities observed   Abdomen:     Normal bowel sounds, no masses, no organomegaly, soft      non-tender, non-distended, no guarding, no rebound                tenderness   Extremities:   Moves all extremities well, no edema, no cyanosis, no           redness   Pulses:   Pulses palpable and equal bilaterally   Skin:   No bleeding, bruising or rash   Lymph nodes:   No palpable adenopathy           Discharge Disposition  Home or Self Care    Discharge Medications     Discharge Medications        New Medications        Instructions Start Date   predniSONE 20 MG tablet  Commonly known as: DELTASONE   20 mg, Oral, Daily With Breakfast   Start Date: May 26, 2025     sodium bicarbonate 650 MG tablet   1,300 mg, Oral, 3 Times Daily             Continue These Medications        Instructions Start Date   acetaminophen 325 MG tablet  Commonly known as: TYLENOL   650 mg, Oral, Every 4 Hours PRN      apixaban 5 MG tablet tablet  Commonly known as: ELIQUIS   5 mg, Oral, Every 12 Hours Scheduled      bacitracin 500 UNIT/GM ointment   Apply 1 Application topically to the appropriate area as directed 2 (Two) Times a Day As Needed for Wound Care.      Centrum Silver 50+Men tablet tablet  Generic drug: multivitamin with minerals   1 tablet, Every Morning      Claritin 10 MG tablet  Generic drug: loratadine   10 mg, Daily      ipratropium-albuterol 0.5-2.5 mg/3 ml nebulizer  Commonly known as: DUO-NEB   3 mL, Nebulization, Every 6 Hours PRN      levothyroxine 75 MCG tablet  Commonly known as: SYNTHROID, LEVOTHROID   75 mcg, Oral, Every Early Morning      midodrine 5 MG tablet  Commonly known as: PROAMATINE   5 mg, Oral, 3 Times Daily PRN      Mucinex 600 MG 12 hr tablet  Generic drug: guaiFENesin   600 mg, 2 Times Daily      NON FORMULARY   2 tablets, Nightly      pantoprazole 40 MG EC tablet  Commonly known as: PROTONIX   40 mg, Daily      simvastatin 20 MG tablet  Commonly  known as: ZOCOR   1 tablet, Nightly      sotalol 80 MG tablet  Commonly known as: BETAPACE   80 mg, 2 Times Daily      tamsulosin 0.4 MG capsule 24 hr capsule  Commonly known as: FLOMAX   1 capsule, Nightly      thiamine 100 MG tablet  tablet  Commonly known as: VITAMIN B-1   1 tablet, Daily      VITAMIN D-3 PO   1 tablet, Daily               Discharge Diet:   Diet Instructions       Diet: Cardiac Diets; Healthy Heart (2-3 Na+); Regular (IDDSI 7); Thin (IDDSI 0)      Discharge Diet: Cardiac Diets    Cardiac Diet: Healthy Heart (2-3 Na+)    Texture: Regular (IDDSI 7)    Fluid Consistency: Thin (IDDSI 0)            Activity at Discharge:   Activity Instructions       Activity as Tolerated              Follow-up Appointments  Future Appointments   Date Time Provider Department Center   5/27/2025 11:00 AM NE[facility] CC CT [facility] PET [facility]   5/28/2025 12:30 PM [facility] IR 1 BH [facility] IR [facility]   6/2/2025  8:00 AM [facility] PORT CHAIR [facility] CC NA [facility]   6/2/2025  8:30 AM Arben Tim MD [facility]     Additional Instructions for the Follow-ups that You Need to Schedule       Discharge Follow-up with PCP   As directed       Currently Documented PCP:    Abner Funes APRN    PCP Phone Number:    [phone]     Follow Up Details: 1-2 weeks        Discharge Follow-up with Specialty: oncology   As directed      Specialty: oncology        Discharge Follow-up with Specified Provider: nephrology   As directed      To: nephrology        Discharge Follow-up with Specified Provider: urology   As directed      To: urology        Basic Metabolic Panel    Jun 01, 2025 (Approximate)      Release to patient: Routine Release                Test Results Pending at Discharge  Pending Results       Procedure [Order ID] Specimen - Date/Time    Fungus Culture - Lavage, Lung, Left Lower Lobe [[id number]] Collected: 05/23/25 0817    Specimen: Lavage from Lung, Left Lower Lobe Updated: 05/23/25 1002    Pneumocystis PCR - Lavage, Lung, Left Lower Lobe  [[id number]] Collected: 05/23/25 0817    Specimen: Lavage from Lung, Left Lower Lobe Updated: 05/23/25 1002    Virus Culture - Lavage, Lung, Left Lower Lobe [[id number]] Collected: 05/23/25 0817    Specimen: Lavage from Lung, Left Lower Lobe Updated: 05/23/25 1002             Julia Hernandez PA-C  05/25/25  11:52 EDT    Time: Discharge 25 min          Electronically signed by Mame Parmar MD at 05/25/25 1229       Discharge Order (From admission, onward)       Start     Ordered    05/25/25 1149  Discharge patient  Once        Expected Discharge Date: 05/25/25   Discharge Disposition: Home or Self Care   Physician of Record for Attribution - Please select from Treatment Team: MAME PARMAR [[id number]]   Review needed by CMO to determine Physician of Record: No      Question Answer Comment   Physician of Record for Attribution - Please select from Treatment Team MAME PARMAR    Review needed by CMO to determine Physician of Record No        05/25/25 2917

## 2025-05-27 NOTE — CASE MANAGEMENT/SOCIAL WORK
Case Management Discharge Note      Final Note: Routine home         Selected Continued Care - Discharged on 5/25/2025 Admission date: 5/21/2025 - Discharge disposition: Home or Self Care         Transportation Services  Private: Car    Final Discharge Disposition Code: 01 - home or self-care

## 2025-05-28 ENCOUNTER — HOSPITAL ENCOUNTER (OUTPATIENT)
Dept: INTERVENTIONAL RADIOLOGY/VASCULAR | Facility: HOSPITAL | Age: 75
Discharge: HOME | End: 2025-05-28
Admitting: RADIOLOGY
Payer: MEDICARE

## 2025-05-28 DIAGNOSIS — C34.91 STAGE IV ADENOCARCINOMA OF LUNG, RIGHT: ICD-10-CM

## 2025-05-28 DIAGNOSIS — Z95.828 PORT-A-CATH IN PLACE: ICD-10-CM

## 2025-05-28 LAB
LAB AP CASE REPORT: NORMAL
LAB AP DIAGNOSIS COMMENT: NORMAL
Lab: NORMAL
PATH REPORT.FINAL DX SPEC: NORMAL
PATH REPORT.GROSS SPEC: NORMAL

## 2025-05-28 PROCEDURE — G0463 HOSPITAL OUTPT CLINIC VISIT: HCPCS

## 2025-05-28 NOTE — NURSING NOTE
Pt had his port removed last week due to it eroding the skin in his right upper chest. Dressing was removed from right chest. Site is healing well and there is a scab nearly completely covering the entire pea-sized wound, all except tiny left corner area. Site was cleaned with chlorhexidine scrub and let to dry completely. Then betadine jelly, sterile 4x4 gauze, and coverderm applied to right chest site. Pt told to leave the dressing in place for one week, clean and dry, without showering and just sponge bathing around it. Pt voiced understanding. Pt was also explained that when removing this dressing, if area is not completely scabbed over, apply a clean dressing every two days until it is before showering. Pt is aware to watch for signs and symptoms of infection at site and to call IR dept with any concerns. Incision site where port was removed is healed well. No signs of infection noted to pt's right upper chest at this time.

## 2025-05-29 NOTE — PROGRESS NOTES
HEMATOLOGY ONCOLOGY OUTPATIENT FOLLOW UP       Patient name: Young Ames  : 1950  MRN: [id number]  Primary Care Physician: Abner Funes APRN  Referring Physician: Abner Funes APRN  Reason For Consult: Adenocarcinoma of the right lung.     History of Present Illness:    2024: Mr. Ames first came to [facility] complaining of dyspnea. This had been getting worse over a short period of time. It was associated to unintended weight loss of approximately 15 lbs in around one month. He was diagnosed with pneumonia in 2024 he was treated with azithromycin and with amoxicillin/clavulanate, despite which he did not seem to recover completely. CT scan had shown dense consolidation of the right lower lobe and there was at some point suggestion of a cavitary lesion. 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 Following this a repeat scan showed a questionable 5.5 cm right lower lobe cavitary lesion. A bronchoscopy and biopsy on 2024 confirmed adenocarcinoma of the right lung. He feels reasonably well at this time. He has been breathing better, though he persists with dyspnea or exertion. He has been afebrile. He continues to cough but not more than before. He has not had abdominal pain and denies diarrhea or dysuria. On exam alert and conversant. Oriented and in no distress. No jaundice. No oral lesions and respirations are not labored. Lungs diminished bilaterally and absent on the right lower lobe. The abdomen is soft. No edema. Reviewed the images of the scans. Reviewed the laboratory exams. Discussed with him and his family. To proceed with a PET scan. Will not obtain  pulmonary function tests, as most likely he has had recurrence of a significant right pleural effusion. He is to see me with results.     2024: Feeling about the same as at the time of the last visit but has had  more dyspnea.  He was placed on new medication in spite of which, intermittently, he continues to be uncomfortable.  He is eating reasonably well and has had no weight loss.  He is also afebrile.  He denies chest pains.  No abdominal pain.  On exam he is conversant and oriented.  No distress.  No jaundice.  The lungs are diminished bilaterally but there is absence of breath sounds and a cough when he in the lower parts of the right chest.  The abdomen is soft.  There is no edema.  Laboratory exams and final reports of pathology were reviewed and discussed with him.  Discussed with him the stage of the disease, which corresponds to 4, given the pleural involvement.  To start chemotherapy and immunotherapy.  I have requested next-generation sequencing.  Port as soon as available.    9/29/2024: Feels reasonably well today.  Has had less dyspnea since the insertion of the pleural catheter as he has been able to have the fluid drained at home more frequently.  Yesterday, for the first time, he had persistent right-sided chest pain after the drainage of the fluid that is now resolved.  He continues to be reasonably active.  He continues to eat reasonably well and has had very minimal nausea.  He took the first chemotherapy without any complications.  He denies chest pain and he has less dyspnea than before.  No abdominal pain and no edema.  On exam chronically ill-appearing but in no distress.  No jaundice.  The lungs are diminished bilaterally with more pronounced reduction of the breath sounds in the right.  The heart is regular.  The abdomen is soft.  There is no edema.  Laboratory exams reviewed.  Discussed with him.  Continue same therapy for now.  I have sent a prescription for tramadol that he can take as needed for pain, when present.    10/14/2024: Breathing better.  Progressively finding less fluid in the thoracic cavity.  Is able to do more activity.  Has had enuresis once.  Also has noted tremors.  He is  eating well.  No nausea or vomiting.  No chest pains.  No abdominal pain.  On exam he does not seem in any distress.  He is conversant and well-oriented.  No jaundice or pallor.  Lungs symmetrically ventilated without any dullness to percussion on the right.  Abdomen soft.  No edema.  Laboratory exams reviewed.  Discussed with him.  My suspicion is that he is no longer producing as much pleural fluid as before and that perhaps today they will not be able to drain much.  Will obtain a scan and will see with results.  Continue for now.  I am not sure what the cause of the tremors and the enuresis is.    11/11/2024: Feeling well.  Continues to tolerate the treatment without difficulties.  He has no more dyspnea or pain than before.  He eats well and has had no nausea or vomiting.  As well his weight is stable.  Denies diarrhea or dysuria.  On exam he does not seem ill.  He is not jaundiced.  The lungs are diminished bilaterally but symmetrically.  Heart is regular.  The abdomen is soft.  There is no edema.  Laboratory exams reviewed.  Reviewed the images and the report of the scans.  There are some suggestion of response as the lymph nodes and the primary tumor have all decreased in size though it is difficult to tell if the primary lesion has decreased given the associated opacity surrounding it.  For now continue same treatment.  Treatment with immunotherapy after he has completed 4 cycles of chemoimmunotherapy.  See me in approximately 6 weeks.  Treatment plan placed.    12/23/2024: Received the first cycle of immunotherapy without any difficulties.  Has had some episodes of fever since the last visit.  Sometimes as high as 101.3.  He has no symptoms associated to this and often is not even aware that he has a fever.  He is using a forehead thermometer that seems to be good working order.  He is energetic in general.  He has no new limitations.  He has recently hurt a hip.  He is eating reasonably well though he  persists with marked dysgeusia.  No chest pains or cough.  He has not had much drainage from the Pleurx catheter lately in the last 3 times they were not able to drain any fluid.  He has no abdominal pain, diarrhea or dysuria.  On exam alert and conversant.  Oriented and in no distress.  No jaundice.  The lungs are diminished bilaterally but in a symmetrical fashion.  I do not believe there is any fluid at this time in the right pleural space.  Heart is regular.  Abdomen is soft.  There is no edema.  Laboratory exams reviewed and discussed with him.  To remove the Pleurx catheter.  Ideally to send the tip for culture.  He is also going to have blood cultures from the port and from a peripheral vein.  He will see me in a few weeks.  Continue immunotherapy for now.    2/13/2025: Has been feeling very well.  Has no new complaints.  His appetite has improved since he discontinued the chemotherapy and he has gained some weight.  He has no more dyspnea and has not been coughing.  His oxygen saturation has been above 90 consistently.  No chest pains or abdominal pain.  Denies diarrhea or constipation.  No dysuria.  No edema.  On exam alert and conversant.  Oriented.  No distress.  The neck is without deformity.  Palpation discloses a barely palpable thyroid.  No nodules.  No palpable lymphadenopathy.  Respirations not labored.  Lungs diminished bilaterally in a symmetrical fashion.  Heart regular.  Abdomen soft and without tumors.  No edema.  Reviewed the laboratory exams.  On 2 occasions he has had a very low TSH with slight elevations of the free T4, but without any symptoms.  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  Will investigate.    3/27/2025: Tired and with arthralgia of the right hip and both knees. He's had difficulties with arthritis for some time. The pain in the knees is predominantly when going up steps. He has been fatigued. He continues to eat with good appetite. No new respiratory symptoms.  Denies fevers. On exam alert and conversant. Oriented and in no distress. No jaundice. O oral lesions and respirations not labored. Lungs diminished but clear bilaterally. Heart regular. Abdomen soft and not tender. No edema. Reviewed the laboratory exams. To  continue with the same treatment. A new prescription for levothyroxine 50 mcg was sent and he was given instructions. He is to see me in 6 weeks.     5/15/99117: Feels about the same as at the time of the previous visit. His wife has noted that his oxygen saturation has decreased from the high 90's to the mid 90's and he feels more dyspneic. No chest pain but he has been coughing more and expectorates without hemoptysis. No dysphagia. Denies abdominal pain and has not had diarrhea. No dysuria. No edema. No skin rash. On exam alert and conversant. Oriented and in no distress. No jaundice. No oral lesions and respirations not labored. Lungs diminished bilaterally and symmetrically; there does not appear to be more pleural fluid. No edema. Reviewed the laboratory exams. His renal function has declined again. Will investigate further. No immunotherapy for now.     5/23/2025: I was asked to see Mr. Ames who has been a patient of mine since September 2025.  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  He has received treatment with carboplatin/pemetrexed/pembrolizumab.  Recently he has been on pembrolizumab only.  On May 22, 2025 he was at the hospital having explantation of a right port.  He was noted to be dyspneic and tachypneic and his oxygen saturation was down to 89%.  Chest x-ray revealed a left-sided pneumonia and he was sent to the emergency room.  Since yesterday he has been on ceftazidime and he feels much better.  Whereas yesterday he was dyspneic with minimal exertion, today he was able to get up and move around, even walking.  He has not been febrile but he had been coughing and expectorating some.  He had had no chest  pains.  He was eating reasonably well and his weight had been within the same range.  On exam he is a well-built and slender man who is well-oriented and conversant.  He is in good spirits and does not seem acutely ill.  He is neither jaundiced nor pale.  The lungs are markedly diminished bilaterally but rather clear.  He is heart is regular.  His abdomen is flat and soft and the liver and spleen are not enlarged.  There is no edema.  He had imaging studies of the chest.  As compared to previous scans he had experienced increase in mediastinal adenopathy and interval development of left hilar and left axillary as well as left supraclavicular lymphadenopathy that were suggested of malignant progression.  He underwent upper gastrointestinal endoscopy that revealed a short segment Schroeder's esophagus and then had bronchoscopy with biopsies of one of the lesions.  The final report of pathology is not yet available but the initial interpretation is of malignancy.  For now continue with antibiotics.  Await the final report of the biopsy to decide on subsequent treatment.  Discussed with him.     6/2/2025: In the office following discharge from the hospital. He is feeling better but has had persistent dyspnea, even as he uses oxygen. He has bee on 2 LPM. He has not had any chest pain and has been free of cough or increased expectoration. He has been eating well. He has not had any nausea or vomiting. He denies chest pain or cough. No abdominal pain or diarrhea and no dysuria. On exam alert and conversant. Oriented and in no distress. No jaundice. No oral lesions and respirations are not labored. The lungs are diminished bilaterally. Heart is regular. Abdomen soft and not tender. No edema. No skin rash.     Past Medical History:   Diagnosis Date    Coronary artery disease     Myocardial infarction 01/31/2024     Past Surgical History:   Procedure Laterality Date    BRONCHOSCOPY N/A 8/13/2024    Procedure: BRONCHOSCOPY WITH  BRONCHIAL WASHING AND BRONCHIAL BRUSHING;  Surgeon: Kelvin Gonzalez MD;  Location: [facility] ENDOSCOPY;  Service: Pulmonary;  Laterality: N/A;    BRONCHOSCOPY N/A 5/23/2025    Procedure: BRONCHOSCOPY with bronchoalveolar lavage and biopsy x 1 area;  Surgeon: Jason Dozier MD;  Location: [facility] ENDOSCOPY;  Service: Pulmonary;  Laterality: N/A;  post op: lung mass    BRONCHOSCOPY WITH ION ROBOTIC ASSIST N/A 8/16/2024    Procedure: BRONCHOSCOPY WITH ION ROBOT WITH CRYO BIOPSY;  Surgeon: Kelvin Gonzalez MD;  Location: [facility] ENDOSCOPY;  Service: Robotics - Pulmonary;  Laterality: N/A;    CARDIAC CATHETERIZATION Right 1/29/2024    Procedure: Coronary angiography;  Surgeon: Abran Chand MD;  Location: [facility] CATH INVASIVE LOCATION;  Service: Cardiovascular;  Laterality: Right;    CARDIAC CATHETERIZATION N/A 1/29/2024    Procedure: Left Heart Cath;  Surgeon: Abran Chand MD;  Location: [facility] CATH INVASIVE LOCATION;  Service: Cardiovascular;  Laterality: N/A;    ENDOSCOPY N/A 5/23/2025    Procedure: ESOPHAGOGASTRODUODENOSCOPY with biopsy x 1 area and dilatation (48FR non-guided bougie);  Surgeon: Carlos Calero MD;  Location: [facility] ENDOSCOPY;  Service: Gastroenterology;  Laterality: N/A;  post op: esophageal stricture       Current Outpatient Medications:     acetaminophen (TYLENOL) 325 MG tablet, Take 2 tablets by mouth Every 4 (Four) Hours As Needed for Mild Pain., Disp: , Rfl:     apixaban (ELIQUIS) 5 MG tablet tablet, Take 1 tablet by mouth Every 12 (Twelve) Hours. Indications: Atrial Fibrillation, Disp: 60 tablet, Rfl: 0    bacitracin 500 UNIT/GM ointment, Apply 1 Application topically to the appropriate area as directed 2 (Two) Times a Day As Needed for Wound Care., Disp: , Rfl:     Cholecalciferol (VITAMIN D-3 PO), Take 1 tablet by mouth Daily. Indications: Vitamin Deficiency, Disp: , Rfl:     guaiFENesin (Mucinex) 600 MG 12 hr tablet, Take 1 tablet by mouth 2 (Two) Times a Day., Disp: , Rfl:      ipratropium-albuterol (DUO-NEB) 0.5-2.5 mg/3 ml nebulizer, Take 3 mL by nebulization Every 6 (Six) Hours As Needed for Wheezing., Disp: , Rfl:     levothyroxine (SYNTHROID, LEVOTHROID) 75 MCG tablet, Take 1 tablet by mouth Every Morning., Disp: 30 tablet, Rfl: 1    loratadine (Claritin) 10 MG tablet, Take 1 tablet by mouth Daily., Disp: , Rfl:     midodrine (PROAMATINE) 5 MG tablet, Take 1 tablet by mouth 3 (Three) Times a Day As Needed., Disp: , Rfl:     multivitamin with minerals (Centrum Silver 50+Men) tablet tablet, Take 1 tablet by mouth Every Morning. Indications: Vitamin Deficiency, Disp: , Rfl:     NON FORMULARY, Take 2 tablets by mouth Every Night. Zzzquil  Indications: Sleep Disorder, Disp: , Rfl:     pantoprazole (PROTONIX) 40 MG EC tablet, Take 1 tablet by mouth Daily., Disp: , Rfl:     simvastatin (ZOCOR) 20 MG tablet, Take 1 tablet by mouth Every Night. Indications: High Amount of Fats in the Blood, Disp: , Rfl:     sodium bicarbonate 650 MG tablet, Take 2 tablets by mouth 3 (Three) Times a Day., Disp: 180 tablet, Rfl: 0    sotalol (BETAPACE) 80 MG tablet, Take 1 tablet by mouth 2 (Two) Times a Day., Disp: , Rfl:     tamsulosin (FLOMAX) 0.4 MG capsule 24 hr capsule, Take 1 capsule by mouth Every Night. Indications: Benign Enlargement of Prostate, Disp: , Rfl:     thiamine (VITAMIN B-1) 100 MG tablet  tablet, Take 1 tablet by mouth Daily. Indications: Vitamin Deficiency, Disp: , Rfl:     No Known Allergies    Family History   Problem Relation Age of Onset    Dementia Mother     Breast cancer Sister 74     Cancer-related family history includes Breast cancer (age of onset: 74) in his sister.    Social History     Tobacco Use    Smoking status: Former     Current packs/day: 0.00     Average packs/day: 1 pack/day for 32.0 years (32.0 ttl pk-yrs)     Types: Cigarettes     Start date:      Quit date: 2000     Years since quittin.4     Passive exposure: Past    Smokeless tobacco: Never   Vaping Use     Vaping status: Never Used   Substance Use Topics    Alcohol use: Yes     Alcohol/week: 1.0 standard drink of alcohol     Types: 1 Cans of beer per week    Drug use: Never     Social History     Social History Narrative    Not on file      ROS:   Review of Systems   Constitutional:  Positive for unexpected weight change. Negative for activity change, appetite change, chills, diaphoresis, fatigue and fever.   HENT:  Negative for congestion, dental problem, drooling, ear discharge, ear pain, facial swelling, hearing loss, mouth sores, nosebleeds, postnasal drip, rhinorrhea, sinus pressure, sinus pain, sneezing, sore throat, tinnitus, trouble swallowing and voice change.    Eyes:  Negative for photophobia, pain, discharge, redness, itching and visual disturbance.   Respiratory:  Positive for cough and shortness of breath. Negative for apnea, choking, chest tightness, wheezing and stridor.    Cardiovascular:  Negative for chest pain, palpitations and leg swelling.   Gastrointestinal:  Negative for abdominal distention, abdominal pain, anal bleeding, blood in stool, constipation, diarrhea, nausea, rectal pain and vomiting.   Endocrine: Negative for cold intolerance, heat intolerance, polydipsia and polyuria.   Genitourinary:  Negative for decreased urine volume, difficulty urinating, dysuria, flank pain, frequency, genital sores, hematuria and urgency.   Musculoskeletal:  Negative for arthralgias, back pain, gait problem, joint swelling, myalgias, neck pain and neck stiffness.   Skin:  Negative for color change, pallor and rash.   Neurological:  Negative for dizziness, tremors, seizures, syncope, facial asymmetry, speech difficulty, weakness, light-headedness, numbness and headaches.   Hematological:  Negative for adenopathy. Does not bruise/bleed easily.   Psychiatric/Behavioral:  Negative for agitation, behavioral problems, confusion, decreased concentration, hallucinations, self-injury, sleep disturbance and  suicidal ideas. The patient is not nervous/anxious.      Objective:  Vital signs:  There were no vitals filed for this visit.  There is no height or weight on file to calculate BMI.  ECOG  (1) Restricted in physically strenuous activity, ambulatory and able to do work of light nature    Physical Exam:   Physical Exam  Constitutional:       General: He is not in acute distress.     Appearance: He is not ill-appearing, toxic-appearing or diaphoretic.      Comments: Slender. Well built and in no distress. No jaundice.    HENT:      Head: Normocephalic and atraumatic.      Right Ear: External ear normal.      Left Ear: External ear normal.      Nose: Nose normal.      Mouth/Throat:      Mouth: Mucous membranes are moist.      Pharynx: Oropharynx is clear.   Eyes:      General: No scleral icterus.        Right eye: No discharge.         Left eye: No discharge.      Conjunctiva/sclera: Conjunctivae normal.      Pupils: Pupils are equal, round, and reactive to light.   Cardiovascular:      Rate and Rhythm: Normal rate and regular rhythm.      Pulses: Normal pulses.      Heart sounds: Normal heart sounds. No murmur heard.     No friction rub. No gallop.   Pulmonary:      Effort: No respiratory distress.      Breath sounds: No stridor. No wheezing, rhonchi or rales.      Comments: Breath sound diminished bilaterally.   Chest:      Chest wall: No tenderness.   Abdominal:      General: Abdomen is flat. Bowel sounds are normal. There is no distension.      Palpations: Abdomen is soft. There is no mass.      Tenderness: There is no abdominal tenderness. There is no right CVA tenderness, left CVA tenderness, guarding or rebound.   Musculoskeletal:         General: No tenderness, deformity or signs of injury.      Cervical back: No rigidity.      Right lower leg: No edema.      Left lower leg: No edema.   Lymphadenopathy:      Cervical: No cervical adenopathy.   Skin:     General: Skin is warm and dry.      Coloration: Skin is not  jaundiced or pale.      Findings: No bruising or rash.   Neurological:      General: No focal deficit present.      Mental Status: He is alert and oriented to person, place, and time.      Cranial Nerves: No cranial nerve deficit.   Psychiatric:         Mood and Affect: Mood normal.         Behavior: Behavior normal.         Thought Content: Thought content normal.         Judgment: Judgment normal.     DICKSON Tim MD performed the physical exam on 6/2/2025 as documented above.     Lab Results - Last 18 Months   Lab Units 05/25/25  0526 05/24/25  0244 05/23/25  0608   WBC 10*3/mm3 9.81 12.63* 17.77*   HEMOGLOBIN g/dL 8.2* 7.6* 8.8*   HEMATOCRIT % 25.7* 23.7* 26.6*   PLATELETS 10*3/mm3 173 183 254   MCV fL 95.9 95.2 94.0     Lab Results - Last 18 Months   Lab Units 05/25/25  0526 05/24/25  0244 05/23/25  0608 05/22/25  0547 05/21/25  1536 05/15/25  1155   SODIUM mmol/L 138 135* 137   < > 133* 137   POTASSIUM mmol/L 4.0 4.7 4.7   < > 4.4 4.9   CHLORIDE mmol/L 108* 107 105   < > 102 103   CO2 mmol/L 20.4* 18.6* 18.1*   < > 20.4* 20.8*   BUN mg/dL 40* 47* 39*   < > 29* 27*   CREATININE mg/dL 1.81* 2.25* 2.29*   < > 2.36* 2.24*   CALCIUM mg/dL 8.3* 8.2* 8.8   < > 8.7 9.2   BILIRUBIN mg/dL <0.2  --   --   --  0.3 0.4   ALK PHOS U/L 45  --   --   --  60 67   ALT (SGPT) U/L 28  --   --   --  65* 75*   AST (SGOT) U/L 24  --   --   --  76* 88*   GLUCOSE mg/dL 96 164* 135*   < > 106* 93    < > = values in this interval not displayed.     Lab Results   Component Value Date    GLUCOSE 96 05/25/2025    BUN 40 (H) 05/25/2025    CREATININE 1.81 (H) 05/25/2025    BCR 22.1 05/25/2025    K 4.0 05/25/2025    CO2 20.4 (L) 05/25/2025    CALCIUM 8.3 (L) 05/25/2025    ALBUMIN 3.0 (L) 05/25/2025    AST 24 05/25/2025    ALT 28 05/25/2025     Lab Results - Last 18 Months   Lab Units 05/21/25  1104 09/19/24  0742 09/12/24  0809   INR  1.18* 1.10 1.03   APTT seconds 29.2 28.5 26.7     Lab Results   Component Value Date    STEVEN Positive (A)  08/12/2024     Lab Results   Component Value Date    PTT 29.2 05/21/2025    INR 1.18 (H) 05/21/2025     Assessment & Plan     cTX N2 M1 adenocarcinoma of the right lung: Next-generation sequencing reviewed.  Positive PD-L1 expression..  The disease is negative for EGFR, ALK, ROS1, ret, BRAF.  Completed concurrent radiation and chemotherapy with carboplatin and paclitaxel.  Started consolidation with pembrolizumab in November of 2024. Remained without evidence of progression until May of 2025. To start treatment with docetaxel/ramucirumab  Hypothyroidism. To continue levothyroxine, but the dose was increased to 50 mcg. To continue to follow.   Fevers: Resolved.  Right pleural effusion: Resolved.  History of tobacco smoking: Abstinent for more than 20 years.  Reviewed all the records from the hospital.   He's to see me in approximately 3 weeks.     Arben Tim MD on 6/2/2025 at 16:51

## 2025-05-30 ENCOUNTER — READMISSION MANAGEMENT (OUTPATIENT)
Dept: CALL CENTER | Facility: HOSPITAL | Age: 75
End: 2025-05-30
Payer: MEDICARE

## 2025-05-30 LAB
FUNGUS WND CULT: NORMAL
MYCOBACTERIUM SPEC CULT: NORMAL
NIGHT BLUE STAIN TISS: NORMAL

## 2025-05-30 NOTE — OUTREACH NOTE
COPD/PN Week 1 Survey      Flowsheet Row Responses   [facility] patient discharged from? [facility]   Does the patient have one of the following disease processes/diagnoses(primary or secondary)? Pneumonia   Week 1 attempt successful? Yes   Call start time 1549   Call end time 1555   Discharge diagnosis Pneumonia   Is patient permission given to speak with other caregiver? Yes   List who call center can speak with spouse- Ruth   Person spoke with today (if not patient) and relationship spouse- Ruth   Meds reviewed with patient/caregiver? Yes   Is the patient having any side effects they believe may be caused by any medication additions or changes? No   Does the patient have all medications ordered at discharge? Yes   Is the patient taking all medications as directed (includes completed medication regime)? Yes   Does the patient have a primary care provider?  Yes   Does the patient have an appointment with their PCP or specialist within 7 days of discharge? Yes   Comments regarding PCP seen his PCP today for a follow up appt   Has the patient kept scheduled appointments due by today? Yes   Has all DME been delivered? Yes   Pulse Ox monitoring Intermittent   Pulse Ox device source Patient   O2 Sat comments 94% on room air   O2 Sat: education provided Sat levels, Monitoring frequency, When to seek care   O2 Sat education comments Advised if O2 sats are below 90% to seek urgent care at ED, spouse voiced understanding   Psychosocial issues? No   Did the patient receive a copy of their discharge instructions? Yes   Nursing interventions Reviewed instructions with patient   What is the patient's perception of their health status since discharge? Improving   Nursing Interventions Nurse provided patient education   If the patient is a current smoker, are they able to teach back resources for cessation? Not a smoker   Is the patient/caregiver able to teach back the hierarchy of who to call/visit for symptoms/problems? PCP,  Specialist, Home health nurse, Urgent Care, ED, 911 Yes   Is the patient/caregiver able to teach back signs and symptoms of worsening condition: Fever/chills, Shortness of breath, Chest pain   Is the patient/caregiver able to teach back importance of completing antibiotic course of treatment? --  [was not sent home with an antibiotic]   Week 1 call completed? Yes   Graduated Yes   Is the patient interested in additional calls from an ambulatory ? No   Would this patient benefit from a Referral to [facility] Social Work? No   Wrap up additional comments Per spouse, patient is doing well, denies any questions or concerns, patient was seen by his PCP earlier today, no further calls needed.   Call end time [id number]            Shanthi MCGREGOR - Registered Nurse

## 2025-05-30 NOTE — PROGRESS NOTES
Enter Query Response Below      Query Response: No.  The esophagus biopsy was not positive for malignancy.  It was the endobronchial biopsy done on bronchoscopy by Dr. 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              If applicable, please update the problem list.

## 2025-06-02 ENCOUNTER — HOSPITAL ENCOUNTER (OUTPATIENT)
Dept: ONCOLOGY | Facility: HOSPITAL | Age: 75
Discharge: HOME | End: 2025-06-02
Admitting: INTERNAL MEDICINE
Payer: MEDICARE

## 2025-06-02 ENCOUNTER — OFFICE VISIT (OUTPATIENT)
Dept: ONCOLOGY | Facility: CLINIC | Age: 75
End: 2025-06-02
Payer: MEDICARE

## 2025-06-02 VITALS
HEART RATE: 75 BPM | DIASTOLIC BLOOD PRESSURE: 72 MMHG | WEIGHT: 168.8 LBS | OXYGEN SATURATION: 90 % | BODY MASS INDEX: 23.63 KG/M2 | HEIGHT: 71 IN | SYSTOLIC BLOOD PRESSURE: 103 MMHG

## 2025-06-02 DIAGNOSIS — C34.91 STAGE IV ADENOCARCINOMA OF LUNG, RIGHT: Primary | ICD-10-CM

## 2025-06-02 DIAGNOSIS — C34.92 STAGE IV ADENOCARCINOMA OF LUNG, LEFT: ICD-10-CM

## 2025-06-02 DIAGNOSIS — C34.92 STAGE IV ADENOCARCINOMA OF LUNG, LEFT: Primary | ICD-10-CM

## 2025-06-02 LAB
ALBUMIN SERPL-MCNC: 3.5 G/DL (ref 3.5–5.2)
ALBUMIN/GLOB SERPL: 1.3 G/DL
ALP SERPL-CCNC: 67 U/L (ref 39–117)
ALT SERPL W P-5'-P-CCNC: 29 U/L (ref 1–41)
ANION GAP SERPL CALCULATED.3IONS-SCNC: 11.2 MMOL/L (ref 5–15)
AST SERPL-CCNC: 45 U/L (ref 1–40)
BASOPHILS # BLD AUTO: 0.08 10*3/MM3 (ref 0–0.2)
BASOPHILS NFR BLD AUTO: 0.5 % (ref 0–1.5)
BILIRUB SERPL-MCNC: 0.4 MG/DL (ref 0–1.2)
BUN SERPL-MCNC: 19.1 MG/DL (ref 8–23)
BUN/CREAT SERPL: 10.9 (ref 7–25)
CALCIUM SPEC-SCNC: 8.7 MG/DL (ref 8.6–10.5)
CHLORIDE SERPL-SCNC: 99 MMOL/L (ref 98–107)
CO2 SERPL-SCNC: 23.8 MMOL/L (ref 22–29)
CREAT SERPL-MCNC: 1.75 MG/DL (ref 0.76–1.27)
DEPRECATED RDW RBC AUTO: 50.5 FL (ref 37–54)
EGFRCR SERPLBLD CKD-EPI 2021: 40.1 ML/MIN/1.73
EOSINOPHIL # BLD AUTO: 1.06 10*3/MM3 (ref 0–0.4)
EOSINOPHIL NFR BLD AUTO: 6.5 % (ref 0.3–6.2)
ERYTHROCYTE [DISTWIDTH] IN BLOOD BY AUTOMATED COUNT: 15 % (ref 12.3–15.4)
GLOBULIN UR ELPH-MCNC: 2.7 GM/DL
GLUCOSE SERPL-MCNC: 132 MG/DL (ref 65–99)
HCT VFR BLD AUTO: 35.4 % (ref 37.5–51)
HGB BLD-MCNC: 11.4 G/DL (ref 13–17.7)
LYMPHOCYTES # BLD AUTO: 2.06 10*3/MM3 (ref 0.7–3.1)
LYMPHOCYTES NFR BLD AUTO: 12.6 % (ref 19.6–45.3)
MCH RBC QN AUTO: 31.1 PG (ref 26.6–33)
MCHC RBC AUTO-ENTMCNC: 32.2 G/DL (ref 31.5–35.7)
MCV RBC AUTO: 96.7 FL (ref 79–97)
MONOCYTES # BLD AUTO: 1.4 10*3/MM3 (ref 0.1–0.9)
MONOCYTES NFR BLD AUTO: 8.6 % (ref 5–12)
NEUTROPHILS NFR BLD AUTO: 11.75 10*3/MM3 (ref 1.7–7)
NEUTROPHILS NFR BLD AUTO: 71.8 % (ref 42.7–76)
PLATELET # BLD AUTO: 214 10*3/MM3 (ref 140–450)
PMV BLD AUTO: 9 FL (ref 6–12)
POTASSIUM SERPL-SCNC: 4.3 MMOL/L (ref 3.5–5.2)
PROT SERPL-MCNC: 6.2 G/DL (ref 6–8.5)
RBC # BLD AUTO: 3.66 10*6/MM3 (ref 4.14–5.8)
SODIUM SERPL-SCNC: 134 MMOL/L (ref 136–145)
VIRUS SPEC CULT: NORMAL
WBC NRBC COR # BLD AUTO: 16.35 10*3/MM3 (ref 3.4–10.8)

## 2025-06-02 PROCEDURE — 36592 COLLECT BLOOD FROM PICC: CPT

## 2025-06-02 PROCEDURE — 85025 COMPLETE CBC W/AUTO DIFF WBC: CPT | Performed by: INTERNAL MEDICINE

## 2025-06-02 PROCEDURE — 99214 OFFICE O/P EST MOD 30 MIN: CPT | Performed by: INTERNAL MEDICINE

## 2025-06-02 PROCEDURE — 80053 COMPREHEN METABOLIC PANEL: CPT | Performed by: INTERNAL MEDICINE

## 2025-06-02 PROCEDURE — 1159F MED LIST DOCD IN RCRD: CPT | Performed by: INTERNAL MEDICINE

## 2025-06-02 PROCEDURE — 1160F RVW MEDS BY RX/DR IN RCRD: CPT | Performed by: INTERNAL MEDICINE

## 2025-06-02 PROCEDURE — 1126F AMNT PAIN NOTED NONE PRSNT: CPT | Performed by: INTERNAL MEDICINE

## 2025-06-02 RX ORDER — SODIUM CHLORIDE 0.9 % (FLUSH) 0.9 %
10 SYRINGE (ML) INJECTION AS NEEDED
Status: DISCONTINUED | OUTPATIENT
Start: 2025-06-02 | End: 2025-06-03 | Stop reason: HOSPADM

## 2025-06-02 RX ORDER — SODIUM CHLORIDE 0.9 % (FLUSH) 0.9 %
10 SYRINGE (ML) INJECTION AS NEEDED
OUTPATIENT
Start: 2025-06-02

## 2025-06-02 RX ORDER — HEPARIN SODIUM (PORCINE) LOCK FLUSH IV SOLN 100 UNIT/ML 100 UNIT/ML
500 SOLUTION INTRAVENOUS AS NEEDED
OUTPATIENT
Start: 2025-06-02

## 2025-06-02 RX ORDER — HEPARIN SODIUM (PORCINE) LOCK FLUSH IV SOLN 100 UNIT/ML 100 UNIT/ML
500 SOLUTION INTRAVENOUS AS NEEDED
Status: DISCONTINUED | OUTPATIENT
Start: 2025-06-02 | End: 2025-06-03 | Stop reason: HOSPADM

## 2025-06-02 RX ADMIN — Medication 10 ML: at 08:26

## 2025-06-02 NOTE — PROGRESS NOTES
Pt to port chair for labs and PICC line dressing change prior to appt with Dr. Tim.  10cc blood wasted prior to lab draw. Labs collected and taken to lab per protocol. Old dressing removed with no issue.  Sterile technique used to clean area with chlorhexidine, new securing dressing and biopatch placed. PICC line was recapped and flushed with good blood return noted. Pt states that his dressing from port site removal has not been changed. Old dressing removed, site cleansed and new dressing gauze dressing applied. Pt to waiting room.

## 2025-06-03 ENCOUNTER — OFFICE VISIT (OUTPATIENT)
Dept: PULMONOLOGY | Facility: HOSPITAL | Age: 75
End: 2025-06-03
Payer: MEDICARE

## 2025-06-03 VITALS
SYSTOLIC BLOOD PRESSURE: 110 MMHG | RESPIRATION RATE: 12 BRPM | HEIGHT: 71 IN | DIASTOLIC BLOOD PRESSURE: 73 MMHG | HEART RATE: 75 BPM | OXYGEN SATURATION: 93 % | BODY MASS INDEX: 23.52 KG/M2 | WEIGHT: 168 LBS

## 2025-06-03 DIAGNOSIS — C34.91 PRIMARY LUNG CANCER, RIGHT: Primary | ICD-10-CM

## 2025-06-03 PROCEDURE — G0463 HOSPITAL OUTPT CLINIC VISIT: HCPCS

## 2025-06-03 RX ORDER — HYDROCORTISONE 20 MG/1
20 TABLET ORAL 2 TIMES DAILY
Qty: 60 TABLET | Refills: 2 | Status: SHIPPED | OUTPATIENT
Start: 2025-06-03 | End: 2025-09-01

## 2025-06-03 NOTE — PROGRESS NOTES
PULMONARY/ CRITICAL CARE/ SLEEP MEDICINE OUTPATIENT CONSULT/ FOLLOW UP NOTE        Patient Name:  Young Ames    :  1950    Medical Record:  [id number]    PRIMARY CARE PHYSICIAN     Abner Funes APRN    REASON FOR CONSULTATION    Young Ames is a 74 y.o. male who is referred for consultation for Lung cancer  REVIEW OF SYSTEMS    Constitutional:  Denies fever or chills   Eyes:  Denies change in visual acuity   HENT:  Denies nasal congestion or sore throat   Respiratory:  Denies cough or shortness of breath   Cardiovascular:  Denies chest pain or edema   GI:  Denies abdominal pain, nausea, vomiting, bloody stools or diarrhea   :  Denies dysuria   Musculoskeletal:  Denies back pain or joint pain   Integument:  Denies rash   Neurologic:  Denies headache, focal weakness or sensory changes   Endocrine:  Denies polyuria or polydipsia   Lymphatic:  Denies swollen glands   Psychiatric:  Denies depression or anxiety     MEDICAL HISTORY    Past Medical History:   Diagnosis Date    Abnormal ECG 2024    Allergic rhinitis     Arrhythmia 2024    SVT, then afib after thoracentesis    Asthma 2024    Shortness of breath with exertion    Atrial fibrillation 2024    After 2L drained per thoracentesis    Coronary artery disease 2024    Minimal blockage per cath    GERD (gastroesophageal reflux disease)     Hyperlipidemia     Lung cancer 2024    Myocardial infarction 2024    Cath, no intervention    Pleural effusion     Pneumonia 2025        SURGICAL HISTORY    Past Surgical History:   Procedure Laterality Date    BRONCHOSCOPY N/A 2024    Procedure: BRONCHOSCOPY WITH BRONCHIAL WASHING AND BRONCHIAL BRUSHING;  Surgeon: Kelvin Gonzalez MD;  Location: [facility] ENDOSCOPY;  Service: Pulmonary;  Laterality: N/A;    BRONCHOSCOPY N/A 2025    Procedure: BRONCHOSCOPY with bronchoalveolar lavage and biopsy x 1 area;  Surgeon: Jason Dozier MD;  Location: [facility] ENDOSCOPY;   Service: Pulmonary;  Laterality: N/A;  post op: lung mass    BRONCHOSCOPY      BRONCHOSCOPY WITH ION ROBOTIC ASSIST N/A 2024    Procedure: BRONCHOSCOPY WITH ION ROBOT WITH CRYO BIOPSY;  Surgeon: Kelvin Gonzalez MD;  Location: [facility] ENDOSCOPY;  Service: Robotics - Pulmonary;  Laterality: N/A;    CARDIAC CATHETERIZATION Right 2024    Procedure: Coronary angiography;  Surgeon: Abran Chand MD;  Location: [facility] CATH INVASIVE LOCATION;  Service: Cardiovascular;  Laterality: Right;    CARDIAC CATHETERIZATION N/A 2024    Procedure: Left Heart Cath;  Surgeon: Abran Chand MD;  Location: [facility] CATH INVASIVE LOCATION;  Service: Cardiovascular;  Laterality: N/A;    ENDOSCOPY N/A 2025    Procedure: ESOPHAGOGASTRODUODENOSCOPY with biopsy x 1 area and dilatation (48FR non-guided bougie);  Surgeon: Carlos Calero MD;  Location: [facility] ENDOSCOPY;  Service: Gastroenterology;  Laterality: N/A;  post op: esophageal stricture    LUNG BIOPSY          FAMILY HISTORY    Family History   Problem Relation Age of Onset    Dementia Mother     Diabetes Mother     Arrhythmia Mother         Afib    Breast cancer Sister 74    Cancer Sister         Breast cancer       SOCIAL HISTORY    Social History     Tobacco Use    Smoking status: Former     Current packs/day: 0.00     Average packs/day: 1 pack/day for 32.0 years (32.0 ttl pk-yrs)     Types: Cigarettes     Start date:      Quit date:      Years since quittin.4     Passive exposure: Past    Smokeless tobacco: Never   Substance Use Topics    Alcohol use: Yes     Alcohol/week: 1.0 standard drink of alcohol     Types: 1 Cans of beer per week        ALLERGIES    No Known Allergies      MEDICATIONS    Current Outpatient Medications on File Prior to Visit   Medication Sig Dispense Refill    acetaminophen (TYLENOL) 325 MG tablet Take 2 tablets by mouth Every 4 (Four) Hours As Needed for Mild Pain.      apixaban (ELIQUIS) 5 MG tablet tablet Take 1  tablet by mouth Every 12 (Twelve) Hours. Indications: Atrial Fibrillation 60 tablet 0    Cholecalciferol (VITAMIN D-3 PO) Take 1 tablet by mouth Daily. Indications: Vitamin Deficiency      guaiFENesin (Mucinex) 600 MG 12 hr tablet Take 1 tablet by mouth 2 (Two) Times a Day. Indications: Cough      ipratropium-albuterol (DUO-NEB) 0.5-2.5 mg/3 ml nebulizer Take 3 mL by nebulization Every 6 (Six) Hours As Needed for Wheezing.      levothyroxine (SYNTHROID, LEVOTHROID) 75 MCG tablet Take 1 tablet by mouth Every Morning. 30 tablet 1    loratadine (Claritin) 10 MG tablet Take 1 tablet by mouth Daily. (Patient not taking: Reported on 6/2/2025)      midodrine (PROAMATINE) 5 MG tablet Take 1 tablet by mouth 3 (Three) Times a Day As Needed.      multivitamin with minerals (Centrum Silver 50+Men) tablet tablet Take 1 tablet by mouth Every Morning. Indications: Vitamin Deficiency      NON FORMULARY Take 2 tablets by mouth Every Night. Zzzquil  Indications: Sleep Disorder      pantoprazole (PROTONIX) 40 MG EC tablet Take 1 tablet by mouth Daily.      simvastatin (ZOCOR) 20 MG tablet Take 1 tablet by mouth Every Night. Indications: High Amount of Fats in the Blood      sodium bicarbonate 650 MG tablet Take 2 tablets by mouth 3 (Three) Times a Day. 180 tablet 0    sotalol (BETAPACE) 80 MG tablet Take 1 tablet by mouth 2 (Two) Times a Day.      tamsulosin (FLOMAX) 0.4 MG capsule 24 hr capsule Take 1 capsule by mouth Every Night. Indications: Benign Enlargement of Prostate      thiamine (VITAMIN B-1) 100 MG tablet  tablet Take 1 tablet by mouth Daily. Indications: Vitamin Deficiency       Current Facility-Administered Medications on File Prior to Visit   Medication Dose Route Frequency Provider Last Rate Last Admin    [DISCONTINUED] heparin injection 500 Units  500 Units Intravenous PRN Arben Tim MD        [DISCONTINUED] sodium chloride 0.9 % flush 10 mL  10 mL Intravenous PRN Arben Tim MD   10 mL at 06/02/25 6033  "      PHYSICAL EXAM    Vitals:    06/03/25 0843   BP: 110/73   BP Location: Left arm   Patient Position: Sitting   Cuff Size: Adult   Pulse: 75   Resp: 12   SpO2: 93%   Weight: 76.2 kg (168 lb)   Height: 180.3 cm (71\")        Constitutional:  Well developed, well nourished, no acute distress, non-toxic appearance   Eyes:  PERRL, conjunctiva normal   HENT:  Atraumatic, external ears normal, nose normal, oropharynx moist, no pharyngeal exudates. mallampatti   Neck- normal range of motion, no tenderness, supple   Respiratory:  No respiratory distress, normal breath sounds, no rales, no wheezing   Cardiovascular:  Normal rate, normal rhythm, no murmurs, no gallops, no rubs   GI:  Soft, nondistended, normal bowel sounds, nontender, no organomegaly, no mass, no rebound, no guarding   :  No costovertebral angle tenderness   Musculoskeletal:  No edema, no tenderness, no deformities. Back- no tenderness  Integument:  Well hydrated, no rash   Lymphatic:  No lymphadenopathy noted   Neurologic:  Alert & oriented x 3, CN 2-12 normal, normal motor function, normal sensory function, no focal deficits noted   Psychiatric:  Speech and behavior appropriate     XR Chest 1 View  Result Date: 5/23/2025  Impression: 1. No pneumothorax status post lung biopsy. 2. Persistent bibasilar airspace disease and small bilateral pleural effusions. 3. Stable asymmetric interstitial thickening throughout the left lung. Electronically Signed: Antwon Estrella MD  5/23/2025 9:05 AM EDT  Workstation ID: [id number]    XR Chest 1 View  Result Date: 5/22/2025  Impression: 1. Improved left lung aeration since 5/21/2025. No significant pleural fluid is evident. 2. Interstitial thickening bilaterally, left greater than right may represent atypical distribution of edema 3. No visible pneumothorax status post thoracentesis. Electronically Signed: Mar Aly MD  5/22/2025 1:35 PM EDT  Workstation ID: [id number]    CT Abdomen Pelvis Without Contrast  Result Date: " 5/22/2025  Impression: 1.Urinary bladder is markedly distended with urine. There is moderate bilateral hydroureteronephrosis. No urinary tract calculi are seen. Appearance is most compatible with urinary retention or bladder outlet obstruction. Patient would likely benefit from Rogers catheterization. 2.Moderate left and small right pleural effusions with bibasilar opacities which may be related to atelectasis, infiltrates, or neoplastic process. Please correlate with chest CT from 5/21/2025. 3.Cholelithiasis. 4.Additional findings as detailed above. Electronically Signed: Shahab Haro MD  5/22/2025 11:50 AM EDT  Workstation ID: [id number]    US Renal Bilateral  Result Date: 5/22/2025  Impression: 1.Moderate bilateral hydronephrosis. 2.Markedly distended urinary bladder. 3.Echogenic kidneys, which could indicate medical renal disease. Electronically Signed: Emigdio Epps MD  5/22/2025 3:02 AM EDT  Workstation ID: [id number]    CT Chest Without Contrast Diagnostic  Result Date: 5/21/2025  Impression: Compared to chest CT dated 11/1/2024, interval development of a moderate left pleural effusion, indeterminate for malignant pleural effusion, and interlobular septal thickening in the left lung which 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 versus pulmonary edema. Consolidative opacities in the left lower lobe and a perifissural nodular opacity in the left lower lobe, which may be infectious/inflammatory or may represent atelectasis. Underlying malignancy is not excluded. Interval increase in mediastinal adenopathy and interval development of left hilar, left axillary, left supraclavicular lymphadenopathy, suspicious for increased metastatic lymphadenopathy. Interval decrease size of a small right pleural effusion. Interval decrease in interlobular septal thickening in the right lung with persistent peripheral linear opacities in the right lower lobe which could represent atelectasis and/or scarring. Superimposed  lymphangitic carcinomatosis is not entirely excluded. Similar-appearing masslike consolidation in the posterior basal right lower lobe. Partially imaged severe bilateral hydronephrosis Electronically Signed: Dru Kruse MD  5/21/2025 6:14 PM EDT  Workstation ID: [id number]    IR Removal Tunnel CV With Port Different Site  Result Date: 5/21/2025  1. Uncomplicated removal, right chest wall [id number]. As mentioned above, the incision was sealed using Dermabond, while the skin ulceration/wound is set to heal via secondary intention. Dressing should be changed every 1 to 2 days using sterile technique. The patient is to return to Interventional Radiology for follow-up to evaluate for adequate healing. 2. Technically successful placement, 4 [place] single lumen power injectable Ptqdup-n-Qnte utilizing the right upper extremity brachial vein approach with both sonographic and fluoroscopic guidance utilized. Electronically Signed: Darlene Wood MD  5/21/2025 3:29 PM EDT  Workstation ID: [id number]    IR PICC W Imaging Guidance  Result Date: 5/21/2025  1. Uncomplicated removal, right chest wall [id number]. As mentioned above, the incision was sealed using Dermabond, while the skin ulceration/wound is set to heal via secondary intention. Dressing should be changed every 1 to 2 days using sterile technique. The patient is to return to Interventional Radiology for follow-up to evaluate for adequate healing. 2. Technically successful placement, 4 [place] single lumen power injectable Ndzihm-l-Atnw utilizing the right upper extremity brachial vein approach with both sonographic and fluoroscopic guidance utilized. Electronically Signed: Darlene Wood MD  5/21/2025 3:29 PM EDT  Workstation ID: [id number]    XR Chest 1 View  Result Date: 5/21/2025  Impression: 1. Extensive airspace disease throughout the left lung compatible with pneumonia. 2. Chronic airspace disease at the right lung base mildly improved from prior study. 3.  Small bilateral pleural effusions. 4. Stable right port catheter. Electronically Signed: Antwon Estrella MD  5/21/2025 12:11 PM EDT  Workstation ID: [id number]     Results for orders placed during the hospital encounter of 08/12/24    Adult Transthoracic Echo Complete W/ Cont if Necessary Per Protocol    Interpretation Summary    Left ventricular ejection fraction appears to be 66 - 70%.    Left ventricular diastolic function was indeterminate.    The left atrial cavity is mildly dilated.    Left atrial volume is mildly increased.    Estimated right ventricular systolic pressure from tricuspid regurgitation is mildly elevated (35-45 mmHg).    Moderate pulmonary hypertension is present.      ASSESSMENT & PLAN:      S/p hosp dischrage  Post bronchoscopy 5/23/2025 Lung, left lower lobe, endobronchial biopsy):    Invasive moderately differentiated adenocarcinoma with micropapillary features    Positive for lymphovascular invasion     CT chest 5/23/2025   interlobular septal thickening in theleft lung which 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    status post bedside left thoracentesis 5/22/2025 with removal of 950 mL of serosanguineous fluid    Pneumonia due to unspecified pathogen: Negative nasal MRSA probe, urinary antigen for Legionella and Streptococcus pneumonia  Left pleural effusion    New left lower lobe opacity with perifissural nodular opacity    Increased mediastinal adenopathy with increased lymph nodes in the left axilla, left axillary and left supraclavicular  Hypoxia     Stage IV right lung cancer status post concomitant radiation chemotherapy, diagnosed on bronchoscopy August 2024  CAD  CKD  Chronic A-fib on chronic anticoagulation Eliquis  Hypothyroidism  Chronic anemia     Former smoker: Quit 2000 after 32 pack years           Recommendations:    Hydrocortisone 20 mg p.o. twice daily  Continue home oxygen at 2 L  Breztri with spacer  DuoNeb as needed for emergency     I personally reviewed the  radiological studies               This document has been electronically signed by  Kelvin Gonzalez MD  09:25 EDT

## 2025-06-04 RX ORDER — BUDESONIDE, GLYCOPYRROLATE, AND FORMOTEROL FUMARATE 160; 9; 4.8 UG/1; UG/1; UG/1
2 AEROSOL, METERED RESPIRATORY (INHALATION) 2 TIMES DAILY
Qty: 1 EACH | Refills: 0 | COMMUNITY
Start: 2025-06-04

## 2025-06-05 ENCOUNTER — HOSPITAL ENCOUNTER (OUTPATIENT)
Dept: ONCOLOGY | Facility: HOSPITAL | Age: 75
End: 2025-06-05
Payer: MEDICARE

## 2025-06-06 LAB
FUNGUS WND CULT: NORMAL
MYCOBACTERIUM SPEC CULT: NORMAL
NIGHT BLUE STAIN TISS: NORMAL

## 2025-06-06 NOTE — PROGRESS NOTES
TREATMENT  PREPARATION    Young Ames  [id number]  1950    Chief Complaint: Treatment preparation and needs assessment    History of present illness:  Young Ames is a 74 y.o. year old male who is here today for treatment preparation and needs assessment.  The patient has been diagnosed with   Encounter Diagnosis   Name Primary?    Stage IV adenocarcinoma of lung, right Yes    and is scheduled to begin treatment with:     Oncology History:    Oncology/Hematology History   Stage IV adenocarcinoma of lung, right   8/12/2024 Initial Diagnosis    Stage IV adenocarcinoma of lung, right     9/19/2024 - 11/21/2024 Biopsy    OP LUNG Pembrolizumab 200 mg / Pemetrexed / Carboplatin AUC=5  Plan Provider: Arben Tim MD  Treatment goal: Control  Line of treatment: First Line     9/19/2024 -  Chemotherapy    OP CENTRAL VENOUS ACCESS DEVICE Access, Care, and Maintenance (CVAD)     6/11/2025 Biopsy    OP LUNG Ramucirumab / DOCEtaxel  Plan Provider: Arben Tim MD  Treatment goal: Control  Line of treatment: Second Line     Stage IV adenocarcinoma of lung, left   9/6/2024 Initial Diagnosis    Stage IV adenocarcinoma of lung, left     12/12/2024 - 5/1/2025 Chemotherapy    OP LUNG Pembrolizumab 200 mg         The current medication list and allergy list were reviewed and reconciled.     Past Medical History, Past Surgical History, Social History, Family History have been reviewed and are without significant changes except as mentioned.    Physical Exam:    Vitals:    06/09/25 1008   BP: 129/76   Pulse: 72   Temp: 98 °F (36.7 °C)   SpO2: 90%     Vitals:    06/09/25 1008   PainSc: 0-No pain        ECOG score: 1             Physical Exam  Constitutional:       Appearance: Normal appearance.   HENT:      Head: Normocephalic and atraumatic.      Right Ear: External ear normal.      Left Ear: External ear normal.      Nose: Nose normal.      Mouth/Throat:      Mouth: Mucous membranes are moist.   Eyes:      General: No  scleral icterus.        Right eye: Discharge present.         Left eye: No discharge.      Conjunctiva/sclera: Conjunctivae normal.   Cardiovascular:      Rate and Rhythm: Normal rate and regular rhythm.      Heart sounds: Normal heart sounds.   Pulmonary:      Breath sounds: Normal breath sounds.      Comments: On continuous oxygen via nasal canula   Abdominal:      Palpations: Abdomen is soft.   Musculoskeletal:         General: Normal range of motion.      Cervical back: Normal range of motion.   Skin:     General: Skin is warm and dry.   Neurological:      General: No focal deficit present.      Mental Status: He is alert and oriented to person, place, and time.   Psychiatric:         Mood and Affect: Mood normal.         Behavior: Behavior normal.         Thought Content: Thought content normal.         Judgment: Judgment normal.           NEEDS ASSESSMENTS    Genetics  The patient's new diagnosis and family history have been reviewed for genetic counseling needs. The patient will not be referred..     Psychosocial and Barriers to care  The patient has completed a PHQ-9 Depression Screening and the Distress Thermometer (DT) today.  PHQ-9 results show PHQ-2 Total Score: PHQ-2 Total Score:     PHQ-9 Total Score: PHQ-9 Total Score:        The patient scored their distress today as   on a scale of 0-10 with 0 being no distress and 10 being extreme distress. Problems marked by the patient as being an issue for them within the last week include   .      Results were reviewed along with psychosocial resources offered by our cancer center.  Our Supportive Oncology team will be flagged for a score of 4 or above, and a same day call will be made for a score of 9 or 10.  A mental health referral is offered at that time. Patients who score less than 4 have been educated on our support services and can be referred to our  upon request.  The patient has seen our  for past treatment  and declines  additional referral to our  at this time.      Nutrition  The patient has completed the malnutrition screening today. They scored     with a score of 0-1 meaning not at risk in a score of 2 or greater meaning at risk.  Patients with a score of 3 or higher will be referred to our oncology dietitian for support. Patients beginning at risk treatment regimens or who have dietary concerns will also be referred to our oncology dietitian. The patient has been be referred. He has seen our dietician in past and declines additional referral at this time.    Functional Assessment  Persons who are age 70 or greater will be screened for qualification of a comprehensive geriatric assessment by our survivorship nurse practitioner.  Older adults with cancer face unique challenges. These may include an increased risk of drug reactions, financial burdens, and caregiver stress. The patient scored   . Patients scoring 14 or lower will referred for an older adult functional assessment with the survivorship advanced practice registered nurse to ensure all needed support is provided as patients plan for their treatments. The patient will not be referred.    Intravenous Access Assessment  The patient and I discussed planned intravenous chemo/biotherapy as well as other IV treatments that are often needed throughout the course of treatment. These may include, but are not limited to blood transfusions, antibiotics, and IV hydration. Discussed that depending on selected treatment and vein assessment, patient may require venous access device (VAD) which could include but not limited to a Mediport or PICC line. Risks and benefits of VADs reviewed. The patient will be treated via PICC.    Reproductive/Sexual Activity   People should avoid becoming pregnant and should not get a partner pregnant while undergoing chemo/biotherapy.  People of childbearing age should use effective contraception during active therapy. The best  "recommendation for all people is to use a barrier method for a minimum of 1 week after the last infusion of chemo/biotherapy to prevent your partner being exposed to byproducts from treatment medications in bodily fluids. Effective contraception should be discussed with your oncology team to make sure it is safe to take based on your diagnosis. Possible options include oral contraceptives, barrier methods. Chemo/biotherapy can change your ability to reproduce children in the future.  There are options for fertility preservation. NOT APPLICABLE    Advanced Care Planning  Advance Care Planning   The patient and I discussed advanced care planning, \"Conversations that Matter\".   This service is offered for development of advance directives with a certified ACP facilitator.  The patient does not have an up-to-date advanced directive. This document is not on file with our office. The patient is not interested in an appointment with one of our facilitators to create or update their advanced directives.  Patient wishes to speak with family and get a . Will advise us if wants referral from this office.              Smoking cessation  Tobacco Use: Medium Risk (6/9/2025)    Patient History     Smoking Tobacco Use: Former     Smokeless Tobacco Use: Never     Passive Exposure: Past       Patient and I discussed their tobacco use history. Referral will not be made for smoking cessation.      Palliative Care  When appropriate, the patient and I discussed the availability palliative care services and when appropriate Hospice care. Palliative care is not the same as Hospice care which was explained to the patient.The patient is not interested in additional information from our  on these services.    Survivorship   When appropriate, we discussed that we will refer the patient to survivorship clinic to discuss next steps following completion of planned treatment.  Reviewed this visit will include assessment of your " physical, psychological, functional, and spiritual needs as a survivor and the need at attend this visit when scheduled.    TREATMENT EDUCATION    Today I met with the patient to discuss the chemo/biotherapy regimen recommended for treatment of Stage IV adenocarcinoma of lung, right  - dexAMETHasone (DECADRON) 4 MG tablet  - ondansetron (ZOFRAN) 8 MG tablet  .  The patient was given explanation of treatment premed side effects including office policy that prohibits patients to drive if sedating medications are administered, MD explanation given regarding benefits, side effects, toxicities and goals of treatment.  The patient received a Chemotherapy/Biotherapy Plan Summary including diagnosis and explanation of specific treatment plan.    SIDE EFFECTS:  Common side effects were discussed with the patient and/or significant other.  Discussion included where applicable hair loss/discoloration, anemia/fatigue, infection/chills/fever, appetite, bleeding risk/precautions, constipation, diarrhea, mouth sores, taste alteration, loss of appetite, nausea/vomiting, peripheral neuropathy, skin/nail changes, rash, muscle aches/weakness, photosensitivity, weight gain/loss, hearing loss, dizziness, menopausal symptoms, menstrual irregularity, sterility, high blood pressure, heart damage, liver damage, lung damage, kidney damage, DVT/PE risk, fluid retention, pleural/pericardial effusion, somnolence, electrolyte/LFT imbalance, vein exercises and/or the possible need for vascular access/port placement.  The patient was advised that although uncommon, leakage of an infused medication from the vein or venous access device may lead to skin breakdown and/or other tissue damage.  The patient was advised that he/she may have pain, bleeding, and/or bruising from the insertion of a needle in their vein or venous access device (port).  The patient was further advised that, in spite of proper technique, infection with redness and irritation  may rarely occur at the site where the needle was inserted.  The patient was advised that if complications occur, additional medical treatment is available.  Finally, where applicable we have reviewed rare but potential immune mediated side effects including shortness of breath, cough, chest pain (pneumonitis), abdominal pain, diarrhea (colitis), thyroiditis (hypothyroid or hyperthyroid), hepatitis and liver dysfunction, nephritis and renal dysfunction.    Discussion also included side effects specific to drugs in the treatment plan, specifically:    Treatment Plans       Name Type Plan Dates Plan Provider         Active    OP LUNG Pembrolizumab 200 mg ONCOLOGY TREATMENT 11/18/2024 - Present Arben Tim MD     OP LUNG Ramucirumab / DOCEtaxel ONCOLOGY TREATMENT 2 6/4/2025 - Present Arben Tim MD                      Questions answered and additional information discussed on topics including:  Anemia, Thrombocytopenia, Neutropenia, Nutrition and appetite changes, Constipation, Diarrhea, Nausea & vomiting, Mouth sores, Alopecia, Nervous system changes, Pain, Skin & nail changes, Organ toxicities, and Home care       Assessment and Plan:    Diagnoses and all orders for this visit:    1. Stage IV adenocarcinoma of lung, right (Primary)  -     dexAMETHasone (DECADRON) 4 MG tablet; Take 2 tablets oral twice a day for 3 consecutive days beginning the day before chemotherapy and continue for 6 doses.  Dispense: 12 tablet; Refill: 5  -     ondansetron (ZOFRAN) 8 MG tablet; Take 1 tablet by mouth 3 (Three) Times a Day As Needed for Nausea or Vomiting.  Dispense: 30 tablet; Refill: 5      No orders of the defined types were placed in this encounter.        The patient and I have reviewed their diagnosis and scheduled treatment plan. Needs assessment was completed where applicable including genetics, psychosocial needs, barriers to care, VAD evaluation, advanced care planning, survivorship, and palliative care  services where indicated. Referrals have been ordered as appropriate based upon evaluation today and patient desires.   Chemo/biotherapy teaching was completed today and consent obtained. See separate documentation for further details.  Adequate time was given to answer questions.  Patient made aware of their care team members and contact information if they have questions or problems throughout the treatment course.  Discussion held and written information provided describing frequency of office visits and ongoing monitoring throughout the treatment plan.     Reviewed with patient any prescribed medication sent to pharmacy.  Education provided regarding proper storage, safe handling, and proper disposal of unused medication.  Proper handling of body fluids and waste discussed and written information provided.  If appropriate, patient had pretreatment labs drawn today.    Learning assessment completed at initial patient encounter. See separate flowsheet. Chemo/biotherapy education comprehension assessed at today's visit.    I spent 60 minutes caring for Young on this date of service. This time includes time spent by me in the following activities: preparing for the visit, reviewing tests, obtaining and/or reviewing a separately obtained history, performing a medically appropriate examination and/or evaluation, counseling and educating the patient/family/caregiver, ordering medications, tests, or procedures, referring and communicating with other health care professionals, documenting information in the medical record, and independently interpreting results and communicating that information with the patient/family/caregiver.     Jessica Falk, APRN   06/09/25

## 2025-06-09 ENCOUNTER — TELEPHONE (OUTPATIENT)
Dept: ONCOLOGY | Facility: CLINIC | Age: 75
End: 2025-06-09
Payer: MEDICARE

## 2025-06-09 ENCOUNTER — OFFICE VISIT (OUTPATIENT)
Dept: ONCOLOGY | Facility: CLINIC | Age: 75
End: 2025-06-09
Payer: MEDICARE

## 2025-06-09 VITALS
BODY MASS INDEX: 23.38 KG/M2 | OXYGEN SATURATION: 90 % | HEART RATE: 72 BPM | DIASTOLIC BLOOD PRESSURE: 76 MMHG | SYSTOLIC BLOOD PRESSURE: 129 MMHG | HEIGHT: 71 IN | TEMPERATURE: 98 F | WEIGHT: 167 LBS

## 2025-06-09 DIAGNOSIS — C34.91 STAGE IV ADENOCARCINOMA OF LUNG, RIGHT: Primary | ICD-10-CM

## 2025-06-09 PROCEDURE — 1126F AMNT PAIN NOTED NONE PRSNT: CPT | Performed by: NURSE PRACTITIONER

## 2025-06-09 PROCEDURE — 99215 OFFICE O/P EST HI 40 MIN: CPT | Performed by: NURSE PRACTITIONER

## 2025-06-09 RX ORDER — SODIUM CHLORIDE 9 MG/ML
10 INJECTION, SOLUTION INTRAMUSCULAR; INTRAVENOUS; SUBCUTANEOUS DAILY
Qty: 300 ML | Refills: 2 | Status: SHIPPED | OUTPATIENT
Start: 2025-06-09

## 2025-06-09 RX ORDER — ONDANSETRON 8 MG/1
8 TABLET, FILM COATED ORAL 3 TIMES DAILY PRN
Qty: 30 TABLET | Refills: 5 | Status: SHIPPED | OUTPATIENT
Start: 2025-06-09

## 2025-06-09 RX ORDER — DEXAMETHASONE 4 MG/1
TABLET ORAL
Qty: 12 TABLET | Refills: 5 | Status: SHIPPED | OUTPATIENT
Start: 2025-06-09

## 2025-06-09 NOTE — TELEPHONE ENCOUNTER
Caller: ANDREW    Relationship: [facility]    Best call back number: [phone]     What is the best time to reach you: ANYTIME    Who are you requesting to speak with (clinical staff, provider,  specific staff member): CLINICAL    What was the call regarding: THEY NEED SOME CLARIFICATION ON THE SODIUM CHLORIDE. THE INSTRUCTIONS STATE INFUSE , BUT THIS IS A FLUSH AND WOULD NOT BE INFUSED AND THEY WOULD LIKE TO CLARIFY THAT?

## 2025-06-09 NOTE — TELEPHONE ENCOUNTER
Contacted [facility] and spoke with Mauricio. I informed him that the sodium chloride order is for a 10 mL flush once daily, not an infusion. He v/u and confirmed the order. No further questions at this time.

## 2025-06-10 DIAGNOSIS — E03.2 HYPOTHYROIDISM DUE TO MEDICATION: ICD-10-CM

## 2025-06-10 DIAGNOSIS — C34.92 STAGE IV ADENOCARCINOMA OF LUNG, LEFT: ICD-10-CM

## 2025-06-10 RX ORDER — LEVOTHYROXINE SODIUM 75 UG/1
75 TABLET ORAL EVERY MORNING
Qty: 30 TABLET | Refills: 1 | Status: SHIPPED | OUTPATIENT
Start: 2025-06-10

## 2025-06-11 ENCOUNTER — HOSPITAL ENCOUNTER (OUTPATIENT)
Dept: ONCOLOGY | Facility: HOSPITAL | Age: 75
Discharge: HOME | End: 2025-06-11
Admitting: INTERNAL MEDICINE
Payer: MEDICARE

## 2025-06-11 VITALS
OXYGEN SATURATION: 88 % | HEART RATE: 71 BPM | SYSTOLIC BLOOD PRESSURE: 113 MMHG | WEIGHT: 170 LBS | DIASTOLIC BLOOD PRESSURE: 65 MMHG | TEMPERATURE: 98.4 F | BODY MASS INDEX: 23.8 KG/M2 | HEIGHT: 71 IN | RESPIRATION RATE: 14 BRPM

## 2025-06-11 DIAGNOSIS — C34.91 STAGE IV ADENOCARCINOMA OF LUNG, RIGHT: Primary | ICD-10-CM

## 2025-06-11 LAB
ALP BLD-CCNC: 67 U/L (ref 53–128)
BASOPHILS # BLD AUTO: 0.02 10*3/MM3 (ref 0–0.2)
BASOPHILS NFR BLD AUTO: 0.1 % (ref 0–1.5)
BILIRUB UR QL STRIP: NEGATIVE
BUN BLDA-MCNC: 27 MG/DL (ref 7–22)
CALCIUM BLD QL: 8.5 MG/DL (ref 8–10.3)
CHLORIDE BLDA-SCNC: 106 MMOL/L (ref 98–108)
CLARITY UR: ABNORMAL
CO2 BLDA-SCNC: 25 MMOL/L (ref 18–33)
COLOR UR: YELLOW
CREAT BLDA-MCNC: 1.6 MG/DL (ref 0.6–1.2)
DEPRECATED RDW RBC AUTO: 49 FL (ref 37–54)
EGFRCR SERPLBLD CKD-EPI 2021: 44.7 ML/MIN/1.73
EOSINOPHIL # BLD AUTO: 0.01 10*3/MM3 (ref 0–0.4)
EOSINOPHIL NFR BLD AUTO: 0.1 % (ref 0.3–6.2)
ERYTHROCYTE [DISTWIDTH] IN BLOOD BY AUTOMATED COUNT: 14.5 % (ref 12.3–15.4)
GLUCOSE BLDC GLUCOMTR-MCNC: 239 MG/DL (ref 73–118)
GLUCOSE UR STRIP-MCNC: NEGATIVE MG/DL
HCT VFR BLD AUTO: 31.1 % (ref 37.5–51)
HGB BLD-MCNC: 10.2 G/DL (ref 13–17.7)
HGB UR QL STRIP.AUTO: ABNORMAL
KETONES UR QL STRIP: NEGATIVE
LEUKOCYTE ESTERASE UR QL STRIP.AUTO: ABNORMAL
LYMPHOCYTES # BLD AUTO: 1.49 10*3/MM3 (ref 0.7–3.1)
LYMPHOCYTES NFR BLD AUTO: 9.2 % (ref 19.6–45.3)
MCH RBC QN AUTO: 32 PG (ref 26.6–33)
MCHC RBC AUTO-ENTMCNC: 32.8 G/DL (ref 31.5–35.7)
MCV RBC AUTO: 97.5 FL (ref 79–97)
MONOCYTES # BLD AUTO: 0.68 10*3/MM3 (ref 0.1–0.9)
MONOCYTES NFR BLD AUTO: 4.2 % (ref 5–12)
NEUTROPHILS NFR BLD AUTO: 14.04 10*3/MM3 (ref 1.7–7)
NEUTROPHILS NFR BLD AUTO: 86.4 % (ref 42.7–76)
NITRITE UR QL STRIP: POSITIVE
PH UR STRIP.AUTO: 6 [PH] (ref 5–8)
PLATELET # BLD AUTO: 208 10*3/MM3 (ref 140–450)
PMV BLD AUTO: 9.2 FL (ref 6–12)
POC ALBUMIN: 3 G/L (ref 3.3–5.5)
POC ALT (SGPT): 27 U/L (ref 10–47)
POC AST (SGOT): 34 U/L (ref 11–38)
POC TOTAL BILIRUBIN: 0.7 MG/DL (ref 0.2–1.6)
POC TOTAL PROTEIN: 6.2 G/DL (ref 6.4–8.1)
POTASSIUM BLDA-SCNC: 4.1 MMOL/L (ref 3.6–5.1)
PROT UR QL STRIP: ABNORMAL
RBC # BLD AUTO: 3.19 10*6/MM3 (ref 4.14–5.8)
SODIUM BLD-SCNC: 138 MMOL/L (ref 128–145)
SP GR UR STRIP: 1.02 (ref 1–1.03)
T4 FREE SERPL-MCNC: 1.02 NG/DL (ref 0.92–1.68)
TSH SERPL DL<=0.05 MIU/L-ACNC: 24.1 UIU/ML (ref 0.27–4.2)
UROBILINOGEN UR QL STRIP: ABNORMAL
WBC NRBC COR # BLD AUTO: 16.24 10*3/MM3 (ref 3.4–10.8)

## 2025-06-11 PROCEDURE — 25010000002 DOCETAXEL 20 MG/ML SOLUTION 8 ML VIAL: Performed by: INTERNAL MEDICINE

## 2025-06-11 PROCEDURE — 84439 ASSAY OF FREE THYROXINE: CPT | Performed by: INTERNAL MEDICINE

## 2025-06-11 PROCEDURE — 25010000002 RAMUCIRUMAB 500 MG/50ML SOLUTION 50 ML VIAL: Performed by: INTERNAL MEDICINE

## 2025-06-11 PROCEDURE — 96417 CHEMO IV INFUS EACH ADDL SEQ: CPT

## 2025-06-11 PROCEDURE — 25010000002 RAMUCIRUMAB 100 MG/10ML SOLUTION 10 ML VIAL: Performed by: INTERNAL MEDICINE

## 2025-06-11 PROCEDURE — 85025 COMPLETE CBC W/AUTO DIFF WBC: CPT | Performed by: INTERNAL MEDICINE

## 2025-06-11 PROCEDURE — 80053 COMPREHEN METABOLIC PANEL: CPT

## 2025-06-11 PROCEDURE — 96375 TX/PRO/DX INJ NEW DRUG ADDON: CPT

## 2025-06-11 PROCEDURE — 25810000003 SODIUM CHLORIDE 0.9 % SOLUTION 250 ML FLEX CONT: Performed by: INTERNAL MEDICINE

## 2025-06-11 PROCEDURE — 84443 ASSAY THYROID STIM HORMONE: CPT | Performed by: INTERNAL MEDICINE

## 2025-06-11 PROCEDURE — 25810000003 SODIUM CHLORIDE 0.9 % SOLUTION: Performed by: INTERNAL MEDICINE

## 2025-06-11 PROCEDURE — 96413 CHEMO IV INFUSION 1 HR: CPT

## 2025-06-11 PROCEDURE — 25010000002 DIPHENHYDRAMINE PER 50 MG: Performed by: INTERNAL MEDICINE

## 2025-06-11 PROCEDURE — 81003 URINALYSIS AUTO W/O SCOPE: CPT | Performed by: INTERNAL MEDICINE

## 2025-06-11 RX ORDER — SODIUM CHLORIDE 9 MG/ML
20 INJECTION, SOLUTION INTRAVENOUS ONCE
Status: CANCELLED | OUTPATIENT
Start: 2025-06-11

## 2025-06-11 RX ORDER — HEPARIN SODIUM (PORCINE) LOCK FLUSH IV SOLN 100 UNIT/ML 100 UNIT/ML
500 SOLUTION INTRAVENOUS AS NEEDED
OUTPATIENT
Start: 2025-06-11

## 2025-06-11 RX ORDER — SODIUM CHLORIDE 0.9 % (FLUSH) 0.9 %
10 SYRINGE (ML) INJECTION AS NEEDED
OUTPATIENT
Start: 2025-06-11

## 2025-06-11 RX ORDER — SODIUM CHLORIDE 9 MG/ML
20 INJECTION, SOLUTION INTRAVENOUS ONCE
Status: COMPLETED | OUTPATIENT
Start: 2025-06-11 | End: 2025-06-11

## 2025-06-11 RX ORDER — DIPHENHYDRAMINE HYDROCHLORIDE 50 MG/ML
50 INJECTION, SOLUTION INTRAMUSCULAR; INTRAVENOUS AS NEEDED
Status: CANCELLED | OUTPATIENT
Start: 2025-06-11

## 2025-06-11 RX ORDER — DIPHENHYDRAMINE HYDROCHLORIDE 50 MG/ML
50 INJECTION, SOLUTION INTRAMUSCULAR; INTRAVENOUS ONCE
Status: COMPLETED | OUTPATIENT
Start: 2025-06-11 | End: 2025-06-11

## 2025-06-11 RX ORDER — SODIUM CHLORIDE 0.9 % (FLUSH) 0.9 %
10 SYRINGE (ML) INJECTION AS NEEDED
Status: DISCONTINUED | OUTPATIENT
Start: 2025-06-11 | End: 2025-06-12 | Stop reason: HOSPADM

## 2025-06-11 RX ORDER — FAMOTIDINE 10 MG/ML
20 INJECTION, SOLUTION INTRAVENOUS AS NEEDED
Status: CANCELLED | OUTPATIENT
Start: 2025-06-11

## 2025-06-11 RX ORDER — HEPARIN SODIUM (PORCINE) LOCK FLUSH IV SOLN 100 UNIT/ML 100 UNIT/ML
500 SOLUTION INTRAVENOUS AS NEEDED
Status: DISCONTINUED | OUTPATIENT
Start: 2025-06-11 | End: 2025-06-12 | Stop reason: HOSPADM

## 2025-06-11 RX ORDER — HYDROCORTISONE SODIUM SUCCINATE 100 MG/2ML
100 INJECTION INTRAMUSCULAR; INTRAVENOUS AS NEEDED
Status: CANCELLED | OUTPATIENT
Start: 2025-06-11

## 2025-06-11 RX ADMIN — DIPHENHYDRAMINE HYDROCHLORIDE 50 MG: 50 INJECTION INTRAMUSCULAR; INTRAVENOUS at 09:32

## 2025-06-11 RX ADMIN — DOCETAXEL 145 MG: 20 INJECTION, SOLUTION, CONCENTRATE INTRAVENOUS at 11:19

## 2025-06-11 RX ADMIN — SODIUM CHLORIDE 20 ML/HR: 9 INJECTION, SOLUTION INTRAVENOUS at 09:32

## 2025-06-11 RX ADMIN — Medication 10 ML: at 12:30

## 2025-06-11 RX ADMIN — SODIUM CHLORIDE 770 MG: 9 INJECTION, SOLUTION INTRAVENOUS at 10:08

## 2025-06-11 NOTE — PROGRESS NOTES
Pt is here for C1 D1 cyramza, docetaxel. Labs collected via PICC. U/a collected from urinary catheter with protein greater than 300. Order received for ok to treat. Pt tolerated treatment.

## 2025-06-13 LAB
FUNGUS WND CULT: NORMAL
MYCOBACTERIUM SPEC CULT: NORMAL
NIGHT BLUE STAIN TISS: NORMAL

## 2025-06-20 ENCOUNTER — HOSPITAL ENCOUNTER (OUTPATIENT)
Dept: ONCOLOGY | Facility: HOSPITAL | Age: 75
Discharge: HOME | End: 2025-06-20
Payer: MEDICARE

## 2025-06-20 ENCOUNTER — PATIENT MESSAGE (OUTPATIENT)
Dept: ONCOLOGY | Facility: CLINIC | Age: 75
End: 2025-06-20
Payer: MEDICARE

## 2025-06-20 DIAGNOSIS — C34.91 STAGE IV ADENOCARCINOMA OF LUNG, RIGHT: Primary | ICD-10-CM

## 2025-06-20 LAB
FUNGUS WND CULT: NORMAL
MYCOBACTERIUM SPEC CULT: NORMAL
NIGHT BLUE STAIN TISS: NORMAL

## 2025-06-20 NOTE — PROGRESS NOTES
Pt to clinic for Picc Line dressing change. Old dressing removed with no issue.  Site cleaned with Chlorhexidine, new securing dressing applied, and biopatch placed using sterile technique. Sutures in place and intact. PICC line valve replaced and flushed with good blood return noted. Alcohol cap placed. Pt discharged and aware of next appt.

## 2025-06-24 RX ORDER — DIPHENHYDRAMINE, LIDOCAINE, NYSTATIN
5 KIT ORAL 4 TIMES DAILY PRN
Qty: 100 ML | Refills: 2 | Status: ON HOLD | OUTPATIENT
Start: 2025-06-24

## 2025-06-24 RX ORDER — BUDESONIDE, GLYCOPYRROLATE, AND FORMOTEROL FUMARATE 160; 9; 4.8 UG/1; UG/1; UG/1
2 AEROSOL, METERED RESPIRATORY (INHALATION) 2 TIMES DAILY
Qty: 1 EACH | Refills: 0 | Status: ON HOLD | COMMUNITY
Start: 2025-06-24

## 2025-06-27 ENCOUNTER — HOSPITAL ENCOUNTER (OUTPATIENT)
Dept: ONCOLOGY | Facility: HOSPITAL | Age: 75
Discharge: HOME | End: 2025-06-27
Payer: MEDICARE

## 2025-06-27 DIAGNOSIS — C34.92 STAGE IV ADENOCARCINOMA OF LUNG, LEFT: Primary | ICD-10-CM

## 2025-06-27 LAB
MYCOBACTERIUM SPEC CULT: NORMAL
NIGHT BLUE STAIN TISS: NORMAL

## 2025-06-29 ENCOUNTER — HOSPITAL ENCOUNTER (INPATIENT)
Facility: HOSPITAL | Age: 75
LOS: 4 days | Discharge: HOME HEALTH | End: 2025-07-03
Attending: INTERNAL MEDICINE | Admitting: INTERNAL MEDICINE
Payer: MEDICARE

## 2025-06-29 ENCOUNTER — APPOINTMENT (OUTPATIENT)
Dept: CT IMAGING | Facility: HOSPITAL | Age: 75
End: 2025-06-29
Payer: MEDICARE

## 2025-06-29 ENCOUNTER — APPOINTMENT (OUTPATIENT)
Dept: CARDIOLOGY | Facility: HOSPITAL | Age: 75
End: 2025-06-29
Payer: MEDICARE

## 2025-06-29 ENCOUNTER — APPOINTMENT (OUTPATIENT)
Dept: GENERAL RADIOLOGY | Facility: HOSPITAL | Age: 75
End: 2025-06-29
Payer: MEDICARE

## 2025-06-29 DIAGNOSIS — J90 PLEURAL EFFUSION: Primary | ICD-10-CM

## 2025-06-29 DIAGNOSIS — J18.9 PNEUMONIA DUE TO INFECTIOUS ORGANISM, UNSPECIFIED LATERALITY, UNSPECIFIED PART OF LUNG: ICD-10-CM

## 2025-06-29 DIAGNOSIS — I26.94 MULTIPLE SUBSEGMENTAL PULMONARY EMBOLI WITHOUT ACUTE COR PULMONALE: ICD-10-CM

## 2025-06-29 DIAGNOSIS — T18.128A FOOD IMPACTION OF ESOPHAGUS, INITIAL ENCOUNTER: ICD-10-CM

## 2025-06-29 DIAGNOSIS — R60.0 LOWER EXTREMITY EDEMA: ICD-10-CM

## 2025-06-29 DIAGNOSIS — R13.10 DYSPHAGIA, UNSPECIFIED TYPE: ICD-10-CM
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
## 2025-06-29 PROBLEM — I26.99 PULMONARY EMBOLISM: Status: ACTIVE | Noted: 2025-06-29

## 2025-06-29 PROBLEM — I48.0 PAROXYSMAL ATRIAL FIBRILLATION: Status: ACTIVE | Noted: 2025-05-22

## 2025-06-29 PROBLEM — R79.89 ELEVATED BRAIN NATRIURETIC PEPTIDE (BNP) LEVEL: Status: ACTIVE | Noted: 2025-06-29

## 2025-06-29 LAB
ALBUMIN SERPL-MCNC: 3.4 G/DL (ref 3.5–5.2)
ALBUMIN/GLOB SERPL: 1.3 G/DL
ALP SERPL-CCNC: 74 U/L (ref 39–117)
ALT SERPL W P-5'-P-CCNC: 11 U/L (ref 1–41)
ANION GAP SERPL CALCULATED.3IONS-SCNC: 11.2 MMOL/L (ref 5–15)
APTT PPP: 31.8 SECONDS (ref 22.7–35.4)
AST SERPL-CCNC: 38 U/L (ref 1–40)
B PARAPERT DNA SPEC QL NAA+PROBE: NOT DETECTED
B PERT DNA SPEC QL NAA+PROBE: NOT DETECTED
BACTERIA UR QL AUTO: ABNORMAL /HPF
BASOPHILS # BLD AUTO: 0.13 10*3/MM3 (ref 0–0.2)
BASOPHILS NFR BLD AUTO: 0.8 % (ref 0–1.5)
BILIRUB SERPL-MCNC: 0.5 MG/DL (ref 0–1.2)
BILIRUB UR QL STRIP: NEGATIVE
BUN SERPL-MCNC: 12.6 MG/DL (ref 8–23)
BUN/CREAT SERPL: 9.5 (ref 7–25)
C PNEUM DNA NPH QL NAA+NON-PROBE: NOT DETECTED
CALCIUM SPEC-SCNC: 8.6 MG/DL (ref 8.6–10.5)
CHLORIDE SERPL-SCNC: 104 MMOL/L (ref 98–107)
CLARITY UR: CLEAR
CO2 SERPL-SCNC: 24.8 MMOL/L (ref 22–29)
COLOR UR: YELLOW
CREAT SERPL-MCNC: 1.33 MG/DL (ref 0.76–1.27)
D DIMER PPP FEU-MCNC: 10.68 MCGFEU/ML (ref 0–0.75)
DEPRECATED RDW RBC AUTO: 45.6 FL (ref 37–54)
EGFRCR SERPLBLD CKD-EPI 2021: 55.7 ML/MIN/1.73
EOSINOPHIL # BLD AUTO: 0.16 10*3/MM3 (ref 0–0.4)
EOSINOPHIL NFR BLD AUTO: 1 % (ref 0.3–6.2)
ERYTHROCYTE [DISTWIDTH] IN BLOOD BY AUTOMATED COUNT: 13.5 % (ref 12.3–15.4)
FLUAV SUBTYP SPEC NAA+PROBE: NOT DETECTED
FLUBV RNA NPH QL NAA+NON-PROBE: NOT DETECTED
GEN 5 1HR TROPONIN T REFLEX: 259 NG/L
GLOBULIN UR ELPH-MCNC: 2.7 GM/DL
GLUCOSE SERPL-MCNC: 100 MG/DL (ref 65–99)
GLUCOSE UR STRIP-MCNC: NEGATIVE MG/DL
HADV DNA SPEC NAA+PROBE: NOT DETECTED
HCOV 229E RNA SPEC QL NAA+PROBE: NOT DETECTED
HCOV HKU1 RNA SPEC QL NAA+PROBE: NOT DETECTED
HCOV NL63 RNA SPEC QL NAA+PROBE: NOT DETECTED
HCOV OC43 RNA SPEC QL NAA+PROBE: NOT DETECTED
HCT VFR BLD AUTO: 34.5 % (ref 37.5–51)
HGB BLD-MCNC: 11.1 G/DL (ref 13–17.7)
HGB UR QL STRIP.AUTO: ABNORMAL
HMPV RNA NPH QL NAA+NON-PROBE: NOT DETECTED
HOLD SPECIMEN: NORMAL
HPIV1 RNA ISLT QL NAA+PROBE: NOT DETECTED
HPIV2 RNA SPEC QL NAA+PROBE: NOT DETECTED
HPIV3 RNA NPH QL NAA+PROBE: NOT DETECTED
HPIV4 P GENE NPH QL NAA+PROBE: NOT DETECTED
HYALINE CASTS UR QL AUTO: ABNORMAL /LPF
IMM GRANULOCYTES # BLD AUTO: 0.62 10*3/MM3 (ref 0–0.05)
IMM GRANULOCYTES NFR BLD AUTO: 3.8 % (ref 0–0.5)
INR PPP: 1.37 (ref 0.9–1.1)
KETONES UR QL STRIP: NEGATIVE
LEUKOCYTE ESTERASE UR QL STRIP.AUTO: NEGATIVE
LYMPHOCYTES # BLD AUTO: 1.77 10*3/MM3 (ref 0.7–3.1)
LYMPHOCYTES NFR BLD AUTO: 10.8 % (ref 19.6–45.3)
M PNEUMO IGG SER IA-ACNC: NOT DETECTED
MCH RBC QN AUTO: 30.3 PG (ref 26.6–33)
MCHC RBC AUTO-ENTMCNC: 32.2 G/DL (ref 31.5–35.7)
MCV RBC AUTO: 94.3 FL (ref 79–97)
MONOCYTES # BLD AUTO: 1.31 10*3/MM3 (ref 0.1–0.9)
MONOCYTES NFR BLD AUTO: 8 % (ref 5–12)
NEUTROPHILS NFR BLD AUTO: 12.36 10*3/MM3 (ref 1.7–7)
NEUTROPHILS NFR BLD AUTO: 75.6 % (ref 42.7–76)
NITRITE UR QL STRIP: NEGATIVE
NRBC BLD AUTO-RTO: 0 /100 WBC (ref 0–0.2)
NT-PROBNP SERPL-MCNC: 2038 PG/ML (ref 0–1800)
PH UR STRIP.AUTO: 8.5 [PH] (ref 5–8)
PLATELET # BLD AUTO: 238 10*3/MM3 (ref 140–450)
PMV BLD AUTO: 8.6 FL (ref 6–12)
POTASSIUM SERPL-SCNC: 4.2 MMOL/L (ref 3.5–5.2)
PROT SERPL-MCNC: 6.1 G/DL (ref 6–8.5)
PROT UR QL STRIP: ABNORMAL
PROTHROMBIN TIME: 16.9 SECONDS (ref 11.7–14.2)
RBC # BLD AUTO: 3.66 10*6/MM3 (ref 4.14–5.8)
RBC # UR STRIP: ABNORMAL /HPF
REF LAB TEST METHOD: ABNORMAL
RHINOVIRUS RNA SPEC NAA+PROBE: NOT DETECTED
RSV RNA NPH QL NAA+NON-PROBE: NOT DETECTED
SARS-COV-2 RNA RESP QL NAA+PROBE: NOT DETECTED
SODIUM SERPL-SCNC: 140 MMOL/L (ref 136–145)
SP GR UR STRIP: 1.02 (ref 1–1.03)
SQUAMOUS #/AREA URNS HPF: ABNORMAL /HPF
TROPONIN T % DELTA: -9
TROPONIN T NUMERIC DELTA: -27 NG/L
TROPONIN T SERPL HS-MCNC: 286 NG/L
UROBILINOGEN UR QL STRIP: ABNORMAL
WBC # UR STRIP: ABNORMAL /HPF
WBC NRBC COR # BLD AUTO: 16.35 10*3/MM3 (ref 3.4–10.8)
WHOLE BLOOD HOLD COAG: NORMAL

## 2025-06-29 PROCEDURE — 71275 CT ANGIOGRAPHY CHEST: CPT

## 2025-06-29 PROCEDURE — 0202U NFCT DS 22 TRGT SARS-COV-2: CPT | Performed by: NURSE PRACTITIONER

## 2025-06-29 PROCEDURE — 85025 COMPLETE CBC W/AUTO DIFF WBC: CPT | Performed by: NURSE PRACTITIONER

## 2025-06-29 PROCEDURE — 36415 COLL VENOUS BLD VENIPUNCTURE: CPT

## 2025-06-29 PROCEDURE — 25010000002 HYDRALAZINE PER 20 MG

## 2025-06-29 PROCEDURE — 25010000002 AZITHROMYCIN PER 500 MG: Performed by: NURSE PRACTITIONER

## 2025-06-29 PROCEDURE — 84484 ASSAY OF TROPONIN QUANT: CPT | Performed by: NURSE PRACTITIONER

## 2025-06-29 PROCEDURE — 85730 THROMBOPLASTIN TIME PARTIAL: CPT | Performed by: NURSE PRACTITIONER

## 2025-06-29 PROCEDURE — 93306 TTE W/DOPPLER COMPLETE: CPT

## 2025-06-29 PROCEDURE — 85379 FIBRIN DEGRADATION QUANT: CPT | Performed by: NURSE PRACTITIONER

## 2025-06-29 PROCEDURE — 99285 EMERGENCY DEPT VISIT HI MDM: CPT

## 2025-06-29 PROCEDURE — 25510000001 IOPAMIDOL PER 1 ML: Performed by: NURSE PRACTITIONER

## 2025-06-29 PROCEDURE — 25010000002 HEPARIN (PORCINE) 25000-0.45 UT/250ML-% SOLUTION: Performed by: NURSE PRACTITIONER

## 2025-06-29 PROCEDURE — 83880 ASSAY OF NATRIURETIC PEPTIDE: CPT | Performed by: NURSE PRACTITIONER

## 2025-06-29 PROCEDURE — 93306 TTE W/DOPPLER COMPLETE: CPT | Performed by: INTERNAL MEDICINE

## 2025-06-29 PROCEDURE — 93005 ELECTROCARDIOGRAM TRACING: CPT | Performed by: NURSE PRACTITIONER

## 2025-06-29 PROCEDURE — 87040 BLOOD CULTURE FOR BACTERIA: CPT | Performed by: NURSE PRACTITIONER

## 2025-06-29 PROCEDURE — 81001 URINALYSIS AUTO W/SCOPE: CPT | Performed by: NURSE PRACTITIONER

## 2025-06-29 PROCEDURE — 80053 COMPREHEN METABOLIC PANEL: CPT | Performed by: NURSE PRACTITIONER

## 2025-06-29 PROCEDURE — 71045 X-RAY EXAM CHEST 1 VIEW: CPT

## 2025-06-29 PROCEDURE — 25810000003 SODIUM CHLORIDE 0.9 % SOLUTION 250 ML FLEX CONT: Performed by: NURSE PRACTITIONER

## 2025-06-29 PROCEDURE — 85610 PROTHROMBIN TIME: CPT | Performed by: NURSE PRACTITIONER

## 2025-06-29 RX ORDER — HEPARIN SODIUM 10000 [USP'U]/100ML
18 INJECTION, SOLUTION INTRAVENOUS
Status: DISCONTINUED | OUTPATIENT
Start: 2025-06-29 | End: 2025-06-30

## 2025-06-29 RX ORDER — IOPAMIDOL 755 MG/ML
100 INJECTION, SOLUTION INTRAVASCULAR
Status: COMPLETED | OUTPATIENT
Start: 2025-06-29 | End: 2025-06-29

## 2025-06-29 RX ORDER — SODIUM CHLORIDE 0.9 % (FLUSH) 0.9 %
10 SYRINGE (ML) INJECTION AS NEEDED
Status: DISCONTINUED | OUTPATIENT
Start: 2025-06-29 | End: 2025-07-03 | Stop reason: HOSPADM

## 2025-06-29 RX ORDER — ONDANSETRON 2 MG/ML
4 INJECTION INTRAMUSCULAR; INTRAVENOUS EVERY 6 HOURS PRN
Status: DISCONTINUED | OUTPATIENT
Start: 2025-06-29 | End: 2025-07-03 | Stop reason: HOSPADM

## 2025-06-29 RX ORDER — BISACODYL 5 MG/1
5 TABLET, DELAYED RELEASE ORAL DAILY PRN
Status: DISCONTINUED | OUTPATIENT
Start: 2025-06-29 | End: 2025-07-03 | Stop reason: HOSPADM

## 2025-06-29 RX ORDER — POLYETHYLENE GLYCOL 3350 17 G/17G
17 POWDER, FOR SOLUTION ORAL DAILY PRN
Status: DISCONTINUED | OUTPATIENT
Start: 2025-06-29 | End: 2025-07-03 | Stop reason: HOSPADM

## 2025-06-29 RX ORDER — PANTOPRAZOLE SODIUM 40 MG/10ML
40 INJECTION, POWDER, LYOPHILIZED, FOR SOLUTION INTRAVENOUS
Status: DISCONTINUED | OUTPATIENT
Start: 2025-06-30 | End: 2025-07-03

## 2025-06-29 RX ORDER — METOPROLOL TARTRATE 1 MG/ML
2.5 INJECTION, SOLUTION INTRAVENOUS EVERY 8 HOURS
Status: DISCONTINUED | OUTPATIENT
Start: 2025-06-29 | End: 2025-07-02

## 2025-06-29 RX ORDER — HYDRALAZINE HYDROCHLORIDE 20 MG/ML
10 INJECTION INTRAMUSCULAR; INTRAVENOUS EVERY 6 HOURS PRN
Status: DISCONTINUED | OUTPATIENT
Start: 2025-06-29 | End: 2025-07-03 | Stop reason: HOSPADM

## 2025-06-29 RX ORDER — SODIUM CHLORIDE 9 MG/ML
40 INJECTION, SOLUTION INTRAVENOUS AS NEEDED
Status: DISCONTINUED | OUTPATIENT
Start: 2025-06-29 | End: 2025-07-03 | Stop reason: HOSPADM

## 2025-06-29 RX ORDER — BISACODYL 10 MG
10 SUPPOSITORY, RECTAL RECTAL DAILY PRN
Status: DISCONTINUED | OUTPATIENT
Start: 2025-06-29 | End: 2025-07-03 | Stop reason: HOSPADM

## 2025-06-29 RX ORDER — NITROGLYCERIN 0.4 MG/1
0.4 TABLET SUBLINGUAL
Status: DISCONTINUED | OUTPATIENT
Start: 2025-06-29 | End: 2025-07-03 | Stop reason: HOSPADM

## 2025-06-29 RX ORDER — AMOXICILLIN 250 MG
2 CAPSULE ORAL 2 TIMES DAILY PRN
Status: DISCONTINUED | OUTPATIENT
Start: 2025-06-29 | End: 2025-07-03 | Stop reason: HOSPADM

## 2025-06-29 RX ORDER — IPRATROPIUM BROMIDE AND ALBUTEROL SULFATE 2.5; .5 MG/3ML; MG/3ML
3 SOLUTION RESPIRATORY (INHALATION) EVERY 6 HOURS PRN
Status: DISCONTINUED | OUTPATIENT
Start: 2025-06-29 | End: 2025-07-01

## 2025-06-29 RX ORDER — SODIUM CHLORIDE 0.9 % (FLUSH) 0.9 %
10 SYRINGE (ML) INJECTION EVERY 12 HOURS SCHEDULED
Status: DISCONTINUED | OUTPATIENT
Start: 2025-06-29 | End: 2025-07-03 | Stop reason: HOSPADM

## 2025-06-29 RX ORDER — ONDANSETRON 4 MG/1
4 TABLET, ORALLY DISINTEGRATING ORAL EVERY 6 HOURS PRN
Status: DISCONTINUED | OUTPATIENT
Start: 2025-06-29 | End: 2025-07-03 | Stop reason: HOSPADM

## 2025-06-29 RX ADMIN — HYDRALAZINE HYDROCHLORIDE 10 MG: 20 INJECTION INTRAMUSCULAR; INTRAVENOUS at 23:38

## 2025-06-29 RX ADMIN — Medication 10 ML: at 22:40

## 2025-06-29 RX ADMIN — METOPROLOL TARTRATE 2.5 MG: 5 INJECTION INTRAVENOUS at 22:39

## 2025-06-29 RX ADMIN — HEPARIN SODIUM 18 UNITS/KG/HR: 10000 INJECTION, SOLUTION INTRAVENOUS at 19:23

## 2025-06-29 RX ADMIN — AZITHROMYCIN 500 MG: 500 INJECTION, POWDER, LYOPHILIZED, FOR SOLUTION INTRAVENOUS at 22:40

## 2025-06-29 RX ADMIN — IOPAMIDOL 100 ML: 755 INJECTION, SOLUTION INTRAVENOUS at 17:50

## 2025-06-29 NOTE — ED PROVIDER NOTES
Subjective   Chief Complaint   Patient presents with    Difficulty Swallowing     Pt feels as though he has a mucous plug stuck in his throat since Friday. Difficulty swallowing pills or keeping any food down. Pt's wife was dx with Covid on Friday as well, but pt tested neg this AM. 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        History of Present Illness  History provided by patient and daughter  Patient is a 74-year-old male presents the emergency department for evaluation of difficulty swallowing, dyspnea, lower extremity swelling.  Patient reports his symptoms began on Friday, he reports that he has had increasing difficulty swallowing, mucus.  His daughter reports that he did have an EGD with his recent hospital admission, he had Schroeder's esophagus, dilatation.     He denies any abdominal pain.  No chest pain.  He does report he feels short of breath at times.  However he has lung cancer is not particular different.  He does have lower extremity swelling that is worse recently bilaterally    No fevers.       Review of Systems    Past Medical History:   Diagnosis Date    Abnormal ECG 01/29/2024    Allergic rhinitis     Arrhythmia 08/14/2024    SVT, then afib after thoracentesis    Asthma 06/01/2024    Shortness of breath with exertion    Atrial fibrillation 08/14/2024    After 2L drained per thoracentesis    Coronary artery disease 01/29/2024    Minimal blockage per cath    GERD (gastroesophageal reflux disease)     Hyperlipidemia     Lung cancer 08/2024    Myocardial infarction 01/29/2024    Cath, no intervention    Pleural effusion 2024    Pneumonia 05/2025       No Known Allergies    Past Surgical History:   Procedure Laterality Date    BRONCHOSCOPY N/A 08/13/2024    Procedure: BRONCHOSCOPY WITH BRONCHIAL WASHING AND BRONCHIAL BRUSHING;  Surgeon: Kelvin Gonzalez MD;  Location: [facility] ENDOSCOPY;  Service: Pulmonary;  Laterality: N/A;    BRONCHOSCOPY N/A 05/23/2025    Procedure:  BRONCHOSCOPY with bronchoalveolar lavage and biopsy x 1 area;  Surgeon: Jason Dozier MD;  Location: [facility] ENDOSCOPY;  Service: Pulmonary;  Laterality: N/A;  post op: lung mass    BRONCHOSCOPY      BRONCHOSCOPY WITH ION ROBOTIC ASSIST N/A 2024    Procedure: BRONCHOSCOPY WITH ION ROBOT WITH CRYO BIOPSY;  Surgeon: Kelvin Gonzalez MD;  Location: [facility] ENDOSCOPY;  Service: Robotics - Pulmonary;  Laterality: N/A;    CARDIAC CATHETERIZATION Right 2024    Procedure: Coronary angiography;  Surgeon: Abran Chand MD;  Location: [facility] CATH INVASIVE LOCATION;  Service: Cardiovascular;  Laterality: Right;    CARDIAC CATHETERIZATION N/A 2024    Procedure: Left Heart Cath;  Surgeon: Abran Chand MD;  Location: [facility] CATH INVASIVE LOCATION;  Service: Cardiovascular;  Laterality: N/A;    ENDOSCOPY N/A 2025    Procedure: ESOPHAGOGASTRODUODENOSCOPY with biopsy x 1 area and dilatation (48FR non-guided bougie);  Surgeon: Carlos Calero MD;  Location: [facility] ENDOSCOPY;  Service: Gastroenterology;  Laterality: N/A;  post op: esophageal stricture    LUNG BIOPSY         Family History   Problem Relation Age of Onset    Dementia Mother     Diabetes Mother     Arrhythmia Mother         Afib    Breast cancer Sister 74    Cancer Sister         Breast cancer       Social History     Socioeconomic History    Marital status:    Tobacco Use    Smoking status: Former     Current packs/day: 0.00     Average packs/day: 1 pack/day for 32.0 years (32.0 ttl pk-yrs)     Types: Cigarettes     Start date:      Quit date:      Years since quittin.5     Passive exposure: Past    Smokeless tobacco: Never   Vaping Use    Vaping status: Never Used   Substance and Sexual Activity    Alcohol use: Yes     Alcohol/week: 1.0 standard drink of alcohol     Types: 1 Cans of beer per week    Drug use: Never    Sexual activity: Not Currently     Partners: Female     Birth control/protection: None            Objective   Physical Exam  Vitals and nursing note reviewed.   Constitutional:       Appearance: Normal appearance. He is not toxic-appearing.   HENT:      Head: Normocephalic and atraumatic.      Nose: Nose normal.      Mouth/Throat:      Mouth: Mucous membranes are moist.      Pharynx: Oropharynx is clear.   Eyes:      Extraocular Movements: Extraocular movements intact.      Conjunctiva/sclera: Conjunctivae normal.      Pupils: Pupils are equal, round, and reactive to light.   Cardiovascular:      Rate and Rhythm: Normal rate and regular rhythm.      Heart sounds: Normal heart sounds. No murmur heard.     No friction rub. No gallop.   Pulmonary:      Effort: Pulmonary effort is normal.      Breath sounds: Normal breath sounds.   Abdominal:      General: Bowel sounds are normal.      Palpations: Abdomen is soft.   Musculoskeletal:      Cervical back: Normal range of motion and neck supple.   Skin:     General: Skin is warm and dry.      Findings: No erythema or rash.   Neurological:      General: No focal deficit present.      Mental Status: He is alert and oriented to person, place, and time.         Procedures           ED Course  ED Course as of 06/30/25 0023   Sun Jun 29, 2025   1820 Discussed with Dr. Benítez   [LB]   1846 Pt updated [LB]      ED Course User Index  [LB] Ce Morgan, CARL        CT Angiogram Chest Pulmonary Embolism  Result Date: 6/29/2025  Impression: 1.Right lower lobe segmental and subsegmental pulmonary emboli. No definite findings of right heart strain. 2.Large left pleural effusion, increased compared to the prior exam. Small right pleural effusion, slightly increased compared to the prior exam. 3.Near complete consolidation of the left lower lobe with minimal aeration of the upper lobe, which could be related to atelectasis and/or pneumonia. Consolidative densities in the posterior-inferior right lung appear similar to the prior exam. 4.Diffuse septal thickening and  prominent interstitial markings in the lungs, left greater than right, which could be related to lymphangitic spread of malignancy and/or pulmonary edema. 5.Increased distention of the upper esophagus containing heterogeneous material, which could predispose to aspiration. There is wall thickening of the distal esophagus with hyperenhancement of the mucosa which could indicate esophagitis. 6.Cardiomegaly with small pericardial effusion. Body wall edema which could be related to heart failure. 7.Cholelithiasis with small amount of pericholecystic fluid. This could be related to heart failure, but cholecystitis cannot be excluded by imaging. Correlate with clinical findings and lab values. 8.Mediastinal, left axillary, and upper abdominal lymphadenopathy, as before consistent with metastatic disease. Results were called to the ordering provider by Dr. Yañez at 6/29/2025 6:39 PM EDT. Electronically Signed: Carlos Yañez  6/29/2025 6:39 PM EDT  Workstation ID: [id number]    XR Chest 1 View  Result Date: 6/29/2025  Impression: 1. Cardiomegaly with new bilateral pleural effusions and increasing interstitial and alveolar edema. Electronically Signed: Aftab Blakely MD  6/29/2025 4:08 PM EDT  Workstation ID: [id number]                                                   Medical Decision Making  Problems Addressed:  Multiple subsegmental pulmonary emboli without acute cor pulmonale: complicated acute illness or injury    Amount and/or Complexity of Data Reviewed  Labs: ordered.  Radiology: ordered.  ECG/medicine tests: ordered.    Risk  Prescription drug management.  Decision regarding hospitalization.    Appropriate PPE worn during patient interactions.    Sinus rhythm rate of 72.  Compared to previous 5/24/2025.  , QTc 496.    Chart Review: Oncology note 6/9/2025  6/3/2025 pulmonary note for lung cancer    Differential diagnoses considered for patient chief complaint and presentation, this list is not all inclusive of  diagnoses considered: Esophagitis, foreign body, pneumonia, bronchitis, PE.  Patient had the above exam and workup, leukocytosis 16.35, hemoglobin 11.1.  Creatinine 1.33 similar to previous.  BNP 2038.Patient CT was reviewed.  He does have pulmonary embolism.  There is no evidence of heart strain.  He started on heparin.  Question atelectasis or pneumonia, given patient leukocytosis, productive coughing, will place on antibiotic therapy.  Body wall edema, could be related to heart failure.  Patient was noted to have cholelithiasis, pericholecystic fluid.  He does not have any upper abdominal pain.  Negative Randall sign.  Doubt cholecystitis.  Will admit patient to family medicine.  Discussed with Dr. Apple.      Given patient's PE, concern for pneumonia, as well as some pulmonary edema going to place him on heparin, as well as treat for pneumonia.     Discussed with Dr. Apple for family medicine.  I discussed with the patient their test results, work-up here in the emergency department, and need for admission and further evaluation. Patient is agreeable to the plan of care. At time of disposition patient's VS are reviewed, and patient without acute distress.  Opportunity was provided for questions at the bedside, all questions and concerns were addressed.    Note Disclaimer: At [facility], we believe that sharing information builds trust and better relationships. You are receiving this note because you recently visited [facility]. It is possible you will see health information before a provider has talked with you about it. This kind of information can be easy to misunderstand. To help you fully understand what it means for your health, we urge you to discuss this note with your provider  Note dictated utilizing Dragon Dictation.          Final diagnoses:   Multiple subsegmental pulmonary emboli without acute cor pulmonale   Pneumonia due to infectious organism, unspecified laterality, unspecified part of  lung   Dysphagia, unspecified type   Lower extremity edema       ED Disposition  ED Disposition       ED Disposition   Decision to Admit    Condition   --    Comment   Level of Care: Telemetry [5]   Admitting Physician: JESUS RYAN [[id number]]   Attending Physician: JESUS RYAN [[id number]]                 No follow-up provider specified.       Medication List      No changes were made to your prescriptions during this visit.            Ce Morgan, APRN  06/30/25 0023

## 2025-06-29 NOTE — ED NOTES
PICC team recommending to hold off on using PICC line as of now. RN upstairs notified along with patient. Pt being transported upstairs currently.

## 2025-06-30 ENCOUNTER — ANESTHESIA EVENT (OUTPATIENT)
Dept: GASTROENTEROLOGY | Facility: HOSPITAL | Age: 75
End: 2025-06-30
Payer: MEDICARE

## 2025-06-30 ENCOUNTER — ANESTHESIA EVENT (OUTPATIENT)
Dept: PERIOP | Facility: HOSPITAL | Age: 75
End: 2025-06-30
Payer: MEDICARE

## 2025-06-30 ENCOUNTER — APPOINTMENT (OUTPATIENT)
Dept: INTERVENTIONAL RADIOLOGY/VASCULAR | Facility: HOSPITAL | Age: 75
End: 2025-06-30
Payer: MEDICARE

## 2025-06-30 ENCOUNTER — ANESTHESIA (OUTPATIENT)
Dept: GASTROENTEROLOGY | Facility: HOSPITAL | Age: 75
End: 2025-06-30
Payer: MEDICARE

## 2025-06-30 ENCOUNTER — APPOINTMENT (OUTPATIENT)
Dept: CARDIOLOGY | Facility: HOSPITAL | Age: 75
End: 2025-06-30
Payer: MEDICARE

## 2025-06-30 PROBLEM — T18.128A FOOD IMPACTION OF ESOPHAGUS: Status: ACTIVE | Noted: 2025-06-29
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
## 2025-06-30 LAB
ANION GAP SERPL CALCULATED.3IONS-SCNC: 13.7 MMOL/L (ref 5–15)
AORTIC DIMENSIONLESS INDEX: 0.72 (DI)
APTT PPP: 33.4 SECONDS (ref 22.7–35.4)
APTT PPP: 77.8 SECONDS (ref 22.7–35.4)
APTT PPP: >200 SECONDS (ref 22.7–35.4)
APTT PPP: >200 SECONDS (ref 22.7–35.4)
AV MEAN PRESS GRAD SYS DOP V1V2: 4 MMHG
AV VMAX SYS DOP: 141 CM/SEC
BH CV ECHO MEAS - ACS: 2 CM
BH CV ECHO MEAS - AI P1/2T: 524 MSEC
BH CV ECHO MEAS - AO MAX PG: 8 MMHG
BH CV ECHO MEAS - AO V2 VTI: 27.5 CM
BH CV ECHO MEAS - AVA(I,D): 2.48 CM2
BH CV ECHO MEAS - EDV(CUBED): 117.6 ML
BH CV ECHO MEAS - EDV(MOD-SP4): 105 ML
BH CV ECHO MEAS - EF(MOD-SP4): 62.3 %
BH CV ECHO MEAS - ESV(CUBED): 32.8 ML
BH CV ECHO MEAS - ESV(MOD-SP4): 39.6 ML
BH CV ECHO MEAS - FS: 34.7 %
BH CV ECHO MEAS - IVS/LVPW: 1 CM
BH CV ECHO MEAS - IVSD: 1.1 CM
BH CV ECHO MEAS - LA DIMENSION: 2.6 CM
BH CV ECHO MEAS - LAT PEAK E' VEL: 9.4 CM/SEC
BH CV ECHO MEAS - LV DIASTOLIC VOL/BSA (35-75): 52.3 CM2
BH CV ECHO MEAS - LV MASS(C)D: 200.5 GRAMS
BH CV ECHO MEAS - LV MAX PG: 4.2 MMHG
BH CV ECHO MEAS - LV MEAN PG: 2 MMHG
BH CV ECHO MEAS - LV SYSTOLIC VOL/BSA (12-30): 19.7 CM2
BH CV ECHO MEAS - LV V1 MAX: 102 CM/SEC
BH CV ECHO MEAS - LV V1 VTI: 19.7 CM
BH CV ECHO MEAS - LVIDD: 4.9 CM
BH CV ECHO MEAS - LVIDS: 3.2 CM
BH CV ECHO MEAS - LVOT AREA: 3.5 CM2
BH CV ECHO MEAS - LVOT DIAM: 2.1 CM
BH CV ECHO MEAS - LVPWD: 1.1 CM
BH CV ECHO MEAS - MED PEAK E' VEL: 6.9 CM/SEC
BH CV ECHO MEAS - MR MAX PG: 118.8 MMHG
BH CV ECHO MEAS - MR MAX VEL: 545 CM/SEC
BH CV ECHO MEAS - MV A MAX VEL: 77.6 CM/SEC
BH CV ECHO MEAS - MV DEC SLOPE: 549 CM/SEC2
BH CV ECHO MEAS - MV DEC TIME: 0.21 SEC
BH CV ECHO MEAS - MV E MAX VEL: 74.9 CM/SEC
BH CV ECHO MEAS - MV E/A: 0.97
BH CV ECHO MEAS - MV MAX PG: 3.5 MMHG
BH CV ECHO MEAS - MV MEAN PG: 2 MMHG
BH CV ECHO MEAS - MV P1/2T: 50.2 MSEC
BH CV ECHO MEAS - MV V2 VTI: 23.2 CM
BH CV ECHO MEAS - MVA(P1/2T): 4.4 CM2
BH CV ECHO MEAS - MVA(VTI): 2.9 CM2
BH CV ECHO MEAS - PA ACC TIME: 0.07 SEC
BH CV ECHO MEAS - PA V2 MAX: 90.3 CM/SEC
BH CV ECHO MEAS - RAP SYSTOLE: 3 MMHG
BH CV ECHO MEAS - RV MAX PG: 1.1 MMHG
BH CV ECHO MEAS - RV V1 MAX: 52.5 CM/SEC
BH CV ECHO MEAS - RV V1 VTI: 11.7 CM
BH CV ECHO MEAS - RVDD: 4.5 CM
BH CV ECHO MEAS - RVSP: 57.5 MMHG
BH CV ECHO MEAS - SV(LVOT): 68.2 ML
BH CV ECHO MEAS - SV(MOD-SP4): 65.4 ML
BH CV ECHO MEAS - SVI(LVOT): 34 ML/M2
BH CV ECHO MEAS - SVI(MOD-SP4): 32.6 ML/M2
BH CV ECHO MEAS - TAPSE (>1.6): 1.97 CM
BH CV ECHO MEAS - TR MAX PG: 54.5 MMHG
BH CV ECHO MEAS - TR MAX VEL: 369 CM/SEC
BH CV ECHO MEASUREMENTS AVERAGE E/E' RATIO: 9.19
BH CV ECHO SHUNT ASSESSMENT PERFORMED (HIDDEN SCRIPTING): 1
BH CV LOWER VASCULAR LEFT COMMON FEMORAL AUGMENT: NORMAL
BH CV LOWER VASCULAR LEFT COMMON FEMORAL COMPETENT: NORMAL
BH CV LOWER VASCULAR LEFT COMMON FEMORAL COMPRESS: NORMAL
BH CV LOWER VASCULAR LEFT COMMON FEMORAL PHASIC: NORMAL
BH CV LOWER VASCULAR LEFT COMMON FEMORAL SPONT: NORMAL
BH CV LOWER VASCULAR LEFT DISTAL FEMORAL COMPRESS: NORMAL
BH CV LOWER VASCULAR LEFT GASTRONEMIUS COMPRESS: NORMAL
BH CV LOWER VASCULAR LEFT GREATER SAPH AK COMPRESS: NORMAL
BH CV LOWER VASCULAR LEFT GREATER SAPH BK COMPRESS: NORMAL
BH CV LOWER VASCULAR LEFT LESSER SAPH COMPRESS: NORMAL
BH CV LOWER VASCULAR LEFT MID FEMORAL AUGMENT: NORMAL
BH CV LOWER VASCULAR LEFT MID FEMORAL COMPETENT: NORMAL
BH CV LOWER VASCULAR LEFT MID FEMORAL COMPRESS: NORMAL
BH CV LOWER VASCULAR LEFT MID FEMORAL PHASIC: NORMAL
BH CV LOWER VASCULAR LEFT MID FEMORAL SPONT: NORMAL
BH CV LOWER VASCULAR LEFT PERONEAL COMPRESS: NORMAL
BH CV LOWER VASCULAR LEFT POPLITEAL AUGMENT: NORMAL
BH CV LOWER VASCULAR LEFT POPLITEAL COMPETENT: NORMAL
BH CV LOWER VASCULAR LEFT POPLITEAL COMPRESS: NORMAL
BH CV LOWER VASCULAR LEFT POPLITEAL PHASIC: NORMAL
BH CV LOWER VASCULAR LEFT POPLITEAL SPONT: NORMAL
BH CV LOWER VASCULAR LEFT POSTERIOR TIBIAL COMPRESS: NORMAL
BH CV LOWER VASCULAR LEFT PROXIMAL FEMORAL COMPRESS: NORMAL
BH CV LOWER VASCULAR LEFT SAPHENOFEMORAL JUNCTION COMPRESS: NORMAL
BH CV LOWER VASCULAR RIGHT COMMON FEMORAL AUGMENT: NORMAL
BH CV LOWER VASCULAR RIGHT COMMON FEMORAL COMPETENT: NORMAL
BH CV LOWER VASCULAR RIGHT COMMON FEMORAL COMPRESS: NORMAL
BH CV LOWER VASCULAR RIGHT COMMON FEMORAL PHASIC: NORMAL
BH CV LOWER VASCULAR RIGHT COMMON FEMORAL SPONT: NORMAL
BH CV LOWER VASCULAR RIGHT DISTAL FEMORAL COMPRESS: NORMAL
BH CV LOWER VASCULAR RIGHT GASTRONEMIUS COMPRESS: NORMAL
BH CV LOWER VASCULAR RIGHT GREATER SAPH AK COMPRESS: NORMAL
BH CV LOWER VASCULAR RIGHT GREATER SAPH BK COMPRESS: NORMAL
BH CV LOWER VASCULAR RIGHT LESSER SAPH COMPRESS: NORMAL
BH CV LOWER VASCULAR RIGHT MID FEMORAL AUGMENT: NORMAL
BH CV LOWER VASCULAR RIGHT MID FEMORAL COMPETENT: NORMAL
BH CV LOWER VASCULAR RIGHT MID FEMORAL COMPRESS: NORMAL
BH CV LOWER VASCULAR RIGHT MID FEMORAL PHASIC: NORMAL
BH CV LOWER VASCULAR RIGHT MID FEMORAL SPONT: NORMAL
BH CV LOWER VASCULAR RIGHT PERONEAL COMPRESS: NORMAL
BH CV LOWER VASCULAR RIGHT POPLITEAL AUGMENT: NORMAL
BH CV LOWER VASCULAR RIGHT POPLITEAL COMPETENT: NORMAL
BH CV LOWER VASCULAR RIGHT POPLITEAL COMPRESS: NORMAL
BH CV LOWER VASCULAR RIGHT POPLITEAL PHASIC: NORMAL
BH CV LOWER VASCULAR RIGHT POPLITEAL SPONT: NORMAL
BH CV LOWER VASCULAR RIGHT POSTERIOR TIBIAL COMPRESS: NORMAL
BH CV LOWER VASCULAR RIGHT PROXIMAL FEMORAL COMPRESS: NORMAL
BH CV LOWER VASCULAR RIGHT SAPHENOFEMORAL JUNCTION COMPRESS: NORMAL
BH CV XLRA - TDI S': 18.4 CM/SEC
BUN SERPL-MCNC: 13.8 MG/DL (ref 8–23)
BUN/CREAT SERPL: 10.5 (ref 7–25)
CALCIUM SPEC-SCNC: 8.3 MG/DL (ref 8.6–10.5)
CHLORIDE SERPL-SCNC: 101 MMOL/L (ref 98–107)
CO2 SERPL-SCNC: 25.3 MMOL/L (ref 22–29)
CREAT SERPL-MCNC: 1.31 MG/DL (ref 0.76–1.27)
EGFRCR SERPLBLD CKD-EPI 2021: 56.8 ML/MIN/1.73
GLUCOSE SERPL-MCNC: 87 MG/DL (ref 65–99)
LV EF BIPLANE MOD: 62 %
POTASSIUM SERPL-SCNC: 3.4 MMOL/L (ref 3.5–5.2)
QT INTERVAL: 452 MS
QTC INTERVAL: 496 MS
SINUS: 3.7 CM
SODIUM SERPL-SCNC: 140 MMOL/L (ref 136–145)

## 2025-06-30 PROCEDURE — 85730 THROMBOPLASTIN TIME PARTIAL: CPT | Performed by: INTERNAL MEDICINE

## 2025-06-30 PROCEDURE — 25810000003 SODIUM CHLORIDE 0.9 % SOLUTION

## 2025-06-30 PROCEDURE — 85730 THROMBOPLASTIN TIME PARTIAL: CPT | Performed by: NURSE PRACTITIONER

## 2025-06-30 PROCEDURE — 80048 BASIC METABOLIC PNL TOTAL CA: CPT | Performed by: INTERNAL MEDICINE

## 2025-06-30 PROCEDURE — 25010000002 PROPOFOL 200 MG/20ML EMULSION

## 2025-06-30 PROCEDURE — 25010000002 HEPARIN (PORCINE) 25000-0.45 UT/250ML-% SOLUTION: Performed by: INTERNAL MEDICINE

## 2025-06-30 PROCEDURE — 25010000002 SUCCINYLCHOLINE PER 20 MG

## 2025-06-30 PROCEDURE — 94640 AIRWAY INHALATION TREATMENT: CPT

## 2025-06-30 PROCEDURE — 93970 EXTREMITY STUDY: CPT | Performed by: SURGERY

## 2025-06-30 PROCEDURE — 02H633Z INSERTION OF INFUSION DEVICE INTO RIGHT ATRIUM, PERCUTANEOUS APPROACH: ICD-10-PCS | Performed by: RADIOLOGY

## 2025-06-30 PROCEDURE — 93970 EXTREMITY STUDY: CPT

## 2025-06-30 PROCEDURE — 99221 1ST HOSP IP/OBS SF/LOW 40: CPT | Performed by: INTERNAL MEDICINE

## 2025-06-30 PROCEDURE — 25010000002 DEXAMETHASONE PER 1 MG

## 2025-06-30 PROCEDURE — 94799 UNLISTED PULMONARY SVC/PX: CPT

## 2025-06-30 PROCEDURE — B518YZA FLUOROSCOPY OF SUPERIOR VENA CAVA USING OTHER CONTRAST, GUIDANCE: ICD-10-PCS | Performed by: RADIOLOGY

## 2025-06-30 PROCEDURE — 0DC38ZZ EXTIRPATION OF MATTER FROM LOWER ESOPHAGUS, VIA NATURAL OR ARTIFICIAL OPENING ENDOSCOPIC: ICD-10-PCS | Performed by: INTERNAL MEDICINE

## 2025-06-30 PROCEDURE — C1751 CATH, INF, PER/CENT/MIDLINE: HCPCS

## 2025-06-30 PROCEDURE — 25010000002 ONDANSETRON PER 1 MG

## 2025-06-30 PROCEDURE — 99222 1ST HOSP IP/OBS MODERATE 55: CPT | Performed by: SURGERY

## 2025-06-30 PROCEDURE — 85730 THROMBOPLASTIN TIME PARTIAL: CPT

## 2025-06-30 PROCEDURE — 77001 FLUOROGUIDE FOR VEIN DEVICE: CPT

## 2025-06-30 PROCEDURE — 25010000002 LIDOCAINE PF 1% 1 % SOLUTION

## 2025-06-30 PROCEDURE — 25010000002 AMPICILLIN-SULBACTAM PER 1.5 G

## 2025-06-30 PROCEDURE — 25010000002 HYDROCORTISONE SOD SUC (PF) 100 MG RECONSTITUTED SOLUTION: Performed by: INTERNAL MEDICINE

## 2025-06-30 PROCEDURE — 25010000002 FUROSEMIDE PER 20 MG

## 2025-06-30 PROCEDURE — 25010000002 HYDROMORPHONE PER 4 MG: Performed by: INTERNAL MEDICINE

## 2025-06-30 RX ORDER — LABETALOL HYDROCHLORIDE 5 MG/ML
5 INJECTION, SOLUTION INTRAVENOUS
Status: DISCONTINUED | OUTPATIENT
Start: 2025-06-30 | End: 2025-06-30 | Stop reason: HOSPADM

## 2025-06-30 RX ORDER — FUROSEMIDE 10 MG/ML
80 INJECTION INTRAMUSCULAR; INTRAVENOUS ONCE
Status: COMPLETED | OUTPATIENT
Start: 2025-06-30 | End: 2025-06-30

## 2025-06-30 RX ORDER — HYDROMORPHONE HYDROCHLORIDE 1 MG/ML
0.5 INJECTION, SOLUTION INTRAMUSCULAR; INTRAVENOUS; SUBCUTANEOUS
Refills: 0 | Status: DISCONTINUED | OUTPATIENT
Start: 2025-06-30 | End: 2025-07-03

## 2025-06-30 RX ORDER — ONDANSETRON 2 MG/ML
4 INJECTION INTRAMUSCULAR; INTRAVENOUS ONCE AS NEEDED
Status: DISCONTINUED | OUTPATIENT
Start: 2025-06-30 | End: 2025-06-30 | Stop reason: HOSPADM

## 2025-06-30 RX ORDER — SODIUM CHLORIDE 9 MG/ML
INJECTION, SOLUTION INTRAVENOUS CONTINUOUS PRN
Status: DISCONTINUED | OUTPATIENT
Start: 2025-06-30 | End: 2025-06-30 | Stop reason: SURG

## 2025-06-30 RX ORDER — PROPOFOL 10 MG/ML
INJECTION, EMULSION INTRAVENOUS AS NEEDED
Status: DISCONTINUED | OUTPATIENT
Start: 2025-06-30 | End: 2025-06-30 | Stop reason: SURG

## 2025-06-30 RX ORDER — HYDROCORTISONE SODIUM SUCCINATE 100 MG/2ML
50 INJECTION INTRAMUSCULAR; INTRAVENOUS EVERY 8 HOURS
Status: DISCONTINUED | OUTPATIENT
Start: 2025-06-30 | End: 2025-07-02

## 2025-06-30 RX ORDER — SUCCINYLCHOLINE CHLORIDE 20 MG/ML
INJECTION INTRAMUSCULAR; INTRAVENOUS AS NEEDED
Status: DISCONTINUED | OUTPATIENT
Start: 2025-06-30 | End: 2025-06-30 | Stop reason: SURG

## 2025-06-30 RX ORDER — PHENYLEPHRINE HCL IN 0.9% NACL 1 MG/10 ML
SYRINGE (ML) INTRAVENOUS AS NEEDED
Status: DISCONTINUED | OUTPATIENT
Start: 2025-06-30 | End: 2025-06-30 | Stop reason: SURG

## 2025-06-30 RX ORDER — HEPARIN SODIUM 10000 [USP'U]/100ML
18 INJECTION, SOLUTION INTRAVENOUS
Status: DISCONTINUED | OUTPATIENT
Start: 2025-06-30 | End: 2025-07-01

## 2025-06-30 RX ORDER — IPRATROPIUM BROMIDE AND ALBUTEROL SULFATE 2.5; .5 MG/3ML; MG/3ML
3 SOLUTION RESPIRATORY (INHALATION) ONCE AS NEEDED
Status: DISCONTINUED | OUTPATIENT
Start: 2025-06-30 | End: 2025-06-30 | Stop reason: HOSPADM

## 2025-06-30 RX ORDER — EPHEDRINE SULFATE 5 MG/ML
5 INJECTION INTRAVENOUS ONCE AS NEEDED
Status: DISCONTINUED | OUTPATIENT
Start: 2025-06-30 | End: 2025-06-30 | Stop reason: HOSPADM

## 2025-06-30 RX ORDER — HYDRALAZINE HYDROCHLORIDE 20 MG/ML
5 INJECTION INTRAMUSCULAR; INTRAVENOUS
Status: DISCONTINUED | OUTPATIENT
Start: 2025-06-30 | End: 2025-06-30 | Stop reason: HOSPADM

## 2025-06-30 RX ORDER — ONDANSETRON 2 MG/ML
INJECTION INTRAMUSCULAR; INTRAVENOUS AS NEEDED
Status: DISCONTINUED | OUTPATIENT
Start: 2025-06-30 | End: 2025-06-30 | Stop reason: SURG

## 2025-06-30 RX ORDER — DEXAMETHASONE SODIUM PHOSPHATE 4 MG/ML
INJECTION, SOLUTION INTRA-ARTICULAR; INTRALESIONAL; INTRAMUSCULAR; INTRAVENOUS; SOFT TISSUE AS NEEDED
Status: DISCONTINUED | OUTPATIENT
Start: 2025-06-30 | End: 2025-06-30 | Stop reason: SURG

## 2025-06-30 RX ORDER — DIPHENHYDRAMINE HYDROCHLORIDE 50 MG/ML
12.5 INJECTION, SOLUTION INTRAMUSCULAR; INTRAVENOUS
Status: DISCONTINUED | OUTPATIENT
Start: 2025-06-30 | End: 2025-06-30 | Stop reason: HOSPADM

## 2025-06-30 RX ORDER — LIDOCAINE HYDROCHLORIDE 10 MG/ML
INJECTION, SOLUTION EPIDURAL; INFILTRATION; INTRACAUDAL; PERINEURAL AS NEEDED
Status: DISCONTINUED | OUTPATIENT
Start: 2025-06-30 | End: 2025-06-30 | Stop reason: SURG

## 2025-06-30 RX ADMIN — Medication 100 MCG: at 14:15

## 2025-06-30 RX ADMIN — SODIUM CHLORIDE: 9 INJECTION, SOLUTION INTRAVENOUS at 14:01

## 2025-06-30 RX ADMIN — METOPROLOL TARTRATE 2.5 MG: 5 INJECTION INTRAVENOUS at 08:29

## 2025-06-30 RX ADMIN — HYDROMORPHONE HYDROCHLORIDE 0.5 MG: 1 INJECTION, SOLUTION INTRAMUSCULAR; INTRAVENOUS; SUBCUTANEOUS at 10:53

## 2025-06-30 RX ADMIN — HEPARIN SODIUM 18 UNITS/KG/HR: 10000 INJECTION, SOLUTION INTRAVENOUS at 09:34

## 2025-06-30 RX ADMIN — PANTOPRAZOLE SODIUM 40 MG: 40 INJECTION, POWDER, FOR SOLUTION INTRAVENOUS at 06:22

## 2025-06-30 RX ADMIN — METOPROLOL TARTRATE 2.5 MG: 5 INJECTION INTRAVENOUS at 22:37

## 2025-06-30 RX ADMIN — HYDROCORTISONE SODIUM SUCCINATE 50 MG: 100 INJECTION, POWDER, FOR SOLUTION INTRAMUSCULAR; INTRAVENOUS at 09:38

## 2025-06-30 RX ADMIN — Medication 10 ML: at 09:43

## 2025-06-30 RX ADMIN — LIDOCAINE HYDROCHLORIDE 50 MG: 10 INJECTION, SOLUTION EPIDURAL; INFILTRATION; INTRACAUDAL; PERINEURAL at 14:05

## 2025-06-30 RX ADMIN — IPRATROPIUM BROMIDE AND ALBUTEROL SULFATE 3 ML: .5; 3 SOLUTION RESPIRATORY (INHALATION) at 01:09

## 2025-06-30 RX ADMIN — DEXAMETHASONE SODIUM PHOSPHATE 4 MG: 4 INJECTION, SOLUTION INTRAMUSCULAR; INTRAVENOUS at 14:10

## 2025-06-30 RX ADMIN — ONDANSETRON 4 MG: 2 INJECTION INTRAMUSCULAR; INTRAVENOUS at 14:05

## 2025-06-30 RX ADMIN — AMPICILLIN SODIUM AND SULBACTAM SODIUM 3 G: 2; 1 INJECTION, POWDER, FOR SOLUTION INTRAMUSCULAR; INTRAVENOUS at 15:46

## 2025-06-30 RX ADMIN — HEPARIN SODIUM 18 UNITS/KG/HR: 10000 INJECTION, SOLUTION INTRAVENOUS at 09:49

## 2025-06-30 RX ADMIN — SUCCINYLCHOLINE CHLORIDE 120 MG: 20 INJECTION, SOLUTION INTRAMUSCULAR; INTRAVENOUS at 14:05

## 2025-06-30 RX ADMIN — PROPOFOL 110 MG: 10 INJECTION, EMULSION INTRAVENOUS at 14:05

## 2025-06-30 RX ADMIN — METOPROLOL TARTRATE 2.5 MG: 5 INJECTION INTRAVENOUS at 15:47

## 2025-06-30 RX ADMIN — AMPICILLIN SODIUM AND SULBACTAM SODIUM 3 G: 2; 1 INJECTION, POWDER, FOR SOLUTION INTRAMUSCULAR; INTRAVENOUS at 22:37

## 2025-06-30 RX ADMIN — AMPICILLIN SODIUM AND SULBACTAM SODIUM 3 G: 2; 1 INJECTION, POWDER, FOR SOLUTION INTRAMUSCULAR; INTRAVENOUS at 04:25

## 2025-06-30 RX ADMIN — Medication 200 MCG: at 14:17

## 2025-06-30 RX ADMIN — Medication 10 ML: at 21:15

## 2025-06-30 RX ADMIN — FUROSEMIDE 80 MG: 10 INJECTION, SOLUTION INTRAMUSCULAR; INTRAVENOUS at 00:52

## 2025-06-30 RX ADMIN — AMPICILLIN SODIUM AND SULBACTAM SODIUM 3 G: 2; 1 INJECTION, POWDER, FOR SOLUTION INTRAMUSCULAR; INTRAVENOUS at 09:38

## 2025-06-30 RX ADMIN — HYDROCORTISONE SODIUM SUCCINATE 50 MG: 100 INJECTION, POWDER, FOR SOLUTION INTRAMUSCULAR; INTRAVENOUS at 15:46

## 2025-06-30 NOTE — PLAN OF CARE
6/30/2025   OTHER   Discipline Speech-Language Pathologist   Rehab Time/Intention   Session Not Performed Unable to treat ; ST consulted for dysphagia d/t pt having difficulty keeping food down. GI currently following, recommended pt remain NPO w/ EGD. ST will re-attempt on next date of service.

## 2025-06-30 NOTE — PLAN OF CARE
Goal Outcome Evaluation:      Pt had PICC line replaced yesterday. Pt also had an EGD. GI Soft diet resumed until NPO at [facility] for pleurx cath tomorrow. No concerns at this time. Call light within reach.

## 2025-06-30 NOTE — CONSULTS
GI CONSULT  NOTE:    Referring Provider:  Evelyn Felder MD    Chief complaint: Dysphagia    Subjective .     History of present illness: Young Ames is a 74 y.o. male with PMH MI with no intervention required 1/2024, Afib on Eliquis, CKD, hypothyroidism, right lung cancer treated with Keytruda (last dose 5/1/2025), Schroeder's esophagus without dysplasia, and new pulmonary embolism (6/29/2025).  He presented to the ER due to shortness of breath, inability to keep meds or  food down, and leg swelling.  He was found to have new pulmonary embolism.  GI was consulted for dysphagia.    Patient reports not being able to eat or drink for the past couple of days.  He regurgitates immediately upon trying to swallow.      Endo History:  5/23/2025 EGD by Dr. Diaz showed short segment Schroeder's esophagus and small hiatal hernia, possibly some esophageal dysmotility.  Dilated to 48 Setswana.  Normal stomach and duodenum.    Past Medical History:  Past Medical History:   Diagnosis Date    Abnormal ECG 01/29/2024    Allergic rhinitis     Arrhythmia 08/14/2024    SVT, then afib after thoracentesis    Asthma 06/01/2024    Shortness of breath with exertion    Atrial fibrillation 08/14/2024    After 2L drained per thoracentesis    Coronary artery disease 01/29/2024    Minimal blockage per cath    GERD (gastroesophageal reflux disease)     Hyperlipidemia     Lung cancer 08/2024    Myocardial infarction 01/29/2024    Cath, no intervention    Pleural effusion 2024    Pneumonia 05/2025       Past Surgical History:  Past Surgical History:   Procedure Laterality Date    BRONCHOSCOPY N/A 08/13/2024    Procedure: BRONCHOSCOPY WITH BRONCHIAL WASHING AND BRONCHIAL BRUSHING;  Surgeon: Kelvin Gonzalez MD;  Location: [facility] ENDOSCOPY;  Service: Pulmonary;  Laterality: N/A;    BRONCHOSCOPY N/A 05/23/2025    Procedure: BRONCHOSCOPY with bronchoalveolar lavage and biopsy x 1 area;  Surgeon: Jason Dozier MD;  Location: [facility] ENDOSCOPY;  Service:  Pulmonary;  Laterality: N/A;  post op: lung mass    BRONCHOSCOPY      BRONCHOSCOPY WITH ION ROBOTIC ASSIST N/A 2024    Procedure: BRONCHOSCOPY WITH ION ROBOT WITH CRYO BIOPSY;  Surgeon: Kelvin Gonzalez MD;  Location: [facility] ENDOSCOPY;  Service: Robotics - Pulmonary;  Laterality: N/A;    CARDIAC CATHETERIZATION Right 2024    Procedure: Coronary angiography;  Surgeon: Abran Chand MD;  Location: [facility] CATH INVASIVE LOCATION;  Service: Cardiovascular;  Laterality: Right;    CARDIAC CATHETERIZATION N/A 2024    Procedure: Left Heart Cath;  Surgeon: Abran Chand MD;  Location: [facility] CATH INVASIVE LOCATION;  Service: Cardiovascular;  Laterality: N/A;    ENDOSCOPY N/A 2025    Procedure: ESOPHAGOGASTRODUODENOSCOPY with biopsy x 1 area and dilatation (48FR non-guided bougie);  Surgeon: Carlos Calero MD;  Location: [facility] ENDOSCOPY;  Service: Gastroenterology;  Laterality: N/A;  post op: esophageal stricture    LUNG BIOPSY         Social History:  Social History     Tobacco Use    Smoking status: Former     Current packs/day: 0.00     Average packs/day: 1 pack/day for 32.0 years (32.0 ttl pk-yrs)     Types: Cigarettes     Start date:      Quit date:      Years since quittin.5     Passive exposure: Past    Smokeless tobacco: Never   Vaping Use    Vaping status: Never Used   Substance Use Topics    Alcohol use: Yes     Alcohol/week: 1.0 standard drink of alcohol     Types: 1 Cans of beer per week    Drug use: Never       Family History:  Family History   Problem Relation Age of Onset    Dementia Mother     Diabetes Mother     Arrhythmia Mother         Afib    Breast cancer Sister 74    Cancer Sister         Breast cancer       Medications:  Medications Prior to Admission   Medication Sig Dispense Refill Last Dose/Taking    acetaminophen (TYLENOL) 325 MG tablet Take 2 tablets by mouth Every 4 (Four) Hours As Needed for Mild Pain.   Patient Taking Differently    apixaban  (ELIQUIS) 5 MG tablet tablet Take 1 tablet by mouth Every 12 (Twelve) Hours. Indications: Atrial Fibrillation 60 tablet 0 6/29/2025 Morning    Budeson-Glycopyrrol-Formoterol (Breztri Aerosphere) 160-9-4.8 MCG/ACT aerosol inhaler Inhale 2 puffs 2 (Two) Times a Day. 1 each 0 6/29/2025 Morning    Cholecalciferol (VITAMIN D-3 PO) Take 1 tablet by mouth Daily. Indications: Vitamin Deficiency   Past Week Evening    dexAMETHasone (DECADRON) 4 MG tablet Take 2 tablets oral twice a day for 3 consecutive days beginning the day before chemotherapy and continue for 6 doses. 12 tablet 5 Past Month Morning    guaiFENesin (Mucinex) 600 MG 12 hr tablet Take 1 tablet by mouth 2 (Two) Times a Day. Indications: Cough   Past Month Evening    hydrocortisone (CORTEF) 20 MG tablet Take 1 tablet by mouth 2 (Two) Times a Day for 90 days. 60 tablet 2 Past Week    ipratropium-albuterol (DUO-NEB) 0.5-2.5 mg/3 ml nebulizer Take 3 mL by nebulization Every 6 (Six) Hours As Needed for Wheezing.   6/29/2025 Morning    levothyroxine (SYNTHROID, LEVOTHROID) 75 MCG tablet TAKE ONE TABLET BY MOUTH EVERY MORNING 30 tablet 1 Past Week    loratadine (Claritin) 10 MG tablet Take 1 tablet by mouth Daily.   Patient Taking Differently    midodrine (PROAMATINE) 5 MG tablet Take 1 tablet by mouth 3 (Three) Times a Day As Needed.   Past Week    NON FORMULARY Take 2 tablets by mouth Every Night. Zzzquil  Indications: Sleep Disorder   Past Week Bedtime    nystatin susp + lidocaine viscous (MAGIC MOUTHWASH) oral suspension Swish and spit 5 mL 4 (Four) Times a Day As Needed (MUCOSITIS). 100 mL 2 Patient Taking Differently    ondansetron (ZOFRAN) 8 MG tablet Take 1 tablet by mouth 3 (Three) Times a Day As Needed for Nausea or Vomiting. 30 tablet 5 Past Month    pantoprazole (PROTONIX) 40 MG EC tablet Take 1 tablet by mouth Daily.   Past Week Morning    simvastatin (ZOCOR) 20 MG tablet Take 1 tablet by mouth Every Night. Indications: High Amount of Fats in the Blood    Past Week Bedtime    sodium bicarbonate 650 MG tablet Take 2 tablets by mouth 3 (Three) Times a Day. 180 tablet 0 Past Week Evening    Sodium Chloride, PF, 0.9 % injection Infuse 10 mL into a venous catheter Daily. 300 mL 2 6/29/2025 Morning    sotalol (BETAPACE) 80 MG tablet Take 1 tablet by mouth 2 (Two) Times a Day.   Past Week Evening    tamsulosin (FLOMAX) 0.4 MG capsule 24 hr capsule Take 1 capsule by mouth Every Night. Indications: Benign Enlargement of Prostate   Past Week Evening    multivitamin with minerals (Centrum Silver 50+Men) tablet tablet Take 1 tablet by mouth Every Morning. Indications: Vitamin Deficiency       thiamine (VITAMIN B-1) 100 MG tablet  tablet Take 1 tablet by mouth Daily. Indications: Vitamin Deficiency          Scheduled Meds:ampicillin-sulbactam, 3 g, Intravenous, Q6H  hydrocortisone sodium succinate, 50 mg, Intravenous, Q8H  metoprolol tartrate, 2.5 mg, Intravenous, Q8H  pantoprazole, 40 mg, Intravenous, Q AM  sodium chloride, 10 mL, Intravenous, Q12H      Continuous Infusions:heparin, 18 Units/kg/hr, Last Rate: Stopped (06/30/25 0424)  Pharmacy To Dose:,       PRN Meds:.  senna-docusate sodium **AND** polyethylene glycol **AND** bisacodyl **AND** bisacodyl    heparin    heparin    hydrALAZINE    ipratropium-albuterol    nitroglycerin    ondansetron ODT **OR** ondansetron    Pharmacy To Dose:    [COMPLETED] Insert Peripheral IV **AND** sodium chloride    sodium chloride    sodium chloride    ALLERGIES:  Patient has no known allergies.    ROS:  Review of Systems    The following systems were reviewed and negative;   Constitution:  No fevers, chills, no unintentional weight loss  Skin: no rash, no jaundice  Eyes:  No blurry vision, no eye pain  HENT:  No change in hearing or smell  Resp:  No dyspnea or cough  CV:  No chest pain or palpitations  :  No dysuria, hematuria  Musculoskeletal:  No leg cramps or arthralgias  Neuro:  No tremor, no numbness  Psych:  No depression or  "confusion    Objective     Vital Signs:   Vitals:    06/30/25 0426 06/30/25 0530 06/30/25 0759 06/30/25 0807   BP: 158/90 124/84 154/100    BP Location: Right arm  Right arm    Patient Position: Lying  Lying    Pulse: 90 78 84    Resp: 25  20    Temp: 97.5 °F (36.4 °C)  97.7 °F (36.5 °C)    TempSrc: Oral  Oral    SpO2: 92%  95%    Weight:    74.8 kg (164 lb 12.8 oz)   Height:           Physical Exam:     General Appearance:    Awake and alert, in no acute distress   Head:    Normocephalic, without obvious abnormality, atraumatic   Eyes:            Conjunctivae normal, anicteric sclerae, pupils equal   Ears:    Ears appear intact with no abnormalities noted   Throat:   No oral lesions, no thrush, oral mucosa moist   Neck:   Supple, no JVD   Lungs:     respirations regular, even and unlabored       Chest Wall:    No abnormalities observed   Abdomen:     soft, nontender, no rebound or guarding, nondistended, no hepatosplenomegaly   Rectal:     Deferred   Extremities:   Moves all extremities, no edema, no cyanosis   Pulses:   Pulses palpable and equal bilaterally   Skin:   No rash, no jaundice, normal palpation             Results Review:   I reviewed the patient's labs and imaging.  CBC    Results from last 7 days   Lab Units 06/29/25  1549   WBC 10*3/mm3 16.35*   HEMOGLOBIN g/dL 11.1*   PLATELETS 10*3/mm3 238     CMP   Results from last 7 days   Lab Units 06/29/25  1529   SODIUM mmol/L 140   POTASSIUM mmol/L 4.2   CHLORIDE mmol/L 104   CO2 mmol/L 24.8   BUN mg/dL 12.6   CREATININE mg/dL 1.33*   GLUCOSE mg/dL 100*   ALBUMIN g/dL 3.4*   BILIRUBIN mg/dL 0.5   ALK PHOS U/L 74   AST (SGOT) U/L 38   ALT (SGPT) U/L 11     Cr Clearance Estimated Creatinine Clearance: 50.8 mL/min (A) (by C-G formula based on SCr of 1.33 mg/dL (H)).  Coag   Results from last 7 days   Lab Units 06/30/25  0341 06/29/25  1549   INR   --  1.37*   APTT seconds >200.0* 31.8     HbA1C No results found for: \"HGBA1C\"  Blood Glucose No results found for: " "\"POCGLU\"  Infection     UA    Results from last 7 days   Lab Units 06/29/25  1618   NITRITE UA  Negative   WBC UA /HPF 6-10*   BACTERIA UA /HPF 1+*   SQUAM EPITHEL UA /HPF 0-2     Radiology(recent) CT Angiogram Chest Pulmonary Embolism  Result Date: 6/29/2025  Impression: 1.Right lower lobe segmental and subsegmental pulmonary emboli. No definite findings of right heart strain. 2.Large left pleural effusion, increased compared to the prior exam. Small right pleural effusion, slightly increased compared to the prior exam. 3.Near complete consolidation of the left lower lobe with minimal aeration of the upper lobe, which could be related to atelectasis and/or pneumonia. Consolidative densities in the posterior-inferior right lung appear similar to the prior exam. 4.Diffuse septal thickening and prominent interstitial markings in the lungs, left greater than right, which could be related to lymphangitic spread of malignancy and/or pulmonary edema. 5.Increased distention of the upper esophagus containing heterogeneous material, which could predispose to aspiration. There is wall thickening of the distal esophagus with hyperenhancement of the mucosa which could indicate esophagitis. 6.Cardiomegaly with small pericardial effusion. Body wall edema which could be related to heart failure. 7.Cholelithiasis with small amount of pericholecystic fluid. This could be related to heart failure, but cholecystitis cannot be excluded by imaging. Correlate with clinical findings and lab values. 8.Mediastinal, left axillary, and upper abdominal lymphadenopathy, as before consistent with metastatic disease. Results were called to the ordering provider by Dr. Yañez at 6/29/2025 6:39 PM EDT. Electronically Signed: Carlos Yañez  6/29/2025 6:39 PM EDT  Workstation ID: [id number]    XR Chest 1 View  Result Date: 6/29/2025  Impression: 1. Cardiomegaly with new bilateral pleural effusions and increasing interstitial and alveolar edema. " Electronically Signed: Aftab Blakely MD  6/29/2025 4:08 PM EDT  Workstation ID: [id number]        ASSESSMENT AND PLAN:  74 y.o. male with PMH MI with no intervention required 1/2024, Afib on Eliquis, CKD, hypothyroidism, right lung cancer treated with Keytruda (last dose 5/1/2025), Schroeder's esophagus without dysplasia, and new pulmonary embolism (6/29/2025).  He presented to the ER due to shortness of breath, inability to keep meds or food down, and leg swelling.  He was found to have new pulmonary embolism.  GI was consulted for dysphagia and inabiity to keep food or liquids down. Planning EGD for likely food bolus.      Problem List:  ?  Food bolus  Dysphagia  Schroeder's esophagus without dysplasia  New pulmonary embolism on heparin drip  A-fib on Eliquis  Lung cancer on Keytruda, followed by Dr. Tim    Plan:  - Maintain NPO.  Plan EGD today for likely food bolus.   - This procedure needs to be carried out today despite new pulmonary embolism.  Will plan for retrieval of food bolus, will avoid dilation due to heparin drip and Eliquis.    I discussed the patients findings and my recommendations with the patient. Patient was seen with GI attending, addendum to follow. We appreciate the referral.    Electronically signed by Tommie Vaca PA-C, 06/30/25, 8:44 AM EDT.

## 2025-06-30 NOTE — PROGRESS NOTES
LOS: 1 day   Patient Care Team:  Abner Funes APRN as PCP - General (Family Medicine)  Brittany Canchola, RN as Nurse Navigator  Arben Tim MD as Consulting Physician (Hematology and Oncology)  Aman Gregory MD as Consulting Physician (Nephrology)    Subjective     Interval History: Stable overnight    Patient Complaints: Feels like there is a distal esophageal obstruction    History taken from: patient    Review of Systems   Constitutional:  Positive for activity change and appetite change. Negative for chills, diaphoresis, fatigue, fever and unexpected weight change.   HENT:  Positive for trouble swallowing. Negative for congestion and facial swelling.    Eyes:  Negative for visual disturbance.   Respiratory:  Positive for shortness of breath. Negative for cough, wheezing and stridor.    Cardiovascular:  Negative for chest pain, palpitations and leg swelling.   Gastrointestinal:  Negative for abdominal pain, constipation, diarrhea and nausea.   Endocrine: Negative for polyuria.   Genitourinary:  Negative for dysuria.   Musculoskeletal:  Negative for arthralgias, back pain and gait problem.   Skin:  Negative for rash and wound.   Allergic/Immunologic: Negative for immunocompromised state.   Neurological:  Negative for tremors, weakness, light-headedness and headaches.   Psychiatric/Behavioral:  Negative for confusion.            Objective     Vital Signs  Temp:  [97.5 °F (36.4 °C)-98 °F (36.7 °C)] 98 °F (36.7 °C)  Heart Rate:  [71-90] 85  Resp:  [17-25] 17  BP: (124-178)/() 160/99    Physical Exam:     General Appearance:    Alert, cooperative, in no acute distress, fully oriented, pleasant   Head:    Normocephalic, without obvious abnormality, atraumatic   Eyes:            Lids and lashes normal, conjunctivae and sclerae normal, no   icterus, no pallor, corneas clear, PERRLA   Ears:    Ears appear intact with no abnormalities noted   Throat:   No oral lesions, no thrush, oral mucosa moist    Neck:   No adenopathy, supple, trachea midline, no thyromegaly, no   carotid bruit, no JVD   Lungs:   Decreased breath sounds left lower lobe    Heart:    Regular rhythm and normal rate, normal S1 and S2, no            murmur, no gallop, no rub, no click   Chest Wall:    No abnormalities observed   Abdomen:     Normal bowel sounds, no masses, no organomegaly, soft        Non-tender non-distended, no guarding,   Extremities:   Moves all extremities well, no edema, no cyanosis, no             Redness   Pulses:   Pulses palpable and equal bilaterally   Skin:   No bleeding, bruising or rash   Lymph nodes:   No palpable adenopathy   Neurologic:   Cranial nerves 2 - 12 grossly intact, sensation intact, DTR       present and equal bilaterally        Results Review:    Lab Results (last 24 hours)       Procedure Component Value Units Date/Time    Basic Metabolic Panel [[id number]]  (Abnormal) Collected: 06/30/25 0828    Specimen: Blood from Arm, Right Updated: 06/30/25 0905     Glucose 87 mg/dL      BUN 13.8 mg/dL      Creatinine 1.31 mg/dL      Sodium 140 mmol/L      Potassium 3.4 mmol/L      Chloride 101 mmol/L      CO2 25.3 mmol/L      Calcium 8.3 mg/dL      BUN/Creatinine Ratio 10.5     Anion Gap 13.7 mmol/L      eGFR 56.8 mL/min/1.73     Narrative:      GFR Categories in Chronic Kidney Disease (CKD)              GFR Category          GFR (mL/min/1.73)    Interpretation  G1                    90 or greater        Normal or high (1)  G2                    60-89                Mild decrease (1)  G3a                   45-59                Mild to moderate decrease  G3b                   30-44                Moderate to severe decrease  G4                    15-29                Severe decrease  G5                    14 or less           Kidney failure    (1)In the absence of evidence of kidney disease, neither GFR category G1 or G2 fulfill the criteria for CKD.    eGFR calculation 2021 CKD-EPI creatinine equation, which  does not include race as a factor    aPTT [[id number]]  (Normal) Collected: 06/30/25 0828    Specimen: Blood from Arm, Right Updated: 06/30/25 0858     PTT 33.4 seconds     aPTT [[id number]]  (Abnormal) Collected: 06/30/25 0341    Specimen: Blood from Arm, Left Updated: 06/30/25 0415     PTT >200.0 seconds     aPTT [[id number]]  (Normal) Collected: 06/29/25 1549    Specimen: Blood from Arm, Left Updated: 06/29/25 1912     PTT 31.8 seconds     Protime-INR [[id number]]  (Abnormal) Collected: 06/29/25 1549    Specimen: Blood from Arm, Left Updated: 06/29/25 1912     Protime 16.9 Seconds      INR 1.37    High Sensitivity Troponin T 1Hr [[id number]]  (Abnormal) Collected: 06/29/25 1644    Specimen: Blood Updated: 06/29/25 1712     HS Troponin T 259 ng/L      Troponin T Numeric Delta -27 ng/L      Troponin T % Delta -9    Narrative:      High Sensitive Troponin T Reference Range:  <14.0 ng/L- Negative Female for AMI  <22.0 ng/L- Negative Male for AMI  >=14 - Abnormal Female indicating possible myocardial injury.  >=22 - Abnormal Male indicating possible myocardial injury.   Clinicians would have to utilize clinical acumen, EKG, Troponin, and serial changes to determine if it is an Acute Myocardial Infarction or myocardial injury due to an underlying chronic condition.         D-dimer, Quantitative [[id number]]  (Abnormal) Collected: 06/29/25 1549    Specimen: Blood from Arm, Left Updated: 06/29/25 1654     D-Dimer, Quantitative 10.68 MCGFEU/mL     Narrative:      According to the assay 's published package insert, a normal (<0.50 MCGFEU/mL) D-dimer result in conjunction with a non-high clinical probability assessment, excludes deep vein thrombosis (DVT) and pulmonary embolism (PE) with high sensitivity.    D-dimer values increase with age and this can make VTE exclusion of an older population difficult. To address this, the [employer], based on best available evidence and recent guidelines,  "recommends that clinicians use age-adjusted D-dimer thresholds in patients greater than 50 years of age with: a) a low probability of PE who do not meet all Pulmonary Embolism Rule Out Criteria, or b) in those with intermediate probability of PE.   The formula for an age-adjusted D-dimer cut-off is \"age/100\".  For example, a 60 year old patient would have an age-adjusted cut-off of 0.60 MCGFEU/mL and an 80 year old 0.80 MCGFEU/mL.    Urinalysis, Microscopic Only - Urine, Clean Catch [[id number]]  (Abnormal) Collected: 06/29/25 1618    Specimen: Urine, Clean Catch Updated: 06/29/25 1653     RBC, UA 3-5 /HPF      WBC, UA 6-10 /HPF      Bacteria, UA 1+ /HPF      Squamous Epithelial Cells, UA 0-2 /HPF      Hyaline Casts, UA None Seen /LPF      Methodology Manual Light Microscopy    Urinalysis With Microscopic If Indicated (No Culture) - Urine, Clean Catch [[id number]]  (Abnormal) Collected: 06/29/25 1618    Specimen: Urine, Clean Catch Updated: 06/29/25 1632     Color, UA Yellow     Appearance, UA Clear     pH, UA 8.5     Specific Gravity, UA 1.020     Glucose, UA Negative     Ketones, UA Negative     Bilirubin, UA Negative     Blood, UA Trace     Protein,  mg/dL (2+)     Leuk Esterase, UA Negative     Nitrite, UA Negative     Urobilinogen, UA 0.2 E.U./dL    Respiratory Panel PCR w/COVID-19(SARS-CoV-2) SARAH/ELENI/NEY/PAD/COR/LUCRETIA In-House, NP Swab in UTM/VTM, 2 HR TAT - Swab, Nasopharynx [[id number]]  (Normal) Collected: 06/29/25 1530    Specimen: Swab from Nasopharynx Updated: 06/29/25 1624     ADENOVIRUS, PCR Not Detected     Coronavirus 229E Not Detected     Coronavirus HKU1 Not Detected     Coronavirus NL63 Not Detected     Coronavirus OC43 Not Detected     COVID19 Not Detected     Human Metapneumovirus Not Detected     Human Rhinovirus/Enterovirus Not Detected     Influenza A PCR Not Detected     Influenza B PCR Not Detected     Parainfluenza Virus 1 Not Detected     Parainfluenza Virus 2 Not Detected     " Parainfluenza Virus 3 Not Detected     Parainfluenza Virus 4 Not Detected     RSV, PCR Not Detected     Bordetella pertussis pcr Not Detected     Bordetella parapertussis PCR Not Detected     Chlamydophila pneumoniae PCR Not Detected     Mycoplasma pneumo by PCR Not Detected    Narrative:      In the setting of a positive respiratory panel with a viral infection PLUS a negative procalcitonin without other underlying concern for bacterial infection, consider observing off antibiotics or discontinuation of antibiotics and continue supportive care. If the respiratory panel is positive for atypical bacterial infection (Bordetella pertussis, Chlamydophila pneumoniae, or Mycoplasma pneumoniae), consider antibiotic de-escalation to target atypical bacterial infection.    Extra Tubes [[id number]] Collected: 06/29/25 1549    Specimen: Blood from Arm, Left Updated: 06/29/25 1600    Narrative:      The following orders were created for panel order Extra Tubes.  Procedure                               Abnormality         Status                     ---------                               -----------         ------                     Green Top (Gel)[[id number]]                                  Final result               Light Blue Top[[id number]]                                   Final result                 Please view results for these tests on the individual orders.    Green Top (Gel) [[id number]] Collected: 06/29/25 1549    Specimen: Blood from Arm, Left Updated: 06/29/25 1600     Extra Tube Hold for add-ons.     Comment: Auto resulted.       Light Blue Top [[id number]] Collected: 06/29/25 1549    Specimen: Blood from Arm, Left Updated: 06/29/25 1600     Extra Tube Hold for add-ons.     Comment: Auto resulted       CBC & Differential [[id number]]  (Abnormal) Collected: 06/29/25 1529    Specimen: Blood from Arm, Left Updated: 06/29/25 1558    Narrative:      The following orders were created for panel order CBC &  Differential.  Procedure                               Abnormality         Status                     ---------                               -----------         ------                     CBC Auto Differential[[id number]]        Abnormal            Final result               Scan Slide[[id number]]                                                                    Please view results for these tests on the individual orders.    CBC Auto Differential [[id number]]  (Abnormal) Collected: 06/29/25 1549    Specimen: Blood from Arm, Left Updated: 06/29/25 1558     WBC 16.35 10*3/mm3      RBC 3.66 10*6/mm3      Hemoglobin 11.1 g/dL      Hematocrit 34.5 %      MCV 94.3 fL      MCH 30.3 pg      MCHC 32.2 g/dL      RDW 13.5 %      RDW-SD 45.6 fl      MPV 8.6 fL      Platelets 238 10*3/mm3      Neutrophil % 75.6 %      Lymphocyte % 10.8 %      Monocyte % 8.0 %      Eosinophil % 1.0 %      Basophil % 0.8 %      Immature Grans % 3.8 %      Neutrophils, Absolute 12.36 10*3/mm3      Lymphocytes, Absolute 1.77 10*3/mm3      Monocytes, Absolute 1.31 10*3/mm3      Eosinophils, Absolute 0.16 10*3/mm3      Basophils, Absolute 0.13 10*3/mm3      Immature Grans, Absolute 0.62 10*3/mm3      nRBC 0.0 /100 WBC     Comprehensive Metabolic Panel [[id number]]  (Abnormal) Collected: 06/29/25 1529    Specimen: Blood from Arm, Left Updated: 06/29/25 1558     Glucose 100 mg/dL      BUN 12.6 mg/dL      Creatinine 1.33 mg/dL      Sodium 140 mmol/L      Potassium 4.2 mmol/L      Comment: Specimen hemolyzed.  Result may be falsely elevated.        Chloride 104 mmol/L      CO2 24.8 mmol/L      Calcium 8.6 mg/dL      Total Protein 6.1 g/dL      Albumin 3.4 g/dL      ALT (SGPT) 11 U/L      AST (SGOT) 38 U/L      Comment: Specimen hemolyzed.  Result may be falsely elevated.        Alkaline Phosphatase 74 U/L      Total Bilirubin 0.5 mg/dL      Globulin 2.7 gm/dL      A/G Ratio 1.3 g/dL      BUN/Creatinine Ratio 9.5     Anion Gap 11.2 mmol/L      eGFR 55.7  mL/min/1.73     Narrative:      GFR Categories in Chronic Kidney Disease (CKD)              GFR Category          GFR (mL/min/1.73)    Interpretation  G1                    90 or greater        Normal or high (1)  G2                    60-89                Mild decrease (1)  G3a                   45-59                Mild to moderate decrease  G3b                   30-44                Moderate to severe decrease  G4                    15-29                Severe decrease  G5                    14 or less           Kidney failure    (1)In the absence of evidence of kidney disease, neither GFR category G1 or G2 fulfill the criteria for CKD.    eGFR calculation 2021 CKD-EPI creatinine equation, which does not include race as a factor    High Sensitivity Troponin T [[id number]]  (Abnormal) Collected: 06/29/25 1529    Specimen: Blood from Arm, Left Updated: 06/29/25 1558     HS Troponin T 286 ng/L     Narrative:      High Sensitive Troponin T Reference Range:  <14.0 ng/L- Negative Female for AMI  <22.0 ng/L- Negative Male for AMI  >=14 - Abnormal Female indicating possible myocardial injury.  >=22 - Abnormal Male indicating possible myocardial injury.   Clinicians would have to utilize clinical acumen, EKG, Troponin, and serial changes to determine if it is an Acute Myocardial Infarction or myocardial injury due to an underlying chronic condition.         BNP [[id number]]  (Abnormal) Collected: 06/29/25 1529    Specimen: Blood from Arm, Left Updated: 06/29/25 1556     proBNP 2,038.0 pg/mL     Narrative:      This assay is used as an aid in the diagnosis of individuals suspected of having heart failure. It can be used as an aid in the diagnosis of acute decompensated heart failure (ADHF) in patients presenting with signs and symptoms of ADHF to the emergency department (ED). In addition, NT-proBNP of <300 pg/mL indicates ADHF is not likely.    Age Range Result Interpretation  NT-proBNP Concentration (pg/mL:      <50              Positive            >450                   Gray                 300-450                    Negative             <300    50-75           Positive            >900                  Gray                300-900                  Negative            <300      >75             Positive            >1800                  Gray                300-1800                  Negative            <300    Blood Culture - Blood, Arm, Left [[id number]] Collected: 06/29/25 1549    Specimen: Blood from Arm, Left Updated: 06/29/25 1553    Blood Culture - Blood, Arm, Left [[id number]] Collected: 06/29/25 1529    Specimen: Blood from Arm, Left Updated: 06/29/25 1534             Imaging Results (Last 24 Hours)       Procedure Component Value Units Date/Time    CT Angiogram Chest Pulmonary Embolism [[id number]] Collected: 06/29/25 1811     Updated: 06/29/25 1841    Narrative:      CT ANGIOGRAM CHEST PULMONARY EMBOLISM    Date of Exam: 6/29/2025 5:41 PM EDT    Indication: Pulmonary Embolism. 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    Comparison: Chest radiograph performed the same date and 5/23/2025, chest CT 5/21/2025    Technique: Axial CT images were obtained of the chest after the uneventful intravenous administration of iodinated contrast utilizing pulmonary embolism protocol.  In addition, a 3-D volume rendered image was created for interpretation.  Sagittal and   coronal reconstructions were performed.  Automated exposure control and iterative reconstruction methods were used.      Findings:  There appears to be filling defect in right lower lobe segmental and subsegmental pulmonary arteries as seen on series 6 images  and coronal images . No other definite pulmonary artery filling defects are identified.    The heart appears enlarged, as before. There does not appear to be significant reflux of contrast into the IVC. The right ventricular diameter appears to be smaller than the left. There appears to be a small  pericardial effusion. There is calcific   atherosclerosis of the coronary arteries. There is tortuosity of the thoracic aorta. No definite acute aortic abnormality. Right arm catheter terminates in the SVC.    There is increased distention of the upper esophagus containing heterogeneous material. This could predispose to aspiration. There appears to be wall thickening of the distal esophagus and there is suspicion for some hyperenhancement of the mucosa which   could indicate esophagitis.    There is a large left pleural effusion, increased compared to the prior exam. There is near complete consolidation of the left lower lobe with minimal aeration of the upper lobe. There is diffuse septal thickening and prominent interstitial markings of   the left lung, similar to the prior exam. There is some mild consolidation in the lingula.    Small right pleural effusion with some loculated fluid along the fissures. Pleural effusion appears slightly increased compared to the prior exam.    Consolidative densities in the posterior-inferior right lung appears similar to the prior chest CT. There is also diffuse septal thickening in the right lung, primarily in the right lower lobe. This does appear to have mildly increased compared to the   prior CT. There is emphysema and central bronchial wall thickening. No pneumothorax.    There is mediastinal lymphadenopathy, as before. Lymph node superior to the aortic arch measures 2.3 x 1.9 cm on image 41 of series 6, previously 2.5 x 1.9 cm. Other lymph nodes also appear to be grossly similar to the prior exam. There are prominent   left axillary lymph nodes, as before. There also appear to be prominent right epicardial lymph nodes. There are prominent gastrohepatic and aortocaval lymph nodes in the visualized upper abdomen. Calcified gallstones are seen. There is a small amount of   pericholecystic fluid. Probable small cyst in the posterior spleen. There is anasarca.    Degenerative  changes in the thoracic spine. No visualized aggressive osseous lesion is identified.      Impression:      Impression:  1.Right lower lobe segmental and subsegmental pulmonary emboli. No definite findings of right heart strain.  2.Large left pleural effusion, increased compared to the prior exam. Small right pleural effusion, slightly increased compared to the prior exam.  3.Near complete consolidation of the left lower lobe with minimal aeration of the upper lobe, which could be related to atelectasis and/or pneumonia. Consolidative densities in the posterior-inferior right lung appear similar to the prior exam.  4.Diffuse septal thickening and prominent interstitial markings in the lungs, left greater than right, which could be related to lymphangitic spread of malignancy and/or pulmonary edema.  5.Increased distention of the upper esophagus containing heterogeneous material, which could predispose to aspiration. There is wall thickening of the distal esophagus with hyperenhancement of the mucosa which could indicate esophagitis.  6.Cardiomegaly with small pericardial effusion. Body wall edema which could be related to heart failure.  7.Cholelithiasis with small amount of pericholecystic fluid. This could be related to heart failure, but cholecystitis cannot be excluded by imaging. Correlate with clinical findings and lab values.  8.Mediastinal, left axillary, and upper abdominal lymphadenopathy, as before consistent with metastatic disease.    Results were called to the ordering provider by Dr. Yañez at 6/29/2025 6:39 PM EDT.        Electronically Signed: Carlos Yañez    6/29/2025 6:39 PM EDT    Workstation ID: [id number]    XR Chest 1 View [[id number]] Collected: 06/29/25 1603     Updated: 06/29/25 1610    Narrative:      XR CHEST 1 VW    Date of Exam: 6/29/2025 3:34 PM EDT    Indication: dyspnea    Comparison: 5/23/2025    Findings:  There is moderate cardiomegaly with prominent interstitial markings and bibasilar  airspace disease favored to be due to combination of interstitial and alveolar edema. There are bilateral pleural effusions left larger than right. Right-sided PICC line is   now looped in the distal right internal jugular vein with the tip still projecting over the superior vena cava.      Impression:      Impression:    1. Cardiomegaly with new bilateral pleural effusions and increasing interstitial and alveolar edema.      Electronically Signed: Aftab Blakely MD    6/29/2025 4:08 PM EDT    Workstation ID: [id number]                 I reviewed the patient's new clinical results.    Medication Review:   Scheduled Meds:ampicillin-sulbactam, 3 g, Intravenous, Q6H  hydrocortisone sodium succinate, 50 mg, Intravenous, Q8H  metoprolol tartrate, 2.5 mg, Intravenous, Q8H  pantoprazole, 40 mg, Intravenous, Q AM  sodium chloride, 10 mL, Intravenous, Q12H      Continuous Infusions:heparin, 18 Units/kg/hr, Last Rate: 18 Units/kg/hr (06/30/25 0949)  Pharmacy To Dose:,       PRN Meds:.  senna-docusate sodium **AND** polyethylene glycol **AND** bisacodyl **AND** bisacodyl    heparin    heparin    hydrALAZINE    HYDROmorphone    ipratropium-albuterol    nitroglycerin    ondansetron ODT **OR** ondansetron    Pharmacy To Dose:    [COMPLETED] Insert Peripheral IV **AND** sodium chloride    sodium chloride    sodium chloride     Assessment & Plan       Pulmonary embolism    Pleural effusion    Stage IV adenocarcinoma of lung, right    Stage IV adenocarcinoma of lung, left    CKD (chronic kidney disease) stage 4, GFR 15-29 ml/min    Paroxysmal atrial fibrillation    Chronic anticoagulation    Hypoxia    Hypothyroidism (acquired)    Dysphagia    Pneumonia, unspecified organism    Elevated brain natriuretic peptide (BNP) level    Bilateral leg edema    Food impaction of esophagus    -Continue heparin drip for pulmonary embolism.  Bilateral lower extremity venous Doppler pending.  -EGD today for esophageal dysphagia.  Recent EGD  reviewed.  -CT surgery and pulmonary services following for recurrent left pleural effusion.  May need Pleurx catheter if this is proven to be malignant effusion.  - Unasyn for pneumonia  -Replacing potassium      CODE Status:    Code status (Patient has no pulse and is not breathing):  CPR (Attempt to Resuscitate)  Medical Interventions (Patient has pulse or is breathing):  Full support  Level of support discussed with:  Patient    Admission status:  I believe this patient meets inpatient status  Expected length of stay:  2 midnights or greater  I discussed the patient's findings and my recommendations with the patient.    Plan for disposition: To be determined    Evelyn Felder MD  06/30/25  11:52 EDT

## 2025-06-30 NOTE — CONSULTS
Hematology/Oncology Inpatient Consultation    Patient name: Young Ames  : 1950  MRN: [id number]  Referring Provider: Laura HENRY  Reason for Consultation: Lung Cancer , pulmonary embolism , failed Eliquis     Chief complaint: Shortness of breath leg swelling dysphagia    History of present illness:    Young Ames is a 74 y.o. 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  CTA chest revealed right lower lobe pulmonary embolism without evidence of heart strain, left lower lobe consolidation, esophageal distention with material and wall thickening and troponins were elevated.  Started on a heparin drip.  He was also started on Unasyn and azithromycin with blood cultures pending.  Given Lasix for pleural effusion.  GI, pulmonary as well as hematology oncology consulted.    25  Hematology/Oncology was consulted for pulmonary embolism failed Eliquis.  He is an established patient in  our office with Dr Tim.  For lung cancer currently on immunotherapy and chemotherapy. He is status post radiation treatment.  Most recent dose of Doxetaxel, pembrolizumab and ramucirumab received on 2025.  Labs at time of consultation showed WBC 16.35 hemoglobin 11.1 platelets 238,000, ANC 12.36, showed 1+ bacteria, BNP 2038, troponin 286, creatinine 1.33, blood cultures pending.    He/She  has a past medical history of Abnormal ECG (2024), Allergic rhinitis, Arrhythmia (2024), Asthma (2024), Atrial fibrillation (2024), Coronary artery disease (2024), GERD (gastroesophageal reflux disease), Hyperlipidemia, Lung cancer (2024), Myocardial infarction  (01/29/2024), Pleural effusion (2024), and Pneumonia (05/2025).    PCP: Abner Funes APRN    History:  Past Medical History:   Diagnosis Date    Abnormal ECG 01/29/2024    Allergic rhinitis     Arrhythmia 08/14/2024    SVT, then afib after thoracentesis    Asthma 06/01/2024    Shortness of breath with exertion    Atrial fibrillation 08/14/2024    After 2L drained per thoracentesis    Coronary artery disease 01/29/2024    Minimal blockage per cath    GERD (gastroesophageal reflux disease)     Hyperlipidemia     Lung cancer 08/2024    Myocardial infarction 01/29/2024    Cath, no intervention    Pleural effusion 2024    Pneumonia 05/2025   ,   Past Surgical History:   Procedure Laterality Date    BRONCHOSCOPY N/A 08/13/2024    Procedure: BRONCHOSCOPY WITH BRONCHIAL WASHING AND BRONCHIAL BRUSHING;  Surgeon: Kelvin Gonzalez MD;  Location: [facility] ENDOSCOPY;  Service: Pulmonary;  Laterality: N/A;    BRONCHOSCOPY N/A 05/23/2025    Procedure: BRONCHOSCOPY with bronchoalveolar lavage and biopsy x 1 area;  Surgeon: Jason Dozier MD;  Location: [facility] ENDOSCOPY;  Service: Pulmonary;  Laterality: N/A;  post op: lung mass    BRONCHOSCOPY      BRONCHOSCOPY WITH ION ROBOTIC ASSIST N/A 08/16/2024    Procedure: BRONCHOSCOPY WITH ION ROBOT WITH CRYO BIOPSY;  Surgeon: Kelvin Gonzalez MD;  Location: [facility] ENDOSCOPY;  Service: Robotics - Pulmonary;  Laterality: N/A;    CARDIAC CATHETERIZATION Right 01/29/2024    Procedure: Coronary angiography;  Surgeon: Abran Chand MD;  Location: [facility] CATH INVASIVE LOCATION;  Service: Cardiovascular;  Laterality: Right;    CARDIAC CATHETERIZATION N/A 01/29/2024    Procedure: Left Heart Cath;  Surgeon: Abran Chand MD;  Location: [facility] CATH INVASIVE LOCATION;  Service: Cardiovascular;  Laterality: N/A;    ENDOSCOPY N/A 05/23/2025    Procedure: ESOPHAGOGASTRODUODENOSCOPY with biopsy x 1 area and dilatation (48FR non-guided bougie);  Surgeon: Carlos Calero MD;  Location: [facility]  ENDOSCOPY;  Service: Gastroenterology;  Laterality: N/A;  post op: esophageal stricture    LUNG BIOPSY     ,   Family History   Problem Relation Age of Onset    Dementia Mother     Diabetes Mother     Arrhythmia Mother         Afib    Breast cancer Sister 74    Cancer Sister         Breast cancer   ,   Social History     Tobacco Use    Smoking status: Former     Current packs/day: 0.00     Average packs/day: 1 pack/day for 32.0 years (32.0 ttl pk-yrs)     Types: Cigarettes     Start date:      Quit date:      Years since quittin.5     Passive exposure: Past    Smokeless tobacco: Never   Vaping Use    Vaping status: Never Used   Substance Use Topics    Alcohol use: Yes     Alcohol/week: 1.0 standard drink of alcohol     Types: 1 Cans of beer per week    Drug use: Never   ,   Medications Prior to Admission   Medication Sig Dispense Refill Last Dose/Taking    acetaminophen (TYLENOL) 325 MG tablet Take 2 tablets by mouth Every 4 (Four) Hours As Needed for Mild Pain.   Patient Taking Differently    apixaban (ELIQUIS) 5 MG tablet tablet Take 1 tablet by mouth Every 12 (Twelve) Hours. Indications: Atrial Fibrillation 60 tablet 0 2025 Morning    Budeson-Glycopyrrol-Formoterol (Breztri Aerosphere) 160-9-4.8 MCG/ACT aerosol inhaler Inhale 2 puffs 2 (Two) Times a Day. 1 each 0 2025 Morning    Cholecalciferol (VITAMIN D-3 PO) Take 1 tablet by mouth Daily. Indications: Vitamin Deficiency   Past Week Evening    dexAMETHasone (DECADRON) 4 MG tablet Take 2 tablets oral twice a day for 3 consecutive days beginning the day before chemotherapy and continue for 6 doses. 12 tablet 5 Past Month Morning    guaiFENesin (Mucinex) 600 MG 12 hr tablet Take 1 tablet by mouth 2 (Two) Times a Day. Indications: Cough   Past Month Evening    hydrocortisone (CORTEF) 20 MG tablet Take 1 tablet by mouth 2 (Two) Times a Day for 90 days. 60 tablet 2 Past Week    ipratropium-albuterol (DUO-NEB) 0.5-2.5 mg/3 ml nebulizer Take 3 mL  by nebulization Every 6 (Six) Hours As Needed for Wheezing.   6/29/2025 Morning    levothyroxine (SYNTHROID, LEVOTHROID) 75 MCG tablet TAKE ONE TABLET BY MOUTH EVERY MORNING 30 tablet 1 Past Week    loratadine (Claritin) 10 MG tablet Take 1 tablet by mouth Daily.   Patient Taking Differently    midodrine (PROAMATINE) 5 MG tablet Take 1 tablet by mouth 3 (Three) Times a Day As Needed.   Past Week    NON FORMULARY Take 2 tablets by mouth Every Night. Zzzquil  Indications: Sleep Disorder   Past Week Bedtime    nystatin susp + lidocaine viscous (MAGIC MOUTHWASH) oral suspension Swish and spit 5 mL 4 (Four) Times a Day As Needed (MUCOSITIS). 100 mL 2 Patient Taking Differently    ondansetron (ZOFRAN) 8 MG tablet Take 1 tablet by mouth 3 (Three) Times a Day As Needed for Nausea or Vomiting. 30 tablet 5 Past Month    pantoprazole (PROTONIX) 40 MG EC tablet Take 1 tablet by mouth Daily.   Past Week Morning    simvastatin (ZOCOR) 20 MG tablet Take 1 tablet by mouth Every Night. Indications: High Amount of Fats in the Blood   Past Week Bedtime    sodium bicarbonate 650 MG tablet Take 2 tablets by mouth 3 (Three) Times a Day. 180 tablet 0 Past Week Evening    Sodium Chloride, PF, 0.9 % injection Infuse 10 mL into a venous catheter Daily. 300 mL 2 6/29/2025 Morning    sotalol (BETAPACE) 80 MG tablet Take 1 tablet by mouth 2 (Two) Times a Day.   Past Week Evening    tamsulosin (FLOMAX) 0.4 MG capsule 24 hr capsule Take 1 capsule by mouth Every Night. Indications: Benign Enlargement of Prostate   Past Week Evening    multivitamin with minerals (Centrum Silver 50+Men) tablet tablet Take 1 tablet by mouth Every Morning. Indications: Vitamin Deficiency       thiamine (VITAMIN B-1) 100 MG tablet  tablet Take 1 tablet by mouth Daily. Indications: Vitamin Deficiency      , Scheduled Meds:  ampicillin-sulbactam, 3 g, Intravenous, Q6H  hydrocortisone sodium succinate, 50 mg, Intravenous, Q8H  metoprolol tartrate, 2.5 mg, Intravenous,  "Q8H  pantoprazole, 40 mg, Intravenous, Q AM  sodium chloride, 10 mL, Intravenous, Q12H    , Continuous Infusions:  heparin, 18 Units/kg/hr, Last Rate: 18 Units/kg/hr (06/30/25 0949)  Pharmacy To Dose:,     , PRN Meds:    senna-docusate sodium **AND** polyethylene glycol **AND** bisacodyl **AND** bisacodyl    heparin    heparin    hydrALAZINE    HYDROmorphone    ipratropium-albuterol    nitroglycerin    ondansetron ODT **OR** ondansetron    Pharmacy To Dose:    [COMPLETED] Insert Peripheral IV **AND** sodium chloride    sodium chloride    sodium chloride   Allergies:  Patient has no known allergies.    Subjective     ROS:  Review of Systems     Objective   Vital Signs:   /99 (BP Location: Left arm, Patient Position: Lying)   Pulse 85   Temp 98 °F (36.7 °C) (Oral)   Resp 17   Ht 180.3 cm (71\")   Wt 74.8 kg (164 lb 12.8 oz)   SpO2 94%   BMI 22.98 kg/m²     Physical Exam: (performed by MD)  Physical Exam    Results Review:  Lab Results (last 48 hours)       Procedure Component Value Units Date/Time    aPTT [[id number]]  (Normal) Collected: 06/30/25 0828    Specimen: Blood from Arm, Right Updated: 06/30/25 0858     PTT 33.4 seconds     Basic Metabolic Panel [[id number]] Collected: 06/30/25 0828    Specimen: Blood from Arm, Right Updated: 06/30/25 0841    aPTT [[id number]]  (Abnormal) Collected: 06/30/25 0341    Specimen: Blood from Arm, Left Updated: 06/30/25 0415     PTT >200.0 seconds     aPTT [[id number]]  (Normal) Collected: 06/29/25 1549    Specimen: Blood from Arm, Left Updated: 06/29/25 1912     PTT 31.8 seconds     Protime-INR [[id number]]  (Abnormal) Collected: 06/29/25 1549    Specimen: Blood from Arm, Left Updated: 06/29/25 1912     Protime 16.9 Seconds      INR 1.37    High Sensitivity Troponin T 1Hr [[id number]]  (Abnormal) Collected: 06/29/25 1644    Specimen: Blood Updated: 06/29/25 1712     HS Troponin T 259 ng/L      Troponin T Numeric Delta -27 ng/L      Troponin T % Delta -9    Narrative:      " "High Sensitive Troponin T Reference Range:  <14.0 ng/L- Negative Female for AMI  <22.0 ng/L- Negative Male for AMI  >=14 - Abnormal Female indicating possible myocardial injury.  >=22 - Abnormal Male indicating possible myocardial injury.   Clinicians would have to utilize clinical acumen, EKG, Troponin, and serial changes to determine if it is an Acute Myocardial Infarction or myocardial injury due to an underlying chronic condition.         D-dimer, Quantitative [[id number]]  (Abnormal) Collected: 06/29/25 1549    Specimen: Blood from Arm, Left Updated: 06/29/25 1654     D-Dimer, Quantitative 10.68 MCGFEU/mL     Narrative:      According to the assay 's published package insert, a normal (<0.50 MCGFEU/mL) D-dimer result in conjunction with a non-high clinical probability assessment, excludes deep vein thrombosis (DVT) and pulmonary embolism (PE) with high sensitivity.    D-dimer values increase with age and this can make VTE exclusion of an older population difficult. To address this, the [employer], based on best available evidence and recent guidelines, recommends that clinicians use age-adjusted D-dimer thresholds in patients greater than 50 years of age with: a) a low probability of PE who do not meet all Pulmonary Embolism Rule Out Criteria, or b) in those with intermediate probability of PE.   The formula for an age-adjusted D-dimer cut-off is \"age/100\".  For example, a 60 year old patient would have an age-adjusted cut-off of 0.60 MCGFEU/mL and an 80 year old 0.80 MCGFEU/mL.    Urinalysis, Microscopic Only - Urine, Clean Catch [[id number]]  (Abnormal) Collected: 06/29/25 1618    Specimen: Urine, Clean Catch Updated: 06/29/25 1653     RBC, UA 3-5 /HPF      WBC, UA 6-10 /HPF      Bacteria, UA 1+ /HPF      Squamous Epithelial Cells, UA 0-2 /HPF      Hyaline Casts, UA None Seen /LPF      Methodology Manual Light Microscopy    Urinalysis With Microscopic If Indicated (No Culture) - " Urine, Clean Catch [[id number]]  (Abnormal) Collected: 06/29/25 1618    Specimen: Urine, Clean Catch Updated: 06/29/25 1632     Color, UA Yellow     Appearance, UA Clear     pH, UA 8.5     Specific Gravity, UA 1.020     Glucose, UA Negative     Ketones, UA Negative     Bilirubin, UA Negative     Blood, UA Trace     Protein,  mg/dL (2+)     Leuk Esterase, UA Negative     Nitrite, UA Negative     Urobilinogen, UA 0.2 E.U./dL    Respiratory Panel PCR w/COVID-19(SARS-CoV-2) SARAH/ELENI/NEY/PAD/COR/LUCRETIA In-House, NP Swab in UTM/VTM, 2 HR TAT - Swab, Nasopharynx [[id number]]  (Normal) Collected: 06/29/25 1530    Specimen: Swab from Nasopharynx Updated: 06/29/25 1624     ADENOVIRUS, PCR Not Detected     Coronavirus 229E Not Detected     Coronavirus HKU1 Not Detected     Coronavirus NL63 Not Detected     Coronavirus OC43 Not Detected     COVID19 Not Detected     Human Metapneumovirus Not Detected     Human Rhinovirus/Enterovirus Not Detected     Influenza A PCR Not Detected     Influenza B PCR Not Detected     Parainfluenza Virus 1 Not Detected     Parainfluenza Virus 2 Not Detected     Parainfluenza Virus 3 Not Detected     Parainfluenza Virus 4 Not Detected     RSV, PCR Not Detected     Bordetella pertussis pcr Not Detected     Bordetella parapertussis PCR Not Detected     Chlamydophila pneumoniae PCR Not Detected     Mycoplasma pneumo by PCR Not Detected    Narrative:      In the setting of a positive respiratory panel with a viral infection PLUS a negative procalcitonin without other underlying concern for bacterial infection, consider observing off antibiotics or discontinuation of antibiotics and continue supportive care. If the respiratory panel is positive for atypical bacterial infection (Bordetella pertussis, Chlamydophila pneumoniae, or Mycoplasma pneumoniae), consider antibiotic de-escalation to target atypical bacterial infection.    Extra Tubes [[id number]] Collected: 06/29/25 1549    Specimen: Blood from Arm,  Left Updated: 06/29/25 1600    Narrative:      The following orders were created for panel order Extra Tubes.  Procedure                               Abnormality         Status                     ---------                               -----------         ------                     Green Top (Gel)[[id number]]                                  Final result               Light Blue Top[[id number]]                                   Final result                 Please view results for these tests on the individual orders.    Green Top (Gel) [[id number]] Collected: 06/29/25 1549    Specimen: Blood from Arm, Left Updated: 06/29/25 1600     Extra Tube Hold for add-ons.     Comment: Auto resulted.       Light Blue Top [[id number]] Collected: 06/29/25 1549    Specimen: Blood from Arm, Left Updated: 06/29/25 1600     Extra Tube Hold for add-ons.     Comment: Auto resulted       CBC & Differential [[id number]]  (Abnormal) Collected: 06/29/25 1529    Specimen: Blood from Arm, Left Updated: 06/29/25 1558    Narrative:      The following orders were created for panel order CBC & Differential.  Procedure                               Abnormality         Status                     ---------                               -----------         ------                     CBC Auto Differential[[id number]]        Abnormal            Final result               Scan Slide[[id number]]                                                                    Please view results for these tests on the individual orders.    CBC Auto Differential [[id number]]  (Abnormal) Collected: 06/29/25 1549    Specimen: Blood from Arm, Left Updated: 06/29/25 1558     WBC 16.35 10*3/mm3      RBC 3.66 10*6/mm3      Hemoglobin 11.1 g/dL      Hematocrit 34.5 %      MCV 94.3 fL      MCH 30.3 pg      MCHC 32.2 g/dL      RDW 13.5 %      RDW-SD 45.6 fl      MPV 8.6 fL      Platelets 238 10*3/mm3      Neutrophil % 75.6 %      Lymphocyte % 10.8 %      Monocyte % 8.0 %       Eosinophil % 1.0 %      Basophil % 0.8 %      Immature Grans % 3.8 %      Neutrophils, Absolute 12.36 10*3/mm3      Lymphocytes, Absolute 1.77 10*3/mm3      Monocytes, Absolute 1.31 10*3/mm3      Eosinophils, Absolute 0.16 10*3/mm3      Basophils, Absolute 0.13 10*3/mm3      Immature Grans, Absolute 0.62 10*3/mm3      nRBC 0.0 /100 WBC     Comprehensive Metabolic Panel [[id number]]  (Abnormal) Collected: 06/29/25 1529    Specimen: Blood from Arm, Left Updated: 06/29/25 1558     Glucose 100 mg/dL      BUN 12.6 mg/dL      Creatinine 1.33 mg/dL      Sodium 140 mmol/L      Potassium 4.2 mmol/L      Comment: Specimen hemolyzed.  Result may be falsely elevated.        Chloride 104 mmol/L      CO2 24.8 mmol/L      Calcium 8.6 mg/dL      Total Protein 6.1 g/dL      Albumin 3.4 g/dL      ALT (SGPT) 11 U/L      AST (SGOT) 38 U/L      Comment: Specimen hemolyzed.  Result may be falsely elevated.        Alkaline Phosphatase 74 U/L      Total Bilirubin 0.5 mg/dL      Globulin 2.7 gm/dL      A/G Ratio 1.3 g/dL      BUN/Creatinine Ratio 9.5     Anion Gap 11.2 mmol/L      eGFR 55.7 mL/min/1.73     Narrative:      GFR Categories in Chronic Kidney Disease (CKD)              GFR Category          GFR (mL/min/1.73)    Interpretation  G1                    90 or greater        Normal or high (1)  G2                    60-89                Mild decrease (1)  G3a                   45-59                Mild to moderate decrease  G3b                   30-44                Moderate to severe decrease  G4                    15-29                Severe decrease  G5                    14 or less           Kidney failure    (1)In the absence of evidence of kidney disease, neither GFR category G1 or G2 fulfill the criteria for CKD.    eGFR calculation 2021 CKD-EPI creatinine equation, which does not include race as a factor    High Sensitivity Troponin T [[id number]]  (Abnormal) Collected: 06/29/25 1529    Specimen: Blood from Arm, Left Updated:  06/29/25 1558     HS Troponin T 286 ng/L     Narrative:      High Sensitive Troponin T Reference Range:  <14.0 ng/L- Negative Female for AMI  <22.0 ng/L- Negative Male for AMI  >=14 - Abnormal Female indicating possible myocardial injury.  >=22 - Abnormal Male indicating possible myocardial injury.   Clinicians would have to utilize clinical acumen, EKG, Troponin, and serial changes to determine if it is an Acute Myocardial Infarction or myocardial injury due to an underlying chronic condition.         BNP [[id number]]  (Abnormal) Collected: 06/29/25 1529    Specimen: Blood from Arm, Left Updated: 06/29/25 1556     proBNP 2,038.0 pg/mL     Narrative:      This assay is used as an aid in the diagnosis of individuals suspected of having heart failure. It can be used as an aid in the diagnosis of acute decompensated heart failure (ADHF) in patients presenting with signs and symptoms of ADHF to the emergency department (ED). In addition, NT-proBNP of <300 pg/mL indicates ADHF is not likely.    Age Range Result Interpretation  NT-proBNP Concentration (pg/mL:      <50             Positive            >450                   Gray                 300-450                    Negative             <300    50-75           Positive            >900                  Gray                300-900                  Negative            <300      >75             Positive            >1800                  Gray                300-1800                  Negative            <300    Blood Culture - Blood, Arm, Left [[id number]] Collected: 06/29/25 1549    Specimen: Blood from Arm, Left Updated: 06/29/25 1553    Blood Culture - Blood, Arm, Left [[id number]] Collected: 06/29/25 1529    Specimen: Blood from Arm, Left Updated: 06/29/25 1534             Pending Results:     Imaging Reviewed:   CT Angiogram Chest Pulmonary Embolism  Result Date: 6/29/2025  Impression: 1.Right lower lobe segmental and subsegmental pulmonary emboli. No definite findings of  right heart strain. 2.Large left pleural effusion, increased compared to the prior exam. Small right pleural effusion, slightly increased compared to the prior exam. 3.Near complete consolidation of the left lower lobe with minimal aeration of the upper lobe, which could be related to atelectasis and/or pneumonia. Consolidative densities in the posterior-inferior right lung appear similar to the prior exam. 4.Diffuse septal thickening and prominent interstitial markings in the lungs, left greater than right, which could be related to lymphangitic spread of malignancy and/or pulmonary edema. 5.Increased distention of the upper esophagus containing heterogeneous material, which could predispose to aspiration. There is wall thickening of the distal esophagus with hyperenhancement of the mucosa which could indicate esophagitis. 6.Cardiomegaly with small pericardial effusion. Body wall edema which could be related to heart failure. 7.Cholelithiasis with small amount of pericholecystic fluid. This could be related to heart failure, but cholecystitis cannot be excluded by imaging. Correlate with clinical findings and lab values. 8.Mediastinal, left axillary, and upper abdominal lymphadenopathy, as before consistent with metastatic disease. Results were called to the ordering provider by Dr. Yañez at 6/29/2025 6:39 PM EDT. Electronically Signed: Carlos Yañez  6/29/2025 6:39 PM EDT  Workstation ID: [id number]    XR Chest 1 View  Result Date: 6/29/2025  Impression: 1. Cardiomegaly with new bilateral pleural effusions and increasing interstitial and alveolar edema. Electronically Signed: Aftab Blakely MD  6/29/2025 4:08 PM EDT  Workstation ID: [id number]           Assessment & Plan   ASSESSMENT  Pulmonary embolism without heart strain , was on Eliquis 5mg po BID for atrial fibrillation , now on Heparin drip , will need further anticoagulation on discharge pending further evaluation and discussion with patient and family    Adenocarcinoma right lung completed concurrent radiation and chemotherapy started on pembrolizumab in November 2024 evidence of progression May 2025 now on pembrolizumab, docetaxel/ramucirumab , ongoing management   Leukocytosis , likely secondary to possible pneumonia per CT/UTI , immunosuppressed state.On Unasyn per primary team, pulmonology following   Dysphagia- gastroenterology following   Elevated troponin/ CHF per primary team/cardiology     PLAN  As above     Electronically signed by CARL Pandya, 06/30/25, 9:01 AM EDT.  Above note was prepared for Dr. Arben Tim -physical exam and review of systems were performed by physician.     6/30/2025: Mr. Ames has been a patient of mine since the mid part of 2024, when he was identified as having metastatic adenocarcinoma of the lung.  He was initially treated with a combination of chemotherapy and immunotherapy.  He had an initial good response but earlier this year showed signs of progression.  His treatment was transitioned to a taxane with ramucirumab and received the first dose a few days before this admission.  He presented to the hospital complaining again of severe dysphagia, as he is known to have esophageal stenosis, but also and more importantly of dyspnea.  He has been found to have segmental and subsegmental pulmonary emboli in the right lower lobe.  In addition the left pleural effusion, which has been a chronic problem, seemed to have increased.  Signs of esophageal obstruction were as well present on the scans.  This morning he feels better.  He has been able to sleep.  He has not attempted to swallow much.  He tells me he has been swallowing some of his medications.  The anticoagulant that he had been taking before, he reports, he had been taking without fail.  On exam he is an ill-appearing man who does not seem in acute distress.  He is oriented and conversant.  No jaundice.  The lungs are diminished bilaterally but  particularly on the left, where in the lower segments, there are no breath sounds.  The heart is tachycardic and regular.  Abdomen is soft.  There is no edema.  Laboratory exams reviewed.  Reviewed the imaging studies.  Probably will need a thoracentesis.  Right now he is on heparin and we will need to transition to a different oral anticoagulant as, it is not clear to me, whether the apixaban did not prevent the formation of new emboli because of his inability to take the medication and swallow it or because of real failure of the medication.  Nevertheless for now I will keep him on the heparin to allow for upper endoscopy of the esophagus.  It is early to tell if the present chemotherapy regimen will result in response.  For now I will continue with the same.  I reviewed and discussed the record with CARL Murillo.  I concur with her note.  I formulated the analysis and the plans.    Arben Tim MD on 6/30/2025 at 1734.

## 2025-06-30 NOTE — ANESTHESIA PREPROCEDURE EVALUATION
Anesthesia Evaluation     Patient summary reviewed and Nursing notes reviewed   no history of anesthetic complications:   NPO Solid Status: > 8 hours  NPO Liquid Status: > 8 hours           Airway   Mallampati: II  TM distance: >3 FB  Neck ROM: full  No difficulty expected  Dental - normal exam     Pulmonary - normal exam   (+) pleural effusion, pulmonary embolism, lung cancer, asthma,  Cardiovascular - normal exam    ECG reviewed    (+) past MI , CAD, dysrhythmias, hyperlipidemia      Neuro/Psych  GI/Hepatic/Renal/Endo    (+) GERD, renal disease-, thyroid problem hypothyroidism    Musculoskeletal     Abdominal    Substance History      OB/GYN          Other      history of cancer    ROS/Med Hx Other: ·  Left ventricular ejection fraction appears to be 66 - 70%.  ·  Left ventricular diastolic function was indeterminate.  ·  The left atrial cavity is mildly dilated.  ·  Left atrial volume is mildly increased.  ·  Estimated right ventricular systolic pressure from tricuspid regurgitation is mildly elevated (35-45 mmHg).  ·  Moderate pulmonary hypertension is present.                Anesthesia Plan    ASA 3     general   total IV anesthesia  intravenous induction     Anesthetic plan, risks, benefits, and alternatives have been provided, discussed and informed consent has been obtained with: patient.    Plan discussed with CRNA.    CODE STATUS:    Code Status (Patient has no pulse and is not breathing): CPR (Attempt to Resuscitate)  Medical Interventions (Patient has pulse or is breathing): Full Support  Level Of Support Discussed With: Patient

## 2025-06-30 NOTE — CASE MANAGEMENT/SOCIAL WORK
Continued Stay Note  KATIE Marrero     Patient Name: Young Ames  MRN: [id number]  Today's Date: 6/30/2025    Admit Date: 6/29/2025    Plan: Needs full CM assessment- pt off unit   Discharge Plan       Row Name 06/30/25 1704       Plan    Plan Needs full CM assessment- pt off unit    Plan Comments DC barriers: heparin gtt, EGD today, IR for PICC replacement due to migration, IV steroids, pain control, pleurx cath pending placement tomorrow.                     Nickie Wick RN     Office phone: [phone]  Office fax: [phone]

## 2025-06-30 NOTE — PLAN OF CARE
Problem: Adult Inpatient Plan of Care  Goal: Plan of Care Review  Outcome: Progressing  Flowsheets (Taken 6/30/2025 0710)  Progress: no change  Outcome Evaluation: Pt is resting in bed with call light in reach. Able to make needs known. Pt has no questions or concerns at this time.  Plan of Care Reviewed With: patient  Goal: Patient-Specific Goal (Individualized)  Outcome: Progressing  Goal: Absence of Hospital-Acquired Illness or Injury  Outcome: Progressing  Intervention: Identify and Manage Fall Risk  Recent Flowsheet Documentation  Taken 6/30/2025 0600 by Keren Hernández RN  Safety Promotion/Fall Prevention: safety round/check completed  Taken 6/30/2025 0425 by Keren Hernández RN  Safety Promotion/Fall Prevention: safety round/check completed  Taken 6/30/2025 0200 by Keren Hernández RN  Safety Promotion/Fall Prevention: safety round/check completed  Taken 6/30/2025 0041 by Keren eHrnández RN  Safety Promotion/Fall Prevention: safety round/check completed  Taken 6/29/2025 2200 by Keren Hernández RN  Safety Promotion/Fall Prevention: safety round/check completed  Taken 6/29/2025 2009 by Keren Hernández RN  Safety Promotion/Fall Prevention: safety round/check completed  Intervention: Prevent Skin Injury  Recent Flowsheet Documentation  Taken 6/30/2025 0041 by Keren Hernández RN  Body Position: position changed independently  Taken 6/29/2025 2009 by Keren Hernández RN  Body Position:   position changed independently   supine  Intervention: Prevent and Manage VTE (Venous Thromboembolism) Risk  Recent Flowsheet Documentation  Taken 6/29/2025 2009 by Keren Hernández RN  VTE Prevention/Management: (heparin)   bilateral   SCDs (sequential compression devices) off   other (see comments)  Intervention: Prevent Infection  Recent Flowsheet Documentation  Taken 6/29/2025 2009 by Keren Hernández RN  Infection Prevention: single patient room  provided  Goal: Optimal Comfort and Wellbeing  Outcome: Progressing  Intervention: Provide Person-Centered Care  Recent Flowsheet Documentation  Taken 6/29/2025 2009 by Keren Hernández, RN  Trust Relationship/Rapport:   care explained   choices provided  Goal: Readiness for Transition of Care  Outcome: Progressing  Intervention: Mutually Develop Transition Plan  Recent Flowsheet Documentation  Taken 6/29/2025 2031 by Keren Hernández, RN  Equipment Currently Used at Home: (Rogers Catheter)   nebulizer   oxygen   other (see comments)  Transportation Anticipated: family or friend will provide  Patient/Family Anticipated Services at Transition: none  Patient/Family Anticipates Transition to: home with family   Goal Outcome Evaluation:  Plan of Care Reviewed With: patient        Progress: no change  Outcome Evaluation: Pt is resting in bed with call light in reach. Able to make needs known. Pt has no questions or concerns at this time.

## 2025-06-30 NOTE — H&P
Patient Care Team:  Abner Funes APRN as PCP - General (Family Medicine)  Brittany Canchola, RN as Nurse Navigator  Arben Tim MD as Consulting Physician (Hematology and Oncology)  Aman Gregory MD as Consulting Physician (Nephrology)    Chief complaint difficulty swallowing, shortness of breath, leg edema    Subjective     Patient is a 74 y.o. male with pmh of lung cancer, pAfib on home Eliquis,who presents with difficulty swallowing since Friday and unable to keep meds and food down,  shortness of breath and increased leg swelling. O2 sats were charted at 75% upon arrival to the ER.  He reports some shortness of breath, but not out of proportion for his normal with known lung cancer.  No fevers, abdominal pain, leg pain, dysuria.     Last month, he was treated for pneumonia, had a Left thoracentesis with nearly a liter out, and EGD with dilatation with notes Schroeder's esophagus.    Of note, his wife has COVID, but patient tested negative at home and in house.      In the Er, D. Dimer, greater than 10,  and troponins 286, 259 were elevated.  CT revealed RLL PE without evidence of heart strain, increased Large Left pleural effusion, LLL consolidation, esophageal distention with  material and wall thickening.  He was started on a Heparin drip and given 80 mg IV Lasix for concern of fluid overload with a newly elevated BNP of 2038.  Creatinine 1.33. WBC 16.35 with HGB of 11.1. Blood cultures pending.  EKG SR 72. He received a dose of Unasyn and Azithromycin.    Review of Systems   Constitutional:  Negative for chills and fever.   HENT:  Positive for trouble swallowing.    Respiratory:  Positive for shortness of breath.    Cardiovascular:  Positive for leg swelling. Negative for chest pain.   Gastrointestinal:  Negative for abdominal pain.   Neurological:  Positive for weakness.          History  Past Medical History:   Diagnosis Date    Abnormal ECG 01/29/2024    Allergic rhinitis     Arrhythmia  08/14/2024    SVT, then afib after thoracentesis    Asthma 06/01/2024    Shortness of breath with exertion    Atrial fibrillation 08/14/2024    After 2L drained per thoracentesis    Coronary artery disease 01/29/2024    Minimal blockage per cath    GERD (gastroesophageal reflux disease)     Hyperlipidemia     Lung cancer 08/2024    Myocardial infarction 01/29/2024    Cath, no intervention    Pleural effusion 2024    Pneumonia 05/2025     Past Surgical History:   Procedure Laterality Date    BRONCHOSCOPY N/A 08/13/2024    Procedure: BRONCHOSCOPY WITH BRONCHIAL WASHING AND BRONCHIAL BRUSHING;  Surgeon: Kelvin Gonzalez MD;  Location: [facility] ENDOSCOPY;  Service: Pulmonary;  Laterality: N/A;    BRONCHOSCOPY N/A 05/23/2025    Procedure: BRONCHOSCOPY with bronchoalveolar lavage and biopsy x 1 area;  Surgeon: Jason Dozier MD;  Location: [facility] ENDOSCOPY;  Service: Pulmonary;  Laterality: N/A;  post op: lung mass    BRONCHOSCOPY      BRONCHOSCOPY WITH ION ROBOTIC ASSIST N/A 08/16/2024    Procedure: BRONCHOSCOPY WITH ION ROBOT WITH CRYO BIOPSY;  Surgeon: Kelvin Gonzalez MD;  Location: [facility] ENDOSCOPY;  Service: Robotics - Pulmonary;  Laterality: N/A;    CARDIAC CATHETERIZATION Right 01/29/2024    Procedure: Coronary angiography;  Surgeon: Abran Chand MD;  Location: [facility] CATH INVASIVE LOCATION;  Service: Cardiovascular;  Laterality: Right;    CARDIAC CATHETERIZATION N/A 01/29/2024    Procedure: Left Heart Cath;  Surgeon: Abran Chand MD;  Location: [facility] CATH INVASIVE LOCATION;  Service: Cardiovascular;  Laterality: N/A;    ENDOSCOPY N/A 05/23/2025    Procedure: ESOPHAGOGASTRODUODENOSCOPY with biopsy x 1 area and dilatation (48FR non-guided bougie);  Surgeon: Carlos Calero MD;  Location: [facility] ENDOSCOPY;  Service: Gastroenterology;  Laterality: N/A;  post op: esophageal stricture    LUNG BIOPSY       Family History   Problem Relation Age of Onset    Dementia Mother     Diabetes Mother     Arrhythmia Mother          Afib    Breast cancer Sister 74    Cancer Sister         Breast cancer     Social History     Tobacco Use    Smoking status: Former     Current packs/day: 0.00     Average packs/day: 1 pack/day for 32.0 years (32.0 ttl pk-yrs)     Types: Cigarettes     Start date:      Quit date:      Years since quittin.5     Passive exposure: Past    Smokeless tobacco: Never   Vaping Use    Vaping status: Never Used   Substance Use Topics    Alcohol use: Yes     Alcohol/week: 1.0 standard drink of alcohol     Types: 1 Cans of beer per week    Drug use: Never     Medications Prior to Admission   Medication Sig Dispense Refill Last Dose/Taking    acetaminophen (TYLENOL) 325 MG tablet Take 2 tablets by mouth Every 4 (Four) Hours As Needed for Mild Pain.   Patient Taking Differently    apixaban (ELIQUIS) 5 MG tablet tablet Take 1 tablet by mouth Every 12 (Twelve) Hours. Indications: Atrial Fibrillation 60 tablet 0 2025 Morning    Budeson-Glycopyrrol-Formoterol (Breztri Aerosphere) 160-9-4.8 MCG/ACT aerosol inhaler Inhale 2 puffs 2 (Two) Times a Day. 1 each 0 2025 Morning    Cholecalciferol (VITAMIN D-3 PO) Take 1 tablet by mouth Daily. Indications: Vitamin Deficiency   Past Week Evening    dexAMETHasone (DECADRON) 4 MG tablet Take 2 tablets oral twice a day for 3 consecutive days beginning the day before chemotherapy and continue for 6 doses. 12 tablet 5 Past Month Morning    guaiFENesin (Mucinex) 600 MG 12 hr tablet Take 1 tablet by mouth 2 (Two) Times a Day. Indications: Cough   Past Month Evening    hydrocortisone (CORTEF) 20 MG tablet Take 1 tablet by mouth 2 (Two) Times a Day for 90 days. 60 tablet 2 Past Week    ipratropium-albuterol (DUO-NEB) 0.5-2.5 mg/3 ml nebulizer Take 3 mL by nebulization Every 6 (Six) Hours As Needed for Wheezing.   2025 Morning    levothyroxine (SYNTHROID, LEVOTHROID) 75 MCG tablet TAKE ONE TABLET BY MOUTH EVERY MORNING 30 tablet 1 Past Week    loratadine (Claritin) 10  MG tablet Take 1 tablet by mouth Daily.   Patient Taking Differently    midodrine (PROAMATINE) 5 MG tablet Take 1 tablet by mouth 3 (Three) Times a Day As Needed.   Past Week    NON FORMULARY Take 2 tablets by mouth Every Night. Zzzquil  Indications: Sleep Disorder   Past Week Bedtime    nystatin susp + lidocaine viscous (MAGIC MOUTHWASH) oral suspension Swish and spit 5 mL 4 (Four) Times a Day As Needed (MUCOSITIS). 100 mL 2 Patient Taking Differently    ondansetron (ZOFRAN) 8 MG tablet Take 1 tablet by mouth 3 (Three) Times a Day As Needed for Nausea or Vomiting. 30 tablet 5 Past Month    pantoprazole (PROTONIX) 40 MG EC tablet Take 1 tablet by mouth Daily.   Past Week Morning    simvastatin (ZOCOR) 20 MG tablet Take 1 tablet by mouth Every Night. Indications: High Amount of Fats in the Blood   Past Week Bedtime    sodium bicarbonate 650 MG tablet Take 2 tablets by mouth 3 (Three) Times a Day. 180 tablet 0 Past Week Evening    Sodium Chloride, PF, 0.9 % injection Infuse 10 mL into a venous catheter Daily. 300 mL 2 6/29/2025 Morning    sotalol (BETAPACE) 80 MG tablet Take 1 tablet by mouth 2 (Two) Times a Day.   Past Week Evening    tamsulosin (FLOMAX) 0.4 MG capsule 24 hr capsule Take 1 capsule by mouth Every Night. Indications: Benign Enlargement of Prostate   Past Week Evening    multivitamin with minerals (Centrum Silver 50+Men) tablet tablet Take 1 tablet by mouth Every Morning. Indications: Vitamin Deficiency       thiamine (VITAMIN B-1) 100 MG tablet  tablet Take 1 tablet by mouth Daily. Indications: Vitamin Deficiency        Allergies:  Patient has no known allergies.    Objective     Vital Signs  Temp:  [97.7 °F (36.5 °C)-98 °F (36.7 °C)] 97.7 °F (36.5 °C)  Heart Rate:  [71-88] 79  Resp:  [19-20] 19  BP: (130-178)/() 161/95     Physical Exam:      General Appearance:    Alert, cooperative, in no acute distress, cheeks flushed   Head:    Normocephalic, without obvious abnormality, atraumatic   Eyes:             Lids and lashes normal, conjunctivae and sclerae normal, no   icterus, no pallor, corneas clear, PERRLA   Ears:    Ears appear intact with no abnormalities noted   Throat:   No oral lesions, no thrush, oral mucosa moist   Neck:   No adenopathy, supple, trachea midline, no thyromegaly, no   carotid bruit, no JVD   Lungs:     Very diminished with poor air exchange in bilateral lobes,, on 2 L, respirations regular, even and                  unlabored    Heart:    Regular rhythm and normal rate, normal S1 and S2, no            murmur, no gallop, no rub, no click   Chest Wall:    No abnormalities observed   Abdomen:     Normal bowel sounds, no masses, no organomegaly, soft        non-tender, non-distended, no guarding, no rebound                tenderness   Extremities:   Moves all extremities well, BLE 3+  edema, no cyanosis, no             redness   Pulses:   Pulses palpable and equal bilaterally   Skin:   No bleeding, bruising or rash   Lymph nodes:   No palpable adenopathy   Neurologic:   Cranial nerves 2 - 12 grossly intact, sensation intact, DTR       present and equal bilaterally       Results Review:     Imaging Results (Last 24 Hours)       Procedure Component Value Units Date/Time    CT Angiogram Chest Pulmonary Embolism [[id number]] Collected: 06/29/25 1811     Updated: 06/29/25 1841    Narrative:      CT ANGIOGRAM CHEST PULMONARY EMBOLISM    Date of Exam: 6/29/2025 5:41 PM EDT    Indication: Pulmonary Embolism. 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    Comparison: Chest radiograph performed the same date and 5/23/2025, chest CT 5/21/2025    Technique: Axial CT images were obtained of the chest after the uneventful intravenous administration of iodinated contrast utilizing pulmonary embolism protocol.  In addition, a 3-D volume rendered image was created for interpretation.  Sagittal and   coronal reconstructions were performed.  Automated exposure control and iterative reconstruction  methods were used.      Findings:  There appears to be filling defect in right lower lobe segmental and subsegmental pulmonary arteries as seen on series 6 images  and coronal images . No other definite pulmonary artery filling defects are identified.    The heart appears enlarged, as before. There does not appear to be significant reflux of contrast into the IVC. The right ventricular diameter appears to be smaller than the left. There appears to be a small pericardial effusion. There is calcific   atherosclerosis of the coronary arteries. There is tortuosity of the thoracic aorta. No definite acute aortic abnormality. Right arm catheter terminates in the SVC.    There is increased distention of the upper esophagus containing heterogeneous material. This could predispose to aspiration. There appears to be wall thickening of the distal esophagus and there is suspicion for some hyperenhancement of the mucosa which   could indicate esophagitis.    There is a large left pleural effusion, increased compared to the prior exam. There is near complete consolidation of the left lower lobe with minimal aeration of the upper lobe. There is diffuse septal thickening and prominent interstitial markings of   the left lung, similar to the prior exam. There is some mild consolidation in the lingula.    Small right pleural effusion with some loculated fluid along the fissures. Pleural effusion appears slightly increased compared to the prior exam.    Consolidative densities in the posterior-inferior right lung appears similar to the prior chest CT. There is also diffuse septal thickening in the right lung, primarily in the right lower lobe. This does appear to have mildly increased compared to the   prior CT. There is emphysema and central bronchial wall thickening. No pneumothorax.    There is mediastinal lymphadenopathy, as before. Lymph node superior to the aortic arch measures 2.3 x 1.9 cm on image 41 of series 6,  previously 2.5 x 1.9 cm. Other lymph nodes also appear to be grossly similar to the prior exam. There are prominent   left axillary lymph nodes, as before. There also appear to be prominent right epicardial lymph nodes. There are prominent gastrohepatic and aortocaval lymph nodes in the visualized upper abdomen. Calcified gallstones are seen. There is a small amount of   pericholecystic fluid. Probable small cyst in the posterior spleen. There is anasarca.    Degenerative changes in the thoracic spine. No visualized aggressive osseous lesion is identified.      Impression:      Impression:  1.Right lower lobe segmental and subsegmental pulmonary emboli. No definite findings of right heart strain.  2.Large left pleural effusion, increased compared to the prior exam. Small right pleural effusion, slightly increased compared to the prior exam.  3.Near complete consolidation of the left lower lobe with minimal aeration of the upper lobe, which could be related to atelectasis and/or pneumonia. Consolidative densities in the posterior-inferior right lung appear similar to the prior exam.  4.Diffuse septal thickening and prominent interstitial markings in the lungs, left greater than right, which could be related to lymphangitic spread of malignancy and/or pulmonary edema.  5.Increased distention of the upper esophagus containing heterogeneous material, which could predispose to aspiration. There is wall thickening of the distal esophagus with hyperenhancement of the mucosa which could indicate esophagitis.  6.Cardiomegaly with small pericardial effusion. Body wall edema which could be related to heart failure.  7.Cholelithiasis with small amount of pericholecystic fluid. This could be related to heart failure, but cholecystitis cannot be excluded by imaging. Correlate with clinical findings and lab values.  8.Mediastinal, left axillary, and upper abdominal lymphadenopathy, as before consistent with metastatic  disease.    Results were called to the ordering provider by Dr. Yañez at 6/29/2025 6:39 PM EDT.        Electronically Signed: Carlos Yañez    6/29/2025 6:39 PM EDT    Workstation ID: [id number]    XR Chest 1 View [[id number]] Collected: 06/29/25 1603     Updated: 06/29/25 1610    Narrative:      XR CHEST 1 VW    Date of Exam: 6/29/2025 3:34 PM EDT    Indication: dyspnea    Comparison: 5/23/2025    Findings:  There is moderate cardiomegaly with prominent interstitial markings and bibasilar airspace disease favored to be due to combination of interstitial and alveolar edema. There are bilateral pleural effusions left larger than right. Right-sided PICC line is   now looped in the distal right internal jugular vein with the tip still projecting over the superior vena cava.      Impression:      Impression:    1. Cardiomegaly with new bilateral pleural effusions and increasing interstitial and alveolar edema.      Electronically Signed: Aftab Blakely MD    6/29/2025 4:08 PM EDT    Workstation ID: [id number]             Lab Results (last 24 hours)       Procedure Component Value Units Date/Time    aPTT [[id number]]  (Normal) Collected: 06/29/25 1549    Specimen: Blood from Arm, Left Updated: 06/29/25 1912     PTT 31.8 seconds     Protime-INR [[id number]]  (Abnormal) Collected: 06/29/25 1549    Specimen: Blood from Arm, Left Updated: 06/29/25 1912     Protime 16.9 Seconds      INR 1.37    High Sensitivity Troponin T 1Hr [[id number]]  (Abnormal) Collected: 06/29/25 1644    Specimen: Blood Updated: 06/29/25 1712     HS Troponin T 259 ng/L      Troponin T Numeric Delta -27 ng/L      Troponin T % Delta -9    Narrative:      High Sensitive Troponin T Reference Range:  <14.0 ng/L- Negative Female for AMI  <22.0 ng/L- Negative Male for AMI  >=14 - Abnormal Female indicating possible myocardial injury.  >=22 - Abnormal Male indicating possible myocardial injury.   Clinicians would have to utilize clinical acumen, EKG, Troponin, and  "serial changes to determine if it is an Acute Myocardial Infarction or myocardial injury due to an underlying chronic condition.         D-dimer, Quantitative [[id number]]  (Abnormal) Collected: 06/29/25 1549    Specimen: Blood from Arm, Left Updated: 06/29/25 1654     D-Dimer, Quantitative 10.68 MCGFEU/mL     Narrative:      According to the assay 's published package insert, a normal (<0.50 MCGFEU/mL) D-dimer result in conjunction with a non-high clinical probability assessment, excludes deep vein thrombosis (DVT) and pulmonary embolism (PE) with high sensitivity.    D-dimer values increase with age and this can make VTE exclusion of an older population difficult. To address this, the [employer], based on best available evidence and recent guidelines, recommends that clinicians use age-adjusted D-dimer thresholds in patients greater than 50 years of age with: a) a low probability of PE who do not meet all Pulmonary Embolism Rule Out Criteria, or b) in those with intermediate probability of PE.   The formula for an age-adjusted D-dimer cut-off is \"age/100\".  For example, a 60 year old patient would have an age-adjusted cut-off of 0.60 MCGFEU/mL and an 80 year old 0.80 MCGFEU/mL.    Urinalysis, Microscopic Only - Urine, Clean Catch [[id number]]  (Abnormal) Collected: 06/29/25 1618    Specimen: Urine, Clean Catch Updated: 06/29/25 1653     RBC, UA 3-5 /HPF      WBC, UA 6-10 /HPF      Bacteria, UA 1+ /HPF      Squamous Epithelial Cells, UA 0-2 /HPF      Hyaline Casts, UA None Seen /LPF      Methodology Manual Light Microscopy    Urinalysis With Microscopic If Indicated (No Culture) - Urine, Clean Catch [[id number]]  (Abnormal) Collected: 06/29/25 1618    Specimen: Urine, Clean Catch Updated: 06/29/25 1632     Color, UA Yellow     Appearance, UA Clear     pH, UA 8.5     Specific Gravity, UA 1.020     Glucose, UA Negative     Ketones, UA Negative     Bilirubin, UA Negative     Blood, UA " Trace     Protein,  mg/dL (2+)     Leuk Esterase, UA Negative     Nitrite, UA Negative     Urobilinogen, UA 0.2 E.U./dL    Respiratory Panel PCR w/COVID-19(SARS-CoV-2) SARAH/ELENI/NEY/PAD/COR/LUCRETIA In-House, NP Swab in UTM/VTM, 2 HR TAT - Swab, Nasopharynx [[id number]]  (Normal) Collected: 06/29/25 1530    Specimen: Swab from Nasopharynx Updated: 06/29/25 1624     ADENOVIRUS, PCR Not Detected     Coronavirus 229E Not Detected     Coronavirus HKU1 Not Detected     Coronavirus NL63 Not Detected     Coronavirus OC43 Not Detected     COVID19 Not Detected     Human Metapneumovirus Not Detected     Human Rhinovirus/Enterovirus Not Detected     Influenza A PCR Not Detected     Influenza B PCR Not Detected     Parainfluenza Virus 1 Not Detected     Parainfluenza Virus 2 Not Detected     Parainfluenza Virus 3 Not Detected     Parainfluenza Virus 4 Not Detected     RSV, PCR Not Detected     Bordetella pertussis pcr Not Detected     Bordetella parapertussis PCR Not Detected     Chlamydophila pneumoniae PCR Not Detected     Mycoplasma pneumo by PCR Not Detected    Narrative:      In the setting of a positive respiratory panel with a viral infection PLUS a negative procalcitonin without other underlying concern for bacterial infection, consider observing off antibiotics or discontinuation of antibiotics and continue supportive care. If the respiratory panel is positive for atypical bacterial infection (Bordetella pertussis, Chlamydophila pneumoniae, or Mycoplasma pneumoniae), consider antibiotic de-escalation to target atypical bacterial infection.    Extra Tubes [[id number]] Collected: 06/29/25 1549    Specimen: Blood from Arm, Left Updated: 06/29/25 1600    Narrative:      The following orders were created for panel order Extra Tubes.  Procedure                               Abnormality         Status                     ---------                               -----------         ------                     Green [facility]  (Gel)[[id number]]                                  Final result               Light Blue Top[[id number]]                                   Final result                 Please view results for these tests on the individual orders.    Green Top (Gel) [[id number]] Collected: 06/29/25 1549    Specimen: Blood from Arm, Left Updated: 06/29/25 1600     Extra Tube Hold for add-ons.     Comment: Auto resulted.       Light Blue Top [[id number]] Collected: 06/29/25 1549    Specimen: Blood from Arm, Left Updated: 06/29/25 1600     Extra Tube Hold for add-ons.     Comment: Auto resulted       CBC & Differential [[id number]]  (Abnormal) Collected: 06/29/25 1529    Specimen: Blood from Arm, Left Updated: 06/29/25 1558    Narrative:      The following orders were created for panel order CBC & Differential.  Procedure                               Abnormality         Status                     ---------                               -----------         ------                     CBC Auto Differential[[id number]]        Abnormal            Final result               Scan Slide[[id number]]                                                                    Please view results for these tests on the individual orders.    CBC Auto Differential [[id number]]  (Abnormal) Collected: 06/29/25 1549    Specimen: Blood from Arm, Left Updated: 06/29/25 1558     WBC 16.35 10*3/mm3      RBC 3.66 10*6/mm3      Hemoglobin 11.1 g/dL      Hematocrit 34.5 %      MCV 94.3 fL      MCH 30.3 pg      MCHC 32.2 g/dL      RDW 13.5 %      RDW-SD 45.6 fl      MPV 8.6 fL      Platelets 238 10*3/mm3      Neutrophil % 75.6 %      Lymphocyte % 10.8 %      Monocyte % 8.0 %      Eosinophil % 1.0 %      Basophil % 0.8 %      Immature Grans % 3.8 %      Neutrophils, Absolute 12.36 10*3/mm3      Lymphocytes, Absolute 1.77 10*3/mm3      Monocytes, Absolute 1.31 10*3/mm3      Eosinophils, Absolute 0.16 10*3/mm3      Basophils, Absolute 0.13 10*3/mm3      Immature Grans, Absolute  0.62 10*3/mm3      nRBC 0.0 /100 WBC     Comprehensive Metabolic Panel [[id number]]  (Abnormal) Collected: 06/29/25 1529    Specimen: Blood from Arm, Left Updated: 06/29/25 1558     Glucose 100 mg/dL      BUN 12.6 mg/dL      Creatinine 1.33 mg/dL      Sodium 140 mmol/L      Potassium 4.2 mmol/L      Comment: Specimen hemolyzed.  Result may be falsely elevated.        Chloride 104 mmol/L      CO2 24.8 mmol/L      Calcium 8.6 mg/dL      Total Protein 6.1 g/dL      Albumin 3.4 g/dL      ALT (SGPT) 11 U/L      AST (SGOT) 38 U/L      Comment: Specimen hemolyzed.  Result may be falsely elevated.        Alkaline Phosphatase 74 U/L      Total Bilirubin 0.5 mg/dL      Globulin 2.7 gm/dL      A/G Ratio 1.3 g/dL      BUN/Creatinine Ratio 9.5     Anion Gap 11.2 mmol/L      eGFR 55.7 mL/min/1.73     Narrative:      GFR Categories in Chronic Kidney Disease (CKD)              GFR Category          GFR (mL/min/1.73)    Interpretation  G1                    90 or greater        Normal or high (1)  G2                    60-89                Mild decrease (1)  G3a                   45-59                Mild to moderate decrease  G3b                   30-44                Moderate to severe decrease  G4                    15-29                Severe decrease  G5                    14 or less           Kidney failure    (1)In the absence of evidence of kidney disease, neither GFR category G1 or G2 fulfill the criteria for CKD.    eGFR calculation 2021 CKD-EPI creatinine equation, which does not include race as a factor    High Sensitivity Troponin T [[id number]]  (Abnormal) Collected: 06/29/25 1529    Specimen: Blood from Arm, Left Updated: 06/29/25 1558     HS Troponin T 286 ng/L     Narrative:      High Sensitive Troponin T Reference Range:  <14.0 ng/L- Negative Female for AMI  <22.0 ng/L- Negative Male for AMI  >=14 - Abnormal Female indicating possible myocardial injury.  >=22 - Abnormal Male indicating possible myocardial injury.    Clinicians would have to utilize clinical acumen, EKG, Troponin, and serial changes to determine if it is an Acute Myocardial Infarction or myocardial injury due to an underlying chronic condition.         BNP [[id number]]  (Abnormal) Collected: 06/29/25 1529    Specimen: Blood from Arm, Left Updated: 06/29/25 1556     proBNP 2,038.0 pg/mL     Narrative:      This assay is used as an aid in the diagnosis of individuals suspected of having heart failure. It can be used as an aid in the diagnosis of acute decompensated heart failure (ADHF) in patients presenting with signs and symptoms of ADHF to the emergency department (ED). In addition, NT-proBNP of <300 pg/mL indicates ADHF is not likely.    Age Range Result Interpretation  NT-proBNP Concentration (pg/mL:      <50             Positive            >450                   Gray                 300-450                    Negative             <300    50-75           Positive            >900                  Gray                300-900                  Negative            <300      >75             Positive            >1800                  Gray                300-1800                  Negative            <300    Blood Culture - Blood, Arm, Left [[id number]] Collected: 06/29/25 1549    Specimen: Blood from Arm, Left Updated: 06/29/25 1553    Blood Culture - Blood, Arm, Left [[id number]] Collected: 06/29/25 1529    Specimen: Blood from Arm, Left Updated: 06/29/25 1534             I reviewed the patient's new clinical results.    Assessment & Plan       Pulmonary embolism    Pleural effusion    Stage IV adenocarcinoma of lung, right    Stage IV adenocarcinoma of lung, left    CKD (chronic kidney disease) stage 4, GFR 15-29 ml/min    Paroxysmal atrial fibrillation    Chronic anticoagulation    Hypoxia    Hypothyroidism (acquired)    Dysphagia    Pneumonia, unspecified organism    Elevated brain natriuretic peptide (BNP) level    Bilateral leg edema    PE   -Heparin  drip   -previously on Eliquis for history of Afib and reported compliance   -echo and BLE duplex ordered.    Lung cancer-Consult Dr. Tim    Recurrent pleural effusion and pneumonia- consult pulm, Unasyn    Difficulty swallowing with esophageal distention- consult GI, NPO, speech    New onset heart failure- cardiomegaly noted on cxr, elevated BNP.  Echo ordered, may consider cardiology consut (sees  Dr. Pavon)    Home meds to be restarted when able to tolerate po    Hx of Afib, currently in SR- scheduled IV lopressor while NPO    HTN- PRN hydralazine    VTE: on heparin drip  GI: Protonix    CODE STATUS:  Code status (Patient has no pulse and is not breathing):  CPR (Attempt to Resuscitate)  Medical Interventions (Patient has pulse or is breathing):  Full Support  Level of Support Discussed with:  Patient    Admission Status:  I believe this patient meets inpatient status    Expected length of stay:  2 midnights or greater    I discussed the patient's findings and my recommendations with patient.     Laura Portillo, APRN  06/29/25  23:54 EDT

## 2025-06-30 NOTE — CONSULTS
[facility]: [facility]         CONSULT NOTE    Patient Identification:  Young Ames  74 y.o.  male  1950  [id number]            Requesting physician: Attending physician    Reason for Consultation: Hypoxia      History of Present Illness:  74-year-old male admitted 6/29/2025 with hypoxia, O2 sat 75% in the emergency, CT chest showed right lower lobe pulmonary embolism and increased large left pleural effusion with left lower lobe consolidation and esophageal distention      Assessment:    Acute/chronic hypoxic respiratory insufficiency    new right lower lobe pulmonary embolism  Echo 6/30/2025, estimated RVSP 45 to 55 mmHg with moderate pulmonary hypertension  Small less than 1 cm pericardial effusion no evidence of tamponade  EF 61%    Large left pleural effusion    Left Lower lobe pneumonia    Bronchoscopy 5/23/2025 Lung, left lower lobe, endobronchial biopsy):    Invasive moderately differentiated adenocarcinoma with micropapillary features,   Positive for lymphovascular invasion     CT chest 5/23/2025   interlobular septal thickening in theleft lung which 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     status post bedside left thoracentesis 5/22/2025 with removal of 950 mL of serosanguineous fluid fluid was positive for cytology non-small cell     History of right lung cancer adenocarcinoma, stage cTX N2 M1   status post concomitant radiation chemotherapy, diagnosed on bronchoscopy August 2024  CAD  CKD  Chronic A-fib on chronic anticoagulation Eliquis  Hypothyroidism  Chronic anemia     Former smoker: Quit 2000 after 32 pack years    Recommendations:    Oxygen titration currently on 2 L    Anticoagulation IV heparin until Pleurx catheter to the left chest is done    Consult thoracic surgery for Pleurx catheter, for malignant left pleural effusion , recurring, and increasing in size and symptomatic    Antibiotics currently on Unasyn  IV steroids hydrocortisone 50 mg  Q8  Bronchodilators              Review of Sytems:  Review of Systems   Respiratory:  Positive for cough and shortness of breath. Negative for wheezing and stridor.    Cardiovascular:  Positive for chest pain. Negative for palpitations and leg swelling.       Past Medical History:  Past Medical History:   Diagnosis Date    Abnormal ECG 01/29/2024    Allergic rhinitis     Arrhythmia 08/14/2024    SVT, then afib after thoracentesis    Asthma 06/01/2024    Shortness of breath with exertion    Atrial fibrillation 08/14/2024    After 2L drained per thoracentesis    Coronary artery disease 01/29/2024    Minimal blockage per cath    GERD (gastroesophageal reflux disease)     Hyperlipidemia     Lung cancer 08/2024    Myocardial infarction 01/29/2024    Cath, no intervention    Pleural effusion 2024    Pneumonia 05/2025       Past Surgical History:  Past Surgical History:   Procedure Laterality Date    BRONCHOSCOPY N/A 08/13/2024    Procedure: BRONCHOSCOPY WITH BRONCHIAL WASHING AND BRONCHIAL BRUSHING;  Surgeon: Kelvin Gonzalez MD;  Location: [facility] ENDOSCOPY;  Service: Pulmonary;  Laterality: N/A;    BRONCHOSCOPY N/A 05/23/2025    Procedure: BRONCHOSCOPY with bronchoalveolar lavage and biopsy x 1 area;  Surgeon: Jason Dozier MD;  Location: [facility] ENDOSCOPY;  Service: Pulmonary;  Laterality: N/A;  post op: lung mass    BRONCHOSCOPY      BRONCHOSCOPY WITH ION ROBOTIC ASSIST N/A 08/16/2024    Procedure: BRONCHOSCOPY WITH ION ROBOT WITH CRYO BIOPSY;  Surgeon: Kelvin Gonzalez MD;  Location: [facility] ENDOSCOPY;  Service: Robotics - Pulmonary;  Laterality: N/A;    CARDIAC CATHETERIZATION Right 01/29/2024    Procedure: Coronary angiography;  Surgeon: Abran Chand MD;  Location: [facility] CATH INVASIVE LOCATION;  Service: Cardiovascular;  Laterality: Right;    CARDIAC CATHETERIZATION N/A 01/29/2024    Procedure: Left Heart Cath;  Surgeon: Abran Chand MD;  Location: [facility] CATH INVASIVE LOCATION;  Service: Cardiovascular;  Laterality:  N/A;    ENDOSCOPY N/A 05/23/2025    Procedure: ESOPHAGOGASTRODUODENOSCOPY with biopsy x 1 area and dilatation (48FR non-guided bougie);  Surgeon: Carlos Calero MD;  Location: [facility] ENDOSCOPY;  Service: Gastroenterology;  Laterality: N/A;  post op: esophageal stricture    LUNG BIOPSY          Home Meds:  Medications Prior to Admission   Medication Sig Dispense Refill Last Dose/Taking    acetaminophen (TYLENOL) 325 MG tablet Take 2 tablets by mouth Every 4 (Four) Hours As Needed for Mild Pain.   Patient Taking Differently    apixaban (ELIQUIS) 5 MG tablet tablet Take 1 tablet by mouth Every 12 (Twelve) Hours. Indications: Atrial Fibrillation 60 tablet 0 6/29/2025 Morning    Budeson-Glycopyrrol-Formoterol (Breztri Aerosphere) 160-9-4.8 MCG/ACT aerosol inhaler Inhale 2 puffs 2 (Two) Times a Day. 1 each 0 6/29/2025 Morning    Cholecalciferol (VITAMIN D-3 PO) Take 1 tablet by mouth Daily. Indications: Vitamin Deficiency   Past Week Evening    dexAMETHasone (DECADRON) 4 MG tablet Take 2 tablets oral twice a day for 3 consecutive days beginning the day before chemotherapy and continue for 6 doses. 12 tablet 5 Past Month Morning    guaiFENesin (Mucinex) 600 MG 12 hr tablet Take 1 tablet by mouth 2 (Two) Times a Day. Indications: Cough   Past Month Evening    hydrocortisone (CORTEF) 20 MG tablet Take 1 tablet by mouth 2 (Two) Times a Day for 90 days. 60 tablet 2 Past Week    ipratropium-albuterol (DUO-NEB) 0.5-2.5 mg/3 ml nebulizer Take 3 mL by nebulization Every 6 (Six) Hours As Needed for Wheezing.   6/29/2025 Morning    levothyroxine (SYNTHROID, LEVOTHROID) 75 MCG tablet TAKE ONE TABLET BY MOUTH EVERY MORNING 30 tablet 1 Past Week    loratadine (Claritin) 10 MG tablet Take 1 tablet by mouth Daily.   Patient Taking Differently    midodrine (PROAMATINE) 5 MG tablet Take 1 tablet by mouth 3 (Three) Times a Day As Needed.   Past Week    NON FORMULARY Take 2 tablets by mouth Every Night. Zzzquil  Indications:  Sleep Disorder   Past Week Bedtime    nystatin susp + lidocaine viscous (MAGIC MOUTHWASH) oral suspension Swish and spit 5 mL 4 (Four) Times a Day As Needed (MUCOSITIS). 100 mL 2 Patient Taking Differently    ondansetron (ZOFRAN) 8 MG tablet Take 1 tablet by mouth 3 (Three) Times a Day As Needed for Nausea or Vomiting. 30 tablet 5 Past Month    pantoprazole (PROTONIX) 40 MG EC tablet Take 1 tablet by mouth Daily.   Past Week Morning    simvastatin (ZOCOR) 20 MG tablet Take 1 tablet by mouth Every Night. Indications: High Amount of Fats in the Blood   Past Week Bedtime    sodium bicarbonate 650 MG tablet Take 2 tablets by mouth 3 (Three) Times a Day. 180 tablet 0 Past Week Evening    Sodium Chloride, PF, 0.9 % injection Infuse 10 mL into a venous catheter Daily. 300 mL 2 2025 Morning    sotalol (BETAPACE) 80 MG tablet Take 1 tablet by mouth 2 (Two) Times a Day.   Past Week Evening    tamsulosin (FLOMAX) 0.4 MG capsule 24 hr capsule Take 1 capsule by mouth Every Night. Indications: Benign Enlargement of Prostate   Past Week Evening    multivitamin with minerals (Centrum Silver 50+Men) tablet tablet Take 1 tablet by mouth Every Morning. Indications: Vitamin Deficiency       thiamine (VITAMIN B-1) 100 MG tablet  tablet Take 1 tablet by mouth Daily. Indications: Vitamin Deficiency          Allergies:  No Known Allergies    Social History:   Social History     Socioeconomic History    Marital status:    Tobacco Use    Smoking status: Former     Current packs/day: 0.00     Average packs/day: 1 pack/day for 32.0 years (32.0 ttl pk-yrs)     Types: Cigarettes     Start date:      Quit date:      Years since quittin.5     Passive exposure: Past    Smokeless tobacco: Never   Vaping Use    Vaping status: Never Used   Substance and Sexual Activity    Alcohol use: Yes     Alcohol/week: 1.0 standard drink of alcohol     Types: 1 Cans of beer per week    Drug use: Never    Sexual activity: Not Currently      "Partners: Female     Birth control/protection: None       Family History:  Family History   Problem Relation Age of Onset    Dementia Mother     Diabetes Mother     Arrhythmia Mother         Afib    Breast cancer Sister 74    Cancer Sister         Breast cancer       Physical Exam:  /90 (BP Location: Right arm, Patient Position: Lying)   Pulse 90   Temp 97.5 °F (36.4 °C) (Oral)   Resp 25   Ht 180.3 cm (71\")   Wt 81 kg (178 lb 9.6 oz)   SpO2 92%   BMI 24.91 kg/m²  Body mass index is 24.91 kg/m². 92% 81 kg (178 lb 9.6 oz)  Physical Exam  Cardiovascular:      Heart sounds: No murmur heard.     Gallop present.   Pulmonary:      Effort: No respiratory distress.      Breath sounds: No stridor. Rhonchi present. No wheezing or rales.   Chest:      Chest wall: No tenderness.         LABS:  Lab Results   Component Value Date    CALCIUM 8.6 06/29/2025    PHOS 3.2 05/25/2025     Results from last 7 days   Lab Units 06/29/25  1549 06/29/25  1529   SODIUM mmol/L  --  140   POTASSIUM mmol/L  --  4.2   CHLORIDE mmol/L  --  104   CO2 mmol/L  --  24.8   BUN mg/dL  --  12.6   CREATININE mg/dL  --  1.33*   GLUCOSE mg/dL  --  100*   CALCIUM mg/dL  --  8.6   WBC 10*3/mm3 16.35*  --    HEMOGLOBIN g/dL 11.1*  --    PLATELETS 10*3/mm3 238  --    ALT (SGPT) U/L  --  11   AST (SGOT) U/L  --  38   PROBNP pg/mL  --  2,038.0*     Lab Results   Component Value Date    TROPONINT 259 (C) 06/29/2025     Results from last 7 days   Lab Units 06/29/25  1644 06/29/25  1529   HSTROP T ng/L 259* 286*                 Results from last 7 days   Lab Units 06/29/25  1530   ADENOVIRUS DETECTION BY PCR  Not Detected   CORONAVIRUS 229E  Not Detected   CORONAVIRUS HKU1  Not Detected   CORONAVIRUS NL63  Not Detected   CORONAVIRUS OC43  Not Detected   HUMAN METAPNEUMOVIRUS  Not Detected   HUMAN RHINOVIRUS/ENTEROVIRUS  Not Detected   INFLUENZA B PCR  Not Detected   PARAINFLUENZA 1  Not Detected   PARAINFLUENZA VIRUS 2  Not Detected   PARAINFLUENZA VIRUS " 3  Not Detected   PARAINFLUENZA VIRUS 4  Not Detected   BORDETELLA PERTUSSIS PCR  Not Detected   CHLAMYDOPHILA PNEUMONIAE PCR  Not Detected   MYCOPLAMA PNEUMO PCR  Not Detected   INFLUENZA A PCR  Not Detected   RSV, PCR  Not Detected     Results from last 7 days   Lab Units 06/29/25  1549   INR  1.37*         Lab Results   Component Value Date    TSH 24.100 (H) 06/11/2025     Estimated Creatinine Clearance: 55 mL/min (A) (by C-G formula based on SCr of 1.33 mg/dL (H)).  Results from last 7 days   Lab Units 06/29/25  1618   NITRITE UA  Negative   WBC UA /HPF 6-10*   BACTERIA UA /HPF 1+*   SQUAM EPITHEL UA /HPF 0-2        Imaging:  Imaging Results (Last 24 Hours)       Procedure Component Value Units Date/Time    CT Angiogram Chest Pulmonary Embolism [[id number]] Collected: 06/29/25 1811     Updated: 06/29/25 1841    Narrative:      CT ANGIOGRAM CHEST PULMONARY EMBOLISM    Date of Exam: 6/29/2025 5:41 PM EDT    Indication: Pulmonary Embolism. 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    Comparison: Chest radiograph performed the same date and 5/23/2025, chest CT 5/21/2025    Technique: Axial CT images were obtained of the chest after the uneventful intravenous administration of iodinated contrast utilizing pulmonary embolism protocol.  In addition, a 3-D volume rendered image was created for interpretation.  Sagittal and   coronal reconstructions were performed.  Automated exposure control and iterative reconstruction methods were used.      Findings:  There appears to be filling defect in right lower lobe segmental and subsegmental pulmonary arteries as seen on series 6 images  and coronal images . No other definite pulmonary artery filling defects are identified.    The heart appears enlarged, as before. There does not appear to be significant reflux of contrast into the IVC. The right ventricular diameter appears to be smaller than the left. There appears to be a small pericardial effusion.  There is calcific   atherosclerosis of the coronary arteries. There is tortuosity of the thoracic aorta. No definite acute aortic abnormality. Right arm catheter terminates in the SVC.    There is increased distention of the upper esophagus containing heterogeneous material. This could predispose to aspiration. There appears to be wall thickening of the distal esophagus and there is suspicion for some hyperenhancement of the mucosa which   could indicate esophagitis.    There is a large left pleural effusion, increased compared to the prior exam. There is near complete consolidation of the left lower lobe with minimal aeration of the upper lobe. There is diffuse septal thickening and prominent interstitial markings of   the left lung, similar to the prior exam. There is some mild consolidation in the lingula.    Small right pleural effusion with some loculated fluid along the fissures. Pleural effusion appears slightly increased compared to the prior exam.    Consolidative densities in the posterior-inferior right lung appears similar to the prior chest CT. There is also diffuse septal thickening in the right lung, primarily in the right lower lobe. This does appear to have mildly increased compared to the   prior CT. There is emphysema and central bronchial wall thickening. No pneumothorax.    There is mediastinal lymphadenopathy, as before. Lymph node superior to the aortic arch measures 2.3 x 1.9 cm on image 41 of series 6, previously 2.5 x 1.9 cm. Other lymph nodes also appear to be grossly similar to the prior exam. There are prominent   left axillary lymph nodes, as before. There also appear to be prominent right epicardial lymph nodes. There are prominent gastrohepatic and aortocaval lymph nodes in the visualized upper abdomen. Calcified gallstones are seen. There is a small amount of   pericholecystic fluid. Probable small cyst in the posterior spleen. There is anasarca.    Degenerative changes in the  thoracic spine. No visualized aggressive osseous lesion is identified.      Impression:      Impression:  1.Right lower lobe segmental and subsegmental pulmonary emboli. No definite findings of right heart strain.  2.Large left pleural effusion, increased compared to the prior exam. Small right pleural effusion, slightly increased compared to the prior exam.  3.Near complete consolidation of the left lower lobe with minimal aeration of the upper lobe, which could be related to atelectasis and/or pneumonia. Consolidative densities in the posterior-inferior right lung appear similar to the prior exam.  4.Diffuse septal thickening and prominent interstitial markings in the lungs, left greater than right, which could be related to lymphangitic spread of malignancy and/or pulmonary edema.  5.Increased distention of the upper esophagus containing heterogeneous material, which could predispose to aspiration. There is wall thickening of the distal esophagus with hyperenhancement of the mucosa which could indicate esophagitis.  6.Cardiomegaly with small pericardial effusion. Body wall edema which could be related to heart failure.  7.Cholelithiasis with small amount of pericholecystic fluid. This could be related to heart failure, but cholecystitis cannot be excluded by imaging. Correlate with clinical findings and lab values.  8.Mediastinal, left axillary, and upper abdominal lymphadenopathy, as before consistent with metastatic disease.    Results were called to the ordering provider by Dr. Yañez at 6/29/2025 6:39 PM EDT.        Electronically Signed: Carlos Yañez    6/29/2025 6:39 PM EDT    Workstation ID: [id number]    XR Chest 1 View [[id number]] Collected: 06/29/25 1603     Updated: 06/29/25 1610    Narrative:      XR CHEST 1 VW    Date of Exam: 6/29/2025 3:34 PM EDT    Indication: dyspnea    Comparison: 5/23/2025    Findings:  There is moderate cardiomegaly with prominent interstitial markings and bibasilar airspace  disease favored to be due to combination of interstitial and alveolar edema. There are bilateral pleural effusions left larger than right. Right-sided PICC line is   now looped in the distal right internal jugular vein with the tip still projecting over the superior vena cava.      Impression:      Impression:    1. Cardiomegaly with new bilateral pleural effusions and increasing interstitial and alveolar edema.      Electronically Signed: Aftab Blakely MD    6/29/2025 4:08 PM EDT    Workstation ID: [id number]              Current Meds:   SCHEDULE  ampicillin-sulbactam, 3 g, Intravenous, Q6H  metoprolol tartrate, 2.5 mg, Intravenous, Q8H  pantoprazole, 40 mg, Intravenous, Q AM  sodium chloride, 10 mL, Intravenous, Q12H      Infusions  heparin, 18 Units/kg/hr, Last Rate: Stopped (06/30/25 0424)  Pharmacy To Dose:,       PRNs    senna-docusate sodium **AND** polyethylene glycol **AND** bisacodyl **AND** bisacodyl    heparin    heparin    hydrALAZINE    ipratropium-albuterol    nitroglycerin    ondansetron ODT **OR** ondansetron    Pharmacy To Dose:    [COMPLETED] Insert Peripheral IV **AND** sodium chloride    sodium chloride    sodium chloride        Kelvin Gonzalez MD  6/30/2025  07:05 EDT      Much of this encounter note is an electronic transcription/translation of spoken language to printed text using Dragon Software.

## 2025-06-30 NOTE — NURSING NOTE
Pt's aPTT lab was due at 0123. Pt was stuck multiple times in an attempt to get the lab. Multiple nurses tried and was unsuccessful. Laura Portillo notified. Had to have Olga RN from ED attempt to get another IV access as well due to being on heparin and getting IV antibiotics. Olga was successful at getting the IV access and aPTT. Pts aPTT came back at over 200. Heparin stopped and Laura Portillo notified. This RN is following Heparin protocol.

## 2025-06-30 NOTE — POST-PROCEDURE NOTE
IR POST OP NOTE    Procedure:PICC exchange      Pre Op DX:Malpositioned PICC      Post Op DX:same      Anesthesia: Local      Findings:PICC tip migrated to RIJ. New PICC placed in exchange over wire with tip in  mid R atrium.       Complications:none immediate       Provider Signature: Dr. Sean Buckley

## 2025-06-30 NOTE — NURSING NOTE
"Called PICC team regarding the patients PICC line being \"kinked.\" Nurse from PICC team stated that the PICC was still in the correct place and as long as it was flushing and getting blood return, that it was okay to use. PICC line is currently flushing well and has good blood return. Will continue to monitor.   "

## 2025-06-30 NOTE — ANESTHESIA PROCEDURE NOTES
Airway  Reason: elective    Date/Time: 6/30/2025 2:07 PM  Airway not difficult    General Information and Staff    Patient location during procedure: OR  Anesthesiologist: Kassandra Villalobos MD  CRNA/CAA: Kelly Campbell CRNA    Indications and Patient Condition  Indications for airway management: airway protection    Preoxygenated: yes    Mask difficulty assessment: 0 - not attempted    Final Airway Details    Final airway type: endotracheal airway      Successful airway: ETT  Cuffed: yes   Successful intubation technique: video laryngoscopy and RSI  Adjuncts used in placement: intubating stylet  Endotracheal tube insertion site: oral  Blade: Stallings  Blade size: 4  ETT size (mm): 7.5  Cormack-Lehane Classification: grade I - full view of glottis  Placement verified by: chest auscultation and capnometry   Measured from: lips  ETT/EBT  to lips (cm): 22  Number of attempts at approach: 1  Assessment: lips, teeth, and gum same as pre-op and atraumatic intubation

## 2025-06-30 NOTE — CONSULTS
THORACIC SURGERY INPATIENT CONSULT NOTE    REASON FOR CONSULT: Left recurrent pleural effusion    Subjective   HISTORY OF PRESENTING ILLNESS:   Young Ames is a 74 y.o. male who has significant medical problems as mentioned in the medical chart.     Patient has history of stage IV lung cancer who has recurrent left-sided pleural effusion.  He presented to the hospital with right lower lobe PE, shortness of breath and esophageal distention.  Thoracic surgery was consulted for left pleural catheter placement.    Past Medical History:   Diagnosis Date    Abnormal ECG 01/29/2024    Allergic rhinitis     Arrhythmia 08/14/2024    SVT, then afib after thoracentesis    Asthma 06/01/2024    Shortness of breath with exertion    Atrial fibrillation 08/14/2024    After 2L drained per thoracentesis    Coronary artery disease 01/29/2024    Minimal blockage per cath    GERD (gastroesophageal reflux disease)     Hyperlipidemia     Lung cancer 08/2024    Myocardial infarction 01/29/2024    Cath, no intervention    Pleural effusion 2024    Pneumonia 05/2025       Past Surgical History:   Procedure Laterality Date    BRONCHOSCOPY N/A 08/13/2024    Procedure: BRONCHOSCOPY WITH BRONCHIAL WASHING AND BRONCHIAL BRUSHING;  Surgeon: Kelvin Gonzalez MD;  Location: [facility] ENDOSCOPY;  Service: Pulmonary;  Laterality: N/A;    BRONCHOSCOPY N/A 05/23/2025    Procedure: BRONCHOSCOPY with bronchoalveolar lavage and biopsy x 1 area;  Surgeon: Jason Dozier MD;  Location: [facility] ENDOSCOPY;  Service: Pulmonary;  Laterality: N/A;  post op: lung mass    BRONCHOSCOPY      BRONCHOSCOPY WITH ION ROBOTIC ASSIST N/A 08/16/2024    Procedure: BRONCHOSCOPY WITH ION ROBOT WITH CRYO BIOPSY;  Surgeon: Kelvin Gonzalez MD;  Location: [facility] ENDOSCOPY;  Service: Robotics - Pulmonary;  Laterality: N/A;    CARDIAC CATHETERIZATION Right 01/29/2024    Procedure: Coronary angiography;  Surgeon: Abran Chand MD;  Location: [facility] CATH INVASIVE LOCATION;  Service:  Cardiovascular;  Laterality: Right;    CARDIAC CATHETERIZATION N/A 2024    Procedure: Left Heart Cath;  Surgeon: Abran Chand MD;  Location: [facility] CATH INVASIVE LOCATION;  Service: Cardiovascular;  Laterality: N/A;    ENDOSCOPY N/A 2025    Procedure: ESOPHAGOGASTRODUODENOSCOPY with biopsy x 1 area and dilatation (48FR non-guided bougie);  Surgeon: Carlos Calero MD;  Location: [facility] ENDOSCOPY;  Service: Gastroenterology;  Laterality: N/A;  post op: esophageal stricture    LUNG BIOPSY         Family History   Problem Relation Age of Onset    Dementia Mother     Diabetes Mother     Arrhythmia Mother         Afib    Breast cancer Sister 74    Cancer Sister         Breast cancer       Social History     Socioeconomic History    Marital status:    Tobacco Use    Smoking status: Former     Current packs/day: 0.00     Average packs/day: 1 pack/day for 32.0 years (32.0 ttl pk-yrs)     Types: Cigarettes     Start date:      Quit date:      Years since quittin.5     Passive exposure: Past    Smokeless tobacco: Never   Vaping Use    Vaping status: Never Used   Substance and Sexual Activity    Alcohol use: Yes     Alcohol/week: 1.0 standard drink of alcohol     Types: 1 Cans of beer per week    Drug use: Never    Sexual activity: Not Currently     Partners: Female     Birth control/protection: None         Current Facility-Administered Medications:     ampicillin-sulbactam (UNASYN) 3 g in sodium chloride 0.9 % 100 mL MBP, 3 g, Intravenous, Q6H, Green, Laura N, APRN, 3 g at 25 1546    sennosides-docusate (PERICOLACE) 8.6-50 MG per tablet 2 tablet, 2 tablet, Oral, BID PRN **AND** polyethylene glycol (MIRALAX) packet 17 g, 17 g, Oral, Daily PRN **AND** bisacodyl (DULCOLAX) EC tablet 5 mg, 5 mg, Oral, Daily PRN **AND** bisacodyl (DULCOLAX) suppository 10 mg, 10 mg, Rectal, Daily PRN, Green, Laura N, APRN    heparin 17498 units/250 mL (100 units/mL) in 0.45 % NaCl infusion, 18  Units/kg/hr, Intravenous, Titrated, Evelyn Felder MD, Last Rate: 13.46 mL/hr at 06/30/25 0949, 18 Units/kg/hr at 06/30/25 0949    heparin bolus from bag solution 2,000 Units, 2,000 Units, Intravenous, PRN, Ce Morgan, APRN    heparin bolus from bag solution 4,000 Units, 4,000 Units, Intravenous, PRN, Ce Morgan, APRN, 4,000 Units at 06/30/25 0927    hydrALAZINE (APRESOLINE) injection 10 mg, 10 mg, Intravenous, Q6H PRN, Laura Portillo, APRN, 10 mg at 06/29/25 2338    Hydrocortisone Sod Suc (PF) (Solu-CORTEF) injection 50 mg, 50 mg, Intravenous, Q8H, DrawKelvin MD, 50 mg at 06/30/25 1546    HYDROmorphone (DILAUDID) injection 0.5 mg, 0.5 mg, Intravenous, Q2H PRN, Evelyn Felder MD, 0.5 mg at 06/30/25 1053    ipratropium-albuterol (DUO-NEB) nebulizer solution 3 mL, 3 mL, Nebulization, Q6H PRN, Laura Portillo, APRN, 3 mL at 06/30/25 0109    metoprolol tartrate (LOPRESSOR) injection 2.5 mg, 2.5 mg, Intravenous, Q8H, Laura Portillo N, APRN, 2.5 mg at 06/30/25 1547    nitroglycerin (NITROSTAT) SL tablet 0.4 mg, 0.4 mg, Sublingual, Q5 Min PRN, Laura Portillo N, APRN    ondansetron ODT (ZOFRAN-ODT) disintegrating tablet 4 mg, 4 mg, Oral, Q6H PRN **OR** ondansetron (ZOFRAN) injection 4 mg, 4 mg, Intravenous, Q6H PRN, Susana Portillohel N, APRN    pantoprazole (PROTONIX) injection 40 mg, 40 mg, Intravenous, Q AM, Laura Portillo N, APRN, 40 mg at 06/30/25 0622    Pharmacy To Dose: Ampicillin-sulbactam, , Not Applicable, Continuous PRN, Laura Portillo, APRN    [COMPLETED] Insert Peripheral IV, , , Once **AND** sodium chloride 0.9 % flush 10 mL, 10 mL, Intravenous, PRN, Ce Morgan, APRN    sodium chloride 0.9 % flush 10 mL, 10 mL, Intravenous, Q12H, Laura Portillo, APRN, 10 mL at 06/30/25 0943    sodium chloride 0.9 % flush 10 mL, 10 mL, Intravenous, PRN, Laura Portillo N, APRN    sodium chloride 0.9 % infusion 40 mL, 40 mL, Intravenous, PRN, Laura Portillo, APRN     No Known Allergies       "    Objective    OBJECTIVE:     VITAL SIGNS:  BP (!) 140/122   Pulse 78   Temp 98.5 °F (36.9 °C) (Oral)   Resp 20   Ht 180.3 cm (71\")   Wt 74.8 kg (164 lb 12.8 oz)   SpO2 93%   BMI 22.98 kg/m²     PHYSICAL EXAM:  Normal appearance.   Head is normocephalic.   Nose appears normal.   No obvious deformity of the mouth and throat.  Conjunctivae normal.   Heart rate and rhythm is normal.  Pulmonary effort is normal.   Moving all 4 extremities.  Extremities warm.  No focal deficit present.   He is alert and oriented to person, place, and time.     LAB RESULTS:  I have reviewed all the available laboratory results in the chart.    RESULTS REVIEW:  I have reviewed the patient's all relevant laboratory and imaging findings.           ASSESSMENT & PLAN:  Young Ames is a 74 y.o. male with significant medical conditions as mentioned above presented to the hospital with problem mentioned in history of presenting illness.    Recurrent left pleural effusion  Plan for Pleurx catheter placement.  The risk and benefit of the procedure were discussed in detail.    I discussed the patient's findings and my recommendations with the patient. The patient was given adequate time to ask questions and all questions were answered to patient satisfaction.     Van Cope MD  Thoracic Surgeon  [facility]        Dictated utilizing Dragon dictation    I spent 45 minutes caring for Young on this date of service. This time includes time spent by me in the following activities: reviewing tests, obtaining and/or reviewing a separately obtained history, performing a medically appropriate examination and/or evaluation , counseling and educating the patient/family/caregiver, ordering medications, tests, or procedures, referring and communicating with other health care professionals , documenting information in the medical record, independently interpreting results and communicating that information with the " patient/family/caregiver, and care coordination and more than half the time was spent in direct face to face evaluation and decision making.

## 2025-07-01 ENCOUNTER — APPOINTMENT (OUTPATIENT)
Dept: GENERAL RADIOLOGY | Facility: HOSPITAL | Age: 75
End: 2025-07-01
Payer: MEDICARE

## 2025-07-01 ENCOUNTER — ANESTHESIA (OUTPATIENT)
Dept: PERIOP | Facility: HOSPITAL | Age: 75
End: 2025-07-01
Payer: MEDICARE

## 2025-07-01 LAB
ABO GROUP BLD: NORMAL
ANION GAP SERPL CALCULATED.3IONS-SCNC: 15 MMOL/L (ref 5–15)
APTT PPP: 110.7 SECONDS (ref 22.7–35.4)
APTT PPP: 27.7 SECONDS (ref 22.7–35.4)
APTT PPP: 37 SECONDS (ref 22.7–35.4)
BASOPHILS # BLD AUTO: 0.01 10*3/MM3 (ref 0–0.2)
BASOPHILS NFR BLD AUTO: 0.1 % (ref 0–1.5)
BLD GP AB SCN SERPL QL: NEGATIVE
BUN SERPL-MCNC: 22.1 MG/DL (ref 8–23)
BUN/CREAT SERPL: 15 (ref 7–25)
CALCIUM SPEC-SCNC: 7.9 MG/DL (ref 8.6–10.5)
CHLORIDE SERPL-SCNC: 101 MMOL/L (ref 98–107)
CO2 SERPL-SCNC: 24 MMOL/L (ref 22–29)
CREAT SERPL-MCNC: 1.47 MG/DL (ref 0.76–1.27)
DEPRECATED RDW RBC AUTO: 47.1 FL (ref 37–54)
EGFRCR SERPLBLD CKD-EPI 2021: 49.4 ML/MIN/1.73
EOSINOPHIL # BLD AUTO: 0 10*3/MM3 (ref 0–0.4)
EOSINOPHIL NFR BLD AUTO: 0 % (ref 0.3–6.2)
ERYTHROCYTE [DISTWIDTH] IN BLOOD BY AUTOMATED COUNT: 13.9 % (ref 12.3–15.4)
GLUCOSE SERPL-MCNC: 136 MG/DL (ref 65–99)
HCT VFR BLD AUTO: 22.4 % (ref 37.5–51)
HCT VFR BLD AUTO: 34.6 % (ref 37.5–51)
HGB BLD-MCNC: 11.4 G/DL (ref 13–17.7)
HGB BLD-MCNC: 7 G/DL (ref 13–17.7)
IMM GRANULOCYTES # BLD AUTO: 0.22 10*3/MM3 (ref 0–0.05)
IMM GRANULOCYTES NFR BLD AUTO: 1.9 % (ref 0–0.5)
LYMPHOCYTES # BLD AUTO: 1.02 10*3/MM3 (ref 0.7–3.1)
LYMPHOCYTES NFR BLD AUTO: 8.7 % (ref 19.6–45.3)
MCH RBC QN AUTO: 29.9 PG (ref 26.6–33)
MCHC RBC AUTO-ENTMCNC: 31.3 G/DL (ref 31.5–35.7)
MCV RBC AUTO: 95.7 FL (ref 79–97)
MONOCYTES # BLD AUTO: 0.47 10*3/MM3 (ref 0.1–0.9)
MONOCYTES NFR BLD AUTO: 4 % (ref 5–12)
NEUTROPHILS NFR BLD AUTO: 85.3 % (ref 42.7–76)
NEUTROPHILS NFR BLD AUTO: 9.95 10*3/MM3 (ref 1.7–7)
NRBC BLD AUTO-RTO: 0 /100 WBC (ref 0–0.2)
PLATELET # BLD AUTO: 163 10*3/MM3 (ref 140–450)
PMV BLD AUTO: 9.2 FL (ref 6–12)
POTASSIUM SERPL-SCNC: 3.7 MMOL/L (ref 3.5–5.2)
RBC # BLD AUTO: 2.34 10*6/MM3 (ref 4.14–5.8)
RH BLD: POSITIVE
SODIUM SERPL-SCNC: 140 MMOL/L (ref 136–145)
T&S EXPIRATION DATE: NORMAL
WBC NRBC COR # BLD AUTO: 11.67 10*3/MM3 (ref 3.4–10.8)

## 2025-07-01 PROCEDURE — 25010000002 PROPOFOL 1000 MG/100ML EMULSION: Performed by: NURSE ANESTHETIST, CERTIFIED REGISTERED

## 2025-07-01 PROCEDURE — 0W9B30Z DRAINAGE OF LEFT PLEURAL CAVITY WITH DRAINAGE DEVICE, PERCUTANEOUS APPROACH: ICD-10-PCS | Performed by: SURGERY

## 2025-07-01 PROCEDURE — 25010000002 PROPOFOL 200 MG/20ML EMULSION: Performed by: NURSE ANESTHETIST, CERTIFIED REGISTERED

## 2025-07-01 PROCEDURE — 86850 RBC ANTIBODY SCREEN: CPT | Performed by: INTERNAL MEDICINE

## 2025-07-01 PROCEDURE — 86900 BLOOD TYPING SEROLOGIC ABO: CPT

## 2025-07-01 PROCEDURE — 25010000002 LIDOCAINE 1 % SOLUTION: Performed by: SURGERY

## 2025-07-01 PROCEDURE — 94762 N-INVAS EAR/PLS OXIMTRY CONT: CPT

## 2025-07-01 PROCEDURE — 86900 BLOOD TYPING SEROLOGIC ABO: CPT | Performed by: INTERNAL MEDICINE

## 2025-07-01 PROCEDURE — 86901 BLOOD TYPING SEROLOGIC RH(D): CPT

## 2025-07-01 PROCEDURE — 86901 BLOOD TYPING SEROLOGIC RH(D): CPT | Performed by: INTERNAL MEDICINE

## 2025-07-01 PROCEDURE — 94799 UNLISTED PULMONARY SVC/PX: CPT

## 2025-07-01 PROCEDURE — 80048 BASIC METABOLIC PNL TOTAL CA: CPT

## 2025-07-01 PROCEDURE — 85018 HEMOGLOBIN: CPT | Performed by: INTERNAL MEDICINE

## 2025-07-01 PROCEDURE — 71045 X-RAY EXAM CHEST 1 VIEW: CPT

## 2025-07-01 PROCEDURE — 25010000002 HYDROCORTISONE SOD SUC (PF) 100 MG RECONSTITUTED SOLUTION: Performed by: INTERNAL MEDICINE

## 2025-07-01 PROCEDURE — C1729 CATH, DRAINAGE: HCPCS | Performed by: SURGERY

## 2025-07-01 PROCEDURE — 36430 TRANSFUSION BLD/BLD COMPNT: CPT

## 2025-07-01 PROCEDURE — 85014 HEMATOCRIT: CPT | Performed by: INTERNAL MEDICINE

## 2025-07-01 PROCEDURE — 25010000002 HYDROMORPHONE PER 4 MG: Performed by: INTERNAL MEDICINE

## 2025-07-01 PROCEDURE — 92610 EVALUATE SWALLOWING FUNCTION: CPT

## 2025-07-01 PROCEDURE — 25010000002 FENTANYL CITRATE (PF) 50 MCG/ML SOLUTION: Performed by: NURSE ANESTHETIST, CERTIFIED REGISTERED

## 2025-07-01 PROCEDURE — 25010000002 METOPROLOL TARTRATE 5 MG/5ML SOLUTION: Performed by: INTERNAL MEDICINE

## 2025-07-01 PROCEDURE — 25010000002 ENOXAPARIN PER 10 MG: Performed by: NURSE PRACTITIONER

## 2025-07-01 PROCEDURE — 32550 INSERT PLEURAL CATH: CPT | Performed by: SURGERY

## 2025-07-01 PROCEDURE — 85025 COMPLETE CBC W/AUTO DIFF WBC: CPT | Performed by: NURSE PRACTITIONER

## 2025-07-01 PROCEDURE — 25010000002 AMPICILLIN-SULBACTAM PER 1.5 G

## 2025-07-01 PROCEDURE — 25010000002 HYDRALAZINE PER 20 MG

## 2025-07-01 PROCEDURE — 25810000003 LACTATED RINGERS PER 1000 ML: Performed by: NURSE ANESTHETIST, CERTIFIED REGISTERED

## 2025-07-01 PROCEDURE — P9016 RBC LEUKOCYTES REDUCED: HCPCS

## 2025-07-01 PROCEDURE — 85730 THROMBOPLASTIN TIME PARTIAL: CPT | Performed by: NURSE PRACTITIONER

## 2025-07-01 PROCEDURE — 85730 THROMBOPLASTIN TIME PARTIAL: CPT | Performed by: INTERNAL MEDICINE

## 2025-07-01 PROCEDURE — 86923 COMPATIBILITY TEST ELECTRIC: CPT

## 2025-07-01 PROCEDURE — 25010000002 AMPICILLIN-SULBACTAM PER 1.5 G: Performed by: INTERNAL MEDICINE

## 2025-07-01 PROCEDURE — 25010000002 LIDOCAINE PF 2% 2 % SOLUTION: Performed by: NURSE ANESTHETIST, CERTIFIED REGISTERED

## 2025-07-01 RX ORDER — ENOXAPARIN SODIUM 100 MG/ML
1 INJECTION SUBCUTANEOUS EVERY 12 HOURS SCHEDULED
Status: DISCONTINUED | OUTPATIENT
Start: 2025-07-01 | End: 2025-07-03 | Stop reason: HOSPADM

## 2025-07-01 RX ORDER — IPRATROPIUM BROMIDE AND ALBUTEROL SULFATE 2.5; .5 MG/3ML; MG/3ML
3 SOLUTION RESPIRATORY (INHALATION) ONCE AS NEEDED
Status: DISCONTINUED | OUTPATIENT
Start: 2025-07-01 | End: 2025-07-01 | Stop reason: HOSPADM

## 2025-07-01 RX ORDER — ONDANSETRON 2 MG/ML
4 INJECTION INTRAMUSCULAR; INTRAVENOUS EVERY 6 HOURS PRN
Status: DISCONTINUED | OUTPATIENT
Start: 2025-07-01 | End: 2025-07-03 | Stop reason: HOSPADM

## 2025-07-01 RX ORDER — HYDRALAZINE HYDROCHLORIDE 20 MG/ML
5 INJECTION INTRAMUSCULAR; INTRAVENOUS
Status: DISCONTINUED | OUTPATIENT
Start: 2025-07-01 | End: 2025-07-01 | Stop reason: HOSPADM

## 2025-07-01 RX ORDER — NALOXONE HCL 0.4 MG/ML
0.4 VIAL (ML) INJECTION AS NEEDED
Status: DISCONTINUED | OUTPATIENT
Start: 2025-07-01 | End: 2025-07-01 | Stop reason: HOSPADM

## 2025-07-01 RX ORDER — LABETALOL HYDROCHLORIDE 5 MG/ML
5 INJECTION, SOLUTION INTRAVENOUS
Status: DISCONTINUED | OUTPATIENT
Start: 2025-07-01 | End: 2025-07-01 | Stop reason: HOSPADM

## 2025-07-01 RX ORDER — DIPHENHYDRAMINE HYDROCHLORIDE 50 MG/ML
12.5 INJECTION, SOLUTION INTRAMUSCULAR; INTRAVENOUS
Status: DISCONTINUED | OUTPATIENT
Start: 2025-07-01 | End: 2025-07-01 | Stop reason: HOSPADM

## 2025-07-01 RX ORDER — EPHEDRINE SULFATE 5 MG/ML
5 INJECTION INTRAVENOUS ONCE AS NEEDED
Status: DISCONTINUED | OUTPATIENT
Start: 2025-07-01 | End: 2025-07-01 | Stop reason: HOSPADM

## 2025-07-01 RX ORDER — IPRATROPIUM BROMIDE AND ALBUTEROL SULFATE 2.5; .5 MG/3ML; MG/3ML
3 SOLUTION RESPIRATORY (INHALATION) EVERY 6 HOURS PRN
Status: DISCONTINUED | OUTPATIENT
Start: 2025-07-01 | End: 2025-07-03 | Stop reason: HOSPADM

## 2025-07-01 RX ORDER — SODIUM CHLORIDE, SODIUM LACTATE, POTASSIUM CHLORIDE, CALCIUM CHLORIDE 600; 310; 30; 20 MG/100ML; MG/100ML; MG/100ML; MG/100ML
INJECTION, SOLUTION INTRAVENOUS CONTINUOUS PRN
Status: DISCONTINUED | OUTPATIENT
Start: 2025-07-01 | End: 2025-07-01 | Stop reason: SURG

## 2025-07-01 RX ORDER — PROPOFOL 10 MG/ML
INJECTION, EMULSION INTRAVENOUS CONTINUOUS PRN
Status: DISCONTINUED | OUTPATIENT
Start: 2025-07-01 | End: 2025-07-01 | Stop reason: SURG

## 2025-07-01 RX ORDER — ONDANSETRON 2 MG/ML
4 INJECTION INTRAMUSCULAR; INTRAVENOUS ONCE AS NEEDED
Status: DISCONTINUED | OUTPATIENT
Start: 2025-07-01 | End: 2025-07-01 | Stop reason: HOSPADM

## 2025-07-01 RX ORDER — FLUMAZENIL 0.1 MG/ML
0.2 INJECTION INTRAVENOUS AS NEEDED
Status: DISCONTINUED | OUTPATIENT
Start: 2025-07-01 | End: 2025-07-01 | Stop reason: HOSPADM

## 2025-07-01 RX ORDER — ONDANSETRON 4 MG/1
4 TABLET, ORALLY DISINTEGRATING ORAL EVERY 6 HOURS PRN
Status: DISCONTINUED | OUTPATIENT
Start: 2025-07-01 | End: 2025-07-03 | Stop reason: HOSPADM

## 2025-07-01 RX ORDER — PROPOFOL 10 MG/ML
INJECTION, EMULSION INTRAVENOUS AS NEEDED
Status: DISCONTINUED | OUTPATIENT
Start: 2025-07-01 | End: 2025-07-01 | Stop reason: SURG

## 2025-07-01 RX ORDER — LIDOCAINE HYDROCHLORIDE 20 MG/ML
INJECTION, SOLUTION EPIDURAL; INFILTRATION; INTRACAUDAL; PERINEURAL AS NEEDED
Status: DISCONTINUED | OUTPATIENT
Start: 2025-07-01 | End: 2025-07-01 | Stop reason: SURG

## 2025-07-01 RX ORDER — OXYCODONE HYDROCHLORIDE 5 MG/1
5 TABLET ORAL ONCE AS NEEDED
Refills: 0 | Status: DISCONTINUED | OUTPATIENT
Start: 2025-07-01 | End: 2025-07-01 | Stop reason: HOSPADM

## 2025-07-01 RX ORDER — LEVOTHYROXINE SODIUM 75 UG/1
75 TABLET ORAL EVERY MORNING
Status: DISCONTINUED | OUTPATIENT
Start: 2025-07-02 | End: 2025-07-03 | Stop reason: HOSPADM

## 2025-07-01 RX ORDER — DIPHENHYDRAMINE HYDROCHLORIDE 50 MG/ML
12.5 INJECTION, SOLUTION INTRAMUSCULAR; INTRAVENOUS ONCE AS NEEDED
Status: DISCONTINUED | OUTPATIENT
Start: 2025-07-01 | End: 2025-07-01 | Stop reason: HOSPADM

## 2025-07-01 RX ORDER — FENTANYL CITRATE 50 UG/ML
50 INJECTION, SOLUTION INTRAMUSCULAR; INTRAVENOUS
Status: DISCONTINUED | OUTPATIENT
Start: 2025-07-01 | End: 2025-07-01 | Stop reason: HOSPADM

## 2025-07-01 RX ORDER — LIDOCAINE HYDROCHLORIDE 10 MG/ML
INJECTION, SOLUTION INFILTRATION; PERINEURAL AS NEEDED
Status: DISCONTINUED | OUTPATIENT
Start: 2025-07-01 | End: 2025-07-01 | Stop reason: HOSPADM

## 2025-07-01 RX ORDER — OXYCODONE HYDROCHLORIDE 5 MG/1
10 TABLET ORAL EVERY 4 HOURS PRN
Refills: 0 | Status: COMPLETED | OUTPATIENT
Start: 2025-07-01 | End: 2025-07-01

## 2025-07-01 RX ADMIN — SODIUM CHLORIDE, SODIUM LACTATE, POTASSIUM CHLORIDE, AND CALCIUM CHLORIDE: .6; .31; .03; .02 INJECTION, SOLUTION INTRAVENOUS at 07:16

## 2025-07-01 RX ADMIN — Medication 10 ML: at 22:51

## 2025-07-01 RX ADMIN — FENTANYL CITRATE 50 MCG: 50 INJECTION, SOLUTION INTRAMUSCULAR; INTRAVENOUS at 08:40

## 2025-07-01 RX ADMIN — OXYCODONE 10 MG: 5 TABLET ORAL at 08:40

## 2025-07-01 RX ADMIN — METOPROLOL TARTRATE 2.5 MG: 5 INJECTION INTRAVENOUS at 12:56

## 2025-07-01 RX ADMIN — DEXMEDETOMIDINE HYDROCHLORIDE 4 MCG: 400 INJECTION INTRAVENOUS at 07:22

## 2025-07-01 RX ADMIN — PROPOFOL 40 MG: 10 INJECTION, EMULSION INTRAVENOUS at 07:22

## 2025-07-01 RX ADMIN — PANTOPRAZOLE SODIUM 40 MG: 40 INJECTION, POWDER, FOR SOLUTION INTRAVENOUS at 05:45

## 2025-07-01 RX ADMIN — DEXMEDETOMIDINE HYDROCHLORIDE 4 MCG: 400 INJECTION INTRAVENOUS at 07:37

## 2025-07-01 RX ADMIN — AMPICILLIN SODIUM AND SULBACTAM SODIUM 3 G: 2; 1 INJECTION, POWDER, FOR SOLUTION INTRAMUSCULAR; INTRAVENOUS at 22:51

## 2025-07-01 RX ADMIN — DEXMEDETOMIDINE HYDROCHLORIDE 4 MCG: 400 INJECTION INTRAVENOUS at 07:25

## 2025-07-01 RX ADMIN — AMPICILLIN SODIUM AND SULBACTAM SODIUM 3 G: 2; 1 INJECTION, POWDER, FOR SOLUTION INTRAMUSCULAR; INTRAVENOUS at 04:47

## 2025-07-01 RX ADMIN — AMPICILLIN SODIUM AND SULBACTAM SODIUM 3 G: 2; 1 INJECTION, POWDER, FOR SOLUTION INTRAMUSCULAR; INTRAVENOUS at 15:34

## 2025-07-01 RX ADMIN — LIDOCAINE HYDROCHLORIDE 40 MG: 20 INJECTION, SOLUTION EPIDURAL; INFILTRATION; INTRACAUDAL; PERINEURAL at 07:22

## 2025-07-01 RX ADMIN — HYDRALAZINE HYDROCHLORIDE 10 MG: 20 INJECTION INTRAMUSCULAR; INTRAVENOUS at 00:36

## 2025-07-01 RX ADMIN — PROPOFOL 150 MCG/KG/MIN: 10 INJECTION, EMULSION INTRAVENOUS at 07:22

## 2025-07-01 RX ADMIN — AMPICILLIN SODIUM AND SULBACTAM SODIUM 3 G: 2; 1 INJECTION, POWDER, FOR SOLUTION INTRAMUSCULAR; INTRAVENOUS at 12:56

## 2025-07-01 RX ADMIN — METOPROLOL TARTRATE 2.5 MG: 5 INJECTION INTRAVENOUS at 22:50

## 2025-07-01 RX ADMIN — HYDROMORPHONE HYDROCHLORIDE 0.5 MG: 1 INJECTION, SOLUTION INTRAMUSCULAR; INTRAVENOUS; SUBCUTANEOUS at 22:49

## 2025-07-01 RX ADMIN — HYDROCORTISONE SODIUM SUCCINATE 50 MG: 100 INJECTION, POWDER, FOR SOLUTION INTRAMUSCULAR; INTRAVENOUS at 15:34

## 2025-07-01 RX ADMIN — ENOXAPARIN SODIUM 70 MG: 100 INJECTION SUBCUTANEOUS at 15:34

## 2025-07-01 RX ADMIN — HYDROMORPHONE HYDROCHLORIDE 0.5 MG: 1 INJECTION, SOLUTION INTRAMUSCULAR; INTRAVENOUS; SUBCUTANEOUS at 16:37

## 2025-07-01 RX ADMIN — HYDROCORTISONE SODIUM SUCCINATE 50 MG: 100 INJECTION, POWDER, FOR SOLUTION INTRAMUSCULAR; INTRAVENOUS at 01:48

## 2025-07-01 RX ADMIN — METOPROLOL TARTRATE 2.5 MG: 5 INJECTION INTRAVENOUS at 16:37

## 2025-07-01 NOTE — PROGRESS NOTES
LOS: 2 days   Patient Care Team:  Abner Funes APRN as PCP - General (Family Medicine)  Brittany Canchola, RN as Nurse Navigator  Arben Tim MD as Consulting Physician (Hematology and Oncology)  Aman Gregory MD as Consulting Physician (Nephrology)    Subjective     Patient denies any complaints s/p pleurx cath placement    Review of Systems   Constitutional:  Positive for activity change.   HENT: Negative.     Respiratory: Negative.     Cardiovascular: Negative.    Gastrointestinal: Negative.    Genitourinary: Negative.    Musculoskeletal: Negative.    Neurological:  Positive for weakness.   Psychiatric/Behavioral: Negative.             Objective     Vital Signs  Temp:  [97.4 °F (36.3 °C)-98.6 °F (37 °C)] 98.6 °F (37 °C)  Heart Rate:  [75-93] 78  Resp:  [11-22] 15  BP: (112-165)/() 157/92      Physical Exam  Vitals reviewed.   Constitutional:       Appearance: He is not ill-appearing.   HENT:      Head: Normocephalic and atraumatic.      Right Ear: External ear normal.      Left Ear: External ear normal.      Nose: Nose normal.      Mouth/Throat:      Mouth: Mucous membranes are dry.   Eyes:      General:         Right eye: No discharge.         Left eye: No discharge.   Cardiovascular:      Rate and Rhythm: Normal rate and regular rhythm.      Pulses: Normal pulses.      Heart sounds: Normal heart sounds.   Pulmonary:      Effort: Pulmonary effort is normal.      Breath sounds: Normal breath sounds.   Abdominal:      General: Bowel sounds are normal.      Palpations: Abdomen is soft.   Musculoskeletal:         General: Normal range of motion.      Cervical back: Normal range of motion.   Skin:     General: Skin is warm and dry.   Neurological:      Mental Status: He is alert and oriented to person, place, and time.   Psychiatric:         Behavior: Behavior normal.              Results Review:    Lab Results (last 24 hours)       Procedure Component Value Units Date/Time    aPTT [[id number]]   (Abnormal) Collected: 07/01/25 0544    Specimen: Blood from Arm, Right Updated: 07/01/25 0717     PTT 37.0 seconds     CBC & Differential [[id number]]  (Abnormal) Collected: 07/01/25 0544    Specimen: Blood from Arm, Right Updated: 07/01/25 0716    Narrative:      The following orders were created for panel order CBC & Differential.  Procedure                               Abnormality         Status                     ---------                               -----------         ------                     CBC Auto Differential[[id number]]        Abnormal            Final result                 Please view results for these tests on the individual orders.    CBC Auto Differential [[id number]]  (Abnormal) Collected: 07/01/25 0544    Specimen: Blood from Arm, Right Updated: 07/01/25 0716     WBC 11.67 10*3/mm3      RBC 2.34 10*6/mm3      Hemoglobin 7.0 g/dL      Comment: Result checked          Hematocrit 22.4 %      MCV 95.7 fL      MCH 29.9 pg      MCHC 31.3 g/dL      RDW 13.9 %      RDW-SD 47.1 fl      MPV 9.2 fL      Platelets 163 10*3/mm3      Neutrophil % 85.3 %      Lymphocyte % 8.7 %      Monocyte % 4.0 %      Eosinophil % 0.0 %      Basophil % 0.1 %      Immature Grans % 1.9 %      Neutrophils, Absolute 9.95 10*3/mm3      Lymphocytes, Absolute 1.02 10*3/mm3      Monocytes, Absolute 0.47 10*3/mm3      Eosinophils, Absolute 0.00 10*3/mm3      Basophils, Absolute 0.01 10*3/mm3      Immature Grans, Absolute 0.22 10*3/mm3      nRBC 0.0 /100 WBC     Basic Metabolic Panel [[id number]]  (Abnormal) Collected: 07/01/25 0452    Specimen: Blood from Arm, Right Updated: 07/01/25 0550     Glucose 136 mg/dL      BUN 22.1 mg/dL      Creatinine 1.47 mg/dL      Sodium 140 mmol/L      Potassium 3.7 mmol/L      Chloride 101 mmol/L      CO2 24.0 mmol/L      Calcium 7.9 mg/dL      BUN/Creatinine Ratio 15.0     Anion Gap 15.0 mmol/L      eGFR 49.4 mL/min/1.73     Narrative:      GFR Categories in Chronic Kidney Disease (CKD)               GFR Category          GFR (mL/min/1.73)    Interpretation  G1                    90 or greater        Normal or high (1)  G2                    60-89                Mild decrease (1)  G3a                   45-59                Mild to moderate decrease  G3b                   30-44                Moderate to severe decrease  G4                    15-29                Severe decrease  G5                    14 or less           Kidney failure    (1)In the absence of evidence of kidney disease, neither GFR category G1 or G2 fulfill the criteria for CKD.    eGFR calculation 2021 CKD-EPI creatinine equation, which does not include race as a factor    aPTT [[id number]]  (Abnormal) Collected: 06/30/25 2353    Specimen: Blood Updated: 07/01/25 0010     .7 seconds     aPTT [[id number]]  (Abnormal) Collected: 06/30/25 1859    Specimen: Blood from Arm, Right Updated: 06/30/25 1934     PTT 77.8 seconds     aPTT [[id number]]  (Abnormal) Collected: 06/30/25 1605    Specimen: Blood Updated: 06/30/25 1639     PTT >200.0 seconds     Blood Culture - Blood, Arm, Left [[id number]]  (Normal) Collected: 06/29/25 1549    Specimen: Blood from Arm, Left Updated: 06/30/25 1600     Blood Culture No growth at 24 hours    Blood Culture - Blood, Arm, Left [[id number]]  (Normal) Collected: 06/29/25 1529    Specimen: Blood from Arm, Left Updated: 06/30/25 1545     Blood Culture No growth at 24 hours             Imaging Results (Last 24 Hours)       Procedure Component Value Units Date/Time    XR Chest 1 View [[id number]] Collected: 07/01/25 0841     Updated: 07/01/25 0847    Narrative:      XR CHEST 1 VW    Date of Exam: 7/1/2025 8:20 AM EDT    Indication: assess for effusion    Comparison: CT chest 6/29/2025    Findings:  Right upper extremity PICC line terminates near cavoatrial junction. Cardiomegaly. Small decreasing left and small mildly loculated right pleural effusions, on the right extending into intrapulmonary fissures. There is  "improved aeration of the left lower   lobe. Combination of smooth appearing interstitial thickening and reticulonodular interstitial thickening suspicious for combination of edema and infectious process. Underlying emphysema. Degenerative related osseous change. Left thoracotomy tube. No   visualized pneumothorax.      Impression:      Impression:  1. Improved left effusion and lung aeration status post thoracotomy tube placement. No pneumothorax.  2. Cardiomegaly with residual small bilateral pleural effusions and interstitial pulmonary edema. Right-sided effusion is mildly loculated.  3. Superimposed more reticulonodular appearing airspace opacities suspicious for infectious process, such as bronchiolitis, aspiration or early bronchopneumonia.      Electronically Signed: Emmanuel Flores MD    7/1/2025 8:45 AM EDT    Workstation ID: [id number]    IR Replacement PICC Without Port Same Site [[id number]] Collected: 06/30/25 1331     Updated: 06/30/25 1338    Narrative:      IR REPLACEMENT PICC WO PORT SAME SITE    Date of Exam: 6/30/2025 12:32 PM EDT    Indication: PICC line \"kinked\". Repositioning or exchange requested.    Comparison: None available.    Description of procedure: The procedure, its benefits, risks and alternatives were explained to the patient and informed consent was obtained after answering the patient's questions. A time-out was performed and the patient's name, date of birth,   procedure and correct site of procedure were verified.    The patient was placed supine on the fluoroscopy table. The right upper extremity PICC site and skin site were prepped and draped using maximum sterile barrier technique. The PICC tip was evaluated under fluoroscopy which demonstrates interval further   migration of the PICC tip entirely into the right internal jugular vein. Lidocaine was used for local anesthesia around the catheter entry site. The existing catheter was cannulated with an 018 measuring wire, and the " malpositioned catheter removed over   the wire. A new 4 [place] PICC was cut to an appropriate length and then advanced over the wire to the mid right atrial level. The new catheter was made approximately 2 cm longer in an effort to prevent remigration, although it was discussed with the   patient that recurrent migration can occur, particularly if there is forceful injection of the catheter (for example during administration of power-injected CT contrast).    The new catheter was secured in place at the skin site using 0 Prolene suture material. Sterile dressing was applied. The catheter flushes and aspirates appropriately.      Impression:      Impression:  Successful exchange and repositioning of right upper extremity PICC, as described above.        Electronically Signed: Sean Buckley MD    6/30/2025 1:36 PM EDT    Workstation ID: [id number]                 I reviewed the patient's new clinical results.    Medication Review:   Scheduled Meds:ampicillin-sulbactam, 3 g, Intravenous, Q6H  hydrocortisone sodium succinate, 50 mg, Intravenous, Q8H  metoprolol tartrate, 2.5 mg, Intravenous, Q8H  pantoprazole, 40 mg, Intravenous, Q AM  sodium chloride, 10 mL, Intravenous, Q12H      Continuous Infusions:heparin, 18 Units/kg/hr, Last Rate: Stopped (07/01/25 0401)  Pharmacy to Dose enoxaparin (LOVENOX),   Pharmacy To Dose:,       PRN Meds:.  senna-docusate sodium **AND** polyethylene glycol **AND** bisacodyl **AND** bisacodyl    heparin    heparin    hydrALAZINE    HYDROmorphone    ipratropium-albuterol    nitroglycerin    ondansetron ODT **OR** ondansetron    ondansetron ODT **OR** ondansetron    Pharmacy to Dose enoxaparin (LOVENOX)    Pharmacy To Dose:    [COMPLETED] Insert Peripheral IV **AND** sodium chloride    sodium chloride    sodium chloride     Interval History:    Assessment & Plan   Patient with pmh of lung cancer, pAfib on home Eliquis,who presents with difficulty swallowing since Friday and unable to keep meds  and food down,  shortness of breath and increased leg swelling. O2 sats were charted at 75% upon arrival to the ER. Last month, he was treated for pneumonia, had a Left thoracentesis with nearly a liter out, and EGD with dilatation with notes Schroeder's esophagus. This admission left pleural effusion, PE without heart strain, pneumonia. He has had lam cath at home for urinary retention and following urology outpatient     Plan of care    Pulmonary embolism  Pleural effusion  Dysphagia  Food impaction  Hypoxia  Bilateral leg edema  Elevated PNP  Pneumonia  Stage IV adenocarcinoma of lung, right  Stage IV adenocarcinoma of lung, left  CKD (chronic kidney disease) stage 4, GFR 15-29 ml/min  Paroxysmal atrial fibrillation  Chronic anticoagulation  Hypothyroidism (acquired)    Plan of care  -Will place on lovenox full dose; await review from Dr Tim whether to change anticoagulation due to PE formed while on eliquis.   -patient s/p EGD for food impaction/continue soft diet  -s/p pleurx catheter for left effusion  -bilateral dopplers negative for PE  -Unasyn for pna  -prn meds for htn       Plan for disposition:Home with CARL Bo  07/01/25  12:36 EDT

## 2025-07-01 NOTE — THERAPY EVALUATION
Acute Care - Speech Language Pathology   Swallow Initial Evaluation  Janes     Patient Name: Young Ames  : 1950  MRN: [id number]  Today's Date: 2025               Admit Date: 2025    Visit Dx:     ICD-10-CM ICD-9-CM   1. Pleural effusion  J90 511.9   2. Multiple subsegmental pulmonary emboli without acute cor pulmonale  I26.94 415.19   3. Pneumonia due to infectious organism, unspecified laterality, unspecified part of lung  J18.9 486   4. Dysphagia, unspecified type  R13.10 787.20   5. Lower extremity edema  R60.0 782.3   6. Food impaction of esophagus, initial encounter  T18.128A 935.1    W44.F3XA      Patient Active Problem List   Diagnosis    Chest pain    Pneumonia due to infectious organism    Pneumonia    NSTEMI, initial episode of care    Pleural effusion    Stage IV adenocarcinoma of lung, right    Stage IV adenocarcinoma of lung, left    CKD (chronic kidney disease) stage 4, GFR 15-29 ml/min    Hydronephrosis    Paroxysmal atrial fibrillation    Chronic anticoagulation    Hypoxia    Hypothyroidism (acquired)    Dysphagia    Pneumonia, unspecified organism    Pulmonary embolism    Elevated brain natriuretic peptide (BNP) level    Bilateral leg edema    Food impaction of esophagus     Past Medical History:   Diagnosis Date    Abnormal ECG 2024    Allergic rhinitis     Arrhythmia 2024    SVT, then afib after thoracentesis    Asthma 2024    Shortness of breath with exertion    Atrial fibrillation 2024    After 2L drained per thoracentesis    Coronary artery disease 2024    Minimal blockage per cath    GERD (gastroesophageal reflux disease)     Hyperlipidemia     Lung cancer 2024    Myocardial infarction 2024    Cath, no intervention    Pleural effusion     Pneumonia 2025     Past Surgical History:   Procedure Laterality Date    BRONCHOSCOPY N/A 2024    Procedure: BRONCHOSCOPY WITH BRONCHIAL WASHING AND BRONCHIAL BRUSHING;  Surgeon: Draw,  MD Kelvin;  Location: [facility] ENDOSCOPY;  Service: Pulmonary;  Laterality: N/A;    BRONCHOSCOPY N/A 05/23/2025    Procedure: BRONCHOSCOPY with bronchoalveolar lavage and biopsy x 1 area;  Surgeon: Jason Dozier MD;  Location: [facility] ENDOSCOPY;  Service: Pulmonary;  Laterality: N/A;  post op: lung mass    BRONCHOSCOPY      BRONCHOSCOPY WITH ION ROBOTIC ASSIST N/A 08/16/2024    Procedure: BRONCHOSCOPY WITH ION ROBOT WITH CRYO BIOPSY;  Surgeon: Kelvin Gonzalez MD;  Location: [facility] ENDOSCOPY;  Service: Robotics - Pulmonary;  Laterality: N/A;    CARDIAC CATHETERIZATION Right 01/29/2024    Procedure: Coronary angiography;  Surgeon: Abran Chand MD;  Location: [facility] CATH INVASIVE LOCATION;  Service: Cardiovascular;  Laterality: Right;    CARDIAC CATHETERIZATION N/A 01/29/2024    Procedure: Left Heart Cath;  Surgeon: Abran Chand MD;  Location: [facility] CATH INVASIVE LOCATION;  Service: Cardiovascular;  Laterality: N/A;    ENDOSCOPY N/A 05/23/2025    Procedure: ESOPHAGOGASTRODUODENOSCOPY with biopsy x 1 area and dilatation (48FR non-guided bougie);  Surgeon: Carlos Calero MD;  Location: [facility] ENDOSCOPY;  Service: Gastroenterology;  Laterality: N/A;  post op: esophageal stricture    LUNG BIOPSY         SLP Recommendation and Plan  SLP Swallowing Diagnosis: swallow WFL/no suspected pharyngeal impairment (07/01/25 1300)  SLP Diet Recommendation: regular textures, thin liquids, other (see comments) (Follow GI recs) (07/01/25 1300)  Recommended Precautions and Strategies: upright posture during/after eating, small bites of food and sips of liquid, alternate between small bites of food and sips of liquid (07/01/25 1300)  SLP Rec. for Method of Medication Administration: as tolerated (07/01/25 1300)           Swallow Criteria for Skilled Therapeutic Interventions Met: no problems identified which require skilled intervention (07/01/25 1300)  Anticipated Discharge Disposition (SLP): No further SLP services warranted  "(07/01/25 1300)     Therapy Frequency (Swallow): evaluation only (07/01/25 1300)     Oral Care Recommendations: Oral Care BID/PRN, Toothbrush (07/01/25 1300)    Pt seen on this date for clinical bedside swallow evaluation. Pt seen at [facility] last month and got EGD w/ dilation. On this admission, pt with food impaction (chicken) in esophagus, removed by GI yesterday. GI rec as follows: \"GI Soft diet, chew food well, PPI daily.\" Upon entry, pt upright in bed with family at bedside. Pt agreeable to trials. Pt able to self feed. PO observed: Saltine cracker, orange juice. Pt with good labial seal. Timely oral prep and transit, complete oral clearance. No overt s/s of aspiration noted. Pt denies pain or globus sensation. From oral pharyngeal standpoint, pt appears safe for Regular/Thin diet. Discussion with pt and family about SLP recs, reviewed GI recs. Further education on safe swallowing strategies listed below. Pt and family verbalized understanding. ST with little to offer from oral pharyngeal standpoint, signing off on dysphagia. Nursing updated.     SLP Plan & Recommendation:   - Per GI: Soft diet / thins   - Meds: as tolerated  - Safe swallow strategies: HOB at a 90 degree angle, slow rate of intake, small bites/sips, alternating sips/bites, \"chew food well.\"   - ST signing off  SWALLOW EVALUATION (Last 72 Hours)       SLP Adult Swallow Evaluation       Row Name 07/01/25 1300       Rehab Evaluation    Document Type discharge evaluation/summary  -AA    Subjective Information no complaints  -AA    Patient Observations alert;cooperative;agree to therapy  -AA    Patient/Family/Caregiver Comments/Observations Family in room  -AA    Patient Effort good  -AA    Symptoms Noted During/After Treatment none  -AA       General Information    Patient Profile Reviewed yes  -AA    Pertinent History Of Current Problem Patient is a 74 y.o. male with pmh of lung cancer, pAfib on home Eliquis,who presents with difficulty swallowing " since Friday and unable to keep meds and food down,  shortness of breath and increased leg swelling. O2 sats were charted at 75% upon arrival to the ER.  He reports some shortness of breath, but not out of proportion for his normal with known lung cancer.  No fevers, abdominal pain, leg pain, dysuria.      Last month, he was treated for pneumonia, had a Left thoracentesis with nearly a liter out, and EGD with dilatation with notes Schroeder's esophagus.  -AA    Current Method of Nutrition thin liquids;regular textures  -AA    Precautions/Limitations, Vision WFL;for purposes of eval  -AA    Precautions/Limitations, Hearing WFL;for purposes of eval  -AA    Prior Level of Function-Communication WFL  -AA    Prior Level of Function-Swallowing no diet consistency restrictions;safe, efficient swallowing in all situations  -AA    Plans/Goals Discussed with patient;agreed upon;family  -AA    Barriers to Rehab none identified  -AA       Oral Motor Structure and Function    Dentition Assessment natural, present and adequate  -AA    Secretion Management WNL/WFL  -AA    Mucosal Quality moist, healthy  -AA       Oral Musculature and Cranial Nerve Assessment    Oral Motor General Assessment WFL  -AA       General Eating/Swallowing Observations    Respiratory Support Currently in Use nasal cannula  -AA    O2 Liters 2L  -AA    Eating/Swallowing Skills self-fed  -AA    Positioning During Eating upright 90 degree;upright in bed  -AA    Utensils Used straw  -AA    Consistencies Trialed thin liquids;regular textures  -AA    Pre SpO2 (%) 96  -AA    Post SpO2 (%) 96  -AA       Respiratory    Respiratory Status WFL  -AA       Clinical Swallow Eval    Oral Prep Phase WFL  -AA    Oral Transit WFL  -AA    Oral Residue WFL  -AA    Pharyngeal Phase no overt signs/symptoms of pharyngeal impairment  -AA    Esophageal Phase --  -AA       SLP Evaluation Clinical Impression    SLP Swallowing Diagnosis swallow WFL/no suspected pharyngeal impairment   -AA    Functional Impact no impact on function  -AA    Swallow Criteria for Skilled Therapeutic Interventions Met no problems identified which require skilled intervention  -AA       Recommendations    Therapy Frequency (Swallow) evaluation only  -AA    SLP Diet Recommendation regular textures;thin liquids;other (see comments)  Follow GI recs  -AA    Recommended Precautions and Strategies upright posture during/after eating;small bites of food and sips of liquid;alternate between small bites of food and sips of liquid  -AA    Oral Care Recommendations Oral Care BID/PRN;Toothbrush  -AA    SLP Rec. for Method of Medication Administration as tolerated  -AA    Anticipated Discharge Disposition (SLP) No further SLP services warranted  -AA              User Key  (r) = Recorded By, (t) = Taken By, (c) = Cosigned By      Initials Name Effective Dates    AA Cassia Grossman SLP 06/18/24 -                     EDUCATION  The patient has been educated in the following areas:   Dysphagia (Swallowing Impairment).                Time Calculation:                VERITO Estevez  7/1/2025

## 2025-07-01 NOTE — PLAN OF CARE
Goal Outcome Evaluation:  Plan of Care Reviewed With: patient        Progress: no change  Outcome Evaluation: Pt is resting in bed with call light in reach. NPO since midnight for pleurx catheter insertion. Consent is signed. Heparin stopped at 0400 per Dr. Cope orders.  Pt has no questions or concerns at this time.

## 2025-07-01 NOTE — ANESTHESIA PREPROCEDURE EVALUATION
Anesthesia Evaluation     Patient summary reviewed and Nursing notes reviewed   NPO Solid Status: > 8 hours  NPO Liquid Status: > 8 hours           Airway   Mallampati: II  TM distance: >3 FB  Neck ROM: full  No difficulty expected  Dental - normal exam     Pulmonary    (+) pneumonia , pleural effusion, pulmonary embolism, lung cancer, asthma,decreased breath sounds  Cardiovascular - normal exam    ECG reviewed    (+) past MI , CAD, dysrhythmias Atrial Fib, hyperlipidemia      Neuro/Psych  GI/Hepatic/Renal/Endo    (+) GERD, renal disease-, thyroid problem     Musculoskeletal     Abdominal  - normal exam    Bowel sounds: normal.   Substance History      OB/GYN          Other      history of cancer active    ROS/Med Hx Other: SR    ·  Left ventricular systolic function is normal. Left ventricular ejection fraction appears to be 61 - 65%.  ·  Left ventricular diastolic function was normal.  ·  The right ventricular cavity is mild to moderately dilated.  ·  Saline test results are negative.  ·  Estimated right ventricular systolic pressure from tricuspid regurgitation is moderately elevated (45-55 mmHg).  ·  Moderate pulmonary hypertension is present.  ·  There is a small (<1cm) pericardial effusion. There is no evidence of cardiac tamponade.  ·  There is a large left pleural effusion      24  Left Main %: 0  Proximal LAD %: 0  Mid/Distal LAD %: 0  LCX %: 0   Ramus:   RCA %: 20 to 30% mid  [place]ima %:   SVG(s) %:       1.Right lower lobe segmental and subsegmental pulmonary emboli. No definite findings of right heart strain.  2.Large left pleural effusion, increased compared to the prior exam. Small right pleural effusion, slightly increased compared to the prior exam.  3.Near complete consolidation of the left lower lobe with minimal aeration of the upper lobe, which could be related to atelectasis and/or pneumonia. Consolidative densities in the posterior-inferior right lung appear similar to the prior exam.  4.Diffuse  septal thickening and prominent interstitial markings in the lungs, left greater than right, which could be related to lymphangitic spread of malignancy and/or pulmonary edema.  5.Increased distention of the upper esophagus containing heterogeneous material, which could predispose to aspiration. There is wall thickening of the distal esophagus with hyperenhancement of the mucosa which could indicate esophagitis.  6.Cardiomegaly with small pericardial effusion. Body wall edema which could be related to heart failure.  7.Cholelithiasis with small amount of pericholecystic fluid. This could be related to heart failure, but cholecystitis cannot be excluded by imaging. Correlate with clinical findings and lab values.  8.Mediastinal, left axillary, and upper abdominal lymphadenopathy, as before consistent with metastatic disease                      Anesthesia Plan    ASA 4     general     (MAC)  intravenous induction     Anesthetic plan, risks, benefits, and alternatives have been provided, discussed and informed consent has been obtained with: patient.    Plan discussed with CRNA.        CODE STATUS:    Code Status (Patient has no pulse and is not breathing): CPR (Attempt to Resuscitate)  Medical Interventions (Patient has pulse or is breathing): Full Support  Level Of Support Discussed With: Patient

## 2025-07-01 NOTE — PROGRESS NOTES
Daily Progress Note          Assessment    Acute/chronic hypoxic respiratory insufficiency     new right lower lobe pulmonary embolism  Echo 6/30/2025, estimated RVSP 45 to 55 mmHg with moderate pulmonary hypertension  Small less than 1 cm pericardial effusion no evidence of tamponade  EF 61%     Large left pleural effusion     Left Lower lobe pneumonia     Bronchoscopy 5/23/2025 Lung, left lower lobe, endobronchial biopsy):    Invasive moderately differentiated adenocarcinoma with micropapillary features,   Positive for lymphovascular invasion     CT chest 5/23/2025   interlobular septal thickening in theleft lung which 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     status post bedside left thoracentesis 5/22/2025 with removal of 950 mL of serosanguineous fluid fluid was positive for cytology non-small cell     History of right lung cancer adenocarcinoma, stage cTX N2 M1   status post concomitant radiation chemotherapy, diagnosed on bronchoscopy August 2024  CAD  CKD  Chronic A-fib on chronic anticoagulation Eliquis  Hypothyroidism  Chronic anemia     Former smoker: Quit 2000 after 32 pack years     Recommendations:     Oxygen supplement and titration to maintain saturation 90-95%: Currently on 2 L     Anticoagulation IV heparin until Pleurx catheter to the left chest is done     Status post left Pleurx catheter placement 7/1/2025     Antibiotics currently on Unasyn  IV steroids hydrocortisone 50 mg Q8  Bronchodilators as needed  Pantoprazole  Metoprolol                      LOS: 2 days     Subjective     Cough and shortness of breath    Objective     Vital signs for last 24 hours:  Vitals:    07/01/25 0910 07/01/25 0925 07/01/25 0955 07/01/25 1100   BP: 144/86 140/86 147/89 157/92   BP Location:       Patient Position:       Pulse: 78 77 85 78   Resp: 11 11 12 16   Temp:    97.9 °F (36.6 °C)   TempSrc:    Oral   SpO2: 100% 99% 97% 99%   Weight:       Height:           Intake/Output last 3 shifts:  I/O last 3  "completed shifts:  In: 700 [P.O.:300; I.V.:400]  Out: 5050 [Urine:5050]  Intake/Output this shift:  I/O this shift:  In: 300 [I.V.:300]  Out: 120 [Chest Tube:120]      Radiology  Imaging Results (Last 24 Hours)       Procedure Component Value Units Date/Time    XR Chest 1 View [[id number]] Collected: 07/01/25 0841     Updated: 07/01/25 0847    Narrative:      XR CHEST 1 VW    Date of Exam: 7/1/2025 8:20 AM EDT    Indication: assess for effusion    Comparison: CT chest 6/29/2025    Findings:  Right upper extremity PICC line terminates near cavoatrial junction. Cardiomegaly. Small decreasing left and small mildly loculated right pleural effusions, on the right extending into intrapulmonary fissures. There is improved aeration of the left lower   lobe. Combination of smooth appearing interstitial thickening and reticulonodular interstitial thickening suspicious for combination of edema and infectious process. Underlying emphysema. Degenerative related osseous change. Left thoracotomy tube. No   visualized pneumothorax.      Impression:      Impression:  1. Improved left effusion and lung aeration status post thoracotomy tube placement. No pneumothorax.  2. Cardiomegaly with residual small bilateral pleural effusions and interstitial pulmonary edema. Right-sided effusion is mildly loculated.  3. Superimposed more reticulonodular appearing airspace opacities suspicious for infectious process, such as bronchiolitis, aspiration or early bronchopneumonia.      Electronically Signed: Emmanuel Flores MD    7/1/2025 8:45 AM EDT    Workstation ID: [id number]    IR Replacement PICC Without Port Same Site [[id number]] Collected: 06/30/25 1331     Updated: 06/30/25 1338    Narrative:      IR REPLACEMENT PICC WO PORT SAME SITE    Date of Exam: 6/30/2025 12:32 PM EDT    Indication: PICC line \"kinked\". Repositioning or exchange requested.    Comparison: None available.    Description of procedure: The procedure, its benefits, risks and " alternatives were explained to the patient and informed consent was obtained after answering the patient's questions. A time-out was performed and the patient's name, date of birth,   procedure and correct site of procedure were verified.    The patient was placed supine on the fluoroscopy table. The right upper extremity PICC site and skin site were prepped and draped using maximum sterile barrier technique. The PICC tip was evaluated under fluoroscopy which demonstrates interval further   migration of the PICC tip entirely into the right internal jugular vein. Lidocaine was used for local anesthesia around the catheter entry site. The existing catheter was cannulated with an 018 measuring wire, and the malpositioned catheter removed over   the wire. A new 4 Upper sorbian PICC was cut to an appropriate length and then advanced over the wire to the mid right atrial level. The new catheter was made approximately 2 cm longer in an effort to prevent remigration, although it was discussed with the   patient that recurrent migration can occur, particularly if there is forceful injection of the catheter (for example during administration of power-injected CT contrast).    The new catheter was secured in place at the skin site using 0 Prolene suture material. Sterile dressing was applied. The catheter flushes and aspirates appropriately.      Impression:      Impression:  Successful exchange and repositioning of right upper extremity PICC, as described above.        Electronically Signed: Sean Buckley MD    6/30/2025 1:36 PM EDT    Workstation ID: [id number]            Labs:  Results from last 7 days   Lab Units 07/01/25  0544   WBC 10*3/mm3 11.67*   HEMOGLOBIN g/dL 7.0*   HEMATOCRIT % 22.4*   PLATELETS 10*3/mm3 163     Results from last 7 days   Lab Units 07/01/25  0452 06/30/25  0828 06/29/25  1529   SODIUM mmol/L 140   < > 140   POTASSIUM mmol/L 3.7   < > 4.2   CHLORIDE mmol/L 101   < > 104   CO2 mmol/L 24.0   < > 24.8   BUN  mg/dL 22.1   < > 12.6   CREATININE mg/dL 1.47*   < > 1.33*   CALCIUM mg/dL 7.9*   < > 8.6   BILIRUBIN mg/dL  --   --  0.5   ALK PHOS U/L  --   --  74   ALT (SGPT) U/L  --   --  11   AST (SGOT) U/L  --   --  38   GLUCOSE mg/dL 136*   < > 100*    < > = values in this interval not displayed.         Results from last 7 days   Lab Units 06/29/25  1529   ALBUMIN g/dL 3.4*     Results from last 7 days   Lab Units 06/29/25  1644 06/29/25  1529   HSTROP T ng/L 259* 286*             Results from last 7 days   Lab Units 07/01/25  0544 06/30/25  2353 06/30/25  1859 06/30/25  0341 06/29/25  1549   INR   --   --   --   --  1.37*   APTT seconds 37.0* 110.7* 77.8*   < > 31.8    < > = values in this interval not displayed.               Meds:   SCHEDULE  ampicillin-sulbactam, 3 g, Intravenous, Q6H  hydrocortisone sodium succinate, 50 mg, Intravenous, Q8H  metoprolol tartrate, 2.5 mg, Intravenous, Q8H  pantoprazole, 40 mg, Intravenous, Q AM  sodium chloride, 10 mL, Intravenous, Q12H      Infusions  heparin, 18 Units/kg/hr, Last Rate: Stopped (07/01/25 0401)  Pharmacy To Dose:,       PRNs    senna-docusate sodium **AND** polyethylene glycol **AND** bisacodyl **AND** bisacodyl    heparin    heparin    hydrALAZINE    HYDROmorphone    ipratropium-albuterol    nitroglycerin    ondansetron ODT **OR** ondansetron    ondansetron ODT **OR** ondansetron    Pharmacy To Dose:    [COMPLETED] Insert Peripheral IV **AND** sodium chloride    sodium chloride    sodium chloride    Physical Exam:  General Appearance:  Alert   HEENT:  Normocephalic, without obvious abnormality, Conjunctiva/corneas clear,.   Nares normal, no drainage     Neck:  Supple, symmetrical, trachea midline.   Lungs /Chest wall:   Bilateral basal rhonchi, respirations unlabored, symmetrical wall movement.     Heart:  Regular rate and rhythm, S1 S2 normal  Abdomen: Soft, non-tender, no masses, no organomegaly.    Extremities: No edema, no clubbing or cyanosis      ROS  Constitutional: Negative for chills, fever and malaise/fatigue.   HENT: Negative.    Eyes: Negative.    Cardiovascular: Negative.    Respiratory: Positive for cough and shortness of breath.    Skin: Negative.    Musculoskeletal: Negative.    Gastrointestinal: Negative.    Genitourinary: Negative.    Neurological: Negative.          I reviewed the recent clinical results  I personally reviewed the latest radiological studies    Part of this note may be an electronic transcription/translation of spoken language to printed text using the Dragon Dictation System.

## 2025-07-01 NOTE — PLAN OF CARE
"Goal Outcome Evaluation:  Pt seen on this date for clinical bedside swallow evaluation. Pt seen at [facility] last month and got EGD w/ dilation. On this admission, pt with food impaction (chicken) in esophagus, removed by GI yesterday. GI rec as follows: \"GI Soft diet, chew food well, PPI daily.\" Upon entry, pt upright in bed with family at bedside. Pt agreeable to trials. Pt able to self feed. PO observed: Saltine cracker, orange juice. Pt with good labial seal. Timely oral prep and transit, complete oral clearance. No overt s/s of aspiration noted. Pt denies pain or globus sensation. From oral pharyngeal standpoint, pt appears safe for Regular/Thin diet. Discussion with pt and family about SLP recs, reviewed GI recs. Further education on safe swallowing strategies listed below. Pt and family verbalized understanding. ST with little to offer from oral pharyngeal standpoint, signing off on dysphagia. Nursing updated.      SLP Plan & Recommendation:   - Per GI: Soft diet / thins   - Meds: as tolerated  - Safe swallow strategies: HOB at a 90 degree angle, slow rate of intake, small bites/sips, alternating sips/bites, \"chew food well.\"   - ST signing off    "

## 2025-07-01 NOTE — OP NOTE
Operative Note     Date of procedure: 7/1/2025     Patient name: Young Ames  MRN: [id number]    Pre OP diagnosis:    Pulmonary embolism    Pleural effusion    Stage IV adenocarcinoma of lung, right    Stage IV adenocarcinoma of lung, left    CKD (chronic kidney disease) stage 4, GFR 15-29 ml/min    Paroxysmal atrial fibrillation    Chronic anticoagulation    Hypoxia    Hypothyroidism (acquired)    Dysphagia    Pneumonia, unspecified organism    Elevated brain natriuretic peptide (BNP) level    Bilateral leg edema    Food impaction of esophagus    Post OP diagnosis:  Same as above    Procedure performed:   Left indwelling tunneled pleural catheter placement (Pleurx catheter).  Diagnostic ultrasound of the chest.    Indications:   Young Ames is a 74-year-old male with history of stage IV lung cancer presented with recurrent left pleural effusion.  I recommended Pleurx catheter placement.    I had an extensive discussion with the patient regarding the risks and benefits of indwelling chest catheter placement.  I explained to the patient that complications are seen in around 5 % of patients which includes bleeding, superficial site infection (1 to 3%), deep site infection (1%), catheter related infection (less than 3%), malfunctioning of the catheter (less than 5%), pneumothorax, postop pain and reoperation. About 1/3 of patients will have mild chest pain or soreness. I also explained to the patient that there is less than 1% risk of death related to any invasive procedure that may require general anesthesia. The patient understood the risks and benefit and signed consent for the above procedure.        Surgeon: Van Cope MD     Assistants: There was no qualified assistant available for this procedure.    Anesthesia: Monitored Local Anesthesia with Sedation    ASA Class: 3    Procedure Details   On 7/1/2025, the patient was brought to the operating room and placed in the lateral decubitus position on the  operating room table with head of the bed 30 degrees. The patient received monitored anesthesia care with sedation. Pleural fluid was identified on the left side using ultrasound. The access site at the fifth intercostal space in the midaxillary line and exit site just below the left costal margin were marked. The patient was given intravenous antibiotic prophylaxis prior to incision. The left chest was prepped and draped in the usual sterile fashion. Prior to beginning the operation, a time-out was conducted with all members of surgical team present. The patient was identified as Young SNOWDEN Ames the procedure and the correct site were verified.       The tunnel and exit site were infiltrated with 10 cc of 1% lidocaine, and the access site to pleural space was anesthetized with an additional 10 cc of 1% lidocaine.  Appropriate choice of the access site was confirmed by free flow of pleural fluid upon aspiration with a fine needle. The pleural space was accessed with a small-bore catheter over a needle.  After confirming access into the pleural space by free flow of pleural fluid, the needle was removed, and the catheter was left in the pleural space.  The wire was advanced into the pleural space through the catheter.  Stab incisions were made at the planned exit and access site with an #11-blade scalpel.  Using the tunneling device, the catheter was tunneled through the subcutaneous tissue.  The cuff was positioned about 10 cm from the exit incision.  A tapered dilator and peel-away sheath and trocar were advanced over the wire and advanced into the pleural space.  The inner dilator and wire were removed, and the tunnel catheter was fed into the pleural space without resistance.  The peel-away trocar was pulled apart while advancing and holding pressure on the catheter until the trocar was removed and the catheter was completely in the pleural space.  Pleural fluid was easily drained from the catheter.  The access site  was closed with a 2-0 Vicryl suture and 4-0 Monocryl suture in layers.  The catheter was connected to Pleurevac drainage system.  Foam padding and a Bio-occlusive dressing were applied at the exit site.    The instrument, sponges and needles counts were correct at the end of the procedure.  The patient tolerated the procedure well and was transferred to the PACU in a hemodynamically stable condition. I was present and performed the entirety of the procedure.    Findings:  2000 cc of serous pleural fluid drained.     Estimated Blood Loss: Minimal           Drains: Left indwelling tunneled pleural catheter placement                 Specimens: None           Implants: None           Complications: None           Disposition: PACU           Condition: Stable     Van Cope MD   Thoracic Surgeon  [facility]

## 2025-07-01 NOTE — PLAN OF CARE
Goal Outcome Evaluation:   Patient a&ox4, pleasant, pain controlled. Received 1 unit of PRBC's. Pleurx catheter to -20 cm suction, chronic lam catheter. Call light within reach, bed in low position. Plan of care on going.

## 2025-07-01 NOTE — NURSING NOTE
Pts heparin was restarted at 2001. His aPTT was at 77.8. The next scheduled aPTT was at 0200. There was another aPTT that this RN collected around 23:23, this was not scheduled. This RN collected it due to pts aPTT fluctuating up and down. The aPTT that was collected at 23:23 came back at 110.7. Notified Laura Portillo to see if the rate could be changed at this time due to it not being the scheduled lab. Laura Portillo told this RN verbal through phone to go off the heparin protocol, change the rate, and discontinue the 0200 aPTT and get the aPTT at 0600.

## 2025-07-01 NOTE — OP NOTE
ESOPHAGOGASTRODUODENOSCOPY Procedure Report    Patient Name:  Young Ames  YOB: 1950    Date of Surgery:  6/30/2025     Pre-Op Diagnosis:  Dysphagia, unspecified type [R13.10]  Food impaction of esophagus, initial encounter [T18.128A, W44.F3XA]       Postop diagnosis:  1.  Food impaction      Procedure/CPT® Codes:  CO ESOPHAGOGASTRODUODENOSCOPY TRANSORAL DIAGNOSTIC [02905]    Procedure(s):  ESOPHAGOGASTRODUODENOSCOPY, impacted esophageal food bolus removal    Staff:  Surgeon(s):  Luis Alfredo Puckett MD      Anesthesia: Monitored Anesthesia Care    Description of Procedure:  A description of the procedure as well as risks, benefits and alternative methods were explained to the patient who voiced understanding and signed the corresponding consent form. A physical exam was performed and vital signs were monitored throughout the procedure.    An upper GI endoscope was placed into the mouth and proceeded through the esophagus, stomach and second portion of the duodenum without difficulty. The scope was then retroflexed and the fundus was visualized. The procedure was not difficult and there were no immediate complications.  There was no blood loss.    Impression:  1.  Food lodged in the distal esophagus.  This was pulled out into pieces using a snare.  This was pulled out of the mouth.  There was some underlying esophagitis and mild underlying narrowing at the GE junction  2.  Normal gastric mucosa entire stomach  3.  Normal duodenum mucosa visualized to D2    Recommendations:  Chew food well  GI soft diet  PPI daily      Luis Alfredo Puckett MD     Date: 6/30/2025    Time: 21:49 EDT

## 2025-07-01 NOTE — ANESTHESIA POSTPROCEDURE EVALUATION
Patient: Young Ames    Procedure Summary       Date: 07/01/25 Room / Location: [facility] OR 07 / [facility] MAIN OR    Anesthesia Start: 0716 Anesthesia Stop: 0756    Procedure: PLEURX CATHETER INSERTION (Left: Chest) Diagnosis:       Pleural effusion      (Pleural effusion [J90])    Surgeons: Van Cope MD Provider: Tae Kearney MD    Anesthesia Type: general ASA Status: 4            Anesthesia Type: general    Vitals  Vitals Value Taken Time   /89 07/01/25 09:55   Temp 97.6 °F (36.4 °C) 07/01/25 07:55   Pulse 82 07/01/25 09:59   Resp 12 07/01/25 09:55   SpO2 98 % 07/01/25 09:59   Vitals shown include unfiled device data.        Post Anesthesia Care and Evaluation    Patient location during evaluation: PACU  Patient participation: complete - patient participated  Level of consciousness: awake  Pain scale: See nurse's notes for pain score.  Pain management: adequate    Airway patency: patent  Anesthetic complications: No anesthetic complications  PONV Status: none  Cardiovascular status: acceptable  Respiratory status: acceptable and spontaneous ventilation  Hydration status: acceptable    Comments: Patient seen and examined postoperatively; vital signs stable; SpO2 greater than or equal to 90%; cardiopulmonary status stable; nausea/vomiting adequately controlled; pain adequately controlled; no apparent anesthesia complications; patient discharged from anesthesia care when discharge criteria were met

## 2025-07-01 NOTE — ANESTHESIA POSTPROCEDURE EVALUATION
Patient: Young Ames    Procedure Summary       Date: 06/30/25 Room / Location: [facility] ENDOSCOPY 1 / [facility] ENDOSCOPY    Anesthesia Start: 1401 Anesthesia Stop: 1430    Procedure: ESOPHAGOGASTRODUODENOSCOPY, impacted esophageal food bolus removal Diagnosis:       Dysphagia, unspecified type      Food impaction of esophagus, initial encounter      (Dysphagia, unspecified type [R13.10])      (Food impaction of esophagus, initial encounter [T18.128A, W44.F3XA])    Surgeons: Luis Alfredo Puckett MD Provider: Kassandra Villalobos MD    Anesthesia Type: general ASA Status: 3            Anesthesia Type: general    Vitals  Vitals Value Taken Time   /91 06/30/25 15:16   Temp 98.5 °F (36.9 °C) 06/30/25 15:16   Pulse 82 06/30/25 15:22   Resp 20 06/30/25 15:16   SpO2 94 % 06/30/25 15:22   Vitals shown include unfiled device data.        Post Anesthesia Care and Evaluation    Patient location during evaluation: PACU  Patient participation: complete - patient participated  Level of consciousness: awake and alert  Pain management: satisfactory to patient    Airway patency: patent  Anesthetic complications: No anesthetic complications  PONV Status: none  Cardiovascular status: acceptable  Respiratory status: acceptable  Hydration status: acceptable

## 2025-07-02 ENCOUNTER — APPOINTMENT (OUTPATIENT)
Dept: GENERAL RADIOLOGY | Facility: HOSPITAL | Age: 75
End: 2025-07-02
Payer: MEDICARE

## 2025-07-02 LAB
ANION GAP SERPL CALCULATED.3IONS-SCNC: 7.4 MMOL/L (ref 5–15)
BH BB BLOOD EXPIRATION DATE: NORMAL
BH BB BLOOD TYPE BARCODE: 5100
BH BB DISPENSE STATUS: NORMAL
BH BB PRODUCT CODE: NORMAL
BH BB UNIT NUMBER: NORMAL
BUN SERPL-MCNC: 25 MG/DL (ref 8–23)
BUN/CREAT SERPL: 18.5 (ref 7–25)
CALCIUM SPEC-SCNC: 7.7 MG/DL (ref 8.6–10.5)
CHLORIDE SERPL-SCNC: 102 MMOL/L (ref 98–107)
CO2 SERPL-SCNC: 27.6 MMOL/L (ref 22–29)
CREAT SERPL-MCNC: 1.35 MG/DL (ref 0.76–1.27)
CROSSMATCH INTERPRETATION: NORMAL
EGFRCR SERPLBLD CKD-EPI 2021: 54.8 ML/MIN/1.73
GLUCOSE SERPL-MCNC: 135 MG/DL (ref 65–99)
MAGNESIUM SERPL-MCNC: 1.8 MG/DL (ref 1.6–2.4)
PHOSPHATE SERPL-MCNC: 2.6 MG/DL (ref 2.5–4.5)
POTASSIUM SERPL-SCNC: 3.5 MMOL/L (ref 3.5–5.2)
QT INTERVAL: 363 MS
QTC INTERVAL: 480 MS
SODIUM SERPL-SCNC: 137 MMOL/L (ref 136–145)
UNIT  ABO: NORMAL
UNIT  RH: NORMAL

## 2025-07-02 PROCEDURE — 99024 POSTOP FOLLOW-UP VISIT: CPT

## 2025-07-02 PROCEDURE — 25010000002 POTASSIUM CHLORIDE 10 MEQ/100ML SOLUTION: Performed by: INTERNAL MEDICINE

## 2025-07-02 PROCEDURE — 25010000002 METOPROLOL TARTRATE 5 MG/5ML SOLUTION: Performed by: INTERNAL MEDICINE

## 2025-07-02 PROCEDURE — 99232 SBSQ HOSP IP/OBS MODERATE 35: CPT | Performed by: INTERNAL MEDICINE

## 2025-07-02 PROCEDURE — 25010000002 ONDANSETRON PER 1 MG: Performed by: INTERNAL MEDICINE

## 2025-07-02 PROCEDURE — 25010000002 HYDRALAZINE PER 20 MG: Performed by: INTERNAL MEDICINE

## 2025-07-02 PROCEDURE — 80048 BASIC METABOLIC PNL TOTAL CA: CPT | Performed by: INTERNAL MEDICINE

## 2025-07-02 PROCEDURE — 74220 X-RAY XM ESOPHAGUS 1CNTRST: CPT

## 2025-07-02 PROCEDURE — 25010000002 ENOXAPARIN PER 10 MG: Performed by: NURSE PRACTITIONER

## 2025-07-02 PROCEDURE — 84100 ASSAY OF PHOSPHORUS: CPT

## 2025-07-02 PROCEDURE — 93010 ELECTROCARDIOGRAM REPORT: CPT | Performed by: INTERNAL MEDICINE

## 2025-07-02 PROCEDURE — 25010000002 AMPICILLIN-SULBACTAM PER 1.5 G: Performed by: INTERNAL MEDICINE

## 2025-07-02 PROCEDURE — 25510000001 IOPAMIDOL PER 1 ML: Performed by: INTERNAL MEDICINE

## 2025-07-02 PROCEDURE — 83735 ASSAY OF MAGNESIUM: CPT

## 2025-07-02 PROCEDURE — 93005 ELECTROCARDIOGRAM TRACING: CPT | Performed by: INTERNAL MEDICINE

## 2025-07-02 PROCEDURE — 25010000002 HYDROCORTISONE SOD SUC (PF) 100 MG RECONSTITUTED SOLUTION: Performed by: INTERNAL MEDICINE

## 2025-07-02 RX ORDER — IOPAMIDOL 755 MG/ML
100 INJECTION, SOLUTION INTRAVASCULAR
Status: COMPLETED | OUTPATIENT
Start: 2025-07-02 | End: 2025-07-02

## 2025-07-02 RX ORDER — HYDROCORTISONE 10 MG/1
20 TABLET ORAL 2 TIMES DAILY WITH MEALS
Status: DISCONTINUED | OUTPATIENT
Start: 2025-07-02 | End: 2025-07-03 | Stop reason: HOSPADM

## 2025-07-02 RX ORDER — METOPROLOL TARTRATE 1 MG/ML
5 INJECTION, SOLUTION INTRAVENOUS EVERY 8 HOURS
Status: DISCONTINUED | OUTPATIENT
Start: 2025-07-02 | End: 2025-07-03

## 2025-07-02 RX ORDER — METOPROLOL SUCCINATE 25 MG/1
25 TABLET, EXTENDED RELEASE ORAL EVERY 12 HOURS SCHEDULED
Status: DISCONTINUED | OUTPATIENT
Start: 2025-07-02 | End: 2025-07-02

## 2025-07-02 RX ORDER — POTASSIUM CHLORIDE 7.45 MG/ML
10 INJECTION INTRAVENOUS
Status: COMPLETED | OUTPATIENT
Start: 2025-07-02 | End: 2025-07-02

## 2025-07-02 RX ORDER — METOPROLOL TARTRATE 1 MG/ML
5 INJECTION, SOLUTION INTRAVENOUS ONCE
Status: COMPLETED | OUTPATIENT
Start: 2025-07-02 | End: 2025-07-02

## 2025-07-02 RX ORDER — POTASSIUM CHLORIDE 1500 MG/1
40 TABLET, EXTENDED RELEASE ORAL ONCE
Status: DISCONTINUED | OUTPATIENT
Start: 2025-07-02 | End: 2025-07-02

## 2025-07-02 RX ADMIN — AMPICILLIN SODIUM AND SULBACTAM SODIUM 3 G: 2; 1 INJECTION, POWDER, FOR SOLUTION INTRAMUSCULAR; INTRAVENOUS at 09:25

## 2025-07-02 RX ADMIN — ENOXAPARIN SODIUM 70 MG: 100 INJECTION SUBCUTANEOUS at 21:51

## 2025-07-02 RX ADMIN — LEVOTHYROXINE SODIUM 75 MCG: 0.07 TABLET ORAL at 06:12

## 2025-07-02 RX ADMIN — AMPICILLIN SODIUM AND SULBACTAM SODIUM 3 G: 2; 1 INJECTION, POWDER, FOR SOLUTION INTRAMUSCULAR; INTRAVENOUS at 21:51

## 2025-07-02 RX ADMIN — IOPAMIDOL 100 ML: 755 INJECTION, SOLUTION INTRAVENOUS at 10:16

## 2025-07-02 RX ADMIN — METOROPROLOL TARTRATE 5 MG: 5 INJECTION, SOLUTION INTRAVENOUS at 22:42

## 2025-07-02 RX ADMIN — HYDROCORTISONE 20 MG: 10 TABLET ORAL at 17:25

## 2025-07-02 RX ADMIN — ONDANSETRON 4 MG: 2 INJECTION, SOLUTION INTRAMUSCULAR; INTRAVENOUS at 09:25

## 2025-07-02 RX ADMIN — HYDROCORTISONE SODIUM SUCCINATE 50 MG: 100 INJECTION, POWDER, FOR SOLUTION INTRAMUSCULAR; INTRAVENOUS at 01:00

## 2025-07-02 RX ADMIN — POTASSIUM CHLORIDE 10 MEQ: 7.46 INJECTION, SOLUTION INTRAVENOUS at 17:25

## 2025-07-02 RX ADMIN — Medication 10 ML: at 21:51

## 2025-07-02 RX ADMIN — AMPICILLIN SODIUM AND SULBACTAM SODIUM 3 G: 2; 1 INJECTION, POWDER, FOR SOLUTION INTRAMUSCULAR; INTRAVENOUS at 16:06

## 2025-07-02 RX ADMIN — HYDROCORTISONE SODIUM SUCCINATE 50 MG: 100 INJECTION, POWDER, FOR SOLUTION INTRAMUSCULAR; INTRAVENOUS at 08:52

## 2025-07-02 RX ADMIN — HYDRALAZINE HYDROCHLORIDE 10 MG: 20 INJECTION INTRAMUSCULAR; INTRAVENOUS at 16:17

## 2025-07-02 RX ADMIN — POTASSIUM CHLORIDE 10 MEQ: 7.46 INJECTION, SOLUTION INTRAVENOUS at 18:22

## 2025-07-02 RX ADMIN — METOPROLOL TARTRATE 2.5 MG: 5 INJECTION INTRAVENOUS at 06:12

## 2025-07-02 RX ADMIN — PANTOPRAZOLE SODIUM 40 MG: 40 INJECTION, POWDER, FOR SOLUTION INTRAVENOUS at 06:12

## 2025-07-02 RX ADMIN — POTASSIUM CHLORIDE 10 MEQ: 7.46 INJECTION, SOLUTION INTRAVENOUS at 22:42

## 2025-07-02 RX ADMIN — POTASSIUM CHLORIDE 10 MEQ: 7.46 INJECTION, SOLUTION INTRAVENOUS at 21:51

## 2025-07-02 RX ADMIN — AMPICILLIN SODIUM AND SULBACTAM SODIUM 3 G: 2; 1 INJECTION, POWDER, FOR SOLUTION INTRAMUSCULAR; INTRAVENOUS at 04:17

## 2025-07-02 RX ADMIN — ENOXAPARIN SODIUM 70 MG: 100 INJECTION SUBCUTANEOUS at 08:52

## 2025-07-02 RX ADMIN — METOROPROLOL TARTRATE 5 MG: 5 INJECTION, SOLUTION INTRAVENOUS at 16:46

## 2025-07-02 RX ADMIN — METOPROLOL TARTRATE 2.5 MG: 5 INJECTION INTRAVENOUS at 14:53

## 2025-07-02 NOTE — CASE MANAGEMENT/SOCIAL WORK
Discharge Planning Assessment  [facility]     Patient Name: Young Ames  MRN: [id number]  Today's Date: 7/2/2025    Admit Date: 6/29/2025    Plan: DC PLAN: From routine home with wife. [facility] referral pending. Watch for Pleural X drain.       Discharge Needs Assessment       Row Name 07/02/25 0956       Living Environment    People in Home spouse    Current Living Arrangements home    Potentially Unsafe Housing Conditions none    In the past 12 months has the electric, gas, oil, or water company threatened to shut off services in your home? No    Primary Care Provided by self    Provides Primary Care For no one    Family Caregiver if Needed spouse    Quality of Family Relationships helpful;involved;supportive    Able to Return to Prior Arrangements yes       Resource/Environmental Concerns    Resource/Environmental Concerns none    Transportation Concerns none       Transportation Needs    In the past 12 months, has lack of transportation kept you from medical appointments or from getting medications? no    In the past 12 months, has lack of transportation kept you from meetings, work, or from getting things needed for daily living? No       Food Insecurity    Within the past 12 months, you worried that your food would run out before you got the money to buy more. Never true    Within the past 12 months, the food you bought just didn't last and you didn't have money to get more. Never true       Transition Planning    Patient/Family Anticipates Transition to home with family    Patient/Family Anticipated Services at Transition none    Transportation Anticipated car, drives self;family or friend will provide       Discharge Needs Assessment    Readmission Within the Last 30 Days no previous admission in last 30 days    Equipment Currently Used at Home none    Anticipated Changes Related to Illness none    Equipment Needed After Discharge none                   Discharge Plan       Row Name 07/02/25 1021        Plan    Plan DC PLAN: From routine home with wife. [facility] referral pending. Watch for Pleural X drain.        Patient/Family in Agreement with Plan yes    Plan Comments CM Met with patient at bedside, from routine home with wife. Independent with ADL's no DME. PCP is [facility], Pharmacy is [facility]. Declines Meds to Beds at this time. Able to afford medications and denies any issues with food or utilities. Denies any transportation issues, still drives. Wife will provide at time of discharge. Agreeable to [facility], DCP report sent to [facility], message sent to liasion. Awaiting response. Denies any concerns about return home.                  Continued Care and Services - Admitted Since 6/29/2025       Home Medical Care       Service Provider Request Status Services Address Phone Fax Patient Preferred    [facility] Pending - Request Sent -- [address] [facility] [place] [address] [phone] --                  Expected Discharge Date and Time       Expected Discharge Date Expected Discharge Time    Jul 2, 2025            Demographic Summary       Row Name 07/02/25 0956       General Information    Admission Type inpatient    Arrived From emergency department    Required Notices Provided Important Message from Medicare    Referral Source admission list    Reason for Consult discharge planning    Preferred Language English       Contact Information    Permission Granted to Share Info With     Contact Information Obtained for                    Functional Status       Row Name 07/02/25 0956       Functional Status    Usual Activity Tolerance excellent    Current Activity Tolerance excellent       Physical Activity    On average, how many days per week do you engage in moderate to strenuous exercise (like a brisk walk)? 0 days    On average, how many minutes do you engage in exercise at this level? 0 min    Number of minutes of exercise per week 0       Assessment of  Health Literacy    How often do you have someone help you read hospital materials? Sometimes    How often do you have problems learning about your medical condition because of difficulty understanding written information? Sometimes    How often do you have a problem understanding what is told to you about your medical condition? Sometimes    How confident are you filling out medical forms by yourself? Somewhat    Health Literacy Moderate       Functional Status, IADL    Medications independent    Meal Preparation independent    Housekeeping independent    Laundry independent    Shopping independent       Mental Status    General Appearance WDL WDL       Mental Status Summary    Recent Changes in Mental Status/Cognitive Functioning no changes                            Patient Forms       Row Name 07/02/25 0953       Patient Forms    Important Message from Medicare (IMM) Delivered  IMM 7/2/2025 per cm    Delivered to Patient    Method of delivery In person                Met with patient in room wearing PPE:     Maintained distance greater than six feet and spent less than 15 minutes in the room.    Megan Warner RN   Case Management  [phone]

## 2025-07-02 NOTE — PROGRESS NOTES
"    Chief Complaint: Left pleural effusion  S/P: Pleurx catheter insertion  POD # 1    Subjective:  Symptoms:  Improved.  No shortness of breath.    Activity level: Returning to normal.    Patient resting in bed on exam. Improved effort of breathing following procedure    Vital Signs:  Temp:  [97.5 °F (36.4 °C)-98.3 °F (36.8 °C)] 97.8 °F (36.6 °C)  Heart Rate:  [76-98] 98  Resp:  [13-20] 13  BP: (131-159)/(86-97) 152/94    Intake & Output (last day)         07/01 0701  07/02 0700 07/02 0701  07/03 0700    P.O. 120 480    I.V. (mL/kg) 300 (4)     Blood 301.3     Total Intake(mL/kg) 721.3 (9.7) 480 (6.5)    Urine (mL/kg/hr) 450 (0.3) 200 (0.3)    Chest Tube 395     Total Output 845 200    Net -123.8 +280                  Objective:  General Appearance:  Comfortable and in no acute distress.    Vital signs: (most recent): Blood pressure 152/94, pulse 98, temperature 97.8 °F (36.6 °C), temperature source Oral, resp. rate 13, height 180.3 cm (70.98\"), weight 74.4 kg (164 lb), SpO2 91%.  No fever.    Lungs:  Normal effort and normal respiratory rate.  He is not in respiratory distress.    Heart: Tachycardia.    Chest: Symmetric chest wall expansion. Chest wall tenderness present.    Neurological: Patient is alert and oriented to person, place and time.    Skin:  Warm and dry.                Chest tube:   Site: Left, Clean, Dry, and Intact  Suction: -20 cm  Air Leak: negative  24 Hour Total: 395    Results Review:     I reviewed the patient's new clinical results.  I reviewed the patient's new imaging results and agree with the interpretation.  I reviewed the patient's other test results and agree with the interpretation  Discussed with patient, RN, surgeon    Imaging Results (Last 24 Hours)       Procedure Component Value Units Date/Time    FL ESOPHAGRAM SINGLE CONTRAST [[id number]] Collected: 07/02/25 1051     Updated: 07/02/25 1152    Narrative:      FL ESOPHAGRAM SINGLE CONTRAST    Date of Exam: 7/2/2025 10:15 AM " EDT    Indication: Dysphagia, s/p EGD with dilation, recent food impaction. EGD biopsy with Schroeder's esophagus. Signed report    Comparison: None available.    Technique:  Patient ingested effervescent crystals followed by high density barium for double contrast imaging of the esophagus. Patient subsequently ingested medium density barium for single-contrast evaluation.    Fluoroscopic Time: 0.7 minutes    Number of Images: 8 spot fluoroscopic series images    Findings:  The study was performed with the patient in semierect position upon the fluoroscopy table due to indwelling left pleural catheter with portable suction mechanism. No gross aspiration was identified..    There is patulous or distended upper thoracic esophagus.    On initial images, there is frothy filling defects in the upper thoracic esophagus with some accumulation of contrast bolus at this location, thought to represent retained food stuff. Patient stated that he was unable to tolerate breakfast this morning,   sensation of it. For these reasons, a 13 mm barium tablet was not administered.    There is smooth long segment narrowing of the lower thoracic esophagus, through which thin contrast bolus would pass. There is appearance of a tiny sliding esophageal hiatal hernia. There is delayed contrast clearance from the thoracic esophagus into the   stomach.    No contrast extravasation is seen.      Impression:      Impression:  Short segment narrowing of the lower thoracic esophagus, through which only a thin linear contrast column with pass. There is distention of the upper thoracic esophagus with what appears to be combination of contrast, air bubbles, and food stuff. Patient   states he was unable to tolerate his breakfast this morning. For these reasons, 13 mm barium tablet was not administered.  No contrast extravasation status post reported esophageal dilation procedure.  Small esophageal hiatal hernia.      Electronically Signed: Mar  MD Jermain    7/2/2025 11:49 AM EDT    Workstation ID: [id number]            Lab Results:     Lab Results (last 24 hours)       Procedure Component Value Units Date/Time    Blood Culture - Blood, Arm, Left [[id number]]  (Normal) Collected: 06/29/25 1549    Specimen: Blood from Arm, Left Updated: 07/02/25 1600     Blood Culture No growth at 3 days    Blood Culture - Blood, Arm, Left [[id number]]  (Normal) Collected: 06/29/25 1529    Specimen: Blood from Arm, Left Updated: 07/02/25 1545     Blood Culture No growth at 3 days    Basic Metabolic Panel [[id number]]  (Abnormal) Collected: 07/02/25 0428    Specimen: Blood from Arm, Right Updated: 07/02/25 0506     Glucose 135 mg/dL      BUN 25.0 mg/dL      Creatinine 1.35 mg/dL      Sodium 137 mmol/L      Potassium 3.5 mmol/L      Chloride 102 mmol/L      CO2 27.6 mmol/L      Calcium 7.7 mg/dL      BUN/Creatinine Ratio 18.5     Anion Gap 7.4 mmol/L      eGFR 54.8 mL/min/1.73     Narrative:      GFR Categories in Chronic Kidney Disease (CKD)              GFR Category          GFR (mL/min/1.73)    Interpretation  G1                    90 or greater        Normal or high (1)  G2                    60-89                Mild decrease (1)  G3a                   45-59                Mild to moderate decrease  G3b                   30-44                Moderate to severe decrease  G4                    15-29                Severe decrease  G5                    14 or less           Kidney failure    (1)In the absence of evidence of kidney disease, neither GFR category G1 or G2 fulfill the criteria for CKD.    eGFR calculation 2021 CKD-EPI creatinine equation, which does not include race as a factor    Hemoglobin & Hematocrit, Blood [[id number]]  (Abnormal) Collected: 07/01/25 1910    Specimen: Blood from Arm, Right Updated: 07/01/25 1926     Hemoglobin 11.4 g/dL      Hematocrit 34.6 %              Assessment & Plan       Pulmonary embolism    Pleural effusion    Stage IV adenocarcinoma  of lung, right    Stage IV adenocarcinoma of lung, left    CKD (chronic kidney disease) stage 4, GFR 15-29 ml/min    Paroxysmal atrial fibrillation    Chronic anticoagulation    Hypoxia    Hypothyroidism (acquired)    Dysphagia    Pneumonia, unspecified organism    Elevated brain natriuretic peptide (BNP) level    Bilateral leg edema    Food impaction of esophagus       Assessment & Plan  Mr. Ames is a pleasant 74-year-old gentleman s/p Pleurx catheter placement POD 1. 2L of serous fluid was drained during his procedure and chest tube was connected to -20 cm suction overnight to facilitate drainage. Patient with stable postoperative course. Pleurx capped today. Patient will require home health at discharge to facilitate drainage, referral pending. Esophagram completed today. No gross aspiration. Distention of upper thoracic esophagus with combination of contrast, air bubbles, and food stuff.    Plan  -Repeat AM CXR, assess for effusion  -Patient will require Pleurx starter kit at discharge. Drain prior to discharge  -Keep Pleurx capped  -Speech therapy with recommendations of HOB at 90 degree angle and soft diet. Esophagram completed today. Will discuss with surgeon, further recommendations to follow, continue current diet.  -New onset SVT. Cardiology consulted placed. Per patient this has happened previously when he had this procedure completed on the contralateral side and he converted into a-fib. Will get additional labs. Appreciate cardiology recommendations.  Hiral Hall PA-C  [facility]  07/02/25  16:19 EDT    Greater than 30 minutes was spent reviewing the patient's chart, radiographic imaging, labs, provider notes, assessing the patient and developing a plan of care.  This was discussed with the patient and RN.

## 2025-07-02 NOTE — PLAN OF CARE
Goal Outcome Evaluation:  Plan of Care Reviewed With: patient        Progress: no change  Outcome Evaluation: Pt VSS, Pain treated per MAR, Pleurx Cath to chest tube, PICC line, Antibiotics, Call light in reach, Plan on going

## 2025-07-02 NOTE — PROGRESS NOTES
Hematology/Oncology Inpatient Progress Note    PATIENT NAME: Young Ames  : 1950  MRN: [id number]    CHIEF COMPLAINT: Dyspnea    HISTORY OF PRESENT ILLNESS:      2025: Mr. Ames has been a patient of mine since the mid part of , when he was identified as having metastatic adenocarcinoma of the lung. He was initially treated with a combination of chemotherapy and immunotherapy. He had an initial good response but earlier this year showed signs of progression. His treatment was transitioned to a taxane with ramucirumab and received the first dose a few days before this admission. He presented to the hospital complaining again of severe dysphagia, as he is known to have esophageal stenosis, but also and more importantly of dyspnea. He has been found to have segmental and subsegmental pulmonary emboli in the right lower lobe. In addition the left pleural effusion, which has been a chronic problem, seemed to have increased. Signs of esophageal obstruction were as well present on the scans. This morning he feels better. He has been able to sleep. He has not attempted to swallow much. He tells me he has been swallowing some of his medications. The anticoagulant that he had been taking before, he reports, he had been taking without fail. On exam he is an ill-appearing man who does not seem in acute distress. He is oriented and conversant. No jaundice. The lungs are diminished bilaterally but particularly on the left, where in the lower segments, there are no breath sounds. The heart is tachycardic and regular. Abdomen is soft. There is no edema. Laboratory exams reviewed. Reviewed the imaging studies. Probably will need a thoracentesis. Right now he is on heparin and we will need to transition to a different oral anticoagulant as, it is not clear to me, whether the apixaban did not prevent the formation of new emboli because of his inability to take the medication and swallow it or because of real  failure of the medication. Nevertheless for now I will keep him on the heparin to allow for upper endoscopy of the esophagus. It is early to tell if the present chemotherapy regimen will result in response. For now I will continue with the same.     Subjective   7/2/2025: Underwent placement of a Pleurx without complications.  He feels much better this morning.  ROS:  Review of Systems   Constitutional:  Positive for fatigue. Negative for activity change, appetite change, chills, diaphoresis, fever and unexpected weight change.   HENT:  Positive for trouble swallowing. Negative for congestion, dental problem, drooling, ear discharge, ear pain, facial swelling, hearing loss, mouth sores, nosebleeds, postnasal drip, rhinorrhea, sinus pressure, sinus pain, sneezing, sore throat, tinnitus and voice change.    Eyes:  Negative for photophobia, pain, discharge, redness, itching and visual disturbance.   Respiratory:  Positive for cough and shortness of breath. Negative for apnea, choking, chest tightness, wheezing and stridor.    Cardiovascular:  Negative for chest pain, palpitations and leg swelling.   Gastrointestinal:  Negative for abdominal distention, abdominal pain, anal bleeding, blood in stool, constipation, diarrhea, nausea, rectal pain and vomiting.   Endocrine: Negative for cold intolerance, heat intolerance, polydipsia and polyuria.   Genitourinary:  Negative for decreased urine volume, difficulty urinating, dysuria, flank pain, frequency, genital sores, hematuria and urgency.   Musculoskeletal:  Negative for arthralgias, back pain, gait problem, joint swelling, myalgias, neck pain and neck stiffness.   Skin:  Negative for color change, pallor and rash.   Neurological:  Negative for dizziness, tremors, seizures, syncope, facial asymmetry, speech difficulty, weakness, light-headedness, numbness and headaches.   Hematological:  Negative for adenopathy. Does not bruise/bleed easily.   Psychiatric/Behavioral:   "Negative for agitation, behavioral problems, confusion, decreased concentration, hallucinations, self-injury, sleep disturbance and suicidal ideas. The patient is not nervous/anxious.         MEDICATIONS:    Scheduled Meds:  ampicillin-sulbactam, 3 g, Intravenous, Q6H  enoxaparin sodium, 1 mg/kg, Subcutaneous, Q12H  hydrocortisone, 20 mg, Oral, BID With Meals  levothyroxine, 75 mcg, Oral, QAM  metoprolol tartrate, 2.5 mg, Intravenous, Q8H  pantoprazole, 40 mg, Intravenous, Q AM  sodium chloride, 10 mL, Intravenous, Q12H       Continuous Infusions:  Pharmacy to Dose enoxaparin (LOVENOX),   Pharmacy To Dose:,        PRN Meds:    senna-docusate sodium **AND** polyethylene glycol **AND** bisacodyl **AND** bisacodyl    hydrALAZINE    HYDROmorphone    ipratropium-albuterol    nitroglycerin    ondansetron ODT **OR** ondansetron    ondansetron ODT **OR** ondansetron    Pharmacy to Dose enoxaparin (LOVENOX)    Pharmacy To Dose:    [COMPLETED] Insert Peripheral IV **AND** sodium chloride    sodium chloride    sodium chloride     ALLERGIES:  No Known Allergies    Objective    VITALS:   /94 (BP Location: Left arm, Patient Position: Lying)   Pulse 98   Temp 97.8 °F (36.6 °C) (Oral)   Resp 13   Ht 180.3 cm (70.98\")   Wt 74.4 kg (164 lb)   SpO2 91%   BMI 22.88 kg/m²     PHYSICAL EXAM: (performed by MD)  Physical Exam  Constitutional:       General: He is not in acute distress.     Appearance: He is ill-appearing. He is not toxic-appearing or diaphoretic.   HENT:      Head: Normocephalic and atraumatic.      Right Ear: External ear normal.      Left Ear: External ear normal.      Nose: Nose normal.      Mouth/Throat:      Mouth: Mucous membranes are moist.      Pharynx: Oropharynx is clear.   Eyes:      General: No scleral icterus.        Right eye: No discharge.         Left eye: No discharge.      Conjunctiva/sclera: Conjunctivae normal.      Pupils: Pupils are equal, round, and reactive to light.   Cardiovascular: "      Rate and Rhythm: Normal rate and regular rhythm.      Pulses: Normal pulses.      Heart sounds: Normal heart sounds. No murmur heard.     No friction rub. No gallop.   Pulmonary:      Effort: No respiratory distress.      Breath sounds: No stridor. No wheezing, rhonchi or rales.      Comments: Breath sounds are now more symmetrically diminished.  Chest:      Chest wall: No tenderness.   Abdominal:      General: Abdomen is flat. Bowel sounds are normal. There is no distension.      Palpations: Abdomen is soft. There is no mass.      Tenderness: There is no abdominal tenderness. There is no right CVA tenderness, left CVA tenderness, guarding or rebound.   Musculoskeletal:         General: No tenderness, deformity or signs of injury.      Cervical back: No rigidity.      Right lower leg: No edema.      Left lower leg: No edema.   Lymphadenopathy:      Cervical: No cervical adenopathy.   Skin:     General: Skin is warm and dry.      Coloration: Skin is not jaundiced or pale.      Findings: No bruising or rash.   Neurological:      General: No focal deficit present.      Mental Status: He is alert and oriented to person, place, and time.      Cranial Nerves: No cranial nerve deficit.   Psychiatric:         Mood and Affect: Mood normal.         Behavior: Behavior normal.         Thought Content: Thought content normal.         Judgment: Judgment normal.     Arben Tim MD performed the physical exam on 7/2/2025 as documented above.    RECENT LABS:  Lab Results (last 24 hours)       Procedure Component Value Units Date/Time    Blood Culture - Blood, Arm, Left [[id number]]  (Normal) Collected: 06/29/25 1549    Specimen: Blood from Arm, Left Updated: 07/02/25 1600     Blood Culture No growth at 3 days    Blood Culture - Blood, Arm, Left [[id number]]  (Normal) Collected: 06/29/25 1529    Specimen: Blood from Arm, Left Updated: 07/02/25 1545     Blood Culture No growth at 3 days    Basic Metabolic Panel [[id number]]   (Abnormal) Collected: 07/02/25 0428    Specimen: Blood from Arm, Right Updated: 07/02/25 0506     Glucose 135 mg/dL      BUN 25.0 mg/dL      Creatinine 1.35 mg/dL      Sodium 137 mmol/L      Potassium 3.5 mmol/L      Chloride 102 mmol/L      CO2 27.6 mmol/L      Calcium 7.7 mg/dL      BUN/Creatinine Ratio 18.5     Anion Gap 7.4 mmol/L      eGFR 54.8 mL/min/1.73     Narrative:      GFR Categories in Chronic Kidney Disease (CKD)              GFR Category          GFR (mL/min/1.73)    Interpretation  G1                    90 or greater        Normal or high (1)  G2                    60-89                Mild decrease (1)  G3a                   45-59                Mild to moderate decrease  G3b                   30-44                Moderate to severe decrease  G4                    15-29                Severe decrease  G5                    14 or less           Kidney failure    (1)In the absence of evidence of kidney disease, neither GFR category G1 or G2 fulfill the criteria for CKD.    eGFR calculation 2021 CKD-EPI creatinine equation, which does not include race as a factor    Hemoglobin & Hematocrit, Blood [[id number]]  (Abnormal) Collected: 07/01/25 1910    Specimen: Blood from Arm, Right Updated: 07/01/25 1926     Hemoglobin 11.4 g/dL      Hematocrit 34.6 %           IMAGING REVIEWED:  FL ESOPHAGRAM SINGLE CONTRAST  Result Date: 7/2/2025  Impression: Short segment narrowing of the lower thoracic esophagus, through which only a thin linear contrast column with pass. There is distention of the upper thoracic esophagus with what appears to be combination of contrast, air bubbles, and food stuff. Patient  states he was unable to tolerate his breakfast this morning. For these reasons, 13 mm barium tablet was not administered. No contrast extravasation status post reported esophageal dilation procedure. Small esophageal hiatal hernia. Electronically Signed: Mar Aly MD  7/2/2025 11:49 AM EDT  Workstation ID:  [id number]    XR Chest 1 View  Result Date: 7/1/2025  Impression: 1. Improved left effusion and lung aeration status post thoracotomy tube placement. No pneumothorax. 2. Cardiomegaly with residual small bilateral pleural effusions and interstitial pulmonary edema. Right-sided effusion is mildly loculated. 3. Superimposed more reticulonodular appearing airspace opacities suspicious for infectious process, such as bronchiolitis, aspiration or early bronchopneumonia. Electronically Signed: Emmanuel Flores MD  7/1/2025 8:45 AM EDT  Workstation ID: [id number]      Assessment & Plan   ASSESSMENT:  Pulmonary embolism: To resume treatment with apixaban in the next 24 hours.  Consider long-term use of enoxaparin.  Left pleural effusion status post Pleurx catheter insertion.  Still connected to waterseal.  Will follow.  Non-small cell lung cancer: Continue treatment with ramucirumab/docetaxel once discharged.  Discussed with him.    PLAN:  As above.    Arben Tim MD on 7/2/2025 at 1639.

## 2025-07-02 NOTE — PROGRESS NOTES
LOS: 3 days   Patient Care Team:  Abner Funes APRN as PCP - General (Family Medicine)  Brittany Canchola, RN as Nurse Navigator  Arben Tim MD as Consulting Physician (Hematology and Oncology)  Aman Gregory MD as Consulting Physician (Nephrology)    Subjective     Interval History: Status post left Pleurx catheter placement yesterday, currently connected to suction.  2000 cc of serous pleural fluid was drained in the operating room.    Patient Complaints: Appropriate left-sided chest wall pain, otherwise feels well.      History taken from: patient    Review of Systems   Constitutional:  Negative for activity change, appetite change, chills, diaphoresis, fatigue and fever.   HENT:  Positive for trouble swallowing. Negative for congestion.    Eyes:  Negative for visual disturbance.   Respiratory:  Negative for cough, shortness of breath, wheezing and stridor.    Cardiovascular:  Negative for chest pain.   Gastrointestinal:  Negative for diarrhea, nausea and vomiting.   Endocrine: Negative for polyuria.   Genitourinary:  Negative for dysuria.   Musculoskeletal:  Positive for arthralgias. Negative for gait problem.   Skin:  Negative for rash and wound.   Allergic/Immunologic: Negative for immunocompromised state.   Neurological:  Negative for light-headedness and headaches.   Psychiatric/Behavioral:  Negative for confusion.            Objective     Vital Signs  Temp:  [97.5 °F (36.4 °C)-98.6 °F (37 °C)] 98.3 °F (36.8 °C)  Heart Rate:  [76-90] 84  Resp:  [14-20] 15  BP: (131-161)/(86-97) 141/86    Physical Exam:     General Appearance:    Alert, cooperative, in no acute distress, pleasant, sitting up in bed   Head:    Normocephalic, without obvious abnormality, atraumatic   Eyes:            Lids and lashes normal, conjunctivae and sclerae normal, no   icterus, no pallor, corneas clear, PERRLA   Ears:    Ears appear intact with no abnormalities noted   Throat:   No oral lesions, no thrush, oral mucosa  moist   Neck:   No adenopathy, supple, trachea midline, no thyromegaly, no   carotid bruit, no JVD   Lungs:     Clear to auscultation,respirations regular, even and                  unlabored    Heart:    Regular rhythm and normal rate, normal S1 and S2, no            murmur, no gallop, no rub, no click   Chest Wall:  Left chest tube in place   Abdomen:     Normal bowel sounds, no masses, no organomegaly, soft        Non-tender non-distended, no guarding,   Extremities:   Moves all extremities well, no edema, no cyanosis, no             Redness   Pulses:   Pulses palpable and equal bilaterally   Skin:   No bleeding, bruising or rash   Lymph nodes:   No palpable adenopathy   Neurologic:   Cranial nerves 2 - 12 grossly intact, sensation intact, DTR       present and equal bilaterally        Results Review:    Lab Results (last 24 hours)       Procedure Component Value Units Date/Time    Basic Metabolic Panel [[id number]]  (Abnormal) Collected: 07/02/25 0428    Specimen: Blood from Arm, Right Updated: 07/02/25 0506     Glucose 135 mg/dL      BUN 25.0 mg/dL      Creatinine 1.35 mg/dL      Sodium 137 mmol/L      Potassium 3.5 mmol/L      Chloride 102 mmol/L      CO2 27.6 mmol/L      Calcium 7.7 mg/dL      BUN/Creatinine Ratio 18.5     Anion Gap 7.4 mmol/L      eGFR 54.8 mL/min/1.73     Narrative:      GFR Categories in Chronic Kidney Disease (CKD)              GFR Category          GFR (mL/min/1.73)    Interpretation  G1                    90 or greater        Normal or high (1)  G2                    60-89                Mild decrease (1)  G3a                   45-59                Mild to moderate decrease  G3b                   30-44                Moderate to severe decrease  G4                    15-29                Severe decrease  G5                    14 or less           Kidney failure    (1)In the absence of evidence of kidney disease, neither GFR category G1 or G2 fulfill the criteria for CKD.    eGFR  calculation 2021 CKD-EPI creatinine equation, which does not include race as a factor    Hemoglobin & Hematocrit, Blood [[id number]]  (Abnormal) Collected: 07/01/25 1910    Specimen: Blood from Arm, Right Updated: 07/01/25 1926     Hemoglobin 11.4 g/dL      Hematocrit 34.6 %     Blood Culture - Blood, Arm, Left [[id number]]  (Normal) Collected: 06/29/25 1549    Specimen: Blood from Arm, Left Updated: 07/01/25 1600     Blood Culture No growth at 2 days    Blood Culture - Blood, Arm, Left [[id number]]  (Normal) Collected: 06/29/25 1529    Specimen: Blood from Arm, Left Updated: 07/01/25 1545     Blood Culture No growth at 2 days    aPTT [[id number]]  (Normal) Collected: 07/01/25 1237    Specimen: Blood from Arm, Right Updated: 07/01/25 1309     PTT 27.7 seconds              Imaging Results (Last 24 Hours)       ** No results found for the last 24 hours. **                 I reviewed the patient's new clinical results.    Medication Review:   Scheduled Meds:ampicillin-sulbactam, 3 g, Intravenous, Q6H  enoxaparin sodium, 1 mg/kg, Subcutaneous, Q12H  hydrocortisone sodium succinate, 50 mg, Intravenous, Q8H  levothyroxine, 75 mcg, Oral, QAM  metoprolol tartrate, 2.5 mg, Intravenous, Q8H  pantoprazole, 40 mg, Intravenous, Q AM  sodium chloride, 10 mL, Intravenous, Q12H      Continuous Infusions:Pharmacy to Dose enoxaparin (LOVENOX),   Pharmacy To Dose:,       PRN Meds:.  senna-docusate sodium **AND** polyethylene glycol **AND** bisacodyl **AND** bisacodyl    hydrALAZINE    HYDROmorphone    ipratropium-albuterol    nitroglycerin    ondansetron ODT **OR** ondansetron    ondansetron ODT **OR** ondansetron    Pharmacy to Dose enoxaparin (LOVENOX)    Pharmacy To Dose:    [COMPLETED] Insert Peripheral IV **AND** sodium chloride    sodium chloride    sodium chloride     Assessment & Plan       Pulmonary embolism    Pleural effusion    Stage IV adenocarcinoma of lung, right    Stage IV adenocarcinoma of lung, left    CKD (chronic  kidney disease) stage 4, GFR 15-29 ml/min    Paroxysmal atrial fibrillation    Chronic anticoagulation    Hypoxia    Hypothyroidism (acquired)    Dysphagia    Pneumonia, unspecified organism    Elevated brain natriuretic peptide (BNP) level    Bilateral leg edema    Food impaction of esophagus    -Chest tube placed successfully, currently connected to negative suction.  Monitor output.  Appreciate thoracic surgery and pulmonary input.  - Remains in sinus rhythm.  Continue Lovenox for anticoagulation for atrial fibs.  Resume oral anticoagulant at discharge.  - Continue ampicillin-sulbactam for pneumonia  - Patient understands that he needs to be upright at all times while eating and drinking to prevent future food impactions.  - Thyroxine for hypothyroidism  - Anticipate weaning hydrocortisone sooon    CODE Status:    Code status (Patient has no pulse and is not breathing):  CPR (Attempt to Resuscitate)  Medical Interventions (Patient has pulse or is breathing):  Full support  Level of support discussed with:  Patient    Admission status:  I believe this patient meets inpatient status  Expected length of stay:  2 midnights or greater  I discussed the patient's findings and my recommendations with the patient.    Plan for disposition:Home at [facility]    Evelyn Felder MD  07/02/25  09:56 EDT

## 2025-07-02 NOTE — DISCHARGE PLACEMENT REQUEST
"Young Sierra (74 y.o. Male)       Date of Birth   1950    Social Security Number       Address   [address] [place] [address] [place] [facility]    Home Phone   [phone]    MRN   [id number]       Mosque   None    Marital Status                               Admission Date   6/29/2025    Admission Type   Emergency    Admitting Provider   Evelyn Felder MD    Attending Provider   Evelyn Felder MD    Department, Room/Bed   [facility] SURGICAL INPATIENT, 4126/1       Discharge Date       Discharge Disposition       Discharge Destination                                 Attending Provider: Evelyn Felder MD    Allergies: No Known Allergies    Isolation: None   Infection: None   Code Status: CPR    Ht: 180.3 cm (70.98\")   Wt: 74.4 kg (164 lb)    Admission Cmt: None   Principal Problem: Pulmonary embolism [I26.99]                   Active Insurance as of 6/29/2025       Primary Coverage       Payor Plan Insurance Group Employer/Plan Group    [employer] MEDICARE REPLACEMENT [employer]NA MEDICARE AD[id number]       Payor Plan Address Payor Plan Phone Number Payor Plan Fax Number Effective Dates    [address] [phone]  1/1/2024 - None Entered    [facility] [id number]         Subscriber Name Subscriber Birth Date Member ID       YOUNG SIERRA 1950 [id number]                     Emergency Contacts        (Rel.) Home Phone Work Phone Mobile Phone    AMIRA SIERRA (Spouse) -- -- [phone]    Norma Mendes (Daughter) -- -- [phone]          "

## 2025-07-02 NOTE — PROGRESS NOTES
Daily Progress Note          Assessment    Acute/chronic hypoxic respiratory insufficiency     new right lower lobe pulmonary embolism  Echo 6/30/2025, estimated RVSP 45 to 55 mmHg with moderate pulmonary hypertension  Small less than 1 cm pericardial effusion no evidence of tamponade  EF 61%     Large left pleural effusion     Left Lower lobe pneumonia     Bronchoscopy 5/23/2025 Lung, left lower lobe, endobronchial biopsy):    Invasive moderately differentiated adenocarcinoma with micropapillary features,   Positive for lymphovascular invasion     CT chest 5/23/2025   interlobular septal thickening in theleft lung which 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     status post bedside left thoracentesis 5/22/2025 with removal of 950 mL of serosanguineous fluid fluid was positive for cytology non-small cell     History of right lung cancer adenocarcinoma, stage cTX N2 M1   status post concomitant radiation chemotherapy, diagnosed on bronchoscopy August 2024  CAD  CKD  Chronic A-fib on chronic anticoagulation Eliquis  Hypothyroidism  Chronic anemia     Former smoker: Quit 2000 after 32 pack years     Recommendations:     Oxygen supplement and titration to maintain saturation 90-95%: Currently on 2 L  Encourage use of incentive spirometry  Status post swallow study     Anticoagulation: Currently on therapeutic dose Lovenox     Status post left Pleurx catheter placement 7/1/2025     Antibiotics currently on Unasyn  Hydrocortisone back to his home dose of 20 mg p.o. twice daily  Bronchodilators as needed  Pantoprazole  Metoprolol             XR Chest 1 View  Result Date: 7/1/2025  Impression: 1. Improved left effusion and lung aeration status post thoracotomy tube placement. No pneumothorax. 2. Cardiomegaly with residual small bilateral pleural effusions and interstitial pulmonary edema. Right-sided effusion is mildly loculated. 3. Superimposed more reticulonodular appearing airspace opacities suspicious for infectious  process, such as bronchiolitis, aspiration or early bronchopneumonia. Electronically Signed: Emmanuel Flores MD  7/1/2025 8:45 AM EDT  Workstation ID: [id number]    IR Replacement PICC Without Port Same Site  Result Date: 6/30/2025  Impression: Successful exchange and repositioning of right upper extremity PICC, as described above. Electronically Signed: Sean Buckley MD  6/30/2025 1:36 PM EDT  Workstation ID: [id number]               LOS: 3 days     Subjective     Cough and shortness of breath    Objective     Vital signs for last 24 hours:  Vitals:    07/01/25 2246 07/02/25 0022 07/02/25 0425 07/02/25 0738   BP:  159/97 141/86    BP Location:  Left arm Right arm    Patient Position:  Lying Lying    Pulse: 80 80 76 84   Resp: 19 20 14 15   Temp:  97.7 °F (36.5 °C) 97.5 °F (36.4 °C) 98.3 °F (36.8 °C)   TempSrc:  Oral Oral Axillary   SpO2: 94% 93% 92%    Weight:       Height:           Intake/Output last 3 shifts:  I/O last 3 completed shifts:  In: 781.3 [P.O.:180; I.V.:300; Blood:301.3]  Out: 1045 [Urine:650; Chest Tube:395]  Intake/Output this shift:  No intake/output data recorded.      Radiology  Imaging Results (Last 24 Hours)       Procedure Component Value Units Date/Time    FL ESOPHAGRAM SINGLE CONTRAST - In process [[id number]] Resulted: 07/02/25 1019     Updated: 07/02/25 1019    This result has not been signed. Information might be incomplete.              Labs:  Results from last 7 days   Lab Units 07/01/25  1910 07/01/25  0544   WBC 10*3/mm3  --  11.67*   HEMOGLOBIN g/dL 11.4* 7.0*   HEMATOCRIT % 34.6* 22.4*   PLATELETS 10*3/mm3  --  163     Results from last 7 days   Lab Units 07/02/25  0428 06/30/25  0828 06/29/25  1529   SODIUM mmol/L 137   < > 140   POTASSIUM mmol/L 3.5   < > 4.2   CHLORIDE mmol/L 102   < > 104   CO2 mmol/L 27.6   < > 24.8   BUN mg/dL 25.0*   < > 12.6   CREATININE mg/dL 1.35*   < > 1.33*   CALCIUM mg/dL 7.7*   < > 8.6   BILIRUBIN mg/dL  --   --  0.5   ALK PHOS U/L  --   --  74   ALT (SGPT)  U/L  --   --  11   AST (SGOT) U/L  --   --  38   GLUCOSE mg/dL 135*   < > 100*    < > = values in this interval not displayed.         Results from last 7 days   Lab Units 06/29/25  1529   ALBUMIN g/dL 3.4*     Results from last 7 days   Lab Units 06/29/25  1644 06/29/25  1529   HSTROP T ng/L 259* 286*             Results from last 7 days   Lab Units 07/01/25  1237 07/01/25  0544 06/30/25  2353 06/30/25  0341 06/29/25  1549   INR   --   --   --   --  1.37*   APTT seconds 27.7 37.0* 110.7*   < > 31.8    < > = values in this interval not displayed.               Meds:   SCHEDULE  ampicillin-sulbactam, 3 g, Intravenous, Q6H  enoxaparin sodium, 1 mg/kg, Subcutaneous, Q12H  hydrocortisone sodium succinate, 50 mg, Intravenous, Q8H  levothyroxine, 75 mcg, Oral, QAM  metoprolol tartrate, 2.5 mg, Intravenous, Q8H  pantoprazole, 40 mg, Intravenous, Q AM  sodium chloride, 10 mL, Intravenous, Q12H      Infusions  Pharmacy to Dose enoxaparin (LOVENOX),   Pharmacy To Dose:,       PRNs    senna-docusate sodium **AND** polyethylene glycol **AND** bisacodyl **AND** bisacodyl    hydrALAZINE    HYDROmorphone    ipratropium-albuterol    nitroglycerin    ondansetron ODT **OR** ondansetron    ondansetron ODT **OR** ondansetron    Pharmacy to Dose enoxaparin (LOVENOX)    Pharmacy To Dose:    [COMPLETED] Insert Peripheral IV **AND** sodium chloride    sodium chloride    sodium chloride    Physical Exam:  General Appearance:  Alert   HEENT:  Normocephalic, without obvious abnormality, Conjunctiva/corneas clear,.   Nares normal, no drainage     Neck:  Supple, symmetrical, trachea midline.   Lungs /Chest wall:   Bilateral basal rhonchi, respirations unlabored, symmetrical wall movement.     Heart:  Regular rate and rhythm, S1 S2 normal  Abdomen: Soft, non-tender, no masses, no organomegaly.    Extremities: No edema, no clubbing or cyanosis     ROS  Constitutional: Negative for chills, fever and malaise/fatigue.   HENT: Negative.    Eyes:  Negative.    Cardiovascular: Negative.    Respiratory: Positive for cough and shortness of breath.    Skin: Negative.    Musculoskeletal: Negative.    Gastrointestinal: Negative.    Genitourinary: Negative.    Neurological: Negative.          I reviewed the recent clinical results  I personally reviewed the latest radiological studies    Part of this note may be an electronic transcription/translation of spoken language to printed text using the Dragon Dictation System.

## 2025-07-02 NOTE — PLAN OF CARE
Goal Outcome Evaluation:  Plan of Care Reviewed With: patient        Progress: improving                      Patient HR >160 several times, consults called, new orders placed. Monitor closely

## 2025-07-02 NOTE — NURSING NOTE
Patient having rhythm changes, notified Dr Felder, Hiral Su PA-C and new order for Cardiology Dr Pavon,

## 2025-07-03 ENCOUNTER — APPOINTMENT (OUTPATIENT)
Dept: GENERAL RADIOLOGY | Facility: HOSPITAL | Age: 75
End: 2025-07-03
Payer: MEDICARE

## 2025-07-03 ENCOUNTER — READMISSION MANAGEMENT (OUTPATIENT)
Dept: CALL CENTER | Facility: HOSPITAL | Age: 75
End: 2025-07-03
Payer: MEDICARE

## 2025-07-03 VITALS
HEART RATE: 89 BPM | RESPIRATION RATE: 22 BRPM | OXYGEN SATURATION: 95 % | WEIGHT: 164 LBS | DIASTOLIC BLOOD PRESSURE: 90 MMHG | TEMPERATURE: 97.6 F | BODY MASS INDEX: 22.96 KG/M2 | HEIGHT: 71 IN | SYSTOLIC BLOOD PRESSURE: 154 MMHG

## 2025-07-03 PROBLEM — J15.9 PNEUMONIA DUE TO GRAM-POSITIVE BACTERIA: Status: ACTIVE | Noted: 2025-07-03

## 2025-07-03 LAB
ALBUMIN SERPL-MCNC: 3 G/DL (ref 3.5–5.2)
ALBUMIN SERPL-MCNC: 3 G/DL (ref 3.5–5.2)
ALBUMIN/GLOB SERPL: 1.6 G/DL
ALBUMIN/GLOB SERPL: 1.6 G/DL
ALP SERPL-CCNC: 55 U/L (ref 39–117)
ALP SERPL-CCNC: 62 U/L (ref 39–117)
ALT SERPL W P-5'-P-CCNC: 8 U/L (ref 1–41)
ALT SERPL W P-5'-P-CCNC: 9 U/L (ref 1–41)
ANION GAP SERPL CALCULATED.3IONS-SCNC: 10.6 MMOL/L (ref 5–15)
ANION GAP SERPL CALCULATED.3IONS-SCNC: 12.3 MMOL/L (ref 5–15)
ANION GAP SERPL CALCULATED.3IONS-SCNC: 8.5 MMOL/L (ref 5–15)
AST SERPL-CCNC: 21 U/L (ref 1–40)
AST SERPL-CCNC: 26 U/L (ref 1–40)
BILIRUB SERPL-MCNC: 0.2 MG/DL (ref 0–1.2)
BILIRUB SERPL-MCNC: 0.3 MG/DL (ref 0–1.2)
BUN SERPL-MCNC: 20.4 MG/DL (ref 8–23)
BUN SERPL-MCNC: 22.3 MG/DL (ref 8–23)
BUN SERPL-MCNC: 23.6 MG/DL (ref 8–23)
BUN/CREAT SERPL: 17 (ref 7–25)
BUN/CREAT SERPL: 17.8 (ref 7–25)
BUN/CREAT SERPL: 18.6 (ref 7–25)
CALCIUM SPEC-SCNC: 7.7 MG/DL (ref 8.6–10.5)
CALCIUM SPEC-SCNC: 7.7 MG/DL (ref 8.6–10.5)
CALCIUM SPEC-SCNC: 7.8 MG/DL (ref 8.6–10.5)
CHLORIDE SERPL-SCNC: 106 MMOL/L (ref 98–107)
CHLORIDE SERPL-SCNC: 106 MMOL/L (ref 98–107)
CHLORIDE SERPL-SCNC: 107 MMOL/L (ref 98–107)
CO2 SERPL-SCNC: 24.4 MMOL/L (ref 22–29)
CO2 SERPL-SCNC: 24.7 MMOL/L (ref 22–29)
CO2 SERPL-SCNC: 27.5 MMOL/L (ref 22–29)
CREAT SERPL-MCNC: 1.2 MG/DL (ref 0.76–1.27)
CREAT SERPL-MCNC: 1.25 MG/DL (ref 0.76–1.27)
CREAT SERPL-MCNC: 1.27 MG/DL (ref 0.76–1.27)
DEPRECATED RDW RBC AUTO: 47.3 FL (ref 37–54)
EGFRCR SERPLBLD CKD-EPI 2021: 58.9 ML/MIN/1.73
EGFRCR SERPLBLD CKD-EPI 2021: 60 ML/MIN/1.73
EGFRCR SERPLBLD CKD-EPI 2021: 63.1 ML/MIN/1.73
ERYTHROCYTE [DISTWIDTH] IN BLOOD BY AUTOMATED COUNT: 14.2 % (ref 12.3–15.4)
GLOBULIN UR ELPH-MCNC: 1.9 GM/DL
GLOBULIN UR ELPH-MCNC: 1.9 GM/DL
GLUCOSE SERPL-MCNC: 102 MG/DL (ref 65–99)
GLUCOSE SERPL-MCNC: 125 MG/DL (ref 65–99)
GLUCOSE SERPL-MCNC: 206 MG/DL (ref 65–99)
HCT VFR BLD AUTO: 33.8 % (ref 37.5–51)
HGB BLD-MCNC: 10.9 G/DL (ref 13–17.7)
MAGNESIUM SERPL-MCNC: 1.8 MG/DL (ref 1.6–2.4)
MCH RBC QN AUTO: 30.4 PG (ref 26.6–33)
MCHC RBC AUTO-ENTMCNC: 32.2 G/DL (ref 31.5–35.7)
MCV RBC AUTO: 94.2 FL (ref 79–97)
PLATELET # BLD AUTO: 191 10*3/MM3 (ref 140–450)
PMV BLD AUTO: 8.8 FL (ref 6–12)
POTASSIUM SERPL-SCNC: 3 MMOL/L (ref 3.5–5.2)
POTASSIUM SERPL-SCNC: 3.2 MMOL/L (ref 3.5–5.2)
POTASSIUM SERPL-SCNC: 3.6 MMOL/L (ref 3.5–5.2)
PROT SERPL-MCNC: 4.9 G/DL (ref 6–8.5)
PROT SERPL-MCNC: 4.9 G/DL (ref 6–8.5)
QT INTERVAL: 410 MS
QTC INTERVAL: 502 MS
RBC # BLD AUTO: 3.59 10*6/MM3 (ref 4.14–5.8)
SODIUM SERPL-SCNC: 141 MMOL/L (ref 136–145)
SODIUM SERPL-SCNC: 143 MMOL/L (ref 136–145)
SODIUM SERPL-SCNC: 143 MMOL/L (ref 136–145)
T4 FREE SERPL-MCNC: 0.63 NG/DL (ref 0.92–1.68)
TSH SERPL DL<=0.05 MIU/L-ACNC: 54.4 UIU/ML (ref 0.27–4.2)
WBC NRBC COR # BLD AUTO: 11.55 10*3/MM3 (ref 3.4–10.8)

## 2025-07-03 PROCEDURE — 99024 POSTOP FOLLOW-UP VISIT: CPT

## 2025-07-03 PROCEDURE — 84439 ASSAY OF FREE THYROXINE: CPT | Performed by: INTERNAL MEDICINE

## 2025-07-03 PROCEDURE — 84443 ASSAY THYROID STIM HORMONE: CPT | Performed by: NURSE PRACTITIONER

## 2025-07-03 PROCEDURE — 25010000002 MAGNESIUM SULFATE 4 GM/100ML SOLUTION: Performed by: INTERNAL MEDICINE

## 2025-07-03 PROCEDURE — 99231 SBSQ HOSP IP/OBS SF/LOW 25: CPT | Performed by: NURSE PRACTITIONER

## 2025-07-03 PROCEDURE — 71045 X-RAY EXAM CHEST 1 VIEW: CPT

## 2025-07-03 PROCEDURE — 99232 SBSQ HOSP IP/OBS MODERATE 35: CPT | Performed by: INTERNAL MEDICINE

## 2025-07-03 PROCEDURE — 93005 ELECTROCARDIOGRAM TRACING: CPT | Performed by: NURSE PRACTITIONER

## 2025-07-03 PROCEDURE — 25010000002 ENOXAPARIN PER 10 MG: Performed by: NURSE PRACTITIONER

## 2025-07-03 PROCEDURE — 25010000002 METOPROLOL TARTRATE 5 MG/5ML SOLUTION: Performed by: INTERNAL MEDICINE

## 2025-07-03 PROCEDURE — 80053 COMPREHEN METABOLIC PANEL: CPT | Performed by: INTERNAL MEDICINE

## 2025-07-03 PROCEDURE — 85027 COMPLETE CBC AUTOMATED: CPT | Performed by: INTERNAL MEDICINE

## 2025-07-03 PROCEDURE — 93010 ELECTROCARDIOGRAM REPORT: CPT | Performed by: INTERNAL MEDICINE

## 2025-07-03 PROCEDURE — 25010000002 AMPICILLIN-SULBACTAM PER 1.5 G: Performed by: INTERNAL MEDICINE

## 2025-07-03 PROCEDURE — 83735 ASSAY OF MAGNESIUM: CPT | Performed by: NURSE PRACTITIONER

## 2025-07-03 RX ORDER — METOPROLOL SUCCINATE 25 MG/1
25 TABLET, EXTENDED RELEASE ORAL EVERY 12 HOURS SCHEDULED
Status: DISCONTINUED | OUTPATIENT
Start: 2025-07-03 | End: 2025-07-03

## 2025-07-03 RX ORDER — POTASSIUM CHLORIDE 1500 MG/1
40 TABLET, EXTENDED RELEASE ORAL EVERY 4 HOURS
Status: COMPLETED | OUTPATIENT
Start: 2025-07-03 | End: 2025-07-03

## 2025-07-03 RX ORDER — MAGNESIUM SULFATE HEPTAHYDRATE 40 MG/ML
4 INJECTION, SOLUTION INTRAVENOUS ONCE
Status: COMPLETED | OUTPATIENT
Start: 2025-07-03 | End: 2025-07-03

## 2025-07-03 RX ORDER — METOPROLOL SUCCINATE 50 MG/1
50 TABLET, EXTENDED RELEASE ORAL EVERY 12 HOURS SCHEDULED
Qty: 60 TABLET | Refills: 0 | Status: ON HOLD | OUTPATIENT
Start: 2025-07-03

## 2025-07-03 RX ORDER — PANTOPRAZOLE SODIUM 40 MG/1
40 TABLET, DELAYED RELEASE ORAL DAILY
Qty: 30 TABLET | Refills: 0 | Status: ON HOLD | OUTPATIENT
Start: 2025-07-03

## 2025-07-03 RX ORDER — POTASSIUM CHLORIDE 1500 MG/1
20 TABLET, EXTENDED RELEASE ORAL ONCE
Status: COMPLETED | OUTPATIENT
Start: 2025-07-03 | End: 2025-07-03

## 2025-07-03 RX ORDER — ENOXAPARIN SODIUM 100 MG/ML
1 INJECTION SUBCUTANEOUS EVERY 12 HOURS SCHEDULED
Qty: 48 ML | Refills: 1 | Status: ON HOLD | OUTPATIENT
Start: 2025-07-03

## 2025-07-03 RX ORDER — TAMSULOSIN HYDROCHLORIDE 0.4 MG/1
0.4 CAPSULE ORAL NIGHTLY
Status: DISCONTINUED | OUTPATIENT
Start: 2025-07-03 | End: 2025-07-03 | Stop reason: HOSPADM

## 2025-07-03 RX ORDER — METOPROLOL SUCCINATE 50 MG/1
50 TABLET, EXTENDED RELEASE ORAL EVERY 12 HOURS SCHEDULED
Status: DISCONTINUED | OUTPATIENT
Start: 2025-07-03 | End: 2025-07-03 | Stop reason: HOSPADM

## 2025-07-03 RX ORDER — SOTALOL HYDROCHLORIDE 80 MG/1
80 TABLET ORAL 2 TIMES DAILY
Status: DISCONTINUED | OUTPATIENT
Start: 2025-07-03 | End: 2025-07-03

## 2025-07-03 RX ADMIN — METOROPROLOL TARTRATE 5 MG: 5 INJECTION, SOLUTION INTRAVENOUS at 07:41

## 2025-07-03 RX ADMIN — ENOXAPARIN SODIUM 70 MG: 100 INJECTION SUBCUTANEOUS at 09:51

## 2025-07-03 RX ADMIN — AMPICILLIN SODIUM AND SULBACTAM SODIUM 3 G: 2; 1 INJECTION, POWDER, FOR SOLUTION INTRAMUSCULAR; INTRAVENOUS at 09:51

## 2025-07-03 RX ADMIN — AMPICILLIN SODIUM AND SULBACTAM SODIUM 3 G: 2; 1 INJECTION, POWDER, FOR SOLUTION INTRAMUSCULAR; INTRAVENOUS at 05:36

## 2025-07-03 RX ADMIN — AMOXICILLIN AND CLAVULANATE POTASSIUM 1 TABLET: 875; 125 TABLET, COATED ORAL at 14:37

## 2025-07-03 RX ADMIN — METOPROLOL SUCCINATE 50 MG: 50 TABLET, EXTENDED RELEASE ORAL at 13:08

## 2025-07-03 RX ADMIN — POTASSIUM CHLORIDE 20 MEQ: 1500 TABLET, EXTENDED RELEASE ORAL at 13:08

## 2025-07-03 RX ADMIN — LEVOTHYROXINE SODIUM 75 MCG: 0.07 TABLET ORAL at 07:41

## 2025-07-03 RX ADMIN — POTASSIUM CHLORIDE 40 MEQ: 1500 TABLET, EXTENDED RELEASE ORAL at 14:37

## 2025-07-03 RX ADMIN — POTASSIUM CHLORIDE 40 MEQ: 1500 TABLET, EXTENDED RELEASE ORAL at 09:51

## 2025-07-03 RX ADMIN — PANTOPRAZOLE SODIUM 40 MG: 40 INJECTION, POWDER, FOR SOLUTION INTRAVENOUS at 05:36

## 2025-07-03 RX ADMIN — Medication 10 ML: at 10:06

## 2025-07-03 RX ADMIN — MAGNESIUM SULFATE IN WATER FOR 4 G: 40 INJECTION INTRAVENOUS at 13:08

## 2025-07-03 RX ADMIN — HYDROCORTISONE 20 MG: 10 TABLET ORAL at 07:41

## 2025-07-03 NOTE — OUTREACH NOTE
Prep Survey      Flowsheet Row Responses   [facility] patient discharged from? [facility]   Is LACE score < 7 ? No   Eligibility Readm Mgmt   Discharge diagnosis :ulmonary embolism-EGD, pleurx catheter insertion   Does the patient have one of the following disease processes/diagnoses(primary or secondary)? Other   Does the patient have Home health ordered? Yes   What is the Home health agency?  [facility]   Is there a DME ordered? No   Prep survey completed? Yes            SAJI SNOWDEN - Registered Nurse

## 2025-07-03 NOTE — PROGRESS NOTES
Daily Progress Note          Assessment    Acute/chronic hypoxic respiratory insufficiency     new right lower lobe pulmonary embolism  Echo 6/30/2025, estimated RVSP 45 to 55 mmHg with moderate pulmonary hypertension  Small less than 1 cm pericardial effusion no evidence of tamponade  EF 61%     Large left pleural effusion     Left Lower lobe pneumonia     Bronchoscopy 5/23/2025 Lung, left lower lobe, endobronchial biopsy):    Invasive moderately differentiated adenocarcinoma with micropapillary features,   Positive for lymphovascular invasion     CT chest 5/23/2025   interlobular septal thickening in theleft lung which 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     status post bedside left thoracentesis 5/22/2025 with removal of 950 mL of serosanguineous fluid fluid was positive for cytology non-small cell     History of right lung cancer adenocarcinoma, stage cTX N2 M1   status post concomitant radiation chemotherapy, diagnosed on bronchoscopy August 2024  CAD  CKD  Chronic A-fib on chronic anticoagulation Eliquis  Hypothyroidism  Chronic anemia     Former smoker: Quit 2000 after 32 pack years     Recommendations:     Oxygen supplement and titration to maintain saturation 90-95%: Currently on room air    Encourage use of incentive spirometry  Status esophagogram 7/2/2025: Narrowing of the distal esophagus     Anticoagulation: Currently on therapeutic dose Lovenox     Status post left Pleurx catheter placement 7/1/2025     Antibiotics currently on Unasyn  Hydrocortisone back to his home dose of 20 mg p.o. twice daily  Bronchodilators as needed  Pantoprazole  Metoprolol             FL ESOPHAGRAM SINGLE CONTRAST  Result Date: 7/2/2025  Impression: Short segment narrowing of the lower thoracic esophagus, through which only a thin linear contrast column with pass. There is distention of the upper thoracic esophagus with what appears to be combination of contrast, air bubbles, and food stuff. Patient  states he was  unable to tolerate his breakfast this morning. For these reasons, 13 mm barium tablet was not administered. No contrast extravasation status post reported esophageal dilation procedure. Small esophageal hiatal hernia. Electronically Signed: Mar Aly MD  7/2/2025 11:49 AM EDT  Workstation ID: [id number]               LOS: 4 days     Subjective     Chronic cough and mild shortness of breath    Objective     Vital signs for last 24 hours:  Vitals:    07/02/25 1750 07/03/25 0009 07/03/25 0557 07/03/25 0815   BP: 146/87 152/86 161/84 159/95   BP Location:  Left arm Left arm Left arm   Patient Position:  Lying Lying Lying   Pulse: 91   80   Resp:  16 20 13   Temp:  97.6 °F (36.4 °C) 97.9 °F (36.6 °C) 98.1 °F (36.7 °C)   TempSrc:  Oral Oral Oral   SpO2: 94%   93%   Weight:       Height:           Intake/Output last 3 shifts:  I/O last 3 completed shifts:  In: 840 [P.O.:840]  Out: 1070 [Urine:850; Chest Tube:220]  Intake/Output this shift:  I/O this shift:  In: -   Out: 250 [Urine:250]      Radiology  Imaging Results (Last 24 Hours)       Procedure Component Value Units Date/Time    XR Chest 1 View - In process [[id number]] Resulted: 07/03/25 0925     Updated: 07/03/25 0927    This result has not been signed. Information might be incomplete.      FL ESOPHAGRAM SINGLE CONTRAST [[id number]] Collected: 07/02/25 1051     Updated: 07/02/25 1152    Narrative:      FL ESOPHAGRAM SINGLE CONTRAST    Date of Exam: 7/2/2025 10:15 AM EDT    Indication: Dysphagia, s/p EGD with dilation, recent food impaction. EGD biopsy with Schroeder's esophagus. Signed report    Comparison: None available.    Technique:  Patient ingested effervescent crystals followed by high density barium for double contrast imaging of the esophagus. Patient subsequently ingested medium density barium for single-contrast evaluation.    Fluoroscopic Time: 0.7 minutes    Number of Images: 8 spot fluoroscopic series images    Findings:  The study was performed with the  patient in semierect position upon the fluoroscopy table due to indwelling left pleural catheter with portable suction mechanism. No gross aspiration was identified..    There is patulous or distended upper thoracic esophagus.    On initial images, there is frothy filling defects in the upper thoracic esophagus with some accumulation of contrast bolus at this location, thought to represent retained food stuff. Patient stated that he was unable to tolerate breakfast this morning,   sensation of it. For these reasons, a 13 mm barium tablet was not administered.    There is smooth long segment narrowing of the lower thoracic esophagus, through which thin contrast bolus would pass. There is appearance of a tiny sliding esophageal hiatal hernia. There is delayed contrast clearance from the thoracic esophagus into the   stomach.    No contrast extravasation is seen.      Impression:      Impression:  Short segment narrowing of the lower thoracic esophagus, through which only a thin linear contrast column with pass. There is distention of the upper thoracic esophagus with what appears to be combination of contrast, air bubbles, and food stuff. Patient   states he was unable to tolerate his breakfast this morning. For these reasons, 13 mm barium tablet was not administered.  No contrast extravasation status post reported esophageal dilation procedure.  Small esophageal hiatal hernia.      Electronically Signed: Mar Aly MD    7/2/2025 11:49 AM EDT    Workstation ID: [id number]            Labs:  Results from last 7 days   Lab Units 07/03/25  0750   WBC 10*3/mm3 11.55*   HEMOGLOBIN g/dL 10.9*   HEMATOCRIT % 33.8*   PLATELETS 10*3/mm3 191     Results from last 7 days   Lab Units 07/03/25  0543 06/30/25  0828 06/29/25  1529   SODIUM mmol/L 143   < > 140   POTASSIUM mmol/L 3.2*   < > 4.2   CHLORIDE mmol/L 107   < > 104   CO2 mmol/L 27.5   < > 24.8   BUN mg/dL 23.6*   < > 12.6   CREATININE mg/dL 1.27   < > 1.33*   CALCIUM  mg/dL 7.7*   < > 8.6   BILIRUBIN mg/dL  --   --  0.5   ALK PHOS U/L  --   --  74   ALT (SGPT) U/L  --   --  11   AST (SGOT) U/L  --   --  38   GLUCOSE mg/dL 102*   < > 100*    < > = values in this interval not displayed.         Results from last 7 days   Lab Units 06/29/25  1529   ALBUMIN g/dL 3.4*     Results from last 7 days   Lab Units 06/29/25  1644 06/29/25  1529   HSTROP T ng/L 259* 286*         Results from last 7 days   Lab Units 07/03/25  0543   MAGNESIUM mg/dL 1.8     Results from last 7 days   Lab Units 07/01/25  1237 07/01/25  0544 06/30/25  2353 06/30/25  0341 06/29/25  1549   INR   --   --   --   --  1.37*   APTT seconds 27.7 37.0* 110.7*   < > 31.8    < > = values in this interval not displayed.     Results from last 7 days   Lab Units 07/03/25  0543   TSH uIU/mL 54.400*           Meds:   SCHEDULE  ampicillin-sulbactam, 3 g, Intravenous, Q6H  enoxaparin sodium, 1 mg/kg, Subcutaneous, Q12H  hydrocortisone, 20 mg, Oral, BID With Meals  levothyroxine, 75 mcg, Oral, QAM  metoprolol tartrate, 5 mg, Intravenous, Q8H  pantoprazole, 40 mg, Intravenous, Q AM  potassium chloride ER, 40 mEq, Oral, Q4H  sodium chloride, 10 mL, Intravenous, Q12H      Infusions  Pharmacy to Dose enoxaparin (LOVENOX),   Pharmacy To Dose:,       PRNs    senna-docusate sodium **AND** polyethylene glycol **AND** bisacodyl **AND** bisacodyl    hydrALAZINE    HYDROmorphone    ipratropium-albuterol    Magnesium Cardiology Dose Replacement - Follow Nurse / BPA Driven Protocol    nitroglycerin    ondansetron ODT **OR** ondansetron    ondansetron ODT **OR** ondansetron    Pharmacy to Dose enoxaparin (LOVENOX)    Pharmacy To Dose:    Potassium Replacement - Follow Nurse / BPA Driven Protocol    [COMPLETED] Insert Peripheral IV **AND** sodium chloride    sodium chloride    sodium chloride    Physical Exam:  General Appearance:  Alert   HEENT:  Normocephalic, without obvious abnormality, Conjunctiva/corneas clear,.   Nares normal, no drainage      Neck:  Supple, symmetrical, trachea midline.   Lungs /Chest wall:   Mild chronic bilateral basal rhonchi, respirations unlabored, symmetrical wall movement.     Heart:  Regular rate and rhythm, S1 S2 normal  Abdomen: Soft, non-tender, no masses, no organomegaly.    Extremities: No edema, no clubbing or cyanosis     ROS  Constitutional: Negative for chills, fever and malaise/fatigue.   HENT: Negative.    Eyes: Negative.    Cardiovascular: Negative.    Respiratory: Positive for mild chronic shortness of breath.    Skin: Negative.    Musculoskeletal: Negative.    Gastrointestinal: Dysphagia  Genitourinary: Negative.    Neurological: Negative.          I reviewed the recent clinical results  I personally reviewed the latest radiological studies    Part of this note may be an electronic transcription/translation of spoken language to printed text using the Dragon Dictation System.

## 2025-07-03 NOTE — DISCHARGE SUMMARY
Date of Discharge:  7/3/2025    Discharge Diagnosis:   Pulmonary embolism  Pleural effusion  Stage IV adenocarcinoma of lung, right  Stage IV adenocarcinoma of lung, left  CKD (chronic kidney disease) stage 4, GFR 15-29 ml/min  Paroxysmal atrial fibrillation  Chronic anticoagulation  Hypoxia  Hypothyroidism (acquired)  Dysphagia  Pneumonia, unspecified organism  Elevated brain natriuretic peptide (BNP) level  Bilateral leg edema  Food impaction of esophagus    Presenting Problem/History of Present Illness  Active Hospital Problems    Diagnosis  POA    **Pulmonary embolism [I26.99]  Yes    Elevated brain natriuretic peptide (BNP) level [R79.89]  Yes    Bilateral leg edema [R60.0]  Yes    Food impaction of esophagus [T18.128A, W44.F3XA]  Unknown    Pneumonia, unspecified organism [J18.9]  Yes    Dysphagia [R13.10]  Yes    CKD (chronic kidney disease) stage 4, GFR 15-29 ml/min [N18.4]  Yes    Hypothyroidism (acquired) [E03.9]  Yes    Paroxysmal atrial fibrillation [I48.0]  Yes    Chronic anticoagulation [Z79.01]  Not Applicable    Hypoxia [R09.02]  Yes    Stage IV adenocarcinoma of lung, left [C34.92]  Yes    Pleural effusion [J90]  Yes    Stage IV adenocarcinoma of lung, right [C34.91]  Yes      Resolved Hospital Problems   No resolved problems to display.          Hospital Course    Patient is a 74 y.o. male presented with pmh of lung cancer, pAfib on home Eliquis,who presents with difficulty swallowing since Friday and unable to keep meds and food down,  shortness of breath and increased leg swelling. O2 sats were charted at 75% upon arrival to the ER. Patient was admitted for lodged food in esophagus, new right lower lobe PE while on Eliquis, and large left pleural effusion and pneumonia.  Patient had an EGD on 6/03 with food lodged in the distal esophagus pulled out into pieces using a snare.There was some underlying esophagitis and mild underlying narrowing at the GE junction. Recommendation was soft diet and  PPI daily. He was seen by speech for further recommendations on preventing chocking. Oncology followed and recommendation is lovenox for anticoagulation since he failed on Eliquis. Cardiothoracic surgeon followed and he had Pleurx catheter placed in the left lung to assist drainage. TSH was 54 and free T4 was down, will not adjust synthroid this admission I believe it may have been adjusted last admission will leave that for PCP.  He will have  follow to assist with this. He has done after treatments. He will discharge with lam catheter as he follows with urology as outpatient. He will need to follow up with PCP in 2 weeks. Home health can start draining pleurx cath three times a week and as the drainage decreases decrease to twice a week than once        Procedures Performed    Procedure(s):  ESOPHAGOGASTRODUODENOSCOPY, impacted esophageal food bolus removal  -------------------    Procedure(s):  PLEURX CATHETER INSERTION  -------------------       Consults:   Consults       Date and Time Order Name Status Description    7/2/2025  4:39 PM Inpatient Cardiology Consult Completed     7/2/2025  4:35 PM Inpatient Cardiology Consult      6/30/2025  7:23 AM Inpatient Thoracic Surgery Consult Completed     6/29/2025 10:49 PM Hematology & Oncology Inpatient Consult Completed     6/29/2025 10:44 PM Inpatient Pulmonology Consult Completed     6/29/2025 10:44 PM Inpatient Gastroenterology Consult Completed     6/29/2025  6:47 PM Family Medicine Consult              Pertinent Test Results:    Lab Results (most recent)       Procedure Component Value Units Date/Time    Comprehensive Metabolic Panel [[id number]] Collected: 07/03/25 0950    Specimen: Blood from Arm, Right Updated: 07/03/25 1015    Magnesium [[id number]]  (Normal) Collected: 07/03/25 0543    Specimen: Blood from Arm, Right Updated: 07/03/25 0922     Magnesium 1.8 mg/dL     TSH [[id number]]  (Abnormal) Collected: 07/03/25 0543    Specimen: Blood from Arm, Right  Updated: 07/03/25 0854     TSH 54.400 uIU/mL     CBC (No Diff) [[id number]]  (Abnormal) Collected: 07/03/25 0750    Specimen: Blood from Arm, Right Updated: 07/03/25 0802     WBC 11.55 10*3/mm3      RBC 3.59 10*6/mm3      Hemoglobin 10.9 g/dL      Hematocrit 33.8 %      MCV 94.2 fL      MCH 30.4 pg      MCHC 32.2 g/dL      RDW 14.2 %      RDW-SD 47.3 fl      MPV 8.8 fL      Platelets 191 10*3/mm3     Basic Metabolic Panel [[id number]]  (Abnormal) Collected: 07/03/25 0543    Specimen: Blood from Arm, Right Updated: 07/03/25 0619     Glucose 102 mg/dL      BUN 23.6 mg/dL      Creatinine 1.27 mg/dL      Sodium 143 mmol/L      Potassium 3.2 mmol/L      Chloride 107 mmol/L      CO2 27.5 mmol/L      Calcium 7.7 mg/dL      BUN/Creatinine Ratio 18.6     Anion Gap 8.5 mmol/L      eGFR 58.9 mL/min/1.73     Narrative:      GFR Categories in Chronic Kidney Disease (CKD)              GFR Category          GFR (mL/min/1.73)    Interpretation  G1                    90 or greater        Normal or high (1)  G2                    60-89                Mild decrease (1)  G3a                   45-59                Mild to moderate decrease  G3b                   30-44                Moderate to severe decrease  G4                    15-29                Severe decrease  G5                    14 or less           Kidney failure    (1)In the absence of evidence of kidney disease, neither GFR category G1 or G2 fulfill the criteria for CKD.    eGFR calculation 2021 CKD-EPI creatinine equation, which does not include race as a factor    Magnesium [[id number]]  (Normal) Collected: 07/02/25 1651    Specimen: Blood Updated: 07/02/25 1721     Magnesium 1.8 mg/dL     Phosphorus [[id number]]  (Normal) Collected: 07/02/25 1651    Specimen: Blood Updated: 07/02/25 1721     Phosphorus 2.6 mg/dL     Blood Culture - Blood, Arm, Left [[id number]]  (Normal) Collected: 06/29/25 1549    Specimen: Blood from Arm, Left Updated: 07/02/25 1600     Blood Culture No  growth at 3 days    Blood Culture - Blood, Arm, Left [[id number]]  (Normal) Collected: 06/29/25 1529    Specimen: Blood from Arm, Left Updated: 07/02/25 1545     Blood Culture No growth at 3 days    Basic Metabolic Panel [[id number]]  (Abnormal) Collected: 07/02/25 0428    Specimen: Blood from Arm, Right Updated: 07/02/25 0506     Glucose 135 mg/dL      BUN 25.0 mg/dL      Creatinine 1.35 mg/dL      Sodium 137 mmol/L      Potassium 3.5 mmol/L      Chloride 102 mmol/L      CO2 27.6 mmol/L      Calcium 7.7 mg/dL      BUN/Creatinine Ratio 18.5     Anion Gap 7.4 mmol/L      eGFR 54.8 mL/min/1.73     Narrative:      GFR Categories in Chronic Kidney Disease (CKD)              GFR Category          GFR (mL/min/1.73)    Interpretation  G1                    90 or greater        Normal or high (1)  G2                    60-89                Mild decrease (1)  G3a                   45-59                Mild to moderate decrease  G3b                   30-44                Moderate to severe decrease  G4                    15-29                Severe decrease  G5                    14 or less           Kidney failure    (1)In the absence of evidence of kidney disease, neither GFR category G1 or G2 fulfill the criteria for CKD.    eGFR calculation 2021 CKD-EPI creatinine equation, which does not include race as a factor    Hemoglobin & Hematocrit, Blood [[id number]]  (Abnormal) Collected: 07/01/25 1910    Specimen: Blood from Arm, Right Updated: 07/01/25 1926     Hemoglobin 11.4 g/dL      Hematocrit 34.6 %     aPTT [[id number]]  (Normal) Collected: 07/01/25 1237    Specimen: Blood from Arm, Right Updated: 07/01/25 1309     PTT 27.7 seconds     aPTT [[id number]]  (Abnormal) Collected: 07/01/25 0544    Specimen: Blood from Arm, Right Updated: 07/01/25 0717     PTT 37.0 seconds     CBC & Differential [[id number]]  (Abnormal) Collected: 07/01/25 0544    Specimen: Blood from Arm, Right Updated: 07/01/25 0716    Narrative:      The  following orders were created for panel order CBC & Differential.  Procedure                               Abnormality         Status                     ---------                               -----------         ------                     CBC Auto Differential[[id number]]        Abnormal            Final result                 Please view results for these tests on the individual orders.    CBC Auto Differential [[id number]]  (Abnormal) Collected: 07/01/25 0544    Specimen: Blood from Arm, Right Updated: 07/01/25 0716     WBC 11.67 10*3/mm3      RBC 2.34 10*6/mm3      Hemoglobin 7.0 g/dL      Comment: Result checked          Hematocrit 22.4 %      MCV 95.7 fL      MCH 29.9 pg      MCHC 31.3 g/dL      RDW 13.9 %      RDW-SD 47.1 fl      MPV 9.2 fL      Platelets 163 10*3/mm3      Neutrophil % 85.3 %      Lymphocyte % 8.7 %      Monocyte % 4.0 %      Eosinophil % 0.0 %      Basophil % 0.1 %      Immature Grans % 1.9 %      Neutrophils, Absolute 9.95 10*3/mm3      Lymphocytes, Absolute 1.02 10*3/mm3      Monocytes, Absolute 0.47 10*3/mm3      Eosinophils, Absolute 0.00 10*3/mm3      Basophils, Absolute 0.01 10*3/mm3      Immature Grans, Absolute 0.22 10*3/mm3      nRBC 0.0 /100 WBC     Protime-INR [[id number]]  (Abnormal) Collected: 06/29/25 1549    Specimen: Blood from Arm, Left Updated: 06/29/25 1912     Protime 16.9 Seconds      INR 1.37    High Sensitivity Troponin T 1Hr [[id number]]  (Abnormal) Collected: 06/29/25 1644    Specimen: Blood Updated: 06/29/25 1712     HS Troponin T 259 ng/L      Troponin T Numeric Delta -27 ng/L      Troponin T % Delta -9    Narrative:      High Sensitive Troponin T Reference Range:  <14.0 ng/L- Negative Female for AMI  <22.0 ng/L- Negative Male for AMI  >=14 - Abnormal Female indicating possible myocardial injury.  >=22 - Abnormal Male indicating possible myocardial injury.   Clinicians would have to utilize clinical acumen, EKG, Troponin, and serial changes to determine if it is an  "Acute Myocardial Infarction or myocardial injury due to an underlying chronic condition.         D-dimer, Quantitative [[id number]]  (Abnormal) Collected: 06/29/25 1549    Specimen: Blood from Arm, Left Updated: 06/29/25 1654     D-Dimer, Quantitative 10.68 MCGFEU/mL     Narrative:      According to the assay 's published package insert, a normal (<0.50 MCGFEU/mL) D-dimer result in conjunction with a non-high clinical probability assessment, excludes deep vein thrombosis (DVT) and pulmonary embolism (PE) with high sensitivity.    D-dimer values increase with age and this can make VTE exclusion of an older population difficult. To address this, the [employer], based on best available evidence and recent guidelines, recommends that clinicians use age-adjusted D-dimer thresholds in patients greater than 50 years of age with: a) a low probability of PE who do not meet all Pulmonary Embolism Rule Out Criteria, or b) in those with intermediate probability of PE.   The formula for an age-adjusted D-dimer cut-off is \"age/100\".  For example, a 60 year old patient would have an age-adjusted cut-off of 0.60 MCGFEU/mL and an 80 year old 0.80 MCGFEU/mL.    Urinalysis, Microscopic Only - Urine, Clean Catch [[id number]]  (Abnormal) Collected: 06/29/25 1618    Specimen: Urine, Clean Catch Updated: 06/29/25 1653     RBC, UA 3-5 /HPF      WBC, UA 6-10 /HPF      Bacteria, UA 1+ /HPF      Squamous Epithelial Cells, UA 0-2 /HPF      Hyaline Casts, UA None Seen /LPF      Methodology Manual Light Microscopy    Urinalysis With Microscopic If Indicated (No Culture) - Urine, Clean Catch [[id number]]  (Abnormal) Collected: 06/29/25 1618    Specimen: Urine, Clean Catch Updated: 06/29/25 1632     Color, UA Yellow     Appearance, UA Clear     pH, UA 8.5     Specific Gravity, UA 1.020     Glucose, UA Negative     Ketones, UA Negative     Bilirubin, UA Negative     Blood, UA Trace     Protein,  mg/dL (2+)     " Leuk Esterase, UA Negative     Nitrite, UA Negative     Urobilinogen, UA 0.2 E.U./dL    Respiratory Panel PCR w/COVID-19(SARS-CoV-2) SARAH/ELENI/NEY/PAD/COR/LUCRETIA In-House, NP Swab in UTM/VTM, 2 HR TAT - Swab, Nasopharynx [[id number]]  (Normal) Collected: 06/29/25 1530    Specimen: Swab from Nasopharynx Updated: 06/29/25 1624     ADENOVIRUS, PCR Not Detected     Coronavirus 229E Not Detected     Coronavirus HKU1 Not Detected     Coronavirus NL63 Not Detected     Coronavirus OC43 Not Detected     COVID19 Not Detected     Human Metapneumovirus Not Detected     Human Rhinovirus/Enterovirus Not Detected     Influenza A PCR Not Detected     Influenza B PCR Not Detected     Parainfluenza Virus 1 Not Detected     Parainfluenza Virus 2 Not Detected     Parainfluenza Virus 3 Not Detected     Parainfluenza Virus 4 Not Detected     RSV, PCR Not Detected     Bordetella pertussis pcr Not Detected     Bordetella parapertussis PCR Not Detected     Chlamydophila pneumoniae PCR Not Detected     Mycoplasma pneumo by PCR Not Detected    Narrative:      In the setting of a positive respiratory panel with a viral infection PLUS a negative procalcitonin without other underlying concern for bacterial infection, consider observing off antibiotics or discontinuation of antibiotics and continue supportive care. If the respiratory panel is positive for atypical bacterial infection (Bordetella pertussis, Chlamydophila pneumoniae, or Mycoplasma pneumoniae), consider antibiotic de-escalation to target atypical bacterial infection.    Extra Tubes [[id number]] Collected: 06/29/25 1549    Specimen: Blood from Arm, Left Updated: 06/29/25 1600    Narrative:      The following orders were created for panel order Extra Tubes.  Procedure                               Abnormality         Status                     ---------                               -----------         ------                     Green Top (Gel)[[id number]]                                   Final result               Light Blue Top[[id number]]                                   Final result                 Please view results for these tests on the individual orders.    Green Top (Gel) [[id number]] Collected: 06/29/25 1549    Specimen: Blood from Arm, Left Updated: 06/29/25 1600     Extra Tube Hold for add-ons.     Comment: Auto resulted.       Light Blue Top [[id number]] Collected: 06/29/25 1549    Specimen: Blood from Arm, Left Updated: 06/29/25 1600     Extra Tube Hold for add-ons.     Comment: Auto resulted       CBC & Differential [[id number]]  (Abnormal) Collected: 06/29/25 1529    Specimen: Blood from Arm, Left Updated: 06/29/25 1558    Narrative:      The following orders were created for panel order CBC & Differential.  Procedure                               Abnormality         Status                     ---------                               -----------         ------                     CBC Auto Differential[[id number]]        Abnormal            Final result               Scan Slide[[id number]]                                                                    Please view results for these tests on the individual orders.    CBC Auto Differential [[id number]]  (Abnormal) Collected: 06/29/25 1549    Specimen: Blood from Arm, Left Updated: 06/29/25 1558     WBC 16.35 10*3/mm3      RBC 3.66 10*6/mm3      Hemoglobin 11.1 g/dL      Hematocrit 34.5 %      MCV 94.3 fL      MCH 30.3 pg      MCHC 32.2 g/dL      RDW 13.5 %      RDW-SD 45.6 fl      MPV 8.6 fL      Platelets 238 10*3/mm3      Neutrophil % 75.6 %      Lymphocyte % 10.8 %      Monocyte % 8.0 %      Eosinophil % 1.0 %      Basophil % 0.8 %      Immature Grans % 3.8 %      Neutrophils, Absolute 12.36 10*3/mm3      Lymphocytes, Absolute 1.77 10*3/mm3      Monocytes, Absolute 1.31 10*3/mm3      Eosinophils, Absolute 0.16 10*3/mm3      Basophils, Absolute 0.13 10*3/mm3      Immature Grans, Absolute 0.62 10*3/mm3      nRBC 0.0 /100 WBC      Comprehensive Metabolic Panel [[id number]]  (Abnormal) Collected: 06/29/25 1529    Specimen: Blood from Arm, Left Updated: 06/29/25 1558     Glucose 100 mg/dL      BUN 12.6 mg/dL      Creatinine 1.33 mg/dL      Sodium 140 mmol/L      Potassium 4.2 mmol/L      Comment: Specimen hemolyzed.  Result may be falsely elevated.        Chloride 104 mmol/L      CO2 24.8 mmol/L      Calcium 8.6 mg/dL      Total Protein 6.1 g/dL      Albumin 3.4 g/dL      ALT (SGPT) 11 U/L      AST (SGOT) 38 U/L      Comment: Specimen hemolyzed.  Result may be falsely elevated.        Alkaline Phosphatase 74 U/L      Total Bilirubin 0.5 mg/dL      Globulin 2.7 gm/dL      A/G Ratio 1.3 g/dL      BUN/Creatinine Ratio 9.5     Anion Gap 11.2 mmol/L      eGFR 55.7 mL/min/1.73     Narrative:      GFR Categories in Chronic Kidney Disease (CKD)              GFR Category          GFR (mL/min/1.73)    Interpretation  G1                    90 or greater        Normal or high (1)  G2                    60-89                Mild decrease (1)  G3a                   45-59                Mild to moderate decrease  G3b                   30-44                Moderate to severe decrease  G4                    15-29                Severe decrease  G5                    14 or less           Kidney failure    (1)In the absence of evidence of kidney disease, neither GFR category G1 or G2 fulfill the criteria for CKD.    eGFR calculation 2021 CKD-EPI creatinine equation, which does not include race as a factor    High Sensitivity Troponin T [[id number]]  (Abnormal) Collected: 06/29/25 1529    Specimen: Blood from Arm, Left Updated: 06/29/25 1558     HS Troponin T 286 ng/L     Narrative:      High Sensitive Troponin T Reference Range:  <14.0 ng/L- Negative Female for AMI  <22.0 ng/L- Negative Male for AMI  >=14 - Abnormal Female indicating possible myocardial injury.  >=22 - Abnormal Male indicating possible myocardial injury.   Clinicians would have to utilize clinical  acumen, EKG, Troponin, and serial changes to determine if it is an Acute Myocardial Infarction or myocardial injury due to an underlying chronic condition.         BNP [[id number]]  (Abnormal) Collected: 06/29/25 1529    Specimen: Blood from Arm, Left Updated: 06/29/25 1556     proBNP 2,038.0 pg/mL     Narrative:      This assay is used as an aid in the diagnosis of individuals suspected of having heart failure. It can be used as an aid in the diagnosis of acute decompensated heart failure (ADHF) in patients presenting with signs and symptoms of ADHF to the emergency department (ED). In addition, NT-proBNP of <300 pg/mL indicates ADHF is not likely.    Age Range Result Interpretation  NT-proBNP Concentration (pg/mL:      <50             Positive            >450                   Gray                 300-450                    Negative             <300    50-75           Positive            >900                  Gray                300-900                  Negative            <300      >75             Positive            >1800                  Gray                300-1800                  Negative            <300             Results for orders placed during the hospital encounter of 06/29/25    Adult Transthoracic Echo Complete W/ Cont if Necessary Per Protocol 06/30/2025  6:51 AM    Interpretation Summary    Left ventricular systolic function is normal. Left ventricular ejection fraction appears to be 61 - 65%.    Left ventricular diastolic function was normal.    The right ventricular cavity is mild to moderately dilated.    Saline test results are negative.    Estimated right ventricular systolic pressure from tricuspid regurgitation is moderately elevated (45-55 mmHg).    Moderate pulmonary hypertension is present.    There is a small (<1cm) pericardial effusion. There is no evidence of cardiac tamponade.    There is a large left pleural effusion.       Condition on Discharge:  Stable    Vital Signs  Temp:  [97.6  °F (36.4 °C)-98.1 °F (36.7 °C)] 98.1 °F (36.7 °C)  Heart Rate:  [] 80  Resp:  [13-20] 13  BP: (145-161)/() 159/95      Physical Exam  Vitals reviewed.   Constitutional:       Appearance: He is not ill-appearing.   HENT:      Head: Atraumatic.      Right Ear: External ear normal.      Left Ear: External ear normal.      Nose: Nose normal.      Mouth/Throat:      Mouth: Mucous membranes are moist.   Eyes:      General:         Right eye: No discharge.         Left eye: No discharge.   Cardiovascular:      Rate and Rhythm: Normal rate and regular rhythm.      Pulses: Normal pulses.      Heart sounds: Normal heart sounds.   Pulmonary:      Effort: Pulmonary effort is normal.      Breath sounds: Normal breath sounds.   Abdominal:      General: Bowel sounds are normal.      Palpations: Abdomen is soft.   Musculoskeletal:         General: Normal range of motion.   Skin:     General: Skin is warm and dry.   Neurological:      Mental Status: He is alert and oriented to person, place, and time.   Psychiatric:         Behavior: Behavior normal.              [place] [facility] [place]     Discharge Medications        PAUSE taking these medications        Instructions Start Date   apixaban 5 MG tablet tablet  Wait to take this until your doctor or other care provider tells you to start again.  Commonly known as: ELIQUIS   5 mg, Oral, Every 12 Hours Scheduled             New Medications        Instructions Start Date   amoxicillin-clavulanate 875-125 MG per tablet  Commonly known as: AUGMENTIN   1 tablet, Oral, Every 12 Hours Scheduled      enoxaparin sodium 80 MG/0.8ML solution prefilled syringe syringe  Commonly known as: LOVENOX   1 mg/kg (70 mg), Subcutaneous, Every 12 Hours Scheduled      metoprolol succinate XL 50 MG 24 hr tablet  Commonly known as: TOPROL-XL   50 mg, Oral, Every 12 Hours Scheduled             Continue These Medications        Instructions Start Date    acetaminophen 325 MG tablet  Commonly known as: TYLENOL   650 mg, Oral, Every 4 Hours PRN      Breztri Aerosphere 160-9-4.8 MCG/ACT aerosol inhaler  Generic drug: Budeson-Glycopyrrol-Formoterol   2 puffs, Inhalation, 2 Times Daily      Centrum Silver 50+Men tablet tablet  Generic drug: multivitamin with minerals   1 tablet, Every Morning      Claritin 10 MG tablet  Generic drug: loratadine   10 mg, Daily      dexAMETHasone 4 MG tablet  Commonly known as: DECADRON   Take 2 tablets oral twice a day for 3 consecutive days beginning the day before chemotherapy and continue for 6 doses.      hydrocortisone 20 MG tablet  Commonly known as: CORTEF   20 mg, Oral, 2 Times Daily      ipratropium-albuterol 0.5-2.5 mg/3 ml nebulizer  Commonly known as: DUO-NEB   3 mL, Every 6 Hours PRN      levothyroxine 75 MCG tablet  Commonly known as: SYNTHROID, LEVOTHROID   75 mcg, Oral, Every Morning      Mucinex 600 MG 12 hr tablet  Generic drug: guaiFENesin   600 mg, 2 Times Daily      NON FORMULARY   2 tablets, Nightly      nystatin susp + lidocaine viscous oral suspension  Commonly known as: MAGIC MOUTHWASH   5 mL, Swish & Spit, 4 Times Daily PRN      ondansetron 8 MG tablet  Commonly known as: ZOFRAN   8 mg, Oral, 3 Times Daily PRN      pantoprazole 40 MG EC tablet  Commonly known as: PROTONIX   40 mg, Oral, Daily      Sodium Chloride (PF) 0.9 % injection   10 mL, Intravenous, Daily      tamsulosin 0.4 MG capsule 24 hr capsule  Commonly known as: FLOMAX   1 capsule, Nightly      thiamine 100 MG tablet  tablet  Commonly known as: VITAMIN B-1   1 tablet, Daily      VITAMIN D-3 PO   1 tablet, Daily             Stop These Medications      midodrine 5 MG tablet  Commonly known as: PROAMATINE     simvastatin 20 MG tablet  Commonly known as: ZOCOR     sodium bicarbonate 650 MG tablet     sotalol 80 MG tablet  Commonly known as: BETAPACE              Discharge Diet:   Diet Instructions       Diet: Regular/House Diet; Regular (IDDSI 7); Thin  (IDDSI 0)      Discharge Diet: Regular/House Diet    Texture: Regular (IDDSI 7)    Fluid Consistency: Thin (IDDSI 0)            Activity at Discharge:   Activity Instructions       Gradually Increase Activity Until at Pre-Hospitalization Level              Follow-up Appointments  Future Appointments   Date Time Provider Department Center   7/11/2025  9:45 AM [facility] INJECTION CHAIR [facility]   7/18/2025 10:00 AM [facility] INJECTION CHAIR [facility]   7/23/2025  8:15 AM INF ROOM 21 - CHAIR [facility]   8/1/2025  9:30 AM [facility] INJECTION CHAIR [facility] L[facility]   8/22/2025  9:30 AM HOPD INJECTION CHAIR [facility] NA [facility]   9/17/2025  9:10 AM Kelvin Gonzalez MD [facility] None     Additional Instructions for the Follow-ups that You Need to Schedule       Ambulatory Referral to Home Health   As directed      Face to Face Visit Date: 7/1/2025   Follow-up provider for Plan of Care?: I treated the patient in an acute care facility and will not continue treatment after discharge.   Follow-up provider: REE WOODALL [[id number]]   Reason/Clinical Findings: Pleurx catheter   Describe mobility limitations that make leaving home difficult: Pleurx catheter   Nursing/Therapeutic Services Requested: Skilled Nursing   Skilled nursing orders: Other (Pleurx catheter drainage)   Frequency: Other (MWF)        Discharge Follow-up with PCP   As directed       Currently Documented PCP:    Abner Funes APRN    PCP Phone Number:    [phone]     Follow Up Details: 2 weeks                Test Results Pending at Discharge  Pending Results       Procedure [Order ID] Specimen - Date/Time    Potassium [[id number]]     Specimen: Blood              CARL Cook  07/03/25  12:45 EDT    Time: Discharge 25 min

## 2025-07-03 NOTE — PROGRESS NOTES
Start PACC Note    Home Health Referral    Evaluated patient on Home Care and services available. Patient offered choice of available HHC and agreeable to RN services with [facility].    Isolation Precautions: No active isolations    START PATIENT REGISTRATION INFORMATION  Order Information  Order Signing Physician: Evelyn Felder MD  Service Ordered RN?: Yes  Service Ordered PT?: No  Service Ordered OT?: No  Service Ordered ST?: No  Service Ordered MSW?: No  Service Ordered HHA?: No  Following Physician: Abner Funes APRN  Following Physician Phone: [phone]  Overseeing Physician: Abner Funes APRN  (Required for Residents Only)  Agreeable to Follow ? Yes  Date/Time of Call 07/03/25 14:10 EDT, Spoke with: via secure chat    Care Coordination  Same Day SOC?: No  Primary Care Physician: Abner Funes APRN  Primary Care Physician Phone: [phone]  Primary Care Physician Address: [address] [place] [address] [place] [address]  Visit Instructions: empty pluerx cath mwf per Nancy Mora NP  Service Discharge Location Type: Home  Service Facility Name: N/A  Service Floor Facility: N/A  Service Room No: N/A    Demographics  Patient Last Name: Kwaku  Patient First Name: Young  Language/Communication Barrier: none  Service Address: [address] [place] [address]  Service City: [place]  Service State: [place]  Service Zip: [address]  Service Home Phone: [phone]  Other Phone Numbers:   Telephone Information:   Mobile [phone]     Emergency Contact:   Extended Emergency Contact Information  Primary Emergency Contact: AMIRA SIERRA  Mobile Phone: [phone]  Relation: Spouse  Hearing or visual needs: None  Other needs: None  Preferred language: English   needed? No  Secondary Emergency Contact: Norma Mendes   [place] [facility]  Mobile Phone: [phone]  Relation: Daughter    Admission Information  Admit Date: 6/29/2025  Patient Status at Discharge: Inpatient  Admitting Diagnosis: Multiple  subsegmental pulmonary emboli without acute cor pulmonale [I26.94]  Pulmonary embolism [I26.99]    Caregiver Information  Caregiver First Name:   Caregiver Last Name:   Caregiver Relationship to Patient:   Caregiver Phone Number:   Caregiver Notes:     HITECH  [employer] List  HIGHTECH: HI TECH - PLEURAL DRAIN  Orders: Drain pluerx catheter MWF  Supplies Sent Home?: Yes  Quantity Sent: 2    Teachable Caregiver  Teachable Caregiver First Name: Ruth  Teachable Caregiver Last Name: Ames  Teachable Caregiver Relationship to Patient: spouse  Teachable Caregiver Phone Number: [phone]  Teachable Caregiver Notes: N/A  Teachable Caregiver available for Start of Care visit?: Yes  Teachable Caregiver agreeable to provide skilled [employer] care per physician's orders?: Yes      END PATIENT REGISTRATION INFORMATION    Start PACC Summary    Additional Comments: ok to begin services on 7/8/25 per gely catalan NP    END PACC Summary    Discharge Date: Pending    Referral Source: KATIE Marrero    Signed By: Tierra Modi RN, 7/3/2025, 14:10 EDT     Date/Time: 07/03/25 14:10 EDT    End PACC Note

## 2025-07-03 NOTE — CONSULTS
Referring Provider: Evelyn Felder MD    Reason for Consultation:  paroxysmal atrial fibrillation       Patient Care Team:  Abner Funes APRN as PCP - General (Family Medicine)  Brittany Canchola, RN as Nurse Navigator  Arben Tim MD as Consulting Physician (Hematology and Oncology)  Aman Gregory MD as Consulting Physician (Nephrology)      SUBJECTIVE     Chief Complaint:  shortness of breath    History of present illness:  Young Ames is a 74 y.o. male with a history of MI with no intervention required 1/2024, Afib on Eliquis, CKD, hypothyroidism, right lung cancer treated with Keytruda (last dose 5/1/2025), Schroeder's esophagus without dysplasia, and new pulmonary embolism (6/29/2025)  who presented to [facility] with complaint of dysphagia, increased leg swelling, and shortness of breath on 6/29/2025. CT chest showed right lower lobe pulmonary embolism and a large left pleural effusion with left lower lobe consolidation and esophageal distention. Thoracic surgery consulted for the recurrent pleural effusion and a left pleural catheter placement procedure on 07/01/2025. Cardiology consulted for suspected SVT.     Most recent cardiac testing includes:  Echo 6/30/2025- Left ventricular systolic function is normal. Left ventricular ejection fraction appears to be 61 - 65%.    Left ventricular diastolic function was normal.    The right ventricular cavity is mild to moderately dilated.    Saline test results are negative.    Estimated right ventricular systolic pressure from tricuspid regurgitation is moderately elevated (45-55 mmHg).    Moderate pulmonary hypertension is present.    There is a small (<1cm) pericardial effusion. There is no evidence of cardiac tamponade.    There is a large left pleural effusion.  Stress test 1/29/2025 . Myocardial perfusion imaging indicates a moderate-sized, moderately severe area of ischemia located in the inferior wall and septal wall.   Cath 1/29/2025 Normal  LV function  Nonobstructive coronary disease  Continue medical therapy and aggressive risk factor modification       Review of Systems   Constitutional:  Positive for fatigue. Negative for activity change and appetite change.   HENT: Negative.     Eyes: Negative.    Respiratory:  Negative for cough, chest tightness, shortness of breath and stridor.    Cardiovascular:  Positive for palpitations. Negative for chest pain and leg swelling.   Gastrointestinal:  Negative for abdominal distention, abdominal pain, nausea and indigestion.   Endocrine: Negative.    Genitourinary: Negative.    Musculoskeletal: Negative.    Skin:  Negative for color change and pallor.   Allergic/Immunologic: Negative.    Neurological:  Negative for dizziness, weakness, light-headedness and headache.   Hematological: Negative.    Psychiatric/Behavioral: Negative.               Personal History:      Past Medical History:   Diagnosis Date    Abnormal ECG 01/29/2024    Allergic rhinitis     Arrhythmia 08/14/2024    SVT, then afib after thoracentesis    Asthma 06/01/2024    Shortness of breath with exertion    Atrial fibrillation 08/14/2024    After 2L drained per thoracentesis    Coronary artery disease 01/29/2024    Minimal blockage per cath    GERD (gastroesophageal reflux disease)     Hyperlipidemia     Lung cancer 08/2024    Myocardial infarction 01/29/2024    Cath, no intervention    Pleural effusion 2024    Pneumonia 05/2025       Past Surgical History:   Procedure Laterality Date    BRONCHOSCOPY N/A 08/13/2024    Procedure: BRONCHOSCOPY WITH BRONCHIAL WASHING AND BRONCHIAL BRUSHING;  Surgeon: Kelvin Gonzalez MD;  Location: [facility] ENDOSCOPY;  Service: Pulmonary;  Laterality: N/A;    BRONCHOSCOPY N/A 05/23/2025    Procedure: BRONCHOSCOPY with bronchoalveolar lavage and biopsy x 1 area;  Surgeon: Jason Dozier MD;  Location: [facility] ENDOSCOPY;  Service: Pulmonary;  Laterality: N/A;  post op: lung mass    BRONCHOSCOPY      BRONCHOSCOPY WITH ION  ROBOTIC ASSIST N/A 2024    Procedure: BRONCHOSCOPY WITH ION ROBOT WITH CRYO BIOPSY;  Surgeon: Kelvin Gonzalez MD;  Location: [facility] ENDOSCOPY;  Service: Robotics - Pulmonary;  Laterality: N/A;    CARDIAC CATHETERIZATION Right 2024    Procedure: Coronary angiography;  Surgeon: Abran Chand MD;  Location: [facility] CATH INVASIVE LOCATION;  Service: Cardiovascular;  Laterality: Right;    CARDIAC CATHETERIZATION N/A 2024    Procedure: Left Heart Cath;  Surgeon: Abran Chand MD;  Location: [facility] CATH INVASIVE LOCATION;  Service: Cardiovascular;  Laterality: N/A;    ENDOSCOPY N/A 2025    Procedure: ESOPHAGOGASTRODUODENOSCOPY with biopsy x 1 area and dilatation (48FR non-guided bougie);  Surgeon: Carlos Calero MD;  Location: [facility] ENDOSCOPY;  Service: Gastroenterology;  Laterality: N/A;  post op: esophageal stricture    LUNG BIOPSY         Family History   Problem Relation Age of Onset    Dementia Mother     Diabetes Mother     Arrhythmia Mother         Afib    Breast cancer Sister 74    Cancer Sister         Breast cancer       Social History     Tobacco Use    Smoking status: Former     Current packs/day: 0.00     Average packs/day: 1 pack/day for 32.0 years (32.0 ttl pk-yrs)     Types: Cigarettes     Start date:      Quit date:      Years since quittin.5     Passive exposure: Past    Smokeless tobacco: Never   Vaping Use    Vaping status: Never Used   Substance Use Topics    Alcohol use: Yes     Alcohol/week: 1.0 standard drink of alcohol     Types: 1 Cans of beer per week    Drug use: Never        Home meds:  Prior to Admission medications    Medication Sig Start Date End Date Taking? Authorizing Provider   acetaminophen (TYLENOL) 325 MG tablet Take 2 tablets by mouth Every 4 (Four) Hours As Needed for Mild Pain. 24  Yes Evelyn Felder MD   apixaban (ELIQUIS) 5 MG tablet tablet Take 1 tablet by mouth Every 12 (Twelve) Hours. Indications: Atrial Fibrillation 24   Yes Asya Marques APRN   Budeson-Glycopyrrol-Formoterol (Breztri Aerosphere) 160-9-4.8 MCG/ACT aerosol inhaler Inhale 2 puffs 2 (Two) Times a Day. 6/24/25  Yes Kelvin Gonzalez MD   Cholecalciferol (VITAMIN D-3 PO) Take 1 tablet by mouth Daily. Indications: Vitamin Deficiency 9/20/24  Yes Oswaldo Simpson MD   dexAMETHasone (DECADRON) 4 MG tablet Take 2 tablets oral twice a day for 3 consecutive days beginning the day before chemotherapy and continue for 6 doses. 6/9/25  Yes Jessica Falk APRN   guaiFENesin (Mucinex) 600 MG 12 hr tablet Take 1 tablet by mouth 2 (Two) Times a Day. Indications: Cough 12/16/24  Yes Oswaldo Simpson MD   hydrocortisone (CORTEF) 20 MG tablet Take 1 tablet by mouth 2 (Two) Times a Day for 90 days. 6/3/25 9/1/25 Yes Kelvin Gonzalez MD   ipratropium-albuterol (DUO-NEB) 0.5-2.5 mg/3 ml nebulizer Take 3 mL by nebulization Every 6 (Six) Hours As Needed for Wheezing.   Yes Oswaldo Simpson MD   levothyroxine (SYNTHROID, LEVOTHROID) 75 MCG tablet TAKE ONE TABLET BY MOUTH EVERY MORNING 6/10/25  Yes Karin Gipson PA-C   loratadine (Claritin) 10 MG tablet Take 1 tablet by mouth Daily.   Yes Oswaldo Simpson MD   midodrine (PROAMATINE) 5 MG tablet Take 1 tablet by mouth 3 (Three) Times a Day As Needed.   Yes Oswaldo Simpson MD   NON FORMULARY Take 2 tablets by mouth Every Night. Zzzquil  Indications: Sleep Disorder 9/20/24  Yes Oswaldo Simpson MD   nystatin susp + lidocaine viscous (MAGIC MOUTHWASH) oral suspension Swish and spit 5 mL 4 (Four) Times a Day As Needed (MUCOSITIS). 6/24/25  Yes Arben Tim MD   ondansetron (ZOFRAN) 8 MG tablet Take 1 tablet by mouth 3 (Three) Times a Day As Needed for Nausea or Vomiting. 6/9/25  Yes Jessica Falk APRN   pantoprazole (PROTONIX) 40 MG EC tablet Take 1 tablet by mouth Daily.   Yes Provider, MD Oswaldo   simvastatin (ZOCOR) 20 MG tablet Take 1 tablet by mouth Every Night. Indications: High  Amount of Fats in the Blood 1/18/24  Yes ProviderOswaldo MD   sodium bicarbonate 650 MG tablet Take 2 tablets by mouth 3 (Three) Times a Day. 5/25/25  Yes Julia Hernandez PA-C   Sodium Chloride, PF, 0.9 % injection Infuse 10 mL into a venous catheter Daily. 6/9/25  Yes Jessica Falk APRN   sotalol (BETAPACE) 80 MG tablet Take 1 tablet by mouth 2 (Two) Times a Day.   Yes Oswaldo Simpson MD   tamsulosin (FLOMAX) 0.4 MG capsule 24 hr capsule Take 1 capsule by mouth Every Night. Indications: Benign Enlargement of Prostate 1/18/24  Yes Oswaldo Simpson MD   multivitamin with minerals (Centrum Silver 50+Men) tablet tablet Take 1 tablet by mouth Every Morning. Indications: Vitamin Deficiency    Oswaldo Simpson MD   thiamine (VITAMIN B-1) 100 MG tablet  tablet Take 1 tablet by mouth Daily. Indications: Vitamin Deficiency    ProviderOswaldo MD       Allergies:     Patient has no known allergies.    Scheduled Meds:ampicillin-sulbactam, 3 g, Intravenous, Q6H  enoxaparin sodium, 1 mg/kg, Subcutaneous, Q12H  hydrocortisone, 20 mg, Oral, BID With Meals  levothyroxine, 75 mcg, Oral, QAM  metoprolol tartrate, 5 mg, Intravenous, Q8H  pantoprazole, 40 mg, Intravenous, Q AM  potassium chloride ER, 40 mEq, Oral, Q4H  sodium chloride, 10 mL, Intravenous, Q12H      Continuous Infusions:Pharmacy to Dose enoxaparin (LOVENOX),   Pharmacy To Dose:,       PRN Meds:  senna-docusate sodium **AND** polyethylene glycol **AND** bisacodyl **AND** bisacodyl    hydrALAZINE    HYDROmorphone    ipratropium-albuterol    Magnesium Cardiology Dose Replacement - Follow Nurse / BPA Driven Protocol    nitroglycerin    ondansetron ODT **OR** ondansetron    ondansetron ODT **OR** ondansetron    Pharmacy to Dose enoxaparin (LOVENOX)    Pharmacy To Dose:    Potassium Replacement - Follow Nurse / BPA Driven Protocol    [COMPLETED] Insert Peripheral IV **AND** sodium chloride    sodium chloride    sodium  "chloride      OBJECTIVE    Vital Signs  Vitals:    07/02/25 1750 07/03/25 0009 07/03/25 0557 07/03/25 0815   BP: 146/87 152/86 161/84 159/95   BP Location:  Left arm Left arm Left arm   Patient Position:  Lying Lying Lying   Pulse: 91   80   Resp:  16 20 13   Temp:  97.6 °F (36.4 °C) 97.9 °F (36.6 °C) 98.1 °F (36.7 °C)   TempSrc:  Oral Oral Oral   SpO2: 94%   93%   Weight:       Height:           Flowsheet Rows      Flowsheet Row First Filed Value   Admission Height 180.3 cm (71\") Documented at 06/29/2025 1505   Admission Weight 81 kg (178 lb 9.6 oz) Documented at 06/29/2025 1505              Intake/Output Summary (Last 24 hours) at 7/3/2025 0948  Last data filed at 7/3/2025 0815  Gross per 24 hour   Intake 720 ml   Output 650 ml   Net 70 ml        Telemetry:  Sinus rhythm at time of exam  Review of telemetry strips does not show SVT, however shows runs of sinus tachycardia with paroxysmal atrial fibrillation     Physical Exam   Constitutional:       General: He is not in acute distress.     Appearance: Normal appearance. .   HENT:      Head: Normocephalic and atraumatic.   Eyes:      Extraocular Movements: Extraocular movements intact.      Pupils: Pupils are equal, round, and reactive to light.   Cardiovascular:      Rate and Rhythm: Normal rate and regular rhythm.   Pulmonary:      Effort: Pulmonary effort is normal.      Breath sounds: Normal breath sounds.     Pleurx drain  Abdominal:      General: Abdomen not distended. Bowel sounds are normal.      Palpations: Abdomen is soft.   Musculoskeletal:      Cervical back: Normal range of motion.   Skin:     General: Skin is warm and dry.   Neurological:      General: No focal deficit present.      Mental Status: He is alert and oriented to person, place, and time. Mental status is at baseline.   Psychiatric:         Mood and Affect: Mood normal.         Behavior: Behavior normal.         Thought Content: Thought content normal.       Results Review:  I have " personally reviewed the results from the time of this admission to 7/3/2025 09:48 EDT and agree with these findings:  [x]  Laboratory  [x]  Microbiology  [x]  Radiology  [x]  EKG/Telemetry   [x]  Cardiology/Vascular   [x]  Pathology  [x]  Old records  []  Other:    Most notable findings include:     Lab Results (last 24 hours)       Procedure Component Value Units Date/Time    Magnesium [[id number]]  (Normal) Collected: 07/03/25 0543    Specimen: Blood from Arm, Right Updated: 07/03/25 0922     Magnesium 1.8 mg/dL     TSH [[id number]]  (Abnormal) Collected: 07/03/25 0543    Specimen: Blood from Arm, Right Updated: 07/03/25 0854     TSH 54.400 uIU/mL     CBC (No Diff) [[id number]]  (Abnormal) Collected: 07/03/25 0750    Specimen: Blood from Arm, Right Updated: 07/03/25 0802     WBC 11.55 10*3/mm3      RBC 3.59 10*6/mm3      Hemoglobin 10.9 g/dL      Hematocrit 33.8 %      MCV 94.2 fL      MCH 30.4 pg      MCHC 32.2 g/dL      RDW 14.2 %      RDW-SD 47.3 fl      MPV 8.8 fL      Platelets 191 10*3/mm3     Basic Metabolic Panel [[id number]]  (Abnormal) Collected: 07/03/25 0543    Specimen: Blood from Arm, Right Updated: 07/03/25 0619     Glucose 102 mg/dL      BUN 23.6 mg/dL      Creatinine 1.27 mg/dL      Sodium 143 mmol/L      Potassium 3.2 mmol/L      Chloride 107 mmol/L      CO2 27.5 mmol/L      Calcium 7.7 mg/dL      BUN/Creatinine Ratio 18.6     Anion Gap 8.5 mmol/L      eGFR 58.9 mL/min/1.73     Narrative:      GFR Categories in Chronic Kidney Disease (CKD)              GFR Category          GFR (mL/min/1.73)    Interpretation  G1                    90 or greater        Normal or high (1)  G2                    60-89                Mild decrease (1)  G3a                   45-59                Mild to moderate decrease  G3b                   30-44                Moderate to severe decrease  G4                    15-29                Severe decrease  G5                    14 or less           Kidney failure    (1)In the  absence of evidence of kidney disease, neither GFR category G1 or G2 fulfill the criteria for CKD.    eGFR calculation 2021 CKD-EPI creatinine equation, which does not include race as a factor    Magnesium [[id number]]  (Normal) Collected: 07/02/25 1651    Specimen: Blood Updated: 07/02/25 1721     Magnesium 1.8 mg/dL     Phosphorus [[id number]]  (Normal) Collected: 07/02/25 1651    Specimen: Blood Updated: 07/02/25 1721     Phosphorus 2.6 mg/dL     Blood Culture - Blood, Arm, Left [[id number]]  (Normal) Collected: 06/29/25 1549    Specimen: Blood from Arm, Left Updated: 07/02/25 1600     Blood Culture No growth at 3 days    Blood Culture - Blood, Arm, Left [[id number]]  (Normal) Collected: 06/29/25 1529    Specimen: Blood from Arm, Left Updated: 07/02/25 1545     Blood Culture No growth at 3 days            Imaging Results (Last 24 Hours)       Procedure Component Value Units Date/Time    XR Chest 1 View - In process [[id number]] Resulted: 07/03/25 0925     Updated: 07/03/25 0927    This result has not been signed. Information might be incomplete.      FL ESOPHAGRAM SINGLE CONTRAST [[id number]] Collected: 07/02/25 1051     Updated: 07/02/25 1152    Narrative:      FL ESOPHAGRAM SINGLE CONTRAST    Date of Exam: 7/2/2025 10:15 AM EDT    Indication: Dysphagia, s/p EGD with dilation, recent food impaction. EGD biopsy with Schroeder's esophagus. Signed report    Comparison: None available.    Technique:  Patient ingested effervescent crystals followed by high density barium for double contrast imaging of the esophagus. Patient subsequently ingested medium density barium for single-contrast evaluation.    Fluoroscopic Time: 0.7 minutes    Number of Images: 8 spot fluoroscopic series images    Findings:  The study was performed with the patient in semierect position upon the fluoroscopy table due to indwelling left pleural catheter with portable suction mechanism. No gross aspiration was identified..    There is patulous or  distended upper thoracic esophagus.    On initial images, there is frothy filling defects in the upper thoracic esophagus with some accumulation of contrast bolus at this location, thought to represent retained food stuff. Patient stated that he was unable to tolerate breakfast this morning,   sensation of it. For these reasons, a 13 mm barium tablet was not administered.    There is smooth long segment narrowing of the lower thoracic esophagus, through which thin contrast bolus would pass. There is appearance of a tiny sliding esophageal hiatal hernia. There is delayed contrast clearance from the thoracic esophagus into the   stomach.    No contrast extravasation is seen.      Impression:      Impression:  Short segment narrowing of the lower thoracic esophagus, through which only a thin linear contrast column with pass. There is distention of the upper thoracic esophagus with what appears to be combination of contrast, air bubbles, and food stuff. Patient   states he was unable to tolerate his breakfast this morning. For these reasons, 13 mm barium tablet was not administered.  No contrast extravasation status post reported esophageal dilation procedure.  Small esophageal hiatal hernia.      Electronically Signed: Mar Aly MD    7/2/2025 11:49 AM EDT    Workstation ID: [id number]            LAB RESULTS (LAST 7 DAYS)    CBC  Results from last 7 days   Lab Units 07/03/25  0750 07/01/25  1910 07/01/25  0544 06/29/25  1549   WBC 10*3/mm3 11.55*  --  11.67* 16.35*   RBC 10*6/mm3 3.59*  --  2.34* 3.66*   HEMOGLOBIN g/dL 10.9* 11.4* 7.0* 11.1*   HEMATOCRIT % 33.8* 34.6* 22.4* 34.5*   MCV fL 94.2  --  95.7 94.3   PLATELETS 10*3/mm3 191  --  163 238       BMP  Results from last 7 days   Lab Units 07/03/25  0543 07/02/25  1651 07/02/25  0428 07/01/25  0452 06/30/25  0828 06/29/25  1529   SODIUM mmol/L 143  --  137 140 140 140   POTASSIUM mmol/L 3.2*  --  3.5 3.7 3.4* 4.2   CHLORIDE mmol/L 107  --  102 101 101 104   CO2  mmol/L 27.5  --  27.6 24.0 25.3 24.8   BUN mg/dL 23.6*  --  25.0* 22.1 13.8 12.6   CREATININE mg/dL 1.27  --  1.35* 1.47* 1.31* 1.33*   GLUCOSE mg/dL 102*  --  135* 136* 87 100*   MAGNESIUM mg/dL 1.8 1.8  --   --   --   --    PHOSPHORUS mg/dL  --  2.6  --   --   --   --        CMP   Results from last 7 days   Lab Units 07/03/25  0543 07/02/25  0428 07/01/25  0452 06/30/25  0828 06/29/25  1529   SODIUM mmol/L 143 137 140 140 140   POTASSIUM mmol/L 3.2* 3.5 3.7 3.4* 4.2   CHLORIDE mmol/L 107 102 101 101 104   CO2 mmol/L 27.5 27.6 24.0 25.3 24.8   BUN mg/dL 23.6* 25.0* 22.1 13.8 12.6   CREATININE mg/dL 1.27 1.35* 1.47* 1.31* 1.33*   GLUCOSE mg/dL 102* 135* 136* 87 100*   ALBUMIN g/dL  --   --   --   --  3.4*   BILIRUBIN mg/dL  --   --   --   --  0.5   ALK PHOS U/L  --   --   --   --  74   AST (SGOT) U/L  --   --   --   --  38   ALT (SGPT) U/L  --   --   --   --  11       BNP        TROPONIN  Results from last 7 days   Lab Units 06/29/25  1644   HSTROP T ng/L 259*       CoAg  Results from last 7 days   Lab Units 07/01/25  1237 07/01/25  0544 06/30/25  2353 06/30/25  1859 06/30/25  1605 06/30/25  0828 06/30/25  0341 06/29/25  1549   INR   --   --   --   --   --   --   --  1.37*   APTT seconds 27.7 37.0* 110.7* 77.8* >200.0* 33.4 >200.0* 31.8       Creatinine Clearance  Estimated Creatinine Clearance: 52.9 mL/min (by C-G formula based on SCr of 1.27 mg/dL).    ABG          Radiology  FL ESOPHAGRAM SINGLE CONTRAST  Result Date: 7/2/2025  Impression: Short segment narrowing of the lower thoracic esophagus, through which only a thin linear contrast column with pass. There is distention of the upper thoracic esophagus with what appears to be combination of contrast, air bubbles, and food stuff. Patient  states he was unable to tolerate his breakfast this morning. For these reasons, 13 mm barium tablet was not administered. No contrast extravasation status post reported esophageal dilation procedure. Small esophageal hiatal  hernia. Electronically Signed: Mar Aly MD  7/2/2025 11:49 AM EDT  Workstation ID: [id number]        EKG  I personally viewed and interpreted the patient's EKG/Telemetry data:  Telemetry Scan   Final Result      ECG 12 Lead Rhythm Change   Final Result   HEART TOHR=752  bpm   RR Uxlzkhib=843  ms   MT Dxvkvwlq=105  ms   P Horizontal Axis=34  deg   P Front Axis=47  deg   QRSD Interval=82  ms   QT Kjopvrhi=501  ms   MTxE=423  ms   QRS Axis=-30  deg   T Wave Axis=21  deg   - ABNORMAL ECG -   Sinus tachycardia   Multiple ventricular premature complexes   Left axis deviation   When compared with ECG of 29-Jun-2025 15:45:25,   Significant rate increase   Significant axis, voltage or hypertrophy change   Electronically Signed By: Carlos Zeng ([facility]) 2025-07-02 23:40:15   Date and Time of Study:2025-07-02 16:29:25      Telemetry Scan   Final Result      Telemetry Scan   Final Result      Telemetry Scan   Final Result      Telemetry Scan   Final Result      Telemetry Scan   Final Result      Telemetry Scan   Final Result      Telemetry Scan   Final Result      Telemetry Scan   Final Result      Telemetry Scan   Final Result      Telemetry Scan   Final Result      Telemetry Scan   Final Result      ECG 12 Lead Dyspnea   Final Result   HEART RATE=72  bpm   RR Nkckvgfw=044  ms   MT Yrirhbpj=222  ms   P Horizontal Axis=3  deg   P Front Axis=43  deg   QRSD Interval=91  ms   QT Giyrhbgl=557  ms   CMpC=427  ms   QRS Axis=-6  deg   T Wave Axis=15  deg   - BORDERLINE ECG -   Sinus rhythm   Low voltage, extremity and precordial leads   When compared with ECG of 24-May-2025 10:30:49,   No significant change   Electronically Signed By: Sean Benítez ([facility]) 2025-06-30 06:33:55   Date and Time of Study:2025-06-29 15:45:25      ECG 12 Lead Rhythm Change    (Results Pending)         Echocardiogram:    Results for orders placed during the hospital encounter of 06/29/25    Adult Transthoracic Echo Complete W/ Cont if Necessary Per  Protocol 06/30/2025 0651    Interpretation Summary    Left ventricular systolic function is normal. Left ventricular ejection fraction appears to be 61 - 65%.    Left ventricular diastolic function was normal.    The right ventricular cavity is mild to moderately dilated.    Saline test results are negative.    Estimated right ventricular systolic pressure from tricuspid regurgitation is moderately elevated (45-55 mmHg).    Moderate pulmonary hypertension is present.    There is a small (<1cm) pericardial effusion. There is no evidence of cardiac tamponade.    There is a large left pleural effusion.        Stress Test:  Results for orders placed during the hospital encounter of 01/28/24    Stress Test With Myocardial Perfusion One Day 01/29/2024 1419    Interpretation Summary    Impressions are consistent with an intermediate risk study.    Left ventricular ejection fraction is normal (Calculated EF = 56%).    Myocardial perfusion imaging indicates a moderate-sized, moderately severe area of ischemia located in the inferior wall and septal wall.      ASCVD Risk Score::  The ASCVD Risk score (Karlos MCCALLUM, et al., 2019) failed to calculate for the following reasons:    Risk score cannot be calculated because patient has a medical history suggesting prior/existing ASCVD          ASSESSMENT & PLAN:    Principal Problem:    Pulmonary embolism  Active Problems:    Pleural effusion    Stage IV adenocarcinoma of lung, right    Stage IV adenocarcinoma of lung, left    CKD (chronic kidney disease) stage 4, GFR 15-29 ml/min    Paroxysmal atrial fibrillation    Chronic anticoagulation    Hypoxia    Hypothyroidism (acquired)    Dysphagia    Pneumonia, unspecified organism    Elevated brain natriuretic peptide (BNP) level    Bilateral leg edema    Food impaction of esophagus    Pulmonary embolism  Recurrent pleural effusion  Stage IV adenocarcinoma of right and left lung  CKD  Hypothyroidism  Dysphagia  Pneumonia  Paroxysmal atrial  fibrillation       Plerux drain placed successful- will discharge with   Lovenox for anticoagulation- on eliquis at home  Continue antibiotics for pneumonia per primary   Patient is also on hydrocortisone- primary weaning  Potassium 3.2 this morning- electrolyte replacement protocol ordered and currently being replaced- repeat labs   Creatinine this morning 1.27, decreased from yesterday   Patient has history of recurrent paroxysmal atrial fibrillation when malignant pleural effusions occur and flare- sinus at time of exam  No antiarrhythmics at this time  When able to take PO intake will transition to oral metoprolol XL BID    -----------------------------------------------------------------------------------------  Cardiology team was questioned on if patient can resume home sotalol on discharge. QTC is 502 on ECG today which is longer than 480 yesterday. Sotalol discontinue for the time being and transitioned to toprol XL 50 mg BID    Additional recommendations per Dr. Librado Bray, APRN  07/03/25  09:48 EDT

## 2025-07-03 NOTE — CASE MANAGEMENT/SOCIAL WORK
Continued Stay Note  [facility]     Patient Name: Young Ames  MRN: [id number]  Today's Date: 7/3/2025    Admit Date: 6/29/2025    Plan: D/C Plan: From routine home with wife. [facility] accepted. Watch for Pleural X drain.   Discharge Plan       Row Name 07/03/25 0912       Plan    Plan D/C Plan: From routine home with wife. [facility] accepted. Watch for Pleural X drain.    Provided Post Acute Provider List? N/A    Provided Post Acute Provider Quality & Resource List? N/A    Plan Comments D/C barriers: PICC, lam, chest tube, IV medications.             Expected Discharge Date and Time       Expected Discharge Date Expected Discharge Time    Jul 5, 2025           Minna Lopez RN      [address] [address]  Phone: [phone]  Fax: [phone]

## 2025-07-03 NOTE — PROGRESS NOTES
Hematology/Oncology Inpatient Progress Note    PATIENT NAME: Young Ames  : 1950  MRN: [id number]    CHIEF COMPLAINT: Dyspnea    HISTORY OF PRESENT ILLNESS:      2025: Mr. Ames has been a patient of mine since the mid part of , when he was identified as having metastatic adenocarcinoma of the lung. He was initially treated with a combination of chemotherapy and immunotherapy. He had an initial good response but earlier this year showed signs of progression. His treatment was transitioned to a taxane with ramucirumab and received the first dose a few days before this admission. He presented to the hospital complaining again of severe dysphagia, as he is known to have esophageal stenosis, but also and more importantly of dyspnea. He has been found to have segmental and subsegmental pulmonary emboli in the right lower lobe. In addition the left pleural effusion, which has been a chronic problem, seemed to have increased. Signs of esophageal obstruction were as well present on the scans. This morning he feels better. He has been able to sleep. He has not attempted to swallow much. He tells me he has been swallowing some of his medications. The anticoagulant that he had been taking before, he reports, he had been taking without fail. On exam he is an ill-appearing man who does not seem in acute distress. He is oriented and conversant. No jaundice. The lungs are diminished bilaterally but particularly on the left, where in the lower segments, there are no breath sounds. The heart is tachycardic and regular. Abdomen is soft. There is no edema. Laboratory exams reviewed. Reviewed the imaging studies. Probably will need a thoracentesis. Right now he is on heparin and we will need to transition to a different oral anticoagulant as, it is not clear to me, whether the apixaban did not prevent the formation of new emboli because of his inability to take the medication and swallow it or because of real  failure of the medication. Nevertheless for now I will keep him on the heparin to allow for upper endoscopy of the esophagus. It is early to tell if the present chemotherapy regimen will result in response. For now I will continue with the same.     Subjective   7/3/2025: Much better today.  The waterseal was removed.  He has been able to get up and move around.  He is eating.  ROS:  Review of Systems   Constitutional:  Positive for fatigue. Negative for activity change, appetite change, chills, diaphoresis, fever and unexpected weight change.   HENT:  Positive for trouble swallowing. Negative for congestion, dental problem, drooling, ear discharge, ear pain, facial swelling, hearing loss, mouth sores, nosebleeds, postnasal drip, rhinorrhea, sinus pressure, sinus pain, sneezing, sore throat, tinnitus and voice change.    Eyes:  Negative for photophobia, pain, discharge, redness, itching and visual disturbance.   Respiratory:  Positive for cough and shortness of breath. Negative for apnea, choking, chest tightness, wheezing and stridor.    Cardiovascular:  Negative for chest pain, palpitations and leg swelling.   Gastrointestinal:  Negative for abdominal distention, abdominal pain, anal bleeding, blood in stool, constipation, diarrhea, nausea, rectal pain and vomiting.   Endocrine: Negative for cold intolerance, heat intolerance, polydipsia and polyuria.   Genitourinary:  Negative for decreased urine volume, difficulty urinating, dysuria, flank pain, frequency, genital sores, hematuria and urgency.   Musculoskeletal:  Negative for arthralgias, back pain, gait problem, joint swelling, myalgias, neck pain and neck stiffness.   Skin:  Negative for color change, pallor and rash.   Neurological:  Negative for dizziness, tremors, seizures, syncope, facial asymmetry, speech difficulty, weakness, light-headedness, numbness and headaches.   Hematological:  Negative for adenopathy. Does not bruise/bleed easily.  "  Psychiatric/Behavioral:  Negative for agitation, behavioral problems, confusion, decreased concentration, hallucinations, self-injury, sleep disturbance and suicidal ideas. The patient is not nervous/anxious.       MEDICATIONS:    Scheduled Meds:  amoxicillin-clavulanate, 1 tablet, Oral, Q12H  enoxaparin sodium, 1 mg/kg, Subcutaneous, Q12H  hydrocortisone, 20 mg, Oral, BID With Meals  levothyroxine, 75 mcg, Oral, QAM  magnesium sulfate, 4 g, Intravenous, Once  metoprolol succinate XL, 50 mg, Oral, Q12H  potassium chloride ER, 40 mEq, Oral, Q4H  sodium chloride, 10 mL, Intravenous, Q12H  tamsulosin, 0.4 mg, Oral, Nightly       Continuous Infusions:  Pharmacy to Dose enoxaparin (LOVENOX),        PRN Meds:    senna-docusate sodium **AND** polyethylene glycol **AND** bisacodyl **AND** bisacodyl    hydrALAZINE    ipratropium-albuterol    Magnesium Cardiology Dose Replacement - Follow Nurse / BPA Driven Protocol    nitroglycerin    ondansetron ODT **OR** ondansetron    ondansetron ODT **OR** ondansetron    Pharmacy to Dose enoxaparin (LOVENOX)    Potassium Replacement - Follow Nurse / BPA Driven Protocol    [COMPLETED] Insert Peripheral IV **AND** sodium chloride    sodium chloride    sodium chloride     ALLERGIES:  No Known Allergies    Objective    VITALS:   /95 (BP Location: Left arm, Patient Position: Lying)   Pulse 80   Temp 98.1 °F (36.7 °C) (Oral)   Resp 13   Ht 180.3 cm (70.98\")   Wt 74.4 kg (164 lb)   SpO2 93%   BMI 22.88 kg/m²     PHYSICAL EXAM: (performed by MD)  Physical Exam  Constitutional:       General: He is not in acute distress.     Appearance: He is ill-appearing. He is not toxic-appearing or diaphoretic.   HENT:      Head: Normocephalic and atraumatic.      Right Ear: External ear normal.      Left Ear: External ear normal.      Nose: Nose normal.      Mouth/Throat:      Mouth: Mucous membranes are moist.      Pharynx: Oropharynx is clear.   Eyes:      General: No scleral icterus.       "  Right eye: No discharge.         Left eye: No discharge.      Conjunctiva/sclera: Conjunctivae normal.      Pupils: Pupils are equal, round, and reactive to light.   Cardiovascular:      Rate and Rhythm: Normal rate and regular rhythm.      Pulses: Normal pulses.      Heart sounds: Normal heart sounds. No murmur heard.     No friction rub. No gallop.   Pulmonary:      Effort: No respiratory distress.      Breath sounds: No stridor. No wheezing, rhonchi or rales.      Comments: Breath sounds are now more symmetrically diminished.  Chest:      Chest wall: No tenderness.   Abdominal:      General: Abdomen is flat. Bowel sounds are normal. There is no distension.      Palpations: Abdomen is soft. There is no mass.      Tenderness: There is no abdominal tenderness. There is no right CVA tenderness, left CVA tenderness, guarding or rebound.   Musculoskeletal:         General: No tenderness, deformity or signs of injury.      Cervical back: No rigidity.      Right lower leg: No edema.      Left lower leg: No edema.   Lymphadenopathy:      Cervical: No cervical adenopathy.   Skin:     General: Skin is warm and dry.      Coloration: Skin is not jaundiced or pale.      Findings: No bruising or rash.   Neurological:      General: No focal deficit present.      Mental Status: He is alert and oriented to person, place, and time.      Cranial Nerves: No cranial nerve deficit.   Psychiatric:         Mood and Affect: Mood normal.         Behavior: Behavior normal.         Thought Content: Thought content normal.         Judgment: Judgment normal.     Arben Tim MD performed the physical exam on 7/3/2025 as documented above.    RECENT LABS:  Lab Results (last 24 hours)       Procedure Component Value Units Date/Time    T4, Free [[id number]]  (Abnormal) Collected: 07/03/25 0950    Specimen: Blood from Arm, Right Updated: 07/03/25 1159     Free T4 0.63 ng/dL     Comprehensive Metabolic Panel [[id number]]  (Abnormal) Collected:  07/03/25 0950    Specimen: Blood from Arm, Right Updated: 07/03/25 1108     Glucose 125 mg/dL      BUN 22.3 mg/dL      Creatinine 1.25 mg/dL      Sodium 143 mmol/L      Potassium 3.0 mmol/L      Chloride 106 mmol/L      CO2 24.7 mmol/L      Calcium 7.8 mg/dL      Total Protein 4.9 g/dL      Albumin 3.0 g/dL      ALT (SGPT) 8 U/L      AST (SGOT) 21 U/L      Alkaline Phosphatase 55 U/L      Total Bilirubin 0.3 mg/dL      Globulin 1.9 gm/dL      A/G Ratio 1.6 g/dL      BUN/Creatinine Ratio 17.8     Anion Gap 12.3 mmol/L      eGFR 60.0 mL/min/1.73     Narrative:      GFR Categories in Chronic Kidney Disease (CKD)              GFR Category          GFR (mL/min/1.73)    Interpretation  G1                    90 or greater        Normal or high (1)  G2                    60-89                Mild decrease (1)  G3a                   45-59                Mild to moderate decrease  G3b                   30-44                Moderate to severe decrease  G4                    15-29                Severe decrease  G5                    14 or less           Kidney failure    (1)In the absence of evidence of kidney disease, neither GFR category G1 or G2 fulfill the criteria for CKD.    eGFR calculation 2021 CKD-EPI creatinine equation, which does not include race as a factor    Magnesium [[id number]]  (Normal) Collected: 07/03/25 0543    Specimen: Blood from Arm, Right Updated: 07/03/25 0922     Magnesium 1.8 mg/dL     TSH [[id number]]  (Abnormal) Collected: 07/03/25 0543    Specimen: Blood from Arm, Right Updated: 07/03/25 0854     TSH 54.400 uIU/mL     CBC (No Diff) [[id number]]  (Abnormal) Collected: 07/03/25 0750    Specimen: Blood from Arm, Right Updated: 07/03/25 0802     WBC 11.55 10*3/mm3      RBC 3.59 10*6/mm3      Hemoglobin 10.9 g/dL      Hematocrit 33.8 %      MCV 94.2 fL      MCH 30.4 pg      MCHC 32.2 g/dL      RDW 14.2 %      RDW-SD 47.3 fl      MPV 8.8 fL      Platelets 191 10*3/mm3     Basic Metabolic Panel  [[id number]]  (Abnormal) Collected: 07/03/25 0543    Specimen: Blood from Arm, Right Updated: 07/03/25 0619     Glucose 102 mg/dL      BUN 23.6 mg/dL      Creatinine 1.27 mg/dL      Sodium 143 mmol/L      Potassium 3.2 mmol/L      Chloride 107 mmol/L      CO2 27.5 mmol/L      Calcium 7.7 mg/dL      BUN/Creatinine Ratio 18.6     Anion Gap 8.5 mmol/L      eGFR 58.9 mL/min/1.73     Narrative:      GFR Categories in Chronic Kidney Disease (CKD)              GFR Category          GFR (mL/min/1.73)    Interpretation  G1                    90 or greater        Normal or high (1)  G2                    60-89                Mild decrease (1)  G3a                   45-59                Mild to moderate decrease  G3b                   30-44                Moderate to severe decrease  G4                    15-29                Severe decrease  G5                    14 or less           Kidney failure    (1)In the absence of evidence of kidney disease, neither GFR category G1 or G2 fulfill the criteria for CKD.    eGFR calculation 2021 CKD-EPI creatinine equation, which does not include race as a factor    Magnesium [[id number]]  (Normal) Collected: 07/02/25 1651    Specimen: Blood Updated: 07/02/25 1721     Magnesium 1.8 mg/dL     Phosphorus [[id number]]  (Normal) Collected: 07/02/25 1651    Specimen: Blood Updated: 07/02/25 1721     Phosphorus 2.6 mg/dL     Blood Culture - Blood, Arm, Left [[id number]]  (Normal) Collected: 06/29/25 1549    Specimen: Blood from Arm, Left Updated: 07/02/25 1600     Blood Culture No growth at 3 days    Blood Culture - Blood, Arm, Left [[id number]]  (Normal) Collected: 06/29/25 1529    Specimen: Blood from Arm, Left Updated: 07/02/25 1545     Blood Culture No growth at 3 days          IMAGING REVIEWED:  XR Chest 1 View  Result Date: 7/3/2025  Impression: 1.Left-sided chest tube in place. No pneumothorax identified. No left pleural effusion identified. 2.Small right pleural effusion. 3.Mild pulmonary  edema pattern. Electronically Signed: Al Gutiérrez MD  7/3/2025 10:16 AM EDT  Workstation ID: [id number]    FL ESOPHAGRAM SINGLE CONTRAST  Result Date: 7/2/2025  Impression: Short segment narrowing of the lower thoracic esophagus, through which only a thin linear contrast column with pass. There is distention of the upper thoracic esophagus with what appears to be combination of contrast, air bubbles, and food stuff. Patient  states he was unable to tolerate his breakfast this morning. For these reasons, 13 mm barium tablet was not administered. No contrast extravasation status post reported esophageal dilation procedure. Small esophageal hiatal hernia. Electronically Signed: Mar Aly MD  7/2/2025 11:49 AM EDT  Workstation ID: [id number]      Assessment & Plan   ASSESSMENT:  Pulmonary embolism: To continue on long-term enoxaparin.  Left pleural effusion status post Pleurx catheter insertion.  To continue treatment as outpatient.  Non-small cell lung cancer: Continue treatment with ramucirumab/docetaxel once discharged.  Discussed with him.    PLAN:  As above.    Arben Tim MD on 7/3/2025 at 12:46 PM.

## 2025-07-03 NOTE — PROGRESS NOTES
Enter Query Response Below      Query Response: Pt had bacterial pneumonia unspecified.             If applicable, please update the problem list.

## 2025-07-03 NOTE — PLAN OF CARE
Goal Outcome Evaluation:  Plan of Care Reviewed With: patient        Progress: no change  Outcome Evaluation: Pt VSS, IV antibiotics,IV Lopressor, Monitoring Pts heart rate,PICC line,Rogers Cath, Call light in reach, Plan on going

## 2025-07-03 NOTE — PLAN OF CARE
Goal Outcome Evaluation:      Pt is able to make needs known. Pleural catheter drained 300mL today. Chronic lam in place. PICC line in place. Plan to discharge today. Pt has no complaints at this time.

## 2025-07-03 NOTE — PROGRESS NOTES
Chart reviewed. Patient to discharge today. Spoke with case management and nursing for patient to be sent home with 3Funnel starter kit. Patient scheduled for EGD with dilation with Dr. Cope on 7/8/25. Office to call with instructions. No objections to discharge.

## 2025-07-04 LAB
BACTERIA SPEC AEROBE CULT: NORMAL
BACTERIA SPEC AEROBE CULT: NORMAL
MYCOBACTERIUM SPEC CULT: NORMAL
NIGHT BLUE STAIN TISS: NORMAL

## 2025-07-05 ENCOUNTER — APPOINTMENT (OUTPATIENT)
Dept: GENERAL RADIOLOGY | Facility: HOSPITAL | Age: 75
End: 2025-07-05
Payer: MEDICARE

## 2025-07-05 ENCOUNTER — HOSPITAL ENCOUNTER (EMERGENCY)
Facility: HOSPITAL | Age: 75
Discharge: HOME | End: 2025-07-05
Attending: EMERGENCY MEDICINE
Payer: MEDICARE

## 2025-07-05 VITALS
SYSTOLIC BLOOD PRESSURE: 154 MMHG | OXYGEN SATURATION: 96 % | DIASTOLIC BLOOD PRESSURE: 98 MMHG | HEIGHT: 71 IN | HEART RATE: 85 BPM | BODY MASS INDEX: 22.96 KG/M2 | RESPIRATION RATE: 18 BRPM | WEIGHT: 164.02 LBS | TEMPERATURE: 97.6 F

## 2025-07-05 DIAGNOSIS — Z51.89 VISIT FOR WOUND CHECK: ICD-10-CM

## 2025-07-05 DIAGNOSIS — Z93.8: Primary | ICD-10-CM

## 2025-07-05 LAB
ANION GAP SERPL CALCULATED.3IONS-SCNC: 9 MMOL/L (ref 5–15)
BASOPHILS # BLD AUTO: 0.04 10*3/MM3 (ref 0–0.2)
BASOPHILS NFR BLD AUTO: 0.5 % (ref 0–1.5)
BUN SERPL-MCNC: 20.1 MG/DL (ref 8–23)
BUN/CREAT SERPL: 17.5 (ref 7–25)
CALCIUM SPEC-SCNC: 7.8 MG/DL (ref 8.6–10.5)
CHLORIDE SERPL-SCNC: 107 MMOL/L (ref 98–107)
CO2 SERPL-SCNC: 24 MMOL/L (ref 22–29)
CREAT SERPL-MCNC: 1.15 MG/DL (ref 0.76–1.27)
DEPRECATED RDW RBC AUTO: 49.9 FL (ref 37–54)
EGFRCR SERPLBLD CKD-EPI 2021: 66.4 ML/MIN/1.73
EOSINOPHIL # BLD AUTO: 0.08 10*3/MM3 (ref 0–0.4)
EOSINOPHIL NFR BLD AUTO: 0.9 % (ref 0.3–6.2)
ERYTHROCYTE [DISTWIDTH] IN BLOOD BY AUTOMATED COUNT: 14.6 % (ref 12.3–15.4)
GLUCOSE SERPL-MCNC: 139 MG/DL (ref 65–99)
HCT VFR BLD AUTO: 30.7 % (ref 37.5–51)
HGB BLD-MCNC: 9.8 G/DL (ref 13–17.7)
IMM GRANULOCYTES # BLD AUTO: 0.08 10*3/MM3 (ref 0–0.05)
IMM GRANULOCYTES NFR BLD AUTO: 0.9 % (ref 0–0.5)
LYMPHOCYTES # BLD AUTO: 0.93 10*3/MM3 (ref 0.7–3.1)
LYMPHOCYTES NFR BLD AUTO: 11 % (ref 19.6–45.3)
MCH RBC QN AUTO: 30.4 PG (ref 26.6–33)
MCHC RBC AUTO-ENTMCNC: 31.9 G/DL (ref 31.5–35.7)
MCV RBC AUTO: 95.3 FL (ref 79–97)
MONOCYTES # BLD AUTO: 0.76 10*3/MM3 (ref 0.1–0.9)
MONOCYTES NFR BLD AUTO: 9 % (ref 5–12)
NEUTROPHILS NFR BLD AUTO: 6.57 10*3/MM3 (ref 1.7–7)
NEUTROPHILS NFR BLD AUTO: 77.7 % (ref 42.7–76)
NRBC BLD AUTO-RTO: 0 /100 WBC (ref 0–0.2)
PLATELET # BLD AUTO: 121 10*3/MM3 (ref 140–450)
PMV BLD AUTO: 9 FL (ref 6–12)
POTASSIUM SERPL-SCNC: 3.8 MMOL/L (ref 3.5–5.2)
RBC # BLD AUTO: 3.22 10*6/MM3 (ref 4.14–5.8)
SODIUM SERPL-SCNC: 140 MMOL/L (ref 136–145)
WBC NRBC COR # BLD AUTO: 8.46 10*3/MM3 (ref 3.4–10.8)
WHOLE BLOOD HOLD COAG: NORMAL

## 2025-07-05 PROCEDURE — 99283 EMERGENCY DEPT VISIT LOW MDM: CPT

## 2025-07-05 PROCEDURE — 99283 EMERGENCY DEPT VISIT LOW MDM: CPT | Performed by: THORACIC SURGERY (CARDIOTHORACIC VASCULAR SURGERY)

## 2025-07-05 PROCEDURE — 85025 COMPLETE CBC W/AUTO DIFF WBC: CPT | Performed by: EMERGENCY MEDICINE

## 2025-07-05 PROCEDURE — 36415 COLL VENOUS BLD VENIPUNCTURE: CPT

## 2025-07-05 PROCEDURE — 80048 BASIC METABOLIC PNL TOTAL CA: CPT | Performed by: EMERGENCY MEDICINE

## 2025-07-05 PROCEDURE — 71045 X-RAY EXAM CHEST 1 VIEW: CPT

## 2025-07-05 RX ORDER — SODIUM CHLORIDE 0.9 % (FLUSH) 0.9 %
10 SYRINGE (ML) INJECTION AS NEEDED
Status: DISCONTINUED | OUTPATIENT
Start: 2025-07-05 | End: 2025-07-05 | Stop reason: HOSPADM

## 2025-07-05 NOTE — ED PROVIDER NOTES
Subjective   History of Present Illness  73y/o M Patient is an individual with a history of cancer who presents with concerns regarding a pleural drain placed recently. The patient was discharged Thursday evening after the procedure and noticed blood or fluid seeping from the drain site on the bandage this morning. The drain had previously been managed by a healthcare worker, and the patient expresses concern about the amount of drainage and the possibility of complications, referencing a prior episode where rapid drainage of 2 liters led to atrial fibrillation. The patient denies increased shortness of breath . The patient is currently on Lovenox injections for anticoagulation for PE noted on 6/29 CTA chest..  Review of Systems  See HPI.  Past Medical History:   Diagnosis Date    Abnormal ECG 01/29/2024    Allergic rhinitis     Arrhythmia 08/14/2024    SVT, then afib after thoracentesis    Asthma 06/01/2024    Shortness of breath with exertion    Atrial fibrillation 08/14/2024    After 2L drained per thoracentesis    Coronary artery disease 01/29/2024    Minimal blockage per cath    GERD (gastroesophageal reflux disease)     Hyperlipidemia     Lung cancer 08/2024    Myocardial infarction 01/29/2024    Cath, no intervention    Pleural effusion 2024    Pneumonia 05/2025       No Known Allergies    Past Surgical History:   Procedure Laterality Date    BRONCHOSCOPY N/A 08/13/2024    Procedure: BRONCHOSCOPY WITH BRONCHIAL WASHING AND BRONCHIAL BRUSHING;  Surgeon: Kelvin Gonzalez MD;  Location: [facility] ENDOSCOPY;  Service: Pulmonary;  Laterality: N/A;    BRONCHOSCOPY N/A 05/23/2025    Procedure: BRONCHOSCOPY with bronchoalveolar lavage and biopsy x 1 area;  Surgeon: Jason Dozier MD;  Location: [facility] ENDOSCOPY;  Service: Pulmonary;  Laterality: N/A;  post op: lung mass    BRONCHOSCOPY      BRONCHOSCOPY WITH ION ROBOTIC ASSIST N/A 08/16/2024    Procedure: BRONCHOSCOPY WITH ION ROBOT WITH CRYO BIOPSY;  Surgeon: Kelvin Gonzalez  MD;  Location: [facility] ENDOSCOPY;  Service: Robotics - Pulmonary;  Laterality: N/A;    CARDIAC CATHETERIZATION Right 2024    Procedure: Coronary angiography;  Surgeon: Abran Chand MD;  Location: [facility] CATH INVASIVE LOCATION;  Service: Cardiovascular;  Laterality: Right;    CARDIAC CATHETERIZATION N/A 2024    Procedure: Left Heart Cath;  Surgeon: Abran Chand MD;  Location: [facility] CATH INVASIVE LOCATION;  Service: Cardiovascular;  Laterality: N/A;    ENDOSCOPY N/A 2025    Procedure: ESOPHAGOGASTRODUODENOSCOPY with biopsy x 1 area and dilatation (48FR non-guided bougie);  Surgeon: Carlos Calero MD;  Location: [facility] ENDOSCOPY;  Service: Gastroenterology;  Laterality: N/A;  post op: esophageal stricture    ENDOSCOPY N/A 2025    Procedure: ESOPHAGOGASTRODUODENOSCOPY, impacted esophageal food bolus removal;  Surgeon: Luis Alfredo Pucktet MD;  Location: [facility] ENDOSCOPY;  Service: Gastroenterology;  Laterality: N/A;  post: impacted foreign body of esophagus    LUNG BIOPSY         Family History   Problem Relation Age of Onset    Dementia Mother     Diabetes Mother     Arrhythmia Mother         Afib    Breast cancer Sister 74    Cancer Sister         Breast cancer       Social History     Socioeconomic History    Marital status:    Tobacco Use    Smoking status: Former     Current packs/day: 0.00     Average packs/day: 1 pack/day for 32.0 years (32.0 ttl pk-yrs)     Types: Cigarettes     Start date:      Quit date:      Years since quittin.5     Passive exposure: Past    Smokeless tobacco: Never   Vaping Use    Vaping status: Never Used   Substance and Sexual Activity    Alcohol use: Yes     Alcohol/week: 1.0 standard drink of alcohol     Types: 1 Cans of beer per week    Drug use: Never    Sexual activity: Not Currently     Partners: Female     Birth control/protection: None           Objective   Physical Exam  No acute distress. Normocephalic. No scleral icterus.  Moist oral mucosa. No observable neck masses on external visualization. No respiratory distress. No tachypnea or increased work of breathing. Normal heart rate. Abdomen soft and nontender without peritoneal signs.   PleurX catheter exiting left lower chest/left upper abdomen area with mild venous oozing and a significantly saturated bandage.  Procedures           ED Course                                                       Medical Decision Making  Problems Addressed:  Status post thoracostomy tube placement: complicated acute illness or injury  Visit for wound check: complicated acute illness or injury    Amount and/or Complexity of Data Reviewed  Labs: ordered.  Radiology: ordered.    Risk  Prescription drug management.    My interpretation of chest x-ray negative for pneumothorax.  See system for radiology interpretation.    Mild drop in hemoglobin and platelet count.  Hemodynamically stable.  Discussed with Dr. Sanchez who recommends placing pressure.  Believe safe for discharge home.  Discussed with patient to continue applying pressure at home for the next several days.  Has wound care coming to his house to readdress.    Final diagnoses:   Status post thoracostomy tube placement   Visit for wound check       ED Disposition  ED Disposition       ED Disposition   Discharge    Condition   Stable    Comment   --               Abner Funes, APRN  [address] [facility] [place] [address]  [phone]    In 3 days           Medication List        PAUSE taking these medications      apixaban 5 MG tablet tablet  Wait to take this until your doctor or other care provider tells you to start again.  Commonly known as: ELIQUIS  Take 1 tablet by mouth Every 12 (Twelve) Hours. Indications: Atrial Fibrillation                 Javed Marin MD  07/05/25 1917

## 2025-07-05 NOTE — CONSULTS
"  Chief Complaint: Left pleural effusion status post Pleurx catheter placement.  Bleeding from the Pleurx catheter subcutaneous tunnel.       History of presenting illness  Mr. Ames is a pleasant 74-year-old gentleman familiar to our service from s/p Pleurx catheter placement POD 3. 2L of serous fluid was drained during his procedure Patient was stable postoperative so Pleurx was capped and the patient was discharged home. Patient has home health .  Patient presented to the emergency room with complaints of oozing around the Pleurx catheter requiring multiple dressing changes.  Emergency room chest x-ray similar to the one at discharge.  Bleeding seems to be related to the tunnel itself.  Was anticoagulated and currently on Lovenox.     Dynamically stable and no significant drop in hematocrit       Vital Signs:  /95   Pulse 82   Temp 97.6 °F (36.4 °C)   Resp 16   Ht 180.3 cm (70.98\")   Wt 74.4 kg (164 lb 0.4 oz)   SpO2 95%   BMI 22.89 kg/m²         Objective:  General Appearance:  Comfortable and in no acute distress.    Lungs:  Normal effort and normal respiratory rate.  He is not in respiratory distress.    Catheter in place left  capped -oozing around the catheter exit site  Chest: Symmetric chest wall expansion. Chest wall tenderness present.    Neurological: Patient is alert and oriented to person, place and time.    Skin:  Warm and dry.                Results Review:                I reviewed the patient's new clinical results.      Procedure Component Value Units Date/Time     FL ESOPHAGRAM SINGLE CONTRAST [[id number]] Collected: 07/02/25 1051       Updated: 07/02/25 1152     Narrative:       FL ESOPHAGRAM SINGLE CONTRAST     Date of Exam: 7/2/2025 10:15 AM EDT     Indication: Dysphagia, s/p EGD with dilation, recent food impaction. EGD biopsy with Schroeder's esophagus. Signed report     Comparison: None available.     Technique:  Patient ingested effervescent crystals followed by high density " barium for double contrast imaging of the esophagus. Patient subsequently ingested medium density barium for single-contrast evaluation.     Fluoroscopic Time: 0.7 minutes     Number of Images: 8 spot fluoroscopic series images     Findings:  The study was performed with the patient in semierect position upon the fluoroscopy table due to indwelling left pleural catheter with portable suction mechanism. No gross aspiration was identified..     There is patulous or distended upper thoracic esophagus.     On initial images, there is frothy filling defects in the upper thoracic esophagus with some accumulation of contrast bolus at this location, thought to represent retained food stuff. Patient stated that he was unable to tolerate breakfast this morning,   sensation of it. For these reasons, a 13 mm barium tablet was not administered.     There is smooth long segment narrowing of the lower thoracic esophagus, through which thin contrast bolus would pass. There is appearance of a tiny sliding esophageal hiatal hernia. There is delayed contrast clearance from the thoracic esophagus into the   stomach.     No contrast extravasation is seen.        Impression:       Impression:  Short segment narrowing of the lower thoracic esophagus, through which only a thin linear contrast column with pass. There is distention of the upper thoracic esophagus with what appears to be combination of contrast, air bubbles, and food stuff. Patient   states he was unable to tolerate his breakfast this morning. For these reasons, 13 mm barium tablet was not administered.  No contrast extravasation status post reported esophageal dilation procedure.  Small esophageal hiatal hernia.        Electronically Signed: Mar Aly MD    7/2/2025 11:49 AM EDT    Workstation ID: [id number]                Lab Results:                   Glucose 139, white count 8, hgb 9.8 from 10.9 ,hematocrit 30.7 platelets 121  Assessment & Plan    Pulmonary embolism     Pleural effusion    Stage IV adenocarcinoma of lung, right    Stage IV adenocarcinoma of lung, left    CKD (chronic kidney disease) stage 4, GFR 15-29 ml/min    Paroxysmal atrial fibrillation    Chronic anticoagulation    Hypoxia    Hypothyroidism (acquired)    Dysphagia    Pneumonia, unspecified organism    Elevated brain natriuretic peptide (BNP) level    Bilateral leg edema    Food impaction of esophagus        Assessment & Plan  Mr. Ames is a pleasant 74-year-old gentleman stage IV adenocarcinoma of lung.  Esophagus stricture.  Pleural effusion s/p Pleurx catheter placement POD 2.  Presented with oozing around the catheter site.  History of pulmonary embolism patient is on anticoagulation and was transferred to [facility] in anticipation for procedures.  Recommend holding Lovenox for right now till the oozing stops.  X-ray satisfactory and looks like most of this bleeding related to the tunneling within the subcutaneous tissue and is able to find its way out rather than accumulate within the chest.   Recommend direct pressure over the entire subcutaneous tunnel  and dressing changes as necessary.  Patient already has a follow-up schedule in anticipation for esophagus dilation.  Follow-up wound care with home health

## 2025-07-05 NOTE — DISCHARGE INSTRUCTIONS
Continue to apply pressure for long periods throughout the day over the entirety of the tube similarly to how you did here in the emergency department.  Follow-up with wound care.  If you begin getting lightheaded, the bleeding worsens, or if other new concerning symptoms arise, please return to the emergency department for repeat evaluation.

## 2025-07-06 ENCOUNTER — READMISSION MANAGEMENT (OUTPATIENT)
Dept: CALL CENTER | Facility: HOSPITAL | Age: 75
End: 2025-07-06
Payer: MEDICARE

## 2025-07-06 ENCOUNTER — HOSPITAL ENCOUNTER (OUTPATIENT)
Facility: HOSPITAL | Age: 75
Discharge: HOME HEALTH | End: 2025-07-08
Attending: EMERGENCY MEDICINE | Admitting: INTERNAL MEDICINE
Payer: MEDICARE

## 2025-07-06 DIAGNOSIS — Z51.89 VISIT FOR WOUND CHECK: ICD-10-CM

## 2025-07-06 DIAGNOSIS — Z96.89: Primary | ICD-10-CM

## 2025-07-06 LAB
ALBUMIN SERPL-MCNC: 2.8 G/DL (ref 3.5–5.2)
ALBUMIN/GLOB SERPL: 1.5 G/DL
ALP SERPL-CCNC: 56 U/L (ref 39–117)
ALT SERPL W P-5'-P-CCNC: 9 U/L (ref 1–41)
ANION GAP SERPL CALCULATED.3IONS-SCNC: 10.6 MMOL/L (ref 5–15)
APTT PPP: 37.2 SECONDS (ref 22.7–35.4)
AST SERPL-CCNC: 22 U/L (ref 1–40)
BASOPHILS # BLD AUTO: 0.03 10*3/MM3 (ref 0–0.2)
BASOPHILS NFR BLD AUTO: 0.5 % (ref 0–1.5)
BILIRUB SERPL-MCNC: <0.2 MG/DL (ref 0–1.2)
BUN SERPL-MCNC: 20.2 MG/DL (ref 8–23)
BUN/CREAT SERPL: 18.5 (ref 7–25)
CALCIUM SPEC-SCNC: 7.8 MG/DL (ref 8.6–10.5)
CHLORIDE SERPL-SCNC: 107 MMOL/L (ref 98–107)
CO2 SERPL-SCNC: 24.4 MMOL/L (ref 22–29)
CREAT SERPL-MCNC: 1.09 MG/DL (ref 0.76–1.27)
DEPRECATED RDW RBC AUTO: 50.4 FL (ref 37–54)
EGFRCR SERPLBLD CKD-EPI 2021: 70.8 ML/MIN/1.73
EOSINOPHIL # BLD AUTO: 0.05 10*3/MM3 (ref 0–0.4)
EOSINOPHIL NFR BLD AUTO: 0.9 % (ref 0.3–6.2)
ERYTHROCYTE [DISTWIDTH] IN BLOOD BY AUTOMATED COUNT: 14.6 % (ref 12.3–15.4)
GLOBULIN UR ELPH-MCNC: 1.9 GM/DL
GLUCOSE SERPL-MCNC: 144 MG/DL (ref 65–99)
HCT VFR BLD AUTO: 29.1 % (ref 37.5–51)
HGB BLD-MCNC: 9.3 G/DL (ref 13–17.7)
HOLD SPECIMEN: NORMAL
IMM GRANULOCYTES # BLD AUTO: 0.05 10*3/MM3 (ref 0–0.05)
IMM GRANULOCYTES NFR BLD AUTO: 0.9 % (ref 0–0.5)
INR PPP: 1.07 (ref 0.9–1.1)
LYMPHOCYTES # BLD AUTO: 0.84 10*3/MM3 (ref 0.7–3.1)
LYMPHOCYTES NFR BLD AUTO: 14.8 % (ref 19.6–45.3)
MCH RBC QN AUTO: 30.7 PG (ref 26.6–33)
MCHC RBC AUTO-ENTMCNC: 32 G/DL (ref 31.5–35.7)
MCV RBC AUTO: 96 FL (ref 79–97)
MONOCYTES # BLD AUTO: 0.36 10*3/MM3 (ref 0.1–0.9)
MONOCYTES NFR BLD AUTO: 6.3 % (ref 5–12)
NEUTROPHILS NFR BLD AUTO: 4.35 10*3/MM3 (ref 1.7–7)
NEUTROPHILS NFR BLD AUTO: 76.6 % (ref 42.7–76)
NRBC BLD AUTO-RTO: 0 /100 WBC (ref 0–0.2)
PLATELET # BLD AUTO: 113 10*3/MM3 (ref 140–450)
PMV BLD AUTO: 9.6 FL (ref 6–12)
POTASSIUM SERPL-SCNC: 3.8 MMOL/L (ref 3.5–5.2)
PROT SERPL-MCNC: 4.7 G/DL (ref 6–8.5)
PROTHROMBIN TIME: 13.8 SECONDS (ref 11.7–14.2)
RBC # BLD AUTO: 3.03 10*6/MM3 (ref 4.14–5.8)
SODIUM SERPL-SCNC: 142 MMOL/L (ref 136–145)
WBC NRBC COR # BLD AUTO: 5.68 10*3/MM3 (ref 3.4–10.8)

## 2025-07-06 PROCEDURE — G0378 HOSPITAL OBSERVATION PER HR: HCPCS

## 2025-07-06 PROCEDURE — 94640 AIRWAY INHALATION TREATMENT: CPT

## 2025-07-06 PROCEDURE — 94799 UNLISTED PULMONARY SVC/PX: CPT

## 2025-07-06 PROCEDURE — 85730 THROMBOPLASTIN TIME PARTIAL: CPT | Performed by: EMERGENCY MEDICINE

## 2025-07-06 PROCEDURE — 85610 PROTHROMBIN TIME: CPT | Performed by: EMERGENCY MEDICINE

## 2025-07-06 PROCEDURE — 80053 COMPREHEN METABOLIC PANEL: CPT | Performed by: EMERGENCY MEDICINE

## 2025-07-06 PROCEDURE — 99285 EMERGENCY DEPT VISIT HI MDM: CPT

## 2025-07-06 PROCEDURE — 85025 COMPLETE CBC W/AUTO DIFF WBC: CPT | Performed by: EMERGENCY MEDICINE

## 2025-07-06 RX ORDER — NITROGLYCERIN 0.4 MG/1
0.4 TABLET SUBLINGUAL
Status: DISCONTINUED | OUTPATIENT
Start: 2025-07-06 | End: 2025-07-08 | Stop reason: HOSPADM

## 2025-07-06 RX ORDER — SODIUM CHLORIDE 0.9 % (FLUSH) 0.9 %
10 SYRINGE (ML) INJECTION AS NEEDED
Status: DISCONTINUED | OUTPATIENT
Start: 2025-07-06 | End: 2025-07-08 | Stop reason: HOSPADM

## 2025-07-06 RX ORDER — SODIUM CHLORIDE 9 MG/ML
40 INJECTION, SOLUTION INTRAVENOUS AS NEEDED
Status: DISCONTINUED | OUTPATIENT
Start: 2025-07-06 | End: 2025-07-08 | Stop reason: HOSPADM

## 2025-07-06 RX ORDER — ENOXAPARIN SODIUM 100 MG/ML
1 INJECTION SUBCUTANEOUS EVERY 12 HOURS SCHEDULED
Status: DISCONTINUED | OUTPATIENT
Start: 2025-07-06 | End: 2025-07-08

## 2025-07-06 RX ORDER — ARFORMOTEROL TARTRATE 15 UG/2ML
15 SOLUTION RESPIRATORY (INHALATION)
Status: DISCONTINUED | OUTPATIENT
Start: 2025-07-06 | End: 2025-07-07

## 2025-07-06 RX ORDER — HYDROCORTISONE 10 MG/1
20 TABLET ORAL 2 TIMES DAILY
Status: DISCONTINUED | OUTPATIENT
Start: 2025-07-06 | End: 2025-07-08 | Stop reason: HOSPADM

## 2025-07-06 RX ORDER — METOPROLOL SUCCINATE 50 MG/1
50 TABLET, EXTENDED RELEASE ORAL EVERY 12 HOURS SCHEDULED
Status: DISCONTINUED | OUTPATIENT
Start: 2025-07-06 | End: 2025-07-08 | Stop reason: HOSPADM

## 2025-07-06 RX ORDER — ACETAMINOPHEN 325 MG/1
650 TABLET ORAL EVERY 4 HOURS PRN
Status: DISCONTINUED | OUTPATIENT
Start: 2025-07-06 | End: 2025-07-08 | Stop reason: HOSPADM

## 2025-07-06 RX ORDER — BUDESONIDE 0.5 MG/2ML
0.5 INHALANT ORAL
Status: DISCONTINUED | OUTPATIENT
Start: 2025-07-06 | End: 2025-07-08 | Stop reason: HOSPADM

## 2025-07-06 RX ORDER — GUAIFENESIN 600 MG/1
600 TABLET, EXTENDED RELEASE ORAL 2 TIMES DAILY
Status: DISCONTINUED | OUTPATIENT
Start: 2025-07-06 | End: 2025-07-08 | Stop reason: HOSPADM

## 2025-07-06 RX ORDER — NYSTATIN 100000 [USP'U]/ML
5 SUSPENSION ORAL 4 TIMES DAILY
Status: DISCONTINUED | OUTPATIENT
Start: 2025-07-06 | End: 2025-07-06

## 2025-07-06 RX ORDER — TAMSULOSIN HYDROCHLORIDE 0.4 MG/1
0.4 CAPSULE ORAL NIGHTLY
Status: DISCONTINUED | OUTPATIENT
Start: 2025-07-06 | End: 2025-07-08 | Stop reason: HOSPADM

## 2025-07-06 RX ORDER — LEVOTHYROXINE SODIUM 75 UG/1
75 TABLET ORAL
Status: DISCONTINUED | OUTPATIENT
Start: 2025-07-07 | End: 2025-07-08 | Stop reason: HOSPADM

## 2025-07-06 RX ORDER — PANTOPRAZOLE SODIUM 40 MG/1
40 TABLET, DELAYED RELEASE ORAL DAILY
Status: DISCONTINUED | OUTPATIENT
Start: 2025-07-07 | End: 2025-07-08 | Stop reason: HOSPADM

## 2025-07-06 RX ORDER — BISACODYL 10 MG
10 SUPPOSITORY, RECTAL RECTAL DAILY PRN
Status: DISCONTINUED | OUTPATIENT
Start: 2025-07-06 | End: 2025-07-08 | Stop reason: HOSPADM

## 2025-07-06 RX ORDER — BISACODYL 5 MG/1
5 TABLET, DELAYED RELEASE ORAL DAILY PRN
Status: DISCONTINUED | OUTPATIENT
Start: 2025-07-06 | End: 2025-07-08 | Stop reason: HOSPADM

## 2025-07-06 RX ORDER — POLYETHYLENE GLYCOL 3350 17 G/17G
17 POWDER, FOR SOLUTION ORAL DAILY PRN
Status: DISCONTINUED | OUTPATIENT
Start: 2025-07-06 | End: 2025-07-08 | Stop reason: HOSPADM

## 2025-07-06 RX ORDER — AMOXICILLIN 250 MG
2 CAPSULE ORAL 2 TIMES DAILY PRN
Status: DISCONTINUED | OUTPATIENT
Start: 2025-07-06 | End: 2025-07-08 | Stop reason: HOSPADM

## 2025-07-06 RX ORDER — DEXAMETHASONE 4 MG/1
4 TABLET ORAL EVERY 12 HOURS SCHEDULED
Status: DISCONTINUED | OUTPATIENT
Start: 2025-07-06 | End: 2025-07-06

## 2025-07-06 RX ORDER — IPRATROPIUM BROMIDE AND ALBUTEROL SULFATE 2.5; .5 MG/3ML; MG/3ML
3 SOLUTION RESPIRATORY (INHALATION) EVERY 6 HOURS PRN
Status: DISCONTINUED | OUTPATIENT
Start: 2025-07-06 | End: 2025-07-08 | Stop reason: HOSPADM

## 2025-07-06 RX ORDER — SODIUM CHLORIDE 0.9 % (FLUSH) 0.9 %
10 SYRINGE (ML) INJECTION EVERY 12 HOURS SCHEDULED
Status: DISCONTINUED | OUTPATIENT
Start: 2025-07-06 | End: 2025-07-08 | Stop reason: HOSPADM

## 2025-07-06 RX ORDER — ONDANSETRON 2 MG/ML
4 INJECTION INTRAMUSCULAR; INTRAVENOUS EVERY 6 HOURS PRN
Status: DISCONTINUED | OUTPATIENT
Start: 2025-07-06 | End: 2025-07-08 | Stop reason: HOSPADM

## 2025-07-06 RX ADMIN — AMOXICILLIN AND CLAVULANATE POTASSIUM 1 TABLET: 875; 125 TABLET, COATED ORAL at 22:16

## 2025-07-06 RX ADMIN — ARFORMOTEROL TARTRATE 15 MCG: 15 SOLUTION RESPIRATORY (INHALATION) at 20:01

## 2025-07-06 RX ADMIN — HYDROCORTISONE 20 MG: 10 TABLET ORAL at 22:16

## 2025-07-06 RX ADMIN — METOPROLOL SUCCINATE 50 MG: 50 TABLET, EXTENDED RELEASE ORAL at 22:16

## 2025-07-06 RX ADMIN — GUAIFENESIN 600 MG: 600 TABLET, EXTENDED RELEASE ORAL at 22:16

## 2025-07-06 RX ADMIN — TAMSULOSIN HYDROCHLORIDE 0.4 MG: 0.4 CAPSULE ORAL at 22:16

## 2025-07-06 RX ADMIN — BUDESONIDE 0.5 MG: 0.5 SUSPENSION RESPIRATORY (INHALATION) at 20:01

## 2025-07-06 RX ADMIN — Medication 10 ML: at 22:17

## 2025-07-06 NOTE — ED PROVIDER NOTES
Subjective   History of Present Illness  74-year-old male with recent history of PE currently on Lovenox, history of lung cancer with recent Pleurx catheter placement presents for oozing around site where catheter exit skin.  Seen yesterday for same.  Has been applying pressure at home.  Held Lovenox this morning.  Still having oozing.  Review of Systems  See HPI.  Past Medical History:   Diagnosis Date    Abnormal ECG 01/29/2024    Allergic rhinitis     Arrhythmia 08/14/2024    SVT, then afib after thoracentesis    Asthma 06/01/2024    Shortness of breath with exertion    Atrial fibrillation 08/14/2024    After 2L drained per thoracentesis    Coronary artery disease 01/29/2024    Minimal blockage per cath    GERD (gastroesophageal reflux disease)     Hyperlipidemia     Lung cancer 08/2024    Myocardial infarction 01/29/2024    Cath, no intervention    Pleural effusion 2024    Pneumonia 05/2025       No Known Allergies    Past Surgical History:   Procedure Laterality Date    BRONCHOSCOPY N/A 08/13/2024    Procedure: BRONCHOSCOPY WITH BRONCHIAL WASHING AND BRONCHIAL BRUSHING;  Surgeon: Kelvin Gonzalez MD;  Location: [facility] ENDOSCOPY;  Service: Pulmonary;  Laterality: N/A;    BRONCHOSCOPY N/A 05/23/2025    Procedure: BRONCHOSCOPY with bronchoalveolar lavage and biopsy x 1 area;  Surgeon: Jason Dozier MD;  Location: [facility] ENDOSCOPY;  Service: Pulmonary;  Laterality: N/A;  post op: lung mass    BRONCHOSCOPY      BRONCHOSCOPY WITH ION ROBOTIC ASSIST N/A 08/16/2024    Procedure: BRONCHOSCOPY WITH ION ROBOT WITH CRYO BIOPSY;  Surgeon: Kelvin Gonzalez MD;  Location: [facility] ENDOSCOPY;  Service: Robotics - Pulmonary;  Laterality: N/A;    CARDIAC CATHETERIZATION Right 01/29/2024    Procedure: Coronary angiography;  Surgeon: Abran Chand MD;  Location: [facility] CATH INVASIVE LOCATION;  Service: Cardiovascular;  Laterality: Right;    CARDIAC CATHETERIZATION N/A 01/29/2024    Procedure: Left Heart Cath;  Surgeon: Abran Chand MD;   "Location: [facility] CATH INVASIVE LOCATION;  Service: Cardiovascular;  Laterality: N/A;    ENDOSCOPY N/A 2025    Procedure: ESOPHAGOGASTRODUODENOSCOPY with biopsy x 1 area and dilatation (48FR non-guided bougie);  Surgeon: Carlos Calero MD;  Location: [facility] ENDOSCOPY;  Service: Gastroenterology;  Laterality: N/A;  post op: esophageal stricture    ENDOSCOPY N/A 2025    Procedure: ESOPHAGOGASTRODUODENOSCOPY, impacted esophageal food bolus removal;  Surgeon: Luis Alfredo Puckett MD;  Location: [facility] ENDOSCOPY;  Service: Gastroenterology;  Laterality: N/A;  post: impacted foreign body of esophagus    LUNG BIOPSY         Family History   Problem Relation Age of Onset    Dementia Mother     Diabetes Mother     Arrhythmia Mother         Afib    Breast cancer Sister 74    Cancer Sister         Breast cancer       Social History     Socioeconomic History    Marital status:    Tobacco Use    Smoking status: Former     Current packs/day: 0.00     Average packs/day: 1 pack/day for 32.0 years (32.0 ttl pk-yrs)     Types: Cigarettes     Start date:      Quit date:      Years since quittin.5     Passive exposure: Past    Smokeless tobacco: Never   Vaping Use    Vaping status: Never Used   Substance and Sexual Activity    Alcohol use: Yes     Alcohol/week: 1.0 standard drink of alcohol     Types: 1 Cans of beer per week    Drug use: Never    Sexual activity: Not Currently     Partners: Female     Birth control/protection: None           Objective   Physical Exam  No acute distress, nasal cannula in place, patient with largely saturated bandage around Pleurx catheter site, no significant induration or fluctuance around site, mild venous oozing present when removed, no tachypnea or increased work of breathing  Procedures           ED Course      /96 (BP Location: Left arm, Patient Position: Lying)   Pulse 79   Temp 98 °F (36.7 °C) (Oral)   Resp 16   Ht 180.3 cm (70.98\")   Wt 74.4 kg " (164 lb 0.4 oz)   SpO2 97%   BMI 22.89 kg/m²   Labs Reviewed   COMPREHENSIVE METABOLIC PANEL - Abnormal; Notable for the following components:       Result Value    Glucose 144 (*)     Calcium 7.8 (*)     Total Protein 4.7 (*)     Albumin 2.8 (*)     All other components within normal limits    Narrative:     GFR Categories in Chronic Kidney Disease (CKD)              GFR Category          GFR (mL/min/1.73)    Interpretation  G1                    90 or greater        Normal or high (1)  G2                    60-89                Mild decrease (1)  G3a                   45-59                Mild to moderate decrease  G3b                   30-44                Moderate to severe decrease  G4                    15-29                Severe decrease  G5                    14 or less           Kidney failure    (1)In the absence of evidence of kidney disease, neither GFR category G1 or G2 fulfill the criteria for CKD.    eGFR calculation 2021 CKD-EPI creatinine equation, which does not include race as a factor   APTT - Abnormal; Notable for the following components:    PTT 37.2 (*)     All other components within normal limits   CBC WITH AUTO DIFFERENTIAL - Abnormal; Notable for the following components:    RBC 3.03 (*)     Hemoglobin 9.3 (*)     Hematocrit 29.1 (*)     Platelets 113 (*)     Neutrophil % 76.6 (*)     Lymphocyte % 14.8 (*)     Immature Grans % 0.9 (*)     All other components within normal limits   PROTIME-INR - Normal   CBC AND DIFFERENTIAL    Narrative:     The following orders were created for panel order CBC & Differential.  Procedure                               Abnormality         Status                     ---------                               -----------         ------                     CBC Auto Differential[[id number]]        Abnormal            Final result                 Please view results for these tests on the individual orders.   EXTRA TUBES    Narrative:     The following orders  were created for panel order Extra Tubes.  Procedure                               Abnormality         Status                     ---------                               -----------         ------                     Green Top (Gel)[[id number]]                                  Final result                 Please view results for these tests on the individual orders.   GREEN TOP   EXTRA TUBES     Medications   sodium chloride 0.9 % flush 10 mL (has no administration in time range)   thiamine (VITAMIN B-1) tablet 100 mg (has no administration in time range)   acetaminophen (TYLENOL) tablet 650 mg (has no administration in time range)   amoxicillin-clavulanate (AUGMENTIN) 875-125 MG per tablet 1 tablet (has no administration in time range)   apixaban (ELIQUIS) tablet 5 mg ( Oral Dose Auto Held 7/14/25 2100)   arformoterol (BROVANA) nebulizer solution 15 mcg (has no administration in time range)     And   budesonide (PULMICORT) nebulizer solution 0.5 mg (has no administration in time range)     And   revefenacin (YUPELRI) nebulizer solution 175 mcg (175 mcg Nebulization Not Given 7/6/25 1537)   dexAMETHasone (DECADRON) tablet 4 mg ( Oral Dose Auto Held 7/14/25 2100)   enoxaparin sodium (LOVENOX) syringe 70 mg ( Subcutaneous Dose Auto Held 7/14/25 2100)   guaiFENesin (MUCINEX) 12 hr tablet 600 mg (has no administration in time range)   hydrocortisone (CORTEF) tablet 20 mg (has no administration in time range)   ipratropium-albuterol (DUO-NEB) nebulizer solution 3 mL (has no administration in time range)   levothyroxine (SYNTHROID, LEVOTHROID) tablet 75 mcg (has no administration in time range)   metoprolol succinate XL (TOPROL-XL) 24 hr tablet 50 mg (has no administration in time range)   nystatin (MYCOSTATIN) 100,000 unit/mL suspension 500,000 Units (has no administration in time range)   pantoprazole (PROTONIX) EC tablet 40 mg (has no administration in time range)   tamsulosin (FLOMAX) 24 hr capsule 0.4 mg (has no  administration in time range)   sodium chloride 0.9 % flush 10 mL (has no administration in time range)   sodium chloride 0.9 % flush 10 mL (has no administration in time range)   sodium chloride 0.9 % infusion 40 mL (has no administration in time range)   nitroglycerin (NITROSTAT) SL tablet 0.4 mg (has no administration in time range)   Potassium Replacement - Follow Nurse / BPA Driven Protocol (has no administration in time range)   Magnesium Standard Dose Replacement - Follow Nurse / BPA Driven Protocol (has no administration in time range)   Phosphorus Replacement - Follow Nurse / BPA Driven Protocol (has no administration in time range)   Calcium Replacement - Follow Nurse / BPA Driven Protocol (has no administration in time range)   sennosides-docusate (PERICOLACE) 8.6-50 MG per tablet 2 tablet (has no administration in time range)     And   polyethylene glycol (MIRALAX) packet 17 g (has no administration in time range)     And   bisacodyl (DULCOLAX) EC tablet 5 mg (has no administration in time range)     And   bisacodyl (DULCOLAX) suppository 10 mg (has no administration in time range)   ondansetron (ZOFRAN) injection 4 mg (has no administration in time range)     No orders to display                                                      Medical Decision Making  Problems Addressed:  Thoracostomy tube in place: complicated acute illness or injury  Visit for wound check: complicated acute illness or injury    Amount and/or Complexity of Data Reviewed  Labs: ordered.    Risk  Prescription drug management.  Decision regarding hospitalization.    Hemoglobin continue to trend down.  Giving second ER visit 2 days for same will admit to [facility] service for further workup evaluation likely wound care and thoracic surgery consultation.    Final diagnoses:   Thoracostomy tube in place   Visit for wound check       ED Disposition  ED Disposition       ED Disposition   Decision to Admit    Condition   --    Comment   Level  of Care: Med/Surg [1]   Diagnosis: Pleural effusion [[id number]]                 No follow-up provider specified.       Medication List        PAUSE taking these medications      apixaban 5 MG tablet tablet  Wait to take this until your doctor or other care provider tells you to start again.  Commonly known as: ELIQUIS  Take 1 tablet by mouth Every 12 (Twelve) Hours. Indications: Atrial Fibrillation                 Javed Marin MD  07/06/25 6830

## 2025-07-06 NOTE — Clinical Note
Level of Care: Med/Surg [1]   Admitting Physician: JESUS RYAN [[id number]]   Attending Physician: JESUS RYAN [[id number]]

## 2025-07-07 ENCOUNTER — ANESTHESIA EVENT (OUTPATIENT)
Dept: GASTROENTEROLOGY | Facility: HOSPITAL | Age: 75
End: 2025-07-07
Payer: MEDICARE

## 2025-07-07 ENCOUNTER — ANESTHESIA (OUTPATIENT)
Dept: GASTROENTEROLOGY | Facility: HOSPITAL | Age: 75
End: 2025-07-07
Payer: MEDICARE

## 2025-07-07 ENCOUNTER — APPOINTMENT (OUTPATIENT)
Dept: GENERAL RADIOLOGY | Facility: HOSPITAL | Age: 75
End: 2025-07-07
Payer: MEDICARE

## 2025-07-07 PROBLEM — N18.4 CKD (CHRONIC KIDNEY DISEASE) STAGE 4, GFR 15-29 ML/MIN: Status: RESOLVED | Noted: 2025-05-22 | Resolved: 2025-07-07

## 2025-07-07 PROBLEM — D50.0 ANEMIA, BLOOD LOSS: Status: ACTIVE | Noted: 2025-07-07

## 2025-07-07 PROBLEM — U07.1 COVID-19 VIRUS DETECTED: Status: ACTIVE | Noted: 2025-07-07

## 2025-07-07 LAB
DEPRECATED RDW RBC AUTO: 50.5 FL (ref 37–54)
ERYTHROCYTE [DISTWIDTH] IN BLOOD BY AUTOMATED COUNT: 14.4 % (ref 12.3–15.4)
HBA1C MFR BLD: 5.66 % (ref 4.8–5.6)
HCT VFR BLD AUTO: 27.9 % (ref 37.5–51)
HGB BLD-MCNC: 8.7 G/DL (ref 13–17.7)
MCH RBC QN AUTO: 30.1 PG (ref 26.6–33)
MCHC RBC AUTO-ENTMCNC: 31.2 G/DL (ref 31.5–35.7)
MCV RBC AUTO: 96.5 FL (ref 79–97)
PLATELET # BLD AUTO: 115 10*3/MM3 (ref 140–450)
PMV BLD AUTO: 9.9 FL (ref 6–12)
RBC # BLD AUTO: 2.89 10*6/MM3 (ref 4.14–5.8)
SARS-COV-2 RNA RESP QL NAA+PROBE: DETECTED
WBC NRBC COR # BLD AUTO: 6.96 10*3/MM3 (ref 3.4–10.8)

## 2025-07-07 PROCEDURE — 87635 SARS-COV-2 COVID-19 AMP PRB: CPT | Performed by: INTERNAL MEDICINE

## 2025-07-07 PROCEDURE — 99222 1ST HOSP IP/OBS MODERATE 55: CPT | Performed by: STUDENT IN AN ORGANIZED HEALTH CARE EDUCATION/TRAINING PROGRAM

## 2025-07-07 PROCEDURE — G0378 HOSPITAL OBSERVATION PER HR: HCPCS

## 2025-07-07 PROCEDURE — 94761 N-INVAS EAR/PLS OXIMETRY MLT: CPT

## 2025-07-07 PROCEDURE — A9270 NON-COVERED ITEM OR SERVICE: HCPCS | Performed by: INTERNAL MEDICINE

## 2025-07-07 PROCEDURE — 85027 COMPLETE CBC AUTOMATED: CPT | Performed by: INTERNAL MEDICINE

## 2025-07-07 PROCEDURE — C1726 CATH, BAL DIL, NON-VASCULAR: HCPCS | Performed by: SURGERY

## 2025-07-07 PROCEDURE — 71045 X-RAY EXAM CHEST 1 VIEW: CPT

## 2025-07-07 PROCEDURE — 63710000001 HYDROCORTISONE 10 MG TABLET: Performed by: INTERNAL MEDICINE

## 2025-07-07 PROCEDURE — 63710000001 TAMSULOSIN 0.4 MG CAPSULE: Performed by: INTERNAL MEDICINE

## 2025-07-07 PROCEDURE — 63710000001 GUAIFENESIN 600 MG TABLET SUSTAINED-RELEASE 12 HOUR: Performed by: INTERNAL MEDICINE

## 2025-07-07 PROCEDURE — 25010000002 LIDOCAINE PF 1% 1 % SOLUTION: Performed by: NURSE ANESTHETIST, CERTIFIED REGISTERED

## 2025-07-07 PROCEDURE — 94799 UNLISTED PULMONARY SVC/PX: CPT

## 2025-07-07 PROCEDURE — 43249 ESOPH EGD DILATION <30 MM: CPT | Performed by: SURGERY

## 2025-07-07 PROCEDURE — 63710000001 REVEFENACIN 175 MCG/3ML SOLUTION: Performed by: INTERNAL MEDICINE

## 2025-07-07 PROCEDURE — 94664 DEMO&/EVAL PT USE INHALER: CPT

## 2025-07-07 PROCEDURE — 83036 HEMOGLOBIN GLYCOSYLATED A1C: CPT | Performed by: INTERNAL MEDICINE

## 2025-07-07 PROCEDURE — 63710000001 METOPROLOL SUCCINATE XL 50 MG TABLET SUSTAINED-RELEASE 24 HOUR: Performed by: INTERNAL MEDICINE

## 2025-07-07 PROCEDURE — 25010000002 PROPOFOL 10 MG/ML EMULSION: Performed by: NURSE ANESTHETIST, CERTIFIED REGISTERED

## 2025-07-07 PROCEDURE — 25810000003 SODIUM CHLORIDE 0.9 % SOLUTION: Performed by: NURSE ANESTHETIST, CERTIFIED REGISTERED

## 2025-07-07 RX ORDER — PROPOFOL 10 MG/ML
VIAL (ML) INTRAVENOUS AS NEEDED
Status: DISCONTINUED | OUTPATIENT
Start: 2025-07-07 | End: 2025-07-07 | Stop reason: SURG

## 2025-07-07 RX ORDER — SODIUM CHLORIDE 9 MG/ML
INJECTION, SOLUTION INTRAVENOUS CONTINUOUS PRN
Status: DISCONTINUED | OUTPATIENT
Start: 2025-07-07 | End: 2025-07-07 | Stop reason: SURG

## 2025-07-07 RX ORDER — LIDOCAINE HYDROCHLORIDE 10 MG/ML
INJECTION, SOLUTION EPIDURAL; INFILTRATION; INTRACAUDAL; PERINEURAL AS NEEDED
Status: DISCONTINUED | OUTPATIENT
Start: 2025-07-07 | End: 2025-07-07 | Stop reason: SURG

## 2025-07-07 RX ADMIN — AMOXICILLIN AND CLAVULANATE POTASSIUM 1 TABLET: 875; 125 TABLET, COATED ORAL at 09:39

## 2025-07-07 RX ADMIN — METOPROLOL SUCCINATE 50 MG: 50 TABLET, EXTENDED RELEASE ORAL at 21:43

## 2025-07-07 RX ADMIN — PROPOFOL 50 MG: 10 INJECTION, EMULSION INTRAVENOUS at 15:24

## 2025-07-07 RX ADMIN — ARFORMOTEROL TARTRATE 15 MCG: 15 SOLUTION RESPIRATORY (INHALATION) at 09:01

## 2025-07-07 RX ADMIN — Medication 10 ML: at 09:43

## 2025-07-07 RX ADMIN — PROPOFOL 70 MG: 10 INJECTION, EMULSION INTRAVENOUS at 15:19

## 2025-07-07 RX ADMIN — METOPROLOL SUCCINATE 50 MG: 50 TABLET, EXTENDED RELEASE ORAL at 09:41

## 2025-07-07 RX ADMIN — PROPOFOL 25 MG: 10 INJECTION, EMULSION INTRAVENOUS at 15:29

## 2025-07-07 RX ADMIN — PROPOFOL 50 MG: 10 INJECTION, EMULSION INTRAVENOUS at 15:36

## 2025-07-07 RX ADMIN — BUDESONIDE 0.5 MG: 0.5 SUSPENSION RESPIRATORY (INHALATION) at 09:01

## 2025-07-07 RX ADMIN — PANTOPRAZOLE SODIUM 40 MG: 40 TABLET, DELAYED RELEASE ORAL at 09:39

## 2025-07-07 RX ADMIN — GUAIFENESIN 600 MG: 600 TABLET, EXTENDED RELEASE ORAL at 09:39

## 2025-07-07 RX ADMIN — PROPOFOL 100 MCG/KG/MIN: 10 INJECTION, EMULSION INTRAVENOUS at 15:32

## 2025-07-07 RX ADMIN — TAMSULOSIN HYDROCHLORIDE 0.4 MG: 0.4 CAPSULE ORAL at 21:42

## 2025-07-07 RX ADMIN — REVEFENACIN 175 MCG: 175 SOLUTION RESPIRATORY (INHALATION) at 09:01

## 2025-07-07 RX ADMIN — BUDESONIDE 0.5 MG: 0.5 SUSPENSION RESPIRATORY (INHALATION) at 19:52

## 2025-07-07 RX ADMIN — HYDROCORTISONE 20 MG: 10 TABLET ORAL at 21:43

## 2025-07-07 RX ADMIN — LIDOCAINE HYDROCHLORIDE 100 MG: 10 INJECTION, SOLUTION EPIDURAL; INFILTRATION; INTRACAUDAL; PERINEURAL at 15:19

## 2025-07-07 RX ADMIN — HYDROCORTISONE 20 MG: 10 TABLET ORAL at 09:38

## 2025-07-07 RX ADMIN — SODIUM CHLORIDE: 9 INJECTION, SOLUTION INTRAVENOUS at 15:15

## 2025-07-07 RX ADMIN — Medication 10 ML: at 21:43

## 2025-07-07 RX ADMIN — GUAIFENESIN 600 MG: 600 TABLET, EXTENDED RELEASE ORAL at 21:43

## 2025-07-07 NOTE — CONSULTS
Group: [facility]         CONSULT NOTE    Patient Identification:  Young Ames  74 y.o.  male  1950  [id number]            Requesting physician: Attending physician    Reason for Consultation: COVID    74-year-old male admitted 6/29/2025 with hypoxia, O2 sat 75% in the emergency, CT chest showed right lower lobe pulmonary embolism and increased large left pleural effusion with left lower lobe consolidation and esophageal distention        Assessment:     Acute/chronic hypoxic respiratory insufficiency    COVID-19 positive on 7/7/2025     Pulmonary embolism on CT chest 6/29/2025  Echo 6/30/2025, estimated RVSP 45 to 55 mmHg with moderate pulmonary hypertension  Small less than 1 cm pericardial effusion no evidence of tamponade  EF 61%     Status post left Pleurx catheter for malignant left pleural effusion     Bronchoscopy 5/23/2025 Lung, left lower lobe, endobronchial biopsy):    Invasive moderately differentiated adenocarcinoma with micropapillary features,   Positive for lymphovascular invasion     CT chest 5/23/2025   interlobular septal thickening in theleft lung which 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    Bronchoscopy 5/23/2025 cultures negative to date     status post bedside left thoracentesis 5/22/2025 with removal of 950 mL of serosanguineous fluid fluid was positive for cytology non-small cell     History of right lung cancer adenocarcinoma, stage cTX N2 M1   status post concomitant radiation chemotherapy, diagnosed on bronchoscopy August 2024  CAD  CKD  Chronic A-fib on chronic anticoagulation Eliquis  Hypothyroidism  Chronic anemia     Former smoker: Quit 2000 after 32 pack years     Recommendations:     Oxygen titration currently on 6 L  Bronchodilators DC Brovana to avoid tachycardia continue Pulmicort and Yupelri  Anticoagulation Lovenox     Hydrocortisone 20 mg p.o. twice daily  Protonix 40 mg daily  Synthroid   Bronchodilators             Review of Sytems:  Review of  Systems   Respiratory:  Positive for cough and shortness of breath. Negative for wheezing and stridor.    Cardiovascular:  Positive for palpitations. Negative for chest pain and leg swelling.       Past Medical History:  Past Medical History:   Diagnosis Date    Abnormal ECG 01/29/2024    Allergic rhinitis     Arrhythmia 08/14/2024    SVT, then afib after thoracentesis    Asthma 06/01/2024    Shortness of breath with exertion    Atrial fibrillation 08/14/2024    After 2L drained per thoracentesis    Coronary artery disease 01/29/2024    Minimal blockage per cath    GERD (gastroesophageal reflux disease)     Hyperlipidemia     Lung cancer 08/2024    Myocardial infarction 01/29/2024    Cath, no intervention    Pleural effusion 2024    Pneumonia 05/2025       Past Surgical History:  Past Surgical History:   Procedure Laterality Date    BRONCHOSCOPY N/A 08/13/2024    Procedure: BRONCHOSCOPY WITH BRONCHIAL WASHING AND BRONCHIAL BRUSHING;  Surgeon: Kelvin Gonzalez MD;  Location: [facility] ENDOSCOPY;  Service: Pulmonary;  Laterality: N/A;    BRONCHOSCOPY N/A 05/23/2025    Procedure: BRONCHOSCOPY with bronchoalveolar lavage and biopsy x 1 area;  Surgeon: Jason Dozier MD;  Location: [facility] ENDOSCOPY;  Service: Pulmonary;  Laterality: N/A;  post op: lung mass    BRONCHOSCOPY      BRONCHOSCOPY WITH ION ROBOTIC ASSIST N/A 08/16/2024    Procedure: BRONCHOSCOPY WITH ION ROBOT WITH CRYO BIOPSY;  Surgeon: Kelvin Gonzalez MD;  Location: [facility] ENDOSCOPY;  Service: Robotics - Pulmonary;  Laterality: N/A;    CARDIAC CATHETERIZATION Right 01/29/2024    Procedure: Coronary angiography;  Surgeon: Abran Chand MD;  Location: [facility] CATH INVASIVE LOCATION;  Service: Cardiovascular;  Laterality: Right;    CARDIAC CATHETERIZATION N/A 01/29/2024    Procedure: Left Heart Cath;  Surgeon: Abran Chand MD;  Location: [facility] CATH INVASIVE LOCATION;  Service: Cardiovascular;  Laterality: N/A;    ENDOSCOPY N/A 05/23/2025    Procedure:  ESOPHAGOGASTRODUODENOSCOPY with biopsy x 1 area and dilatation (48FR non-guided bougie);  Surgeon: Carlos Calero MD;  Location: [facility] ENDOSCOPY;  Service: Gastroenterology;  Laterality: N/A;  post op: esophageal stricture    ENDOSCOPY N/A 6/30/2025    Procedure: ESOPHAGOGASTRODUODENOSCOPY, impacted esophageal food bolus removal;  Surgeon: Luis Alfredo Puckett MD;  Location: [facility] ENDOSCOPY;  Service: Gastroenterology;  Laterality: N/A;  post: impacted foreign body of esophagus    LUNG BIOPSY          Home Meds:  Medications Prior to Admission   Medication Sig Dispense Refill Last Dose/Taking    acetaminophen (TYLENOL) 325 MG tablet Take 2 tablets by mouth Every 4 (Four) Hours As Needed for Mild Pain.   Taking As Needed    amoxicillin-clavulanate (AUGMENTIN) 875-125 MG per tablet Take 1 tablet by mouth Every 12 (Twelve) Hours for 8 doses. Indications: Pneumonia 8 tablet 0 Taking    Budeson-Glycopyrrol-Formoterol (Breztri Aerosphere) 160-9-4.8 MCG/ACT aerosol inhaler Inhale 2 puffs 2 (Two) Times a Day. 1 each 0 Taking    Cholecalciferol (VITAMIN D-3 PO) Take 1 tablet by mouth Daily. Indications: Vitamin Deficiency   Taking    enoxaparin sodium (LOVENOX) 80 MG/0.8ML solution prefilled syringe syringe Inject 0.7 mL under the skin into the appropriate area as directed Every 12 (Twelve) Hours. Indications: DVT/PE (active thrombosis) 48 mL 1 Taking    guaiFENesin (Mucinex) 600 MG 12 hr tablet Take 1 tablet by mouth 2 (Two) Times a Day. Indications: Cough   Taking    hydrocortisone (CORTEF) 20 MG tablet Take 1 tablet by mouth 2 (Two) Times a Day for 90 days. 60 tablet 2 Taking    ipratropium-albuterol (DUO-NEB) 0.5-2.5 mg/3 ml nebulizer Take 3 mL by nebulization Every 6 (Six) Hours As Needed for Wheezing.   Taking As Needed    levothyroxine (SYNTHROID, LEVOTHROID) 75 MCG tablet TAKE ONE TABLET BY MOUTH EVERY MORNING 30 tablet 1 Taking    metoprolol succinate XL (TOPROL-XL) 50 MG 24 hr tablet Take 1 tablet by  "mouth Every 12 (Twelve) Hours. 60 tablet 0 Taking    NON FORMULARY Take 2 tablets by mouth Every Night. Zzzquil  Indications: Sleep Disorder   Taking    ondansetron (ZOFRAN) 8 MG tablet Take 1 tablet by mouth 3 (Three) Times a Day As Needed for Nausea or Vomiting. 30 tablet 5 Taking As Needed    pantoprazole (PROTONIX) 40 MG EC tablet Take 1 tablet by mouth Daily. 30 tablet 0 Taking    tamsulosin (FLOMAX) 0.4 MG capsule 24 hr capsule Take 1 capsule by mouth Every Night. Indications: Benign Enlargement of Prostate   Taking    [Paused] apixaban (ELIQUIS) 5 MG tablet tablet Take 1 tablet by mouth Every 12 (Twelve) Hours. Indications: Atrial Fibrillation 60 tablet 0        Allergies:  No Known Allergies    Social History:   Social History     Socioeconomic History    Marital status:    Tobacco Use    Smoking status: Former     Current packs/day: 0.00     Average packs/day: 1 pack/day for 32.0 years (32.0 ttl pk-yrs)     Types: Cigarettes     Start date:      Quit date:      Years since quittin.5     Passive exposure: Past    Smokeless tobacco: Never   Vaping Use    Vaping status: Never Used   Substance and Sexual Activity    Alcohol use: Yes     Alcohol/week: 1.0 standard drink of alcohol     Types: 1 Cans of beer per week    Drug use: Never    Sexual activity: Not Currently     Partners: Female     Birth control/protection: None       Family History:  Family History   Problem Relation Age of Onset    Dementia Mother     Diabetes Mother     Arrhythmia Mother         Afib    Breast cancer Sister 74    Cancer Sister         Breast cancer       Physical Exam:  /90   Pulse 79   Temp 97 °F (36.1 °C) (Oral)   Resp 16   Ht 177.8 cm (70\")   Wt 79 kg (174 lb 2.6 oz)   SpO2 95%   BMI 24.99 kg/m²  Body mass index is 24.99 kg/m². 95% 79 kg (174 lb 2.6 oz)  Physical Exam  Cardiovascular:      Heart sounds: Murmur heard.      No gallop.   Pulmonary:      Effort: No respiratory distress.      Breath " sounds: No stridor. Rhonchi and rales present. No wheezing.   Chest:      Chest wall: No tenderness.         LABS:  Lab Results   Component Value Date    CALCIUM 7.8 (L) 07/06/2025    PHOS 2.6 07/02/2025     Results from last 7 days   Lab Units 07/07/25  0347 07/06/25  1344 07/05/25  1145 07/03/25  1712 07/03/25  0950 07/03/25  0750 07/03/25  0543 07/02/25  1651   MAGNESIUM mg/dL  --   --   --   --   --   --  1.8 1.8   SODIUM mmol/L  --  142 140 141 143  --  143  --    POTASSIUM mmol/L  --  3.8 3.8 3.6 3.0*  --  3.2*  --    CHLORIDE mmol/L  --  107 107 106 106  --  107  --    CO2 mmol/L  --  24.4 24.0 24.4 24.7  --  27.5  --    BUN mg/dL  --  20.2 20.1 20.4 22.3  --  23.6*  --    CREATININE mg/dL  --  1.09 1.15 1.20 1.25  --  1.27  --    GLUCOSE mg/dL  --  144* 139* 206* 125*  --  102*  --    CALCIUM mg/dL  --  7.8* 7.8* 7.7* 7.8*  --  7.7*  --    WBC 10*3/mm3 6.96 5.68 8.46  --   --    < >  --   --    HEMOGLOBIN g/dL 8.7* 9.3* 9.8*  --   --    < >  --   --    PLATELETS 10*3/mm3 115* 113* 121*  --   --    < >  --   --    ALT (SGPT) U/L  --  9  --  9 8  --   --   --    AST (SGOT) U/L  --  22  --  26 21  --   --   --     < > = values in this interval not displayed.     Lab Results   Component Value Date    TROPONINT 259 (C) 06/29/2025                         Results from last 7 days   Lab Units 07/06/25  1344   INR  1.07         Lab Results   Component Value Date    TSH 54.400 (H) 07/03/2025     Estimated Creatinine Clearance: 65.4 mL/min (by C-G formula based on SCr of 1.09 mg/dL).         Imaging:  Imaging Results (Last 24 Hours)       ** No results found for the last 24 hours. **              Current Meds:   SCHEDULE  [Held by provider] apixaban, 5 mg, Oral, Q12H  arformoterol, 15 mcg, Nebulization, BID - RT   And  budesonide, 0.5 mg, Nebulization, BID - RT   And  revefenacin, 175 mcg, Nebulization, Daily - RT  [Held by provider] enoxaparin sodium, 1 mg/kg, Subcutaneous, Q12H  guaiFENesin, 600 mg, Oral,  BID  hydrocortisone, 20 mg, Oral, BID  levothyroxine, 75 mcg, Oral, Q AM  metoprolol succinate XL, 50 mg, Oral, Q12H  pantoprazole, 40 mg, Oral, Daily  sodium chloride, 10 mL, Intravenous, Q12H  tamsulosin, 0.4 mg, Oral, Nightly      Infusions     PRNs    acetaminophen    senna-docusate sodium **AND** polyethylene glycol **AND** bisacodyl **AND** bisacodyl    Calcium Replacement - Follow Nurse / BPA Driven Protocol    ipratropium-albuterol    Magnesium Standard Dose Replacement - Follow Nurse / BPA Driven Protocol    nitroglycerin    ondansetron    Phosphorus Replacement - Follow Nurse / BPA Driven Protocol    Potassium Replacement - Follow Nurse / BPA Driven Protocol    [COMPLETED] Insert Peripheral IV **AND** sodium chloride    sodium chloride    sodium chloride        Kelvin Gonzalez MD  7/7/2025  17:12 EDT      Much of this encounter note is an electronic transcription/translation of spoken language to printed text using Dragon Software.

## 2025-07-07 NOTE — H&P
Patient Care Team:  Abner Funes APRN as PCP - General (Family Medicine)  Brittany Canchola, RN as Nurse Navigator  Arben Tim MD as Consulting Physician (Hematology and Oncology)  Aman Gregory MD as Consulting Physician (Nephrology)    Chief complaint bleeding from left Pleur-x catheter    Subjective     Patient is a 74 y.o. male with h/o lung cancer, persistent malignant left pleural effusion with recently placed Pleur-x catheter, atrial fib and pulmonary embolism who presents with persistent bleeding from tunneled catheter for 2 days.  He was discharged from this facility on 7/3.  He presented to ED on 7/5 with bleeding from catheter that was treated with pressure dressing.  He continued to soak dressings at home and re-presented to ED on 7/6.  Hemoglobin dropped from 9.8 to 8.7.  Lovenox was held last night.  Dressing this am was dry.    He also reports testing positive for COVID at home - has mild nasal congestion.    Review of Systems   Constitutional:  Positive for activity change and appetite change. Negative for chills, diaphoresis, fatigue, fever and unexpected weight change.   HENT:  Positive for congestion. Negative for facial swelling.    Eyes:  Negative for visual disturbance.   Respiratory:  Positive for cough and shortness of breath. Negative for wheezing and stridor.    Cardiovascular:  Negative for chest pain, palpitations and leg swelling.   Gastrointestinal:  Negative for abdominal pain, constipation, diarrhea, nausea and vomiting.   Endocrine: Negative for polyuria.   Genitourinary:  Negative for dysuria.   Musculoskeletal:  Positive for arthralgias.   Skin:  Negative for rash and wound.   Neurological:  Negative for dizziness, tremors, weakness and light-headedness.   Psychiatric/Behavioral:  Negative for confusion.           History  Past Medical History:   Diagnosis Date    Abnormal ECG 01/29/2024    Allergic rhinitis     Arrhythmia 08/14/2024    SVT, then afib after  thoracentesis    Asthma 06/01/2024    Shortness of breath with exertion    Atrial fibrillation 08/14/2024    After 2L drained per thoracentesis    Coronary artery disease 01/29/2024    Minimal blockage per cath    GERD (gastroesophageal reflux disease)     Hyperlipidemia     Lung cancer 08/2024    Myocardial infarction 01/29/2024    Cath, no intervention    Pleural effusion 2024    Pneumonia 05/2025     Past Surgical History:   Procedure Laterality Date    BRONCHOSCOPY N/A 08/13/2024    Procedure: BRONCHOSCOPY WITH BRONCHIAL WASHING AND BRONCHIAL BRUSHING;  Surgeon: Kelvin Gonzalez MD;  Location: [facility] ENDOSCOPY;  Service: Pulmonary;  Laterality: N/A;    BRONCHOSCOPY N/A 05/23/2025    Procedure: BRONCHOSCOPY with bronchoalveolar lavage and biopsy x 1 area;  Surgeon: Jason Dozier MD;  Location: [facility] ENDOSCOPY;  Service: Pulmonary;  Laterality: N/A;  post op: lung mass    BRONCHOSCOPY      BRONCHOSCOPY WITH ION ROBOTIC ASSIST N/A 08/16/2024    Procedure: BRONCHOSCOPY WITH ION ROBOT WITH CRYO BIOPSY;  Surgeon: Kelvin Gonzalez MD;  Location: [facility] ENDOSCOPY;  Service: Robotics - Pulmonary;  Laterality: N/A;    CARDIAC CATHETERIZATION Right 01/29/2024    Procedure: Coronary angiography;  Surgeon: Abran Chand MD;  Location: [facility] CATH INVASIVE LOCATION;  Service: Cardiovascular;  Laterality: Right;    CARDIAC CATHETERIZATION N/A 01/29/2024    Procedure: Left Heart Cath;  Surgeon: Abran Chand MD;  Location: [facility] CATH INVASIVE LOCATION;  Service: Cardiovascular;  Laterality: N/A;    ENDOSCOPY N/A 05/23/2025    Procedure: ESOPHAGOGASTRODUODENOSCOPY with biopsy x 1 area and dilatation (48FR non-guided bougie);  Surgeon: Carlos Calero MD;  Location: [facility] ENDOSCOPY;  Service: Gastroenterology;  Laterality: N/A;  post op: esophageal stricture    ENDOSCOPY N/A 6/30/2025    Procedure: ESOPHAGOGASTRODUODENOSCOPY, impacted esophageal food bolus removal;  Surgeon: Luis Alfredo Puckett MD;  Location: [facility]  ENDOSCOPY;  Service: Gastroenterology;  Laterality: N/A;  post: impacted foreign body of esophagus    LUNG BIOPSY       Family History   Problem Relation Age of Onset    Dementia Mother     Diabetes Mother     Arrhythmia Mother         Afib    Breast cancer Sister 74    Cancer Sister         Breast cancer     Social History     Tobacco Use    Smoking status: Former     Current packs/day: 0.00     Average packs/day: 1 pack/day for 32.0 years (32.0 ttl pk-yrs)     Types: Cigarettes     Start date:      Quit date:      Years since quittin.5     Passive exposure: Past    Smokeless tobacco: Never   Vaping Use    Vaping status: Never Used   Substance Use Topics    Alcohol use: Yes     Alcohol/week: 1.0 standard drink of alcohol     Types: 1 Cans of beer per week    Drug use: Never     Medications Prior to Admission   Medication Sig Dispense Refill Last Dose/Taking    acetaminophen (TYLENOL) 325 MG tablet Take 2 tablets by mouth Every 4 (Four) Hours As Needed for Mild Pain.   Taking As Needed    amoxicillin-clavulanate (AUGMENTIN) 875-125 MG per tablet Take 1 tablet by mouth Every 12 (Twelve) Hours for 8 doses. Indications: Pneumonia 8 tablet 0 Taking    Budeson-Glycopyrrol-Formoterol (Breztri Aerosphere) 160-9-4.8 MCG/ACT aerosol inhaler Inhale 2 puffs 2 (Two) Times a Day. 1 each 0 Taking    Cholecalciferol (VITAMIN D-3 PO) Take 1 tablet by mouth Daily. Indications: Vitamin Deficiency   Taking    enoxaparin sodium (LOVENOX) 80 MG/0.8ML solution prefilled syringe syringe Inject 0.7 mL under the skin into the appropriate area as directed Every 12 (Twelve) Hours. Indications: DVT/PE (active thrombosis) 48 mL 1 Taking    guaiFENesin (Mucinex) 600 MG 12 hr tablet Take 1 tablet by mouth 2 (Two) Times a Day. Indications: Cough   Taking    hydrocortisone (CORTEF) 20 MG tablet Take 1 tablet by mouth 2 (Two) Times a Day for 90 days. 60 tablet 2 Taking    ipratropium-albuterol (DUO-NEB) 0.5-2.5 mg/3 ml nebulizer Take 3  mL by nebulization Every 6 (Six) Hours As Needed for Wheezing.   Taking As Needed    levothyroxine (SYNTHROID, LEVOTHROID) 75 MCG tablet TAKE ONE TABLET BY MOUTH EVERY MORNING 30 tablet 1 Taking    metoprolol succinate XL (TOPROL-XL) 50 MG 24 hr tablet Take 1 tablet by mouth Every 12 (Twelve) Hours. 60 tablet 0 Taking    NON FORMULARY Take 2 tablets by mouth Every Night. Zzzquil  Indications: Sleep Disorder   Taking    ondansetron (ZOFRAN) 8 MG tablet Take 1 tablet by mouth 3 (Three) Times a Day As Needed for Nausea or Vomiting. 30 tablet 5 Taking As Needed    pantoprazole (PROTONIX) 40 MG EC tablet Take 1 tablet by mouth Daily. 30 tablet 0 Taking    tamsulosin (FLOMAX) 0.4 MG capsule 24 hr capsule Take 1 capsule by mouth Every Night. Indications: Benign Enlargement of Prostate   Taking    [Paused] apixaban (ELIQUIS) 5 MG tablet tablet Take 1 tablet by mouth Every 12 (Twelve) Hours. Indications: Atrial Fibrillation 60 tablet 0      Allergies:  Patient has no known allergies.    Objective     Vital Signs  Temp:  [96.7 °F (35.9 °C)-98.6 °F (37 °C)] 97 °F (36.1 °C)  Heart Rate:  [62-86] 80  Resp:  [16-25] 20  BP: (130-167)/() 159/98     Physical Exam:      General Appearance:    Alert, cooperative, in no acute distress   Head:    Normocephalic, without obvious abnormality, atraumatic   Eyes:            Lids and lashes normal, conjunctivae and sclerae normal, no   icterus, no pallor, corneas clear, PERRLA   Ears:    Ears appear intact with no abnormalities noted   Throat:   No oral lesions, no thrush, oral mucosa moist   Neck:   No adenopathy, supple, trachea midline, no thyromegaly, no   carotid bruit, no JVD   Lungs:     Clear to auscultation,respirations regular, even and                  unlabored    Heart:    Regular rhythm and normal rate, normal S1 and S2, no            murmur, no gallop, no rub, no click   Chest Wall:    Tunneled catheter in left lateral thorax   Abdomen:     Normal bowel sounds, no  masses, no organomegaly, soft        non-tender, non-distended, no guarding, no rebound                tenderness   Extremities:   Moves all extremities well, no edema, no cyanosis, no             redness   Pulses:   Pulses palpable and equal bilaterally   Skin:   No bleeding, bruising or rash   Lymph nodes:   No palpable adenopathy   Neurologic:   Cranial nerves 2 - 12 grossly intact, sensation intact, DTR       present and equal bilaterally       Results Review:     Imaging Results (Last 24 Hours)       ** No results found for the last 24 hours. **             Lab Results (last 24 hours)       Procedure Component Value Units Date/Time    COVID-19,CEPHEID/CIARA,COR/NEY/PAD/EUFEMIA/LAG/LUCRETIA IN-HOUSE,NP SWAB IN TRANSPORT MEDIA 1 HR TAT, RT-PCR - Swab, Nasopharynx [[id number]]  (Abnormal) Collected: 07/07/25 0941    Specimen: Swab from Nasopharynx Updated: 07/07/25 1058     COVID19 Detected    Narrative:      Fact sheet for providers: https://www.fda.gov/media/840747/download     Fact sheet for patients: https://www.fda.gov/media/066415/download    CBC (No Diff) [[id number]]  (Abnormal) Collected: 07/07/25 0347    Specimen: Blood Updated: 07/07/25 0436     WBC 6.96 10*3/mm3      RBC 2.89 10*6/mm3      Hemoglobin 8.7 g/dL      Hematocrit 27.9 %      MCV 96.5 fL      MCH 30.1 pg      MCHC 31.2 g/dL      RDW 14.4 %      RDW-SD 50.5 fl      MPV 9.9 fL      Platelets 115 10*3/mm3              I reviewed the patient's new clinical results.    Assessment & Plan       Pleural effusion    Stage IV adenocarcinoma of lung, right    Stage IV adenocarcinoma of lung, left    Paroxysmal atrial fibrillation    Hypothyroidism (acquired)    Pulmonary embolism    COVID-19 virus detected    - holding anticoagulation for afib and PE until evaluated by thoracic surgery  -Mild covid symptoms - supportive care  - heart rate is controlled with metoprolol  - maintenance meds for COPD  - levothyroxine for hypothyroidism  -PPI for GERD  -tamsulosin for  BPH  -failed Eliquis (PE); resume Lovenox when ok with surgeon  - unclear why he is on chronic hydrocortisone therapy - will review records.            CODE STATUS:  Code status (Patient has no pulse and is not breathing):  CPR (Attempt to Resuscitate)  Medical Interventions (Patient has pulse or is breathing):  Full Support  Level of Support Discussed with:  Patient    Admission Status:  I believe this patient meets observation status    Expected length of stay:  1 midnights or greater    I discussed the patient's findings and my recommendations with patient.     Evelyn Felder MD  07/07/25  14:49 EDT

## 2025-07-07 NOTE — CASE MANAGEMENT/SOCIAL WORK
Case Management Discharge Note      Final Note: [facility]    Provided Post Acute Provider List?: N/A  Provided Post Acute Provider Quality & Resource List?: N/A         Home Medical Care       Service Provider Services Address Phone Fax Patient Preferred    [facility] [address] [facility] [place] [address] [phone] --               Transportation Services  Transportation: Private Transportation  Private: Car    Final Discharge Disposition Code: 06 - home with home health care

## 2025-07-07 NOTE — ANESTHESIA PREPROCEDURE EVALUATION
Anesthesia Evaluation     Patient summary reviewed and Nursing notes reviewed   NPO Solid Status: > 8 hours  NPO Liquid Status: > 8 hours           Airway   Mallampati: II  Dental      Pulmonary    (+) pneumonia , pleural effusion, pulmonary embolism, lung cancer, asthma,  Cardiovascular     ECG reviewed    (+) past MI , CAD, dysrhythmias Atrial Fib, hyperlipidemia      Neuro/Psych  GI/Hepatic/Renal/Endo    (+) GERD, renal disease-, thyroid problem hypothyroidism    Musculoskeletal     Abdominal    Substance History      OB/GYN          Other      history of cancer active    ROS/Med Hx Other: Covid +  Stage IV lung carcinoma  Pleurex catheter for large pleural effusion placerd 7/1  Afib  Hx PE on anticoagulation              Anesthesia Plan    ASA 4     general     intravenous induction     Anesthetic plan, risks, benefits, and alternatives have been provided, discussed and informed consent has been obtained with: patient.    Plan discussed with CRNA.    CODE STATUS:    Code Status (Patient has no pulse and is not breathing): CPR (Attempt to Resuscitate)  Medical Interventions (Patient has pulse or is breathing): Full Support       Letter mailed to patient.    Anisha Hay MA

## 2025-07-07 NOTE — OUTREACH NOTE
[facility] Medicine Progress Note    Primary Team: [facility] Hospitalist Team A  Attending Physician: Catherine Rodriguez MD  Resident: Aris  Intern: MAGY Rodriguez     Subjective:      Ms. Pérez denies acute complaint this morning including chest pain, shortness of breath, abdominal pain. She does continue with right arm tenderness.      Objective:     Last 24 Hour Vital Signs:  BP  Min: 100/72  Max: 173/74  Temp  Av °F (36.7 °C)  Min: 97.8 °F (36.6 °C)  Max: 98.3 °F (36.8 °C)  Pulse  Av.4  Min: 50  Max: 63  Resp  Av.8  Min: 14  Max: 25  SpO2  Av.8 %  Min: 96 %  Max: 100 %  Height  Av' (152.4 cm)  Min: 5' (152.4 cm)  Max: 5' (152.4 cm)  Weight  Av kg (143 lb 5 oz)  Min: 64.7 kg (142 lb 10.2 oz)  Max: 65.3 kg (144 lb)  I/O last 3 completed shifts:  In: 205 [P.O.:205]  Out: 400 [Urine:400]    Physical Examination:  Gen: awake, alert, laying in bed in NAD  Head: NC/AT  Eyes: conjunctiva clear, EOMI  Throat: MMM, OP clear  Lungs: CTAB, normal respiratory effort   Cardiac: RRR, no murmurs   Abdominal: soft, ND/NT, +BS  Extremities: acyanotic, mild edema RUE>LUE, TTP over dressed chest wound and right arm, LE edema 1+ b/l  Pulses: 2+ and symmetric   Skin: no rashes or lesions noted   Neuro: AAOx4, no focal deficit      Laboratory:  Laboratory Data Reviewed: yes  Pertinent Findings:  Recent Labs   Lab 20  1848 20  0422 20  0423   WBC 6.00 4.92  --    HGB 11.8* 11.4*  --    HCT 36.4* 34.2*  --     292  --    MCV 94 92  --    RDW 12.5 12.4  --      --  139   K 3.6  --  3.4*   CL 98  --  102   CO2 28  --  29   BUN 18  --  13   CREATININE 0.9  --  0.8   GLU 93  --  93   PROT 7.7  --  6.6   ALBUMIN 4.2  --  3.6   BILITOT 0.2  --  0.3   AST 44*  --  35   ALKPHOS 95  --  79   ALT 36  --  30       Microbiology Data Reviewed: yes  Pertinent Findings:  None     Other Results:  EKG (my interpretation): None new     Radiology Data Reviewed: yes  Pertinent Findings:  CTA chest   1.  Medical Week 1 Survey      Flowsheet Row Responses   [facility] facility patient discharged from? [facility]   Does the patient have one of the following disease processes/diagnoses(primary or secondary)? Other   Week 1 attempt successful? No   Unsuccessful attempts Attempt 1   Revoke Readmitted            [facility] - Registered Nurse   No evidence of pulmonary embolism.  2. Mild cardiomegaly.  3. Small 2 mm pulmonary nodule in the left upper lobe is nonspecific.    Current Medications:     Infusions:       Scheduled:   azelastine  1 spray Nasal BID    cetirizine  10 mg Oral QHS    clopidogreL  75 mg Oral Daily    clotrimazole  10 mg Oral BID    enoxaparin  1 mg/kg Subcutaneous Q12H    ezetimibe  10 mg Oral QHS    famotidine  20 mg Oral BID    fluticasone propionate  1 spray Each Nostril Daily    furosemide  80 mg Oral Daily    hydroxychloroquine  200 mg Oral Daily    levothyroxine  137 mcg Oral Before breakfast    magnesium oxide  400 mg Oral BID    pantoprazole  40 mg Oral Daily    polyethylene glycol  17 g Oral Daily    potassium chloride  40 mEq Oral Once    sertraline  50 mg Oral Daily    tamsulosin  0.4 mg Oral Daily    tiZANidine  4 mg Oral BID        PRN:  clonazePAM, sodium chloride 0.9%      Assessment:     Nishi Pérez is a 60 y.o. female with sarcoidosis, hypothyroidism, GERD, history of CVA who presents with 5 days RUL pain and swelling found to have b/l UE DVTs.      Plan:      Extensive bilateral UE DVTs  - Pt with 5 days right arm pain and swelling, U/S done 2/24 at nursing home significant for DVT so sent to the ED   - On our ultrasound pt with extensive DVTs of both upper extremities. Left IJ and axillary veins, right subclavian, axillary, and basalic veins   - Admitted 1/2020 for infected AICD pocket (right sided) and sent home on IV abx with midline (left sided) likely provoking factor   - Given 1 mg/kg Lovenox in the ED, will continue BID  - Pt previously on heparin? To prevent CVA after 2 cryptogenic strokes. May need to consider Lovenox for treatment over DOAC. Will need minimum 3 months treatment   - Hypercoagulability work up completed in 2019 significant for elevated VWF and Factor VIII  - CTA chest without PE   - Consult vascular for consideration of intervention     Recent admission for infection of  AICD leads s/p removal of leads and 2 weeks IV abx   - Pt admitted to [facility] from 12/31 to 1/17 for infected AICD pocket and leads  - Polymicrobial bacteremia treated with 2 weeks Flagyl and Rocephin  - Continues with open wound from surgery, will consult wound care  - AICD placed for cardiac arrest in 2001     Sarcoidosis   - Per pt with controlled disease at this time    - Previously on O2, history of neurogenic bladder, gastroparesis with bowel obstruction/perforation   - Takes lasix 80 daily, bethanechol 10 mg QID and flomax 0.4 daily for bladder, Miralax daily, PRN dulcolax for constipation, Zantac 300, Omeprazole 20 for GERD. Continue while inpatient   - Continue home Plaquenil   - Follows with Dr. Gomez     Depression/Anxiety   - Continue home Zoloft, Klonopin   - No acute issues      Allergic Rhinitis   - Continue home Zyrtec/Flonase/Azelastine   - No acute issues      Hypothyroidism   - TSH checked 12/31 1.995  - Continue home Synthroid 137 mcg     Migraine   - Pt on Riboflavin at home, infrequent migraines   - Vitamin not on formulary will resume at discharge      History of CVA  - Pt with reported CVAs when she was 19 and 30 yo  - Follows with Dr. Crum [facility]  - Continue Zetia (pt can't tolerate statin) and Plavix   - Previously worked up for hypercoagulability, essentially WNL. Did have elevated Factor VIII and von Willebrand factor      Normocytic Anemia   - Hgb 11.8 on admission labs with MCV 94, baseline 11-12  - Now 11.4  - Anemia w/u 10/2019 consistent with AoCI  - Monitor      Dispo: pending cardiology evaluation     Code: Full    Joel Hurst MD  [facility] Internal Medicine/Pediatrics HO-IV    [facility] Medicine Hospitalist Pager numbers:   [facility] Hospitalist Medicine Team A (Alin/Michael): [phone]  [facility] Hospitalist Medicine Team B (Mac/Terri):  [phone]

## 2025-07-07 NOTE — OP NOTE
Operative Note     Date of procedure: 7/7/2025     Patient name: Young Ames  MRN: [id number]    Pre OP diagnosis:    Pleural effusion    Stage IV adenocarcinoma of lung, right    Stage IV adenocarcinoma of lung, left    Paroxysmal atrial fibrillation    Hypothyroidism (acquired)    Pulmonary embolism    COVID-19 virus detected    Anemia, blood loss    Post OP diagnosis:    Pleural effusion    Stage IV adenocarcinoma of lung, right    Stage IV adenocarcinoma of lung, left    Paroxysmal atrial fibrillation    Hypothyroidism (acquired)    Pulmonary embolism    COVID-19 virus detected    Anemia, blood loss    Procedure performed:   Flexible esophagogastroduodenoscopy.  CRE balloon dilation of the distal esophagus.  Drainage of Pleurx catheter.    Indication:   Young Ames Is a 74-year-old male with stage IV lung cancer who recently underwent Pleurx catheter placement for malignant pleural effusion.  He had esophageal dysphagia and severe narrowing.  I recommended EGD and dilation for symptomatic dysphagia.       Surgeon: Van Cope MD     Anesthesia: Monitored Anesthesia Care with Sedation    ASA Class: 3    Procedure Details   On 7/7/2025, the patient was brought to the endoscopy suite. Young Ames was positioned in the left lateral decubitus position.  Monitored anesthesia care with deep sedation was provided by anesthesiologist.  A bite-block was placed.  Prophylactic intravenous antibiotics were not indicated.  Prior to beginning the procedure, a time-out was conducted during which all members of the surgical team were present.      Following the administration of adequate intravenous sedation, I began by performing a flexible esophagogastroduodenoscopy.   A flexible adult endoscope was placed into the oropharynx and advanced down the proximal esophagus under direct vision.  The cricopharyngeus was located 16 cm.  The proximal and mid thoracic esophagus appeared normal.  The distal esophagus or a 10 cm  segment was narrowed.  There was mucus pooling in the upper esophagus.  I suspect esophageal dysmotility and achalasia.  The Z-line was located at 38 cm.  There were 2 less than centimeter strands of Schroeder's esophagus.  The diaphragmatic pinch was at 42 cm.  He had 5 cm sliding hiatal hernia.  The endoscope was advanced into the stomach and retroflexed.  The stomach appeared normal. The pylorus and 1st and 2nd portions of the duodenum appeared normal.    I passed the CRE™ balloon dilator through the working channel of the endoscope.  The wire-guided catheter was advanced into the stomach.  The endoscope was retracted and the balloon dilator was parked at the distal esophagus stenosis.  The manufacture pressure guidelines were used to achieve desired dilation diameter.  The stenosis was serially dilated up to 20 mm starting from 15 mm.  Sustained pressures were maintained for a minute with each dilation to allow breaking the scar tissue.  The balloon dilator was deflated and retracted.  The area of concern was re-examined.  There was no evidence of deep injury or perforation.  The duodenum, stomach and esophagus were evacuated free of air and debris.  The endoscope was removed.    The Pleurx catheter was connected to drainage bottle and 450 cc serosanguineous fluid was drained.  A suture was placed around the Pleurx catheter to decrease the chances of oozing/bleeding from the catheter surrounding.    The patient was awakened from sedation and was transported to the post-anesthesia care unit in stable condition.    Findings:  10 cm area of the distal esophagus narrowing.  Pooling of mucus in the upper esophagus.  Finding concerning for esophageal dysmotility and achalasia.  2 strands of less than 1 cm Schroeder's esophagus at the GE junction.  5 cm hiatal hernia.  450 cc serosanguineous pleural effusion drained from the left Pleurx catheter.    Estimated Blood Loss:  None           Specimens:   None            Complications: None           Disposition: PACU - hemodynamically stable.           Condition: Stable    Post-procedure recommendations:   Chest x-ray in the PACU.  Diet advancement as tolerated.  Resume therapeutic anticoagulation from tomorrow morning.    Van Cope MD   Thoracic Surgeon  [facility]

## 2025-07-07 NOTE — PLAN OF CARE
Problem: Adult Inpatient Plan of Care  Goal: Absence of Hospital-Acquired Illness or Injury  Intervention: Identify and Manage Fall Risk  Recent Flowsheet Documentation  Taken 7/7/2025 0015 by Lizbet Wang RN  Safety Promotion/Fall Prevention: safety round/check completed  Taken 7/6/2025 2204 by Lizbet Wang RN  Safety Promotion/Fall Prevention:   activity supervised   fall prevention program maintained   clutter free environment maintained   lighting adjusted   safety round/check completed  Intervention: Prevent Skin Injury  Recent Flowsheet Documentation  Taken 7/7/2025 0015 by Lizbet Wang RN  Body Position: position changed independently  Taken 7/6/2025 2204 by Lizbet Wang RN  Body Position: position changed independently  Goal: Optimal Comfort and Wellbeing  Intervention: Provide Person-Centered Care  Recent Flowsheet Documentation  Taken 7/7/2025 0015 by Lizbet Wang RN  Trust Relationship/Rapport:   care explained   choices provided  Taken 7/6/2025 2204 by Lizbet Wang RN  Trust Relationship/Rapport:   care explained   choices provided  Goal: Readiness for Transition of Care  Intervention: Mutually Develop Transition Plan  Recent Flowsheet Documentation  Taken 7/6/2025 2207 by Lizbet Wang RN  Transportation Anticipated: family or friend will provide  Patient/Family Anticipated Services at Transition: none  Patient/Family Anticipates Transition to:   home with family   home  Taken 7/6/2025 2205 by Lizbet Wang RN  Equipment Currently Used at Home:   oxygen   nebulizer   Goal Outcome Evaluation:         Patient admitted to floor this shift for drainage around pleurx drain.  Patients dressing saturated when arriving to the floor.  VSS on 2 L O2.  Rogers catheter in place.  NPO since midnight.  Call light within reach.

## 2025-07-07 NOTE — CASE MANAGEMENT/SOCIAL WORK
Social Work Assessment   [facility]     Patient Name: Young Ames  MRN: [id number]  Today's Date: 7/7/2025    Admit Date: 7/6/2025       Discharge Plan       Row Name 07/07/25 1603       Plan    Plan TBD    Plan Comments SW went to patient's room to complete CM assessment.  Patient OOR and getting an EGD.  Patient will be moved to [facility].  Patient Covid +.  Patient will need full CM assessment.                 GISELL Fraser MSW    Phone: [phone]  Cell: [phone]  Fax: [phone]  [email or web address]

## 2025-07-07 NOTE — PLAN OF CARE
Problem: Adult Inpatient Plan of Care  Goal: Plan of Care Review  Outcome: Progressing   Goal Outcome Evaluation:      Pt here for drainage around pleurex drain, found to be COVID+. Safety measures in place, care assumed.

## 2025-07-07 NOTE — ANESTHESIA POSTPROCEDURE EVALUATION
Patient: Young Ames    Procedure Summary       Date: 07/07/25 Room / Location: [facility] ENDOSCOPY 1 / [facility] ENDOSCOPY    Anesthesia Start: 1515 Anesthesia Stop: 1555    Procedure: ESOPHAGOGASTRODUODENOSCOPY WITH BALLOON DILATION (15MM-18MM, 18MM-20MM)  UP TO 20MM Diagnosis:     Surgeons: Van Cope MD Provider: Jevon Powers MD    Anesthesia Type: general ASA Status: 4            Anesthesia Type: general    Vitals  Vitals Value Taken Time   /89 07/07/25 16:26   Temp     Pulse 78 07/07/25 16:29   Resp 16 07/07/25 15:52   SpO2 97 % 07/07/25 16:29   Vitals shown include unfiled device data.        Post Anesthesia Care and Evaluation    Patient location during evaluation: PACU  Patient participation: complete - patient participated  Level of consciousness: awake  Pain score: 0  Pain management: adequate  Anesthetic complications: No anesthetic complications  PONV Status: none  Cardiovascular status: acceptable  Respiratory status: acceptable  Hydration status: acceptable

## 2025-07-07 NOTE — PAYOR COMM NOTE
"Discharge notification for case# [id number]     ===============    THANK YOU,    JODI Wright, RN  Utilization Review  [facility]  Phone: [phone]  Fax: [phone]      NPI: [id number]  Tax ID: [id number]        Young Ames (74 y.o. Male)       Date of Birth   1950    Social Security Number       Address   [address] [place] [address] [place] [address]    Home Phone   [phone]    MRN   [id number]       Pentecostalism   None    Marital Status                               Admission Date   6/29/2025    Admission Type   Emergency    Admitting Provider   Evelyn Felder MD    Attending Provider       Department, Room/Bed   [facility] SURGICAL INPATIENT, 4126/1       Discharge Date   7/3/2025    Discharge Disposition   Home-Health Care Svc    Discharge Destination                                 Attending Provider: (none)   Allergies: No Known Allergies    Isolation: None   Infection: COVID (confirmed) (07/07/25)   Code Status: CPR    Ht: 180.3 cm (70.98\")   Wt: 74.4 kg (164 lb)    Admission Cmt: None   Principal Problem: Pulmonary embolism [I26.99]                   Active Insurance as of 6/29/2025       Primary Coverage       Payor Plan Insurance Group Employer/Plan Group    AETNA MEDICARE REPLACEMENT A[place]NA MEDIC[id number]       Payor Plan Address Payor Plan Phone Number Payor Plan Fax Number Effective Dates    [address] [phone]  1/1/2024 - None Entered    [facility] [id number]         Subscriber Name Subscriber Birth Date Member ID       YOUNG AMES 1950 [id number]                     Emergency Contacts        (Rel.) Home Phone Work Phone Mobile Phone    AMIRA AEMS (Spouse) -- -- [phone]    Norma Mendes (Daughter) -- -- [phone]                 Discharge Summary        Nancy Mora, APRN at 07/03/25 1046       Attestation signed by Evelyn Felder MD at 07/03/25 1923      I have reviewed this documentation and " agree.                        Date of Discharge:  7/3/2025    Discharge Diagnosis:   Pulmonary embolism  Pleural effusion  Stage IV adenocarcinoma of lung, right  Stage IV adenocarcinoma of lung, left  CKD (chronic kidney disease) stage 4, GFR 15-29 ml/min  Paroxysmal atrial fibrillation  Chronic anticoagulation  Hypoxia  Hypothyroidism (acquired)  Dysphagia  Pneumonia, unspecified organism  Elevated brain natriuretic peptide (BNP) level  Bilateral leg edema  Food impaction of esophagus    Presenting Problem/History of Present Illness  Active Hospital Problems    Diagnosis  POA    **Pulmonary embolism [I26.99]  Yes    Elevated brain natriuretic peptide (BNP) level [R79.89]  Yes    Bilateral leg edema [R60.0]  Yes    Food impaction of esophagus [T18.128A, W44.F3XA]  Unknown    Pneumonia, unspecified organism [J18.9]  Yes    Dysphagia [R13.10]  Yes    CKD (chronic kidney disease) stage 4, GFR 15-29 ml/min [N18.4]  Yes    Hypothyroidism (acquired) [E03.9]  Yes    Paroxysmal atrial fibrillation [I48.0]  Yes    Chronic anticoagulation [Z79.01]  Not Applicable    Hypoxia [R09.02]  Yes    Stage IV adenocarcinoma of lung, left [C34.92]  Yes    Pleural effusion [J90]  Yes    Stage IV adenocarcinoma of lung, right [C34.91]  Yes      Resolved Hospital Problems   No resolved problems to display.          Hospital Course    Patient is a 74 y.o. male presented with pmh of lung cancer, pAfib on home Eliquis,who presents with difficulty swallowing since Friday and unable to keep meds and food down,  shortness of breath and increased leg swelling. O2 sats were charted at 75% upon arrival to the ER. Patient was admitted for lodged food in esophagus, new right lower lobe PE while on Eliquis, and large left pleural effusion and pneumonia.  Patient had an EGD on 6/03 with food lodged in the distal esophagus pulled out into pieces using a snare.There was some underlying esophagitis and mild underlying narrowing at the GE junction.  Recommendation was soft diet and PPI daily. He was seen by speech for further recommendations on preventing chocking. Oncology followed and recommendation is lovenox for anticoagulation since he failed on Eliquis. Cardiothoracic surgeon followed and he had Pleurx catheter placed in the left lung to assist drainage. TSH was 54 and free T4 was down, will not adjust synthroid this admission I believe it may have been adjusted last admission will leave that for PCP.  He will have HH follow to assist with this. He has done after treatments. He will discharge with lam catheter as he follows with urology as outpatient. He will need to follow up with PCP in 2 weeks. Home health can start draining pleurx cath three times a week and as the drainage decreases decrease to twice a week than once        Procedures Performed    Procedure(s):  ESOPHAGOGASTRODUODENOSCOPY, impacted esophageal food bolus removal  -------------------    Procedure(s):  PLEURX CATHETER INSERTION  -------------------       Consults:   Consults       Date and Time Order Name Status Description    7/2/2025  4:39 PM Inpatient Cardiology Consult Completed     7/2/2025  4:35 PM Inpatient Cardiology Consult      6/30/2025  7:23 AM Inpatient Thoracic Surgery Consult Completed     6/29/2025 10:49 PM Hematology & Oncology Inpatient Consult Completed     6/29/2025 10:44 PM Inpatient Pulmonology Consult Completed     6/29/2025 10:44 PM Inpatient Gastroenterology Consult Completed     6/29/2025  6:47 PM Family Medicine Consult              Pertinent Test Results:    Lab Results (most recent)       Procedure Component Value Units Date/Time    Comprehensive Metabolic Panel [[id number]] Collected: 07/03/25 0950    Specimen: Blood from Arm, Right Updated: 07/03/25 1015    Magnesium [[id number]]  (Normal) Collected: 07/03/25 0543    Specimen: Blood from Arm, Right Updated: 07/03/25 0922     Magnesium 1.8 mg/dL     TSH [[id number]]  (Abnormal) Collected: 07/03/25 0543     Specimen: Blood from Arm, Right Updated: 07/03/25 0854     TSH 54.400 uIU/mL     CBC (No Diff) [[id number]]  (Abnormal) Collected: 07/03/25 0750    Specimen: Blood from Arm, Right Updated: 07/03/25 0802     WBC 11.55 10*3/mm3      RBC 3.59 10*6/mm3      Hemoglobin 10.9 g/dL      Hematocrit 33.8 %      MCV 94.2 fL      MCH 30.4 pg      MCHC 32.2 g/dL      RDW 14.2 %      RDW-SD 47.3 fl      MPV 8.8 fL      Platelets 191 10*3/mm3     Basic Metabolic Panel [[id number]]  (Abnormal) Collected: 07/03/25 0543    Specimen: Blood from Arm, Right Updated: 07/03/25 0619     Glucose 102 mg/dL      BUN 23.6 mg/dL      Creatinine 1.27 mg/dL      Sodium 143 mmol/L      Potassium 3.2 mmol/L      Chloride 107 mmol/L      CO2 27.5 mmol/L      Calcium 7.7 mg/dL      BUN/Creatinine Ratio 18.6     Anion Gap 8.5 mmol/L      eGFR 58.9 mL/min/1.73     Narrative:      GFR Categories in Chronic Kidney Disease (CKD)              GFR Category          GFR (mL/min/1.73)    Interpretation  G1                    90 or greater        Normal or high (1)  G2                    60-89                Mild decrease (1)  G3a                   45-59                Mild to moderate decrease  G3b                   30-44                Moderate to severe decrease  G4                    15-29                Severe decrease  G5                    14 or less           Kidney failure    (1)In the absence of evidence of kidney disease, neither GFR category G1 or G2 fulfill the criteria for CKD.    eGFR calculation 2021 CKD-EPI creatinine equation, which does not include race as a factor    Magnesium [[id number]]  (Normal) Collected: 07/02/25 1651    Specimen: Blood Updated: 07/02/25 1721     Magnesium 1.8 mg/dL     Phosphorus [[id number]]  (Normal) Collected: 07/02/25 1651    Specimen: Blood Updated: 07/02/25 1721     Phosphorus 2.6 mg/dL     Blood Culture - Blood, Arm, Left [[id number]]  (Normal) Collected: 06/29/25 1549    Specimen: Blood from Arm, Left Updated:  07/02/25 1600     Blood Culture No growth at 3 days    Blood Culture - Blood, Arm, Left [[id number]]  (Normal) Collected: 06/29/25 1529    Specimen: Blood from Arm, Left Updated: 07/02/25 1545     Blood Culture No growth at 3 days    Basic Metabolic Panel [[id number]]  (Abnormal) Collected: 07/02/25 0428    Specimen: Blood from Arm, Right Updated: 07/02/25 0506     Glucose 135 mg/dL      BUN 25.0 mg/dL      Creatinine 1.35 mg/dL      Sodium 137 mmol/L      Potassium 3.5 mmol/L      Chloride 102 mmol/L      CO2 27.6 mmol/L      Calcium 7.7 mg/dL      BUN/Creatinine Ratio 18.5     Anion Gap 7.4 mmol/L      eGFR 54.8 mL/min/1.73     Narrative:      GFR Categories in Chronic Kidney Disease (CKD)              GFR Category          GFR (mL/min/1.73)    Interpretation  G1                    90 or greater        Normal or high (1)  G2                    60-89                Mild decrease (1)  G3a                   45-59                Mild to moderate decrease  G3b                   30-44                Moderate to severe decrease  G4                    15-29                Severe decrease  G5                    14 or less           Kidney failure    (1)In the absence of evidence of kidney disease, neither GFR category G1 or G2 fulfill the criteria for CKD.    eGFR calculation 2021 CKD-EPI creatinine equation, which does not include race as a factor    Hemoglobin & Hematocrit, Blood [[id number]]  (Abnormal) Collected: 07/01/25 1910    Specimen: Blood from Arm, Right Updated: 07/01/25 1926     Hemoglobin 11.4 g/dL      Hematocrit 34.6 %     aPTT [[id number]]  (Normal) Collected: 07/01/25 1237    Specimen: Blood from Arm, Right Updated: 07/01/25 1309     PTT 27.7 seconds     aPTT [[id number]]  (Abnormal) Collected: 07/01/25 0544    Specimen: Blood from Arm, Right Updated: 07/01/25 0717     PTT 37.0 seconds     CBC & Differential [[id number]]  (Abnormal) Collected: 07/01/25 0544    Specimen: Blood from Arm, Right Updated: 07/01/25  0716    Narrative:      The following orders were created for panel order CBC & Differential.  Procedure                               Abnormality         Status                     ---------                               -----------         ------                     CBC Auto Differential[[id number]]        Abnormal            Final result                 Please view results for these tests on the individual orders.    CBC Auto Differential [[id number]]  (Abnormal) Collected: 07/01/25 0544    Specimen: Blood from Arm, Right Updated: 07/01/25 0716     WBC 11.67 10*3/mm3      RBC 2.34 10*6/mm3      Hemoglobin 7.0 g/dL      Comment: Result checked          Hematocrit 22.4 %      MCV 95.7 fL      MCH 29.9 pg      MCHC 31.3 g/dL      RDW 13.9 %      RDW-SD 47.1 fl      MPV 9.2 fL      Platelets 163 10*3/mm3      Neutrophil % 85.3 %      Lymphocyte % 8.7 %      Monocyte % 4.0 %      Eosinophil % 0.0 %      Basophil % 0.1 %      Immature Grans % 1.9 %      Neutrophils, Absolute 9.95 10*3/mm3      Lymphocytes, Absolute 1.02 10*3/mm3      Monocytes, Absolute 0.47 10*3/mm3      Eosinophils, Absolute 0.00 10*3/mm3      Basophils, Absolute 0.01 10*3/mm3      Immature Grans, Absolute 0.22 10*3/mm3      nRBC 0.0 /100 WBC     Protime-INR [[id number]]  (Abnormal) Collected: 06/29/25 1549    Specimen: Blood from Arm, Left Updated: 06/29/25 1912     Protime 16.9 Seconds      INR 1.37    High Sensitivity Troponin T 1Hr [[id number]]  (Abnormal) Collected: 06/29/25 1644    Specimen: Blood Updated: 06/29/25 1712     HS Troponin T 259 ng/L      Troponin T Numeric Delta -27 ng/L      Troponin T % Delta -9    Narrative:      High Sensitive Troponin T Reference Range:  <14.0 ng/L- Negative Female for AMI  <22.0 ng/L- Negative Male for AMI  >=14 - Abnormal Female indicating possible myocardial injury.  >=22 - Abnormal Male indicating possible myocardial injury.   Clinicians would have to utilize clinical acumen, EKG, Troponin, and serial changes  "to determine if it is an Acute Myocardial Infarction or myocardial injury due to an underlying chronic condition.         D-dimer, Quantitative [[id number]]  (Abnormal) Collected: 06/29/25 1549    Specimen: Blood from Arm, Left Updated: 06/29/25 1654     D-Dimer, Quantitative 10.68 MCGFEU/mL     Narrative:      According to the assay 's published package insert, a normal (<0.50 MCGFEU/mL) D-dimer result in conjunction with a non-high clinical probability assessment, excludes deep vein thrombosis (DVT) and pulmonary embolism (PE) with high sensitivity.    D-dimer values increase with age and this can make VTE exclusion of an older population difficult. To address this, the American College of [employer], based on best available evidence and recent guidelines, recommends that clinicians use age-adjusted D-dimer thresholds in patients greater than 50 years of age with: a) a low probability of PE who do not meet all Pulmonary Embolism Rule Out Criteria, or b) in those with intermediate probability of PE.   The formula for an age-adjusted D-dimer cut-off is \"age/100\".  For example, a 60 year old patient would have an age-adjusted cut-off of 0.60 MCGFEU/mL and an 80 year old 0.80 MCGFEU/mL.    Urinalysis, Microscopic Only - Urine, Clean Catch [[id number]]  (Abnormal) Collected: 06/29/25 1618    Specimen: Urine, Clean Catch Updated: 06/29/25 1653     RBC, UA 3-5 /HPF      WBC, UA 6-10 /HPF      Bacteria, UA 1+ /HPF      Squamous Epithelial Cells, UA 0-2 /HPF      Hyaline Casts, UA None Seen /LPF      Methodology Manual Light Microscopy    Urinalysis With Microscopic If Indicated (No Culture) - Urine, Clean Catch [[id number]]  (Abnormal) Collected: 06/29/25 1618    Specimen: Urine, Clean Catch Updated: 06/29/25 1632     Color, UA Yellow     Appearance, UA Clear     pH, UA 8.5     Specific Gravity, UA 1.020     Glucose, UA Negative     Ketones, UA Negative     Bilirubin, UA Negative     Blood, UA Trace     Protein, "  mg/dL (2+)     Leuk Esterase, UA Negative     Nitrite, UA Negative     Urobilinogen, UA 0.2 E.U./dL    Respiratory Panel PCR w/COVID-19(SARS-CoV-2) SARAH/ELENI/NEY/PAD/COR/LUCRETIA In-House, NP Swab in UTM/VTM, 2 HR TAT - Swab, Nasopharynx [[id number]]  (Normal) Collected: 06/29/25 1530    Specimen: Swab from Nasopharynx Updated: 06/29/25 1624     ADENOVIRUS, PCR Not Detected     Coronavirus 229E Not Detected     Coronavirus HKU1 Not Detected     Coronavirus NL63 Not Detected     Coronavirus OC43 Not Detected     COVID19 Not Detected     Human Metapneumovirus Not Detected     Human Rhinovirus/Enterovirus Not Detected     Influenza A PCR Not Detected     Influenza B PCR Not Detected     Parainfluenza Virus 1 Not Detected     Parainfluenza Virus 2 Not Detected     Parainfluenza Virus 3 Not Detected     Parainfluenza Virus 4 Not Detected     RSV, PCR Not Detected     Bordetella pertussis pcr Not Detected     Bordetella parapertussis PCR Not Detected     Chlamydophila pneumoniae PCR Not Detected     Mycoplasma pneumo by PCR Not Detected    Narrative:      In the setting of a positive respiratory panel with a viral infection PLUS a negative procalcitonin without other underlying concern for bacterial infection, consider observing off antibiotics or discontinuation of antibiotics and continue supportive care. If the respiratory panel is positive for atypical bacterial infection (Bordetella pertussis, Chlamydophila pneumoniae, or Mycoplasma pneumoniae), consider antibiotic de-escalation to target atypical bacterial infection.    Extra Tubes [[id number]] Collected: 06/29/25 1549    Specimen: Blood from Arm, Left Updated: 06/29/25 1600    Narrative:      The following orders were created for panel order Extra Tubes.  Procedure                               Abnormality         Status                     ---------                               -----------         ------                     Green Top (Gel)[[id number]]                                   Final result               Light Blue Top[[id number]]                                   Final result                 Please view results for these tests on the individual orders.    Green Top (Gel) [[id number]] Collected: 06/29/25 1549    Specimen: Blood from Arm, Left Updated: 06/29/25 1600     Extra Tube Hold for add-ons.     Comment: Auto resulted.       Light Blue Top [[id number]] Collected: 06/29/25 1549    Specimen: Blood from Arm, Left Updated: 06/29/25 1600     Extra Tube Hold for add-ons.     Comment: Auto resulted       CBC & Differential [[id number]]  (Abnormal) Collected: 06/29/25 1529    Specimen: Blood from Arm, Left Updated: 06/29/25 1558    Narrative:      The following orders were created for panel order CBC & Differential.  Procedure                               Abnormality         Status                     ---------                               -----------         ------                     CBC Auto Differential[[id number]]        Abnormal            Final result               Scan Slide[[id number]]                                                                    Please view results for these tests on the individual orders.    CBC Auto Differential [[id number]]  (Abnormal) Collected: 06/29/25 1549    Specimen: Blood from Arm, Left Updated: 06/29/25 1558     WBC 16.35 10*3/mm3      RBC 3.66 10*6/mm3      Hemoglobin 11.1 g/dL      Hematocrit 34.5 %      MCV 94.3 fL      MCH 30.3 pg      MCHC 32.2 g/dL      RDW 13.5 %      RDW-SD 45.6 fl      MPV 8.6 fL      Platelets 238 10*3/mm3      Neutrophil % 75.6 %      Lymphocyte % 10.8 %      Monocyte % 8.0 %      Eosinophil % 1.0 %      Basophil % 0.8 %      Immature Grans % 3.8 %      Neutrophils, Absolute 12.36 10*3/mm3      Lymphocytes, Absolute 1.77 10*3/mm3      Monocytes, Absolute 1.31 10*3/mm3      Eosinophils, Absolute 0.16 10*3/mm3      Basophils, Absolute 0.13 10*3/mm3      Immature Grans, Absolute 0.62 10*3/mm3      nRBC 0.0 /100  WBC     Comprehensive Metabolic Panel [[id number]]  (Abnormal) Collected: 06/29/25 1529    Specimen: Blood from Arm, Left Updated: 06/29/25 1558     Glucose 100 mg/dL      BUN 12.6 mg/dL      Creatinine 1.33 mg/dL      Sodium 140 mmol/L      Potassium 4.2 mmol/L      Comment: Specimen hemolyzed.  Result may be falsely elevated.        Chloride 104 mmol/L      CO2 24.8 mmol/L      Calcium 8.6 mg/dL      Total Protein 6.1 g/dL      Albumin 3.4 g/dL      ALT (SGPT) 11 U/L      AST (SGOT) 38 U/L      Comment: Specimen hemolyzed.  Result may be falsely elevated.        Alkaline Phosphatase 74 U/L      Total Bilirubin 0.5 mg/dL      Globulin 2.7 gm/dL      A/G Ratio 1.3 g/dL      BUN/Creatinine Ratio 9.5     Anion Gap 11.2 mmol/L      eGFR 55.7 mL/min/1.73     Narrative:      GFR Categories in Chronic Kidney Disease (CKD)              GFR Category          GFR (mL/min/1.73)    Interpretation  G1                    90 or greater        Normal or high (1)  G2                    60-89                Mild decrease (1)  G3a                   45-59                Mild to moderate decrease  G3b                   30-44                Moderate to severe decrease  G4                    15-29                Severe decrease  G5                    14 or less           Kidney failure    (1)In the absence of evidence of kidney disease, neither GFR category G1 or G2 fulfill the criteria for CKD.    eGFR calculation 2021 CKD-EPI creatinine equation, which does not include race as a factor    High Sensitivity Troponin T [[id number]]  (Abnormal) Collected: 06/29/25 1529    Specimen: Blood from Arm, Left Updated: 06/29/25 1558     HS Troponin T 286 ng/L     Narrative:      High Sensitive Troponin T Reference Range:  <14.0 ng/L- Negative Female for AMI  <22.0 ng/L- Negative Male for AMI  >=14 - Abnormal Female indicating possible myocardial injury.  >=22 - Abnormal Male indicating possible myocardial injury.   Clinicians would have to utilize  clinical acumen, EKG, Troponin, and serial changes to determine if it is an Acute Myocardial Infarction or myocardial injury due to an underlying chronic condition.         BNP [[id number]]  (Abnormal) Collected: 06/29/25 1529    Specimen: Blood from Arm, Left Updated: 06/29/25 1556     proBNP 2,038.0 pg/mL     Narrative:      This assay is used as an aid in the diagnosis of individuals suspected of having heart failure. It can be used as an aid in the diagnosis of acute decompensated heart failure (ADHF) in patients presenting with signs and symptoms of ADHF to the emergency department (ED). In addition, NT-proBNP of <300 pg/mL indicates ADHF is not likely.    Age Range Result Interpretation  NT-proBNP Concentration (pg/mL:      <50             Positive            >450                   Gray                 300-450                    Negative             <300    50-75           Positive            >900                  Gray                300-900                  Negative            <300      >75             Positive            >1800                  Gray                300-1800                  Negative            <300             Results for orders placed during the hospital encounter of 06/29/25    Adult Transthoracic Echo Complete W/ Cont if Necessary Per Protocol 06/30/2025  6:51 AM    Interpretation Summary    Left ventricular systolic function is normal. Left ventricular ejection fraction appears to be 61 - 65%.    Left ventricular diastolic function was normal.    The right ventricular cavity is mild to moderately dilated.    Saline test results are negative.    Estimated right ventricular systolic pressure from tricuspid regurgitation is moderately elevated (45-55 mmHg).    Moderate pulmonary hypertension is present.    There is a small (<1cm) pericardial effusion. There is no evidence of cardiac tamponade.    There is a large left pleural effusion.       Condition on Discharge:  Stable    Vital  Signs  Temp:  [97.6 °F (36.4 °C)-98.1 °F (36.7 °C)] 98.1 °F (36.7 °C)  Heart Rate:  [] 80  Resp:  [13-20] 13  BP: (145-161)/() 159/95      Physical Exam  Vitals reviewed.   Constitutional:       Appearance: He is not ill-appearing.   HENT:      Head: Atraumatic.      Right Ear: External ear normal.      Left Ear: External ear normal.      Nose: Nose normal.      Mouth/Throat:      Mouth: Mucous membranes are moist.   Eyes:      General:         Right eye: No discharge.         Left eye: No discharge.   Cardiovascular:      Rate and Rhythm: Normal rate and regular rhythm.      Pulses: Normal pulses.      Heart sounds: Normal heart sounds.   Pulmonary:      Effort: Pulmonary effort is normal.      Breath sounds: Normal breath sounds.   Abdominal:      General: Bowel sounds are normal.      Palpations: Abdomen is soft.   Musculoskeletal:         General: Normal range of motion.   Skin:     General: Skin is warm and dry.   Neurological:      Mental Status: He is alert and oriented to person, place, and time.   Psychiatric:         Behavior: Behavior normal.              Discharge Disposition  Home-Health Care [facility]    Discharge Medications     Discharge Medications        PAUSE taking these medications        Instructions Start Date   apixaban 5 MG tablet tablet  Wait to take this until your doctor or other care provider tells you to start again.  Commonly known as: ELIQUIS   5 mg, Oral, Every 12 Hours Scheduled             New Medications        Instructions Start Date   amoxicillin-clavulanate 875-125 MG per tablet  Commonly known as: AUGMENTIN   1 tablet, Oral, Every 12 Hours Scheduled      enoxaparin sodium 80 MG/0.8ML solution prefilled syringe syringe  Commonly known as: LOVENOX   1 mg/kg (70 mg), Subcutaneous, Every 12 Hours Scheduled      metoprolol succinate XL 50 MG 24 hr tablet  Commonly known as: TOPROL-XL   50 mg, Oral, Every 12 Hours Scheduled             Continue These Medications         Instructions Start Date   acetaminophen 325 MG tablet  Commonly known as: TYLENOL   650 mg, Oral, Every 4 Hours PRN      Breztri Aerosphere 160-9-4.8 MCG/ACT aerosol inhaler  Generic drug: Budeson-Glycopyrrol-Formoterol   2 puffs, Inhalation, 2 Times Daily      Centrum Silver 50+Men tablet tablet  Generic drug: multivitamin with minerals   1 tablet, Every Morning      Claritin 10 MG tablet  Generic drug: loratadine   10 mg, Daily      dexAMETHasone 4 MG tablet  Commonly known as: DECADRON   Take 2 tablets oral twice a day for 3 consecutive days beginning the day before chemotherapy and continue for 6 doses.      hydrocortisone 20 MG tablet  Commonly known as: CORTEF   20 mg, Oral, 2 Times Daily      ipratropium-albuterol 0.5-2.5 mg/3 ml nebulizer  Commonly known as: DUO-NEB   3 mL, Every 6 Hours PRN      levothyroxine 75 MCG tablet  Commonly known as: SYNTHROID, LEVOTHROID   75 mcg, Oral, Every Morning      Mucinex 600 MG 12 hr tablet  Generic drug: guaiFENesin   600 mg, 2 Times Daily      NON FORMULARY   2 tablets, Nightly      nystatin susp + lidocaine viscous oral suspension  Commonly known as: MAGIC MOUTHWASH   5 mL, Swish & Spit, 4 Times Daily PRN      ondansetron 8 MG tablet  Commonly known as: ZOFRAN   8 mg, Oral, 3 Times Daily PRN      pantoprazole 40 MG EC tablet  Commonly known as: PROTONIX   40 mg, Oral, Daily      Sodium Chloride (PF) 0.9 % injection   10 mL, Intravenous, Daily      tamsulosin 0.4 MG capsule 24 hr capsule  Commonly known as: FLOMAX   1 capsule, Nightly      thiamine 100 MG tablet  tablet  Commonly known as: VITAMIN B-1   1 tablet, Daily      VITAMIN D-3 PO   1 tablet, Daily             Stop These Medications      midodrine 5 MG tablet  Commonly known as: PROAMATINE     simvastatin 20 MG tablet  Commonly known as: ZOCOR     sodium bicarbonate 650 MG tablet     sotalol 80 MG tablet  Commonly known as: BETAPACE              Discharge Diet:   Diet Instructions       Diet: Regular/House Diet;  Regular (IDDSI 7); Thin (IDDSI 0)      Discharge Diet: Regular/House Diet    Texture: Regular (IDDSI 7)    Fluid Consistency: Thin (IDDSI 0)            Activity at Discharge:   Activity Instructions       Gradually Increase Activity Until at Pre-Hospitalization Level              Follow-up Appointments  Future Appointments   Date Time Provider Department Center   7/11/2025  9:45 AM HOPD INJECTION CHAIR [facility]   7/18/2025 10:00 AM [facility] INJECTION CHAIR [facility]   7/23/2025  8:15 AM INF ROOM 21 - CHAIR [facility]   8/1/2025  9:30 AM [facility] INJECTION CHAIR [facility]   8/22/2025  9:30 AM [facility] INJECTION CHAIR [facility]   9/17/2025  9:10 AM Kelvin Gonzalez MD [facility] None     Additional Instructions for the Follow-ups that You Need to Schedule       Ambulatory Referral to Home Health   As directed      Face to Face Visit Date: 7/1/2025   Follow-up provider for Plan of Care?: I treated the patient in an acute care facility and will not continue treatment after discharge.   Follow-up provider: REE WOODALL [[id number]]   Reason/Clinical Findings: Pleurx catheter   Describe mobility limitations that make leaving home difficult: Pleurx catheter   Nursing/Therapeutic Services Requested: Skilled Nursing   Skilled nursing orders: Other (Pleurx catheter drainage)   Frequency: Other (MWF)        Discharge Follow-up with PCP   As directed       Currently Documented PCP:    Abner Funes APRN    PCP Phone Number:    [phone]     Follow Up Details: 2 weeks                Test Results Pending at Discharge  Pending Results       Procedure [Order ID] Specimen - Date/Time    Potassium [[id number]]     Specimen: Blood              CARL Cook  07/03/25  12:45 EDT    Time: Discharge 25 min          Electronically signed by Evelyn Felder MD at 07/03/25 1923       Discharge Order (From admission, onward)       Start     Ordered    07/03/25 1243  Discharge patient  Once         Expected Discharge Date: 07/03/25   Expected Discharge Time: Afternoon   Discharge Disposition: Home-Health Care [facility]   Physician of Record for Attribution - Please select from Treatment Team: JESUS RYAN [[id number]]   Review needed by CMO to determine Physician of Record: No   Please choose which facility the patient is currently admitted if they are being discharged to another facility or unit.:  [facility]   Mode: Family      Question Answer Comment   Physician of Record for Attribution - Please select from Treatment Team JESUS RYAN    Review needed by CMO to determine Physician of Record No    Please choose which facility the patient is currently admitted if they are being discharged to another facility or unit. KATIE Marrero    Mode: Family        07/03/25 [id number]

## 2025-07-07 NOTE — CONSULTS
Hematology/Oncology Inpatient Consultation    Patient name: Young Ames  : 1950  MRN: [id number]  Referring Provider:    Evelyn Felder MD     Reason for Consultation: DVT, metastatic cancer    Chief complaint: Bleeding from PleurX cathter.    History of present illness:    Young Ames is a 74 y.o. male  with h/o lung cancer, persistent malignant left pleural effusion with recently placed Pleur-x catheter, atrial fib and pulmonary embolism who presents with persistent bleeding from tunneled catheter for 2 days.  He was discharged from this facility on 7/3.  He presented to ED on  with bleeding from catheter that was treated with pressure dressing.  He continued to soak dressings at home and re-presented to ED on .  Hemoglobin dropped from 9.8 to 8.7 on admission.       25  Hematology/Oncology was consulted for further evaluation and management of anticoagulation and cancer chemotherapy. Patient is established with Dr. Tim at [facility] and was recently seen during prior inpatient admission on 7/3/25. His last Chemotherapy session was on 25 and was reportedly planned for Chemotherapy today. This has been held due to repeat hospital admission, COVID 19 infection and concerns about ongoing bleeding.    25: EGD and Esophageal dilatation, pleurX catheter drainage:  Findings:  10 cm area of the distal esophagus narrowing.  Pooling of mucus in the upper esophagus.  Finding concerning for esophageal dysmotility and achalasia.  2 strands of less than 1 cm Schroeder's esophagus at the GE junction.  5 cm hiatal hernia.  450 cc serosanguineous pleural effusion drained from the left Pleurx catheter.      Subjective:  Pt seen today for initial evaluation. He is s/p EGD and balloon dilatation of esophagus today, appears somewhat drowsy but is oriented X 3. No active bleeding form pleurx catheter at this time. Lovenox is on hold at present.        He/She  has a past medical history of  Abnormal ECG (01/29/2024), Allergic rhinitis, Arrhythmia (08/14/2024), Asthma (06/01/2024), Atrial fibrillation (08/14/2024), Coronary artery disease (01/29/2024), GERD (gastroesophageal reflux disease), Hyperlipidemia, Lung cancer (08/2024), Myocardial infarction (01/29/2024), Pleural effusion (2024), and Pneumonia (05/2025).    PCP: Abner Funes APRN    History:  Past Medical History:   Diagnosis Date    Abnormal ECG 01/29/2024    Allergic rhinitis     Arrhythmia 08/14/2024    SVT, then afib after thoracentesis    Asthma 06/01/2024    Shortness of breath with exertion    Atrial fibrillation 08/14/2024    After 2L drained per thoracentesis    Coronary artery disease 01/29/2024    Minimal blockage per cath    GERD (gastroesophageal reflux disease)     Hyperlipidemia     Lung cancer 08/2024    Myocardial infarction 01/29/2024    Cath, no intervention    Pleural effusion 2024    Pneumonia 05/2025   ,   Past Surgical History:   Procedure Laterality Date    BRONCHOSCOPY N/A 08/13/2024    Procedure: BRONCHOSCOPY WITH BRONCHIAL WASHING AND BRONCHIAL BRUSHING;  Surgeon: Kelvin Gonzalez MD;  Location: [facility] ENDOSCOPY;  Service: Pulmonary;  Laterality: N/A;    BRONCHOSCOPY N/A 05/23/2025    Procedure: BRONCHOSCOPY with bronchoalveolar lavage and biopsy x 1 area;  Surgeon: Jason Dozier MD;  Location: [facility] ENDOSCOPY;  Service: Pulmonary;  Laterality: N/A;  post op: lung mass    BRONCHOSCOPY      BRONCHOSCOPY WITH ION ROBOTIC ASSIST N/A 08/16/2024    Procedure: BRONCHOSCOPY WITH ION ROBOT WITH CRYO BIOPSY;  Surgeon: Kelvin Gonzalez MD;  Location: [facility] ENDOSCOPY;  Service: Robotics - Pulmonary;  Laterality: N/A;    CARDIAC CATHETERIZATION Right 01/29/2024    Procedure: Coronary angiography;  Surgeon: Abran Chand MD;  Location: [facility] CATH INVASIVE LOCATION;  Service: Cardiovascular;  Laterality: Right;    CARDIAC CATHETERIZATION N/A 01/29/2024    Procedure: Left Heart Cath;  Surgeon: Abran Chand MD;  Location: [facility]  CATH INVASIVE LOCATION;  Service: Cardiovascular;  Laterality: N/A;    ENDOSCOPY N/A 2025    Procedure: ESOPHAGOGASTRODUODENOSCOPY with biopsy x 1 area and dilatation (48FR non-guided bougie);  Surgeon: Carlos Calero MD;  Location: [facility] ENDOSCOPY;  Service: Gastroenterology;  Laterality: N/A;  post op: esophageal stricture    ENDOSCOPY N/A 2025    Procedure: ESOPHAGOGASTRODUODENOSCOPY, impacted esophageal food bolus removal;  Surgeon: Luis Alfredo Puckett MD;  Location: [facility] ENDOSCOPY;  Service: Gastroenterology;  Laterality: N/A;  post: impacted foreign body of esophagus    LUNG BIOPSY     ,   Family History   Problem Relation Age of Onset    Dementia Mother     Diabetes Mother     Arrhythmia Mother         Afib    Breast cancer Sister 74    Cancer Sister         Breast cancer   ,   Social History     Tobacco Use    Smoking status: Former     Current packs/day: 0.00     Average packs/day: 1 pack/day for 32.0 years (32.0 ttl pk-yrs)     Types: Cigarettes     Start date:      Quit date:      Years since quittin.5     Passive exposure: Past    Smokeless tobacco: Never   Vaping Use    Vaping status: Never Used   Substance Use Topics    Alcohol use: Yes     Alcohol/week: 1.0 standard drink of alcohol     Types: 1 Cans of beer per week    Drug use: Never   ,   Medications Prior to Admission   Medication Sig Dispense Refill Last Dose/Taking    acetaminophen (TYLENOL) 325 MG tablet Take 2 tablets by mouth Every 4 (Four) Hours As Needed for Mild Pain.   Taking As Needed    amoxicillin-clavulanate (AUGMENTIN) 875-125 MG per tablet Take 1 tablet by mouth Every 12 (Twelve) Hours for 8 doses. Indications: Pneumonia 8 tablet 0 Taking    Budeson-Glycopyrrol-Formoterol (Breztri Aerosphere) 160-9-4.8 MCG/ACT aerosol inhaler Inhale 2 puffs 2 (Two) Times a Day. 1 each 0 Taking    Cholecalciferol (VITAMIN D-3 PO) Take 1 tablet by mouth Daily. Indications: Vitamin Deficiency   Taking    enoxaparin  sodium (LOVENOX) 80 MG/0.8ML solution prefilled syringe syringe Inject 0.7 mL under the skin into the appropriate area as directed Every 12 (Twelve) Hours. Indications: DVT/PE (active thrombosis) 48 mL 1 Taking    guaiFENesin (Mucinex) 600 MG 12 hr tablet Take 1 tablet by mouth 2 (Two) Times a Day. Indications: Cough   Taking    hydrocortisone (CORTEF) 20 MG tablet Take 1 tablet by mouth 2 (Two) Times a Day for 90 days. 60 tablet 2 Taking    ipratropium-albuterol (DUO-NEB) 0.5-2.5 mg/3 ml nebulizer Take 3 mL by nebulization Every 6 (Six) Hours As Needed for Wheezing.   Taking As Needed    levothyroxine (SYNTHROID, LEVOTHROID) 75 MCG tablet TAKE ONE TABLET BY MOUTH EVERY MORNING 30 tablet 1 Taking    metoprolol succinate XL (TOPROL-XL) 50 MG 24 hr tablet Take 1 tablet by mouth Every 12 (Twelve) Hours. 60 tablet 0 Taking    NON FORMULARY Take 2 tablets by mouth Every Night. Zzzquil  Indications: Sleep Disorder   Taking    ondansetron (ZOFRAN) 8 MG tablet Take 1 tablet by mouth 3 (Three) Times a Day As Needed for Nausea or Vomiting. 30 tablet 5 Taking As Needed    pantoprazole (PROTONIX) 40 MG EC tablet Take 1 tablet by mouth Daily. 30 tablet 0 Taking    tamsulosin (FLOMAX) 0.4 MG capsule 24 hr capsule Take 1 capsule by mouth Every Night. Indications: Benign Enlargement of Prostate   Taking    [Paused] apixaban (ELIQUIS) 5 MG tablet tablet Take 1 tablet by mouth Every 12 (Twelve) Hours. Indications: Atrial Fibrillation 60 tablet 0    , Scheduled Meds:  [Held by provider] apixaban, 5 mg, Oral, Q12H  budesonide, 0.5 mg, Nebulization, BID - RT   And  revefenacin, 175 mcg, Nebulization, Daily - RT  [Held by provider] enoxaparin sodium, 1 mg/kg, Subcutaneous, Q12H  guaiFENesin, 600 mg, Oral, BID  hydrocortisone, 20 mg, Oral, BID  levothyroxine, 75 mcg, Oral, Q AM  metoprolol succinate XL, 50 mg, Oral, Q12H  pantoprazole, 40 mg, Oral, Daily  sodium chloride, 10 mL, Intravenous, Q12H  tamsulosin, 0.4 mg, Oral, Nightly    ,  "Continuous Infusions:   , PRN Meds:    acetaminophen    senna-docusate sodium **AND** polyethylene glycol **AND** bisacodyl **AND** bisacodyl    Calcium Replacement - Follow Nurse / BPA Driven Protocol    ipratropium-albuterol    Magnesium Standard Dose Replacement - Follow Nurse / BPA Driven Protocol    nitroglycerin    ondansetron    Phosphorus Replacement - Follow Nurse / BPA Driven Protocol    Potassium Replacement - Follow Nurse / BPA Driven Protocol    [COMPLETED] Insert Peripheral IV **AND** sodium chloride    sodium chloride    sodium chloride   Allergies:  Patient has no known allergies.    Subjective     ROS:  Review of Systems     Objective   Vital Signs:   /89   Pulse 79   Temp 97 °F (36.1 °C) (Oral)   Resp 16   Ht 177.8 cm (70\")   Wt 79 kg (174 lb 2.6 oz)   SpO2 98%   BMI 24.99 kg/m²     Physical Exam: (performed by MD)  Physical Exam    Results Review:  Lab Results (last 48 hours)       Procedure Component Value Units Date/Time    Hemoglobin A1c [[id number]]  (Abnormal) Collected: 07/07/25 0347    Specimen: Blood Updated: 07/07/25 1556     Hemoglobin A1C 5.66 %     Narrative:      Hemoglobin A1C Ranges:    Increased Risk for Diabetes  5.7% to 6.4%  Diabetes                     >= 6.5%  Diabetic Goal                < 7.0%    COVID-19,CEPHEID/CIARA,COR/NEY/PAD/EUFEMIA/LAG/LUCRETIA IN-HOUSE,NP SWAB IN TRANSPORT MEDIA 1 HR TAT, RT-PCR - Swab, Nasopharynx [[id number]]  (Abnormal) Collected: 07/07/25 0941    Specimen: Swab from Nasopharynx Updated: 07/07/25 1058     COVID19 Detected    Narrative:      Fact sheet for providers: https://www.fda.gov/media/074783/download     Fact sheet for patients: https://www.fda.gov/media/953785/download    CBC (No Diff) [[id number]]  (Abnormal) Collected: 07/07/25 0347    Specimen: Blood Updated: 07/07/25 0436     WBC 6.96 10*3/mm3      RBC 2.89 10*6/mm3      Hemoglobin 8.7 g/dL      Hematocrit 27.9 %      MCV 96.5 fL      MCH 30.1 pg      MCHC 31.2 g/dL      RDW 14.4 %     "  RDW-SD 50.5 fl      MPV 9.9 fL      Platelets 115 10*3/mm3     Protime-INR [[id number]]  (Normal) Collected: 07/06/25 1344    Specimen: Blood Updated: 07/06/25 1416     Protime 13.8 Seconds      INR 1.07    aPTT [[id number]]  (Abnormal) Collected: 07/06/25 1344    Specimen: Blood Updated: 07/06/25 1416     PTT 37.2 seconds     Comprehensive Metabolic Panel [[id number]]  (Abnormal) Collected: 07/06/25 1344    Specimen: Blood Updated: 07/06/25 1412     Glucose 144 mg/dL      BUN 20.2 mg/dL      Creatinine 1.09 mg/dL      Sodium 142 mmol/L      Potassium 3.8 mmol/L      Chloride 107 mmol/L      CO2 24.4 mmol/L      Calcium 7.8 mg/dL      Total Protein 4.7 g/dL      Albumin 2.8 g/dL      ALT (SGPT) 9 U/L      AST (SGOT) 22 U/L      Alkaline Phosphatase 56 U/L      Total Bilirubin <0.2 mg/dL      Globulin 1.9 gm/dL      A/G Ratio 1.5 g/dL      BUN/Creatinine Ratio 18.5     Anion Gap 10.6 mmol/L      eGFR 70.8 mL/min/1.73     Narrative:      GFR Categories in Chronic Kidney Disease (CKD)              GFR Category          GFR (mL/min/1.73)    Interpretation  G1                    90 or greater        Normal or high (1)  G2                    60-89                Mild decrease (1)  G3a                   45-59                Mild to moderate decrease  G3b                   30-44                Moderate to severe decrease  G4                    15-29                Severe decrease  G5                    14 or less           Kidney failure    (1)In the absence of evidence of kidney disease, neither GFR category G1 or G2 fulfill the criteria for CKD.    eGFR calculation 2021 CKD-EPI creatinine equation, which does not include race as a factor    Extra Tubes [[id number]] Collected: 07/06/25 1344    Specimen: Blood, Venous Line Updated: 07/06/25 1400    Narrative:      The following orders were created for panel order Extra Tubes.  Procedure                               Abnormality         Status                     ---------                                -----------         ------                     Green Top (Gel)[[id number]]                                  Final result                 Please view results for these tests on the individual orders.    Green Top (Gel) [[id number]] Collected: 07/06/25 1344    Specimen: Blood Updated: 07/06/25 1400     Extra Tube Hold for add-ons.     Comment: Auto resulted.       CBC & Differential [[id number]]  (Abnormal) Collected: 07/06/25 1344    Specimen: Blood Updated: 07/06/25 1351    Narrative:      The following orders were created for panel order CBC & Differential.  Procedure                               Abnormality         Status                     ---------                               -----------         ------                     CBC Auto Differential[[id number]]        Abnormal            Final result                 Please view results for these tests on the individual orders.    CBC Auto Differential [[id number]]  (Abnormal) Collected: 07/06/25 1344    Specimen: Blood Updated: 07/06/25 1351     WBC 5.68 10*3/mm3      RBC 3.03 10*6/mm3      Hemoglobin 9.3 g/dL      Hematocrit 29.1 %      MCV 96.0 fL      MCH 30.7 pg      MCHC 32.0 g/dL      RDW 14.6 %      RDW-SD 50.4 fl      MPV 9.6 fL      Platelets 113 10*3/mm3      Neutrophil % 76.6 %      Lymphocyte % 14.8 %      Monocyte % 6.3 %      Eosinophil % 0.9 %      Basophil % 0.5 %      Immature Grans % 0.9 %      Neutrophils, Absolute 4.35 10*3/mm3      Lymphocytes, Absolute 0.84 10*3/mm3      Monocytes, Absolute 0.36 10*3/mm3      Eosinophils, Absolute 0.05 10*3/mm3      Basophils, Absolute 0.03 10*3/mm3      Immature Grans, Absolute 0.05 10*3/mm3      nRBC 0.0 /100 WBC              Pending Results:     Imaging Reviewed:   XR Chest 1 View  Result Date: 7/7/2025  Impression: 1. Prominent interstitial markings which are nonspecific but could be related to interstitial edema. 2. Small right pleural effusion. No left pleural effusion. 3. No  pneumothorax. Electronically Signed: Aftab Blakely MD  7/7/2025 5:28 PM EDT  Workstation ID: [id number]    XR Chest 1 View  Result Date: 7/5/2025  Impression: Small bilateral pleural effusions largely unchanged. Bibasilar atelectasis right greater than left extensive chronic reticular opacity unchanged Electronically Signed: Adrian Peterson MD  7/5/2025 11:57 AM EDT  Workstation ID: [id number]    XR Chest 1 View  Result Date: 7/3/2025  Impression: 1.Left-sided chest tube in place. No pneumothorax identified. No left pleural effusion identified. 2.Small right pleural effusion. 3.Mild pulmonary edema pattern. Electronically Signed: Al Gutiérrez MD  7/3/2025 10:16 AM EDT  Workstation ID: [id number]    FL ESOPHAGRAM SINGLE CONTRAST  Result Date: 7/2/2025  Impression: Short segment narrowing of the lower thoracic esophagus, through which only a thin linear contrast column with pass. There is distention of the upper thoracic esophagus with what appears to be combination of contrast, air bubbles, and food stuff. Patient  states he was unable to tolerate his breakfast this morning. For these reasons, 13 mm barium tablet was not administered. No contrast extravasation status post reported esophageal dilation procedure. Small esophageal hiatal hernia. Electronically Signed: Mar Aly MD  7/2/2025 11:49 AM EDT  Workstation ID: [id number]    XR Chest 1 View  Result Date: 7/1/2025  Impression: 1. Improved left effusion and lung aeration status post thoracotomy tube placement. No pneumothorax. 2. Cardiomegaly with residual small bilateral pleural effusions and interstitial pulmonary edema. Right-sided effusion is mildly loculated. 3. Superimposed more reticulonodular appearing airspace opacities suspicious for infectious process, such as bronchiolitis, aspiration or early bronchopneumonia. Electronically Signed: Emmanuel Flores MD  7/1/2025 8:45 AM EDT  Workstation ID: [id number]    IR Replacement PICC Without Port Same  Site  Result Date: 6/30/2025  Impression: Successful exchange and repositioning of right upper extremity PICC, as described above. Electronically Signed: Sean Buckley MD  6/30/2025 1:36 PM EDT  Workstation ID: [id number]           Assessment & Plan     Pulmonary embolism: Likely provoked by malignancy, To continue on long-term enoxaparin, on hold presently due to recent bleeding from PleurX catheter. Reportedly has failed Eliquis previously. To resume therapeutic dose Lovenox once cleared by thoracic surgery.     Recurrent pleural effusion:  Bleeding from PleurX catheter:   -has ongoing drainage form the catheter, s/p 450 cc serosanguineous pleural effusion drained on 7/7/25.  -bleeding likely secondary to Anticoagulation. Monitor for signs of recurrent bleeding once started on anticoagulation..    4. Anemia: noted some decline in hb from 9.3 to 8.7 since yesterday  Transfuse PRN to maintain HB >7  Check nutritional labs    5. Non-small cell lung cancer: Continue treatment with ramucirumab/docetaxel as outpatient once discharged.      Electronically signed by Garett Burton MD, 07/07/25, 7:33 PM EDT.        Thank you for this consult. Please call us with any additional clinical questions/concerns.

## 2025-07-08 VITALS
TEMPERATURE: 97.4 F | HEART RATE: 80 BPM | RESPIRATION RATE: 20 BRPM | HEIGHT: 70 IN | BODY MASS INDEX: 24.93 KG/M2 | SYSTOLIC BLOOD PRESSURE: 158 MMHG | WEIGHT: 174.16 LBS | OXYGEN SATURATION: 100 % | DIASTOLIC BLOOD PRESSURE: 98 MMHG

## 2025-07-08 LAB
ANION GAP SERPL CALCULATED.3IONS-SCNC: 6.5 MMOL/L (ref 5–15)
BASOPHILS # BLD AUTO: 0.02 10*3/MM3 (ref 0–0.2)
BASOPHILS NFR BLD AUTO: 0.3 % (ref 0–1.5)
BUN SERPL-MCNC: 22 MG/DL (ref 8–23)
BUN/CREAT SERPL: 20.6 (ref 7–25)
CALCIUM SPEC-SCNC: 7.7 MG/DL (ref 8.6–10.5)
CHLORIDE SERPL-SCNC: 108 MMOL/L (ref 98–107)
CO2 SERPL-SCNC: 24.5 MMOL/L (ref 22–29)
CREAT SERPL-MCNC: 1.07 MG/DL (ref 0.76–1.27)
DEPRECATED RDW RBC AUTO: 50.1 FL (ref 37–54)
EGFRCR SERPLBLD CKD-EPI 2021: 72.4 ML/MIN/1.73
EOSINOPHIL # BLD AUTO: 0.13 10*3/MM3 (ref 0–0.4)
EOSINOPHIL NFR BLD AUTO: 1.8 % (ref 0.3–6.2)
ERYTHROCYTE [DISTWIDTH] IN BLOOD BY AUTOMATED COUNT: 14.6 % (ref 12.3–15.4)
FERRITIN SERPL-MCNC: 196 NG/ML (ref 30–400)
FOLATE SERPL-MCNC: 16.1 NG/ML (ref 4.78–24.2)
GLUCOSE SERPL-MCNC: 95 MG/DL (ref 65–99)
HCT VFR BLD AUTO: 29.4 % (ref 37.5–51)
HGB BLD-MCNC: 9.3 G/DL (ref 13–17.7)
IMM GRANULOCYTES # BLD AUTO: 0.04 10*3/MM3 (ref 0–0.05)
IMM GRANULOCYTES NFR BLD AUTO: 0.6 % (ref 0–0.5)
IRON 24H UR-MRATE: 43 MCG/DL (ref 59–158)
IRON SATN MFR SERPL: 18 % (ref 20–50)
LYMPHOCYTES # BLD AUTO: 1.44 10*3/MM3 (ref 0.7–3.1)
LYMPHOCYTES NFR BLD AUTO: 20.5 % (ref 19.6–45.3)
MCH RBC QN AUTO: 30.2 PG (ref 26.6–33)
MCHC RBC AUTO-ENTMCNC: 31.6 G/DL (ref 31.5–35.7)
MCV RBC AUTO: 95.5 FL (ref 79–97)
MONOCYTES # BLD AUTO: 0.59 10*3/MM3 (ref 0.1–0.9)
MONOCYTES NFR BLD AUTO: 8.4 % (ref 5–12)
NEUTROPHILS NFR BLD AUTO: 4.81 10*3/MM3 (ref 1.7–7)
NEUTROPHILS NFR BLD AUTO: 68.4 % (ref 42.7–76)
NRBC BLD AUTO-RTO: 0 /100 WBC (ref 0–0.2)
PLATELET # BLD AUTO: 114 10*3/MM3 (ref 140–450)
PMV BLD AUTO: 9.7 FL (ref 6–12)
POTASSIUM SERPL-SCNC: 4 MMOL/L (ref 3.5–5.2)
RBC # BLD AUTO: 3.08 10*6/MM3 (ref 4.14–5.8)
RETICS # AUTO: 0.06 10*6/MM3 (ref 0.02–0.13)
RETICS/RBC NFR AUTO: 2.03 % (ref 0.7–1.9)
SODIUM SERPL-SCNC: 139 MMOL/L (ref 136–145)
TIBC SERPL-MCNC: 243 MCG/DL (ref 298–536)
TRANSFERRIN SERPL-MCNC: 163 MG/DL (ref 200–360)
VIT B12 BLD-MCNC: 569 PG/ML (ref 211–946)
WBC NRBC COR # BLD AUTO: 7.03 10*3/MM3 (ref 3.4–10.8)

## 2025-07-08 PROCEDURE — 94799 UNLISTED PULMONARY SVC/PX: CPT

## 2025-07-08 PROCEDURE — 82746 ASSAY OF FOLIC ACID SERUM: CPT | Performed by: STUDENT IN AN ORGANIZED HEALTH CARE EDUCATION/TRAINING PROGRAM

## 2025-07-08 PROCEDURE — 85045 AUTOMATED RETICULOCYTE COUNT: CPT | Performed by: STUDENT IN AN ORGANIZED HEALTH CARE EDUCATION/TRAINING PROGRAM

## 2025-07-08 PROCEDURE — 85025 COMPLETE CBC W/AUTO DIFF WBC: CPT | Performed by: INTERNAL MEDICINE

## 2025-07-08 PROCEDURE — 63710000001 REVEFENACIN 175 MCG/3ML SOLUTION: Performed by: INTERNAL MEDICINE

## 2025-07-08 PROCEDURE — G0378 HOSPITAL OBSERVATION PER HR: HCPCS

## 2025-07-08 PROCEDURE — A9270 NON-COVERED ITEM OR SERVICE: HCPCS | Performed by: INTERNAL MEDICINE

## 2025-07-08 PROCEDURE — 82728 ASSAY OF FERRITIN: CPT | Performed by: STUDENT IN AN ORGANIZED HEALTH CARE EDUCATION/TRAINING PROGRAM

## 2025-07-08 PROCEDURE — 84466 ASSAY OF TRANSFERRIN: CPT | Performed by: STUDENT IN AN ORGANIZED HEALTH CARE EDUCATION/TRAINING PROGRAM

## 2025-07-08 PROCEDURE — 94664 DEMO&/EVAL PT USE INHALER: CPT

## 2025-07-08 PROCEDURE — 63710000001 GUAIFENESIN 600 MG TABLET SUSTAINED-RELEASE 12 HOUR: Performed by: INTERNAL MEDICINE

## 2025-07-08 PROCEDURE — 25010000002 ENOXAPARIN PER 10 MG: Performed by: INTERNAL MEDICINE

## 2025-07-08 PROCEDURE — 82607 VITAMIN B-12: CPT | Performed by: STUDENT IN AN ORGANIZED HEALTH CARE EDUCATION/TRAINING PROGRAM

## 2025-07-08 PROCEDURE — 80048 BASIC METABOLIC PNL TOTAL CA: CPT | Performed by: INTERNAL MEDICINE

## 2025-07-08 PROCEDURE — 63710000001 PANTOPRAZOLE 40 MG TABLET DELAYED-RELEASE: Performed by: INTERNAL MEDICINE

## 2025-07-08 PROCEDURE — 63710000001 LEVOTHYROXINE 75 MCG TABLET: Performed by: INTERNAL MEDICINE

## 2025-07-08 PROCEDURE — 83540 ASSAY OF IRON: CPT | Performed by: STUDENT IN AN ORGANIZED HEALTH CARE EDUCATION/TRAINING PROGRAM

## 2025-07-08 PROCEDURE — 63710000001 METOPROLOL SUCCINATE XL 50 MG TABLET SUSTAINED-RELEASE 24 HOUR: Performed by: INTERNAL MEDICINE

## 2025-07-08 PROCEDURE — 63710000001 HYDROCORTISONE 10 MG TABLET: Performed by: INTERNAL MEDICINE

## 2025-07-08 PROCEDURE — 94761 N-INVAS EAR/PLS OXIMETRY MLT: CPT

## 2025-07-08 RX ORDER — ENOXAPARIN SODIUM 100 MG/ML
1 INJECTION SUBCUTANEOUS EVERY 12 HOURS SCHEDULED
Status: DISCONTINUED | OUTPATIENT
Start: 2025-07-08 | End: 2025-07-08 | Stop reason: HOSPADM

## 2025-07-08 RX ADMIN — LEVOTHYROXINE SODIUM 75 MCG: 0.07 TABLET ORAL at 05:13

## 2025-07-08 RX ADMIN — BUDESONIDE 0.5 MG: 0.5 SUSPENSION RESPIRATORY (INHALATION) at 08:59

## 2025-07-08 RX ADMIN — PANTOPRAZOLE SODIUM 40 MG: 40 TABLET, DELAYED RELEASE ORAL at 08:59

## 2025-07-08 RX ADMIN — REVEFENACIN 175 MCG: 175 SOLUTION RESPIRATORY (INHALATION) at 08:58

## 2025-07-08 RX ADMIN — METOPROLOL SUCCINATE 50 MG: 50 TABLET, EXTENDED RELEASE ORAL at 08:59

## 2025-07-08 RX ADMIN — Medication 10 ML: at 10:20

## 2025-07-08 RX ADMIN — ENOXAPARIN SODIUM 80 MG: 100 INJECTION SUBCUTANEOUS at 08:59

## 2025-07-08 RX ADMIN — HYDROCORTISONE 20 MG: 10 TABLET ORAL at 08:58

## 2025-07-08 RX ADMIN — GUAIFENESIN 600 MG: 600 TABLET, EXTENDED RELEASE ORAL at 08:59

## 2025-07-08 NOTE — CASE MANAGEMENT/SOCIAL WORK
Case Management Discharge Note      Final Note: Home w/ [facility].    Selected Continued Care - Discharged on 7/8/2025 Admission date: 7/6/2025 - Discharge disposition: Home-Health Care c      Home Medical Care Coordination complete.      Service Provider Services Address Phone Fax Patient Preferred    [facility] [address] [facility] [place] [address] [phone] --             Transportation Services  Transportation: Private Transportation  Private: Car    Final Discharge Disposition Code: 06 - home with home health care

## 2025-07-08 NOTE — PROGRESS NOTES
Daily Progress Note        Pleural effusion    Stage IV adenocarcinoma of lung, right    Stage IV adenocarcinoma of lung, left    Paroxysmal atrial fibrillation    Hypothyroidism (acquired)    Pulmonary embolism    COVID-19 virus detected    Anemia, blood loss    Assessment:     Acute/chronic hypoxic respiratory insufficiency     COVID-19 positive on 7/7/2025     Pulmonary embolism on CT chest 6/29/2025  Echo 6/30/2025, estimated RVSP 45 to 55 mmHg with moderate pulmonary hypertension  Small less than 1 cm pericardial effusion no evidence of tamponade  EF 61%     Status post left Pleurx catheter for malignant left pleural effusion     Bronchoscopy 5/23/2025 Lung, left lower lobe, endobronchial biopsy):    Invasive moderately differentiated adenocarcinoma with micropapillary features,   Positive for lymphovascular invasion     CT chest 5/23/2025   interlobular septal thickening in theleft lung which 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     Bronchoscopy 5/23/2025 cultures negative to date     status post bedside left thoracentesis 5/22/2025 with removal of 950 mL of serosanguineous fluid fluid was positive for cytology non-small cell     History of right lung cancer adenocarcinoma, stage cTX N2 M1   status post concomitant radiation chemotherapy, diagnosed on bronchoscopy August 2024  CAD  CKD  Chronic A-fib on chronic anticoagulation Eliquis  Hypothyroidism  Chronic anemia     Former smoker: Quit 2000 after 32 pack years     Recommendations:     Oxygen titration currently on 1-2 L  Bronchodilators DC Brovana to avoid tachycardia continue Pulmicort and Yupelri  Anticoagulation Lovenox     Continue low-dose hydrocortisone 20 mg p.o. twice daily for acute bronchitis due to COVID and hypoxic patient as well as COPD and possible lymphangitic spread from recurrent lung cancer    Protonix 40 mg daily  Synthroid   Bronchodilators              LOS: 0 days     Subjective         Objective     Vital signs for last 24  hours:  Vitals:    07/07/25 1952 07/07/25 1957 07/07/25 2320 07/08/25 0311   BP:   148/91 150/88   BP Location:   Right arm Right arm   Patient Position:   Lying Lying   Pulse: 77 77 75 77   Resp: 22 22 14 19   Temp:  97.8 °F (36.6 °C) 96.6 °F (35.9 °C) 97.5 °F (36.4 °C)   TempSrc:  Oral Oral Oral   SpO2: 96% 96% 97% 96%   Weight:       Height:           Intake/Output last 3 shifts:  I/O last 3 completed shifts:  In: 350 [I.V.:350]  Out: 500 [Urine:500]  Intake/Output this shift:  No intake/output data recorded.      Radiology  Imaging Results (Last 24 Hours)       Procedure Component Value Units Date/Time    XR Chest 1 View [[id number]] Collected: 07/07/25 1727     Updated: 07/07/25 1731    Narrative:      XR CHEST 1 VW    Date of Exam: 7/7/2025 5:00 PM EDT    Indication: Assess for pleural effusion    Comparison: 7/5/2025    Findings:  Right-sided PICC line remains in place and unchanged in position. Left-sided thoracostomy tube is also unchanged. Heart size is within normal limits. Pulmonary vessels are indistinct. There are prominent interstitial markings bilaterally left greater   than right. Findings may be related to interstitial edema. There are bands of linear atelectasis in the right perihilar region and left lung base consistent with superimposed atelectasis. There is a stable small right pleural effusion. No left pleural   effusion. No pneumothorax.      Impression:      Impression:    1. Prominent interstitial markings which are nonspecific but could be related to interstitial edema.  2. Small right pleural effusion. No left pleural effusion.  3. No pneumothorax.      Electronically Signed: Aftab Blakely MD    7/7/2025 5:28 PM EDT    Workstation ID: [id number]            Labs:  Results from last 7 days   Lab Units 07/08/25  0527   WBC 10*3/mm3 7.03   HEMOGLOBIN g/dL 9.3*   HEMATOCRIT % 29.4*   PLATELETS 10*3/mm3 114*     Results from last 7 days   Lab Units 07/08/25 0527 07/06/25  1344   SODIUM mmol/L  139 142   POTASSIUM mmol/L 4.0 3.8   CHLORIDE mmol/L 108* 107   CO2 mmol/L 24.5 24.4   BUN mg/dL 22.0 20.2   CREATININE mg/dL 1.07 1.09   CALCIUM mg/dL 7.7* 7.8*   BILIRUBIN mg/dL  --  <0.2   ALK PHOS U/L  --  56   ALT (SGPT) U/L  --  9   AST (SGOT) U/L  --  22   GLUCOSE mg/dL 95 144*         Results from last 7 days   Lab Units 07/06/25  1344 07/03/25  1712 07/03/25  0950   ALBUMIN g/dL 2.8* 3.0* 3.0*             Results from last 7 days   Lab Units 07/03/25  0543   MAGNESIUM mg/dL 1.8     Results from last 7 days   Lab Units 07/06/25  1344 07/01/25  1237   INR  1.07  --    APTT seconds 37.2* 27.7     Results from last 7 days   Lab Units 07/03/25  0950 07/03/25  0543   TSH uIU/mL  --  54.400*   FREE T4 ng/dL 0.63*  --            Meds:   SCHEDULE  budesonide, 0.5 mg, Nebulization, BID - RT   And  revefenacin, 175 mcg, Nebulization, Daily - RT  enoxaparin sodium, 1 mg/kg, Subcutaneous, Q12H  guaiFENesin, 600 mg, Oral, BID  hydrocortisone, 20 mg, Oral, BID  levothyroxine, 75 mcg, Oral, Q AM  metoprolol succinate XL, 50 mg, Oral, Q12H  pantoprazole, 40 mg, Oral, Daily  sodium chloride, 10 mL, Intravenous, Q12H  tamsulosin, 0.4 mg, Oral, Nightly      Infusions     PRNs    acetaminophen    senna-docusate sodium **AND** polyethylene glycol **AND** bisacodyl **AND** bisacodyl    Calcium Replacement - Follow Nurse / BPA Driven Protocol    ipratropium-albuterol    Magnesium Standard Dose Replacement - Follow Nurse / BPA Driven Protocol    nitroglycerin    ondansetron    Phosphorus Replacement - Follow Nurse / BPA Driven Protocol    Potassium Replacement - Follow Nurse / BPA Driven Protocol    [COMPLETED] Insert Peripheral IV **AND** sodium chloride    sodium chloride    sodium chloride    Physical Exam:  Physical Exam  Cardiovascular:      Heart sounds: Murmur heard.      No gallop.   Pulmonary:      Effort: No respiratory distress.      Breath sounds: No stridor. Rhonchi and rales present. No wheezing.   Chest:      Chest  wall: No tenderness.         ROS  Review of Systems          Total critical care time spent with patient greater than: 45 Minutes

## 2025-07-08 NOTE — DISCHARGE PLACEMENT REQUEST
"Young Sierra (74 y.o. Male)       Date of Birth   1950    Social Security Number       Address   [address] [place] [address] [place] [facility]    Home Phone   [phone]    MRN   [id number]       Orthodox   None    Marital Status                               Admission Date   7/6/2025    Admission Type   Emergency    Admitting Provider   Evelyn Felder MD    Attending Provider   Evelyn Felder MD    Department, Room/Bed   [facility] INPATIENT, 362/B       Discharge Date       Discharge Disposition       Discharge Destination                                 Attending Provider: Evelyn Felder MD    Allergies: No Known Allergies    Isolation: Enh Drop/Con   Infection: COVID (confirmed) (07/07/25)   Code Status: CPR    Ht: 177.8 cm (70\")   Wt: 79 kg (174 lb 2.6 oz)    Admission Cmt: None   Principal Problem: Pleural effusion [J90]                   Active Insurance as of 7/6/2025       Primary Coverage       Payor Plan Insurance Group Employer/Plan Group    AETNA MEDICARE REPLACEMENT AETNA MEDICARE ADVANTAGE [id number]       Payor Plan Address Payor Plan Phone Number Payor Plan Fax Number Effective Dates    [address] [phone]  1/1/2024 - None Entered    [facility] [id number]         Subscriber Name Subscriber Birth Date Member ID       YOUNG SIERRA 1950 [id number]                     Emergency Contacts        (Rel.) Home Phone Work Phone Mobile Phone    AMIRA SIERRA (Spouse) -- -- [phone]    Norma Mendes (Daughter) -- -- [phone]          "

## 2025-07-08 NOTE — PLAN OF CARE
Problem: Adult Inpatient Plan of Care  Goal: Plan of Care Review  Outcome: Progressing  Goal: Patient-Specific Goal (Individualized)  Outcome: Progressing  Goal: Absence of Hospital-Acquired Illness or Injury  Outcome: Progressing  Intervention: Identify and Manage Fall Risk  Recent Flowsheet Documentation  Taken 7/8/2025 0400 by Doreen Green RN  Safety Promotion/Fall Prevention: safety round/check completed  Taken 7/7/2025 2000 by Dorene Green RN  Safety Promotion/Fall Prevention: safety round/check completed  Intervention: Prevent and Manage VTE (Venous Thromboembolism) Risk  Recent Flowsheet Documentation  Taken 7/8/2025 0400 by Doreen Green RN  VTE Prevention/Management: patient refused intervention  Taken 7/7/2025 2000 by Doreen Green RN  VTE Prevention/Management: patient refused intervention  Goal: Optimal Comfort and Wellbeing  Outcome: Progressing  Intervention: Provide Person-Centered Care  Recent Flowsheet Documentation  Taken 7/8/2025 0000 by Doreen Green RN  Trust Relationship/Rapport: care explained  Taken 7/7/2025 2000 by Doreen Green RN  Trust Relationship/Rapport:   care explained   questions answered   questions encouraged  Goal: Readiness for Transition of Care  Outcome: Progressing     Problem: Comorbidity Management  Goal: Maintenance of COPD Symptom Control  Outcome: Progressing   Goal Outcome Evaluation:

## 2025-07-08 NOTE — DISCHARGE SUMMARY
Date of Discharge:  7/8/2025    Discharge Diagnosis:   Pleural effusion  Stage IV adenocarcinoma of lung, right  Stage IV adenocarcinoma of lung, left  Paroxysmal atrial fibrillation  Hypothyroidism (acquired)  Pulmonary embolism  COVID-19 virus detected    Presenting Problem/History of Present Illness  Active Hospital Problems    Diagnosis  POA    **Pleural effusion [J90]  Yes    COVID-19 virus detected [U07.1]  Yes    Anemia, blood loss [D50.0]  Yes    Pulmonary embolism [I26.99]  Yes    Paroxysmal atrial fibrillation [I48.0]  Yes    Hypothyroidism (acquired) [E03.9]  Yes    Stage IV adenocarcinoma of lung, left [C34.92]  Yes    Stage IV adenocarcinoma of lung, right [C34.91]  Yes      Resolved Hospital Problems   No resolved problems to display.          Hospital Course    Patient is a 74 y.o. male presented with h/o lung cancer, persistent malignant left pleural effusion with recently placed Pleur-x catheter, atrial fib and pulmonary embolism who presents with persistent bleeding from tunneled catheter for 2 days.  He was discharged from this facility on 7/3.  He presented to ED on 7/5 with bleeding from catheter that was treated with pressure dressing.  He continued to soak dressings at home and re-presented to ED on 7/6.  Hemoglobin dropped from 9.8 to 8.7. He had EGD with dilation of distal esophagus and drainage of Pleurx catheter. A suture was placed around the catheter to decrease chances of oozing/bleeding. Continue low-dose hydrocortisone 20 mg p.o. twice daily for acute bronchitis due to COVID and hypoxic patient as well as COPD and possible lymphangitic spread from recurrent lung cancer. Home health to continue draining pleurx catheter three times a week and when fluid decreases go to twice weekly. Also  to maintain PICC line care weekly flush and dressing change     Procedures Performed    Procedure(s):  ESOPHAGOGASTRODUODENOSCOPY WITH BALLOON DILATION (15MM-18MM, 18MM-20MM)  UP TO  20MM  -------------------  07/07 1522 Upper GI Endoscopy    Consults:   Consults       Date and Time Order Name Status Description    7/7/2025  4:26 PM Hematology & Oncology Inpatient Consult Completed     7/7/2025  2:59 PM Inpatient Pulmonology Consult Completed     7/7/2025  8:18 AM Inpatient Thoracic Surgery Consult      7/2/2025  4:39 PM Inpatient Cardiology Consult Completed     6/30/2025  7:23 AM Inpatient Thoracic Surgery Consult Completed     6/29/2025 10:49 PM Hematology & Oncology Inpatient Consult Completed     6/29/2025 10:44 PM Inpatient Pulmonology Consult Completed     6/29/2025 10:44 PM Inpatient Gastroenterology Consult Completed             Pertinent Test Results:    Lab Results (most recent)       Procedure Component Value Units Date/Time    Iron Profile w/o Ferritin [[id number]]  (Abnormal) Collected: 07/08/25 0527    Specimen: Blood Updated: 07/08/25 0631     Iron 43 mcg/dL      Iron Saturation (TSAT) 18 %      Transferrin 163 mg/dL      TIBC 243 mcg/dL     Ferritin [[id number]]  (Normal) Collected: 07/08/25 0527    Specimen: Blood Updated: 07/08/25 0631     Ferritin 196.00 ng/mL     Narrative:      Results may be falsely decreased if patient taking Biotin.      Basic Metabolic Panel [[id number]]  (Abnormal) Collected: 07/08/25 0527    Specimen: Blood Updated: 07/08/25 0631     Glucose 95 mg/dL      BUN 22.0 mg/dL      Creatinine 1.07 mg/dL      Sodium 139 mmol/L      Potassium 4.0 mmol/L      Chloride 108 mmol/L      CO2 24.5 mmol/L      Calcium 7.7 mg/dL      BUN/Creatinine Ratio 20.6     Anion Gap 6.5 mmol/L      eGFR 72.4 mL/min/1.73     Narrative:      GFR Categories in Chronic Kidney Disease (CKD)              GFR Category          GFR (mL/min/1.73)    Interpretation  G1                    90 or greater        Normal or high (1)  G2                    60-89                Mild decrease (1)  G3a                   45-59                Mild to moderate decrease  G3b                   30-44                 Moderate to severe decrease  G4                    15-29                Severe decrease  G5                    14 or less           Kidney failure    (1)In the absence of evidence of kidney disease, neither GFR category G1 or G2 fulfill the criteria for CKD.    eGFR calculation 2021 CKD-EPI creatinine equation, which does not include race as a factor    CBC & Differential [[id number]]  (Abnormal) Collected: 07/08/25 0527    Specimen: Blood Updated: 07/08/25 0552    Narrative:      The following orders were created for panel order CBC & Differential.  Procedure                               Abnormality         Status                     ---------                               -----------         ------                     CBC Auto Differential[[id number]]        Abnormal            Final result                 Please view results for these tests on the individual orders.    Reticulocytes [[id number]]  (Abnormal) Collected: 07/08/25 0527    Specimen: Blood Updated: 07/08/25 0552     Reticulocyte % 2.03 %      Reticulocyte Absolute 0.0625 10*6/mm3     CBC Auto Differential [[id number]]  (Abnormal) Collected: 07/08/25 0527    Specimen: Blood Updated: 07/08/25 0552     WBC 7.03 10*3/mm3      RBC 3.08 10*6/mm3      Hemoglobin 9.3 g/dL      Hematocrit 29.4 %      MCV 95.5 fL      MCH 30.2 pg      MCHC 31.6 g/dL      RDW 14.6 %      RDW-SD 50.1 fl      MPV 9.7 fL      Platelets 114 10*3/mm3      Neutrophil % 68.4 %      Lymphocyte % 20.5 %      Monocyte % 8.4 %      Eosinophil % 1.8 %      Basophil % 0.3 %      Immature Grans % 0.6 %      Neutrophils, Absolute 4.81 10*3/mm3      Lymphocytes, Absolute 1.44 10*3/mm3      Monocytes, Absolute 0.59 10*3/mm3      Eosinophils, Absolute 0.13 10*3/mm3      Basophils, Absolute 0.02 10*3/mm3      Immature Grans, Absolute 0.04 10*3/mm3      nRBC 0.0 /100 WBC     Vitamin B12 [[id number]] Collected: 07/08/25 0527    Specimen: Blood Updated: 07/08/25 0546    Folate [[id number]]  Collected: 07/08/25 0527    Specimen: Blood Updated: 07/08/25 0546    Hemoglobin A1c [[id number]]  (Abnormal) Collected: 07/07/25 0347    Specimen: Blood Updated: 07/07/25 1556     Hemoglobin A1C 5.66 %     Narrative:      Hemoglobin A1C Ranges:    Increased Risk for Diabetes  5.7% to 6.4%  Diabetes                     >= 6.5%  Diabetic Goal                < 7.0%    COVID-19,CEPHEID/CIARA,COR/NEY/PAD/EUFEMIA/LAG/LUCRETIA IN-HOUSE,NP SWAB IN TRANSPORT MEDIA 1 HR TAT, RT-PCR - Swab, Nasopharynx [[id number]]  (Abnormal) Collected: 07/07/25 0941    Specimen: Swab from Nasopharynx Updated: 07/07/25 1058     COVID19 Detected    Narrative:      Fact sheet for providers: https://www.fda.gov/media/832671/download     Fact sheet for patients: https://www.fda.gov/media/012343/download    CBC (No Diff) [[id number]]  (Abnormal) Collected: 07/07/25 0347    Specimen: Blood Updated: 07/07/25 0436     WBC 6.96 10*3/mm3      RBC 2.89 10*6/mm3      Hemoglobin 8.7 g/dL      Hematocrit 27.9 %      MCV 96.5 fL      MCH 30.1 pg      MCHC 31.2 g/dL      RDW 14.4 %      RDW-SD 50.5 fl      MPV 9.9 fL      Platelets 115 10*3/mm3     Protime-INR [[id number]]  (Normal) Collected: 07/06/25 1344    Specimen: Blood Updated: 07/06/25 1416     Protime 13.8 Seconds      INR 1.07    aPTT [[id number]]  (Abnormal) Collected: 07/06/25 1344    Specimen: Blood Updated: 07/06/25 1416     PTT 37.2 seconds     Comprehensive Metabolic Panel [[id number]]  (Abnormal) Collected: 07/06/25 1344    Specimen: Blood Updated: 07/06/25 1412     Glucose 144 mg/dL      BUN 20.2 mg/dL      Creatinine 1.09 mg/dL      Sodium 142 mmol/L      Potassium 3.8 mmol/L      Chloride 107 mmol/L      CO2 24.4 mmol/L      Calcium 7.8 mg/dL      Total Protein 4.7 g/dL      Albumin 2.8 g/dL      ALT (SGPT) 9 U/L      AST (SGOT) 22 U/L      Alkaline Phosphatase 56 U/L      Total Bilirubin <0.2 mg/dL      Globulin 1.9 gm/dL      A/G Ratio 1.5 g/dL      BUN/Creatinine Ratio 18.5     Anion Gap 10.6 mmol/L       eGFR 70.8 mL/min/1.73     Narrative:      GFR Categories in Chronic Kidney Disease (CKD)              GFR Category          GFR (mL/min/1.73)    Interpretation  G1                    90 or greater        Normal or high (1)  G2                    60-89                Mild decrease (1)  G3a                   45-59                Mild to moderate decrease  G3b                   30-44                Moderate to severe decrease  G4                    15-29                Severe decrease  G5                    14 or less           Kidney failure    (1)In the absence of evidence of kidney disease, neither GFR category G1 or G2 fulfill the criteria for CKD.    eGFR calculation 2021 CKD-EPI creatinine equation, which does not include race as a factor    Extra Tubes [[id number]] Collected: 07/06/25 1344    Specimen: Blood, Venous Line Updated: 07/06/25 1400    Narrative:      The following orders were created for panel order Extra Tubes.  Procedure                               Abnormality         Status                     ---------                               -----------         ------                     Green Top (Gel)[[id number]]                                  Final result                 Please view results for these tests on the individual orders.    Green Top (Gel) [[id number]] Collected: 07/06/25 1344    Specimen: Blood Updated: 07/06/25 1400     Extra Tube Hold for add-ons.     Comment: Auto resulted.       CBC & Differential [[id number]]  (Abnormal) Collected: 07/06/25 1344    Specimen: Blood Updated: 07/06/25 1351    Narrative:      The following orders were created for panel order CBC & Differential.  Procedure                               Abnormality         Status                     ---------                               -----------         ------                     CBC Auto Differential[[id number]]        Abnormal            Final result                 Please view results for these tests on the  individual orders.    CBC Auto Differential [[id number]]  (Abnormal) Collected: 07/06/25 1344    Specimen: Blood Updated: 07/06/25 1351     WBC 5.68 10*3/mm3      RBC 3.03 10*6/mm3      Hemoglobin 9.3 g/dL      Hematocrit 29.1 %      MCV 96.0 fL      MCH 30.7 pg      MCHC 32.0 g/dL      RDW 14.6 %      RDW-SD 50.4 fl      MPV 9.6 fL      Platelets 113 10*3/mm3      Neutrophil % 76.6 %      Lymphocyte % 14.8 %      Monocyte % 6.3 %      Eosinophil % 0.9 %      Basophil % 0.5 %      Immature Grans % 0.9 %      Neutrophils, Absolute 4.35 10*3/mm3      Lymphocytes, Absolute 0.84 10*3/mm3      Monocytes, Absolute 0.36 10*3/mm3      Eosinophils, Absolute 0.05 10*3/mm3      Basophils, Absolute 0.03 10*3/mm3      Immature Grans, Absolute 0.05 10*3/mm3      nRBC 0.0 /100 WBC              Results for orders placed during the hospital encounter of 06/29/25    Adult Transthoracic Echo Complete W/ Cont if Necessary Per Protocol 06/30/2025  6:51 AM    Interpretation Summary    Left ventricular systolic function is normal. Left ventricular ejection fraction appears to be 61 - 65%.    Left ventricular diastolic function was normal.    The right ventricular cavity is mild to moderately dilated.    Saline test results are negative.    Estimated right ventricular systolic pressure from tricuspid regurgitation is moderately elevated (45-55 mmHg).    Moderate pulmonary hypertension is present.    There is a small (<1cm) pericardial effusion. There is no evidence of cardiac tamponade.    There is a large left pleural effusion.         Condition on Discharge:  Stable    Vital Signs  Temp:  [96.6 °F (35.9 °C)-97.8 °F (36.6 °C)] 97.4 °F (36.3 °C)  Heart Rate:  [] 80  Resp:  [14-25] 20  BP: (101-159)/(71-98) 158/98      Physical Exam  Vitals reviewed.   Constitutional:       Appearance: He is not ill-appearing.   HENT:      Head: Normocephalic and atraumatic.      Right Ear: External ear normal.      Left Ear: External ear normal.       Nose: Nose normal.      Mouth/Throat:      Mouth: Mucous membranes are moist.   Eyes:      General:         Right eye: No discharge.         Left eye: No discharge.   Cardiovascular:      Rate and Rhythm: Normal rate and regular rhythm.      Pulses: Normal pulses.      Heart sounds: Normal heart sounds.   Pulmonary:      Effort: Pulmonary effort is normal.      Breath sounds: Normal breath sounds.   Abdominal:      General: Bowel sounds are normal.      Palpations: Abdomen is soft.   Musculoskeletal:         General: Normal range of motion.      Cervical back: Normal range of motion.   Skin:     General: Skin is warm and dry.   Neurological:      Mental Status: He is alert and oriented to person, place, and time.   Psychiatric:         Behavior: Behavior normal.              Discharge Disposition  Home-Health Care [facility]    Discharge Medications     Discharge Medications        Continue These Medications        Instructions Start Date   acetaminophen 325 MG tablet  Commonly known as: TYLENOL   650 mg, Oral, Every 4 Hours PRN      Breztri Aerosphere 160-9-4.8 MCG/ACT aerosol inhaler  Generic drug: Budeson-Glycopyrrol-Formoterol   2 puffs, Inhalation, 2 Times Daily      enoxaparin sodium 80 MG/0.8ML solution prefilled syringe syringe  Commonly known as: LOVENOX   1 mg/kg (70 mg), Subcutaneous, Every 12 Hours Scheduled      hydrocortisone 20 MG tablet  Commonly known as: CORTEF   20 mg, Oral, 2 Times Daily      ipratropium-albuterol 0.5-2.5 mg/3 ml nebulizer  Commonly known as: DUO-NEB   3 mL, Every 6 Hours PRN      levothyroxine 75 MCG tablet  Commonly known as: SYNTHROID, LEVOTHROID   75 mcg, Oral, Every Morning      metoprolol succinate XL 50 MG 24 hr tablet  Commonly known as: TOPROL-XL   50 mg, Oral, Every 12 Hours Scheduled      Mucinex 600 MG 12 hr tablet  Generic drug: guaiFENesin   600 mg, 2 Times Daily      NON FORMULARY   2 tablets, Nightly      ondansetron 8 MG tablet  Commonly known as: ZOFRAN   8 mg, Oral, 3  Times Daily PRN      pantoprazole 40 MG EC tablet  Commonly known as: PROTONIX   40 mg, Oral, Daily      tamsulosin 0.4 MG capsule 24 hr capsule  Commonly known as: FLOMAX   1 capsule, Nightly             Stop These Medications      amoxicillin-clavulanate 875-125 MG per tablet  Commonly known as: AUGMENTIN     apixaban 5 MG tablet tablet  Commonly known as: ELIQUIS     VITAMIN D-3 PO              Discharge Diet:   Diet Instructions       Diet: Regular/House Diet; Regular (IDDSI 7); Thin (IDDSI 0)      Discharge Diet: Regular/House Diet    Texture: Regular (IDDSI 7)    Fluid Consistency: Thin (IDDSI 0)            Activity at Discharge:   Activity Instructions       Activity as Tolerated              Follow-up Appointments  Future Appointments   Date Time Provider Department Center   7/11/2025  9:45 AM [facility] INJECTION CHAIR [facility]   7/16/2025 10:00 AM [facility] INJECTION CHAIR [facility]   7/16/2025 10:15 AM Arben Tim MD [facility]   7/18/2025 10:00 AM [facility] INJECTION CHAIR [facility]   7/23/2025  8:15 AM INF ROOM 21 - CHAIR [facility]   8/1/2025  9:30 AM [facility] INJECTION CHAIR [facility]   8/22/2025  9:30 AM [facility] INJECTION CHAIR [facility]   9/17/2025  9:10 AM Kelvin Gonzalez MD [facility] None     Additional Instructions for the Follow-ups that You Need to Schedule       Ambulatory Referral to Home Health   As directed      Face to Face Visit Date: 7/8/2025   Follow-up provider for Plan of Care?: I treated the patient in an acute care facility and will not continue treatment after discharge.   Follow-up provider: AAYUSH HOFFMANN [[id number]]   Reason/Clinical Findings: VERONICA   Describe mobility limitations that make leaving home difficult: weak   Nursing/Therapeutic Services Requested: Skilled Nursing   Skilled nursing orders: Wound care dressing/changes Vascular access care/instruction   Instructions: pleurx drainage 3 times week   Select type: PICC    Frequency: 1 Week 1        Discharge Follow-up with PCP   As directed       Currently Documented PCP:    Abner Funes APRN    PCP Phone Number:    [phone]     Follow Up Details: 2 weeks        Discharge Follow-up with Specified Provider: Dr Gonzalez; 2 Weeks   As directed      To: Dr Gonzalez   Follow Up: 2 Weeks                Test Results Pending at Discharge  Pending Results       Procedure [Order ID] Specimen - Date/Time    Extra Tubes [[id number]]     Specimen: Blood, Venous Line     Folate [[id number]] Collected: 07/08/25 0527    Specimen: Blood Updated: 07/08/25 0546    Vitamin B12 [[id number]] Collected: 07/08/25 0527    Specimen: Blood Updated: 07/08/25 0546             CARL Cook  07/08/25  09:38 EDT    Time: Discharge 25 min

## 2025-07-08 NOTE — PROGRESS NOTES
Start PACC Note    Home Health Referral    Evaluated patient and wife on Home Care and services available. Patient offered choice of available HHC and agreeable to SN services with [facility].    Isolation Precautions: Enhanced Droplet/Contact     START PATIENT REGISTRATION INFORMATION  Order Information  Order Signing Physician: Evelyn Felder MD  Service Ordered RN?: Yes  Service Ordered PT?: No  Service Ordered OT?: No  Service Ordered ST?: No  Service Ordered MSW?: No  Service Ordered HHA?: No  Following Physician: Dr Van Cope  Following Physician Phone: [phone]    Overseeing Physician: Abner Funes APRN  (Required for Residents Only)  Agreeable to Follow ? Yes  Date/Time of Call 07/08/25 11:10 EDT, Spoke with: per Dr Cope, thoracic surgery, will sign orders.    Care Coordination  Same Day SOC?: No  Primary Care Physician: Abner Funes APRN  Primary Care Physician Phone: [phone]  Primary Care Physician Address: [address] [place] [address] [place] [address]  Visit Instructions: N/A  Service Discharge Location Type: Home  Service Facility Name: N/A  Service Floor Facility: N/A  Service Room No: N/A        Demographics  Patient Last Name: Kwaku  Patient First Name: Young  Language/Communication Barrier: none  Service Address: [address] [place] [address]  Service City: [place]  Service State: [place]  Service Zip: [address]  Service Home Phone: [phone]  Other Phone Numbers:   Telephone Information:   Mobile [phone]     Emergency Contact:   Extended Emergency Contact Information  Primary Emergency Contact: AMIRA SIERRA  Mobile Phone: [phone]  Relation: Spouse  Hearing or visual needs: None  Other needs: None  Preferred language: English   needed? No  Secondary Emergency Contact: Norma Mendes   [place]  Mobile Phone: [phone]  Relation: Daughter    Admission Information  Admit Date: 7/6/2025  Patient Status at Discharge: Observation  Admitting Diagnosis: Pleural  effusion [J90]  Thoracostomy tube in place [Z96.89]  Visit for wound check [Z51.89]    Caregiver Information  Caregiver First Name:   Caregiver Last Name:   Caregiver Relationship to Patient:   Caregiver Phone Number:   Caregiver Notes:     HITECH  [employer] List  HIGHTECH: HI TECH - PLEURAL DRAIN  Orders: Drain pleurex 3X week M,W,F initially. Drain no more than 1000 cc each time per Dr Cope. Call for any questions or concerns.  Supplies Sent Home?: Yes  Quantity Sent: 6    Teachable Caregiver  Teachable Caregiver First Name: Ruth  Teachable Caregiver Last Name: Kwaku  Teachable Caregiver Relationship to Patient: wife  Teachable Caregiver Phone Number: [phone]  Teachable Caregiver Notes: N/A  Teachable Caregiver available for Start of Care visit?: Yes  Teachable Caregiver agreeable to provide skilled High [employer] care per physician's orders?: Yes      END PATIENT REGISTRATION INFORMATION        Start PACC Summary    Additional Comments: spoke with  and patient will be sent home with at least 3 drain kits.  Wife is aware that she will be educated in the drain and visit scheduled for tomorrow.      END PACC Summary    Discharge Date: Pending    Referral Source: KATIE Marrero    Signed By: Anat Galarza RN, 7/8/2025, 11:10 EDT     Date/Time: 07/08/25 11:10 EDT    End PACC Note

## 2025-07-08 NOTE — CASE MANAGEMENT/SOCIAL WORK
Discharge Planning Assessment   Janes     Patient Name: Young Ames  MRN: [id number]  Today's Date: 7/8/2025    Admit Date: 7/6/2025    Plan: Return home with wife and [facility] (accepted, order per MD).   Discharge Needs Assessment       Row Name 07/08/25 1108       Living Environment    People in Home spouse    Name(s) of People in Home Wife-Ruth    Current Living Arrangements home    Potentially Unsafe Housing Conditions none    In the past 12 months has the electric, gas, oil, or water company threatened to shut off services in your home? No    Primary Care Provided by self    Provides Primary Care For no one    Family Caregiver if Needed spouse    Family Caregiver Names Wife-Ruth    Quality of Family Relationships helpful;involved;supportive    Able to Return to Prior Arrangements yes       Resource/Environmental Concerns    Resource/Environmental Concerns none    Transportation Concerns none       Transportation Needs    In the past 12 months, has lack of transportation kept you from medical appointments or from getting medications? no    In the past 12 months, has lack of transportation kept you from meetings, work, or from getting things needed for daily living? No       Food Insecurity    Within the past 12 months, you worried that your food would run out before you got the money to buy more. Never true    Within the past 12 months, the food you bought just didn't last and you didn't have money to get more. Never true       Transition Planning    Patient/Family Anticipates Transition to home with family;home with help/services    Patient/Family Anticipated Services at Transition home health care    Transportation Anticipated family or friend will provide       Discharge Needs Assessment    Readmission Within the Last 30 Days no previous admission in last 30 days  currently obs status    Equipment Currently Used at Home oxygen;bp cuff    Concerns to be Addressed discharge planning;care coordination/care  conferences    Do you want help finding or keeping work or a job? Patient declined    Do you want help with school or training? For example, starting or completing job training or getting a high school diploma, GED or equivalent No    Anticipated Changes Related to Illness none    Equipment Needed After Discharge none    Outpatient/Agency/Support Group Needs homecare agency    Discharge Facility/Level of Care Needs home with home health    Provided Post Acute Provider List? N/A    Provided Post Acute Provider Quality & Resource List? N/A                   Discharge Plan       Row Name 07/08/25 1112       Plan    Plan Return home with wife and [facility] (accepted, order per MD).    Patient/Family in Agreement with Plan yes    Plan Comments CM contacted wife Ruth by phone to complete assessment. Pt lives at home, drives, and is independent with ADL's. PCP and pharmacy confirmed ([facility] in [place]). [facility] BP [facility]. Reviewed pt's previous hospitalization and noted he was to start services with [facility]. CM contacted liaison Anat BARBER who reports pt returned to hospital before they were able to start services. She verified staffing availability to see pt tomorrow. Pt has pleurx cath that will be drained 3 days/week MWF. Wife also mentioned PICC line care being done. Reviewed further and [facility] Pleurx starter kit provided to pt by nursing staff.                  Continued Care and Services - Admitted Since 7/6/2025       Home Medical Care Coordination complete.      Service Provider Request Status Services Address Phone Fax Patient Preferred    [facility] [address] [facility] [place] [address] [phone] --                   Demographic Summary       Row Name 07/08/25 110       General Information    Admission Type observation    Arrived From emergency department    Referral Source  admission list    Reason for Consult care coordination/care conference;discharge planning    Preferred Language English       Contact Information    Permission Granted to Share Info With                    Functional Status       Row Name 07/08/25 9349       Functional Status    Usual Activity Tolerance good    Current Activity Tolerance good       Functional Status, IADL    Medications independent    Meal Preparation independent    Housekeeping independent    Laundry independent    Shopping independent    If for any reason you need help with day-to-day activities such as bathing, preparing meals, shopping, managing finances, etc., do you get the help you need? I get all the help I need             Megan Naegele, RN     Office Phone: [phone]  Office Cell: [phone]

## 2025-07-09 ENCOUNTER — READMISSION MANAGEMENT (OUTPATIENT)
Dept: CALL CENTER | Facility: HOSPITAL | Age: 75
End: 2025-07-09
Payer: MEDICARE

## 2025-07-09 DIAGNOSIS — J18.9 PNEUMONIA DUE TO INFECTIOUS ORGANISM, UNSPECIFIED LATERALITY, UNSPECIFIED PART OF LUNG: Primary | ICD-10-CM

## 2025-07-09 RX ORDER — BUDESONIDE, GLYCOPYRROLATE, AND FORMOTEROL FUMARATE 160; 9; 4.8 UG/1; UG/1; UG/1
2 AEROSOL, METERED RESPIRATORY (INHALATION) 2 TIMES DAILY
Qty: 1 EACH | Refills: 0 | COMMUNITY
Start: 2025-07-09

## 2025-07-09 NOTE — OUTREACH NOTE
Prep Survey      Flowsheet Row Responses   [facility] patient discharged from? [facility]   Is LACE score < 7 ? No   Eligibility Readm Mgmt   Discharge diagnosis Pleural effusion   Does the patient have one of the following disease processes/diagnoses(primary or secondary)? Other   Does the patient have Home health ordered? Yes   What is the Home health agency?  [facility]   Is there a DME ordered? No   Medication alerts for this patient see avs   Prep survey completed? Yes            Nazanin SHUKLA - Registered Nurse

## 2025-07-10 NOTE — PROGRESS NOTES
[facility]  HEMATOLOGY ONCOLOGY OUTPATIENT FOLLOW UP       Patient name: Young Ames  : 1950  MRN: [id number]  Primary Care Physician: Abner Funes APRN  Referring Physician: No ref. provider found  Reason For Consult: Adenocarcinoma of the right lung.     History of Present Illness:    2024: Mr. Ames first came to [facility] complaining of dyspnea. This had been getting worse over a short period of time. It was associated to unintended weight loss of approximately 15 lbs in around one month. He was diagnosed with pneumonia in 2024 he was treated with azithromycin and with amoxicillin/clavulanate, despite which he did not seem to recover completely. CT scan had shown dense consolidation of the right lower lobe and there was at some point suggestion of a cavitary lesion. 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 Following this a repeat scan showed a questionable 5.5 cm right lower lobe cavitary lesion. A bronchoscopy and biopsy on 2024 confirmed adenocarcinoma of the right lung. He feels reasonably well at this time. He has been breathing better, though he persists with dyspnea or exertion. He has been afebrile. He continues to cough but not more than before. He has not had abdominal pain and denies diarrhea or dysuria. On exam alert and conversant. Oriented and in no distress. No jaundice. No oral lesions and respirations are not labored. Lungs diminished bilaterally and absent on the right lower lobe. The abdomen is soft. No edema. Reviewed the images of the scans. Reviewed the laboratory exams. Discussed with him and his family. To proceed with a PET scan. Will not obtain  pulmonary function tests, as most likely he has had recurrence of a significant right pleural effusion. He is to see me with results.     2024: Feeling about the same as at the time of the last visit but has  had more dyspnea.  He was placed on new medication in spite of which, intermittently, he continues to be uncomfortable.  He is eating reasonably well and has had no weight loss.  He is also afebrile.  He denies chest pains.  No abdominal pain.  On exam he is conversant and oriented.  No distress.  No jaundice.  The lungs are diminished bilaterally but there is absence of breath sounds and a cough when he in the lower parts of the right chest.  The abdomen is soft.  There is no edema.  Laboratory exams and final reports of pathology were reviewed and discussed with him.  Discussed with him the stage of the disease, which corresponds to 4, given the pleural involvement.  To start chemotherapy and immunotherapy.  I have requested next-generation sequencing.  Port as soon as available.    9/29/2024: Feels reasonably well today.  Has had less dyspnea since the insertion of the pleural catheter as he has been able to have the fluid drained at home more frequently.  Yesterday, for the first time, he had persistent right-sided chest pain after the drainage of the fluid that is now resolved.  He continues to be reasonably active.  He continues to eat reasonably well and has had very minimal nausea.  He took the first chemotherapy without any complications.  He denies chest pain and he has less dyspnea than before.  No abdominal pain and no edema.  On exam chronically ill-appearing but in no distress.  No jaundice.  The lungs are diminished bilaterally with more pronounced reduction of the breath sounds in the right.  The heart is regular.  The abdomen is soft.  There is no edema.  Laboratory exams reviewed.  Discussed with him.  Continue same therapy for now.  I have sent a prescription for tramadol that he can take as needed for pain, when present.    10/14/2024: Breathing better.  Progressively finding less fluid in the thoracic cavity.  Is able to do more activity.  Has had enuresis once.  Also has noted tremors.  He is  eating well.  No nausea or vomiting.  No chest pains.  No abdominal pain.  On exam he does not seem in any distress.  He is conversant and well-oriented.  No jaundice or pallor.  Lungs symmetrically ventilated without any dullness to percussion on the right.  Abdomen soft.  No edema.  Laboratory exams reviewed.  Discussed with him.  My suspicion is that he is no longer producing as much pleural fluid as before and that perhaps today they will not be able to drain much.  Will obtain a scan and will see with results.  Continue for now.  I am not sure what the cause of the tremors and the enuresis is.    11/11/2024: Feeling well.  Continues to tolerate the treatment without difficulties.  He has no more dyspnea or pain than before.  He eats well and has had no nausea or vomiting.  As well his weight is stable.  Denies diarrhea or dysuria.  On exam he does not seem ill.  He is not jaundiced.  The lungs are diminished bilaterally but symmetrically.  Heart is regular.  The abdomen is soft.  There is no edema.  Laboratory exams reviewed.  Reviewed the images and the report of the scans.  There are some suggestion of response as the lymph nodes and the primary tumor have all decreased in size though it is difficult to tell if the primary lesion has decreased given the associated opacity surrounding it.  For now continue same treatment.  Treatment with immunotherapy after he has completed 4 cycles of chemoimmunotherapy.  See me in approximately 6 weeks.  Treatment plan placed.    12/23/2024: Received the first cycle of immunotherapy without any difficulties.  Has had some episodes of fever since the last visit.  Sometimes as high as 101.3.  He has no symptoms associated to this and often is not even aware that he has a fever.  He is using a forehead thermometer that seems to be good working order.  He is energetic in general.  He has no new limitations.  He has recently hurt a hip.  He is eating reasonably well though he  persists with marked dysgeusia.  No chest pains or cough.  He has not had much drainage from the Pleurx catheter lately in the last 3 times they were not able to drain any fluid.  He has no abdominal pain, diarrhea or dysuria.  On exam alert and conversant.  Oriented and in no distress.  No jaundice.  The lungs are diminished bilaterally but in a symmetrical fashion.  I do not believe there is any fluid at this time in the right pleural space.  Heart is regular.  Abdomen is soft.  There is no edema.  Laboratory exams reviewed and discussed with him.  To remove the Pleurx catheter.  Ideally to send the tip for culture.  He is also going to have blood cultures from the port and from a peripheral vein.  He will see me in a few weeks.  Continue immunotherapy for now.    2/13/2025: Has been feeling very well.  Has no new complaints.  His appetite has improved since he discontinued the chemotherapy and he has gained some weight.  He has no more dyspnea and has not been coughing.  His oxygen saturation has been above 90 consistently.  No chest pains or abdominal pain.  Denies diarrhea or constipation.  No dysuria.  No edema.  On exam alert and conversant.  Oriented.  No distress.  The neck is without deformity.  Palpation discloses a barely palpable thyroid.  No nodules.  No palpable lymphadenopathy.  Respirations not labored.  Lungs diminished bilaterally in a symmetrical fashion.  Heart regular.  Abdomen soft and without tumors.  No edema.  Reviewed the laboratory exams.  On 2 occasions he has had a very low TSH with slight elevations of the free T4, but without any symptoms.  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  Will investigate.    3/27/2025: Tired and with arthralgia of the right hip and both knees. He's had difficulties with arthritis for some time. The pain in the knees is predominantly when going up steps. He has been fatigued. He continues to eat with good appetite. No new respiratory symptoms.  Denies fevers. On exam alert and conversant. Oriented and in no distress. No jaundice. O oral lesions and respirations not labored. Lungs diminished but clear bilaterally. Heart regular. Abdomen soft and not tender. No edema. Reviewed the laboratory exams. To  continue with the same treatment. A new prescription for levothyroxine 50 mcg was sent and he was given instructions. He is to see me in 6 weeks.     5/15/81136: Feels about the same as at the time of the previous visit. His wife has noted that his oxygen saturation has decreased from the high 90's to the mid 90's and he feels more dyspneic. No chest pain but he has been coughing more and expectorates without hemoptysis. No dysphagia. Denies abdominal pain and has not had diarrhea. No dysuria. No edema. No skin rash. On exam alert and conversant. Oriented and in no distress. No jaundice. No oral lesions and respirations not labored. Lungs diminished bilaterally and symmetrically; there does not appear to be more pleural fluid. No edema. Reviewed the laboratory exams. His renal function has declined again. Will investigate further. No immunotherapy for now.     5/23/2025: I was asked to see Mr. Ames who has been a patient of mine since September 2025.  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  He has received treatment with carboplatin/pemetrexed/pembrolizumab.  Recently he has been on pembrolizumab only.  On May 22, 2025 he was at the hospital having explantation of a right port.  He was noted to be dyspneic and tachypneic and his oxygen saturation was down to 89%.  Chest x-ray revealed a left-sided pneumonia and he was sent to the emergency room.  Since yesterday he has been on ceftazidime and he feels much better.  Whereas yesterday he was dyspneic with minimal exertion, today he was able to get up and move around, even walking.  He has not been febrile but he had been coughing and expectorating some.  He had had no chest  pains.  He was eating reasonably well and his weight had been within the same range.  On exam he is a well-built and slender man who is well-oriented and conversant.  He is in good spirits and does not seem acutely ill.  He is neither jaundiced nor pale.  The lungs are markedly diminished bilaterally but rather clear.  He is heart is regular.  His abdomen is flat and soft and the liver and spleen are not enlarged.  There is no edema.  He had imaging studies of the chest.  As compared to previous scans he had experienced increase in mediastinal adenopathy and interval development of left hilar and left axillary as well as left supraclavicular lymphadenopathy that were suggested of malignant progression.  He underwent upper gastrointestinal endoscopy that revealed a short segment Schroeder's esophagus and then had bronchoscopy with biopsies of one of the lesions.  The final report of pathology is not yet available but the initial interpretation is of malignancy.  For now continue with antibiotics.  Await the final report of the biopsy to decide on subsequent treatment.  Discussed with him.     6/2/2025: In the office following discharge from the hospital. He is feeling better but has had persistent dyspnea, even as he uses oxygen. He has bee on 2 LPM. He has not had any chest pain and has been free of cough or increased expectoration. He has been eating well. He has not had any nausea or vomiting. He denies chest pain or cough. No abdominal pain or diarrhea and no dysuria. On exam alert and conversant. Oriented and in no distress. No jaundice. No oral lesions and respirations are not labored. The lungs are diminished bilaterally. Heart is regular. Abdomen soft and not tender. No edema. No skin rash.     6/30/2025: Mr. Ames has been a patient of mine since the mid part of 2024, when he was identified as having metastatic adenocarcinoma of the lung. He was initially treated with a combination of chemotherapy and  immunotherapy. He had an initial good response but earlier this year showed signs of progression. His treatment was transitioned to a taxane with ramucirumab and received the first dose a few days before this admission. He presented to the hospital complaining again of severe dysphagia, as he is known to have esophageal stenosis, but also and more importantly of dyspnea. He has been found to have segmental and subsegmental pulmonary emboli in the right lower lobe. In addition the left pleural effusion, which has been a chronic problem, seemed to have increased. Signs of esophageal obstruction were as well present on the scans. This morning he feels better. He has been able to sleep. He has not attempted to swallow much. He tells me he has been swallowing some of his medications. The anticoagulant that he had been taking before, he reports, he had been taking without fail. On exam he is an ill-appearing man who does not seem in acute distress. He is oriented and conversant. No jaundice. The lungs are diminished bilaterally but particularly on the left, where in the lower segments, there are no breath sounds. The heart is tachycardic and regular. Abdomen is soft. There is no edema. Laboratory exams reviewed. Reviewed the imaging studies. Probably will need a thoracentesis. Right now he is on heparin and we will need to transition to a different oral anticoagulant as, it is not clear to me, whether the apixaban did not prevent the formation of new emboli because of his inability to take the medication and swallow it or because of real failure of the medication. Nevertheless for now I will keep him on the heparin to allow for upper endoscopy of the esophagus. It is early to tell if the present chemotherapy regimen will result in response. For now I will continue with the same.     7/16/2025: Was eventually discharged but had to be readmitted once because of bleeding at the insertion site of the tunneled thoracostomy  tube.  He is feeling better now.  At times he has pain in the chest on the left side, following the insertion of the thoracostomy tube.  He is eating better.  His weight has not dropped.  He has been dyspneic with exertion but specially with humid weather.  He has had no abdominal pain, diarrhea or dysuria.  Exam he appears ill.  He is not in distress.  No jaundice but pale.  Lungs diminished bilaterally but particularly on the left.  Heart regular.  Abdomen soft.  Bilateral lower extremity edema is present at 2+.  It appears there is no more drainage from the thoracostomy tube.  Hopefully this is a manifestation of improvement of the malignancy due to the chemotherapy.  To continue with the same chemotherapy.  I would like to proceed with treatment today.    Past Medical History:   Diagnosis Date    Abnormal ECG 01/29/2024    Allergic rhinitis     Arrhythmia 08/14/2024    SVT, then afib after thoracentesis    Asthma 06/01/2024    Shortness of breath with exertion    Atrial fibrillation 08/14/2024    After 2L drained per thoracentesis    Coronary artery disease 01/29/2024    Minimal blockage per cath    GERD (gastroesophageal reflux disease)     Hyperlipidemia     Lung cancer 08/2024    Myocardial infarction 01/29/2024    Cath, no intervention    Pleural effusion 2024    Pneumonia 05/2025     Past Surgical History:   Procedure Laterality Date    BRONCHOSCOPY N/A 08/13/2024    Procedure: BRONCHOSCOPY WITH BRONCHIAL WASHING AND BRONCHIAL BRUSHING;  Surgeon: Kelvin Gonzalez MD;  Location: [facility] ENDOSCOPY;  Service: Pulmonary;  Laterality: N/A;    BRONCHOSCOPY N/A 05/23/2025    Procedure: BRONCHOSCOPY with bronchoalveolar lavage and biopsy x 1 area;  Surgeon: Jason Dozier MD;  Location: [facility] ENDOSCOPY;  Service: Pulmonary;  Laterality: N/A;  post op: lung mass    BRONCHOSCOPY      BRONCHOSCOPY WITH ION ROBOTIC ASSIST N/A 08/16/2024    Procedure: BRONCHOSCOPY WITH ION ROBOT WITH CRYO BIOPSY;  Surgeon: Kelvin Gonzalez MD;   Location: [facility] ENDOSCOPY;  Service: Robotics - Pulmonary;  Laterality: N/A;    CARDIAC CATHETERIZATION Right 01/29/2024    Procedure: Coronary angiography;  Surgeon: Abran Chand MD;  Location: [facility] CATH INVASIVE LOCATION;  Service: Cardiovascular;  Laterality: Right;    CARDIAC CATHETERIZATION N/A 01/29/2024    Procedure: Left Heart Cath;  Surgeon: Abran Chand MD;  Location: [facility] CATH INVASIVE LOCATION;  Service: Cardiovascular;  Laterality: N/A;    ENDOSCOPY N/A 05/23/2025    Procedure: ESOPHAGOGASTRODUODENOSCOPY with biopsy x 1 area and dilatation (48FR non-guided bougie);  Surgeon: Carlos Calero MD;  Location: [facility] ENDOSCOPY;  Service: Gastroenterology;  Laterality: N/A;  post op: esophageal stricture    ENDOSCOPY N/A 6/30/2025    Procedure: ESOPHAGOGASTRODUODENOSCOPY, impacted esophageal food bolus removal;  Surgeon: Luis Alfredo Puckett MD;  Location: [facility] ENDOSCOPY;  Service: Gastroenterology;  Laterality: N/A;  post: impacted foreign body of esophagus    ENDOSCOPY N/A 7/7/2025    Procedure: ESOPHAGOGASTRODUODENOSCOPY WITH BALLOON DILATION (15MM-18MM, 18MM-20MM)  UP TO 20MM;  Surgeon: Van Cope MD;  Location: [facility] ENDOSCOPY;  Service: Gastroenterology;  Laterality: N/A;  POST-    LUNG BIOPSY      PLEURAL CATHETER INSERTION Left 7/1/2025    Procedure: PLEURX CATHETER INSERTION;  Surgeon: Van Cope MD;  Location: [facility] MAIN OR;  Service: Thoracic;  Laterality: Left;       Current Outpatient Medications:     acetaminophen (TYLENOL) 325 MG tablet, Take 2 tablets by mouth Every 4 (Four) Hours As Needed for Mild Pain., Disp: , Rfl:     Budeson-Glycopyrrol-Formoterol (Breztri Aerosphere) 160-9-4.8 MCG/ACT aerosol inhaler, Inhale 2 puffs 2 (Two) Times a Day., Disp: 1 each, Rfl: 0    enoxaparin sodium (LOVENOX) 80 MG/0.8ML solution prefilled syringe syringe, Inject 0.7 mL under the skin into the appropriate area as directed Every 12 (Twelve) Hours. Indications: DVT/PE (active  thrombosis), Disp: 48 mL, Rfl: 1    hydrocortisone (CORTEF) 20 MG tablet, Take 1 tablet by mouth 2 (Two) Times a Day for 90 days., Disp: 60 tablet, Rfl: 2    ipratropium-albuterol (DUO-NEB) 0.5-2.5 mg/3 ml nebulizer, Take 3 mL by nebulization Every 6 (Six) Hours As Needed for Wheezing., Disp: , Rfl:     levothyroxine (SYNTHROID, LEVOTHROID) 75 MCG tablet, TAKE ONE TABLET BY MOUTH EVERY MORNING, Disp: 30 tablet, Rfl: 1    metoprolol succinate XL (TOPROL-XL) 50 MG 24 hr tablet, Take 1 tablet by mouth Every 12 (Twelve) Hours., Disp: 60 tablet, Rfl: 0    NON FORMULARY, Take 2 tablets by mouth Every Night. Zzzquil  Indications: Sleep Disorder, Disp: , Rfl:     ondansetron (ZOFRAN) 8 MG tablet, Take 1 tablet by mouth 3 (Three) Times a Day As Needed for Nausea or Vomiting., Disp: 30 tablet, Rfl: 5    pantoprazole (PROTONIX) 40 MG EC tablet, Take 1 tablet by mouth Daily., Disp: 30 tablet, Rfl: 0    tamsulosin (FLOMAX) 0.4 MG capsule 24 hr capsule, Take 1 capsule by mouth Every Night. Indications: Benign Enlargement of Prostate, Disp: , Rfl:     benzonatate (TESSALON) 200 MG capsule, Take 1 capsule by mouth 3 (Three) Times a Day As Needed for Cough., Disp: 60 capsule, Rfl: 3    furosemide (LASIX) 20 MG tablet, Take 1 tablet by mouth Daily., Disp: 30 tablet, Rfl: 1    guaiFENesin (Mucinex) 600 MG 12 hr tablet, Take 1 tablet by mouth 2 (Two) Times a Day. Indications: Cough (Patient not taking: Reported on 7/16/2025), Disp: , Rfl:   No current facility-administered medications for this visit.    Facility-Administered Medications Ordered in Other Visits:     sodium chloride 0.9 % flush 10 mL, 10 mL, Intravenous, PRN, Arben Tim MD    No Known Allergies    Family History   Problem Relation Age of Onset    Dementia Mother     Diabetes Mother     Arrhythmia Mother         Afib    Breast cancer Sister 74    Cancer Sister         Breast cancer     Cancer-related family history includes Breast cancer (age of onset: 74) in his  sister; Cancer in his sister.    Social History     Tobacco Use    Smoking status: Former     Current packs/day: 0.00     Average packs/day: 1 pack/day for 32.0 years (32.0 ttl pk-yrs)     Types: Cigarettes     Start date:      Quit date:      Years since quittin.5     Passive exposure: Past    Smokeless tobacco: Never   Vaping Use    Vaping status: Never Used   Substance Use Topics    Alcohol use: Yes     Alcohol/week: 1.0 standard drink of alcohol     Types: 1 Cans of beer per week    Drug use: Never     Social History     Social History Narrative    Not on file      ROS:   Review of Systems   Constitutional:  Positive for unexpected weight change. Negative for activity change, appetite change, chills, diaphoresis, fatigue and fever.   HENT:  Negative for congestion, dental problem, drooling, ear discharge, ear pain, facial swelling, hearing loss, mouth sores, nosebleeds, postnasal drip, rhinorrhea, sinus pressure, sinus pain, sneezing, sore throat, tinnitus, trouble swallowing and voice change.    Eyes:  Negative for photophobia, pain, discharge, redness, itching and visual disturbance.   Respiratory:  Positive for cough and shortness of breath. Negative for apnea, choking, chest tightness, wheezing and stridor.    Cardiovascular:  Negative for chest pain, palpitations and leg swelling.   Gastrointestinal:  Negative for abdominal distention, abdominal pain, anal bleeding, blood in stool, constipation, diarrhea, nausea, rectal pain and vomiting.   Endocrine: Negative for cold intolerance, heat intolerance, polydipsia and polyuria.   Genitourinary:  Negative for decreased urine volume, difficulty urinating, dysuria, flank pain, frequency, genital sores, hematuria and urgency.   Musculoskeletal:  Negative for arthralgias, back pain, gait problem, joint swelling, myalgias, neck pain and neck stiffness.   Skin:  Negative for color change, pallor and rash.   Neurological:  Negative for dizziness, tremors,  "seizures, syncope, facial asymmetry, speech difficulty, weakness, light-headedness, numbness and headaches.   Hematological:  Negative for adenopathy. Does not bruise/bleed easily.   Psychiatric/Behavioral:  Negative for agitation, behavioral problems, confusion, decreased concentration, hallucinations, self-injury, sleep disturbance and suicidal ideas. The patient is not nervous/anxious.      Objective:  Vital signs:  Vitals:    07/16/25 1116   BP: 153/97   Pulse: 94   Resp: 16   Temp: 98.2 °F (36.8 °C)   SpO2: 97%   Weight: 81.5 kg (179 lb 9.6 oz)   Height: 177.8 cm (70\")   PainSc: 0-No pain     Body mass index is 25.77 kg/m².  ECOG  (1) Restricted in physically strenuous activity, ambulatory and able to do work of light nature    Physical Exam:   Physical Exam  Constitutional:       General: He is not in acute distress.     Appearance: He is not ill-appearing, toxic-appearing or diaphoretic.      Comments: Slender. Well built and in no distress. No jaundice.    HENT:      Head: Normocephalic and atraumatic.      Right Ear: External ear normal.      Left Ear: External ear normal.      Nose: Nose normal.      Mouth/Throat:      Mouth: Mucous membranes are moist.      Pharynx: Oropharynx is clear.   Eyes:      General: No scleral icterus.        Right eye: No discharge.         Left eye: No discharge.      Conjunctiva/sclera: Conjunctivae normal.      Pupils: Pupils are equal, round, and reactive to light.   Cardiovascular:      Rate and Rhythm: Normal rate and regular rhythm.      Pulses: Normal pulses.      Heart sounds: Normal heart sounds. No murmur heard.     No friction rub. No gallop.   Pulmonary:      Effort: No respiratory distress.      Breath sounds: No stridor. No wheezing, rhonchi or rales.      Comments: Breath sound diminished bilaterally.   Chest:      Chest wall: No tenderness.   Abdominal:      General: Abdomen is flat. Bowel sounds are normal. There is no distension.      Palpations: Abdomen is " soft. There is no mass.      Tenderness: There is no abdominal tenderness. There is no right CVA tenderness, left CVA tenderness, guarding or rebound.   Musculoskeletal:         General: No tenderness, deformity or signs of injury.      Cervical back: No rigidity.      Right lower leg: No edema.      Left lower leg: No edema.   Lymphadenopathy:      Cervical: No cervical adenopathy.   Skin:     General: Skin is warm and dry.      Coloration: Skin is not jaundiced or pale.      Findings: No bruising or rash.   Neurological:      General: No focal deficit present.      Mental Status: He is alert and oriented to person, place, and time.      Cranial Nerves: No cranial nerve deficit.   Psychiatric:         Mood and Affect: Mood normal.         Behavior: Behavior normal.         Thought Content: Thought content normal.         Judgment: Judgment normal.     DICKSON Tim MD performed the physical exam on 7/16/2025 as documented above.    Lab Results - Last 18 Months   Lab Units 07/16/25  0957 07/08/25  0527 07/07/25  0347   WBC 10*3/mm3 6.76 7.03 6.96   HEMOGLOBIN g/dL 9.8* 9.3* 8.7*   HEMATOCRIT % 29.6* 29.4* 27.9*   PLATELETS 10*3/mm3 151 114* 115*   MCV fL 93.7 95.5 96.5     Lab Results - Last 18 Months   Lab Units 07/08/25  0527 07/06/25  1344 07/05/25  1145 07/03/25  1712 07/03/25  0950   SODIUM mmol/L 139 142 140 141 143   POTASSIUM mmol/L 4.0 3.8 3.8 3.6 3.0*   CHLORIDE mmol/L 108* 107 107 106 106   CO2 mmol/L 24.5 24.4 24.0 24.4 24.7   BUN mg/dL 22.0 20.2 20.1 20.4 22.3   CREATININE mg/dL 1.07 1.09 1.15 1.20 1.25   CALCIUM mg/dL 7.7* 7.8* 7.8* 7.7* 7.8*   BILIRUBIN mg/dL  --  <0.2  --  0.2 0.3   ALK PHOS U/L  --  56  --  62 55   ALT (SGPT) U/L  --  9  --  9 8   AST (SGOT) U/L  --  22  --  26 21   GLUCOSE mg/dL 95 144* 139* 206* 125*     Lab Results   Component Value Date    GLUCOSE 95 07/08/2025    BUN 22.0 07/08/2025    CREATININE 1.07 07/08/2025    BCR 20.6 07/08/2025    K 4.0 07/08/2025    CO2 24.5  07/08/2025    CALCIUM 7.7 (L) 07/08/2025    ALBUMIN 2.8 (L) 07/06/2025    AST 22 07/06/2025    ALT 9 07/06/2025     Lab Results - Last 18 Months   Lab Units 07/06/25  1344 07/01/25  1237 07/01/25  0544 06/30/25  0341 06/29/25  1549 05/21/25  1104   INR  1.07  --   --   --  1.37* 1.18*   APTT seconds 37.2* 27.7 37.0*   < > 31.8 29.2    < > = values in this interval not displayed.     Lab Results   Component Value Date    STEVEN Positive (A) 08/12/2024     Lab Results   Component Value Date    PTT 37.2 (H) 07/06/2025    INR 1.07 07/06/2025     Assessment & Plan     cTX N2 M1 adenocarcinoma of the right lung: Next-generation sequencing reviewed.  Positive PD-L1 expression..  The disease is negative for EGFR, ALK, ROS1, ret, BRAF.  Completed concurrent radiation and chemotherapy with carboplatin and paclitaxel.  Started consolidation with pembrolizumab in November of 2024. Remained without evidence of progression until May of 2025.  With evidence of progression started treatment with docetaxel/ramucirumab.  No complications from the treatment.  Potentially there has been response.  Hypothyroidism.  He needs an increase in the dose of the levothyroxine  Fevers: Resolved.  Left pleural effusion: Tunneled thoracostomy tube in place.  Drainage has decreased significantly.  History of tobacco smoking: Abstinent for more than 20 years.  Reviewed the records from the hospital.  Reviewed the laboratory exams and discussed with him.  To resume treatment as soon as possible.  See me in approximately 4 weeks.    Arben Tim MD on 7/16/2025 at 11:49 AM.

## 2025-07-15 ENCOUNTER — READMISSION MANAGEMENT (OUTPATIENT)
Dept: CALL CENTER | Facility: HOSPITAL | Age: 75
End: 2025-07-15
Payer: MEDICARE

## 2025-07-15 NOTE — OUTREACH NOTE
Medical Week 1 Survey      Flowsheet Row Responses   [facility] patient discharged from? [facility]   Does the patient have one of the following disease processes/diagnoses(primary or secondary)? Other   Week 1 attempt successful? Yes   Call start time 0832   Call end time 0839   Discharge diagnosis Pleural effusion   Is patient permission given to speak with other caregiver? Yes   List who call center can speak with Wife Ruth   Person spoke with today (if not patient) and relationship Patient and wife   Meds reviewed with patient/caregiver? Yes   Is the patient taking all medications as directed (includes completed medication regime)? Yes   Medication comments No changes   Does the patient have a primary care provider?  Yes   Does the patient have an appointment with their PCP within 7 days of discharge? Yes   Has the patient kept scheduled appointments due by today? N/A   Comments Oncology tomorrow, Narc[facility], has appt for chemo on 7/23, Urology on 7/24 and also has appt with Dr. Gonzalez.   What is the Home health agency?  [facility]   Has home health visited the patient within 72 hours of discharge? Yes   Has all DME been delivered? No   Did the patient receive a copy of their discharge instructions? Yes   Nursing interventions Reviewed instructions with patient   What is the patient's perception of their health status since discharge? Improving   Is the patient/caregiver able to teach back signs and symptoms related to disease process for when to call PCP? Yes   Is the patient/caregiver able to teach back signs and symptoms related to disease process for when to call 911? Yes   Is the patient/caregiver able to teach back the hierarchy of who to call/visit for symptoms/problems? PCP, Specialist, Home health nurse, Urgent Care, ED, 911 Yes   If the patient is a current smoker, are they able to teach back resources for cessation? Not a smoker   Additional teach back comments States home health is  to come back tomorrow to do PICC line dressing.  States it is usually easy to flush and has been a little harder lately.  Advised to notify home health regarding this.  Has numerous appts scheduled.   Week 1 call completed? Yes   Wrap up additional comments States he is doing ok right now.   Call end time [id number]            Charlene AQUINO - Licensed Nurse

## 2025-07-16 ENCOUNTER — HOSPITAL ENCOUNTER (OUTPATIENT)
Dept: ONCOLOGY | Facility: HOSPITAL | Age: 75
Discharge: HOME | End: 2025-07-16
Payer: MEDICARE

## 2025-07-16 ENCOUNTER — OFFICE VISIT (OUTPATIENT)
Dept: ONCOLOGY | Facility: CLINIC | Age: 75
End: 2025-07-16
Payer: MEDICARE

## 2025-07-16 VITALS
SYSTOLIC BLOOD PRESSURE: 153 MMHG | HEIGHT: 70 IN | RESPIRATION RATE: 16 BRPM | BODY MASS INDEX: 25.71 KG/M2 | TEMPERATURE: 98.2 F | HEART RATE: 94 BPM | OXYGEN SATURATION: 97 % | WEIGHT: 179.6 LBS | DIASTOLIC BLOOD PRESSURE: 97 MMHG

## 2025-07-16 VITALS — HEART RATE: 88 BPM | DIASTOLIC BLOOD PRESSURE: 102 MMHG | SYSTOLIC BLOOD PRESSURE: 185 MMHG

## 2025-07-16 DIAGNOSIS — C34.91 STAGE IV ADENOCARCINOMA OF LUNG, RIGHT: ICD-10-CM

## 2025-07-16 DIAGNOSIS — R60.9 EDEMA, UNSPECIFIED TYPE: ICD-10-CM

## 2025-07-16 DIAGNOSIS — C34.91 STAGE IV ADENOCARCINOMA OF LUNG, RIGHT: Primary | ICD-10-CM

## 2025-07-16 DIAGNOSIS — C34.92 STAGE IV ADENOCARCINOMA OF LUNG, LEFT: Primary | ICD-10-CM

## 2025-07-16 DIAGNOSIS — I10 ESSENTIAL HYPERTENSION: ICD-10-CM

## 2025-07-16 DIAGNOSIS — C34.92 STAGE IV ADENOCARCINOMA OF LUNG, LEFT: ICD-10-CM

## 2025-07-16 LAB
ALBUMIN SERPL-MCNC: 3.2 G/DL (ref 3.5–5.2)
ALBUMIN/GLOB SERPL: 1.6 G/DL
ALP SERPL-CCNC: 61 U/L (ref 39–117)
ALT SERPL W P-5'-P-CCNC: 11 U/L (ref 1–41)
ANION GAP SERPL CALCULATED.3IONS-SCNC: 9.9 MMOL/L (ref 5–15)
AST SERPL-CCNC: 26 U/L (ref 1–40)
BACTERIA UR QL AUTO: ABNORMAL /HPF
BASOPHILS # BLD AUTO: 0.02 10*3/MM3 (ref 0–0.2)
BASOPHILS NFR BLD AUTO: 0.3 % (ref 0–1.5)
BILIRUB SERPL-MCNC: 0.2 MG/DL (ref 0–1.2)
BILIRUB UR QL STRIP: NEGATIVE
BILIRUB UR QL STRIP: NEGATIVE
BUN SERPL-MCNC: 13.6 MG/DL (ref 8–23)
BUN/CREAT SERPL: 13.2 (ref 7–25)
CALCIUM SPEC-SCNC: 7.8 MG/DL (ref 8.6–10.5)
CHLORIDE SERPL-SCNC: 107 MMOL/L (ref 98–107)
CLARITY UR: ABNORMAL
CLARITY UR: ABNORMAL
CO2 SERPL-SCNC: 24.1 MMOL/L (ref 22–29)
COLOR UR: YELLOW
COLOR UR: YELLOW
CREAT SERPL-MCNC: 1.03 MG/DL (ref 0.76–1.27)
DEPRECATED RDW RBC AUTO: 52.1 FL (ref 37–54)
EGFRCR SERPLBLD CKD-EPI 2021: 75.8 ML/MIN/1.73
EOSINOPHIL # BLD AUTO: 0.19 10*3/MM3 (ref 0–0.4)
EOSINOPHIL NFR BLD AUTO: 2.8 % (ref 0.3–6.2)
ERYTHROCYTE [DISTWIDTH] IN BLOOD BY AUTOMATED COUNT: 15 % (ref 12.3–15.4)
GLOBULIN UR ELPH-MCNC: 2 GM/DL
GLUCOSE SERPL-MCNC: 105 MG/DL (ref 65–99)
GLUCOSE UR STRIP-MCNC: NEGATIVE MG/DL
GLUCOSE UR STRIP-MCNC: NEGATIVE MG/DL
HCT VFR BLD AUTO: 29.6 % (ref 37.5–51)
HGB BLD-MCNC: 9.8 G/DL (ref 13–17.7)
HGB UR QL STRIP.AUTO: ABNORMAL
HGB UR QL STRIP.AUTO: ABNORMAL
HYALINE CASTS UR QL AUTO: ABNORMAL /LPF
IMM GRANULOCYTES # BLD AUTO: 0.03 10*3/MM3 (ref 0–0.05)
IMM GRANULOCYTES NFR BLD AUTO: 0.4 % (ref 0–0.5)
KETONES UR QL STRIP: NEGATIVE
KETONES UR QL STRIP: NEGATIVE
LEUKOCYTE ESTERASE UR QL STRIP.AUTO: ABNORMAL
LEUKOCYTE ESTERASE UR QL STRIP.AUTO: ABNORMAL
LYMPHOCYTES # BLD AUTO: 1.46 10*3/MM3 (ref 0.7–3.1)
LYMPHOCYTES NFR BLD AUTO: 21.6 % (ref 19.6–45.3)
MCH RBC QN AUTO: 31 PG (ref 26.6–33)
MCHC RBC AUTO-ENTMCNC: 33.1 G/DL (ref 31.5–35.7)
MCV RBC AUTO: 93.7 FL (ref 79–97)
MONOCYTES # BLD AUTO: 0.37 10*3/MM3 (ref 0.1–0.9)
MONOCYTES NFR BLD AUTO: 5.5 % (ref 5–12)
NEUTROPHILS NFR BLD AUTO: 4.69 10*3/MM3 (ref 1.7–7)
NEUTROPHILS NFR BLD AUTO: 69.4 % (ref 42.7–76)
NITRITE UR QL STRIP: POSITIVE
NITRITE UR QL STRIP: POSITIVE
PH UR STRIP.AUTO: 7 [PH] (ref 5–8)
PH UR STRIP.AUTO: 7 [PH] (ref 5–8)
PLATELET # BLD AUTO: 151 10*3/MM3 (ref 140–450)
PMV BLD AUTO: 9.4 FL (ref 6–12)
POTASSIUM SERPL-SCNC: 3.6 MMOL/L (ref 3.5–5.2)
PROT SERPL-MCNC: 5.2 G/DL (ref 6–8.5)
PROT UR QL STRIP: ABNORMAL
PROT UR QL STRIP: ABNORMAL
RBC # BLD AUTO: 3.16 10*6/MM3 (ref 4.14–5.8)
RBC # UR STRIP: ABNORMAL /HPF
REF LAB TEST METHOD: ABNORMAL
SODIUM SERPL-SCNC: 141 MMOL/L (ref 136–145)
SP GR UR STRIP: 1.02 (ref 1–1.03)
SP GR UR STRIP: 1.02 (ref 1–1.03)
SQUAMOUS #/AREA URNS HPF: ABNORMAL /HPF
UROBILINOGEN UR QL STRIP: ABNORMAL
UROBILINOGEN UR QL STRIP: ABNORMAL
WBC # UR STRIP: ABNORMAL /HPF
WBC NRBC COR # BLD AUTO: 6.76 10*3/MM3 (ref 3.4–10.8)

## 2025-07-16 PROCEDURE — 36592 COLLECT BLOOD FROM PICC: CPT

## 2025-07-16 PROCEDURE — 85025 COMPLETE CBC W/AUTO DIFF WBC: CPT | Performed by: INTERNAL MEDICINE

## 2025-07-16 PROCEDURE — 81003 URINALYSIS AUTO W/O SCOPE: CPT | Performed by: INTERNAL MEDICINE

## 2025-07-16 PROCEDURE — 80053 COMPREHEN METABOLIC PANEL: CPT | Performed by: INTERNAL MEDICINE

## 2025-07-16 PROCEDURE — 81001 URINALYSIS AUTO W/SCOPE: CPT | Performed by: NURSE PRACTITIONER

## 2025-07-16 RX ORDER — HEPARIN SODIUM (PORCINE) LOCK FLUSH IV SOLN 100 UNIT/ML 100 UNIT/ML
500 SOLUTION INTRAVENOUS AS NEEDED
Status: CANCELLED | OUTPATIENT
Start: 2025-07-16

## 2025-07-16 RX ORDER — DIPHENHYDRAMINE HYDROCHLORIDE 50 MG/ML
50 INJECTION, SOLUTION INTRAMUSCULAR; INTRAVENOUS AS NEEDED
Status: CANCELLED | OUTPATIENT
Start: 2025-07-18

## 2025-07-16 RX ORDER — FUROSEMIDE 20 MG/1
20 TABLET ORAL DAILY
Qty: 30 TABLET | Refills: 1 | Status: SHIPPED | OUTPATIENT
Start: 2025-07-16

## 2025-07-16 RX ORDER — LISINOPRIL 20 MG/1
20 TABLET ORAL DAILY
Qty: 30 TABLET | Refills: 1 | Status: SHIPPED | OUTPATIENT
Start: 2025-07-16

## 2025-07-16 RX ORDER — SODIUM CHLORIDE 0.9 % (FLUSH) 0.9 %
10 SYRINGE (ML) INJECTION AS NEEDED
Status: CANCELLED | OUTPATIENT
Start: 2025-07-16

## 2025-07-16 RX ORDER — BENZONATATE 200 MG/1
200 CAPSULE ORAL 3 TIMES DAILY PRN
Qty: 60 CAPSULE | Refills: 3 | Status: SHIPPED | OUTPATIENT
Start: 2025-07-16

## 2025-07-16 RX ORDER — SODIUM CHLORIDE 0.9 % (FLUSH) 0.9 %
10 SYRINGE (ML) INJECTION AS NEEDED
Status: DISCONTINUED | OUTPATIENT
Start: 2025-07-16 | End: 2025-07-17 | Stop reason: HOSPADM

## 2025-07-16 RX ORDER — FAMOTIDINE 10 MG/ML
20 INJECTION, SOLUTION INTRAVENOUS AS NEEDED
Status: CANCELLED | OUTPATIENT
Start: 2025-07-18

## 2025-07-16 RX ORDER — LEVOTHYROXINE SODIUM 100 UG/1
100 TABLET ORAL
COMMUNITY

## 2025-07-16 RX ORDER — HYDROCORTISONE SODIUM SUCCINATE 100 MG/2ML
100 INJECTION INTRAMUSCULAR; INTRAVENOUS AS NEEDED
Status: CANCELLED | OUTPATIENT
Start: 2025-07-18

## 2025-07-16 RX ORDER — SODIUM CHLORIDE 9 MG/ML
20 INJECTION, SOLUTION INTRAVENOUS ONCE
Status: CANCELLED | OUTPATIENT
Start: 2025-07-18

## 2025-07-16 NOTE — PROGRESS NOTES
Pt here for C2D1 Cyramza/Taxotere following scheduled visit with Dr Tim.  Pt with elevated blood pressure outside of treatment parameter.   Reviewed with Dr Tim and he is going to send in rx for hypertension and pt to hold treatment today and return to clinic on 7/18/25 for infusion.  Reviewed above with pt and spouse and v/u and agreement and instructed to take taxotere dex.   AVS provided and pt discharged.       Pt to clinic for Picc Line dressing change/treatment. Old dressing removed with no issue.  New securing dressing, and biopatch placed. PICC line suture was still intact and left in place. PICC line was recapped and flushed with good blood return noted.  Curos cap applied

## 2025-07-16 NOTE — PROGRESS NOTES
Single lumen PICC to upper right arm. Flushed with NS with positive blood return noted, cbc and cmp obtained. Flushed with saline and green cap placed.  Patient to waiting room to see MD next.

## 2025-07-18 ENCOUNTER — HOSPITAL ENCOUNTER (OUTPATIENT)
Dept: ONCOLOGY | Facility: HOSPITAL | Age: 75
Discharge: HOME | End: 2025-07-18
Payer: MEDICARE

## 2025-07-18 VITALS
SYSTOLIC BLOOD PRESSURE: 164 MMHG | TEMPERATURE: 97.3 F | DIASTOLIC BLOOD PRESSURE: 99 MMHG | BODY MASS INDEX: 25.74 KG/M2 | OXYGEN SATURATION: 92 % | HEIGHT: 70 IN | WEIGHT: 179.8 LBS | HEART RATE: 96 BPM | RESPIRATION RATE: 16 BRPM

## 2025-07-18 DIAGNOSIS — C34.91 STAGE IV ADENOCARCINOMA OF LUNG, RIGHT: Primary | ICD-10-CM

## 2025-07-18 PROCEDURE — 25010000002 DIPHENHYDRAMINE PER 50 MG: Performed by: INTERNAL MEDICINE

## 2025-07-18 PROCEDURE — 25810000003 SODIUM CHLORIDE 0.9 % SOLUTION: Performed by: INTERNAL MEDICINE

## 2025-07-18 PROCEDURE — 25010000002 DOCETAXEL 20 MG/ML SOLUTION 8 ML VIAL: Performed by: INTERNAL MEDICINE

## 2025-07-18 PROCEDURE — 96413 CHEMO IV INFUSION 1 HR: CPT

## 2025-07-18 PROCEDURE — 96375 TX/PRO/DX INJ NEW DRUG ADDON: CPT

## 2025-07-18 PROCEDURE — 96417 CHEMO IV INFUS EACH ADDL SEQ: CPT

## 2025-07-18 PROCEDURE — 25810000003 SODIUM CHLORIDE 0.9 % SOLUTION 250 ML FLEX CONT: Performed by: INTERNAL MEDICINE

## 2025-07-18 RX ORDER — SODIUM CHLORIDE 0.9 % (FLUSH) 0.9 %
10 SYRINGE (ML) INJECTION AS NEEDED
OUTPATIENT
Start: 2025-07-18

## 2025-07-18 RX ORDER — SODIUM CHLORIDE 0.9 % (FLUSH) 0.9 %
10 SYRINGE (ML) INJECTION AS NEEDED
Status: DISCONTINUED | OUTPATIENT
Start: 2025-07-18 | End: 2025-07-19 | Stop reason: HOSPADM

## 2025-07-18 RX ORDER — SODIUM CHLORIDE 9 MG/ML
20 INJECTION, SOLUTION INTRAVENOUS ONCE
Status: COMPLETED | OUTPATIENT
Start: 2025-07-18 | End: 2025-07-18

## 2025-07-18 RX ORDER — HEPARIN SODIUM (PORCINE) LOCK FLUSH IV SOLN 100 UNIT/ML 100 UNIT/ML
500 SOLUTION INTRAVENOUS AS NEEDED
OUTPATIENT
Start: 2025-07-18

## 2025-07-18 RX ORDER — DIPHENHYDRAMINE HYDROCHLORIDE 50 MG/ML
25 INJECTION, SOLUTION INTRAMUSCULAR; INTRAVENOUS ONCE
Status: COMPLETED | OUTPATIENT
Start: 2025-07-18 | End: 2025-07-18

## 2025-07-18 RX ADMIN — DOCETAXEL 145 MG: 20 INJECTION, SOLUTION, CONCENTRATE INTRAVENOUS at 09:24

## 2025-07-18 RX ADMIN — Medication 10 ML: at 10:26

## 2025-07-18 RX ADMIN — SODIUM CHLORIDE 20 ML/HR: 9 INJECTION, SOLUTION INTRAVENOUS at 08:24

## 2025-07-18 RX ADMIN — SODIUM CHLORIDE 770 MG: 9 INJECTION, SOLUTION INTRAVENOUS at 08:47

## 2025-07-18 RX ADMIN — DIPHENHYDRAMINE HYDROCHLORIDE 25 MG: 50 INJECTION INTRAMUSCULAR; INTRAVENOUS at 08:27

## 2025-07-18 NOTE — PROGRESS NOTES
Patient to infusion room for C2D1 Cyranza/Docetaxel. He was added on 7/16/25 for treatment but BP elevated and ordered BP med (which he started yesterday) and scheduled to return today. BP elevated when arrived at infusion, 164/99. Wife recording his BP's and they are mid 140's/mis 90's last couple days. No other symptoms from his high BP.  Discussed with Dr Tim and gave ok to treat today with the BP and labs/UA from 7/16/25, ok to treat placed in plan.    Wife reports homecare nurse coming to today to start care and will do the PICC dressing change weekly. She will call clinic if any reason that they do not do dressing changes so he can get scheduled for them with us. Wife is doing daily flushes to PICC.  Dr Tim ordered to repeat BMP next week which is set up 7/22/25.     Patient reports taking his dexamethasone at home. Tolerated infusion well. Printed AVS and discharged home.

## 2025-07-22 ENCOUNTER — HOSPITAL ENCOUNTER (OUTPATIENT)
Dept: ONCOLOGY | Facility: HOSPITAL | Age: 75
Discharge: HOME | End: 2025-07-22
Admitting: INTERNAL MEDICINE
Payer: MEDICARE

## 2025-07-22 ENCOUNTER — RESULTS FOLLOW-UP (OUTPATIENT)
Dept: ONCOLOGY | Facility: CLINIC | Age: 75
End: 2025-07-22
Payer: MEDICARE

## 2025-07-22 DIAGNOSIS — C34.91 STAGE IV ADENOCARCINOMA OF LUNG, RIGHT: ICD-10-CM

## 2025-07-22 DIAGNOSIS — C34.92 STAGE IV ADENOCARCINOMA OF LUNG, LEFT: ICD-10-CM

## 2025-07-22 LAB
ANION GAP SERPL CALCULATED.3IONS-SCNC: 10.2 MMOL/L (ref 5–15)
BUN SERPL-MCNC: 20.9 MG/DL (ref 8–23)
BUN/CREAT SERPL: 19 (ref 7–25)
CALCIUM SPEC-SCNC: 6.8 MG/DL (ref 8.6–10.5)
CHLORIDE SERPL-SCNC: 105 MMOL/L (ref 98–107)
CO2 SERPL-SCNC: 22.8 MMOL/L (ref 22–29)
CREAT SERPL-MCNC: 1.1 MG/DL (ref 0.76–1.27)
EGFRCR SERPLBLD CKD-EPI 2021: 70 ML/MIN/1.73
GLUCOSE SERPL-MCNC: 115 MG/DL (ref 65–99)
POTASSIUM SERPL-SCNC: 3 MMOL/L (ref 3.5–5.2)
SODIUM SERPL-SCNC: 138 MMOL/L (ref 136–145)

## 2025-07-22 PROCEDURE — 80048 BASIC METABOLIC PNL TOTAL CA: CPT | Performed by: INTERNAL MEDICINE

## 2025-07-22 PROCEDURE — 36592 COLLECT BLOOD FROM PICC: CPT

## 2025-07-22 RX ORDER — CALCIUM CARBONATE/VITAMIN D3 600 MG-10
1 TABLET ORAL 2 TIMES DAILY WITH MEALS
Qty: 60 TABLET | Refills: 0 | Status: SHIPPED | OUTPATIENT
Start: 2025-07-22

## 2025-07-22 RX ORDER — POTASSIUM CHLORIDE 1500 MG/1
20 TABLET, EXTENDED RELEASE ORAL DAILY
Qty: 7 TABLET | Refills: 0 | Status: SHIPPED | OUTPATIENT
Start: 2025-07-22

## 2025-07-22 NOTE — PROGRESS NOTES
PICC line flushed with good blood return noted. 10cc of blood wasted prior to specimen collection. Blood specimen obtained and sent to lab for processing per protocol. PICC line flushed with 20 ml normal saline.

## 2025-07-23 DIAGNOSIS — J18.9 PNEUMONIA DUE TO INFECTIOUS ORGANISM, UNSPECIFIED LATERALITY, UNSPECIFIED PART OF LUNG: ICD-10-CM

## 2025-07-23 RX ORDER — BUDESONIDE, GLYCOPYRROLATE, AND FORMOTEROL FUMARATE 160; 9; 4.8 UG/1; UG/1; UG/1
2 AEROSOL, METERED RESPIRATORY (INHALATION) 2 TIMES DAILY
Qty: 10.7 G | Refills: 11 | Status: SHIPPED | OUTPATIENT
Start: 2025-07-23

## 2025-07-23 RX ORDER — BUDESONIDE, GLYCOPYRROLATE, AND FORMOTEROL FUMARATE 160; 9; 4.8 UG/1; UG/1; UG/1
2 AEROSOL, METERED RESPIRATORY (INHALATION) 2 TIMES DAILY
Qty: 10.7 G | Refills: 11 | COMMUNITY
Start: 2025-07-23 | End: 2025-07-23

## 2025-07-23 NOTE — TELEPHONE ENCOUNTER
Contacted Young to let him know that DIMITRI Frazier, sent Potassium and Calcium to his pharmacy and they should be ready for . I spoke with his wife Ruth. She voiced understanding and had no questions.

## 2025-07-28 DIAGNOSIS — E03.2 HYPOTHYROIDISM DUE TO MEDICATION: Primary | ICD-10-CM

## 2025-07-28 RX ORDER — LEVOTHYROXINE SODIUM 100 UG/1
100 TABLET ORAL
Qty: 30 TABLET | Refills: 2 | Status: ON HOLD | OUTPATIENT
Start: 2025-07-28

## 2025-07-29 ENCOUNTER — APPOINTMENT (OUTPATIENT)
Dept: CT IMAGING | Facility: HOSPITAL | Age: 75
DRG: 871 | End: 2025-07-29
Payer: MEDICARE

## 2025-07-29 ENCOUNTER — HOSPITAL ENCOUNTER (INPATIENT)
Facility: HOSPITAL | Age: 75
LOS: 13 days | Discharge: HOME HEALTH | DRG: 871 | End: 2025-08-11
Attending: INTERNAL MEDICINE | Admitting: INTERNAL MEDICINE
Payer: MEDICARE

## 2025-07-29 ENCOUNTER — READMISSION MANAGEMENT (OUTPATIENT)
Dept: CALL CENTER | Facility: HOSPITAL | Age: 75
End: 2025-07-29
Payer: MEDICARE

## 2025-07-29 ENCOUNTER — APPOINTMENT (OUTPATIENT)
Dept: ULTRASOUND IMAGING | Facility: HOSPITAL | Age: 75
DRG: 871 | End: 2025-07-29
Payer: MEDICARE

## 2025-07-29 DIAGNOSIS — J15.9 PNEUMONIA DUE TO GRAM-POSITIVE BACTERIA: ICD-10-CM

## 2025-07-29 DIAGNOSIS — E83.51 HYPOCALCEMIA: ICD-10-CM

## 2025-07-29 DIAGNOSIS — C34.90 MALIGNANT NEOPLASM OF LUNG, UNSPECIFIED LATERALITY, UNSPECIFIED PART OF LUNG: ICD-10-CM

## 2025-07-29 DIAGNOSIS — R65.21 SEPTIC SHOCK: Primary | ICD-10-CM

## 2025-07-29 DIAGNOSIS — A41.9 SEPTIC SHOCK: Primary | ICD-10-CM

## 2025-07-29 DIAGNOSIS — I26.93 SINGLE SUBSEGMENTAL PULMONARY EMBOLISM WITHOUT ACUTE COR PULMONALE: ICD-10-CM

## 2025-07-29 DIAGNOSIS — S80.11XA LEG HEMATOMA, RIGHT, INITIAL ENCOUNTER: ICD-10-CM

## 2025-07-29 DIAGNOSIS — N17.9 AKI (ACUTE KIDNEY INJURY): ICD-10-CM

## 2025-07-29 DIAGNOSIS — I95.89 OTHER SPECIFIED HYPOTENSION: ICD-10-CM

## 2025-07-29 DIAGNOSIS — R09.02 HYPOXIA: ICD-10-CM

## 2025-07-29 DIAGNOSIS — K51.00 PANCOLITIS: ICD-10-CM

## 2025-07-29 DIAGNOSIS — R50.9 FEVER, UNSPECIFIED FEVER CAUSE: ICD-10-CM

## 2025-07-29 DIAGNOSIS — N17.9 ACUTE KIDNEY INJURY: ICD-10-CM

## 2025-07-29 LAB
ALBUMIN SERPL-MCNC: 2.5 G/DL (ref 3.5–5.2)
ALBUMIN/GLOB SERPL: 1.1 G/DL
ALP SERPL-CCNC: 63 U/L (ref 39–117)
ALT SERPL W P-5'-P-CCNC: 6 U/L (ref 1–41)
ANION GAP SERPL CALCULATED.3IONS-SCNC: 12.9 MMOL/L (ref 5–15)
APTT PPP: 44 SECONDS (ref 22.7–35.4)
AST SERPL-CCNC: 13 U/L (ref 1–40)
ATMOSPHERIC PRESS: ABNORMAL MM[HG]
B PARAPERT DNA SPEC QL NAA+PROBE: NOT DETECTED
B PERT DNA SPEC QL NAA+PROBE: NOT DETECTED
BACTERIA UR QL AUTO: ABNORMAL /HPF
BASE EXCESS BLDV CALC-SCNC: -1.7 MMOL/L (ref -2–2)
BDY SITE: ABNORMAL
BILIRUB SERPL-MCNC: 0.4 MG/DL (ref 0–1.2)
BILIRUB UR QL STRIP: ABNORMAL
BUN SERPL-MCNC: 35.9 MG/DL (ref 8–23)
BUN/CREAT SERPL: 12.5 (ref 7–25)
C PNEUM DNA NPH QL NAA+NON-PROBE: NOT DETECTED
CALCIUM SPEC-SCNC: 6.9 MG/DL (ref 8.6–10.5)
CHLORIDE SERPL-SCNC: 100 MMOL/L (ref 98–107)
CK SERPL-CCNC: 104 U/L (ref 20–200)
CLARITY UR: ABNORMAL
CO2 BLDA-SCNC: 24 MMOL/L (ref 22–29)
CO2 SERPL-SCNC: 20.1 MMOL/L (ref 22–29)
COLOR UR: ABNORMAL
CREAT SERPL-MCNC: 2.88 MG/DL (ref 0.76–1.27)
D-LACTATE SERPL-SCNC: 1.9 MMOL/L (ref 0.5–2)
D-LACTATE SERPL-SCNC: 3.3 MMOL/L (ref 0.3–2)
DEPRECATED RDW RBC AUTO: 49.5 FL (ref 37–54)
EGFRCR SERPLBLD CKD-EPI 2021: 22.1 ML/MIN/1.73
EOSINOPHIL # BLD MANUAL: 0.13 10*3/MM3 (ref 0–0.4)
EOSINOPHIL NFR BLD MANUAL: 1 % (ref 0.3–6.2)
ERYTHROCYTE [DISTWIDTH] IN BLOOD BY AUTOMATED COUNT: 14.6 % (ref 12.3–15.4)
FLUAV SUBTYP SPEC NAA+PROBE: NOT DETECTED
FLUBV RNA NPH QL NAA+NON-PROBE: NOT DETECTED
GLOBULIN UR ELPH-MCNC: 2.2 GM/DL
GLUCOSE SERPL-MCNC: 80 MG/DL (ref 65–99)
GLUCOSE UR STRIP-MCNC: NEGATIVE MG/DL
HADV DNA SPEC NAA+PROBE: NOT DETECTED
HCO3 BLDV-SCNC: 22.9 MMOL/L (ref 22–26)
HCOV 229E RNA SPEC QL NAA+PROBE: NOT DETECTED
HCOV HKU1 RNA SPEC QL NAA+PROBE: NOT DETECTED
HCOV NL63 RNA SPEC QL NAA+PROBE: NOT DETECTED
HCOV OC43 RNA SPEC QL NAA+PROBE: NOT DETECTED
HCT VFR BLD AUTO: 28.1 % (ref 37.5–51)
HGB BLD-MCNC: 8.9 G/DL (ref 13–17.7)
HGB UR QL STRIP.AUTO: ABNORMAL
HMPV RNA NPH QL NAA+NON-PROBE: NOT DETECTED
HPIV1 RNA ISLT QL NAA+PROBE: NOT DETECTED
HPIV2 RNA SPEC QL NAA+PROBE: NOT DETECTED
HPIV3 RNA NPH QL NAA+PROBE: NOT DETECTED
HPIV4 P GENE NPH QL NAA+PROBE: NOT DETECTED
HYALINE CASTS UR QL AUTO: ABNORMAL /LPF
INHALED O2 CONCENTRATION: 36 %
INR PPP: 1.24 (ref 0.9–1.1)
KETONES UR QL STRIP: ABNORMAL
LEUKOCYTE ESTERASE UR QL STRIP.AUTO: ABNORMAL
LYMPHOCYTES # BLD MANUAL: 0.39 10*3/MM3 (ref 0.7–3.1)
LYMPHOCYTES NFR BLD MANUAL: 2 % (ref 5–12)
M PNEUMO IGG SER IA-ACNC: NOT DETECTED
MAGNESIUM SERPL-MCNC: 1.5 MG/DL (ref 1.6–2.4)
MCH RBC QN AUTO: 29.2 PG (ref 26.6–33)
MCHC RBC AUTO-ENTMCNC: 31.7 G/DL (ref 31.5–35.7)
MCV RBC AUTO: 92.1 FL (ref 79–97)
MODALITY: ABNORMAL
MONOCYTES # BLD: 0.26 10*3/MM3 (ref 0.1–0.9)
MRSA DNA SPEC QL NAA+PROBE: NORMAL
NEUTROPHILS # BLD AUTO: 12.07 10*3/MM3 (ref 1.7–7)
NEUTROPHILS NFR BLD MANUAL: 93 % (ref 42.7–76)
NEUTS BAND NFR BLD MANUAL: 1 % (ref 0–5)
NITRITE UR QL STRIP: NEGATIVE
PCO2 BLDV: 37 MM HG (ref 42–51)
PH BLDV: 7.4 PH UNITS (ref 7.32–7.43)
PH UR STRIP.AUTO: <=5 [PH] (ref 5–8)
PLATELET # BLD AUTO: 204 10*3/MM3 (ref 140–450)
PMV BLD AUTO: 10.4 FL (ref 6–12)
PO2 BLDV: 18.3 MM HG (ref 40–42)
POTASSIUM SERPL-SCNC: 4.5 MMOL/L (ref 3.5–5.2)
PROCALCITONIN SERPL-MCNC: 25.8 NG/ML (ref 0–0.25)
PROT SERPL-MCNC: 4.7 G/DL (ref 6–8.5)
PROT UR QL STRIP: ABNORMAL
PROTHROMBIN TIME: 15.5 SECONDS (ref 11.7–14.2)
RBC # BLD AUTO: 3.05 10*6/MM3 (ref 4.14–5.8)
RBC # UR STRIP: ABNORMAL /HPF
RBC MORPH BLD: NORMAL
REF LAB TEST METHOD: ABNORMAL
RHINOVIRUS RNA SPEC NAA+PROBE: NOT DETECTED
RSV RNA NPH QL NAA+NON-PROBE: NOT DETECTED
SAO2 % BLDCOV: 27.7 % (ref 45–75)
SARS-COV-2 RNA RESP QL NAA+PROBE: DETECTED
SCAN SLIDE: NORMAL
SMALL PLATELETS BLD QL SMEAR: ADEQUATE
SODIUM SERPL-SCNC: 133 MMOL/L (ref 136–145)
SP GR UR STRIP: 1.04 (ref 1–1.03)
SQUAMOUS #/AREA URNS HPF: ABNORMAL /HPF
TOXIC GRANULATION: ABNORMAL
UROBILINOGEN UR QL STRIP: ABNORMAL
VARIANT LYMPHS NFR BLD MANUAL: 3 % (ref 19.6–45.3)
WBC # UR STRIP: ABNORMAL /HPF
WBC CLUMPS # UR AUTO: ABNORMAL /HPF
WBC NRBC COR # BLD AUTO: 12.84 10*3/MM3 (ref 3.4–10.8)

## 2025-07-29 PROCEDURE — 85610 PROTHROMBIN TIME: CPT | Performed by: NURSE PRACTITIONER

## 2025-07-29 PROCEDURE — 84145 PROCALCITONIN (PCT): CPT | Performed by: NURSE PRACTITIONER

## 2025-07-29 PROCEDURE — 87641 MR-STAPH DNA AMP PROBE: CPT | Performed by: NURSE PRACTITIONER

## 2025-07-29 PROCEDURE — 94799 UNLISTED PULMONARY SVC/PX: CPT

## 2025-07-29 PROCEDURE — 25810000003 SODIUM CHLORIDE 0.9 % SOLUTION: Performed by: NURSE PRACTITIONER

## 2025-07-29 PROCEDURE — 80053 COMPREHEN METABOLIC PANEL: CPT | Performed by: NURSE PRACTITIONER

## 2025-07-29 PROCEDURE — 99291 CRITICAL CARE FIRST HOUR: CPT

## 2025-07-29 PROCEDURE — 25010000002 METOPROLOL TARTRATE 5 MG/5ML SOLUTION

## 2025-07-29 PROCEDURE — 74177 CT ABD & PELVIS W/CONTRAST: CPT

## 2025-07-29 PROCEDURE — 25010000002 DILTIAZEM 25 MG/5ML SOLUTION

## 2025-07-29 PROCEDURE — 25010000002 CALCIUM GLUCONATE 2-0.675 GM/100ML-% SOLUTION: Performed by: NURSE PRACTITIONER

## 2025-07-29 PROCEDURE — 93005 ELECTROCARDIOGRAM TRACING: CPT

## 2025-07-29 PROCEDURE — 36415 COLL VENOUS BLD VENIPUNCTURE: CPT

## 2025-07-29 PROCEDURE — 85007 BL SMEAR W/DIFF WBC COUNT: CPT | Performed by: NURSE PRACTITIONER

## 2025-07-29 PROCEDURE — 87086 URINE CULTURE/COLONY COUNT: CPT | Performed by: NURSE PRACTITIONER

## 2025-07-29 PROCEDURE — 71275 CT ANGIOGRAPHY CHEST: CPT

## 2025-07-29 PROCEDURE — 99231 SBSQ HOSP IP/OBS SF/LOW 25: CPT | Performed by: SURGERY

## 2025-07-29 PROCEDURE — 83605 ASSAY OF LACTIC ACID: CPT | Performed by: NURSE PRACTITIONER

## 2025-07-29 PROCEDURE — 25010000002 CEFTRIAXONE PER 250 MG: Performed by: NURSE PRACTITIONER

## 2025-07-29 PROCEDURE — 25010000002 METRONIDAZOLE 500 MG/100ML SOLUTION: Performed by: NURSE PRACTITIONER

## 2025-07-29 PROCEDURE — 94761 N-INVAS EAR/PLS OXIMETRY MLT: CPT

## 2025-07-29 PROCEDURE — 93005 ELECTROCARDIOGRAM TRACING: CPT | Performed by: INTERNAL MEDICINE

## 2025-07-29 PROCEDURE — 81001 URINALYSIS AUTO W/SCOPE: CPT | Performed by: NURSE PRACTITIONER

## 2025-07-29 PROCEDURE — 83735 ASSAY OF MAGNESIUM: CPT | Performed by: NURSE PRACTITIONER

## 2025-07-29 PROCEDURE — 87077 CULTURE AEROBIC IDENTIFY: CPT | Performed by: NURSE PRACTITIONER

## 2025-07-29 PROCEDURE — 87040 BLOOD CULTURE FOR BACTERIA: CPT | Performed by: NURSE PRACTITIONER

## 2025-07-29 PROCEDURE — 25010000002 ENOXAPARIN PER 10 MG: Performed by: NURSE PRACTITIONER

## 2025-07-29 PROCEDURE — 25510000001 IOPAMIDOL PER 1 ML: Performed by: INTERNAL MEDICINE

## 2025-07-29 PROCEDURE — 25010000002 CEFTAZIDIME 2 G RECONSTITUTED SOLUTION 1 EACH VIAL: Performed by: NURSE PRACTITIONER

## 2025-07-29 PROCEDURE — 85025 COMPLETE CBC W/AUTO DIFF WBC: CPT | Performed by: NURSE PRACTITIONER

## 2025-07-29 PROCEDURE — 25010000002 MAGNESIUM SULFATE 4 GM/100ML SOLUTION

## 2025-07-29 PROCEDURE — 25810000003 LACTATED RINGERS SOLUTION: Performed by: NURSE PRACTITIONER

## 2025-07-29 PROCEDURE — 51702 INSERT TEMP BLADDER CATH: CPT

## 2025-07-29 PROCEDURE — 25810000003 LACTATED RINGERS PER 1000 ML: Performed by: NURSE PRACTITIONER

## 2025-07-29 PROCEDURE — 0202U NFCT DS 22 TRGT SARS-COV-2: CPT | Performed by: NURSE PRACTITIONER

## 2025-07-29 PROCEDURE — 85730 THROMBOPLASTIN TIME PARTIAL: CPT | Performed by: NURSE PRACTITIONER

## 2025-07-29 PROCEDURE — 87186 SC STD MICRODIL/AGAR DIL: CPT | Performed by: NURSE PRACTITIONER

## 2025-07-29 PROCEDURE — 25010000002 HYDROCORTISONE SOD SUC (PF) 100 MG RECONSTITUTED SOLUTION: Performed by: NURSE PRACTITIONER

## 2025-07-29 PROCEDURE — 82803 BLOOD GASES ANY COMBINATION: CPT

## 2025-07-29 PROCEDURE — 76705 ECHO EXAM OF ABDOMEN: CPT

## 2025-07-29 PROCEDURE — 82550 ASSAY OF CK (CPK): CPT | Performed by: NURSE PRACTITIONER

## 2025-07-29 RX ORDER — METRONIDAZOLE 500 MG/100ML
500 INJECTION, SOLUTION INTRAVENOUS EVERY 8 HOURS
Status: COMPLETED | OUTPATIENT
Start: 2025-07-29 | End: 2025-08-03

## 2025-07-29 RX ORDER — ACETAMINOPHEN 325 MG/1
650 TABLET ORAL EVERY 4 HOURS PRN
Status: DISCONTINUED | OUTPATIENT
Start: 2025-07-29 | End: 2025-08-11 | Stop reason: HOSPADM

## 2025-07-29 RX ORDER — METOPROLOL TARTRATE 1 MG/ML
5 INJECTION, SOLUTION INTRAVENOUS EVERY 8 HOURS
Status: DISCONTINUED | OUTPATIENT
Start: 2025-07-30 | End: 2025-07-31

## 2025-07-29 RX ORDER — ACETAMINOPHEN 500 MG
1000 TABLET ORAL ONCE
Status: COMPLETED | OUTPATIENT
Start: 2025-07-29 | End: 2025-07-29

## 2025-07-29 RX ORDER — ALUMINA, MAGNESIA, AND SIMETHICONE 2400; 2400; 240 MG/30ML; MG/30ML; MG/30ML
15 SUSPENSION ORAL EVERY 6 HOURS PRN
Status: DISCONTINUED | OUTPATIENT
Start: 2025-07-29 | End: 2025-08-11 | Stop reason: HOSPADM

## 2025-07-29 RX ORDER — ONDANSETRON 4 MG/1
4 TABLET, ORALLY DISINTEGRATING ORAL EVERY 6 HOURS PRN
Status: DISCONTINUED | OUTPATIENT
Start: 2025-07-29 | End: 2025-08-11 | Stop reason: HOSPADM

## 2025-07-29 RX ORDER — METOPROLOL SUCCINATE 50 MG/1
50 TABLET, EXTENDED RELEASE ORAL EVERY 12 HOURS SCHEDULED
Status: DISCONTINUED | OUTPATIENT
Start: 2025-07-29 | End: 2025-08-02

## 2025-07-29 RX ORDER — BENZONATATE 100 MG/1
200 CAPSULE ORAL 3 TIMES DAILY PRN
Status: DISCONTINUED | OUTPATIENT
Start: 2025-07-29 | End: 2025-08-11 | Stop reason: HOSPADM

## 2025-07-29 RX ORDER — HYDROCORTISONE SODIUM SUCCINATE 100 MG/2ML
100 INJECTION INTRAMUSCULAR; INTRAVENOUS EVERY 8 HOURS
Status: DISCONTINUED | OUTPATIENT
Start: 2025-07-29 | End: 2025-07-31

## 2025-07-29 RX ORDER — NOREPINEPHRINE BITARTRATE 0.03 MG/ML
.02-.3 INJECTION, SOLUTION INTRAVENOUS
Status: DISCONTINUED | OUTPATIENT
Start: 2025-07-29 | End: 2025-07-31

## 2025-07-29 RX ORDER — AMOXICILLIN 250 MG
2 CAPSULE ORAL 2 TIMES DAILY PRN
Status: DISCONTINUED | OUTPATIENT
Start: 2025-07-29 | End: 2025-07-31

## 2025-07-29 RX ORDER — PANTOPRAZOLE SODIUM 40 MG/1
40 TABLET, DELAYED RELEASE ORAL DAILY
Status: DISCONTINUED | OUTPATIENT
Start: 2025-07-30 | End: 2025-07-31

## 2025-07-29 RX ORDER — GUAIFENESIN 600 MG/1
600 TABLET, EXTENDED RELEASE ORAL 2 TIMES DAILY
Status: DISCONTINUED | OUTPATIENT
Start: 2025-07-29 | End: 2025-08-11 | Stop reason: HOSPADM

## 2025-07-29 RX ORDER — SODIUM CHLORIDE, SODIUM LACTATE, POTASSIUM CHLORIDE, CALCIUM CHLORIDE 600; 310; 30; 20 MG/100ML; MG/100ML; MG/100ML; MG/100ML
100 INJECTION, SOLUTION INTRAVENOUS CONTINUOUS
Status: DISPENSED | OUTPATIENT
Start: 2025-07-29 | End: 2025-07-30

## 2025-07-29 RX ORDER — METOPROLOL TARTRATE 1 MG/ML
5 INJECTION, SOLUTION INTRAVENOUS ONCE
Status: COMPLETED | OUTPATIENT
Start: 2025-07-29 | End: 2025-07-29

## 2025-07-29 RX ORDER — DILTIAZEM HYDROCHLORIDE 5 MG/ML
10 INJECTION INTRAVENOUS ONCE
Status: COMPLETED | OUTPATIENT
Start: 2025-07-29 | End: 2025-07-29

## 2025-07-29 RX ORDER — MIDODRINE HYDROCHLORIDE 5 MG/1
10 TABLET ORAL EVERY 8 HOURS SCHEDULED
Status: DISCONTINUED | OUTPATIENT
Start: 2025-07-29 | End: 2025-08-03

## 2025-07-29 RX ORDER — ALBUTEROL SULFATE 90 UG/1
2 INHALANT RESPIRATORY (INHALATION)
Status: DISCONTINUED | OUTPATIENT
Start: 2025-07-29 | End: 2025-08-10

## 2025-07-29 RX ORDER — POLYETHYLENE GLYCOL 3350 17 G/17G
17 POWDER, FOR SOLUTION ORAL DAILY PRN
Status: DISCONTINUED | OUTPATIENT
Start: 2025-07-29 | End: 2025-07-31

## 2025-07-29 RX ORDER — ENOXAPARIN SODIUM 100 MG/ML
1 INJECTION SUBCUTANEOUS EVERY 24 HOURS
Status: DISCONTINUED | OUTPATIENT
Start: 2025-07-29 | End: 2025-07-30

## 2025-07-29 RX ORDER — ONDANSETRON 2 MG/ML
4 INJECTION INTRAMUSCULAR; INTRAVENOUS EVERY 6 HOURS PRN
Status: DISCONTINUED | OUTPATIENT
Start: 2025-07-29 | End: 2025-08-11 | Stop reason: HOSPADM

## 2025-07-29 RX ORDER — METOPROLOL TARTRATE 1 MG/ML
INJECTION, SOLUTION INTRAVENOUS
Status: COMPLETED
Start: 2025-07-29 | End: 2025-07-29

## 2025-07-29 RX ORDER — MIDODRINE HYDROCHLORIDE 5 MG/1
5 TABLET ORAL 3 TIMES DAILY PRN
COMMUNITY
End: 2025-08-11 | Stop reason: HOSPADM

## 2025-07-29 RX ORDER — CALCIUM GLUCONATE 20 MG/ML
2000 INJECTION, SOLUTION INTRAVENOUS ONCE
Status: COMPLETED | OUTPATIENT
Start: 2025-07-29 | End: 2025-07-29

## 2025-07-29 RX ORDER — BISACODYL 5 MG/1
5 TABLET, DELAYED RELEASE ORAL DAILY PRN
Status: DISCONTINUED | OUTPATIENT
Start: 2025-07-29 | End: 2025-07-31

## 2025-07-29 RX ORDER — SODIUM CHLORIDE 9 MG/ML
40 INJECTION, SOLUTION INTRAVENOUS AS NEEDED
Status: DISCONTINUED | OUTPATIENT
Start: 2025-07-29 | End: 2025-08-11 | Stop reason: HOSPADM

## 2025-07-29 RX ORDER — MAGNESIUM SULFATE HEPTAHYDRATE 40 MG/ML
INJECTION, SOLUTION INTRAVENOUS
Status: COMPLETED
Start: 2025-07-29 | End: 2025-07-29

## 2025-07-29 RX ORDER — FUROSEMIDE 40 MG/1
20 TABLET ORAL DAILY
Status: DISCONTINUED | OUTPATIENT
Start: 2025-07-29 | End: 2025-08-01

## 2025-07-29 RX ORDER — IOPAMIDOL 755 MG/ML
100 INJECTION, SOLUTION INTRAVASCULAR
Status: COMPLETED | OUTPATIENT
Start: 2025-07-29 | End: 2025-07-29

## 2025-07-29 RX ORDER — LOPERAMIDE HYDROCHLORIDE 2 MG/1
2 CAPSULE ORAL 4 TIMES DAILY PRN
COMMUNITY
End: 2025-08-11 | Stop reason: HOSPADM

## 2025-07-29 RX ORDER — BUDESONIDE AND FORMOTEROL FUMARATE DIHYDRATE 160; 4.5 UG/1; UG/1
2 AEROSOL RESPIRATORY (INHALATION)
Status: DISCONTINUED | OUTPATIENT
Start: 2025-07-29 | End: 2025-08-11 | Stop reason: HOSPADM

## 2025-07-29 RX ORDER — LISINOPRIL 20 MG/1
20 TABLET ORAL DAILY
Status: DISCONTINUED | OUTPATIENT
Start: 2025-07-29 | End: 2025-08-01

## 2025-07-29 RX ORDER — DILTIAZEM HYDROCHLORIDE 5 MG/ML
INJECTION INTRAVENOUS
Status: COMPLETED
Start: 2025-07-29 | End: 2025-07-29

## 2025-07-29 RX ORDER — IPRATROPIUM BROMIDE AND ALBUTEROL SULFATE 2.5; .5 MG/3ML; MG/3ML
3 SOLUTION RESPIRATORY (INHALATION) EVERY 6 HOURS PRN
Status: DISCONTINUED | OUTPATIENT
Start: 2025-07-29 | End: 2025-08-11 | Stop reason: HOSPADM

## 2025-07-29 RX ORDER — TAMSULOSIN HYDROCHLORIDE 0.4 MG/1
0.4 CAPSULE ORAL NIGHTLY
Status: DISCONTINUED | OUTPATIENT
Start: 2025-07-29 | End: 2025-08-01

## 2025-07-29 RX ORDER — SODIUM CHLORIDE 0.9 % (FLUSH) 0.9 %
10 SYRINGE (ML) INJECTION AS NEEDED
COMMUNITY

## 2025-07-29 RX ORDER — TRAMADOL HYDROCHLORIDE 50 MG/1
50 TABLET ORAL EVERY 6 HOURS PRN
COMMUNITY

## 2025-07-29 RX ORDER — HYDROCORTISONE 10 MG/1
20 TABLET ORAL 2 TIMES DAILY
Status: DISCONTINUED | OUTPATIENT
Start: 2025-07-29 | End: 2025-08-04

## 2025-07-29 RX ORDER — VANCOMYCIN/0.9 % SOD CHLORIDE 1.5G/250ML
20 PLASTIC BAG, INJECTION (ML) INTRAVENOUS ONCE
Status: COMPLETED | OUTPATIENT
Start: 2025-07-29 | End: 2025-07-29

## 2025-07-29 RX ORDER — ACETAMINOPHEN 650 MG/1
650 SUPPOSITORY RECTAL EVERY 4 HOURS PRN
Status: DISCONTINUED | OUTPATIENT
Start: 2025-07-29 | End: 2025-08-11 | Stop reason: HOSPADM

## 2025-07-29 RX ORDER — NITROGLYCERIN 0.4 MG/1
0.4 TABLET SUBLINGUAL
Status: DISCONTINUED | OUTPATIENT
Start: 2025-07-29 | End: 2025-08-11 | Stop reason: HOSPADM

## 2025-07-29 RX ORDER — MIDODRINE HYDROCHLORIDE 5 MG/1
5 TABLET ORAL ONCE
Status: COMPLETED | OUTPATIENT
Start: 2025-07-29 | End: 2025-07-29

## 2025-07-29 RX ORDER — BISACODYL 10 MG
10 SUPPOSITORY, RECTAL RECTAL DAILY PRN
Status: DISCONTINUED | OUTPATIENT
Start: 2025-07-29 | End: 2025-07-31

## 2025-07-29 RX ORDER — MAGNESIUM SULFATE HEPTAHYDRATE 40 MG/ML
4 INJECTION, SOLUTION INTRAVENOUS ONCE
Status: COMPLETED | OUTPATIENT
Start: 2025-07-29 | End: 2025-07-29

## 2025-07-29 RX ORDER — SODIUM CHLORIDE 0.9 % (FLUSH) 0.9 %
10 SYRINGE (ML) INJECTION EVERY 12 HOURS SCHEDULED
Status: DISCONTINUED | OUTPATIENT
Start: 2025-07-29 | End: 2025-08-11 | Stop reason: HOSPADM

## 2025-07-29 RX ORDER — LEVOTHYROXINE SODIUM 100 UG/1
100 TABLET ORAL
Status: DISCONTINUED | OUTPATIENT
Start: 2025-07-30 | End: 2025-08-02

## 2025-07-29 RX ORDER — SODIUM CHLORIDE 0.9 % (FLUSH) 0.9 %
10 SYRINGE (ML) INJECTION AS NEEDED
Status: DISCONTINUED | OUTPATIENT
Start: 2025-07-29 | End: 2025-08-11 | Stop reason: HOSPADM

## 2025-07-29 RX ADMIN — MIDODRINE HYDROCHLORIDE 5 MG: 5 TABLET ORAL at 11:13

## 2025-07-29 RX ADMIN — GUAIFENESIN 600 MG: 600 TABLET, EXTENDED RELEASE ORAL at 20:16

## 2025-07-29 RX ADMIN — SODIUM CHLORIDE 1000 ML: 9 INJECTION, SOLUTION INTRAVENOUS at 11:08

## 2025-07-29 RX ADMIN — IOPAMIDOL 100 ML: 755 INJECTION, SOLUTION INTRAVENOUS at 11:53

## 2025-07-29 RX ADMIN — SODIUM CHLORIDE 1000 ML: 9 INJECTION, SOLUTION INTRAVENOUS at 11:24

## 2025-07-29 RX ADMIN — NOREPINEPHRINE BITARTRATE 0.2 MCG/KG/MIN: 32 SOLUTION INTRAVENOUS at 18:17

## 2025-07-29 RX ADMIN — BUDESONIDE AND FORMOTEROL FUMARATE DIHYDRATE 2 PUFF: 160; 4.5 AEROSOL RESPIRATORY (INHALATION) at 21:03

## 2025-07-29 RX ADMIN — METOPROLOL TARTRATE 5 MG: 1 INJECTION, SOLUTION INTRAVENOUS at 19:43

## 2025-07-29 RX ADMIN — METOPROLOL TARTRATE 5 MG: 5 INJECTION INTRAVENOUS at 19:43

## 2025-07-29 RX ADMIN — CEFTAZIDIME 2000 MG: 2 INJECTION, POWDER, FOR SOLUTION INTRAVENOUS at 11:57

## 2025-07-29 RX ADMIN — CEFTRIAXONE 2000 MG: 2 INJECTION, POWDER, FOR SOLUTION INTRAMUSCULAR; INTRAVENOUS at 20:16

## 2025-07-29 RX ADMIN — NOREPINEPHRINE BITARTRATE 0.05 MCG/KG/MIN: 32 SOLUTION INTRAVENOUS at 11:54

## 2025-07-29 RX ADMIN — METRONIDAZOLE 500 MG: 500 INJECTION, SOLUTION INTRAVENOUS at 20:16

## 2025-07-29 RX ADMIN — ALBUTEROL SULFATE 2 PUFF: 108 AEROSOL, METERED RESPIRATORY (INHALATION) at 21:03

## 2025-07-29 RX ADMIN — MAGNESIUM SULFATE IN WATER FOR 4 G: 40 INJECTION INTRAVENOUS at 19:57

## 2025-07-29 RX ADMIN — Medication 1500 MG: at 11:32

## 2025-07-29 RX ADMIN — MIDODRINE HYDROCHLORIDE 10 MG: 5 TABLET ORAL at 21:32

## 2025-07-29 RX ADMIN — HYDROCORTISONE SODIUM SUCCINATE 100 MG: 100 INJECTION, POWDER, FOR SOLUTION INTRAMUSCULAR; INTRAVENOUS at 23:30

## 2025-07-29 RX ADMIN — Medication 10 ML: at 20:17

## 2025-07-29 RX ADMIN — ACETAMINOPHEN 1000 MG: 500 TABLET, FILM COATED ORAL at 11:13

## 2025-07-29 RX ADMIN — MAGNESIUM SULFATE HEPTAHYDRATE 4 G: 40 INJECTION, SOLUTION INTRAVENOUS at 19:57

## 2025-07-29 RX ADMIN — Medication 10 ML: at 18:18

## 2025-07-29 RX ADMIN — ENOXAPARIN SODIUM 80 MG: 100 INJECTION SUBCUTANEOUS at 20:16

## 2025-07-29 RX ADMIN — MUPIROCIN 1 APPLICATION: 20 OINTMENT TOPICAL at 21:32

## 2025-07-29 RX ADMIN — SODIUM CHLORIDE, POTASSIUM CHLORIDE, SODIUM LACTATE AND CALCIUM CHLORIDE 1000 ML: 600; 310; 30; 20 INJECTION, SOLUTION INTRAVENOUS at 14:11

## 2025-07-29 RX ADMIN — DILTIAZEM HYDROCHLORIDE 10 MG: 5 INJECTION INTRAVENOUS at 19:56

## 2025-07-29 RX ADMIN — HYDROCORTISONE SODIUM SUCCINATE 100 MG: 100 INJECTION, POWDER, FOR SOLUTION INTRAMUSCULAR; INTRAVENOUS at 14:36

## 2025-07-29 RX ADMIN — SODIUM CHLORIDE, POTASSIUM CHLORIDE, SODIUM LACTATE AND CALCIUM CHLORIDE 100 ML/HR: 600; 310; 30; 20 INJECTION, SOLUTION INTRAVENOUS at 18:17

## 2025-07-29 RX ADMIN — CALCIUM GLUCONATE 2000 MG: 20 INJECTION, SOLUTION INTRAVENOUS at 19:57

## 2025-07-30 ENCOUNTER — APPOINTMENT (OUTPATIENT)
Dept: INTERVENTIONAL RADIOLOGY/VASCULAR | Facility: HOSPITAL | Age: 75
DRG: 871 | End: 2025-07-30
Payer: MEDICARE

## 2025-07-30 ENCOUNTER — APPOINTMENT (OUTPATIENT)
Dept: CARDIOLOGY | Facility: HOSPITAL | Age: 75
DRG: 871 | End: 2025-07-30
Payer: MEDICARE

## 2025-07-30 PROBLEM — E44.0 MODERATE PROTEIN-CALORIE MALNUTRITION: Status: ACTIVE | Noted: 2025-07-30

## 2025-07-30 LAB
ALBUMIN SERPL-MCNC: 2.1 G/DL (ref 3.5–5.2)
ALBUMIN/GLOB SERPL: 1 G/DL
ALP SERPL-CCNC: 90 U/L (ref 39–117)
ALT SERPL W P-5'-P-CCNC: 6 U/L (ref 1–41)
ANION GAP SERPL CALCULATED.3IONS-SCNC: 13.4 MMOL/L (ref 5–15)
AST SERPL-CCNC: 14 U/L (ref 1–40)
BH CV LOWER VASCULAR LEFT COMMON FEMORAL AUGMENT: NORMAL
BH CV LOWER VASCULAR LEFT COMMON FEMORAL COMPETENT: NORMAL
BH CV LOWER VASCULAR LEFT COMMON FEMORAL COMPRESS: NORMAL
BH CV LOWER VASCULAR LEFT COMMON FEMORAL PHASIC: NORMAL
BH CV LOWER VASCULAR LEFT COMMON FEMORAL SPONT: NORMAL
BH CV LOWER VASCULAR LEFT DISTAL FEMORAL COMPRESS: NORMAL
BH CV LOWER VASCULAR LEFT GASTRONEMIUS COMPRESS: NORMAL
BH CV LOWER VASCULAR LEFT GREATER SAPH AK COMPRESS: NORMAL
BH CV LOWER VASCULAR LEFT GREATER SAPH BK COMPRESS: NORMAL
BH CV LOWER VASCULAR LEFT LESSER SAPH COMPRESS: NORMAL
BH CV LOWER VASCULAR LEFT MID FEMORAL AUGMENT: NORMAL
BH CV LOWER VASCULAR LEFT MID FEMORAL COMPETENT: NORMAL
BH CV LOWER VASCULAR LEFT MID FEMORAL COMPRESS: NORMAL
BH CV LOWER VASCULAR LEFT MID FEMORAL PHASIC: NORMAL
BH CV LOWER VASCULAR LEFT MID FEMORAL SPONT: NORMAL
BH CV LOWER VASCULAR LEFT PERONEAL COMPRESS: NORMAL
BH CV LOWER VASCULAR LEFT POPLITEAL AUGMENT: NORMAL
BH CV LOWER VASCULAR LEFT POPLITEAL COMPETENT: NORMAL
BH CV LOWER VASCULAR LEFT POPLITEAL COMPRESS: NORMAL
BH CV LOWER VASCULAR LEFT POPLITEAL PHASIC: NORMAL
BH CV LOWER VASCULAR LEFT POPLITEAL SPONT: NORMAL
BH CV LOWER VASCULAR LEFT POSTERIOR TIBIAL COMPRESS: NORMAL
BH CV LOWER VASCULAR LEFT PROFUNDA FEMORAL COMPRESS: NORMAL
BH CV LOWER VASCULAR LEFT PROXIMAL FEMORAL COMPRESS: NORMAL
BH CV LOWER VASCULAR LEFT SAPHENOFEMORAL JUNCTION COMPRESS: NORMAL
BH CV LOWER VASCULAR RIGHT COMMON FEMORAL AUGMENT: NORMAL
BH CV LOWER VASCULAR RIGHT COMMON FEMORAL COMPETENT: NORMAL
BH CV LOWER VASCULAR RIGHT COMMON FEMORAL COMPRESS: NORMAL
BH CV LOWER VASCULAR RIGHT COMMON FEMORAL PHASIC: NORMAL
BH CV LOWER VASCULAR RIGHT COMMON FEMORAL SPONT: NORMAL
BH CV LOWER VASCULAR RIGHT DISTAL FEMORAL COMPRESS: NORMAL
BH CV LOWER VASCULAR RIGHT GASTRONEMIUS COMPRESS: NORMAL
BH CV LOWER VASCULAR RIGHT GREATER SAPH AK COMPRESS: NORMAL
BH CV LOWER VASCULAR RIGHT GREATER SAPH BK COMPRESS: NORMAL
BH CV LOWER VASCULAR RIGHT LESSER SAPH COMPRESS: NORMAL
BH CV LOWER VASCULAR RIGHT MID FEMORAL AUGMENT: NORMAL
BH CV LOWER VASCULAR RIGHT MID FEMORAL COMPETENT: NORMAL
BH CV LOWER VASCULAR RIGHT MID FEMORAL COMPRESS: NORMAL
BH CV LOWER VASCULAR RIGHT MID FEMORAL PHASIC: NORMAL
BH CV LOWER VASCULAR RIGHT MID FEMORAL SPONT: NORMAL
BH CV LOWER VASCULAR RIGHT PERONEAL COMPRESS: NORMAL
BH CV LOWER VASCULAR RIGHT POPLITEAL AUGMENT: NORMAL
BH CV LOWER VASCULAR RIGHT POPLITEAL COMPETENT: NORMAL
BH CV LOWER VASCULAR RIGHT POPLITEAL COMPRESS: NORMAL
BH CV LOWER VASCULAR RIGHT POPLITEAL PHASIC: NORMAL
BH CV LOWER VASCULAR RIGHT POPLITEAL SPONT: NORMAL
BH CV LOWER VASCULAR RIGHT POSTERIOR TIBIAL COMPRESS: NORMAL
BH CV LOWER VASCULAR RIGHT PROFUNDA FEMORAL COMPRESS: NORMAL
BH CV LOWER VASCULAR RIGHT PROXIMAL FEMORAL COMPRESS: NORMAL
BH CV LOWER VASCULAR RIGHT SAPHENOFEMORAL JUNCTION COMPRESS: NORMAL
BH CV VAS POP FLUID COLLECTED: 1
BILIRUB SERPL-MCNC: 0.2 MG/DL (ref 0–1.2)
BUN SERPL-MCNC: 37.2 MG/DL (ref 8–23)
BUN/CREAT SERPL: 15.2 (ref 7–25)
CALCIUM SPEC-SCNC: 6.8 MG/DL (ref 8.6–10.5)
CHLORIDE SERPL-SCNC: 101 MMOL/L (ref 98–107)
CHOLEST SERPL-MCNC: 113 MG/DL (ref 0–200)
CO2 SERPL-SCNC: 16.6 MMOL/L (ref 22–29)
CREAT SERPL-MCNC: 2.45 MG/DL (ref 0.76–1.27)
DEPRECATED RDW RBC AUTO: 48.9 FL (ref 37–54)
EGFRCR SERPLBLD CKD-EPI 2021: 26.8 ML/MIN/1.73
ERYTHROCYTE [DISTWIDTH] IN BLOOD BY AUTOMATED COUNT: 15.1 % (ref 12.3–15.4)
GLOBULIN UR ELPH-MCNC: 2.1 GM/DL
GLUCOSE SERPL-MCNC: 104 MG/DL (ref 65–99)
HCT VFR BLD AUTO: 24.1 % (ref 37.5–51)
HDLC SERPL-MCNC: 26 MG/DL (ref 40–60)
HGB BLD-MCNC: 8 G/DL (ref 13–17.7)
LARGE PLATELETS: ABNORMAL
LDLC SERPL CALC-MCNC: 60 MG/DL (ref 0–100)
LDLC/HDLC SERPL: 2.14 {RATIO}
LYMPHOCYTES # BLD MANUAL: 0.54 10*3/MM3 (ref 0.7–3.1)
LYMPHOCYTES NFR BLD MANUAL: 3 % (ref 5–12)
MAGNESIUM SERPL-MCNC: 2.2 MG/DL (ref 1.6–2.4)
MCH RBC QN AUTO: 29.6 PG (ref 26.6–33)
MCHC RBC AUTO-ENTMCNC: 33.2 G/DL (ref 31.5–35.7)
MCV RBC AUTO: 89.3 FL (ref 79–97)
METAMYELOCYTES NFR BLD MANUAL: 2 % (ref 0–0)
MONOCYTES # BLD: 0.54 10*3/MM3 (ref 0.1–0.9)
MYELOCYTES NFR BLD MANUAL: 1 % (ref 0–0)
NEUTROPHILS # BLD AUTO: 16.26 10*3/MM3 (ref 1.7–7)
NEUTROPHILS NFR BLD MANUAL: 68 % (ref 42.7–76)
NEUTS BAND NFR BLD MANUAL: 23 % (ref 0–5)
PHOSPHATE SERPL-MCNC: 4.1 MG/DL (ref 2.5–4.5)
PLATELET # BLD AUTO: 201 10*3/MM3 (ref 140–450)
PMV BLD AUTO: 9.9 FL (ref 6–12)
POTASSIUM SERPL-SCNC: 4 MMOL/L (ref 3.5–5.2)
PROT SERPL-MCNC: 4.2 G/DL (ref 6–8.5)
QT INTERVAL: 305 MS
QTC INTERVAL: 446 MS
RBC # BLD AUTO: 2.7 10*6/MM3 (ref 4.14–5.8)
RBC MORPH BLD: NORMAL
SCAN SLIDE: NORMAL
SMALL PLATELETS BLD QL SMEAR: ADEQUATE
SODIUM SERPL-SCNC: 131 MMOL/L (ref 136–145)
TOXIC GRANULATION: ABNORMAL
TRIGL SERPL-MCNC: 157 MG/DL (ref 0–150)
VARIANT LYMPHS NFR BLD MANUAL: 3 % (ref 19.6–45.3)
VLDLC SERPL-MCNC: 27 MG/DL (ref 5–40)
WBC NRBC COR # BLD AUTO: 17.87 10*3/MM3 (ref 3.4–10.8)

## 2025-07-30 PROCEDURE — 06H03DZ INSERTION OF INTRALUMINAL DEVICE INTO INFERIOR VENA CAVA, PERCUTANEOUS APPROACH: ICD-10-PCS | Performed by: RADIOLOGY

## 2025-07-30 PROCEDURE — 80061 LIPID PANEL: CPT | Performed by: NURSE PRACTITIONER

## 2025-07-30 PROCEDURE — 80053 COMPREHEN METABOLIC PANEL: CPT | Performed by: NURSE PRACTITIONER

## 2025-07-30 PROCEDURE — 25010000002 FENTANYL CITRATE (PF) 50 MCG/ML SOLUTION: Performed by: RADIOLOGY

## 2025-07-30 PROCEDURE — 94664 DEMO&/EVAL PT USE INHALER: CPT

## 2025-07-30 PROCEDURE — 25510000001 IOPAMIDOL PER 1 ML: Performed by: INTERNAL MEDICINE

## 2025-07-30 PROCEDURE — 87507 IADNA-DNA/RNA PROBE TQ 12-25: CPT | Performed by: NURSE PRACTITIONER

## 2025-07-30 PROCEDURE — 25010000002 LIDOCAINE 1 % SOLUTION: Performed by: RADIOLOGY

## 2025-07-30 PROCEDURE — C1894 INTRO/SHEATH, NON-LASER: HCPCS

## 2025-07-30 PROCEDURE — 25010000002 CEFTRIAXONE PER 250 MG: Performed by: NURSE PRACTITIONER

## 2025-07-30 PROCEDURE — 94799 UNLISTED PULMONARY SVC/PX: CPT

## 2025-07-30 PROCEDURE — 99152 MOD SED SAME PHYS/QHP 5/>YRS: CPT

## 2025-07-30 PROCEDURE — 87449 NOS EACH ORGANISM AG IA: CPT | Performed by: SURGERY

## 2025-07-30 PROCEDURE — 25010000002 METOPROLOL TARTRATE 5 MG/5ML SOLUTION: Performed by: NURSE PRACTITIONER

## 2025-07-30 PROCEDURE — 87324 CLOSTRIDIUM AG IA: CPT | Performed by: SURGERY

## 2025-07-30 PROCEDURE — 94761 N-INVAS EAR/PLS OXIMETRY MLT: CPT

## 2025-07-30 PROCEDURE — 25010000002 METOPROLOL TARTRATE 5 MG/5ML SOLUTION: Performed by: STUDENT IN AN ORGANIZED HEALTH CARE EDUCATION/TRAINING PROGRAM

## 2025-07-30 PROCEDURE — 99232 SBSQ HOSP IP/OBS MODERATE 35: CPT | Performed by: SURGERY

## 2025-07-30 PROCEDURE — 83735 ASSAY OF MAGNESIUM: CPT | Performed by: NURSE PRACTITIONER

## 2025-07-30 PROCEDURE — 93970 EXTREMITY STUDY: CPT | Performed by: SURGERY

## 2025-07-30 PROCEDURE — 25010000002 CEFAZOLIN PER 500 MG: Performed by: RADIOLOGY

## 2025-07-30 PROCEDURE — 25010000002 DILTIAZEM 25 MG/5ML SOLUTION

## 2025-07-30 PROCEDURE — 25010000002 METRONIDAZOLE 500 MG/100ML SOLUTION: Performed by: NURSE PRACTITIONER

## 2025-07-30 PROCEDURE — 25010000002 ENOXAPARIN PER 10 MG: Performed by: NURSE PRACTITIONER

## 2025-07-30 PROCEDURE — 85007 BL SMEAR W/DIFF WBC COUNT: CPT | Performed by: NURSE PRACTITIONER

## 2025-07-30 PROCEDURE — C1769 GUIDE WIRE: HCPCS

## 2025-07-30 PROCEDURE — 84100 ASSAY OF PHOSPHORUS: CPT | Performed by: NURSE PRACTITIONER

## 2025-07-30 PROCEDURE — 25010000002 HYDROCORTISONE SOD SUC (PF) 100 MG RECONSTITUTED SOLUTION: Performed by: NURSE PRACTITIONER

## 2025-07-30 PROCEDURE — 25810000003 LACTATED RINGERS PER 1000 ML: Performed by: NURSE PRACTITIONER

## 2025-07-30 PROCEDURE — C1880 VENA CAVA FILTER: HCPCS

## 2025-07-30 PROCEDURE — 85025 COMPLETE CBC W/AUTO DIFF WBC: CPT | Performed by: NURSE PRACTITIONER

## 2025-07-30 PROCEDURE — 93970 EXTREMITY STUDY: CPT

## 2025-07-30 RX ORDER — LIDOCAINE HYDROCHLORIDE 10 MG/ML
INJECTION, SOLUTION INFILTRATION; PERINEURAL AS NEEDED
Status: DISCONTINUED | OUTPATIENT
Start: 2025-07-30 | End: 2025-08-11 | Stop reason: HOSPADM

## 2025-07-30 RX ORDER — IOPAMIDOL 755 MG/ML
50 INJECTION, SOLUTION INTRAVASCULAR
Status: COMPLETED | OUTPATIENT
Start: 2025-07-30 | End: 2025-07-30

## 2025-07-30 RX ORDER — SODIUM CHLORIDE, SODIUM LACTATE, POTASSIUM CHLORIDE, CALCIUM CHLORIDE 600; 310; 30; 20 MG/100ML; MG/100ML; MG/100ML; MG/100ML
100 INJECTION, SOLUTION INTRAVENOUS CONTINUOUS
Status: DISCONTINUED | OUTPATIENT
Start: 2025-07-30 | End: 2025-07-31

## 2025-07-30 RX ORDER — FENTANYL CITRATE 50 UG/ML
INJECTION, SOLUTION INTRAMUSCULAR; INTRAVENOUS AS NEEDED
Status: DISCONTINUED | OUTPATIENT
Start: 2025-07-30 | End: 2025-08-11 | Stop reason: HOSPADM

## 2025-07-30 RX ORDER — METOPROLOL TARTRATE 1 MG/ML
5 INJECTION, SOLUTION INTRAVENOUS ONCE
Status: COMPLETED | OUTPATIENT
Start: 2025-07-30 | End: 2025-07-30

## 2025-07-30 RX ORDER — ENOXAPARIN SODIUM 100 MG/ML
1 INJECTION SUBCUTANEOUS ONCE
Status: COMPLETED | OUTPATIENT
Start: 2025-07-30 | End: 2025-07-30

## 2025-07-30 RX ORDER — DILTIAZEM HYDROCHLORIDE 5 MG/ML
10 INJECTION INTRAVENOUS ONCE
Status: COMPLETED | OUTPATIENT
Start: 2025-07-30 | End: 2025-07-30

## 2025-07-30 RX ORDER — ENOXAPARIN SODIUM 100 MG/ML
1 INJECTION SUBCUTANEOUS EVERY 24 HOURS
Status: DISCONTINUED | OUTPATIENT
Start: 2025-07-31 | End: 2025-07-31

## 2025-07-30 RX ORDER — SACCHAROMYCES BOULARDII 250 MG
250 CAPSULE ORAL 2 TIMES DAILY
Status: DISCONTINUED | OUTPATIENT
Start: 2025-07-30 | End: 2025-08-11 | Stop reason: HOSPADM

## 2025-07-30 RX ORDER — DILTIAZEM HYDROCHLORIDE 5 MG/ML
INJECTION INTRAVENOUS
Status: COMPLETED
Start: 2025-07-30 | End: 2025-07-30

## 2025-07-30 RX ADMIN — VANCOMYCIN HYDROCHLORIDE 500 MG: 250 POWDER, FOR SOLUTION ORAL at 11:06

## 2025-07-30 RX ADMIN — ALBUTEROL SULFATE 2 PUFF: 108 AEROSOL, METERED RESPIRATORY (INHALATION) at 07:47

## 2025-07-30 RX ADMIN — CEFAZOLIN 2000 MG: 2 INJECTION, POWDER, FOR SOLUTION INTRAMUSCULAR; INTRAVENOUS at 13:53

## 2025-07-30 RX ADMIN — HYDROCORTISONE SODIUM SUCCINATE 100 MG: 100 INJECTION, POWDER, FOR SOLUTION INTRAMUSCULAR; INTRAVENOUS at 07:58

## 2025-07-30 RX ADMIN — Medication 10 ML: at 08:00

## 2025-07-30 RX ADMIN — BUDESONIDE AND FORMOTEROL FUMARATE DIHYDRATE 2 PUFF: 160; 4.5 AEROSOL RESPIRATORY (INHALATION) at 20:34

## 2025-07-30 RX ADMIN — DILTIAZEM HYDROCHLORIDE 10 MG: 5 INJECTION INTRAVENOUS at 23:18

## 2025-07-30 RX ADMIN — ALBUTEROL SULFATE 2 PUFF: 108 AEROSOL, METERED RESPIRATORY (INHALATION) at 15:43

## 2025-07-30 RX ADMIN — NOREPINEPHRINE BITARTRATE 0.06 MCG/KG/MIN: 32 SOLUTION INTRAVENOUS at 14:35

## 2025-07-30 RX ADMIN — Medication 10 ML: at 20:12

## 2025-07-30 RX ADMIN — IOPAMIDOL 50 ML: 755 INJECTION, SOLUTION INTRAVENOUS at 14:04

## 2025-07-30 RX ADMIN — METRONIDAZOLE 500 MG: 500 INJECTION, SOLUTION INTRAVENOUS at 20:11

## 2025-07-30 RX ADMIN — PANTOPRAZOLE SODIUM 40 MG: 40 TABLET, DELAYED RELEASE ORAL at 08:00

## 2025-07-30 RX ADMIN — MUPIROCIN 1 APPLICATION: 20 OINTMENT TOPICAL at 20:11

## 2025-07-30 RX ADMIN — VANCOMYCIN HYDROCHLORIDE 500 MG: 250 POWDER, FOR SOLUTION ORAL at 23:19

## 2025-07-30 RX ADMIN — Medication 250 MG: at 20:11

## 2025-07-30 RX ADMIN — GUAIFENESIN 600 MG: 600 TABLET, EXTENDED RELEASE ORAL at 20:11

## 2025-07-30 RX ADMIN — BUDESONIDE AND FORMOTEROL FUMARATE DIHYDRATE 2 PUFF: 160; 4.5 AEROSOL RESPIRATORY (INHALATION) at 07:50

## 2025-07-30 RX ADMIN — METOPROLOL TARTRATE 5 MG: 5 INJECTION INTRAVENOUS at 17:37

## 2025-07-30 RX ADMIN — ENOXAPARIN SODIUM 80 MG: 100 INJECTION SUBCUTANEOUS at 17:38

## 2025-07-30 RX ADMIN — METOPROLOL TARTRATE 5 MG: 5 INJECTION INTRAVENOUS at 04:06

## 2025-07-30 RX ADMIN — MIDODRINE HYDROCHLORIDE 10 MG: 5 TABLET ORAL at 21:27

## 2025-07-30 RX ADMIN — METOPROLOL TARTRATE 5 MG: 5 INJECTION INTRAVENOUS at 11:06

## 2025-07-30 RX ADMIN — GUAIFENESIN 600 MG: 600 TABLET, EXTENDED RELEASE ORAL at 08:00

## 2025-07-30 RX ADMIN — SODIUM CHLORIDE, POTASSIUM CHLORIDE, SODIUM LACTATE AND CALCIUM CHLORIDE 100 ML/HR: 600; 310; 30; 20 INJECTION, SOLUTION INTRAVENOUS at 14:57

## 2025-07-30 RX ADMIN — HYDROCORTISONE SODIUM SUCCINATE 100 MG: 100 INJECTION, POWDER, FOR SOLUTION INTRAMUSCULAR; INTRAVENOUS at 23:19

## 2025-07-30 RX ADMIN — ALBUTEROL SULFATE 2 PUFF: 108 AEROSOL, METERED RESPIRATORY (INHALATION) at 20:37

## 2025-07-30 RX ADMIN — METOPROLOL TARTRATE 5 MG: 5 INJECTION INTRAVENOUS at 22:09

## 2025-07-30 RX ADMIN — METRONIDAZOLE 500 MG: 500 INJECTION, SOLUTION INTRAVENOUS at 04:06

## 2025-07-30 RX ADMIN — FENTANYL CITRATE 50 MCG: 50 INJECTION, SOLUTION INTRAMUSCULAR; INTRAVENOUS at 13:37

## 2025-07-30 RX ADMIN — MUPIROCIN 1 APPLICATION: 20 OINTMENT TOPICAL at 08:00

## 2025-07-30 RX ADMIN — ALBUTEROL SULFATE 2 PUFF: 108 AEROSOL, METERED RESPIRATORY (INHALATION) at 12:04

## 2025-07-30 RX ADMIN — LEVOTHYROXINE SODIUM 100 MCG: 100 TABLET ORAL at 05:17

## 2025-07-30 RX ADMIN — METOPROLOL TARTRATE 5 MG: 5 INJECTION INTRAVENOUS at 20:11

## 2025-07-30 RX ADMIN — MIDODRINE HYDROCHLORIDE 10 MG: 5 TABLET ORAL at 05:17

## 2025-07-30 RX ADMIN — NOREPINEPHRINE BITARTRATE 0.18 MCG/KG/MIN: 32 SOLUTION INTRAVENOUS at 01:05

## 2025-07-30 RX ADMIN — DILTIAZEM HYDROCHLORIDE 10 MG: 5 INJECTION, SOLUTION INTRAVENOUS at 23:18

## 2025-07-30 RX ADMIN — HYDROCORTISONE SODIUM SUCCINATE 100 MG: 100 INJECTION, POWDER, FOR SOLUTION INTRAMUSCULAR; INTRAVENOUS at 14:52

## 2025-07-30 RX ADMIN — MIDODRINE HYDROCHLORIDE 10 MG: 5 TABLET ORAL at 14:38

## 2025-07-30 RX ADMIN — CEFTRIAXONE SODIUM 1000 MG: 1 INJECTION, POWDER, FOR SOLUTION INTRAMUSCULAR; INTRAVENOUS at 20:11

## 2025-07-30 RX ADMIN — TAMSULOSIN HYDROCHLORIDE 0.4 MG: 0.4 CAPSULE ORAL at 20:11

## 2025-07-30 RX ADMIN — LIDOCAINE HYDROCHLORIDE 5 ML: 10 INJECTION, SOLUTION INFILTRATION; PERINEURAL at 13:52

## 2025-07-30 RX ADMIN — METRONIDAZOLE 500 MG: 500 INJECTION, SOLUTION INTRAVENOUS at 11:06

## 2025-07-30 NOTE — OUTREACH NOTE
Medical Week 2 Survey      Flowsheet Row Responses   [facility] facility patient discharged from? [facility]   Does the patient have one of the following disease processes/diagnoses(primary or secondary)? Other   Week 2 attempt successful? No   Unsuccessful attempts Attempt 1   Revoke Readmitted            JASMYNE ZHOU - Registered Nurse

## 2025-07-31 PROBLEM — N17.9 ACUTE KIDNEY INJURY: Status: ACTIVE | Noted: 2025-07-31

## 2025-07-31 PROBLEM — R33.8 ACUTE URINARY RETENTION: Status: ACTIVE | Noted: 2025-07-31

## 2025-07-31 PROBLEM — A04.72 C. DIFFICILE COLITIS: Status: ACTIVE | Noted: 2025-07-31

## 2025-07-31 PROBLEM — T83.511A UTI (URINARY TRACT INFECTION) DUE TO URINARY INDWELLING CATHETER: Status: ACTIVE | Noted: 2025-07-31

## 2025-07-31 PROBLEM — N39.0 UTI (URINARY TRACT INFECTION) DUE TO URINARY INDWELLING CATHETER: Status: ACTIVE | Noted: 2025-07-31

## 2025-07-31 LAB
ADV 40+41 DNA STL QL NAA+NON-PROBE: NOT DETECTED
ALBUMIN SERPL-MCNC: 2 G/DL (ref 3.5–5.2)
ALBUMIN/GLOB SERPL: 1 G/DL
ALP SERPL-CCNC: 97 U/L (ref 39–117)
ALT SERPL W P-5'-P-CCNC: 5 U/L (ref 1–41)
ANION GAP SERPL CALCULATED.3IONS-SCNC: 12.3 MMOL/L (ref 5–15)
ANION GAP SERPL CALCULATED.3IONS-SCNC: 14.2 MMOL/L (ref 5–15)
AST SERPL-CCNC: 16 U/L (ref 1–40)
ASTRO TYP 1-8 RNA STL QL NAA+NON-PROBE: NOT DETECTED
BILIRUB SERPL-MCNC: 0.2 MG/DL (ref 0–1.2)
BUN SERPL-MCNC: 40.9 MG/DL (ref 8–23)
BUN SERPL-MCNC: 42.4 MG/DL (ref 8–23)
BUN/CREAT SERPL: 16.8 (ref 7–25)
BUN/CREAT SERPL: 17.2 (ref 7–25)
C CAYETANENSIS DNA STL QL NAA+NON-PROBE: NOT DETECTED
C COLI+JEJ+UPSA DNA STL QL NAA+NON-PROBE: NOT DETECTED
C DIFF GDH + TOXINS A+B STL QL IA.RAPID: POSITIVE
C DIFF GDH + TOXINS A+B STL QL IA.RAPID: POSITIVE
CA-I SERPL ISE-MCNC: 0.93 MMOL/L (ref 1.15–1.3)
CALCIUM SPEC-SCNC: 6.8 MG/DL (ref 8.6–10.5)
CALCIUM SPEC-SCNC: 7 MG/DL (ref 8.6–10.5)
CHLORIDE SERPL-SCNC: 100 MMOL/L (ref 98–107)
CHLORIDE SERPL-SCNC: 99 MMOL/L (ref 98–107)
CO2 SERPL-SCNC: 17.8 MMOL/L (ref 22–29)
CO2 SERPL-SCNC: 18.7 MMOL/L (ref 22–29)
CREAT SERPL-MCNC: 2.38 MG/DL (ref 0.76–1.27)
CREAT SERPL-MCNC: 2.52 MG/DL (ref 0.76–1.27)
CRYPTOSP DNA STL QL NAA+NON-PROBE: NOT DETECTED
DACRYOCYTES BLD QL SMEAR: ABNORMAL
DEPRECATED RDW RBC AUTO: 49.6 FL (ref 37–54)
E HISTOLYT DNA STL QL NAA+NON-PROBE: NOT DETECTED
EAEC PAA PLAS AGGR+AATA ST NAA+NON-PRB: NOT DETECTED
EC STX1+STX2 GENES STL QL NAA+NON-PROBE: NOT DETECTED
EGFRCR SERPLBLD CKD-EPI 2021: 25.9 ML/MIN/1.73
EGFRCR SERPLBLD CKD-EPI 2021: 27.7 ML/MIN/1.73
EPEC EAE GENE STL QL NAA+NON-PROBE: NOT DETECTED
ERYTHROCYTE [DISTWIDTH] IN BLOOD BY AUTOMATED COUNT: 15.2 % (ref 12.3–15.4)
ETEC LTA+ST1A+ST1B TOX ST NAA+NON-PROBE: NOT DETECTED
G LAMBLIA DNA STL QL NAA+NON-PROBE: NOT DETECTED
GLOBULIN UR ELPH-MCNC: 2.1 GM/DL
GLUCOSE SERPL-MCNC: 140 MG/DL (ref 65–99)
GLUCOSE SERPL-MCNC: 272 MG/DL (ref 65–99)
HCT VFR BLD AUTO: 22.3 % (ref 37.5–51)
HGB BLD-MCNC: 7.4 G/DL (ref 13–17.7)
LARGE PLATELETS: ABNORMAL
LYMPHOCYTES # BLD MANUAL: 0.35 10*3/MM3 (ref 0.7–3.1)
MAGNESIUM SERPL-MCNC: 2.2 MG/DL (ref 1.6–2.4)
MAGNESIUM SERPL-MCNC: 2.3 MG/DL (ref 1.6–2.4)
MCH RBC QN AUTO: 29.2 PG (ref 26.6–33)
MCHC RBC AUTO-ENTMCNC: 33.2 G/DL (ref 31.5–35.7)
MCV RBC AUTO: 88.1 FL (ref 79–97)
METAMYELOCYTES NFR BLD MANUAL: 6 % (ref 0–0)
NEUTROPHILS # BLD AUTO: 15.92 10*3/MM3 (ref 1.7–7)
NEUTROPHILS NFR BLD MANUAL: 59 % (ref 42.7–76)
NEUTS BAND NFR BLD MANUAL: 33 % (ref 0–5)
NOROVIRUS GI+II RNA STL QL NAA+NON-PROBE: NOT DETECTED
P SHIGELLOIDES DNA STL QL NAA+NON-PROBE: NOT DETECTED
PHOSPHATE SERPL-MCNC: 3.6 MG/DL (ref 2.5–4.5)
PHOSPHATE SERPL-MCNC: 4.3 MG/DL (ref 2.5–4.5)
PLATELET # BLD AUTO: 164 10*3/MM3 (ref 140–450)
PMV BLD AUTO: 9.9 FL (ref 6–12)
POIKILOCYTOSIS BLD QL SMEAR: ABNORMAL
POLYCHROMASIA BLD QL SMEAR: ABNORMAL
POTASSIUM SERPL-SCNC: 3.5 MMOL/L (ref 3.5–5.2)
POTASSIUM SERPL-SCNC: 3.9 MMOL/L (ref 3.5–5.2)
POTASSIUM SERPL-SCNC: 3.9 MMOL/L (ref 3.5–5.2)
PROT SERPL-MCNC: 4.1 G/DL (ref 6–8.5)
RBC # BLD AUTO: 2.53 10*6/MM3 (ref 4.14–5.8)
RVA RNA STL QL NAA+NON-PROBE: NOT DETECTED
S ENT+BONG DNA STL QL NAA+NON-PROBE: NOT DETECTED
SAPO I+II+IV+V RNA STL QL NAA+NON-PROBE: NOT DETECTED
SCAN SLIDE: NORMAL
SHIGELLA SP+EIEC IPAH ST NAA+NON-PROBE: NOT DETECTED
SMALL PLATELETS BLD QL SMEAR: ADEQUATE
SODIUM SERPL-SCNC: 130 MMOL/L (ref 136–145)
SODIUM SERPL-SCNC: 132 MMOL/L (ref 136–145)
V CHOL+PARA+VUL DNA STL QL NAA+NON-PROBE: NOT DETECTED
V CHOLERAE DNA STL QL NAA+NON-PROBE: NOT DETECTED
VARIANT LYMPHS NFR BLD MANUAL: 2 % (ref 19.6–45.3)
WBC MORPH BLD: NORMAL
WBC NRBC COR # BLD AUTO: 17.3 10*3/MM3 (ref 3.4–10.8)
Y ENTEROCOL DNA STL QL NAA+NON-PROBE: NOT DETECTED

## 2025-07-31 PROCEDURE — 25010000002 ENOXAPARIN PER 10 MG: Performed by: NURSE PRACTITIONER

## 2025-07-31 PROCEDURE — 25010000002 MAGNESIUM SULFATE 4 GM/100ML SOLUTION

## 2025-07-31 PROCEDURE — 83735 ASSAY OF MAGNESIUM: CPT | Performed by: NURSE PRACTITIONER

## 2025-07-31 PROCEDURE — 25010000002 CEFTRIAXONE PER 250 MG: Performed by: NURSE PRACTITIONER

## 2025-07-31 PROCEDURE — 82330 ASSAY OF CALCIUM: CPT | Performed by: STUDENT IN AN ORGANIZED HEALTH CARE EDUCATION/TRAINING PROGRAM

## 2025-07-31 PROCEDURE — 94799 UNLISTED PULMONARY SVC/PX: CPT

## 2025-07-31 PROCEDURE — 84100 ASSAY OF PHOSPHORUS: CPT | Performed by: NURSE PRACTITIONER

## 2025-07-31 PROCEDURE — 25010000002 HYDROCORTISONE SOD SUC (PF) 100 MG RECONSTITUTED SOLUTION: Performed by: NURSE PRACTITIONER

## 2025-07-31 PROCEDURE — 25010000002 DIGOXIN PER 500 MCG: Performed by: INTERNAL MEDICINE

## 2025-07-31 PROCEDURE — 84132 ASSAY OF SERUM POTASSIUM: CPT | Performed by: INTERNAL MEDICINE

## 2025-07-31 PROCEDURE — 85007 BL SMEAR W/DIFF WBC COUNT: CPT | Performed by: NURSE PRACTITIONER

## 2025-07-31 PROCEDURE — 80053 COMPREHEN METABOLIC PANEL: CPT | Performed by: NURSE PRACTITIONER

## 2025-07-31 PROCEDURE — 25010000002 MAGNESIUM SULFATE IN D5W 1G/100ML (PREMIX) 1-5 GM/100ML-% SOLUTION: Performed by: STUDENT IN AN ORGANIZED HEALTH CARE EDUCATION/TRAINING PROGRAM

## 2025-07-31 PROCEDURE — 93005 ELECTROCARDIOGRAM TRACING: CPT | Performed by: INTERNAL MEDICINE

## 2025-07-31 PROCEDURE — 25010000002 CALCIUM GLUCONATE 2-0.675 GM/100ML-% SOLUTION: Performed by: STUDENT IN AN ORGANIZED HEALTH CARE EDUCATION/TRAINING PROGRAM

## 2025-07-31 PROCEDURE — 25810000003 LACTATED RINGERS SOLUTION: Performed by: NURSE PRACTITIONER

## 2025-07-31 PROCEDURE — 25010000002 AMIODARONE IN DEXTROSE 5% 360-4.14 MG/200ML-% SOLUTION: Performed by: INTERNAL MEDICINE

## 2025-07-31 PROCEDURE — 25010000002 DILTIAZEM 25 MG/5ML SOLUTION

## 2025-07-31 PROCEDURE — 93010 ELECTROCARDIOGRAM REPORT: CPT | Performed by: INTERNAL MEDICINE

## 2025-07-31 PROCEDURE — 25810000003 LACTATED RINGERS PER 1000 ML: Performed by: NURSE PRACTITIONER

## 2025-07-31 PROCEDURE — 99221 1ST HOSP IP/OBS SF/LOW 40: CPT | Performed by: INTERNAL MEDICINE

## 2025-07-31 PROCEDURE — 94761 N-INVAS EAR/PLS OXIMETRY MLT: CPT

## 2025-07-31 PROCEDURE — 25010000002 AMIODARONE IN DEXTROSE 5% 150-4.21 MG/100ML-% SOLUTION: Performed by: INTERNAL MEDICINE

## 2025-07-31 PROCEDURE — 25810000003 SODIUM CHLORIDE 0.9 % SOLUTION: Performed by: INTERNAL MEDICINE

## 2025-07-31 PROCEDURE — 25010000002 METOPROLOL TARTRATE 5 MG/5ML SOLUTION: Performed by: NURSE PRACTITIONER

## 2025-07-31 PROCEDURE — 94664 DEMO&/EVAL PT USE INHALER: CPT

## 2025-07-31 PROCEDURE — 85025 COMPLETE CBC W/AUTO DIFF WBC: CPT | Performed by: NURSE PRACTITIONER

## 2025-07-31 PROCEDURE — 25010000002 METRONIDAZOLE 500 MG/100ML SOLUTION: Performed by: NURSE PRACTITIONER

## 2025-07-31 RX ORDER — DILTIAZEM HCL/D5W 125 MG/125
PLASTIC BAG, INJECTION (ML) INTRAVENOUS
Status: COMPLETED
Start: 2025-07-31 | End: 2025-07-31

## 2025-07-31 RX ORDER — CALCIUM GLUCONATE 20 MG/ML
2000 INJECTION, SOLUTION INTRAVENOUS ONCE
Status: COMPLETED | OUTPATIENT
Start: 2025-07-31 | End: 2025-07-31

## 2025-07-31 RX ORDER — POTASSIUM CHLORIDE 1500 MG/1
40 TABLET, EXTENDED RELEASE ORAL EVERY 4 HOURS
Status: COMPLETED | OUTPATIENT
Start: 2025-07-31 | End: 2025-07-31

## 2025-07-31 RX ORDER — DILTIAZEM HYDROCHLORIDE 5 MG/ML
10 INJECTION INTRAVENOUS ONCE
Status: COMPLETED | OUTPATIENT
Start: 2025-07-31 | End: 2025-07-31

## 2025-07-31 RX ORDER — MAGNESIUM SULFATE HEPTAHYDRATE 40 MG/ML
4 INJECTION, SOLUTION INTRAVENOUS ONCE
Status: COMPLETED | OUTPATIENT
Start: 2025-07-31 | End: 2025-07-31

## 2025-07-31 RX ORDER — SODIUM CHLORIDE, SODIUM LACTATE, POTASSIUM CHLORIDE, CALCIUM CHLORIDE 600; 310; 30; 20 MG/100ML; MG/100ML; MG/100ML; MG/100ML
100 INJECTION, SOLUTION INTRAVENOUS CONTINUOUS
Status: DISCONTINUED | OUTPATIENT
Start: 2025-07-31 | End: 2025-08-01

## 2025-07-31 RX ORDER — DIGOXIN 0.25 MG/ML
125 INJECTION INTRAMUSCULAR; INTRAVENOUS ONCE
Status: COMPLETED | OUTPATIENT
Start: 2025-07-31 | End: 2025-07-31

## 2025-07-31 RX ORDER — ENOXAPARIN SODIUM 100 MG/ML
1 INJECTION SUBCUTANEOUS EVERY 12 HOURS
Status: DISCONTINUED | OUTPATIENT
Start: 2025-07-31 | End: 2025-08-01

## 2025-07-31 RX ORDER — DIGOXIN 0.25 MG/ML
250 INJECTION INTRAMUSCULAR; INTRAVENOUS ONCE
Status: COMPLETED | OUTPATIENT
Start: 2025-07-31 | End: 2025-07-31

## 2025-07-31 RX ORDER — MAGNESIUM SULFATE HEPTAHYDRATE 40 MG/ML
INJECTION, SOLUTION INTRAVENOUS
Status: COMPLETED
Start: 2025-07-31 | End: 2025-07-31

## 2025-07-31 RX ORDER — HYDROXYZINE HYDROCHLORIDE 25 MG/1
50 TABLET, FILM COATED ORAL NIGHTLY
Status: DISCONTINUED | OUTPATIENT
Start: 2025-07-31 | End: 2025-08-11 | Stop reason: HOSPADM

## 2025-07-31 RX ORDER — SODIUM CHLORIDE 9 MG/ML
100 INJECTION, SOLUTION INTRAVENOUS CONTINUOUS
Status: DISPENSED | OUTPATIENT
Start: 2025-07-31 | End: 2025-08-01

## 2025-07-31 RX ORDER — DILTIAZEM HYDROCHLORIDE 5 MG/ML
INJECTION INTRAVENOUS
Status: COMPLETED
Start: 2025-07-31 | End: 2025-07-31

## 2025-07-31 RX ORDER — MAGNESIUM SULFATE 1 G/100ML
1 INJECTION INTRAVENOUS ONCE
Status: COMPLETED | OUTPATIENT
Start: 2025-07-31 | End: 2025-07-31

## 2025-07-31 RX ORDER — DILTIAZEM HCL/D5W 125 MG/125
5-15 PLASTIC BAG, INJECTION (ML) INTRAVENOUS
Status: DISCONTINUED | OUTPATIENT
Start: 2025-07-31 | End: 2025-08-03

## 2025-07-31 RX ORDER — FAMOTIDINE 20 MG/1
20 TABLET, FILM COATED ORAL DAILY
Status: DISCONTINUED | OUTPATIENT
Start: 2025-08-01 | End: 2025-08-11 | Stop reason: HOSPADM

## 2025-07-31 RX ADMIN — HYDROXYZINE HYDROCHLORIDE 50 MG: 25 TABLET, FILM COATED ORAL at 21:31

## 2025-07-31 RX ADMIN — VANCOMYCIN HYDROCHLORIDE 500 MG: 250 POWDER, FOR SOLUTION ORAL at 17:39

## 2025-07-31 RX ADMIN — Medication 10 ML: at 21:37

## 2025-07-31 RX ADMIN — AMIODARONE HYDROCHLORIDE 150 MG: 1.5 INJECTION, SOLUTION INTRAVENOUS at 17:39

## 2025-07-31 RX ADMIN — DIGOXIN 250 MCG: 0.25 INJECTION INTRAMUSCULAR; INTRAVENOUS at 21:32

## 2025-07-31 RX ADMIN — VANCOMYCIN HYDROCHLORIDE 500 MG: 250 POWDER, FOR SOLUTION ORAL at 05:08

## 2025-07-31 RX ADMIN — MIDODRINE HYDROCHLORIDE 10 MG: 5 TABLET ORAL at 12:29

## 2025-07-31 RX ADMIN — VANCOMYCIN HYDROCHLORIDE 500 MG: 250 POWDER, FOR SOLUTION ORAL at 23:39

## 2025-07-31 RX ADMIN — AMIODARONE HYDROCHLORIDE 1 MG/MIN: 1.8 INJECTION, SOLUTION INTRAVENOUS at 23:02

## 2025-07-31 RX ADMIN — POTASSIUM CHLORIDE 40 MEQ: 1500 TABLET, EXTENDED RELEASE ORAL at 16:03

## 2025-07-31 RX ADMIN — GUAIFENESIN 600 MG: 600 TABLET, EXTENDED RELEASE ORAL at 08:12

## 2025-07-31 RX ADMIN — Medication 15 MG/HR: at 23:02

## 2025-07-31 RX ADMIN — GUAIFENESIN 600 MG: 600 TABLET, EXTENDED RELEASE ORAL at 21:31

## 2025-07-31 RX ADMIN — MAGNESIUM SULFATE IN WATER FOR 4 G: 40 INJECTION INTRAVENOUS at 15:37

## 2025-07-31 RX ADMIN — CEFTRIAXONE SODIUM 1000 MG: 1 INJECTION, POWDER, FOR SOLUTION INTRAMUSCULAR; INTRAVENOUS at 21:31

## 2025-07-31 RX ADMIN — Medication 5 MG/HR: at 16:03

## 2025-07-31 RX ADMIN — MUPIROCIN 1 APPLICATION: 20 OINTMENT TOPICAL at 21:31

## 2025-07-31 RX ADMIN — Medication 250 MG: at 08:12

## 2025-07-31 RX ADMIN — MIDODRINE HYDROCHLORIDE 10 MG: 5 TABLET ORAL at 21:31

## 2025-07-31 RX ADMIN — MIDODRINE HYDROCHLORIDE 10 MG: 5 TABLET ORAL at 05:08

## 2025-07-31 RX ADMIN — TAMSULOSIN HYDROCHLORIDE 0.4 MG: 0.4 CAPSULE ORAL at 21:31

## 2025-07-31 RX ADMIN — HYDROCORTISONE SODIUM SUCCINATE 100 MG: 100 INJECTION, POWDER, FOR SOLUTION INTRAMUSCULAR; INTRAVENOUS at 08:12

## 2025-07-31 RX ADMIN — MUPIROCIN 1 APPLICATION: 20 OINTMENT TOPICAL at 08:12

## 2025-07-31 RX ADMIN — METRONIDAZOLE 500 MG: 500 INJECTION, SOLUTION INTRAVENOUS at 12:28

## 2025-07-31 RX ADMIN — Medication 250 MG: at 21:31

## 2025-07-31 RX ADMIN — CALCIUM GLUCONATE 2000 MG: 20 INJECTION, SOLUTION INTRAVENOUS at 05:08

## 2025-07-31 RX ADMIN — AMIODARONE HYDROCHLORIDE 1 MG/MIN: 1.8 INJECTION, SOLUTION INTRAVENOUS at 17:39

## 2025-07-31 RX ADMIN — DIGOXIN 125 MCG: 0.25 INJECTION INTRAMUSCULAR; INTRAVENOUS at 19:46

## 2025-07-31 RX ADMIN — ALBUTEROL SULFATE 2 PUFF: 108 AEROSOL, METERED RESPIRATORY (INHALATION) at 08:49

## 2025-07-31 RX ADMIN — BUDESONIDE AND FORMOTEROL FUMARATE DIHYDRATE 2 PUFF: 160; 4.5 AEROSOL RESPIRATORY (INHALATION) at 08:51

## 2025-07-31 RX ADMIN — AMIODARONE HYDROCHLORIDE 1 MG/MIN: 1.8 INJECTION, SOLUTION INTRAVENOUS at 17:40

## 2025-07-31 RX ADMIN — METRONIDAZOLE 500 MG: 500 INJECTION, SOLUTION INTRAVENOUS at 02:40

## 2025-07-31 RX ADMIN — DILTIAZEM HYDROCHLORIDE 10 MG: 5 INJECTION INTRAVENOUS at 15:19

## 2025-07-31 RX ADMIN — MAGNESIUM SULFATE HEPTAHYDRATE 1 G: 1 INJECTION, SOLUTION INTRAVENOUS at 05:08

## 2025-07-31 RX ADMIN — SODIUM CHLORIDE, POTASSIUM CHLORIDE, SODIUM LACTATE AND CALCIUM CHLORIDE 500 ML: 600; 310; 30; 20 INJECTION, SOLUTION INTRAVENOUS at 15:38

## 2025-07-31 RX ADMIN — ENOXAPARIN SODIUM 80 MG: 100 INJECTION SUBCUTANEOUS at 12:28

## 2025-07-31 RX ADMIN — HYDROCORTISONE 20 MG: 10 TABLET ORAL at 21:31

## 2025-07-31 RX ADMIN — MAGNESIUM SULFATE HEPTAHYDRATE 4 G: 40 INJECTION, SOLUTION INTRAVENOUS at 15:37

## 2025-07-31 RX ADMIN — LEVOTHYROXINE SODIUM 100 MCG: 100 TABLET ORAL at 05:08

## 2025-07-31 RX ADMIN — METOPROLOL TARTRATE 5 MG: 5 INJECTION INTRAVENOUS at 03:17

## 2025-07-31 RX ADMIN — PANTOPRAZOLE SODIUM 40 MG: 40 TABLET, DELAYED RELEASE ORAL at 08:12

## 2025-07-31 RX ADMIN — VANCOMYCIN HYDROCHLORIDE 500 MG: 250 POWDER, FOR SOLUTION ORAL at 12:28

## 2025-07-31 RX ADMIN — SODIUM CHLORIDE, POTASSIUM CHLORIDE, SODIUM LACTATE AND CALCIUM CHLORIDE 100 ML/HR: 600; 310; 30; 20 INJECTION, SOLUTION INTRAVENOUS at 21:29

## 2025-07-31 RX ADMIN — ALBUTEROL SULFATE 2 PUFF: 108 AEROSOL, METERED RESPIRATORY (INHALATION) at 12:42

## 2025-07-31 RX ADMIN — SODIUM CHLORIDE 100 ML/HR: 9 INJECTION, SOLUTION INTRAVENOUS at 21:38

## 2025-07-31 RX ADMIN — METRONIDAZOLE 500 MG: 500 INJECTION, SOLUTION INTRAVENOUS at 19:46

## 2025-07-31 RX ADMIN — POTASSIUM CHLORIDE 40 MEQ: 1500 TABLET, EXTENDED RELEASE ORAL at 19:46

## 2025-07-31 RX ADMIN — ENOXAPARIN SODIUM 80 MG: 100 INJECTION SUBCUTANEOUS at 21:31

## 2025-08-01 ENCOUNTER — APPOINTMENT (OUTPATIENT)
Dept: GENERAL RADIOLOGY | Facility: HOSPITAL | Age: 75
DRG: 871 | End: 2025-08-01
Payer: MEDICARE

## 2025-08-01 LAB
ALBUMIN SERPL-MCNC: 2.1 G/DL (ref 3.5–5.2)
ALBUMIN/GLOB SERPL: 1.1 G/DL
ALP SERPL-CCNC: 100 U/L (ref 39–117)
ALT SERPL W P-5'-P-CCNC: <5 U/L (ref 1–41)
ANION GAP SERPL CALCULATED.3IONS-SCNC: 9.2 MMOL/L (ref 5–15)
ANISOCYTOSIS BLD QL: ABNORMAL
AST SERPL-CCNC: 12 U/L (ref 1–40)
BACTERIA SPEC AEROBE CULT: ABNORMAL
BACTERIA SPEC AEROBE CULT: ABNORMAL
BILIRUB SERPL-MCNC: <0.2 MG/DL (ref 0–1.2)
BUN SERPL-MCNC: 41.8 MG/DL (ref 8–23)
BUN/CREAT SERPL: 18 (ref 7–25)
CALCIUM SPEC-SCNC: 7 MG/DL (ref 8.6–10.5)
CHLORIDE SERPL-SCNC: 100 MMOL/L (ref 98–107)
CK SERPL-CCNC: 76 U/L (ref 20–200)
CO2 SERPL-SCNC: 18.8 MMOL/L (ref 22–29)
CORTIS SERPL-MCNC: 47.1 MCG/DL
CREAT SERPL-MCNC: 2.32 MG/DL (ref 0.76–1.27)
DEPRECATED RDW RBC AUTO: 50.1 FL (ref 37–54)
EGFRCR SERPLBLD CKD-EPI 2021: 28.6 ML/MIN/1.73
EOSINOPHIL SPEC QL MICRO: 0 % EOS/100 CELLS (ref 0–0)
ERYTHROCYTE [DISTWIDTH] IN BLOOD BY AUTOMATED COUNT: 15.6 % (ref 12.3–15.4)
FERRITIN SERPL-MCNC: 661 NG/ML (ref 30–400)
GLOBULIN UR ELPH-MCNC: 2 GM/DL
GLUCOSE BLDC GLUCOMTR-MCNC: 135 MG/DL (ref 70–105)
GLUCOSE BLDC GLUCOMTR-MCNC: 190 MG/DL (ref 70–105)
GLUCOSE BLDC GLUCOMTR-MCNC: 234 MG/DL (ref 70–105)
GLUCOSE SERPL-MCNC: 209 MG/DL (ref 65–99)
HCT VFR BLD AUTO: 24.3 % (ref 37.5–51)
HGB BLD-MCNC: 8 G/DL (ref 13–17.7)
IRON 24H UR-MRATE: 52 MCG/DL (ref 59–158)
IRON SATN MFR SERPL: 34 % (ref 20–50)
LYMPHOCYTES # BLD MANUAL: 0.44 10*3/MM3 (ref 0.7–3.1)
LYMPHOCYTES NFR BLD MANUAL: 2 % (ref 5–12)
MAGNESIUM SERPL-MCNC: 2.9 MG/DL (ref 1.6–2.4)
MCH RBC QN AUTO: 29.2 PG (ref 26.6–33)
MCHC RBC AUTO-ENTMCNC: 32.9 G/DL (ref 31.5–35.7)
MCV RBC AUTO: 88.7 FL (ref 79–97)
MONOCYTES # BLD: 0.44 10*3/MM3 (ref 0.1–0.9)
NEUTROPHILS # BLD AUTO: 21.27 10*3/MM3 (ref 1.7–7)
NEUTROPHILS NFR BLD MANUAL: 88 % (ref 42.7–76)
NEUTS BAND NFR BLD MANUAL: 8 % (ref 0–5)
NT-PROBNP SERPL-MCNC: 5248 PG/ML (ref 0–1800)
OSMOLALITY UR: 329 MOSM/KG (ref 300–800)
PHOSPHATE SERPL-MCNC: 2.8 MG/DL (ref 2.5–4.5)
PLAT MORPH BLD: NORMAL
PLATELET # BLD AUTO: 173 10*3/MM3 (ref 140–450)
PMV BLD AUTO: 10.5 FL (ref 6–12)
POIKILOCYTOSIS BLD QL SMEAR: ABNORMAL
POTASSIUM SERPL-SCNC: 4 MMOL/L (ref 3.5–5.2)
PROT SERPL-MCNC: 4.1 G/DL (ref 6–8.5)
QT INTERVAL: 320 MS
QT INTERVAL: 364 MS
QTC INTERVAL: 437 MS
QTC INTERVAL: 480 MS
RBC # BLD AUTO: 2.74 10*6/MM3 (ref 4.14–5.8)
SCAN SLIDE: NORMAL
SODIUM SERPL-SCNC: 128 MMOL/L (ref 136–145)
SODIUM UR-SCNC: <20 MMOL/L
TIBC SERPL-MCNC: 152 MCG/DL (ref 298–536)
TOXIC GRANULATION: ABNORMAL
TRANSFERRIN SERPL-MCNC: 102 MG/DL (ref 200–360)
TSH SERPL DL<=0.05 MIU/L-ACNC: 15.6 UIU/ML (ref 0.27–4.2)
UFH PPP CHRO-ACNC: 0.8 IU/ML
URATE SERPL-MCNC: 8.5 MG/DL (ref 3.4–7)
VARIANT LYMPHS NFR BLD MANUAL: 2 % (ref 19.6–45.3)
WBC NRBC COR # BLD AUTO: 22.16 10*3/MM3 (ref 3.4–10.8)

## 2025-08-01 PROCEDURE — 99232 SBSQ HOSP IP/OBS MODERATE 35: CPT

## 2025-08-01 PROCEDURE — 84300 ASSAY OF URINE SODIUM: CPT | Performed by: INTERNAL MEDICINE

## 2025-08-01 PROCEDURE — 84466 ASSAY OF TRANSFERRIN: CPT | Performed by: INTERNAL MEDICINE

## 2025-08-01 PROCEDURE — 63710000001 INSULIN LISPRO (HUMAN) PER 5 UNITS: Performed by: INTERNAL MEDICINE

## 2025-08-01 PROCEDURE — 99232 SBSQ HOSP IP/OBS MODERATE 35: CPT | Performed by: INTERNAL MEDICINE

## 2025-08-01 PROCEDURE — 94799 UNLISTED PULMONARY SVC/PX: CPT

## 2025-08-01 PROCEDURE — 80053 COMPREHEN METABOLIC PANEL: CPT | Performed by: NURSE PRACTITIONER

## 2025-08-01 PROCEDURE — 84550 ASSAY OF BLOOD/URIC ACID: CPT | Performed by: INTERNAL MEDICINE

## 2025-08-01 PROCEDURE — 25010000002 ENOXAPARIN PER 10 MG: Performed by: INTERNAL MEDICINE

## 2025-08-01 PROCEDURE — 82550 ASSAY OF CK (CPK): CPT | Performed by: INTERNAL MEDICINE

## 2025-08-01 PROCEDURE — 85025 COMPLETE CBC W/AUTO DIFF WBC: CPT | Performed by: NURSE PRACTITIONER

## 2025-08-01 PROCEDURE — 71045 X-RAY EXAM CHEST 1 VIEW: CPT

## 2025-08-01 PROCEDURE — 25010000002 METRONIDAZOLE 500 MG/100ML SOLUTION: Performed by: NURSE PRACTITIONER

## 2025-08-01 PROCEDURE — 25010000002 AMIODARONE IN DEXTROSE 5% 150-4.21 MG/100ML-% SOLUTION: Performed by: INTERNAL MEDICINE

## 2025-08-01 PROCEDURE — 93010 ELECTROCARDIOGRAM REPORT: CPT | Performed by: INTERNAL MEDICINE

## 2025-08-01 PROCEDURE — 85520 HEPARIN ASSAY: CPT | Performed by: INTERNAL MEDICINE

## 2025-08-01 PROCEDURE — 25810000003 LACTATED RINGERS PER 1000 ML: Performed by: NURSE PRACTITIONER

## 2025-08-01 PROCEDURE — 82728 ASSAY OF FERRITIN: CPT | Performed by: INTERNAL MEDICINE

## 2025-08-01 PROCEDURE — 83540 ASSAY OF IRON: CPT | Performed by: INTERNAL MEDICINE

## 2025-08-01 PROCEDURE — 93005 ELECTROCARDIOGRAM TRACING: CPT | Performed by: INTERNAL MEDICINE

## 2025-08-01 PROCEDURE — 25810000003 SODIUM CHLORIDE 0.9 % SOLUTION: Performed by: INTERNAL MEDICINE

## 2025-08-01 PROCEDURE — 25010000002 ALBUMIN HUMAN 25% PER 50 ML: Performed by: INTERNAL MEDICINE

## 2025-08-01 PROCEDURE — P9047 ALBUMIN (HUMAN), 25%, 50ML: HCPCS | Performed by: INTERNAL MEDICINE

## 2025-08-01 PROCEDURE — 84443 ASSAY THYROID STIM HORMONE: CPT | Performed by: INTERNAL MEDICINE

## 2025-08-01 PROCEDURE — 83935 ASSAY OF URINE OSMOLALITY: CPT | Performed by: INTERNAL MEDICINE

## 2025-08-01 PROCEDURE — 87205 SMEAR GRAM STAIN: CPT | Performed by: INTERNAL MEDICINE

## 2025-08-01 PROCEDURE — 99222 1ST HOSP IP/OBS MODERATE 55: CPT | Performed by: INTERNAL MEDICINE

## 2025-08-01 PROCEDURE — 97163 PT EVAL HIGH COMPLEX 45 MIN: CPT

## 2025-08-01 PROCEDURE — 82948 REAGENT STRIP/BLOOD GLUCOSE: CPT | Performed by: INTERNAL MEDICINE

## 2025-08-01 PROCEDURE — 83880 ASSAY OF NATRIURETIC PEPTIDE: CPT | Performed by: INTERNAL MEDICINE

## 2025-08-01 PROCEDURE — 85007 BL SMEAR W/DIFF WBC COUNT: CPT | Performed by: NURSE PRACTITIONER

## 2025-08-01 PROCEDURE — 84165 PROTEIN E-PHORESIS SERUM: CPT | Performed by: INTERNAL MEDICINE

## 2025-08-01 PROCEDURE — 82533 TOTAL CORTISOL: CPT | Performed by: INTERNAL MEDICINE

## 2025-08-01 PROCEDURE — 25010000002 CALCIUM GLUCONATE 2-0.675 GM/100ML-% SOLUTION: Performed by: INTERNAL MEDICINE

## 2025-08-01 PROCEDURE — 82948 REAGENT STRIP/BLOOD GLUCOSE: CPT

## 2025-08-01 PROCEDURE — 84100 ASSAY OF PHOSPHORUS: CPT | Performed by: NURSE PRACTITIONER

## 2025-08-01 PROCEDURE — 83735 ASSAY OF MAGNESIUM: CPT | Performed by: NURSE PRACTITIONER

## 2025-08-01 PROCEDURE — 25010000002 AMIODARONE IN DEXTROSE 5% 360-4.14 MG/200ML-% SOLUTION: Performed by: INTERNAL MEDICINE

## 2025-08-01 RX ORDER — ALBUMIN (HUMAN) 12.5 G/50ML
25 SOLUTION INTRAVENOUS ONCE
Status: DISCONTINUED | OUTPATIENT
Start: 2025-08-01 | End: 2025-08-01

## 2025-08-01 RX ORDER — DEXTROSE MONOHYDRATE 25 G/50ML
25 INJECTION, SOLUTION INTRAVENOUS
Status: DISCONTINUED | OUTPATIENT
Start: 2025-08-01 | End: 2025-08-11 | Stop reason: HOSPADM

## 2025-08-01 RX ORDER — ENOXAPARIN SODIUM 100 MG/ML
1 INJECTION SUBCUTANEOUS EVERY 24 HOURS
Status: DISCONTINUED | OUTPATIENT
Start: 2025-08-01 | End: 2025-08-02

## 2025-08-01 RX ORDER — ALBUMIN (HUMAN) 12.5 G/50ML
25 SOLUTION INTRAVENOUS EVERY 8 HOURS
Status: COMPLETED | OUTPATIENT
Start: 2025-08-01 | End: 2025-08-02

## 2025-08-01 RX ORDER — INDOMETHACIN 25 MG/1
50 CAPSULE ORAL EVERY 8 HOURS
Status: COMPLETED | OUTPATIENT
Start: 2025-08-01 | End: 2025-08-02

## 2025-08-01 RX ORDER — INSULIN LISPRO 100 [IU]/ML
5 INJECTION, SOLUTION INTRAVENOUS; SUBCUTANEOUS
Status: DISCONTINUED | OUTPATIENT
Start: 2025-08-01 | End: 2025-08-04

## 2025-08-01 RX ORDER — IBUPROFEN 600 MG/1
1 TABLET ORAL
Status: DISCONTINUED | OUTPATIENT
Start: 2025-08-01 | End: 2025-08-11 | Stop reason: HOSPADM

## 2025-08-01 RX ORDER — GRANULES FOR ORAL 3 G/1
3 POWDER ORAL ONCE
Status: COMPLETED | OUTPATIENT
Start: 2025-08-01 | End: 2025-08-01

## 2025-08-01 RX ORDER — SODIUM CHLORIDE 9 MG/ML
100 INJECTION, SOLUTION INTRAVENOUS CONTINUOUS
Status: DISPENSED | OUTPATIENT
Start: 2025-08-01 | End: 2025-08-02

## 2025-08-01 RX ORDER — INSULIN LISPRO 100 [IU]/ML
2-7 INJECTION, SOLUTION INTRAVENOUS; SUBCUTANEOUS
Status: DISCONTINUED | OUTPATIENT
Start: 2025-08-01 | End: 2025-08-11 | Stop reason: HOSPADM

## 2025-08-01 RX ORDER — NICOTINE POLACRILEX 4 MG
15 LOZENGE BUCCAL
Status: DISCONTINUED | OUTPATIENT
Start: 2025-08-01 | End: 2025-08-11 | Stop reason: HOSPADM

## 2025-08-01 RX ORDER — CALCIUM GLUCONATE 20 MG/ML
2000 INJECTION, SOLUTION INTRAVENOUS EVERY 12 HOURS
Status: COMPLETED | OUTPATIENT
Start: 2025-08-01 | End: 2025-08-01

## 2025-08-01 RX ADMIN — SODIUM BICARBONATE 50 MEQ: 84 INJECTION INTRAVENOUS at 17:43

## 2025-08-01 RX ADMIN — MIDODRINE HYDROCHLORIDE 10 MG: 5 TABLET ORAL at 21:23

## 2025-08-01 RX ADMIN — INSULIN LISPRO 2 UNITS: 100 INJECTION, SOLUTION INTRAVENOUS; SUBCUTANEOUS at 17:37

## 2025-08-01 RX ADMIN — FOSFOMYCIN TROMETHAMINE 3 G: 3 GRANULE, FOR SOLUTION ORAL at 14:32

## 2025-08-01 RX ADMIN — Medication 250 MG: at 09:28

## 2025-08-01 RX ADMIN — CALCIUM GLUCONATE 2000 MG: 20 INJECTION, SOLUTION INTRAVENOUS at 21:24

## 2025-08-01 RX ADMIN — VANCOMYCIN HYDROCHLORIDE 500 MG: 250 POWDER, FOR SOLUTION ORAL at 11:01

## 2025-08-01 RX ADMIN — SODIUM CHLORIDE 100 ML/HR: 9 INJECTION, SOLUTION INTRAVENOUS at 19:45

## 2025-08-01 RX ADMIN — HYDROXYZINE HYDROCHLORIDE 50 MG: 25 TABLET, FILM COATED ORAL at 20:48

## 2025-08-01 RX ADMIN — ALBUMIN (HUMAN) 25 G: 0.25 INJECTION, SOLUTION INTRAVENOUS at 09:25

## 2025-08-01 RX ADMIN — GUAIFENESIN 600 MG: 600 TABLET, EXTENDED RELEASE ORAL at 20:47

## 2025-08-01 RX ADMIN — BUDESONIDE AND FORMOTEROL FUMARATE DIHYDRATE 2 PUFF: 160; 4.5 AEROSOL RESPIRATORY (INHALATION) at 08:07

## 2025-08-01 RX ADMIN — SODIUM CHLORIDE 100 ML/HR: 9 INJECTION, SOLUTION INTRAVENOUS at 09:25

## 2025-08-01 RX ADMIN — METRONIDAZOLE 500 MG: 500 INJECTION, SOLUTION INTRAVENOUS at 11:01

## 2025-08-01 RX ADMIN — GUAIFENESIN 600 MG: 600 TABLET, EXTENDED RELEASE ORAL at 09:28

## 2025-08-01 RX ADMIN — AMIODARONE HYDROCHLORIDE 0.5 MG/MIN: 1.8 INJECTION, SOLUTION INTRAVENOUS at 12:24

## 2025-08-01 RX ADMIN — AMIODARONE HYDROCHLORIDE 1 MG/MIN: 1.8 INJECTION, SOLUTION INTRAVENOUS at 21:53

## 2025-08-01 RX ADMIN — ALBUTEROL SULFATE 2 PUFF: 108 AEROSOL, METERED RESPIRATORY (INHALATION) at 08:05

## 2025-08-01 RX ADMIN — MIDODRINE HYDROCHLORIDE 10 MG: 5 TABLET ORAL at 06:00

## 2025-08-01 RX ADMIN — METRONIDAZOLE 500 MG: 500 INJECTION, SOLUTION INTRAVENOUS at 18:15

## 2025-08-01 RX ADMIN — MUPIROCIN 1 APPLICATION: 20 OINTMENT TOPICAL at 09:28

## 2025-08-01 RX ADMIN — Medication 10 ML: at 20:49

## 2025-08-01 RX ADMIN — SODIUM BICARBONATE 50 MEQ: 84 INJECTION INTRAVENOUS at 09:25

## 2025-08-01 RX ADMIN — INSULIN LISPRO 3 UNITS: 100 INJECTION, SOLUTION INTRAVENOUS; SUBCUTANEOUS at 11:38

## 2025-08-01 RX ADMIN — VANCOMYCIN HYDROCHLORIDE 500 MG: 250 POWDER, FOR SOLUTION ORAL at 06:00

## 2025-08-01 RX ADMIN — Medication 12.5 MG: at 20:48

## 2025-08-01 RX ADMIN — ENOXAPARIN SODIUM 80 MG: 100 INJECTION SUBCUTANEOUS at 11:38

## 2025-08-01 RX ADMIN — SODIUM CHLORIDE, POTASSIUM CHLORIDE, SODIUM LACTATE AND CALCIUM CHLORIDE 100 ML/HR: 600; 310; 30; 20 INJECTION, SOLUTION INTRAVENOUS at 07:33

## 2025-08-01 RX ADMIN — METRONIDAZOLE 500 MG: 500 INJECTION, SOLUTION INTRAVENOUS at 03:10

## 2025-08-01 RX ADMIN — Medication 250 MG: at 20:47

## 2025-08-01 RX ADMIN — HYDROCORTISONE 20 MG: 10 TABLET ORAL at 20:47

## 2025-08-01 RX ADMIN — Medication 10 MG/HR: at 07:33

## 2025-08-01 RX ADMIN — MUPIROCIN 1 APPLICATION: 20 OINTMENT TOPICAL at 21:24

## 2025-08-01 RX ADMIN — MIDODRINE HYDROCHLORIDE 10 MG: 5 TABLET ORAL at 14:11

## 2025-08-01 RX ADMIN — ALBUTEROL SULFATE 2 PUFF: 108 AEROSOL, METERED RESPIRATORY (INHALATION) at 20:22

## 2025-08-01 RX ADMIN — CALCIUM GLUCONATE 2000 MG: 20 INJECTION, SOLUTION INTRAVENOUS at 09:29

## 2025-08-01 RX ADMIN — ALBUTEROL SULFATE 2 PUFF: 108 AEROSOL, METERED RESPIRATORY (INHALATION) at 12:12

## 2025-08-01 RX ADMIN — VANCOMYCIN HYDROCHLORIDE 500 MG: 250 POWDER, FOR SOLUTION ORAL at 17:37

## 2025-08-01 RX ADMIN — BUDESONIDE AND FORMOTEROL FUMARATE DIHYDRATE 2 PUFF: 160; 4.5 AEROSOL RESPIRATORY (INHALATION) at 20:17

## 2025-08-01 RX ADMIN — FAMOTIDINE 20 MG: 20 TABLET, FILM COATED ORAL at 09:28

## 2025-08-01 RX ADMIN — HYDROCORTISONE 20 MG: 10 TABLET ORAL at 09:28

## 2025-08-01 RX ADMIN — AMIODARONE HYDROCHLORIDE 1 MG/MIN: 1.8 INJECTION, SOLUTION INTRAVENOUS at 17:53

## 2025-08-01 RX ADMIN — ALBUMIN (HUMAN) 25 G: 0.25 INJECTION, SOLUTION INTRAVENOUS at 17:37

## 2025-08-01 RX ADMIN — INSULIN LISPRO 5 UNITS: 100 INJECTION, SOLUTION INTRAVENOUS; SUBCUTANEOUS at 17:37

## 2025-08-01 RX ADMIN — LEVOTHYROXINE SODIUM 100 MCG: 100 TABLET ORAL at 06:00

## 2025-08-01 RX ADMIN — AMIODARONE HYDROCHLORIDE 150 MG: 1.5 INJECTION, SOLUTION INTRAVENOUS at 17:36

## 2025-08-02 PROBLEM — E83.51 HYPOCALCEMIA: Status: ACTIVE | Noted: 2025-08-02

## 2025-08-02 LAB
ALBUMIN SERPL-MCNC: 2.8 G/DL (ref 3.5–5.2)
ALBUMIN/GLOB SERPL: 1.8 G/DL
ALP SERPL-CCNC: 106 U/L (ref 39–117)
ALT SERPL W P-5'-P-CCNC: 6 U/L (ref 1–41)
ANION GAP SERPL CALCULATED.3IONS-SCNC: 11.6 MMOL/L (ref 5–15)
ANISOCYTOSIS BLD QL: ABNORMAL
AST SERPL-CCNC: 28 U/L (ref 1–40)
BILIRUB SERPL-MCNC: 0.2 MG/DL (ref 0–1.2)
BUN SERPL-MCNC: 37.3 MG/DL (ref 8–23)
BUN/CREAT SERPL: 16.7 (ref 7–25)
BURR CELLS BLD QL SMEAR: ABNORMAL
CA-I SERPL ISE-MCNC: 1.02 MMOL/L (ref 1.15–1.3)
CALCIUM SPEC-SCNC: 7.4 MG/DL (ref 8.6–10.5)
CHLORIDE SERPL-SCNC: 101 MMOL/L (ref 98–107)
CO2 SERPL-SCNC: 22.4 MMOL/L (ref 22–29)
CREAT SERPL-MCNC: 2.23 MG/DL (ref 0.76–1.27)
D-LACTATE SERPL-SCNC: 1.2 MMOL/L (ref 0.5–2)
D-LACTATE SERPL-SCNC: 2.3 MMOL/L (ref 0.5–2)
D-LACTATE SERPL-SCNC: 3.4 MMOL/L (ref 0.5–2)
DACRYOCYTES BLD QL SMEAR: ABNORMAL
DEPRECATED RDW RBC AUTO: 50.7 FL (ref 37–54)
EGFRCR SERPLBLD CKD-EPI 2021: 30 ML/MIN/1.73
ERYTHROCYTE [DISTWIDTH] IN BLOOD BY AUTOMATED COUNT: 15.4 % (ref 12.3–15.4)
GLOBULIN UR ELPH-MCNC: 1.6 GM/DL
GLUCOSE BLDC GLUCOMTR-MCNC: 117 MG/DL (ref 70–105)
GLUCOSE BLDC GLUCOMTR-MCNC: 147 MG/DL (ref 70–105)
GLUCOSE BLDC GLUCOMTR-MCNC: 173 MG/DL (ref 70–105)
GLUCOSE SERPL-MCNC: 100 MG/DL (ref 65–99)
HCT VFR BLD AUTO: 23.1 % (ref 37.5–51)
HGB BLD-MCNC: 7.6 G/DL (ref 13–17.7)
IRON 24H UR-MRATE: 51 MCG/DL (ref 59–158)
IRON SATN MFR SERPL: 35 % (ref 20–50)
LYMPHOCYTES # BLD MANUAL: 0.26 10*3/MM3 (ref 0.7–3.1)
LYMPHOCYTES NFR BLD MANUAL: 2 % (ref 5–12)
Lab: ABNORMAL
MAGNESIUM SERPL-MCNC: 2.7 MG/DL (ref 1.6–2.4)
MCH RBC QN AUTO: 29.3 PG (ref 26.6–33)
MCHC RBC AUTO-ENTMCNC: 32.9 G/DL (ref 31.5–35.7)
MCV RBC AUTO: 89.2 FL (ref 79–97)
METAMYELOCYTES NFR BLD MANUAL: 1 % (ref 0–0)
MONOCYTES # BLD: 0.52 10*3/MM3 (ref 0.1–0.9)
MYELOCYTES NFR BLD MANUAL: 3 % (ref 0–0)
NEUTROPHILS # BLD AUTO: 23.97 10*3/MM3 (ref 1.7–7)
NEUTROPHILS NFR BLD MANUAL: 92 % (ref 42.7–76)
NEUTS BAND NFR BLD MANUAL: 1 % (ref 0–5)
PHOSPHATE SERPL-MCNC: 2.1 MG/DL (ref 2.5–4.5)
PHOSPHATE SERPL-MCNC: 2.1 MG/DL (ref 2.5–4.5)
PLAT MORPH BLD: NORMAL
PLATELET # BLD AUTO: 172 10*3/MM3 (ref 140–450)
PMV BLD AUTO: 10.7 FL (ref 6–12)
POIKILOCYTOSIS BLD QL SMEAR: ABNORMAL
POTASSIUM SERPL-SCNC: 3.6 MMOL/L (ref 3.5–5.2)
POTASSIUM SERPL-SCNC: 3.7 MMOL/L (ref 3.5–5.2)
PROT SERPL-MCNC: 4.4 G/DL (ref 6–8.5)
QT INTERVAL: 414 MS
QTC INTERVAL: 493 MS
RBC # BLD AUTO: 2.59 10*6/MM3 (ref 4.14–5.8)
SCAN SLIDE: NORMAL
SODIUM SERPL-SCNC: 135 MMOL/L (ref 136–145)
TIBC SERPL-MCNC: 146 MCG/DL (ref 298–536)
TOXIC GRANULATION: ABNORMAL
TRANSFERRIN SERPL-MCNC: 98 MG/DL (ref 200–360)
VARIANT LYMPHS NFR BLD MANUAL: 1 % (ref 19.6–45.3)
WBC NRBC COR # BLD AUTO: 25.77 10*3/MM3 (ref 3.4–10.8)

## 2025-08-02 PROCEDURE — 83735 ASSAY OF MAGNESIUM: CPT | Performed by: NURSE PRACTITIONER

## 2025-08-02 PROCEDURE — P9047 ALBUMIN (HUMAN), 25%, 50ML: HCPCS | Performed by: INTERNAL MEDICINE

## 2025-08-02 PROCEDURE — 84100 ASSAY OF PHOSPHORUS: CPT | Performed by: INTERNAL MEDICINE

## 2025-08-02 PROCEDURE — 94761 N-INVAS EAR/PLS OXIMETRY MLT: CPT

## 2025-08-02 PROCEDURE — 25010000002 BUMETANIDE PER 0.5 MG: Performed by: INTERNAL MEDICINE

## 2025-08-02 PROCEDURE — 25010000002 CALCIUM GLUCONATE 2-0.675 GM/100ML-% SOLUTION: Performed by: INTERNAL MEDICINE

## 2025-08-02 PROCEDURE — 99233 SBSQ HOSP IP/OBS HIGH 50: CPT | Performed by: STUDENT IN AN ORGANIZED HEALTH CARE EDUCATION/TRAINING PROGRAM

## 2025-08-02 PROCEDURE — 85007 BL SMEAR W/DIFF WBC COUNT: CPT | Performed by: NURSE PRACTITIONER

## 2025-08-02 PROCEDURE — 82330 ASSAY OF CALCIUM: CPT | Performed by: INTERNAL MEDICINE

## 2025-08-02 PROCEDURE — 25010000002 AMIODARONE IN DEXTROSE 5% 360-4.14 MG/200ML-% SOLUTION: Performed by: INTERNAL MEDICINE

## 2025-08-02 PROCEDURE — 83605 ASSAY OF LACTIC ACID: CPT | Performed by: INTERNAL MEDICINE

## 2025-08-02 PROCEDURE — 63710000001 INSULIN LISPRO (HUMAN) PER 5 UNITS: Performed by: INTERNAL MEDICINE

## 2025-08-02 PROCEDURE — 82948 REAGENT STRIP/BLOOD GLUCOSE: CPT | Performed by: INTERNAL MEDICINE

## 2025-08-02 PROCEDURE — 85025 COMPLETE CBC W/AUTO DIFF WBC: CPT | Performed by: NURSE PRACTITIONER

## 2025-08-02 PROCEDURE — 99233 SBSQ HOSP IP/OBS HIGH 50: CPT | Performed by: INTERNAL MEDICINE

## 2025-08-02 PROCEDURE — 80053 COMPREHEN METABOLIC PANEL: CPT | Performed by: NURSE PRACTITIONER

## 2025-08-02 PROCEDURE — 84132 ASSAY OF SERUM POTASSIUM: CPT | Performed by: INTERNAL MEDICINE

## 2025-08-02 PROCEDURE — 82948 REAGENT STRIP/BLOOD GLUCOSE: CPT

## 2025-08-02 PROCEDURE — 94799 UNLISTED PULMONARY SVC/PX: CPT

## 2025-08-02 PROCEDURE — 83540 ASSAY OF IRON: CPT | Performed by: INTERNAL MEDICINE

## 2025-08-02 PROCEDURE — 93005 ELECTROCARDIOGRAM TRACING: CPT | Performed by: INTERNAL MEDICINE

## 2025-08-02 PROCEDURE — 25010000002 ENOXAPARIN PER 10 MG: Performed by: INTERNAL MEDICINE

## 2025-08-02 PROCEDURE — 99232 SBSQ HOSP IP/OBS MODERATE 35: CPT | Performed by: SURGERY

## 2025-08-02 PROCEDURE — 94640 AIRWAY INHALATION TREATMENT: CPT

## 2025-08-02 PROCEDURE — C1751 CATH, INF, PER/CENT/MIDLINE: HCPCS

## 2025-08-02 PROCEDURE — 93010 ELECTROCARDIOGRAM REPORT: CPT | Performed by: INTERNAL MEDICINE

## 2025-08-02 PROCEDURE — 25810000003 SODIUM CHLORIDE 0.9 % SOLUTION: Performed by: INTERNAL MEDICINE

## 2025-08-02 PROCEDURE — 84466 ASSAY OF TRANSFERRIN: CPT | Performed by: INTERNAL MEDICINE

## 2025-08-02 PROCEDURE — 84100 ASSAY OF PHOSPHORUS: CPT | Performed by: NURSE PRACTITIONER

## 2025-08-02 PROCEDURE — 25010000002 ALBUMIN HUMAN 25% PER 50 ML: Performed by: INTERNAL MEDICINE

## 2025-08-02 PROCEDURE — 25010000002 METRONIDAZOLE 500 MG/100ML SOLUTION: Performed by: NURSE PRACTITIONER

## 2025-08-02 PROCEDURE — 94664 DEMO&/EVAL PT USE INHALER: CPT

## 2025-08-02 PROCEDURE — 25010000002 NA FERRIC GLUC CPLX PER 12.5 MG: Performed by: INTERNAL MEDICINE

## 2025-08-02 RX ORDER — BUMETANIDE 0.25 MG/ML
2 INJECTION, SOLUTION INTRAMUSCULAR; INTRAVENOUS ONCE
Status: COMPLETED | OUTPATIENT
Start: 2025-08-02 | End: 2025-08-02

## 2025-08-02 RX ORDER — INDOMETHACIN 25 MG/1
50 CAPSULE ORAL EVERY 8 HOURS
Status: COMPLETED | OUTPATIENT
Start: 2025-08-02 | End: 2025-08-02

## 2025-08-02 RX ORDER — ALBUMIN (HUMAN) 12.5 G/50ML
25 SOLUTION INTRAVENOUS EVERY 8 HOURS
Status: DISCONTINUED | OUTPATIENT
Start: 2025-08-02 | End: 2025-08-02

## 2025-08-02 RX ORDER — POTASSIUM CHLORIDE 1500 MG/1
20 TABLET, EXTENDED RELEASE ORAL 2 TIMES DAILY WITH MEALS
Status: DISCONTINUED | OUTPATIENT
Start: 2025-08-02 | End: 2025-08-03

## 2025-08-02 RX ORDER — LEVOTHYROXINE SODIUM 125 UG/1
125 TABLET ORAL
Status: DISCONTINUED | OUTPATIENT
Start: 2025-08-03 | End: 2025-08-04

## 2025-08-02 RX ORDER — FENTANYL/ROPIVACAINE/NS/PF 2-625MCG/1
15 PLASTIC BAG, INJECTION (ML) EPIDURAL ONCE
Status: COMPLETED | OUTPATIENT
Start: 2025-08-02 | End: 2025-08-02

## 2025-08-02 RX ORDER — POTASSIUM CHLORIDE 1500 MG/1
40 TABLET, EXTENDED RELEASE ORAL EVERY 4 HOURS
Status: COMPLETED | OUTPATIENT
Start: 2025-08-02 | End: 2025-08-02

## 2025-08-02 RX ORDER — CALCIUM GLUCONATE 20 MG/ML
2000 INJECTION, SOLUTION INTRAVENOUS EVERY 12 HOURS
Status: DISCONTINUED | OUTPATIENT
Start: 2025-08-02 | End: 2025-08-02

## 2025-08-02 RX ORDER — ALBUMIN (HUMAN) 12.5 G/50ML
25 SOLUTION INTRAVENOUS EVERY 8 HOURS
Status: COMPLETED | OUTPATIENT
Start: 2025-08-03 | End: 2025-08-03

## 2025-08-02 RX ORDER — AMIODARONE HYDROCHLORIDE 200 MG/1
200 TABLET ORAL EVERY 12 HOURS SCHEDULED
Status: DISCONTINUED | OUTPATIENT
Start: 2025-08-03 | End: 2025-08-11 | Stop reason: HOSPADM

## 2025-08-02 RX ORDER — ALLOPURINOL 100 MG/1
100 TABLET ORAL DAILY
Status: DISCONTINUED | OUTPATIENT
Start: 2025-08-02 | End: 2025-08-11 | Stop reason: HOSPADM

## 2025-08-02 RX ORDER — ENOXAPARIN SODIUM 100 MG/ML
1 INJECTION SUBCUTANEOUS EVERY 12 HOURS
Status: DISCONTINUED | OUTPATIENT
Start: 2025-08-02 | End: 2025-08-08

## 2025-08-02 RX ORDER — CALCIUM GLUCONATE 20 MG/ML
2000 INJECTION, SOLUTION INTRAVENOUS ONCE
Status: COMPLETED | OUTPATIENT
Start: 2025-08-02 | End: 2025-08-02

## 2025-08-02 RX ADMIN — POTASSIUM PHOSPHATE, MONOBASIC POTASSIUM PHOSPHATE, DIBASIC INJECTION, 15 MMOL: 236; 224 SOLUTION, CONCENTRATE INTRAVENOUS at 17:39

## 2025-08-02 RX ADMIN — GUAIFENESIN 600 MG: 600 TABLET, EXTENDED RELEASE ORAL at 22:55

## 2025-08-02 RX ADMIN — MIDODRINE HYDROCHLORIDE 10 MG: 5 TABLET ORAL at 04:29

## 2025-08-02 RX ADMIN — MUPIROCIN 1 APPLICATION: 20 OINTMENT TOPICAL at 22:55

## 2025-08-02 RX ADMIN — Medication 250 MG: at 22:55

## 2025-08-02 RX ADMIN — ALBUMIN (HUMAN) 25 G: 0.25 INJECTION, SOLUTION INTRAVENOUS at 17:49

## 2025-08-02 RX ADMIN — HYDROCORTISONE 20 MG: 10 TABLET ORAL at 10:44

## 2025-08-02 RX ADMIN — Medication 10 ML: at 22:57

## 2025-08-02 RX ADMIN — IPRATROPIUM BROMIDE AND ALBUTEROL SULFATE 3 ML: .5; 3 SOLUTION RESPIRATORY (INHALATION) at 09:18

## 2025-08-02 RX ADMIN — INSULIN LISPRO 5 UNITS: 100 INJECTION, SOLUTION INTRAVENOUS; SUBCUTANEOUS at 17:51

## 2025-08-02 RX ADMIN — ALBUTEROL SULFATE 2 PUFF: 108 AEROSOL, METERED RESPIRATORY (INHALATION) at 16:18

## 2025-08-02 RX ADMIN — Medication 5 MG: at 02:00

## 2025-08-02 RX ADMIN — CALCIUM GLUCONATE 2000 MG: 20 INJECTION, SOLUTION INTRAVENOUS at 22:56

## 2025-08-02 RX ADMIN — POTASSIUM CHLORIDE 40 MEQ: 1500 TABLET, EXTENDED RELEASE ORAL at 04:29

## 2025-08-02 RX ADMIN — LEVOTHYROXINE SODIUM 100 MCG: 100 TABLET ORAL at 04:29

## 2025-08-02 RX ADMIN — SODIUM BICARBONATE 50 MEQ: 84 INJECTION INTRAVENOUS at 10:43

## 2025-08-02 RX ADMIN — BUDESONIDE AND FORMOTEROL FUMARATE DIHYDRATE 2 PUFF: 160; 4.5 AEROSOL RESPIRATORY (INHALATION) at 04:56

## 2025-08-02 RX ADMIN — ALBUMIN (HUMAN) 25 G: 0.25 INJECTION, SOLUTION INTRAVENOUS at 23:00

## 2025-08-02 RX ADMIN — POTASSIUM CHLORIDE 20 MEQ: 1500 TABLET, EXTENDED RELEASE ORAL at 17:52

## 2025-08-02 RX ADMIN — METRONIDAZOLE 500 MG: 500 INJECTION, SOLUTION INTRAVENOUS at 17:56

## 2025-08-02 RX ADMIN — Medication 2 PACKET: at 04:29

## 2025-08-02 RX ADMIN — ALLOPURINOL 100 MG: 100 TABLET ORAL at 10:44

## 2025-08-02 RX ADMIN — AMIODARONE HYDROCHLORIDE 0.5 MG/MIN: 1.8 INJECTION, SOLUTION INTRAVENOUS at 17:50

## 2025-08-02 RX ADMIN — ENOXAPARIN SODIUM 90 MG: 100 INJECTION SUBCUTANEOUS at 22:55

## 2025-08-02 RX ADMIN — SODIUM BICARBONATE 50 MEQ: 84 INJECTION INTRAVENOUS at 17:49

## 2025-08-02 RX ADMIN — ALBUMIN (HUMAN) 25 G: 0.25 INJECTION, SOLUTION INTRAVENOUS at 09:01

## 2025-08-02 RX ADMIN — GUAIFENESIN 600 MG: 600 TABLET, EXTENDED RELEASE ORAL at 10:44

## 2025-08-02 RX ADMIN — POTASSIUM CHLORIDE 40 MEQ: 1500 TABLET, EXTENDED RELEASE ORAL at 10:44

## 2025-08-02 RX ADMIN — Medication 250 MG: at 10:44

## 2025-08-02 RX ADMIN — VANCOMYCIN HYDROCHLORIDE 500 MG: 250 POWDER, FOR SOLUTION ORAL at 23:33

## 2025-08-02 RX ADMIN — Medication 10 ML: at 10:46

## 2025-08-02 RX ADMIN — ALBUTEROL SULFATE 2 PUFF: 108 AEROSOL, METERED RESPIRATORY (INHALATION) at 05:01

## 2025-08-02 RX ADMIN — HYDROCORTISONE 20 MG: 10 TABLET ORAL at 22:55

## 2025-08-02 RX ADMIN — SODIUM BICARBONATE 50 MEQ: 84 INJECTION INTRAVENOUS at 01:01

## 2025-08-02 RX ADMIN — CALCIUM GLUCONATE 2000 MG: 20 INJECTION, SOLUTION INTRAVENOUS at 10:43

## 2025-08-02 RX ADMIN — SODIUM BICARBONATE 50 MEQ: 84 INJECTION INTRAVENOUS at 23:33

## 2025-08-02 RX ADMIN — ALBUTEROL SULFATE 2 PUFF: 108 AEROSOL, METERED RESPIRATORY (INHALATION) at 13:19

## 2025-08-02 RX ADMIN — SODIUM CHLORIDE 250 MG: 9 INJECTION, SOLUTION INTRAVENOUS at 17:50

## 2025-08-02 RX ADMIN — VANCOMYCIN HYDROCHLORIDE 500 MG: 250 POWDER, FOR SOLUTION ORAL at 17:55

## 2025-08-02 RX ADMIN — VANCOMYCIN HYDROCHLORIDE 500 MG: 250 POWDER, FOR SOLUTION ORAL at 11:07

## 2025-08-02 RX ADMIN — ALBUMIN (HUMAN) 25 G: 0.25 INJECTION, SOLUTION INTRAVENOUS at 01:02

## 2025-08-02 RX ADMIN — BUMETANIDE 2 MG: 0.25 INJECTION INTRAMUSCULAR; INTRAVENOUS at 09:01

## 2025-08-02 RX ADMIN — INSULIN LISPRO 2 UNITS: 100 INJECTION, SOLUTION INTRAVENOUS; SUBCUTANEOUS at 17:50

## 2025-08-02 RX ADMIN — METRONIDAZOLE 500 MG: 500 INJECTION, SOLUTION INTRAVENOUS at 02:00

## 2025-08-02 RX ADMIN — METRONIDAZOLE 500 MG: 500 INJECTION, SOLUTION INTRAVENOUS at 10:43

## 2025-08-02 RX ADMIN — Medication 12.5 MG: at 22:55

## 2025-08-02 RX ADMIN — BENZONATATE 200 MG: 100 CAPSULE ORAL at 01:01

## 2025-08-02 RX ADMIN — AMIODARONE HYDROCHLORIDE 1 MG/MIN: 1.8 INJECTION, SOLUTION INTRAVENOUS at 04:34

## 2025-08-02 RX ADMIN — FAMOTIDINE 20 MG: 20 TABLET, FILM COATED ORAL at 10:44

## 2025-08-02 RX ADMIN — ENOXAPARIN SODIUM 80 MG: 100 INJECTION SUBCUTANEOUS at 11:07

## 2025-08-02 RX ADMIN — HYDROXYZINE HYDROCHLORIDE 50 MG: 25 TABLET, FILM COATED ORAL at 22:55

## 2025-08-02 RX ADMIN — VANCOMYCIN HYDROCHLORIDE 500 MG: 250 POWDER, FOR SOLUTION ORAL at 01:02

## 2025-08-02 RX ADMIN — IPRATROPIUM BROMIDE AND ALBUTEROL SULFATE 3 ML: .5; 3 SOLUTION RESPIRATORY (INHALATION) at 00:57

## 2025-08-02 RX ADMIN — ALBUTEROL SULFATE 2 PUFF: 108 AEROSOL, METERED RESPIRATORY (INHALATION) at 20:16

## 2025-08-02 RX ADMIN — AMIODARONE HYDROCHLORIDE 1 MG/MIN: 1.8 INJECTION, SOLUTION INTRAVENOUS at 11:05

## 2025-08-02 RX ADMIN — BUDESONIDE AND FORMOTEROL FUMARATE DIHYDRATE 2 PUFF: 160; 4.5 AEROSOL RESPIRATORY (INHALATION) at 20:11

## 2025-08-03 ENCOUNTER — APPOINTMENT (OUTPATIENT)
Dept: CT IMAGING | Facility: HOSPITAL | Age: 75
DRG: 871 | End: 2025-08-03
Payer: MEDICARE

## 2025-08-03 LAB
ABO GROUP BLD: NORMAL
ACANTHOCYTES BLD QL SMEAR: ABNORMAL
ALBUMIN SERPL-MCNC: 3 G/DL (ref 3.5–5.2)
ALBUMIN/GLOB SERPL: 3 G/DL
ALP SERPL-CCNC: 82 U/L (ref 39–117)
ALT SERPL W P-5'-P-CCNC: 8 U/L (ref 1–41)
ANION GAP SERPL CALCULATED.3IONS-SCNC: 11.4 MMOL/L (ref 5–15)
AST SERPL-CCNC: 34 U/L (ref 1–40)
BACTERIA SPEC AEROBE CULT: NORMAL
BACTERIA SPEC AEROBE CULT: NORMAL
BILIRUB SERPL-MCNC: 0.2 MG/DL (ref 0–1.2)
BLD GP AB SCN SERPL QL: NEGATIVE
BUN SERPL-MCNC: 29.9 MG/DL (ref 8–23)
BUN/CREAT SERPL: 15.6 (ref 7–25)
C3 FRG RBC-MCNC: ABNORMAL
CA-I SERPL ISE-MCNC: 1.03 MMOL/L (ref 1.15–1.3)
CALCIUM SPEC-SCNC: 7.6 MG/DL (ref 8.6–10.5)
CHLORIDE SERPL-SCNC: 102 MMOL/L (ref 98–107)
CO2 SERPL-SCNC: 21.6 MMOL/L (ref 22–29)
CREAT SERPL-MCNC: 1.92 MG/DL (ref 0.76–1.27)
D-LACTATE SERPL-SCNC: 3.2 MMOL/L (ref 0.5–2)
DACRYOCYTES BLD QL SMEAR: ABNORMAL
DEPRECATED RDW RBC AUTO: 50.6 FL (ref 37–54)
EGFRCR SERPLBLD CKD-EPI 2021: 35.9 ML/MIN/1.73
ERYTHROCYTE [DISTWIDTH] IN BLOOD BY AUTOMATED COUNT: 15.9 % (ref 12.3–15.4)
GIANT PLATELETS: ABNORMAL
GLOBULIN UR ELPH-MCNC: 1 GM/DL
GLUCOSE BLDC GLUCOMTR-MCNC: 118 MG/DL (ref 70–105)
GLUCOSE BLDC GLUCOMTR-MCNC: 127 MG/DL (ref 70–105)
GLUCOSE BLDC GLUCOMTR-MCNC: 51 MG/DL (ref 70–105)
GLUCOSE BLDC GLUCOMTR-MCNC: 51 MG/DL (ref 70–105)
GLUCOSE BLDC GLUCOMTR-MCNC: 65 MG/DL (ref 70–105)
GLUCOSE BLDC GLUCOMTR-MCNC: 66 MG/DL (ref 70–105)
GLUCOSE BLDC GLUCOMTR-MCNC: 78 MG/DL (ref 70–105)
GLUCOSE SERPL-MCNC: 119 MG/DL (ref 65–99)
HCT VFR BLD AUTO: 21.3 % (ref 37.5–51)
HGB BLD-MCNC: 7 G/DL (ref 13–17.7)
LARGE PLATELETS: ABNORMAL
LYMPHOCYTES # BLD MANUAL: 1.31 10*3/MM3 (ref 0.7–3.1)
LYMPHOCYTES NFR BLD MANUAL: 1 % (ref 5–12)
Lab: ABNORMAL
Lab: ABNORMAL
MAGNESIUM SERPL-MCNC: 2.3 MG/DL (ref 1.6–2.4)
MCH RBC QN AUTO: 28.8 PG (ref 26.6–33)
MCHC RBC AUTO-ENTMCNC: 32.9 G/DL (ref 31.5–35.7)
MCV RBC AUTO: 87.7 FL (ref 79–97)
METAMYELOCYTES NFR BLD MANUAL: 6 % (ref 0–0)
MONOCYTES # BLD: 0.19 10*3/MM3 (ref 0.1–0.9)
NEUTROPHILS # BLD AUTO: 16.12 10*3/MM3 (ref 1.7–7)
NEUTROPHILS NFR BLD MANUAL: 76 % (ref 42.7–76)
NEUTS BAND NFR BLD MANUAL: 10 % (ref 0–5)
NRBC SPEC MANUAL: 1 /100 WBC (ref 0–0.2)
PHOSPHATE SERPL-MCNC: 2.5 MG/DL (ref 2.5–4.5)
PLATELET # BLD AUTO: 145 10*3/MM3 (ref 140–450)
PMV BLD AUTO: 10.7 FL (ref 6–12)
POIKILOCYTOSIS BLD QL SMEAR: ABNORMAL
POTASSIUM SERPL-SCNC: 3.9 MMOL/L (ref 3.5–5.2)
PROT SERPL-MCNC: 4 G/DL (ref 6–8.5)
QT INTERVAL: 445 MS
QTC INTERVAL: 501 MS
RBC # BLD AUTO: 2.43 10*6/MM3 (ref 4.14–5.8)
RH BLD: POSITIVE
SCAN SLIDE: NORMAL
SMALL PLATELETS BLD QL SMEAR: ADEQUATE
SODIUM SERPL-SCNC: 135 MMOL/L (ref 136–145)
T&S EXPIRATION DATE: NORMAL
VARIANT LYMPHS NFR BLD MANUAL: 2 % (ref 0–5)
VARIANT LYMPHS NFR BLD MANUAL: 5 % (ref 19.6–45.3)
WBC MORPH BLD: NORMAL
WBC NRBC COR # BLD AUTO: 18.74 10*3/MM3 (ref 3.4–10.8)

## 2025-08-03 PROCEDURE — 94664 DEMO&/EVAL PT USE INHALER: CPT

## 2025-08-03 PROCEDURE — 97166 OT EVAL MOD COMPLEX 45 MIN: CPT

## 2025-08-03 PROCEDURE — 82948 REAGENT STRIP/BLOOD GLUCOSE: CPT | Performed by: INTERNAL MEDICINE

## 2025-08-03 PROCEDURE — 84100 ASSAY OF PHOSPHORUS: CPT | Performed by: NURSE PRACTITIONER

## 2025-08-03 PROCEDURE — 86900 BLOOD TYPING SEROLOGIC ABO: CPT

## 2025-08-03 PROCEDURE — 83605 ASSAY OF LACTIC ACID: CPT | Performed by: INTERNAL MEDICINE

## 2025-08-03 PROCEDURE — 94761 N-INVAS EAR/PLS OXIMETRY MLT: CPT

## 2025-08-03 PROCEDURE — 82948 REAGENT STRIP/BLOOD GLUCOSE: CPT

## 2025-08-03 PROCEDURE — 25010000002 NA FERRIC GLUC CPLX PER 12.5 MG: Performed by: INTERNAL MEDICINE

## 2025-08-03 PROCEDURE — 93010 ELECTROCARDIOGRAM REPORT: CPT | Performed by: INTERNAL MEDICINE

## 2025-08-03 PROCEDURE — 25010000002 ENOXAPARIN PER 10 MG: Performed by: INTERNAL MEDICINE

## 2025-08-03 PROCEDURE — P9016 RBC LEUKOCYTES REDUCED: HCPCS

## 2025-08-03 PROCEDURE — 25010000002 AMIODARONE IN DEXTROSE 5% 360-4.14 MG/200ML-% SOLUTION: Performed by: INTERNAL MEDICINE

## 2025-08-03 PROCEDURE — 86923 COMPATIBILITY TEST ELECTRIC: CPT

## 2025-08-03 PROCEDURE — 86901 BLOOD TYPING SEROLOGIC RH(D): CPT | Performed by: INTERNAL MEDICINE

## 2025-08-03 PROCEDURE — 82330 ASSAY OF CALCIUM: CPT | Performed by: INTERNAL MEDICINE

## 2025-08-03 PROCEDURE — 80053 COMPREHEN METABOLIC PANEL: CPT | Performed by: NURSE PRACTITIONER

## 2025-08-03 PROCEDURE — 25810000003 SODIUM CHLORIDE 0.9 % SOLUTION: Performed by: INTERNAL MEDICINE

## 2025-08-03 PROCEDURE — 83735 ASSAY OF MAGNESIUM: CPT | Performed by: NURSE PRACTITIONER

## 2025-08-03 PROCEDURE — 25010000002 ONDANSETRON PER 1 MG: Performed by: NURSE PRACTITIONER

## 2025-08-03 PROCEDURE — 63710000001 DIPHENHYDRAMINE PER 50 MG: Performed by: INTERNAL MEDICINE

## 2025-08-03 PROCEDURE — P9047 ALBUMIN (HUMAN), 25%, 50ML: HCPCS | Performed by: INTERNAL MEDICINE

## 2025-08-03 PROCEDURE — 85007 BL SMEAR W/DIFF WBC COUNT: CPT | Performed by: NURSE PRACTITIONER

## 2025-08-03 PROCEDURE — 94799 UNLISTED PULMONARY SVC/PX: CPT

## 2025-08-03 PROCEDURE — 99232 SBSQ HOSP IP/OBS MODERATE 35: CPT

## 2025-08-03 PROCEDURE — 25010000002 FUROSEMIDE PER 20 MG: Performed by: INTERNAL MEDICINE

## 2025-08-03 PROCEDURE — 74176 CT ABD & PELVIS W/O CONTRAST: CPT

## 2025-08-03 PROCEDURE — 86900 BLOOD TYPING SEROLOGIC ABO: CPT | Performed by: INTERNAL MEDICINE

## 2025-08-03 PROCEDURE — 25010000002 CALCIUM GLUCONATE 2-0.675 GM/100ML-% SOLUTION: Performed by: INTERNAL MEDICINE

## 2025-08-03 PROCEDURE — 25010000002 ALBUMIN HUMAN 25% PER 50 ML: Performed by: INTERNAL MEDICINE

## 2025-08-03 PROCEDURE — 93005 ELECTROCARDIOGRAM TRACING: CPT | Performed by: INTERNAL MEDICINE

## 2025-08-03 PROCEDURE — 36430 TRANSFUSION BLD/BLD COMPNT: CPT

## 2025-08-03 PROCEDURE — 25010000002 METRONIDAZOLE 500 MG/100ML SOLUTION: Performed by: NURSE PRACTITIONER

## 2025-08-03 PROCEDURE — 99233 SBSQ HOSP IP/OBS HIGH 50: CPT | Performed by: INTERNAL MEDICINE

## 2025-08-03 PROCEDURE — 85025 COMPLETE CBC W/AUTO DIFF WBC: CPT | Performed by: NURSE PRACTITIONER

## 2025-08-03 PROCEDURE — 86850 RBC ANTIBODY SCREEN: CPT | Performed by: INTERNAL MEDICINE

## 2025-08-03 PROCEDURE — 63710000001 INSULIN LISPRO (HUMAN) PER 5 UNITS: Performed by: INTERNAL MEDICINE

## 2025-08-03 RX ORDER — FENTANYL/ROPIVACAINE/NS/PF 2-625MCG/1
15 PLASTIC BAG, INJECTION (ML) EPIDURAL ONCE
Status: COMPLETED | OUTPATIENT
Start: 2025-08-03 | End: 2025-08-03

## 2025-08-03 RX ORDER — INDOMETHACIN 25 MG/1
50 CAPSULE ORAL EVERY 8 HOURS
Status: COMPLETED | OUTPATIENT
Start: 2025-08-03 | End: 2025-08-04

## 2025-08-03 RX ORDER — ACETAMINOPHEN 160 MG/5ML
650 SOLUTION ORAL ONCE
Status: COMPLETED | OUTPATIENT
Start: 2025-08-03 | End: 2025-08-03

## 2025-08-03 RX ORDER — FUROSEMIDE 10 MG/ML
20 INJECTION INTRAMUSCULAR; INTRAVENOUS ONCE
Status: COMPLETED | OUTPATIENT
Start: 2025-08-03 | End: 2025-08-03

## 2025-08-03 RX ORDER — CALCIUM GLUCONATE 20 MG/ML
2000 INJECTION, SOLUTION INTRAVENOUS EVERY 12 HOURS
Status: COMPLETED | OUTPATIENT
Start: 2025-08-03 | End: 2025-08-03

## 2025-08-03 RX ORDER — ACETAMINOPHEN 325 MG/1
650 TABLET ORAL ONCE
Status: COMPLETED | OUTPATIENT
Start: 2025-08-03 | End: 2025-08-03

## 2025-08-03 RX ORDER — DIPHENHYDRAMINE HYDROCHLORIDE 50 MG/ML
12.5 INJECTION, SOLUTION INTRAMUSCULAR; INTRAVENOUS ONCE
Status: COMPLETED | OUTPATIENT
Start: 2025-08-03 | End: 2025-08-03

## 2025-08-03 RX ORDER — METOPROLOL TARTRATE 25 MG/1
25 TABLET, FILM COATED ORAL EVERY 12 HOURS SCHEDULED
Status: DISCONTINUED | OUTPATIENT
Start: 2025-08-03 | End: 2025-08-11 | Stop reason: HOSPADM

## 2025-08-03 RX ORDER — DIPHENHYDRAMINE HCL 25 MG
25 CAPSULE ORAL ONCE
Status: COMPLETED | OUTPATIENT
Start: 2025-08-03 | End: 2025-08-03

## 2025-08-03 RX ORDER — POTASSIUM CHLORIDE 1500 MG/1
20 TABLET, EXTENDED RELEASE ORAL
Status: DISCONTINUED | OUTPATIENT
Start: 2025-08-03 | End: 2025-08-08

## 2025-08-03 RX ORDER — MIDODRINE HYDROCHLORIDE 5 MG/1
5 TABLET ORAL EVERY 8 HOURS SCHEDULED
Status: DISCONTINUED | OUTPATIENT
Start: 2025-08-03 | End: 2025-08-04

## 2025-08-03 RX ORDER — ACETAMINOPHEN 650 MG/1
650 SUPPOSITORY RECTAL ONCE
Status: COMPLETED | OUTPATIENT
Start: 2025-08-03 | End: 2025-08-03

## 2025-08-03 RX ADMIN — VANCOMYCIN HYDROCHLORIDE 500 MG: 250 POWDER, FOR SOLUTION ORAL at 05:03

## 2025-08-03 RX ADMIN — POTASSIUM CHLORIDE 20 MEQ: 1500 TABLET, EXTENDED RELEASE ORAL at 10:12

## 2025-08-03 RX ADMIN — POTASSIUM CHLORIDE 20 MEQ: 1500 TABLET, EXTENDED RELEASE ORAL at 13:53

## 2025-08-03 RX ADMIN — Medication 10 ML: at 10:11

## 2025-08-03 RX ADMIN — INSULIN LISPRO 5 UNITS: 100 INJECTION, SOLUTION INTRAVENOUS; SUBCUTANEOUS at 10:08

## 2025-08-03 RX ADMIN — METOPROLOL TARTRATE 25 MG: 25 TABLET, FILM COATED ORAL at 20:05

## 2025-08-03 RX ADMIN — FUROSEMIDE 20 MG: 10 INJECTION, SOLUTION INTRAMUSCULAR; INTRAVENOUS at 22:04

## 2025-08-03 RX ADMIN — SODIUM BICARBONATE 50 MEQ: 84 INJECTION INTRAVENOUS at 18:26

## 2025-08-03 RX ADMIN — HYDROXYZINE HYDROCHLORIDE 50 MG: 25 TABLET, FILM COATED ORAL at 20:05

## 2025-08-03 RX ADMIN — DIPHENHYDRAMINE HYDROCHLORIDE 25 MG: 25 CAPSULE ORAL at 13:53

## 2025-08-03 RX ADMIN — METRONIDAZOLE 500 MG: 500 INJECTION, SOLUTION INTRAVENOUS at 10:09

## 2025-08-03 RX ADMIN — VANCOMYCIN HYDROCHLORIDE 500 MG: 250 POWDER, FOR SOLUTION ORAL at 13:52

## 2025-08-03 RX ADMIN — ALBUTEROL SULFATE 2 PUFF: 108 AEROSOL, METERED RESPIRATORY (INHALATION) at 11:07

## 2025-08-03 RX ADMIN — LEVOTHYROXINE SODIUM 125 MCG: 125 TABLET ORAL at 05:03

## 2025-08-03 RX ADMIN — FAMOTIDINE 20 MG: 20 TABLET, FILM COATED ORAL at 10:08

## 2025-08-03 RX ADMIN — ONDANSETRON 4 MG: 2 INJECTION, SOLUTION INTRAMUSCULAR; INTRAVENOUS at 03:37

## 2025-08-03 RX ADMIN — CALCIUM GLUCONATE 2000 MG: 20 INJECTION, SOLUTION INTRAVENOUS at 22:04

## 2025-08-03 RX ADMIN — ALBUTEROL SULFATE 2 PUFF: 108 AEROSOL, METERED RESPIRATORY (INHALATION) at 08:01

## 2025-08-03 RX ADMIN — ALBUMIN (HUMAN) 25 G: 0.25 INJECTION, SOLUTION INTRAVENOUS at 10:09

## 2025-08-03 RX ADMIN — ALBUTEROL SULFATE 2 PUFF: 108 AEROSOL, METERED RESPIRATORY (INHALATION) at 15:55

## 2025-08-03 RX ADMIN — BUDESONIDE AND FORMOTEROL FUMARATE DIHYDRATE 2 PUFF: 160; 4.5 AEROSOL RESPIRATORY (INHALATION) at 08:01

## 2025-08-03 RX ADMIN — POTASSIUM PHOSPHATE, MONOBASIC POTASSIUM PHOSPHATE, DIBASIC INJECTION, 15 MMOL: 236; 224 SOLUTION, CONCENTRATE INTRAVENOUS at 13:52

## 2025-08-03 RX ADMIN — METRONIDAZOLE 500 MG: 500 INJECTION, SOLUTION INTRAVENOUS at 01:39

## 2025-08-03 RX ADMIN — Medication 10 ML: at 20:05

## 2025-08-03 RX ADMIN — GUAIFENESIN 600 MG: 600 TABLET, EXTENDED RELEASE ORAL at 10:08

## 2025-08-03 RX ADMIN — FUROSEMIDE 20 MG: 10 INJECTION, SOLUTION INTRAMUSCULAR; INTRAVENOUS at 19:37

## 2025-08-03 RX ADMIN — AMIODARONE HYDROCHLORIDE 0.5 MG/MIN: 1.8 INJECTION, SOLUTION INTRAVENOUS at 03:04

## 2025-08-03 RX ADMIN — GUAIFENESIN 600 MG: 600 TABLET, EXTENDED RELEASE ORAL at 20:05

## 2025-08-03 RX ADMIN — SODIUM CHLORIDE 250 MG: 9 INJECTION, SOLUTION INTRAVENOUS at 01:40

## 2025-08-03 RX ADMIN — BUDESONIDE AND FORMOTEROL FUMARATE DIHYDRATE 2 PUFF: 160; 4.5 AEROSOL RESPIRATORY (INHALATION) at 19:43

## 2025-08-03 RX ADMIN — POTASSIUM CHLORIDE 20 MEQ: 1500 TABLET, EXTENDED RELEASE ORAL at 18:26

## 2025-08-03 RX ADMIN — AMIODARONE HYDROCHLORIDE 200 MG: 200 TABLET ORAL at 20:05

## 2025-08-03 RX ADMIN — ACETAMINOPHEN 650 MG: 325 TABLET, FILM COATED ORAL at 13:52

## 2025-08-03 RX ADMIN — ALBUTEROL SULFATE 2 PUFF: 108 AEROSOL, METERED RESPIRATORY (INHALATION) at 19:48

## 2025-08-03 RX ADMIN — Medication 250 MG: at 10:08

## 2025-08-03 RX ADMIN — HYDROCORTISONE 20 MG: 10 TABLET ORAL at 20:05

## 2025-08-03 RX ADMIN — CALCIUM GLUCONATE 2000 MG: 20 INJECTION, SOLUTION INTRAVENOUS at 10:09

## 2025-08-03 RX ADMIN — ENOXAPARIN SODIUM 90 MG: 100 INJECTION SUBCUTANEOUS at 13:53

## 2025-08-03 RX ADMIN — Medication 12.5 MG: at 10:08

## 2025-08-03 RX ADMIN — Medication 250 MG: at 20:05

## 2025-08-03 RX ADMIN — ALLOPURINOL 100 MG: 100 TABLET ORAL at 10:08

## 2025-08-03 RX ADMIN — VANCOMYCIN HYDROCHLORIDE 500 MG: 250 POWDER, FOR SOLUTION ORAL at 18:26

## 2025-08-03 RX ADMIN — HYDROCORTISONE 20 MG: 10 TABLET ORAL at 10:08

## 2025-08-03 RX ADMIN — INSULIN LISPRO 5 UNITS: 100 INJECTION, SOLUTION INTRAVENOUS; SUBCUTANEOUS at 13:52

## 2025-08-03 RX ADMIN — AMIODARONE HYDROCHLORIDE 200 MG: 200 TABLET ORAL at 10:08

## 2025-08-03 RX ADMIN — ENOXAPARIN SODIUM 90 MG: 100 INJECTION SUBCUTANEOUS at 22:04

## 2025-08-03 RX ADMIN — SODIUM BICARBONATE 50 MEQ: 84 INJECTION INTRAVENOUS at 10:09

## 2025-08-04 ENCOUNTER — APPOINTMENT (OUTPATIENT)
Dept: GENERAL RADIOLOGY | Facility: HOSPITAL | Age: 75
DRG: 871 | End: 2025-08-04
Payer: MEDICARE

## 2025-08-04 LAB
ALBUMIN SERPL ELPH-MCNC: 2.3 G/DL (ref 2.9–4.4)
ALBUMIN SERPL-MCNC: 2.9 G/DL (ref 3.5–5.2)
ALBUMIN/GLOB SERPL: 1.3 {RATIO} (ref 0.7–1.7)
ALBUMIN/GLOB SERPL: 2.2 G/DL
ALP SERPL-CCNC: 118 U/L (ref 39–117)
ALPHA1 GLOB SERPL ELPH-MCNC: 0.3 G/DL (ref 0–0.4)
ALPHA2 GLOB SERPL ELPH-MCNC: 0.7 G/DL (ref 0.4–1)
ALT SERPL W P-5'-P-CCNC: 11 U/L (ref 1–41)
ANION GAP SERPL CALCULATED.3IONS-SCNC: 10 MMOL/L (ref 5–15)
AST SERPL-CCNC: 50 U/L (ref 1–40)
B-GLOBULIN SERPL ELPH-MCNC: 0.4 G/DL (ref 0.7–1.3)
BH BB BLOOD EXPIRATION DATE: NORMAL
BH BB BLOOD TYPE BARCODE: 5100
BH BB DISPENSE STATUS: NORMAL
BH BB PRODUCT CODE: NORMAL
BH BB UNIT NUMBER: NORMAL
BILIRUB SERPL-MCNC: 0.3 MG/DL (ref 0–1.2)
BUN SERPL-MCNC: 22.1 MG/DL (ref 8–23)
BUN/CREAT SERPL: 13.1 (ref 7–25)
BURR CELLS BLD QL SMEAR: ABNORMAL
C3 FRG RBC-MCNC: ABNORMAL
CALCIUM SPEC-SCNC: 8 MG/DL (ref 8.6–10.5)
CHLORIDE SERPL-SCNC: 102 MMOL/L (ref 98–107)
CO2 SERPL-SCNC: 23 MMOL/L (ref 22–29)
CREAT SERPL-MCNC: 1.69 MG/DL (ref 0.76–1.27)
CROSSMATCH INTERPRETATION: NORMAL
D-LACTATE SERPL-SCNC: 1.9 MMOL/L (ref 0.5–2)
DEPRECATED RDW RBC AUTO: 52.4 FL (ref 37–54)
EGFRCR SERPLBLD CKD-EPI 2021: 41.8 ML/MIN/1.73
ERYTHROCYTE [DISTWIDTH] IN BLOOD BY AUTOMATED COUNT: 15.9 % (ref 12.3–15.4)
GAMMA GLOB SERPL ELPH-MCNC: 0.3 G/DL (ref 0.4–1.8)
GLOBULIN SER CALC-MCNC: 1.8 G/DL (ref 2.2–3.9)
GLOBULIN UR ELPH-MCNC: 1.3 GM/DL
GLUCOSE BLDC GLUCOMTR-MCNC: 122 MG/DL (ref 70–105)
GLUCOSE BLDC GLUCOMTR-MCNC: 135 MG/DL (ref 70–105)
GLUCOSE BLDC GLUCOMTR-MCNC: 149 MG/DL (ref 70–105)
GLUCOSE BLDC GLUCOMTR-MCNC: 96 MG/DL (ref 70–105)
GLUCOSE BLDC GLUCOMTR-MCNC: 97 MG/DL (ref 70–105)
GLUCOSE SERPL-MCNC: 91 MG/DL (ref 65–99)
HCT VFR BLD AUTO: 25.8 % (ref 37.5–51)
HGB BLD-MCNC: 8.3 G/DL (ref 13–17.7)
LABORATORY COMMENT REPORT: ABNORMAL
LYMPHOCYTES # BLD MANUAL: 0.92 10*3/MM3 (ref 0.7–3.1)
LYMPHOCYTES NFR BLD MANUAL: 7 % (ref 5–12)
M PROTEIN SERPL ELPH-MCNC: ABNORMAL G/DL
MAGNESIUM SERPL-MCNC: 2 MG/DL (ref 1.6–2.4)
MCH RBC QN AUTO: 29.3 PG (ref 26.6–33)
MCHC RBC AUTO-ENTMCNC: 32.2 G/DL (ref 31.5–35.7)
MCV RBC AUTO: 91.2 FL (ref 79–97)
METAMYELOCYTES NFR BLD MANUAL: 4 % (ref 0–0)
MONOCYTES # BLD: 1.29 10*3/MM3 (ref 0.1–0.9)
MYELOCYTES NFR BLD MANUAL: 1 % (ref 0–0)
NEUTROPHILS # BLD AUTO: 15.28 10*3/MM3 (ref 1.7–7)
NEUTROPHILS NFR BLD MANUAL: 76 % (ref 42.7–76)
NEUTS BAND NFR BLD MANUAL: 7 % (ref 0–5)
PHOSPHATE SERPL-MCNC: 2.6 MG/DL (ref 2.5–4.5)
PLAT MORPH BLD: NORMAL
PLATELET # BLD AUTO: 158 10*3/MM3 (ref 140–450)
PMV BLD AUTO: 10.3 FL (ref 6–12)
POIKILOCYTOSIS BLD QL SMEAR: ABNORMAL
POLYCHROMASIA BLD QL SMEAR: ABNORMAL
POTASSIUM SERPL-SCNC: 4.4 MMOL/L (ref 3.5–5.2)
PROT PATTERN SERPL ELPH-IMP: ABNORMAL
PROT SERPL-MCNC: 4.1 G/DL (ref 6–8.5)
PROT SERPL-MCNC: 4.2 G/DL (ref 6–8.5)
RBC # BLD AUTO: 2.83 10*6/MM3 (ref 4.14–5.8)
SCAN SLIDE: NORMAL
SODIUM SERPL-SCNC: 135 MMOL/L (ref 136–145)
T4 FREE SERPL-MCNC: 0.62 NG/DL (ref 0.92–1.68)
TOXIC GRANULATION: ABNORMAL
UNIT  ABO: NORMAL
UNIT  RH: NORMAL
VARIANT LYMPHS NFR BLD MANUAL: 1 % (ref 0–5)
VARIANT LYMPHS NFR BLD MANUAL: 4 % (ref 19.6–45.3)
WBC NRBC COR # BLD AUTO: 18.41 10*3/MM3 (ref 3.4–10.8)

## 2025-08-04 PROCEDURE — 94761 N-INVAS EAR/PLS OXIMETRY MLT: CPT

## 2025-08-04 PROCEDURE — 94664 DEMO&/EVAL PT USE INHALER: CPT

## 2025-08-04 PROCEDURE — 84439 ASSAY OF FREE THYROXINE: CPT | Performed by: INTERNAL MEDICINE

## 2025-08-04 PROCEDURE — 94799 UNLISTED PULMONARY SVC/PX: CPT

## 2025-08-04 PROCEDURE — 82948 REAGENT STRIP/BLOOD GLUCOSE: CPT

## 2025-08-04 PROCEDURE — 80053 COMPREHEN METABOLIC PANEL: CPT | Performed by: NURSE PRACTITIONER

## 2025-08-04 PROCEDURE — 82948 REAGENT STRIP/BLOOD GLUCOSE: CPT | Performed by: INTERNAL MEDICINE

## 2025-08-04 PROCEDURE — 99232 SBSQ HOSP IP/OBS MODERATE 35: CPT | Performed by: SURGERY

## 2025-08-04 PROCEDURE — 97110 THERAPEUTIC EXERCISES: CPT

## 2025-08-04 PROCEDURE — 83605 ASSAY OF LACTIC ACID: CPT | Performed by: INTERNAL MEDICINE

## 2025-08-04 PROCEDURE — 97112 NEUROMUSCULAR REEDUCATION: CPT

## 2025-08-04 PROCEDURE — 99232 SBSQ HOSP IP/OBS MODERATE 35: CPT | Performed by: INTERNAL MEDICINE

## 2025-08-04 PROCEDURE — 99232 SBSQ HOSP IP/OBS MODERATE 35: CPT | Performed by: NURSE PRACTITIONER

## 2025-08-04 PROCEDURE — 25010000002 ENOXAPARIN PER 10 MG: Performed by: INTERNAL MEDICINE

## 2025-08-04 PROCEDURE — 83735 ASSAY OF MAGNESIUM: CPT | Performed by: NURSE PRACTITIONER

## 2025-08-04 PROCEDURE — 84100 ASSAY OF PHOSPHORUS: CPT | Performed by: NURSE PRACTITIONER

## 2025-08-04 PROCEDURE — 71045 X-RAY EXAM CHEST 1 VIEW: CPT

## 2025-08-04 PROCEDURE — 85025 COMPLETE CBC W/AUTO DIFF WBC: CPT | Performed by: NURSE PRACTITIONER

## 2025-08-04 PROCEDURE — 85007 BL SMEAR W/DIFF WBC COUNT: CPT | Performed by: NURSE PRACTITIONER

## 2025-08-04 PROCEDURE — 97530 THERAPEUTIC ACTIVITIES: CPT

## 2025-08-04 RX ORDER — SODIUM BICARBONATE 650 MG/1
650 TABLET ORAL 3 TIMES DAILY
Status: DISCONTINUED | OUTPATIENT
Start: 2025-08-04 | End: 2025-08-11 | Stop reason: HOSPADM

## 2025-08-04 RX ORDER — HYDROCORTISONE 10 MG/1
10 TABLET ORAL 2 TIMES DAILY
Status: DISCONTINUED | OUTPATIENT
Start: 2025-08-04 | End: 2025-08-11 | Stop reason: HOSPADM

## 2025-08-04 RX ORDER — BUMETANIDE 1 MG/1
1 TABLET ORAL DAILY
Status: DISCONTINUED | OUTPATIENT
Start: 2025-08-04 | End: 2025-08-07

## 2025-08-04 RX ORDER — MIDODRINE HYDROCHLORIDE 2.5 MG/1
2.5 TABLET ORAL EVERY 8 HOURS SCHEDULED
Status: DISCONTINUED | OUTPATIENT
Start: 2025-08-04 | End: 2025-08-05

## 2025-08-04 RX ADMIN — ENOXAPARIN SODIUM 90 MG: 100 INJECTION SUBCUTANEOUS at 10:04

## 2025-08-04 RX ADMIN — GUAIFENESIN 600 MG: 600 TABLET, EXTENDED RELEASE ORAL at 21:25

## 2025-08-04 RX ADMIN — ALLOPURINOL 100 MG: 100 TABLET ORAL at 09:53

## 2025-08-04 RX ADMIN — ALBUTEROL SULFATE 2 PUFF: 108 AEROSOL, METERED RESPIRATORY (INHALATION) at 08:30

## 2025-08-04 RX ADMIN — METOPROLOL TARTRATE 25 MG: 25 TABLET, FILM COATED ORAL at 21:25

## 2025-08-04 RX ADMIN — HYDROXYZINE HYDROCHLORIDE 50 MG: 25 TABLET, FILM COATED ORAL at 21:25

## 2025-08-04 RX ADMIN — VANCOMYCIN HYDROCHLORIDE 500 MG: 250 POWDER, FOR SOLUTION ORAL at 12:23

## 2025-08-04 RX ADMIN — ALBUTEROL SULFATE 2 PUFF: 108 AEROSOL, METERED RESPIRATORY (INHALATION) at 15:35

## 2025-08-04 RX ADMIN — FAMOTIDINE 20 MG: 20 TABLET, FILM COATED ORAL at 09:53

## 2025-08-04 RX ADMIN — POTASSIUM CHLORIDE 20 MEQ: 1500 TABLET, EXTENDED RELEASE ORAL at 09:53

## 2025-08-04 RX ADMIN — BUMETANIDE 1 MG: 1 TABLET ORAL at 12:23

## 2025-08-04 RX ADMIN — Medication 250 MG: at 09:53

## 2025-08-04 RX ADMIN — METOPROLOL TARTRATE 25 MG: 25 TABLET, FILM COATED ORAL at 09:53

## 2025-08-04 RX ADMIN — Medication 10 ML: at 09:54

## 2025-08-04 RX ADMIN — VANCOMYCIN HYDROCHLORIDE 500 MG: 250 POWDER, FOR SOLUTION ORAL at 06:06

## 2025-08-04 RX ADMIN — Medication 250 MG: at 21:25

## 2025-08-04 RX ADMIN — ALBUTEROL SULFATE 2 PUFF: 108 AEROSOL, METERED RESPIRATORY (INHALATION) at 19:05

## 2025-08-04 RX ADMIN — HYDROCORTISONE 20 MG: 10 TABLET ORAL at 09:53

## 2025-08-04 RX ADMIN — BUDESONIDE AND FORMOTEROL FUMARATE DIHYDRATE 2 PUFF: 160; 4.5 AEROSOL RESPIRATORY (INHALATION) at 19:05

## 2025-08-04 RX ADMIN — SODIUM BICARBONATE 650 MG: 650 TABLET ORAL at 15:06

## 2025-08-04 RX ADMIN — BUDESONIDE AND FORMOTEROL FUMARATE DIHYDRATE 2 PUFF: 160; 4.5 AEROSOL RESPIRATORY (INHALATION) at 08:33

## 2025-08-04 RX ADMIN — VANCOMYCIN HYDROCHLORIDE 500 MG: 250 POWDER, FOR SOLUTION ORAL at 01:40

## 2025-08-04 RX ADMIN — VANCOMYCIN HYDROCHLORIDE 500 MG: 250 POWDER, FOR SOLUTION ORAL at 17:17

## 2025-08-04 RX ADMIN — HYDROCORTISONE 10 MG: 10 TABLET ORAL at 21:25

## 2025-08-04 RX ADMIN — SODIUM BICARBONATE 650 MG: 650 TABLET ORAL at 09:53

## 2025-08-04 RX ADMIN — POTASSIUM CHLORIDE 20 MEQ: 1500 TABLET, EXTENDED RELEASE ORAL at 17:17

## 2025-08-04 RX ADMIN — GUAIFENESIN 600 MG: 600 TABLET, EXTENDED RELEASE ORAL at 09:53

## 2025-08-04 RX ADMIN — SODIUM BICARBONATE 50 MEQ: 84 INJECTION INTRAVENOUS at 01:49

## 2025-08-04 RX ADMIN — AMIODARONE HYDROCHLORIDE 200 MG: 200 TABLET ORAL at 09:52

## 2025-08-04 RX ADMIN — ALBUTEROL SULFATE 2 PUFF: 108 AEROSOL, METERED RESPIRATORY (INHALATION) at 11:50

## 2025-08-04 RX ADMIN — SODIUM BICARBONATE 650 MG: 650 TABLET ORAL at 21:25

## 2025-08-04 RX ADMIN — AMIODARONE HYDROCHLORIDE 200 MG: 200 TABLET ORAL at 21:25

## 2025-08-04 RX ADMIN — Medication 10 ML: at 21:26

## 2025-08-04 RX ADMIN — POTASSIUM CHLORIDE 20 MEQ: 1500 TABLET, EXTENDED RELEASE ORAL at 12:23

## 2025-08-04 RX ADMIN — LEVOTHYROXINE SODIUM 125 MCG: 125 TABLET ORAL at 06:06

## 2025-08-05 LAB
ALBUMIN SERPL-MCNC: 2.8 G/DL (ref 3.5–5.2)
ALBUMIN/GLOB SERPL: 1.8 G/DL
ALP SERPL-CCNC: 141 U/L (ref 39–117)
ALT SERPL W P-5'-P-CCNC: 9 U/L (ref 1–41)
ANION GAP SERPL CALCULATED.3IONS-SCNC: 10.1 MMOL/L (ref 5–15)
ANISOCYTOSIS BLD QL: ABNORMAL
AST SERPL-CCNC: 39 U/L (ref 1–40)
BILIRUB SERPL-MCNC: 0.3 MG/DL (ref 0–1.2)
BUN SERPL-MCNC: 21.8 MG/DL (ref 8–23)
BUN/CREAT SERPL: 14.1 (ref 7–25)
CALCIUM SPEC-SCNC: 7.6 MG/DL (ref 8.6–10.5)
CHLORIDE SERPL-SCNC: 103 MMOL/L (ref 98–107)
CO2 SERPL-SCNC: 23.9 MMOL/L (ref 22–29)
CREAT SERPL-MCNC: 1.55 MG/DL (ref 0.76–1.27)
DACRYOCYTES BLD QL SMEAR: ABNORMAL
DEPRECATED RDW RBC AUTO: 53.4 FL (ref 37–54)
EGFRCR SERPLBLD CKD-EPI 2021: 46.4 ML/MIN/1.73
ERYTHROCYTE [DISTWIDTH] IN BLOOD BY AUTOMATED COUNT: 16.2 % (ref 12.3–15.4)
GLOBULIN UR ELPH-MCNC: 1.6 GM/DL
GLUCOSE BLDC GLUCOMTR-MCNC: 104 MG/DL (ref 70–105)
GLUCOSE BLDC GLUCOMTR-MCNC: 113 MG/DL (ref 70–105)
GLUCOSE BLDC GLUCOMTR-MCNC: 116 MG/DL (ref 70–105)
GLUCOSE BLDC GLUCOMTR-MCNC: 95 MG/DL (ref 70–105)
GLUCOSE SERPL-MCNC: 153 MG/DL (ref 65–99)
HCT VFR BLD AUTO: 28.9 % (ref 37.5–51)
HGB BLD-MCNC: 9.3 G/DL (ref 13–17.7)
LAB AP CASE REPORT: NORMAL
LYMPHOCYTES # BLD MANUAL: 1.54 10*3/MM3 (ref 0.7–3.1)
LYMPHOCYTES NFR BLD MANUAL: 3 % (ref 5–12)
MAGNESIUM SERPL-MCNC: 1.8 MG/DL (ref 1.6–2.4)
MAGNESIUM SERPL-MCNC: 2.1 MG/DL (ref 1.6–2.4)
MCH RBC QN AUTO: 29.1 PG (ref 26.6–33)
MCHC RBC AUTO-ENTMCNC: 32.2 G/DL (ref 31.5–35.7)
MCV RBC AUTO: 90.3 FL (ref 79–97)
METAMYELOCYTES NFR BLD MANUAL: 4 % (ref 0–0)
MONOCYTES # BLD: 0.66 10*3/MM3 (ref 0.1–0.9)
MYELOCYTES NFR BLD MANUAL: 7 % (ref 0–0)
NEUTROPHILS # BLD AUTO: 17.21 10*3/MM3 (ref 1.7–7)
NEUTROPHILS NFR BLD MANUAL: 76 % (ref 42.7–76)
NEUTS BAND NFR BLD MANUAL: 2 % (ref 0–5)
PATH REPORT.FINAL DX SPEC: NORMAL
PATHOLOGY REVIEW: YES
PHOSPHATE SERPL-MCNC: 1.9 MG/DL (ref 2.5–4.5)
PHOSPHATE SERPL-MCNC: 2.7 MG/DL (ref 2.5–4.5)
PLAT MORPH BLD: NORMAL
PLATELET # BLD AUTO: 192 10*3/MM3 (ref 140–450)
PMV BLD AUTO: 10.3 FL (ref 6–12)
POIKILOCYTOSIS BLD QL SMEAR: ABNORMAL
POLYCHROMASIA BLD QL SMEAR: ABNORMAL
POTASSIUM SERPL-SCNC: 4.7 MMOL/L (ref 3.5–5.2)
PROMYELOCYTES NFR BLD MANUAL: 1 % (ref 0–0)
PROT SERPL-MCNC: 4.4 G/DL (ref 6–8.5)
RBC # BLD AUTO: 3.2 10*6/MM3 (ref 4.14–5.8)
SCAN SLIDE: NORMAL
SODIUM SERPL-SCNC: 137 MMOL/L (ref 136–145)
TOXIC GRANULATION: ABNORMAL
VARIANT LYMPHS NFR BLD MANUAL: 7 % (ref 19.6–45.3)
WBC NRBC COR # BLD AUTO: 22.06 10*3/MM3 (ref 3.4–10.8)

## 2025-08-05 PROCEDURE — 97530 THERAPEUTIC ACTIVITIES: CPT

## 2025-08-05 PROCEDURE — 83735 ASSAY OF MAGNESIUM: CPT | Performed by: INTERNAL MEDICINE

## 2025-08-05 PROCEDURE — 97535 SELF CARE MNGMENT TRAINING: CPT

## 2025-08-05 PROCEDURE — 97110 THERAPEUTIC EXERCISES: CPT

## 2025-08-05 PROCEDURE — 84100 ASSAY OF PHOSPHORUS: CPT | Performed by: NURSE PRACTITIONER

## 2025-08-05 PROCEDURE — 63710000001 DIPHENHYDRAMINE PER 50 MG: Performed by: INTERNAL MEDICINE

## 2025-08-05 PROCEDURE — 80053 COMPREHEN METABOLIC PANEL: CPT | Performed by: NURSE PRACTITIONER

## 2025-08-05 PROCEDURE — 99232 SBSQ HOSP IP/OBS MODERATE 35: CPT | Performed by: INTERNAL MEDICINE

## 2025-08-05 PROCEDURE — 25810000003 SODIUM CHLORIDE 0.9 % SOLUTION: Performed by: INTERNAL MEDICINE

## 2025-08-05 PROCEDURE — 94761 N-INVAS EAR/PLS OXIMETRY MLT: CPT

## 2025-08-05 PROCEDURE — 85025 COMPLETE CBC W/AUTO DIFF WBC: CPT | Performed by: NURSE PRACTITIONER

## 2025-08-05 PROCEDURE — 82948 REAGENT STRIP/BLOOD GLUCOSE: CPT

## 2025-08-05 PROCEDURE — 84100 ASSAY OF PHOSPHORUS: CPT | Performed by: INTERNAL MEDICINE

## 2025-08-05 PROCEDURE — 25010000002 HALOPERIDOL LACTATE PER 5 MG

## 2025-08-05 PROCEDURE — 99232 SBSQ HOSP IP/OBS MODERATE 35: CPT

## 2025-08-05 PROCEDURE — 94799 UNLISTED PULMONARY SVC/PX: CPT

## 2025-08-05 PROCEDURE — 25010000002 CALCIUM GLUCONATE 2-0.675 GM/100ML-% SOLUTION: Performed by: INTERNAL MEDICINE

## 2025-08-05 PROCEDURE — 25010000002 ENOXAPARIN PER 10 MG: Performed by: INTERNAL MEDICINE

## 2025-08-05 PROCEDURE — 85007 BL SMEAR W/DIFF WBC COUNT: CPT | Performed by: NURSE PRACTITIONER

## 2025-08-05 PROCEDURE — 94762 N-INVAS EAR/PLS OXIMTRY CONT: CPT

## 2025-08-05 PROCEDURE — 25010000002 ALBUMIN HUMAN 25% PER 50 ML: Performed by: INTERNAL MEDICINE

## 2025-08-05 PROCEDURE — P9047 ALBUMIN (HUMAN), 25%, 50ML: HCPCS | Performed by: INTERNAL MEDICINE

## 2025-08-05 PROCEDURE — 25010000002 MAGNESIUM SULFATE 4 GM/100ML SOLUTION: Performed by: INTERNAL MEDICINE

## 2025-08-05 PROCEDURE — 94664 DEMO&/EVAL PT USE INHALER: CPT

## 2025-08-05 PROCEDURE — 97116 GAIT TRAINING THERAPY: CPT

## 2025-08-05 PROCEDURE — 83735 ASSAY OF MAGNESIUM: CPT | Performed by: NURSE PRACTITIONER

## 2025-08-05 PROCEDURE — 82948 REAGENT STRIP/BLOOD GLUCOSE: CPT | Performed by: INTERNAL MEDICINE

## 2025-08-05 RX ORDER — FENTANYL/ROPIVACAINE/NS/PF 2-625MCG/1
15 PLASTIC BAG, INJECTION (ML) EPIDURAL ONCE
Status: COMPLETED | OUTPATIENT
Start: 2025-08-05 | End: 2025-08-05

## 2025-08-05 RX ORDER — HALOPERIDOL 5 MG/ML
5 INJECTION INTRAMUSCULAR ONCE
Status: COMPLETED | OUTPATIENT
Start: 2025-08-05 | End: 2025-08-05

## 2025-08-05 RX ORDER — CALCIUM GLUCONATE 20 MG/ML
2000 INJECTION, SOLUTION INTRAVENOUS EVERY 12 HOURS
Status: COMPLETED | OUTPATIENT
Start: 2025-08-05 | End: 2025-08-05

## 2025-08-05 RX ORDER — MIDODRINE HYDROCHLORIDE 2.5 MG/1
2.5 TABLET ORAL
Status: DISCONTINUED | OUTPATIENT
Start: 2025-08-05 | End: 2025-08-05

## 2025-08-05 RX ORDER — ALBUMIN (HUMAN) 12.5 G/50ML
25 SOLUTION INTRAVENOUS EVERY 8 HOURS
Status: COMPLETED | OUTPATIENT
Start: 2025-08-05 | End: 2025-08-05

## 2025-08-05 RX ORDER — HALOPERIDOL 5 MG/ML
2 INJECTION INTRAMUSCULAR ONCE
Status: DISCONTINUED | OUTPATIENT
Start: 2025-08-05 | End: 2025-08-11 | Stop reason: HOSPADM

## 2025-08-05 RX ORDER — MAGNESIUM SULFATE HEPTAHYDRATE 40 MG/ML
4 INJECTION, SOLUTION INTRAVENOUS ONCE
Status: COMPLETED | OUTPATIENT
Start: 2025-08-05 | End: 2025-08-05

## 2025-08-05 RX ORDER — DIPHENHYDRAMINE HCL 25 MG
25 CAPSULE ORAL NIGHTLY PRN
Status: DISCONTINUED | OUTPATIENT
Start: 2025-08-05 | End: 2025-08-11 | Stop reason: HOSPADM

## 2025-08-05 RX ADMIN — Medication 250 MG: at 08:58

## 2025-08-05 RX ADMIN — VANCOMYCIN HYDROCHLORIDE 500 MG: 250 POWDER, FOR SOLUTION ORAL at 00:04

## 2025-08-05 RX ADMIN — Medication 250 MG: at 20:39

## 2025-08-05 RX ADMIN — SODIUM BICARBONATE 650 MG: 650 TABLET ORAL at 17:54

## 2025-08-05 RX ADMIN — Medication 10 ML: at 09:43

## 2025-08-05 RX ADMIN — ENOXAPARIN SODIUM 90 MG: 100 INJECTION SUBCUTANEOUS at 10:25

## 2025-08-05 RX ADMIN — POTASSIUM CHLORIDE 20 MEQ: 1500 TABLET, EXTENDED RELEASE ORAL at 08:58

## 2025-08-05 RX ADMIN — SODIUM CHLORIDE 15 MMOL: 9 INJECTION, SOLUTION INTRAVENOUS at 03:08

## 2025-08-05 RX ADMIN — HYDROCORTISONE 10 MG: 10 TABLET ORAL at 20:39

## 2025-08-05 RX ADMIN — HALOPERIDOL LACTATE 5 MG: 5 INJECTION, SOLUTION INTRAMUSCULAR at 22:19

## 2025-08-05 RX ADMIN — GUAIFENESIN 600 MG: 600 TABLET, EXTENDED RELEASE ORAL at 08:58

## 2025-08-05 RX ADMIN — FAMOTIDINE 20 MG: 20 TABLET, FILM COATED ORAL at 08:58

## 2025-08-05 RX ADMIN — POTASSIUM PHOSPHATE 15 MMOL: 236; 224 INJECTION, SOLUTION INTRAVENOUS at 10:02

## 2025-08-05 RX ADMIN — CALCIUM GLUCONATE 2000 MG: 20 INJECTION, SOLUTION INTRAVENOUS at 10:04

## 2025-08-05 RX ADMIN — CALCIUM GLUCONATE 2000 MG: 20 INJECTION, SOLUTION INTRAVENOUS at 20:39

## 2025-08-05 RX ADMIN — BUDESONIDE AND FORMOTEROL FUMARATE DIHYDRATE 2 PUFF: 160; 4.5 AEROSOL RESPIRATORY (INHALATION) at 07:40

## 2025-08-05 RX ADMIN — VANCOMYCIN HYDROCHLORIDE 500 MG: 250 POWDER, FOR SOLUTION ORAL at 05:42

## 2025-08-05 RX ADMIN — Medication 10 ML: at 20:39

## 2025-08-05 RX ADMIN — DIPHENHYDRAMINE HYDROCHLORIDE 25 MG: 25 CAPSULE ORAL at 20:41

## 2025-08-05 RX ADMIN — BUDESONIDE AND FORMOTEROL FUMARATE DIHYDRATE 2 PUFF: 160; 4.5 AEROSOL RESPIRATORY (INHALATION) at 20:33

## 2025-08-05 RX ADMIN — POTASSIUM CHLORIDE 20 MEQ: 1500 TABLET, EXTENDED RELEASE ORAL at 17:54

## 2025-08-05 RX ADMIN — SODIUM BICARBONATE 650 MG: 650 TABLET ORAL at 20:39

## 2025-08-05 RX ADMIN — HYDROCORTISONE 10 MG: 10 TABLET ORAL at 08:58

## 2025-08-05 RX ADMIN — HYDROXYZINE HYDROCHLORIDE 50 MG: 25 TABLET, FILM COATED ORAL at 20:39

## 2025-08-05 RX ADMIN — ENOXAPARIN SODIUM 90 MG: 100 INJECTION SUBCUTANEOUS at 00:04

## 2025-08-05 RX ADMIN — ALLOPURINOL 100 MG: 100 TABLET ORAL at 08:58

## 2025-08-05 RX ADMIN — ALBUMIN (HUMAN) 25 G: 0.25 INJECTION, SOLUTION INTRAVENOUS at 10:04

## 2025-08-05 RX ADMIN — ALBUTEROL SULFATE 2 PUFF: 108 AEROSOL, METERED RESPIRATORY (INHALATION) at 11:17

## 2025-08-05 RX ADMIN — SODIUM BICARBONATE 650 MG: 650 TABLET ORAL at 08:58

## 2025-08-05 RX ADMIN — GUAIFENESIN 600 MG: 600 TABLET, EXTENDED RELEASE ORAL at 20:39

## 2025-08-05 RX ADMIN — BUMETANIDE 1 MG: 1 TABLET ORAL at 09:07

## 2025-08-05 RX ADMIN — AMIODARONE HYDROCHLORIDE 200 MG: 200 TABLET ORAL at 20:39

## 2025-08-05 RX ADMIN — AMIODARONE HYDROCHLORIDE 200 MG: 200 TABLET ORAL at 08:58

## 2025-08-05 RX ADMIN — METOPROLOL TARTRATE 25 MG: 25 TABLET, FILM COATED ORAL at 20:39

## 2025-08-05 RX ADMIN — VANCOMYCIN HYDROCHLORIDE 500 MG: 250 POWDER, FOR SOLUTION ORAL at 17:54

## 2025-08-05 RX ADMIN — POTASSIUM CHLORIDE 20 MEQ: 1500 TABLET, EXTENDED RELEASE ORAL at 11:54

## 2025-08-05 RX ADMIN — VANCOMYCIN HYDROCHLORIDE 500 MG: 250 POWDER, FOR SOLUTION ORAL at 11:55

## 2025-08-05 RX ADMIN — ALBUTEROL SULFATE 2 PUFF: 108 AEROSOL, METERED RESPIRATORY (INHALATION) at 14:45

## 2025-08-05 RX ADMIN — ALBUTEROL SULFATE 2 PUFF: 108 AEROSOL, METERED RESPIRATORY (INHALATION) at 07:40

## 2025-08-05 RX ADMIN — MAGNESIUM SULFATE IN WATER FOR 4 G: 40 INJECTION INTRAVENOUS at 03:08

## 2025-08-05 RX ADMIN — METOPROLOL TARTRATE 25 MG: 25 TABLET, FILM COATED ORAL at 08:59

## 2025-08-05 RX ADMIN — LEVOTHYROXINE SODIUM 137 MCG: 0.03 TABLET ORAL at 05:41

## 2025-08-05 RX ADMIN — ALBUTEROL SULFATE 2 PUFF: 108 AEROSOL, METERED RESPIRATORY (INHALATION) at 20:31

## 2025-08-05 RX ADMIN — ALBUMIN (HUMAN) 25 G: 0.25 INJECTION, SOLUTION INTRAVENOUS at 17:55

## 2025-08-06 ENCOUNTER — APPOINTMENT (OUTPATIENT)
Dept: CARDIOLOGY | Facility: HOSPITAL | Age: 75
DRG: 871 | End: 2025-08-06
Payer: MEDICARE

## 2025-08-06 ENCOUNTER — APPOINTMENT (OUTPATIENT)
Dept: CT IMAGING | Facility: HOSPITAL | Age: 75
DRG: 871 | End: 2025-08-06
Payer: MEDICARE

## 2025-08-06 LAB
ALBUMIN SERPL-MCNC: 2.8 G/DL (ref 3.5–5.2)
ALBUMIN/GLOB SERPL: 2.3 G/DL
ALP SERPL-CCNC: 103 U/L (ref 39–117)
ALT SERPL W P-5'-P-CCNC: 7 U/L (ref 1–41)
ANION GAP SERPL CALCULATED.3IONS-SCNC: 6.7 MMOL/L (ref 5–15)
AST SERPL-CCNC: 25 U/L (ref 1–40)
BACTERIA UR QL AUTO: ABNORMAL /HPF
BASOPHILS # BLD AUTO: 0.02 10*3/MM3 (ref 0–0.2)
BASOPHILS NFR BLD AUTO: 0.1 % (ref 0–1.5)
BH CV LOWER VASCULAR LEFT COMMON FEMORAL AUGMENT: NORMAL
BH CV LOWER VASCULAR LEFT COMMON FEMORAL COMPETENT: NORMAL
BH CV LOWER VASCULAR LEFT COMMON FEMORAL COMPRESS: NORMAL
BH CV LOWER VASCULAR LEFT COMMON FEMORAL PHASIC: NORMAL
BH CV LOWER VASCULAR LEFT COMMON FEMORAL SPONT: NORMAL
BH CV LOWER VASCULAR RIGHT COMMON FEMORAL AUGMENT: NORMAL
BH CV LOWER VASCULAR RIGHT COMMON FEMORAL COMPETENT: NORMAL
BH CV LOWER VASCULAR RIGHT COMMON FEMORAL COMPRESS: NORMAL
BH CV LOWER VASCULAR RIGHT COMMON FEMORAL PHASIC: NORMAL
BH CV LOWER VASCULAR RIGHT COMMON FEMORAL SPONT: NORMAL
BH CV LOWER VASCULAR RIGHT DISTAL FEMORAL COMPRESS: NORMAL
BH CV LOWER VASCULAR RIGHT GASTRONEMIUS COMPRESS: NORMAL
BH CV LOWER VASCULAR RIGHT GREATER SAPH AK COMPRESS: NORMAL
BH CV LOWER VASCULAR RIGHT GREATER SAPH BK COMPRESS: NORMAL
BH CV LOWER VASCULAR RIGHT LESSER SAPH COMPRESS: NORMAL
BH CV LOWER VASCULAR RIGHT MID FEMORAL AUGMENT: NORMAL
BH CV LOWER VASCULAR RIGHT MID FEMORAL COMPETENT: NORMAL
BH CV LOWER VASCULAR RIGHT MID FEMORAL COMPRESS: NORMAL
BH CV LOWER VASCULAR RIGHT MID FEMORAL PHASIC: NORMAL
BH CV LOWER VASCULAR RIGHT MID FEMORAL SPONT: NORMAL
BH CV LOWER VASCULAR RIGHT PERONEAL COMPRESS: NORMAL
BH CV LOWER VASCULAR RIGHT POPLITEAL AUGMENT: NORMAL
BH CV LOWER VASCULAR RIGHT POPLITEAL COMPETENT: NORMAL
BH CV LOWER VASCULAR RIGHT POPLITEAL COMPRESS: NORMAL
BH CV LOWER VASCULAR RIGHT POPLITEAL PHASIC: NORMAL
BH CV LOWER VASCULAR RIGHT POPLITEAL SPONT: NORMAL
BH CV LOWER VASCULAR RIGHT POSTERIOR TIBIAL COMPRESS: NORMAL
BH CV LOWER VASCULAR RIGHT PROXIMAL FEMORAL COMPRESS: NORMAL
BH CV LOWER VASCULAR RIGHT SAPHENOFEMORAL JUNCTION COMPRESS: NORMAL
BH CV VAS POP FLUID COLLECTED: 1
BILIRUB SERPL-MCNC: 0.3 MG/DL (ref 0–1.2)
BILIRUB UR QL STRIP: NEGATIVE
BUN SERPL-MCNC: 17.5 MG/DL (ref 8–23)
BUN/CREAT SERPL: 12.3 (ref 7–25)
CA-I SERPL ISE-MCNC: 1.07 MMOL/L (ref 1.15–1.3)
CALCIUM SPEC-SCNC: 7.9 MG/DL (ref 8.6–10.5)
CHLORIDE SERPL-SCNC: 106 MMOL/L (ref 98–107)
CK SERPL-CCNC: 93 U/L (ref 20–200)
CLARITY UR: ABNORMAL
CO2 SERPL-SCNC: 26.3 MMOL/L (ref 22–29)
COLOR UR: YELLOW
CREAT SERPL-MCNC: 1.42 MG/DL (ref 0.76–1.27)
DEPRECATED RDW RBC AUTO: 54.1 FL (ref 37–54)
EGFRCR SERPLBLD CKD-EPI 2021: 51.5 ML/MIN/1.73
EOSINOPHIL # BLD AUTO: 0.02 10*3/MM3 (ref 0–0.4)
EOSINOPHIL NFR BLD AUTO: 0.1 % (ref 0.3–6.2)
ERYTHROCYTE [DISTWIDTH] IN BLOOD BY AUTOMATED COUNT: 16.7 % (ref 12.3–15.4)
GLOBULIN UR ELPH-MCNC: 1.2 GM/DL
GLUCOSE BLDC GLUCOMTR-MCNC: 114 MG/DL (ref 70–105)
GLUCOSE BLDC GLUCOMTR-MCNC: 127 MG/DL (ref 70–105)
GLUCOSE BLDC GLUCOMTR-MCNC: 132 MG/DL (ref 70–105)
GLUCOSE BLDC GLUCOMTR-MCNC: 54 MG/DL (ref 70–105)
GLUCOSE BLDC GLUCOMTR-MCNC: 64 MG/DL (ref 70–105)
GLUCOSE BLDC GLUCOMTR-MCNC: 77 MG/DL (ref 70–105)
GLUCOSE SERPL-MCNC: 82 MG/DL (ref 65–99)
GLUCOSE UR STRIP-MCNC: NEGATIVE MG/DL
HCT VFR BLD AUTO: 22 % (ref 37.5–51)
HCT VFR BLD AUTO: 27.7 % (ref 37.5–51)
HGB BLD-MCNC: 7.3 G/DL (ref 13–17.7)
HGB BLD-MCNC: 9 G/DL (ref 13–17.7)
HGB UR QL STRIP.AUTO: ABNORMAL
HYALINE CASTS UR QL AUTO: ABNORMAL /LPF
IMM GRANULOCYTES # BLD AUTO: 1.55 10*3/MM3 (ref 0–0.05)
IMM GRANULOCYTES NFR BLD AUTO: 10 % (ref 0–0.5)
KETONES UR QL STRIP: NEGATIVE
LEUKOCYTE ESTERASE UR QL STRIP.AUTO: ABNORMAL
LYMPHOCYTES # BLD AUTO: 0.94 10*3/MM3 (ref 0.7–3.1)
LYMPHOCYTES NFR BLD AUTO: 6.1 % (ref 19.6–45.3)
Lab: ABNORMAL
MAGNESIUM SERPL-MCNC: 1.8 MG/DL (ref 1.6–2.4)
MCH RBC QN AUTO: 30 PG (ref 26.6–33)
MCHC RBC AUTO-ENTMCNC: 33.2 G/DL (ref 31.5–35.7)
MCV RBC AUTO: 90.5 FL (ref 79–97)
MONOCYTES # BLD AUTO: 0.63 10*3/MM3 (ref 0.1–0.9)
MONOCYTES NFR BLD AUTO: 4.1 % (ref 5–12)
NEUTROPHILS NFR BLD AUTO: 12.34 10*3/MM3 (ref 1.7–7)
NEUTROPHILS NFR BLD AUTO: 79.6 % (ref 42.7–76)
NITRITE UR QL STRIP: NEGATIVE
NRBC BLD AUTO-RTO: 0 /100 WBC (ref 0–0.2)
PH UR STRIP.AUTO: 6.5 [PH] (ref 5–8)
PHOSPHATE SERPL-MCNC: 2.6 MG/DL (ref 2.5–4.5)
PLATELET # BLD AUTO: 158 10*3/MM3 (ref 140–450)
PMV BLD AUTO: 10.2 FL (ref 6–12)
POTASSIUM SERPL-SCNC: 4.6 MMOL/L (ref 3.5–5.2)
PROT SERPL-MCNC: 4 G/DL (ref 6–8.5)
PROT UR QL STRIP: ABNORMAL
QT INTERVAL: 409 MS
QTC INTERVAL: 455 MS
RBC # BLD AUTO: 2.43 10*6/MM3 (ref 4.14–5.8)
RBC # UR STRIP: ABNORMAL /HPF
REF LAB TEST METHOD: ABNORMAL
SODIUM SERPL-SCNC: 139 MMOL/L (ref 136–145)
SP GR UR STRIP: 1.01 (ref 1–1.03)
SQUAMOUS #/AREA URNS HPF: ABNORMAL /HPF
UROBILINOGEN UR QL STRIP: ABNORMAL
WBC # UR STRIP: ABNORMAL /HPF
WBC NRBC COR # BLD AUTO: 15.5 10*3/MM3 (ref 3.4–10.8)

## 2025-08-06 PROCEDURE — 80053 COMPREHEN METABOLIC PANEL: CPT | Performed by: NURSE PRACTITIONER

## 2025-08-06 PROCEDURE — 85014 HEMATOCRIT: CPT | Performed by: NURSE PRACTITIONER

## 2025-08-06 PROCEDURE — 85018 HEMOGLOBIN: CPT | Performed by: NURSE PRACTITIONER

## 2025-08-06 PROCEDURE — 25010000002 MAGNESIUM SULFATE 4 GM/100ML SOLUTION: Performed by: INTERNAL MEDICINE

## 2025-08-06 PROCEDURE — 99232 SBSQ HOSP IP/OBS MODERATE 35: CPT | Performed by: INTERNAL MEDICINE

## 2025-08-06 PROCEDURE — 84100 ASSAY OF PHOSPHORUS: CPT | Performed by: NURSE PRACTITIONER

## 2025-08-06 PROCEDURE — 93971 EXTREMITY STUDY: CPT | Performed by: SURGERY

## 2025-08-06 PROCEDURE — 93010 ELECTROCARDIOGRAM REPORT: CPT | Performed by: INTERNAL MEDICINE

## 2025-08-06 PROCEDURE — 25010000002 CALCIUM GLUCONATE 2-0.675 GM/100ML-% SOLUTION: Performed by: INTERNAL MEDICINE

## 2025-08-06 PROCEDURE — 81001 URINALYSIS AUTO W/SCOPE: CPT | Performed by: NURSE PRACTITIONER

## 2025-08-06 PROCEDURE — 94799 UNLISTED PULMONARY SVC/PX: CPT

## 2025-08-06 PROCEDURE — 83735 ASSAY OF MAGNESIUM: CPT | Performed by: NURSE PRACTITIONER

## 2025-08-06 PROCEDURE — 94761 N-INVAS EAR/PLS OXIMETRY MLT: CPT

## 2025-08-06 PROCEDURE — 86900 BLOOD TYPING SEROLOGIC ABO: CPT

## 2025-08-06 PROCEDURE — 63710000001 DIPHENHYDRAMINE PER 50 MG: Performed by: INTERNAL MEDICINE

## 2025-08-06 PROCEDURE — 82330 ASSAY OF CALCIUM: CPT | Performed by: INTERNAL MEDICINE

## 2025-08-06 PROCEDURE — 99232 SBSQ HOSP IP/OBS MODERATE 35: CPT | Performed by: NURSE PRACTITIONER

## 2025-08-06 PROCEDURE — 87086 URINE CULTURE/COLONY COUNT: CPT | Performed by: NURSE PRACTITIONER

## 2025-08-06 PROCEDURE — 73701 CT LOWER EXTREMITY W/DYE: CPT

## 2025-08-06 PROCEDURE — 93005 ELECTROCARDIOGRAM TRACING: CPT | Performed by: NURSE PRACTITIONER

## 2025-08-06 PROCEDURE — 86923 COMPATIBILITY TEST ELECTRIC: CPT

## 2025-08-06 PROCEDURE — 36430 TRANSFUSION BLD/BLD COMPNT: CPT

## 2025-08-06 PROCEDURE — P9016 RBC LEUKOCYTES REDUCED: HCPCS

## 2025-08-06 PROCEDURE — 82550 ASSAY OF CK (CPK): CPT | Performed by: NURSE PRACTITIONER

## 2025-08-06 PROCEDURE — 25510000001 IOPAMIDOL PER 1 ML: Performed by: INTERNAL MEDICINE

## 2025-08-06 PROCEDURE — 94664 DEMO&/EVAL PT USE INHALER: CPT

## 2025-08-06 PROCEDURE — 25010000002 FUROSEMIDE PER 20 MG: Performed by: INTERNAL MEDICINE

## 2025-08-06 PROCEDURE — 82948 REAGENT STRIP/BLOOD GLUCOSE: CPT

## 2025-08-06 PROCEDURE — 25010000002 ENOXAPARIN PER 10 MG: Performed by: INTERNAL MEDICINE

## 2025-08-06 PROCEDURE — 82948 REAGENT STRIP/BLOOD GLUCOSE: CPT | Performed by: INTERNAL MEDICINE

## 2025-08-06 PROCEDURE — 93971 EXTREMITY STUDY: CPT

## 2025-08-06 PROCEDURE — 85025 COMPLETE CBC W/AUTO DIFF WBC: CPT | Performed by: NURSE PRACTITIONER

## 2025-08-06 RX ORDER — ACETAMINOPHEN 650 MG/1
650 SUPPOSITORY RECTAL ONCE
Status: COMPLETED | OUTPATIENT
Start: 2025-08-06 | End: 2025-08-06

## 2025-08-06 RX ORDER — DIPHENHYDRAMINE HCL 25 MG
25 CAPSULE ORAL ONCE
Status: COMPLETED | OUTPATIENT
Start: 2025-08-06 | End: 2025-08-06

## 2025-08-06 RX ORDER — DIPHENHYDRAMINE HYDROCHLORIDE 50 MG/ML
12.5 INJECTION, SOLUTION INTRAMUSCULAR; INTRAVENOUS ONCE
Status: COMPLETED | OUTPATIENT
Start: 2025-08-06 | End: 2025-08-06

## 2025-08-06 RX ORDER — ACETAMINOPHEN 325 MG/1
650 TABLET ORAL ONCE
Status: COMPLETED | OUTPATIENT
Start: 2025-08-06 | End: 2025-08-06

## 2025-08-06 RX ORDER — MAGNESIUM SULFATE HEPTAHYDRATE 40 MG/ML
4 INJECTION, SOLUTION INTRAVENOUS ONCE
Status: COMPLETED | OUTPATIENT
Start: 2025-08-06 | End: 2025-08-06

## 2025-08-06 RX ORDER — ACETAMINOPHEN 160 MG/5ML
650 SOLUTION ORAL ONCE
Status: COMPLETED | OUTPATIENT
Start: 2025-08-06 | End: 2025-08-06

## 2025-08-06 RX ORDER — OXYCODONE HYDROCHLORIDE 5 MG/1
5 TABLET ORAL EVERY 4 HOURS PRN
Refills: 0 | Status: DISCONTINUED | OUTPATIENT
Start: 2025-08-06 | End: 2025-08-11 | Stop reason: HOSPADM

## 2025-08-06 RX ORDER — FUROSEMIDE 10 MG/ML
20 INJECTION INTRAMUSCULAR; INTRAVENOUS ONCE
Status: COMPLETED | OUTPATIENT
Start: 2025-08-06 | End: 2025-08-06

## 2025-08-06 RX ORDER — CALCIUM GLUCONATE 20 MG/ML
2000 INJECTION, SOLUTION INTRAVENOUS EVERY 12 HOURS
Status: COMPLETED | OUTPATIENT
Start: 2025-08-06 | End: 2025-08-06

## 2025-08-06 RX ORDER — IOPAMIDOL 755 MG/ML
100 INJECTION, SOLUTION INTRAVASCULAR
Status: COMPLETED | OUTPATIENT
Start: 2025-08-06 | End: 2025-08-06

## 2025-08-06 RX ADMIN — ACETAMINOPHEN 650 MG: 325 TABLET, FILM COATED ORAL at 19:37

## 2025-08-06 RX ADMIN — MAGNESIUM SULFATE IN WATER FOR 4 G: 40 INJECTION INTRAVENOUS at 08:55

## 2025-08-06 RX ADMIN — Medication 10 ML: at 19:41

## 2025-08-06 RX ADMIN — SODIUM BICARBONATE 650 MG: 650 TABLET ORAL at 08:50

## 2025-08-06 RX ADMIN — BUMETANIDE 1 MG: 1 TABLET ORAL at 08:51

## 2025-08-06 RX ADMIN — Medication 5 MG: at 19:40

## 2025-08-06 RX ADMIN — ALBUTEROL SULFATE 2 PUFF: 108 AEROSOL, METERED RESPIRATORY (INHALATION) at 20:34

## 2025-08-06 RX ADMIN — OXYCODONE 5 MG: 5 TABLET ORAL at 10:44

## 2025-08-06 RX ADMIN — VANCOMYCIN HYDROCHLORIDE 500 MG: 250 POWDER, FOR SOLUTION ORAL at 17:08

## 2025-08-06 RX ADMIN — AMIODARONE HYDROCHLORIDE 200 MG: 200 TABLET ORAL at 08:50

## 2025-08-06 RX ADMIN — METOPROLOL TARTRATE 25 MG: 25 TABLET, FILM COATED ORAL at 19:40

## 2025-08-06 RX ADMIN — HYDROCORTISONE 10 MG: 10 TABLET ORAL at 19:38

## 2025-08-06 RX ADMIN — ACETAMINOPHEN 650 MG: 325 TABLET ORAL at 09:41

## 2025-08-06 RX ADMIN — AMIODARONE HYDROCHLORIDE 200 MG: 200 TABLET ORAL at 19:40

## 2025-08-06 RX ADMIN — VANCOMYCIN HYDROCHLORIDE 500 MG: 250 POWDER, FOR SOLUTION ORAL at 05:23

## 2025-08-06 RX ADMIN — GUAIFENESIN 600 MG: 600 TABLET, EXTENDED RELEASE ORAL at 09:04

## 2025-08-06 RX ADMIN — ENOXAPARIN SODIUM 90 MG: 100 INJECTION SUBCUTANEOUS at 10:44

## 2025-08-06 RX ADMIN — ALLOPURINOL 100 MG: 100 TABLET ORAL at 08:50

## 2025-08-06 RX ADMIN — POTASSIUM CHLORIDE 20 MEQ: 1500 TABLET, EXTENDED RELEASE ORAL at 17:08

## 2025-08-06 RX ADMIN — OXYCODONE 5 MG: 5 TABLET ORAL at 19:38

## 2025-08-06 RX ADMIN — Medication 250 MG: at 19:40

## 2025-08-06 RX ADMIN — Medication 10 ML: at 09:11

## 2025-08-06 RX ADMIN — ALBUTEROL SULFATE 2 PUFF: 108 AEROSOL, METERED RESPIRATORY (INHALATION) at 12:04

## 2025-08-06 RX ADMIN — ALBUTEROL SULFATE 2 PUFF: 108 AEROSOL, METERED RESPIRATORY (INHALATION) at 07:56

## 2025-08-06 RX ADMIN — CALCIUM GLUCONATE 2000 MG: 20 INJECTION, SOLUTION INTRAVENOUS at 19:42

## 2025-08-06 RX ADMIN — ENOXAPARIN SODIUM 90 MG: 100 INJECTION SUBCUTANEOUS at 00:38

## 2025-08-06 RX ADMIN — METOPROLOL TARTRATE 25 MG: 25 TABLET, FILM COATED ORAL at 08:51

## 2025-08-06 RX ADMIN — SODIUM BICARBONATE 650 MG: 650 TABLET ORAL at 19:40

## 2025-08-06 RX ADMIN — IOPAMIDOL 100 ML: 755 INJECTION, SOLUTION INTRAVENOUS at 17:08

## 2025-08-06 RX ADMIN — GUAIFENESIN 600 MG: 600 TABLET, EXTENDED RELEASE ORAL at 19:41

## 2025-08-06 RX ADMIN — FUROSEMIDE 20 MG: 10 INJECTION, SOLUTION INTRAMUSCULAR; INTRAVENOUS at 13:25

## 2025-08-06 RX ADMIN — SODIUM BICARBONATE 650 MG: 650 TABLET ORAL at 17:08

## 2025-08-06 RX ADMIN — BUDESONIDE AND FORMOTEROL FUMARATE DIHYDRATE 2 PUFF: 160; 4.5 AEROSOL RESPIRATORY (INHALATION) at 20:36

## 2025-08-06 RX ADMIN — CALCIUM GLUCONATE 2000 MG: 20 INJECTION, SOLUTION INTRAVENOUS at 08:55

## 2025-08-06 RX ADMIN — HYDROXYZINE HYDROCHLORIDE 50 MG: 25 TABLET, FILM COATED ORAL at 19:40

## 2025-08-06 RX ADMIN — DIPHENHYDRAMINE HYDROCHLORIDE 25 MG: 25 CAPSULE ORAL at 09:40

## 2025-08-06 RX ADMIN — LEVOTHYROXINE SODIUM 137 MCG: 0.03 TABLET ORAL at 05:23

## 2025-08-06 RX ADMIN — HYDROCORTISONE 10 MG: 10 TABLET ORAL at 08:50

## 2025-08-06 RX ADMIN — FAMOTIDINE 20 MG: 20 TABLET, FILM COATED ORAL at 08:51

## 2025-08-06 RX ADMIN — BUDESONIDE AND FORMOTEROL FUMARATE DIHYDRATE 2 PUFF: 160; 4.5 AEROSOL RESPIRATORY (INHALATION) at 07:59

## 2025-08-06 RX ADMIN — VANCOMYCIN HYDROCHLORIDE 500 MG: 250 POWDER, FOR SOLUTION ORAL at 00:39

## 2025-08-06 RX ADMIN — ALBUTEROL SULFATE 2 PUFF: 108 AEROSOL, METERED RESPIRATORY (INHALATION) at 15:40

## 2025-08-06 RX ADMIN — VANCOMYCIN HYDROCHLORIDE 500 MG: 250 POWDER, FOR SOLUTION ORAL at 13:22

## 2025-08-06 RX ADMIN — POTASSIUM CHLORIDE 20 MEQ: 1500 TABLET, EXTENDED RELEASE ORAL at 08:50

## 2025-08-06 RX ADMIN — Medication 250 MG: at 08:50

## 2025-08-07 PROBLEM — S80.11XA LEG HEMATOMA, RIGHT, INITIAL ENCOUNTER: Status: ACTIVE | Noted: 2025-08-07

## 2025-08-07 LAB
ALBUMIN SERPL-MCNC: 2.7 G/DL (ref 3.5–5.2)
ALBUMIN/GLOB SERPL: 2.1 G/DL
ALP SERPL-CCNC: 121 U/L (ref 39–117)
ALT SERPL W P-5'-P-CCNC: 8 U/L (ref 1–41)
ANION GAP SERPL CALCULATED.3IONS-SCNC: 6.7 MMOL/L (ref 5–15)
APTT PPP: 46.7 SECONDS (ref 22.7–35.4)
AST SERPL-CCNC: 25 U/L (ref 1–40)
BACTERIA SPEC AEROBE CULT: NO GROWTH
BASOPHILS # BLD AUTO: 0.04 10*3/MM3 (ref 0–0.2)
BASOPHILS NFR BLD AUTO: 0.2 % (ref 0–1.5)
BH BB BLOOD EXPIRATION DATE: NORMAL
BH BB BLOOD TYPE BARCODE: 5100
BH BB DISPENSE STATUS: NORMAL
BH BB PRODUCT CODE: NORMAL
BH BB UNIT NUMBER: NORMAL
BILIRUB SERPL-MCNC: 0.3 MG/DL (ref 0–1.2)
BUN SERPL-MCNC: 17.5 MG/DL (ref 8–23)
BUN/CREAT SERPL: 13 (ref 7–25)
CA-I SERPL ISE-MCNC: 1.11 MMOL/L (ref 1.15–1.3)
CALCIUM SPEC-SCNC: 7.9 MG/DL (ref 8.6–10.5)
CHLORIDE SERPL-SCNC: 103 MMOL/L (ref 98–107)
CO2 SERPL-SCNC: 25.3 MMOL/L (ref 22–29)
CREAT SERPL-MCNC: 1.35 MG/DL (ref 0.76–1.27)
CROSSMATCH INTERPRETATION: NORMAL
DEPRECATED RDW RBC AUTO: 57.7 FL (ref 37–54)
EGFRCR SERPLBLD CKD-EPI 2021: 54.8 ML/MIN/1.73
EOSINOPHIL # BLD AUTO: 0.02 10*3/MM3 (ref 0–0.4)
EOSINOPHIL NFR BLD AUTO: 0.1 % (ref 0.3–6.2)
ERYTHROCYTE [DISTWIDTH] IN BLOOD BY AUTOMATED COUNT: 18.3 % (ref 12.3–15.4)
GLOBULIN UR ELPH-MCNC: 1.3 GM/DL
GLUCOSE BLDC GLUCOMTR-MCNC: 108 MG/DL (ref 70–105)
GLUCOSE BLDC GLUCOMTR-MCNC: 133 MG/DL (ref 70–105)
GLUCOSE BLDC GLUCOMTR-MCNC: 134 MG/DL (ref 70–105)
GLUCOSE BLDC GLUCOMTR-MCNC: 90 MG/DL (ref 70–105)
GLUCOSE SERPL-MCNC: 116 MG/DL (ref 65–99)
HCT VFR BLD AUTO: 23.9 % (ref 37.5–51)
HCT VFR BLD AUTO: 24.3 % (ref 37.5–51)
HGB BLD-MCNC: 7.6 G/DL (ref 13–17.7)
HGB BLD-MCNC: 7.8 G/DL (ref 13–17.7)
IMM GRANULOCYTES # BLD AUTO: 1.43 10*3/MM3 (ref 0–0.05)
IMM GRANULOCYTES NFR BLD AUTO: 7.9 % (ref 0–0.5)
INR PPP: 1.16 (ref 0.9–1.1)
LYMPHOCYTES # BLD AUTO: 0.88 10*3/MM3 (ref 0.7–3.1)
LYMPHOCYTES NFR BLD AUTO: 4.8 % (ref 19.6–45.3)
MAGNESIUM SERPL-MCNC: 2.2 MG/DL (ref 1.6–2.4)
MCH RBC QN AUTO: 28.8 PG (ref 26.6–33)
MCHC RBC AUTO-ENTMCNC: 32.6 G/DL (ref 31.5–35.7)
MCV RBC AUTO: 88.2 FL (ref 79–97)
MONOCYTES # BLD AUTO: 0.8 10*3/MM3 (ref 0.1–0.9)
MONOCYTES NFR BLD AUTO: 4.4 % (ref 5–12)
NEUTROPHILS NFR BLD AUTO: 15.02 10*3/MM3 (ref 1.7–7)
NEUTROPHILS NFR BLD AUTO: 82.6 % (ref 42.7–76)
NRBC BLD AUTO-RTO: 0 /100 WBC (ref 0–0.2)
PHOSPHATE SERPL-MCNC: 3.3 MG/DL (ref 2.5–4.5)
PLATELET # BLD AUTO: 178 10*3/MM3 (ref 140–450)
PMV BLD AUTO: 10.5 FL (ref 6–12)
POTASSIUM SERPL-SCNC: 5.2 MMOL/L (ref 3.5–5.2)
PROT SERPL-MCNC: 4 G/DL (ref 6–8.5)
PROTHROMBIN TIME: 14.7 SECONDS (ref 11.7–14.2)
RBC # BLD AUTO: 2.71 10*6/MM3 (ref 4.14–5.8)
SODIUM SERPL-SCNC: 135 MMOL/L (ref 136–145)
UNIT  ABO: NORMAL
UNIT  RH: NORMAL
WBC NRBC COR # BLD AUTO: 18.19 10*3/MM3 (ref 3.4–10.8)
WHOLE BLOOD HOLD COAG: NORMAL

## 2025-08-07 PROCEDURE — 25010000002 BUMETANIDE PER 0.5 MG: Performed by: NURSE PRACTITIONER

## 2025-08-07 PROCEDURE — 84100 ASSAY OF PHOSPHORUS: CPT | Performed by: NURSE PRACTITIONER

## 2025-08-07 PROCEDURE — 82948 REAGENT STRIP/BLOOD GLUCOSE: CPT

## 2025-08-07 PROCEDURE — 82330 ASSAY OF CALCIUM: CPT | Performed by: INTERNAL MEDICINE

## 2025-08-07 PROCEDURE — P9047 ALBUMIN (HUMAN), 25%, 50ML: HCPCS | Performed by: INTERNAL MEDICINE

## 2025-08-07 PROCEDURE — 25010000002 ALBUMIN HUMAN 25% PER 50 ML: Performed by: INTERNAL MEDICINE

## 2025-08-07 PROCEDURE — 85018 HEMOGLOBIN: CPT | Performed by: NURSE PRACTITIONER

## 2025-08-07 PROCEDURE — 83735 ASSAY OF MAGNESIUM: CPT | Performed by: NURSE PRACTITIONER

## 2025-08-07 PROCEDURE — 94664 DEMO&/EVAL PT USE INHALER: CPT

## 2025-08-07 PROCEDURE — 25010000002 CALCIUM GLUCONATE 2-0.675 GM/100ML-% SOLUTION: Performed by: INTERNAL MEDICINE

## 2025-08-07 PROCEDURE — 99232 SBSQ HOSP IP/OBS MODERATE 35: CPT | Performed by: INTERNAL MEDICINE

## 2025-08-07 PROCEDURE — 85730 THROMBOPLASTIN TIME PARTIAL: CPT

## 2025-08-07 PROCEDURE — 85025 COMPLETE CBC W/AUTO DIFF WBC: CPT | Performed by: NURSE PRACTITIONER

## 2025-08-07 PROCEDURE — 99233 SBSQ HOSP IP/OBS HIGH 50: CPT | Performed by: INTERNAL MEDICINE

## 2025-08-07 PROCEDURE — 97530 THERAPEUTIC ACTIVITIES: CPT

## 2025-08-07 PROCEDURE — 94761 N-INVAS EAR/PLS OXIMETRY MLT: CPT

## 2025-08-07 PROCEDURE — 97116 GAIT TRAINING THERAPY: CPT

## 2025-08-07 PROCEDURE — 82948 REAGENT STRIP/BLOOD GLUCOSE: CPT | Performed by: INTERNAL MEDICINE

## 2025-08-07 PROCEDURE — 80053 COMPREHEN METABOLIC PANEL: CPT | Performed by: NURSE PRACTITIONER

## 2025-08-07 PROCEDURE — 94799 UNLISTED PULMONARY SVC/PX: CPT

## 2025-08-07 PROCEDURE — 85610 PROTHROMBIN TIME: CPT

## 2025-08-07 PROCEDURE — 85014 HEMATOCRIT: CPT | Performed by: NURSE PRACTITIONER

## 2025-08-07 RX ORDER — BUMETANIDE 0.25 MG/ML
1 INJECTION, SOLUTION INTRAMUSCULAR; INTRAVENOUS EVERY 12 HOURS
Status: DISCONTINUED | OUTPATIENT
Start: 2025-08-08 | End: 2025-08-08

## 2025-08-07 RX ORDER — METOLAZONE 2.5 MG/1
2.5 TABLET ORAL ONCE
Status: DISCONTINUED | OUTPATIENT
Start: 2025-08-07 | End: 2025-08-07

## 2025-08-07 RX ORDER — BUMETANIDE 0.25 MG/ML
2 INJECTION, SOLUTION INTRAMUSCULAR; INTRAVENOUS ONCE
Status: COMPLETED | OUTPATIENT
Start: 2025-08-07 | End: 2025-08-07

## 2025-08-07 RX ORDER — ALBUMIN (HUMAN) 12.5 G/50ML
25 SOLUTION INTRAVENOUS EVERY 8 HOURS
Status: COMPLETED | OUTPATIENT
Start: 2025-08-07 | End: 2025-08-08

## 2025-08-07 RX ORDER — BUMETANIDE 0.25 MG/ML
1 INJECTION, SOLUTION INTRAMUSCULAR; INTRAVENOUS EVERY 12 HOURS
Status: DISCONTINUED | OUTPATIENT
Start: 2025-08-07 | End: 2025-08-07

## 2025-08-07 RX ORDER — CALCIUM GLUCONATE 20 MG/ML
2000 INJECTION, SOLUTION INTRAVENOUS EVERY 12 HOURS
Status: COMPLETED | OUTPATIENT
Start: 2025-08-07 | End: 2025-08-07

## 2025-08-07 RX ORDER — METOLAZONE 2.5 MG/1
2.5 TABLET ORAL ONCE
Status: COMPLETED | OUTPATIENT
Start: 2025-08-07 | End: 2025-08-07

## 2025-08-07 RX ADMIN — POTASSIUM CHLORIDE 20 MEQ: 1500 TABLET, EXTENDED RELEASE ORAL at 17:14

## 2025-08-07 RX ADMIN — OXYCODONE 5 MG: 5 TABLET ORAL at 06:05

## 2025-08-07 RX ADMIN — BUMETANIDE 2 MG: 0.25 INJECTION INTRAMUSCULAR; INTRAVENOUS at 14:06

## 2025-08-07 RX ADMIN — METOPROLOL TARTRATE 25 MG: 25 TABLET, FILM COATED ORAL at 09:12

## 2025-08-07 RX ADMIN — VANCOMYCIN HYDROCHLORIDE 500 MG: 250 POWDER, FOR SOLUTION ORAL at 00:32

## 2025-08-07 RX ADMIN — ALBUTEROL SULFATE 2 PUFF: 108 AEROSOL, METERED RESPIRATORY (INHALATION) at 19:01

## 2025-08-07 RX ADMIN — GUAIFENESIN 600 MG: 600 TABLET, EXTENDED RELEASE ORAL at 21:55

## 2025-08-07 RX ADMIN — CALCIUM GLUCONATE 2000 MG: 20 INJECTION, SOLUTION INTRAVENOUS at 09:11

## 2025-08-07 RX ADMIN — OXYCODONE 5 MG: 5 TABLET ORAL at 21:56

## 2025-08-07 RX ADMIN — Medication 10 ML: at 09:11

## 2025-08-07 RX ADMIN — ACETAMINOPHEN 650 MG: 325 TABLET, FILM COATED ORAL at 00:33

## 2025-08-07 RX ADMIN — ACETAMINOPHEN 650 MG: 325 TABLET, FILM COATED ORAL at 21:55

## 2025-08-07 RX ADMIN — ALBUTEROL SULFATE 2 PUFF: 108 AEROSOL, METERED RESPIRATORY (INHALATION) at 08:12

## 2025-08-07 RX ADMIN — AMIODARONE HYDROCHLORIDE 200 MG: 200 TABLET ORAL at 21:56

## 2025-08-07 RX ADMIN — Medication 10 ML: at 21:57

## 2025-08-07 RX ADMIN — AMIODARONE HYDROCHLORIDE 200 MG: 200 TABLET ORAL at 09:12

## 2025-08-07 RX ADMIN — BUMETANIDE 1 MG: 1 TABLET ORAL at 09:12

## 2025-08-07 RX ADMIN — FAMOTIDINE 20 MG: 20 TABLET, FILM COATED ORAL at 09:12

## 2025-08-07 RX ADMIN — HYDROCORTISONE 10 MG: 10 TABLET ORAL at 09:12

## 2025-08-07 RX ADMIN — SODIUM BICARBONATE 650 MG: 650 TABLET ORAL at 17:14

## 2025-08-07 RX ADMIN — BUDESONIDE AND FORMOTEROL FUMARATE DIHYDRATE 2 PUFF: 160; 4.5 AEROSOL RESPIRATORY (INHALATION) at 08:15

## 2025-08-07 RX ADMIN — ACETAMINOPHEN 650 MG: 325 TABLET, FILM COATED ORAL at 06:05

## 2025-08-07 RX ADMIN — ALLOPURINOL 100 MG: 100 TABLET ORAL at 09:12

## 2025-08-07 RX ADMIN — HYDROXYZINE HYDROCHLORIDE 50 MG: 25 TABLET, FILM COATED ORAL at 21:55

## 2025-08-07 RX ADMIN — SODIUM BICARBONATE 650 MG: 650 TABLET ORAL at 21:56

## 2025-08-07 RX ADMIN — ALBUTEROL SULFATE 2 PUFF: 108 AEROSOL, METERED RESPIRATORY (INHALATION) at 15:55

## 2025-08-07 RX ADMIN — ALBUMIN (HUMAN) 25 G: 0.25 INJECTION, SOLUTION INTRAVENOUS at 09:11

## 2025-08-07 RX ADMIN — Medication 250 MG: at 21:55

## 2025-08-07 RX ADMIN — POTASSIUM CHLORIDE 20 MEQ: 1500 TABLET, EXTENDED RELEASE ORAL at 11:58

## 2025-08-07 RX ADMIN — VANCOMYCIN HYDROCHLORIDE 500 MG: 250 POWDER, FOR SOLUTION ORAL at 11:58

## 2025-08-07 RX ADMIN — BUDESONIDE AND FORMOTEROL FUMARATE DIHYDRATE 2 PUFF: 160; 4.5 AEROSOL RESPIRATORY (INHALATION) at 19:01

## 2025-08-07 RX ADMIN — LEVOTHYROXINE SODIUM 137 MCG: 0.03 TABLET ORAL at 05:40

## 2025-08-07 RX ADMIN — Medication 5 MG: at 21:55

## 2025-08-07 RX ADMIN — METOPROLOL TARTRATE 25 MG: 25 TABLET, FILM COATED ORAL at 22:01

## 2025-08-07 RX ADMIN — METOLAZONE 2.5 MG: 2.5 TABLET ORAL at 17:14

## 2025-08-07 RX ADMIN — Medication 250 MG: at 09:12

## 2025-08-07 RX ADMIN — OXYCODONE 5 MG: 5 TABLET ORAL at 11:58

## 2025-08-07 RX ADMIN — GUAIFENESIN 600 MG: 600 TABLET, EXTENDED RELEASE ORAL at 09:12

## 2025-08-07 RX ADMIN — CALCIUM GLUCONATE 2000 MG: 20 INJECTION, SOLUTION INTRAVENOUS at 21:55

## 2025-08-07 RX ADMIN — VANCOMYCIN HYDROCHLORIDE 500 MG: 250 POWDER, FOR SOLUTION ORAL at 05:40

## 2025-08-07 RX ADMIN — SODIUM BICARBONATE 650 MG: 650 TABLET ORAL at 09:12

## 2025-08-07 RX ADMIN — HYDROCORTISONE 10 MG: 10 TABLET ORAL at 21:55

## 2025-08-07 RX ADMIN — ALBUTEROL SULFATE 2 PUFF: 108 AEROSOL, METERED RESPIRATORY (INHALATION) at 11:40

## 2025-08-07 RX ADMIN — POTASSIUM CHLORIDE 20 MEQ: 1500 TABLET, EXTENDED RELEASE ORAL at 09:12

## 2025-08-07 RX ADMIN — VANCOMYCIN HYDROCHLORIDE 500 MG: 250 POWDER, FOR SOLUTION ORAL at 17:14

## 2025-08-07 RX ADMIN — ALBUMIN (HUMAN) 25 G: 0.25 INJECTION, SOLUTION INTRAVENOUS at 17:15

## 2025-08-08 LAB
ABO GROUP BLD: NORMAL
ALBUMIN SERPL-MCNC: 3.1 G/DL (ref 3.5–5.2)
ALBUMIN/GLOB SERPL: 2.2 G/DL
ALP SERPL-CCNC: 115 U/L (ref 39–117)
ALT SERPL W P-5'-P-CCNC: 7 U/L (ref 1–41)
ANION GAP SERPL CALCULATED.3IONS-SCNC: 7.4 MMOL/L (ref 5–15)
AST SERPL-CCNC: 24 U/L (ref 1–40)
BASOPHILS # BLD AUTO: 0.03 10*3/MM3 (ref 0–0.2)
BASOPHILS NFR BLD AUTO: 0.2 % (ref 0–1.5)
BILIRUB SERPL-MCNC: 0.3 MG/DL (ref 0–1.2)
BLD GP AB SCN SERPL QL: NEGATIVE
BUN SERPL-MCNC: 18.7 MG/DL (ref 8–23)
BUN/CREAT SERPL: 12.6 (ref 7–25)
CA-I SERPL ISE-MCNC: 1.13 MMOL/L (ref 1.15–1.3)
CALCIUM SPEC-SCNC: 8.4 MG/DL (ref 8.6–10.5)
CHLORIDE SERPL-SCNC: 101 MMOL/L (ref 98–107)
CO2 SERPL-SCNC: 25.6 MMOL/L (ref 22–29)
CREAT SERPL-MCNC: 1.49 MG/DL (ref 0.76–1.27)
DEPRECATED RDW RBC AUTO: 59 FL (ref 37–54)
EGFRCR SERPLBLD CKD-EPI 2021: 48.6 ML/MIN/1.73
EOSINOPHIL # BLD AUTO: 0.01 10*3/MM3 (ref 0–0.4)
EOSINOPHIL NFR BLD AUTO: 0.1 % (ref 0.3–6.2)
ERYTHROCYTE [DISTWIDTH] IN BLOOD BY AUTOMATED COUNT: 18.3 % (ref 12.3–15.4)
FIBRINOGEN PPP-MCNC: 356 MG/DL (ref 219–464)
GLOBULIN UR ELPH-MCNC: 1.4 GM/DL
GLUCOSE BLDC GLUCOMTR-MCNC: 105 MG/DL (ref 70–105)
GLUCOSE BLDC GLUCOMTR-MCNC: 129 MG/DL (ref 70–105)
GLUCOSE BLDC GLUCOMTR-MCNC: 131 MG/DL (ref 70–105)
GLUCOSE BLDC GLUCOMTR-MCNC: 78 MG/DL (ref 70–105)
GLUCOSE SERPL-MCNC: 114 MG/DL (ref 65–99)
HCT VFR BLD AUTO: 18.7 % (ref 37.5–51)
HCT VFR BLD AUTO: 22.2 % (ref 37.5–51)
HCT VFR BLD AUTO: 24.9 % (ref 37.5–51)
HCT VFR BLD AUTO: 27.1 % (ref 37.5–51)
HGB BLD-MCNC: 6 G/DL (ref 13–17.7)
HGB BLD-MCNC: 7.1 G/DL (ref 13–17.7)
HGB BLD-MCNC: 8 G/DL (ref 13–17.7)
HGB BLD-MCNC: 9 G/DL (ref 13–17.7)
IMM GRANULOCYTES # BLD AUTO: 0.8 10*3/MM3 (ref 0–0.05)
IMM GRANULOCYTES NFR BLD AUTO: 4.6 % (ref 0–0.5)
INR PPP: 1.17 (ref 0.9–1.1)
LYMPHOCYTES # BLD AUTO: 0.82 10*3/MM3 (ref 0.7–3.1)
LYMPHOCYTES NFR BLD AUTO: 4.7 % (ref 19.6–45.3)
MAGNESIUM SERPL-MCNC: 2.1 MG/DL (ref 1.6–2.4)
MCH RBC QN AUTO: 29 PG (ref 26.6–33)
MCHC RBC AUTO-ENTMCNC: 32.1 G/DL (ref 31.5–35.7)
MCV RBC AUTO: 90.3 FL (ref 79–97)
MONOCYTES # BLD AUTO: 0.75 10*3/MM3 (ref 0.1–0.9)
MONOCYTES NFR BLD AUTO: 4.3 % (ref 5–12)
NEUTROPHILS NFR BLD AUTO: 15.03 10*3/MM3 (ref 1.7–7)
NEUTROPHILS NFR BLD AUTO: 86.1 % (ref 42.7–76)
NRBC BLD AUTO-RTO: 0 /100 WBC (ref 0–0.2)
PHOSPHATE SERPL-MCNC: 3.7 MG/DL (ref 2.5–4.5)
PLATELET # BLD AUTO: 149 10*3/MM3 (ref 140–450)
PMV BLD AUTO: 10.1 FL (ref 6–12)
POTASSIUM SERPL-SCNC: 5.4 MMOL/L (ref 3.5–5.2)
PROT SERPL-MCNC: 4.5 G/DL (ref 6–8.5)
PROTHROMBIN TIME: 14.8 SECONDS (ref 11.7–14.2)
RBC # BLD AUTO: 2.07 10*6/MM3 (ref 4.14–5.8)
RH BLD: POSITIVE
SODIUM SERPL-SCNC: 134 MMOL/L (ref 136–145)
T&S EXPIRATION DATE: NORMAL
WBC NRBC COR # BLD AUTO: 17.44 10*3/MM3 (ref 3.4–10.8)

## 2025-08-08 PROCEDURE — P9047 ALBUMIN (HUMAN), 25%, 50ML: HCPCS | Performed by: NURSE PRACTITIONER

## 2025-08-08 PROCEDURE — 86850 RBC ANTIBODY SCREEN: CPT | Performed by: INTERNAL MEDICINE

## 2025-08-08 PROCEDURE — 86901 BLOOD TYPING SEROLOGIC RH(D): CPT | Performed by: INTERNAL MEDICINE

## 2025-08-08 PROCEDURE — 85014 HEMATOCRIT: CPT | Performed by: INTERNAL MEDICINE

## 2025-08-08 PROCEDURE — 82330 ASSAY OF CALCIUM: CPT | Performed by: INTERNAL MEDICINE

## 2025-08-08 PROCEDURE — 86923 COMPATIBILITY TEST ELECTRIC: CPT

## 2025-08-08 PROCEDURE — 25010000002 ALBUMIN HUMAN 25% PER 50 ML: Performed by: INTERNAL MEDICINE

## 2025-08-08 PROCEDURE — 85610 PROTHROMBIN TIME: CPT | Performed by: STUDENT IN AN ORGANIZED HEALTH CARE EDUCATION/TRAINING PROGRAM

## 2025-08-08 PROCEDURE — 99233 SBSQ HOSP IP/OBS HIGH 50: CPT | Performed by: INTERNAL MEDICINE

## 2025-08-08 PROCEDURE — 85018 HEMOGLOBIN: CPT | Performed by: NURSE PRACTITIONER

## 2025-08-08 PROCEDURE — 25010000002 CALCIUM GLUCONATE 2-0.675 GM/100ML-% SOLUTION: Performed by: INTERNAL MEDICINE

## 2025-08-08 PROCEDURE — 85018 HEMOGLOBIN: CPT | Performed by: INTERNAL MEDICINE

## 2025-08-08 PROCEDURE — 94664 DEMO&/EVAL PT USE INHALER: CPT

## 2025-08-08 PROCEDURE — 94761 N-INVAS EAR/PLS OXIMETRY MLT: CPT

## 2025-08-08 PROCEDURE — 83735 ASSAY OF MAGNESIUM: CPT | Performed by: NURSE PRACTITIONER

## 2025-08-08 PROCEDURE — 85014 HEMATOCRIT: CPT | Performed by: NURSE PRACTITIONER

## 2025-08-08 PROCEDURE — 85025 COMPLETE CBC W/AUTO DIFF WBC: CPT | Performed by: NURSE PRACTITIONER

## 2025-08-08 PROCEDURE — 94799 UNLISTED PULMONARY SVC/PX: CPT

## 2025-08-08 PROCEDURE — 80053 COMPREHEN METABOLIC PANEL: CPT | Performed by: NURSE PRACTITIONER

## 2025-08-08 PROCEDURE — 86900 BLOOD TYPING SEROLOGIC ABO: CPT

## 2025-08-08 PROCEDURE — 82948 REAGENT STRIP/BLOOD GLUCOSE: CPT

## 2025-08-08 PROCEDURE — 25010000002 ALBUMIN HUMAN 25% PER 50 ML: Performed by: NURSE PRACTITIONER

## 2025-08-08 PROCEDURE — 84100 ASSAY OF PHOSPHORUS: CPT | Performed by: NURSE PRACTITIONER

## 2025-08-08 PROCEDURE — P9016 RBC LEUKOCYTES REDUCED: HCPCS

## 2025-08-08 PROCEDURE — 25010000002 BUMETANIDE PER 0.5 MG: Performed by: INTERNAL MEDICINE

## 2025-08-08 PROCEDURE — 99222 1ST HOSP IP/OBS MODERATE 55: CPT | Performed by: STUDENT IN AN ORGANIZED HEALTH CARE EDUCATION/TRAINING PROGRAM

## 2025-08-08 PROCEDURE — 36430 TRANSFUSION BLD/BLD COMPNT: CPT

## 2025-08-08 PROCEDURE — P9047 ALBUMIN (HUMAN), 25%, 50ML: HCPCS | Performed by: INTERNAL MEDICINE

## 2025-08-08 PROCEDURE — 99232 SBSQ HOSP IP/OBS MODERATE 35: CPT | Performed by: INTERNAL MEDICINE

## 2025-08-08 PROCEDURE — 25010000002 BUMETANIDE PER 0.5 MG: Performed by: NURSE PRACTITIONER

## 2025-08-08 PROCEDURE — 85384 FIBRINOGEN ACTIVITY: CPT | Performed by: STUDENT IN AN ORGANIZED HEALTH CARE EDUCATION/TRAINING PROGRAM

## 2025-08-08 PROCEDURE — 86900 BLOOD TYPING SEROLOGIC ABO: CPT | Performed by: INTERNAL MEDICINE

## 2025-08-08 RX ORDER — METOLAZONE 2.5 MG/1
2.5 TABLET ORAL ONCE
Status: COMPLETED | OUTPATIENT
Start: 2025-08-08 | End: 2025-08-08

## 2025-08-08 RX ORDER — BUMETANIDE 0.25 MG/ML
2 INJECTION, SOLUTION INTRAMUSCULAR; INTRAVENOUS ONCE
Status: COMPLETED | OUTPATIENT
Start: 2025-08-08 | End: 2025-08-08

## 2025-08-08 RX ORDER — BUMETANIDE 0.25 MG/ML
0.5 INJECTION, SOLUTION INTRAMUSCULAR; INTRAVENOUS ONCE
Status: COMPLETED | OUTPATIENT
Start: 2025-08-08 | End: 2025-08-08

## 2025-08-08 RX ORDER — ALBUMIN (HUMAN) 12.5 G/50ML
25 SOLUTION INTRAVENOUS ONCE
Status: COMPLETED | OUTPATIENT
Start: 2025-08-08 | End: 2025-08-08

## 2025-08-08 RX ORDER — CALCIUM GLUCONATE 20 MG/ML
2000 INJECTION, SOLUTION INTRAVENOUS ONCE
Status: COMPLETED | OUTPATIENT
Start: 2025-08-08 | End: 2025-08-08

## 2025-08-08 RX ORDER — ALBUMIN (HUMAN) 12.5 G/50ML
12.5 SOLUTION INTRAVENOUS ONCE
Status: COMPLETED | OUTPATIENT
Start: 2025-08-08 | End: 2025-08-08

## 2025-08-08 RX ORDER — INDOMETHACIN 25 MG/1
50 CAPSULE ORAL ONCE
Status: COMPLETED | OUTPATIENT
Start: 2025-08-08 | End: 2025-08-08

## 2025-08-08 RX ORDER — BUMETANIDE 0.25 MG/ML
1 INJECTION, SOLUTION INTRAMUSCULAR; INTRAVENOUS EVERY 12 HOURS
Status: DISCONTINUED | OUTPATIENT
Start: 2025-08-09 | End: 2025-08-11 | Stop reason: HOSPADM

## 2025-08-08 RX ADMIN — VANCOMYCIN HYDROCHLORIDE 500 MG: 250 POWDER, FOR SOLUTION ORAL at 17:17

## 2025-08-08 RX ADMIN — ALBUTEROL SULFATE 2 PUFF: 108 AEROSOL, METERED RESPIRATORY (INHALATION) at 08:47

## 2025-08-08 RX ADMIN — ALBUMIN (HUMAN) 25 G: 0.25 INJECTION, SOLUTION INTRAVENOUS at 09:13

## 2025-08-08 RX ADMIN — ACETAMINOPHEN 650 MG: 325 TABLET, FILM COATED ORAL at 05:13

## 2025-08-08 RX ADMIN — AMIODARONE HYDROCHLORIDE 200 MG: 200 TABLET ORAL at 09:13

## 2025-08-08 RX ADMIN — BUMETANIDE 2 MG: 0.25 INJECTION INTRAMUSCULAR; INTRAVENOUS at 11:38

## 2025-08-08 RX ADMIN — HYDROCORTISONE 10 MG: 10 TABLET ORAL at 20:12

## 2025-08-08 RX ADMIN — ALLOPURINOL 100 MG: 100 TABLET ORAL at 09:13

## 2025-08-08 RX ADMIN — SODIUM BICARBONATE 650 MG: 650 TABLET ORAL at 20:12

## 2025-08-08 RX ADMIN — FAMOTIDINE 20 MG: 20 TABLET, FILM COATED ORAL at 09:13

## 2025-08-08 RX ADMIN — GUAIFENESIN 600 MG: 600 TABLET, EXTENDED RELEASE ORAL at 09:13

## 2025-08-08 RX ADMIN — GUAIFENESIN 600 MG: 600 TABLET, EXTENDED RELEASE ORAL at 20:12

## 2025-08-08 RX ADMIN — SODIUM BICARBONATE 50 MEQ: 84 INJECTION INTRAVENOUS at 09:14

## 2025-08-08 RX ADMIN — BUMETANIDE 1 MG: 0.25 INJECTION INTRAMUSCULAR; INTRAVENOUS at 00:22

## 2025-08-08 RX ADMIN — BUDESONIDE AND FORMOTEROL FUMARATE DIHYDRATE 2 PUFF: 160; 4.5 AEROSOL RESPIRATORY (INHALATION) at 08:49

## 2025-08-08 RX ADMIN — SODIUM BICARBONATE 650 MG: 650 TABLET ORAL at 09:13

## 2025-08-08 RX ADMIN — ALBUTEROL SULFATE 2 PUFF: 108 AEROSOL, METERED RESPIRATORY (INHALATION) at 20:48

## 2025-08-08 RX ADMIN — VANCOMYCIN HYDROCHLORIDE 500 MG: 250 POWDER, FOR SOLUTION ORAL at 00:22

## 2025-08-08 RX ADMIN — METOPROLOL TARTRATE 25 MG: 25 TABLET, FILM COATED ORAL at 09:13

## 2025-08-08 RX ADMIN — METOPROLOL TARTRATE 25 MG: 25 TABLET, FILM COATED ORAL at 20:12

## 2025-08-08 RX ADMIN — BUDESONIDE AND FORMOTEROL FUMARATE DIHYDRATE 2 PUFF: 160; 4.5 AEROSOL RESPIRATORY (INHALATION) at 20:50

## 2025-08-08 RX ADMIN — ALBUTEROL SULFATE 2 PUFF: 108 AEROSOL, METERED RESPIRATORY (INHALATION) at 12:00

## 2025-08-08 RX ADMIN — BUMETANIDE 0.5 MG: 0.25 INJECTION INTRAMUSCULAR; INTRAVENOUS at 14:49

## 2025-08-08 RX ADMIN — CALCIUM GLUCONATE 2000 MG: 20 INJECTION, SOLUTION INTRAVENOUS at 11:36

## 2025-08-08 RX ADMIN — LEVOTHYROXINE SODIUM 137 MCG: 0.03 TABLET ORAL at 05:13

## 2025-08-08 RX ADMIN — HYDROXYZINE HYDROCHLORIDE 50 MG: 25 TABLET, FILM COATED ORAL at 20:12

## 2025-08-08 RX ADMIN — ALBUMIN (HUMAN) 25 G: 0.25 INJECTION, SOLUTION INTRAVENOUS at 00:22

## 2025-08-08 RX ADMIN — Medication 250 MG: at 09:13

## 2025-08-08 RX ADMIN — SODIUM BICARBONATE 650 MG: 650 TABLET ORAL at 15:44

## 2025-08-08 RX ADMIN — ALBUMIN (HUMAN) 12.5 G: 0.25 INJECTION, SOLUTION INTRAVENOUS at 14:49

## 2025-08-08 RX ADMIN — OXYCODONE 5 MG: 5 TABLET ORAL at 05:13

## 2025-08-08 RX ADMIN — VANCOMYCIN HYDROCHLORIDE 500 MG: 250 POWDER, FOR SOLUTION ORAL at 11:36

## 2025-08-08 RX ADMIN — Medication 10 ML: at 20:12

## 2025-08-08 RX ADMIN — Medication 250 MG: at 20:12

## 2025-08-08 RX ADMIN — METOLAZONE 2.5 MG: 2.5 TABLET ORAL at 14:50

## 2025-08-08 RX ADMIN — SODIUM ZIRCONIUM CYCLOSILICATE 10 G: 10 POWDER, FOR SUSPENSION ORAL at 09:12

## 2025-08-08 RX ADMIN — BUMETANIDE 1 MG: 0.25 INJECTION INTRAMUSCULAR; INTRAVENOUS at 23:53

## 2025-08-08 RX ADMIN — VANCOMYCIN HYDROCHLORIDE 500 MG: 250 POWDER, FOR SOLUTION ORAL at 05:12

## 2025-08-08 RX ADMIN — AMIODARONE HYDROCHLORIDE 200 MG: 200 TABLET ORAL at 20:12

## 2025-08-08 RX ADMIN — VANCOMYCIN HYDROCHLORIDE 500 MG: 250 POWDER, FOR SOLUTION ORAL at 23:53

## 2025-08-08 RX ADMIN — HYDROCORTISONE 10 MG: 10 TABLET ORAL at 09:13

## 2025-08-09 LAB
ALBUMIN SERPL-MCNC: 3.5 G/DL (ref 3.5–5.2)
ALBUMIN/GLOB SERPL: 2.2 G/DL
ALP SERPL-CCNC: 124 U/L (ref 39–117)
ALT SERPL W P-5'-P-CCNC: 7 U/L (ref 1–41)
ANION GAP SERPL CALCULATED.3IONS-SCNC: 9 MMOL/L (ref 5–15)
AST SERPL-CCNC: 23 U/L (ref 1–40)
BASOPHILS # BLD AUTO: 0.11 10*3/MM3 (ref 0–0.2)
BASOPHILS NFR BLD AUTO: 0.5 % (ref 0–1.5)
BH BB BLOOD EXPIRATION DATE: NORMAL
BH BB BLOOD EXPIRATION DATE: NORMAL
BH BB BLOOD TYPE BARCODE: 5100
BH BB BLOOD TYPE BARCODE: 5100
BH BB DISPENSE STATUS: NORMAL
BH BB DISPENSE STATUS: NORMAL
BH BB PRODUCT CODE: NORMAL
BH BB PRODUCT CODE: NORMAL
BH BB UNIT NUMBER: NORMAL
BH BB UNIT NUMBER: NORMAL
BILIRUB SERPL-MCNC: 0.6 MG/DL (ref 0–1.2)
BUN SERPL-MCNC: 17.7 MG/DL (ref 8–23)
BUN/CREAT SERPL: 11 (ref 7–25)
CALCIUM SPEC-SCNC: 8.5 MG/DL (ref 8.6–10.5)
CHLORIDE SERPL-SCNC: 98 MMOL/L (ref 98–107)
CO2 SERPL-SCNC: 29 MMOL/L (ref 22–29)
CREAT SERPL-MCNC: 1.61 MG/DL (ref 0.76–1.27)
CROSSMATCH INTERPRETATION: NORMAL
CROSSMATCH INTERPRETATION: NORMAL
DEPRECATED RDW RBC AUTO: 52.8 FL (ref 37–54)
EGFRCR SERPLBLD CKD-EPI 2021: 44.3 ML/MIN/1.73
EOSINOPHIL # BLD AUTO: 0.02 10*3/MM3 (ref 0–0.4)
EOSINOPHIL NFR BLD AUTO: 0.1 % (ref 0.3–6.2)
ERYTHROCYTE [DISTWIDTH] IN BLOOD BY AUTOMATED COUNT: 16.8 % (ref 12.3–15.4)
GLOBULIN UR ELPH-MCNC: 1.6 GM/DL
GLUCOSE BLDC GLUCOMTR-MCNC: 116 MG/DL (ref 70–105)
GLUCOSE BLDC GLUCOMTR-MCNC: 119 MG/DL (ref 70–105)
GLUCOSE BLDC GLUCOMTR-MCNC: 154 MG/DL (ref 70–105)
GLUCOSE BLDC GLUCOMTR-MCNC: 89 MG/DL (ref 70–105)
GLUCOSE SERPL-MCNC: 95 MG/DL (ref 65–99)
HCT VFR BLD AUTO: 26.1 % (ref 37.5–51)
HCT VFR BLD AUTO: 28.5 % (ref 37.5–51)
HCT VFR BLD AUTO: 28.8 % (ref 37.5–51)
HCT VFR BLD AUTO: 29.3 % (ref 37.5–51)
HGB BLD-MCNC: 8.6 G/DL (ref 13–17.7)
HGB BLD-MCNC: 9.3 G/DL (ref 13–17.7)
HGB BLD-MCNC: 9.5 G/DL (ref 13–17.7)
HGB BLD-MCNC: 9.5 G/DL (ref 13–17.7)
IMM GRANULOCYTES # BLD AUTO: 0.72 10*3/MM3 (ref 0–0.05)
IMM GRANULOCYTES NFR BLD AUTO: 3.4 % (ref 0–0.5)
LYMPHOCYTES # BLD AUTO: 0.99 10*3/MM3 (ref 0.7–3.1)
LYMPHOCYTES NFR BLD AUTO: 4.7 % (ref 19.6–45.3)
MAGNESIUM SERPL-MCNC: 1.5 MG/DL (ref 1.6–2.4)
MCH RBC QN AUTO: 29.1 PG (ref 26.6–33)
MCHC RBC AUTO-ENTMCNC: 33 G/DL (ref 31.5–35.7)
MCV RBC AUTO: 88.3 FL (ref 79–97)
MONOCYTES # BLD AUTO: 1.04 10*3/MM3 (ref 0.1–0.9)
MONOCYTES NFR BLD AUTO: 5 % (ref 5–12)
NEUTROPHILS NFR BLD AUTO: 18.07 10*3/MM3 (ref 1.7–7)
NEUTROPHILS NFR BLD AUTO: 86.3 % (ref 42.7–76)
NRBC BLD AUTO-RTO: 0 /100 WBC (ref 0–0.2)
PHOSPHATE SERPL-MCNC: 3.2 MG/DL (ref 2.5–4.5)
PLATELET # BLD AUTO: 179 10*3/MM3 (ref 140–450)
PMV BLD AUTO: 9.9 FL (ref 6–12)
POTASSIUM SERPL-SCNC: 4.5 MMOL/L (ref 3.5–5.2)
PROT SERPL-MCNC: 5.1 G/DL (ref 6–8.5)
QT INTERVAL: 387 MS
QTC INTERVAL: 468 MS
RBC # BLD AUTO: 3.26 10*6/MM3 (ref 4.14–5.8)
SODIUM SERPL-SCNC: 136 MMOL/L (ref 136–145)
UNIT  ABO: NORMAL
UNIT  ABO: NORMAL
UNIT  RH: NORMAL
UNIT  RH: NORMAL
WBC NRBC COR # BLD AUTO: 20.95 10*3/MM3 (ref 3.4–10.8)

## 2025-08-09 PROCEDURE — 80053 COMPREHEN METABOLIC PANEL: CPT | Performed by: NURSE PRACTITIONER

## 2025-08-09 PROCEDURE — 83735 ASSAY OF MAGNESIUM: CPT | Performed by: NURSE PRACTITIONER

## 2025-08-09 PROCEDURE — 85018 HEMOGLOBIN: CPT | Performed by: INTERNAL MEDICINE

## 2025-08-09 PROCEDURE — 63710000001 INSULIN LISPRO (HUMAN) PER 5 UNITS: Performed by: INTERNAL MEDICINE

## 2025-08-09 PROCEDURE — 93005 ELECTROCARDIOGRAM TRACING: CPT | Performed by: NURSE PRACTITIONER

## 2025-08-09 PROCEDURE — 93010 ELECTROCARDIOGRAM REPORT: CPT | Performed by: INTERNAL MEDICINE

## 2025-08-09 PROCEDURE — 82948 REAGENT STRIP/BLOOD GLUCOSE: CPT | Performed by: INTERNAL MEDICINE

## 2025-08-09 PROCEDURE — 94799 UNLISTED PULMONARY SVC/PX: CPT

## 2025-08-09 PROCEDURE — 85014 HEMATOCRIT: CPT | Performed by: INTERNAL MEDICINE

## 2025-08-09 PROCEDURE — 25010000002 BUMETANIDE PER 0.5 MG: Performed by: INTERNAL MEDICINE

## 2025-08-09 PROCEDURE — 25010000002 MAGNESIUM SULFATE 2 GM/50ML SOLUTION: Performed by: INTERNAL MEDICINE

## 2025-08-09 PROCEDURE — 85025 COMPLETE CBC W/AUTO DIFF WBC: CPT | Performed by: NURSE PRACTITIONER

## 2025-08-09 PROCEDURE — 84100 ASSAY OF PHOSPHORUS: CPT | Performed by: NURSE PRACTITIONER

## 2025-08-09 PROCEDURE — 99232 SBSQ HOSP IP/OBS MODERATE 35: CPT | Performed by: INTERNAL MEDICINE

## 2025-08-09 PROCEDURE — 63710000001 DIPHENHYDRAMINE PER 50 MG: Performed by: INTERNAL MEDICINE

## 2025-08-09 PROCEDURE — 25010000002 DIAZEPAM PER 5 MG: Performed by: NURSE PRACTITIONER

## 2025-08-09 PROCEDURE — 94664 DEMO&/EVAL PT USE INHALER: CPT

## 2025-08-09 PROCEDURE — 82948 REAGENT STRIP/BLOOD GLUCOSE: CPT

## 2025-08-09 PROCEDURE — 94761 N-INVAS EAR/PLS OXIMETRY MLT: CPT

## 2025-08-09 RX ORDER — DIAZEPAM 10 MG/2ML
2.5 INJECTION, SOLUTION INTRAMUSCULAR; INTRAVENOUS EVERY 6 HOURS PRN
Status: DISCONTINUED | OUTPATIENT
Start: 2025-08-09 | End: 2025-08-11 | Stop reason: HOSPADM

## 2025-08-09 RX ORDER — MAGNESIUM SULFATE HEPTAHYDRATE 40 MG/ML
2 INJECTION, SOLUTION INTRAVENOUS
Status: COMPLETED | OUTPATIENT
Start: 2025-08-09 | End: 2025-08-09

## 2025-08-09 RX ADMIN — FAMOTIDINE 20 MG: 20 TABLET, FILM COATED ORAL at 08:33

## 2025-08-09 RX ADMIN — VANCOMYCIN HYDROCHLORIDE 500 MG: 250 POWDER, FOR SOLUTION ORAL at 05:40

## 2025-08-09 RX ADMIN — MAGNESIUM SULFATE HEPTAHYDRATE 2 G: 40 INJECTION, SOLUTION INTRAVENOUS at 09:33

## 2025-08-09 RX ADMIN — VANCOMYCIN HYDROCHLORIDE 500 MG: 250 POWDER, FOR SOLUTION ORAL at 17:07

## 2025-08-09 RX ADMIN — BUMETANIDE 1 MG: 0.25 INJECTION INTRAMUSCULAR; INTRAVENOUS at 11:16

## 2025-08-09 RX ADMIN — Medication 10 ML: at 08:40

## 2025-08-09 RX ADMIN — DIPHENHYDRAMINE HYDROCHLORIDE 25 MG: 25 CAPSULE ORAL at 21:56

## 2025-08-09 RX ADMIN — ALBUTEROL SULFATE 2 PUFF: 108 AEROSOL, METERED RESPIRATORY (INHALATION) at 07:43

## 2025-08-09 RX ADMIN — Medication 10 ML: at 21:58

## 2025-08-09 RX ADMIN — BUDESONIDE AND FORMOTEROL FUMARATE DIHYDRATE 2 PUFF: 160; 4.5 AEROSOL RESPIRATORY (INHALATION) at 07:47

## 2025-08-09 RX ADMIN — HYDROCORTISONE 10 MG: 10 TABLET ORAL at 21:56

## 2025-08-09 RX ADMIN — SODIUM BICARBONATE 650 MG: 650 TABLET ORAL at 21:56

## 2025-08-09 RX ADMIN — ALBUTEROL SULFATE 2 PUFF: 108 AEROSOL, METERED RESPIRATORY (INHALATION) at 11:33

## 2025-08-09 RX ADMIN — Medication 250 MG: at 08:33

## 2025-08-09 RX ADMIN — LEVOTHYROXINE SODIUM 137 MCG: 0.03 TABLET ORAL at 05:40

## 2025-08-09 RX ADMIN — Medication 5 MG: at 21:56

## 2025-08-09 RX ADMIN — Medication 250 MG: at 21:56

## 2025-08-09 RX ADMIN — OXYCODONE 5 MG: 5 TABLET ORAL at 21:56

## 2025-08-09 RX ADMIN — ALBUTEROL SULFATE 2 PUFF: 108 AEROSOL, METERED RESPIRATORY (INHALATION) at 20:56

## 2025-08-09 RX ADMIN — HYDROCORTISONE 10 MG: 10 TABLET ORAL at 08:33

## 2025-08-09 RX ADMIN — SODIUM BICARBONATE 650 MG: 650 TABLET ORAL at 17:07

## 2025-08-09 RX ADMIN — MAGNESIUM SULFATE HEPTAHYDRATE 2 G: 40 INJECTION, SOLUTION INTRAVENOUS at 13:24

## 2025-08-09 RX ADMIN — GUAIFENESIN 600 MG: 600 TABLET, EXTENDED RELEASE ORAL at 21:56

## 2025-08-09 RX ADMIN — METOPROLOL TARTRATE 25 MG: 25 TABLET, FILM COATED ORAL at 08:33

## 2025-08-09 RX ADMIN — METOPROLOL TARTRATE 25 MG: 25 TABLET, FILM COATED ORAL at 21:56

## 2025-08-09 RX ADMIN — HYDROXYZINE HYDROCHLORIDE 50 MG: 25 TABLET, FILM COATED ORAL at 21:56

## 2025-08-09 RX ADMIN — GUAIFENESIN 600 MG: 600 TABLET, EXTENDED RELEASE ORAL at 08:34

## 2025-08-09 RX ADMIN — AMIODARONE HYDROCHLORIDE 200 MG: 200 TABLET ORAL at 08:33

## 2025-08-09 RX ADMIN — ALBUTEROL SULFATE 2 PUFF: 108 AEROSOL, METERED RESPIRATORY (INHALATION) at 15:25

## 2025-08-09 RX ADMIN — VANCOMYCIN HYDROCHLORIDE 500 MG: 250 POWDER, FOR SOLUTION ORAL at 11:24

## 2025-08-09 RX ADMIN — DIAZEPAM 2.5 MG: 10 INJECTION, SOLUTION INTRAMUSCULAR; INTRAVENOUS at 21:56

## 2025-08-09 RX ADMIN — AMIODARONE HYDROCHLORIDE 200 MG: 200 TABLET ORAL at 21:56

## 2025-08-09 RX ADMIN — SODIUM BICARBONATE 650 MG: 650 TABLET ORAL at 08:33

## 2025-08-09 RX ADMIN — MAGNESIUM SULFATE HEPTAHYDRATE 2 G: 40 INJECTION, SOLUTION INTRAVENOUS at 11:15

## 2025-08-09 RX ADMIN — ALLOPURINOL 100 MG: 100 TABLET ORAL at 08:33

## 2025-08-09 RX ADMIN — BUDESONIDE AND FORMOTEROL FUMARATE DIHYDRATE 2 PUFF: 160; 4.5 AEROSOL RESPIRATORY (INHALATION) at 20:58

## 2025-08-09 RX ADMIN — INSULIN LISPRO 2 UNITS: 100 INJECTION, SOLUTION INTRAVENOUS; SUBCUTANEOUS at 21:56

## 2025-08-10 LAB
ALBUMIN SERPL-MCNC: 2.9 G/DL (ref 3.5–5.2)
ALBUMIN/GLOB SERPL: 1.5 G/DL
ALP SERPL-CCNC: 119 U/L (ref 39–117)
ALT SERPL W P-5'-P-CCNC: 7 U/L (ref 1–41)
ANION GAP SERPL CALCULATED.3IONS-SCNC: 7 MMOL/L (ref 5–15)
AST SERPL-CCNC: 22 U/L (ref 1–40)
BASOPHILS # BLD AUTO: 0.05 10*3/MM3 (ref 0–0.2)
BASOPHILS NFR BLD AUTO: 0.3 % (ref 0–1.5)
BILIRUB SERPL-MCNC: 0.4 MG/DL (ref 0–1.2)
BUN SERPL-MCNC: 20.2 MG/DL (ref 8–23)
BUN/CREAT SERPL: 12.2 (ref 7–25)
CALCIUM SPEC-SCNC: 7.9 MG/DL (ref 8.6–10.5)
CHLORIDE SERPL-SCNC: 98 MMOL/L (ref 98–107)
CO2 SERPL-SCNC: 30 MMOL/L (ref 22–29)
CREAT SERPL-MCNC: 1.65 MG/DL (ref 0.76–1.27)
DEPRECATED RDW RBC AUTO: 54.2 FL (ref 37–54)
EGFRCR SERPLBLD CKD-EPI 2021: 43 ML/MIN/1.73
EOSINOPHIL # BLD AUTO: 0.03 10*3/MM3 (ref 0–0.4)
EOSINOPHIL NFR BLD AUTO: 0.2 % (ref 0.3–6.2)
ERYTHROCYTE [DISTWIDTH] IN BLOOD BY AUTOMATED COUNT: 16.9 % (ref 12.3–15.4)
GLOBULIN UR ELPH-MCNC: 1.9 GM/DL
GLUCOSE BLDC GLUCOMTR-MCNC: 123 MG/DL (ref 70–105)
GLUCOSE BLDC GLUCOMTR-MCNC: 137 MG/DL (ref 70–105)
GLUCOSE BLDC GLUCOMTR-MCNC: 146 MG/DL (ref 70–105)
GLUCOSE BLDC GLUCOMTR-MCNC: 76 MG/DL (ref 70–105)
GLUCOSE SERPL-MCNC: 82 MG/DL (ref 65–99)
HCT VFR BLD AUTO: 24.8 % (ref 37.5–51)
HCT VFR BLD AUTO: 25.4 % (ref 37.5–51)
HGB BLD-MCNC: 7.9 G/DL (ref 13–17.7)
HGB BLD-MCNC: 8 G/DL (ref 13–17.7)
IMM GRANULOCYTES # BLD AUTO: 0.23 10*3/MM3 (ref 0–0.05)
IMM GRANULOCYTES NFR BLD AUTO: 1.5 % (ref 0–0.5)
LYMPHOCYTES # BLD AUTO: 0.64 10*3/MM3 (ref 0.7–3.1)
LYMPHOCYTES NFR BLD AUTO: 4 % (ref 19.6–45.3)
Lab: ABNORMAL
Lab: ABNORMAL
MAGNESIUM SERPL-MCNC: 2 MG/DL (ref 1.6–2.4)
MCH RBC QN AUTO: 28 PG (ref 26.6–33)
MCHC RBC AUTO-ENTMCNC: 31.1 G/DL (ref 31.5–35.7)
MCV RBC AUTO: 90.1 FL (ref 79–97)
MONOCYTES # BLD AUTO: 0.84 10*3/MM3 (ref 0.1–0.9)
MONOCYTES NFR BLD AUTO: 5.3 % (ref 5–12)
NEUTROPHILS NFR BLD AUTO: 14.06 10*3/MM3 (ref 1.7–7)
NEUTROPHILS NFR BLD AUTO: 88.7 % (ref 42.7–76)
NRBC BLD AUTO-RTO: 0 /100 WBC (ref 0–0.2)
PHOSPHATE SERPL-MCNC: 3 MG/DL (ref 2.5–4.5)
PLATELET # BLD AUTO: 163 10*3/MM3 (ref 140–450)
PMV BLD AUTO: 10 FL (ref 6–12)
POTASSIUM SERPL-SCNC: 4.2 MMOL/L (ref 3.5–5.2)
PROT SERPL-MCNC: 4.8 G/DL (ref 6–8.5)
RBC # BLD AUTO: 2.82 10*6/MM3 (ref 4.14–5.8)
SODIUM SERPL-SCNC: 135 MMOL/L (ref 136–145)
WBC NRBC COR # BLD AUTO: 15.85 10*3/MM3 (ref 3.4–10.8)

## 2025-08-10 PROCEDURE — 82948 REAGENT STRIP/BLOOD GLUCOSE: CPT

## 2025-08-10 PROCEDURE — 83735 ASSAY OF MAGNESIUM: CPT | Performed by: NURSE PRACTITIONER

## 2025-08-10 PROCEDURE — 94799 UNLISTED PULMONARY SVC/PX: CPT

## 2025-08-10 PROCEDURE — 25010000002 CALCIUM GLUCONATE-NACL 1-0.675 GM/50ML-% SOLUTION: Performed by: INTERNAL MEDICINE

## 2025-08-10 PROCEDURE — 63710000001 DIPHENHYDRAMINE PER 50 MG: Performed by: INTERNAL MEDICINE

## 2025-08-10 PROCEDURE — 84100 ASSAY OF PHOSPHORUS: CPT | Performed by: NURSE PRACTITIONER

## 2025-08-10 PROCEDURE — 25010000002 BUMETANIDE PER 0.5 MG: Performed by: INTERNAL MEDICINE

## 2025-08-10 PROCEDURE — 82948 REAGENT STRIP/BLOOD GLUCOSE: CPT | Performed by: INTERNAL MEDICINE

## 2025-08-10 PROCEDURE — 85018 HEMOGLOBIN: CPT | Performed by: INTERNAL MEDICINE

## 2025-08-10 PROCEDURE — 25010000002 DIAZEPAM PER 5 MG: Performed by: NURSE PRACTITIONER

## 2025-08-10 PROCEDURE — 80053 COMPREHEN METABOLIC PANEL: CPT | Performed by: NURSE PRACTITIONER

## 2025-08-10 PROCEDURE — 85025 COMPLETE CBC W/AUTO DIFF WBC: CPT | Performed by: NURSE PRACTITIONER

## 2025-08-10 PROCEDURE — 85014 HEMATOCRIT: CPT | Performed by: INTERNAL MEDICINE

## 2025-08-10 RX ORDER — CALCIUM GLUCONATE 20 MG/ML
1000 INJECTION, SOLUTION INTRAVENOUS EVERY 12 HOURS
Status: COMPLETED | OUTPATIENT
Start: 2025-08-10 | End: 2025-08-10

## 2025-08-10 RX ORDER — ALBUTEROL SULFATE 90 UG/1
2 INHALANT RESPIRATORY (INHALATION) EVERY 4 HOURS PRN
Status: DISCONTINUED | OUTPATIENT
Start: 2025-08-10 | End: 2025-08-11 | Stop reason: HOSPADM

## 2025-08-10 RX ADMIN — VANCOMYCIN HYDROCHLORIDE 500 MG: 250 POWDER, FOR SOLUTION ORAL at 11:35

## 2025-08-10 RX ADMIN — Medication 5 MG: at 21:53

## 2025-08-10 RX ADMIN — Medication 10 ML: at 08:21

## 2025-08-10 RX ADMIN — HYDROXYZINE HYDROCHLORIDE 50 MG: 25 TABLET, FILM COATED ORAL at 21:53

## 2025-08-10 RX ADMIN — VANCOMYCIN HYDROCHLORIDE 500 MG: 250 POWDER, FOR SOLUTION ORAL at 05:02

## 2025-08-10 RX ADMIN — OXYCODONE 5 MG: 5 TABLET ORAL at 21:53

## 2025-08-10 RX ADMIN — SODIUM BICARBONATE 650 MG: 650 TABLET ORAL at 17:07

## 2025-08-10 RX ADMIN — Medication 250 MG: at 21:52

## 2025-08-10 RX ADMIN — AMIODARONE HYDROCHLORIDE 200 MG: 200 TABLET ORAL at 08:07

## 2025-08-10 RX ADMIN — HYDROCORTISONE 10 MG: 10 TABLET ORAL at 21:53

## 2025-08-10 RX ADMIN — Medication 10 ML: at 21:53

## 2025-08-10 RX ADMIN — Medication 250 MG: at 08:07

## 2025-08-10 RX ADMIN — FAMOTIDINE 20 MG: 20 TABLET, FILM COATED ORAL at 08:07

## 2025-08-10 RX ADMIN — GUAIFENESIN 600 MG: 600 TABLET, EXTENDED RELEASE ORAL at 08:07

## 2025-08-10 RX ADMIN — VANCOMYCIN HYDROCHLORIDE 500 MG: 250 POWDER, FOR SOLUTION ORAL at 00:29

## 2025-08-10 RX ADMIN — BUMETANIDE 1 MG: 0.25 INJECTION INTRAMUSCULAR; INTRAVENOUS at 11:35

## 2025-08-10 RX ADMIN — METOPROLOL TARTRATE 25 MG: 25 TABLET, FILM COATED ORAL at 08:07

## 2025-08-10 RX ADMIN — GUAIFENESIN 600 MG: 600 TABLET, EXTENDED RELEASE ORAL at 21:52

## 2025-08-10 RX ADMIN — LEVOTHYROXINE SODIUM 137 MCG: 0.03 TABLET ORAL at 05:02

## 2025-08-10 RX ADMIN — CALCIUM GLUCONATE 1000 MG: 20 INJECTION, SOLUTION INTRAVENOUS at 08:21

## 2025-08-10 RX ADMIN — DIPHENHYDRAMINE HYDROCHLORIDE 25 MG: 25 CAPSULE ORAL at 21:52

## 2025-08-10 RX ADMIN — CALCIUM GLUCONATE 1000 MG: 20 INJECTION, SOLUTION INTRAVENOUS at 21:53

## 2025-08-10 RX ADMIN — BUDESONIDE AND FORMOTEROL FUMARATE DIHYDRATE 2 PUFF: 160; 4.5 AEROSOL RESPIRATORY (INHALATION) at 21:03

## 2025-08-10 RX ADMIN — BUMETANIDE 1 MG: 0.25 INJECTION INTRAMUSCULAR; INTRAVENOUS at 00:29

## 2025-08-10 RX ADMIN — METOPROLOL TARTRATE 25 MG: 25 TABLET, FILM COATED ORAL at 21:52

## 2025-08-10 RX ADMIN — SODIUM BICARBONATE 650 MG: 650 TABLET ORAL at 08:07

## 2025-08-10 RX ADMIN — VANCOMYCIN HYDROCHLORIDE 500 MG: 250 POWDER, FOR SOLUTION ORAL at 17:06

## 2025-08-10 RX ADMIN — SODIUM BICARBONATE 650 MG: 650 TABLET ORAL at 21:53

## 2025-08-10 RX ADMIN — HYDROCORTISONE 10 MG: 10 TABLET ORAL at 08:07

## 2025-08-10 RX ADMIN — DIAZEPAM 2.5 MG: 10 INJECTION, SOLUTION INTRAMUSCULAR; INTRAVENOUS at 21:53

## 2025-08-10 RX ADMIN — ALLOPURINOL 100 MG: 100 TABLET ORAL at 08:07

## 2025-08-10 RX ADMIN — AMIODARONE HYDROCHLORIDE 200 MG: 200 TABLET ORAL at 21:53

## 2025-08-11 VITALS
BODY MASS INDEX: 25.5 KG/M2 | DIASTOLIC BLOOD PRESSURE: 82 MMHG | WEIGHT: 178.13 LBS | HEIGHT: 70 IN | SYSTOLIC BLOOD PRESSURE: 136 MMHG | OXYGEN SATURATION: 93 % | HEART RATE: 86 BPM | RESPIRATION RATE: 16 BRPM | TEMPERATURE: 97.5 F

## 2025-08-11 LAB
ALBUMIN SERPL-MCNC: 3 G/DL (ref 3.5–5.2)
ALBUMIN/GLOB SERPL: 1.8 G/DL
ALP SERPL-CCNC: 128 U/L (ref 39–117)
ALT SERPL W P-5'-P-CCNC: 6 U/L (ref 1–41)
ANION GAP SERPL CALCULATED.3IONS-SCNC: 7.5 MMOL/L (ref 5–15)
AST SERPL-CCNC: 19 U/L (ref 1–40)
BASOPHILS # BLD AUTO: 0.04 10*3/MM3 (ref 0–0.2)
BASOPHILS NFR BLD AUTO: 0.3 % (ref 0–1.5)
BILIRUB SERPL-MCNC: 0.4 MG/DL (ref 0–1.2)
BUN SERPL-MCNC: 21.9 MG/DL (ref 8–23)
BUN/CREAT SERPL: 14.2 (ref 7–25)
CA-I SERPL ISE-MCNC: 1.07 MMOL/L (ref 1.15–1.3)
CALCIUM SPEC-SCNC: 8 MG/DL (ref 8.6–10.5)
CHLORIDE SERPL-SCNC: 97 MMOL/L (ref 98–107)
CO2 SERPL-SCNC: 29.5 MMOL/L (ref 22–29)
CREAT SERPL-MCNC: 1.54 MG/DL (ref 0.76–1.27)
DEPRECATED RDW RBC AUTO: 54.7 FL (ref 37–54)
EGFRCR SERPLBLD CKD-EPI 2021: 46.7 ML/MIN/1.73
EOSINOPHIL # BLD AUTO: 0.03 10*3/MM3 (ref 0–0.4)
EOSINOPHIL NFR BLD AUTO: 0.2 % (ref 0.3–6.2)
ERYTHROCYTE [DISTWIDTH] IN BLOOD BY AUTOMATED COUNT: 16.7 % (ref 12.3–15.4)
GLOBULIN UR ELPH-MCNC: 1.7 GM/DL
GLUCOSE BLDC GLUCOMTR-MCNC: 152 MG/DL (ref 70–105)
GLUCOSE BLDC GLUCOMTR-MCNC: 98 MG/DL (ref 70–105)
GLUCOSE SERPL-MCNC: 125 MG/DL (ref 65–99)
HCT VFR BLD AUTO: 25.4 % (ref 37.5–51)
HGB BLD-MCNC: 8 G/DL (ref 13–17.7)
IMM GRANULOCYTES # BLD AUTO: 0.17 10*3/MM3 (ref 0–0.05)
IMM GRANULOCYTES NFR BLD AUTO: 1.3 % (ref 0–0.5)
LYMPHOCYTES # BLD AUTO: 0.67 10*3/MM3 (ref 0.7–3.1)
LYMPHOCYTES NFR BLD AUTO: 5.3 % (ref 19.6–45.3)
MAGNESIUM SERPL-MCNC: 1.8 MG/DL (ref 1.6–2.4)
MCH RBC QN AUTO: 28.6 PG (ref 26.6–33)
MCHC RBC AUTO-ENTMCNC: 31.5 G/DL (ref 31.5–35.7)
MCV RBC AUTO: 90.7 FL (ref 79–97)
MONOCYTES # BLD AUTO: 0.72 10*3/MM3 (ref 0.1–0.9)
MONOCYTES NFR BLD AUTO: 5.7 % (ref 5–12)
NEUTROPHILS NFR BLD AUTO: 11.09 10*3/MM3 (ref 1.7–7)
NEUTROPHILS NFR BLD AUTO: 87.2 % (ref 42.7–76)
NRBC BLD AUTO-RTO: 0 /100 WBC (ref 0–0.2)
PHOSPHATE SERPL-MCNC: 2.8 MG/DL (ref 2.5–4.5)
PLATELET # BLD AUTO: 165 10*3/MM3 (ref 140–450)
PMV BLD AUTO: 9.8 FL (ref 6–12)
POTASSIUM SERPL-SCNC: 4.1 MMOL/L (ref 3.5–5.2)
PROT SERPL-MCNC: 4.7 G/DL (ref 6–8.5)
RBC # BLD AUTO: 2.8 10*6/MM3 (ref 4.14–5.8)
SODIUM SERPL-SCNC: 134 MMOL/L (ref 136–145)
WBC NRBC COR # BLD AUTO: 12.72 10*3/MM3 (ref 3.4–10.8)

## 2025-08-11 PROCEDURE — P9047 ALBUMIN (HUMAN), 25%, 50ML: HCPCS | Performed by: NURSE PRACTITIONER

## 2025-08-11 PROCEDURE — 25010000002 MAGNESIUM SULFATE 4 GM/100ML SOLUTION: Performed by: INTERNAL MEDICINE

## 2025-08-11 PROCEDURE — 99232 SBSQ HOSP IP/OBS MODERATE 35: CPT | Performed by: INTERNAL MEDICINE

## 2025-08-11 PROCEDURE — 25010000002 ALBUMIN HUMAN 25% PER 50 ML: Performed by: NURSE PRACTITIONER

## 2025-08-11 PROCEDURE — 85025 COMPLETE CBC W/AUTO DIFF WBC: CPT | Performed by: NURSE PRACTITIONER

## 2025-08-11 PROCEDURE — 94799 UNLISTED PULMONARY SVC/PX: CPT

## 2025-08-11 PROCEDURE — 94664 DEMO&/EVAL PT USE INHALER: CPT

## 2025-08-11 PROCEDURE — 97110 THERAPEUTIC EXERCISES: CPT

## 2025-08-11 PROCEDURE — 97530 THERAPEUTIC ACTIVITIES: CPT

## 2025-08-11 PROCEDURE — 80053 COMPREHEN METABOLIC PANEL: CPT | Performed by: NURSE PRACTITIONER

## 2025-08-11 PROCEDURE — 82948 REAGENT STRIP/BLOOD GLUCOSE: CPT

## 2025-08-11 PROCEDURE — 84100 ASSAY OF PHOSPHORUS: CPT | Performed by: NURSE PRACTITIONER

## 2025-08-11 PROCEDURE — 83735 ASSAY OF MAGNESIUM: CPT | Performed by: NURSE PRACTITIONER

## 2025-08-11 PROCEDURE — 82330 ASSAY OF CALCIUM: CPT | Performed by: INTERNAL MEDICINE

## 2025-08-11 PROCEDURE — 97551 CAREGIVER TRAING EA ADDL 15: CPT

## 2025-08-11 PROCEDURE — 94761 N-INVAS EAR/PLS OXIMETRY MLT: CPT

## 2025-08-11 PROCEDURE — 25010000002 BUMETANIDE PER 0.5 MG: Performed by: INTERNAL MEDICINE

## 2025-08-11 RX ORDER — SODIUM BICARBONATE 650 MG/1
650 TABLET ORAL 3 TIMES DAILY
Qty: 90 TABLET | Refills: 1 | Status: SHIPPED | OUTPATIENT
Start: 2025-08-11

## 2025-08-11 RX ORDER — OXYCODONE HYDROCHLORIDE 5 MG/1
5 TABLET ORAL EVERY 4 HOURS PRN
Qty: 20 TABLET | Refills: 0 | Status: SHIPPED | OUTPATIENT
Start: 2025-08-11 | End: 2025-09-10

## 2025-08-11 RX ORDER — HYDROCORTISONE 10 MG/1
TABLET ORAL
Qty: 49 TABLET | Refills: 0 | Status: SHIPPED | OUTPATIENT
Start: 2025-08-11 | End: 2025-09-22

## 2025-08-11 RX ORDER — ALBUMIN (HUMAN) 12.5 G/50ML
25 SOLUTION INTRAVENOUS ONCE
Status: COMPLETED | OUTPATIENT
Start: 2025-08-11 | End: 2025-08-11

## 2025-08-11 RX ORDER — MAGNESIUM SULFATE HEPTAHYDRATE 40 MG/ML
4 INJECTION, SOLUTION INTRAVENOUS ONCE
Status: COMPLETED | OUTPATIENT
Start: 2025-08-11 | End: 2025-08-11

## 2025-08-11 RX ORDER — AMIODARONE HYDROCHLORIDE 200 MG/1
200 TABLET ORAL EVERY 12 HOURS SCHEDULED
Qty: 60 TABLET | Refills: 5 | Status: SHIPPED | OUTPATIENT
Start: 2025-08-11

## 2025-08-11 RX ORDER — LEVOTHYROXINE SODIUM 112 UG/1
112 TABLET ORAL
Qty: 90 TABLET | Refills: 1 | Status: SHIPPED | OUTPATIENT
Start: 2025-08-12

## 2025-08-11 RX ORDER — BUMETANIDE 1 MG/1
0.5 TABLET ORAL 2 TIMES DAILY
Qty: 90 TABLET | Refills: 1 | Status: SHIPPED | OUTPATIENT
Start: 2025-08-11

## 2025-08-11 RX ORDER — ACETAMINOPHEN 325 MG/1
650 TABLET ORAL EVERY 4 HOURS PRN
Start: 2025-08-11

## 2025-08-11 RX ORDER — VANCOMYCIN HYDROCHLORIDE 125 MG/1
500 CAPSULE ORAL 4 TIMES DAILY
Qty: 40 CAPSULE | Refills: 0 | Status: SHIPPED | OUTPATIENT
Start: 2025-08-11

## 2025-08-11 RX ORDER — ALLOPURINOL 100 MG/1
100 TABLET ORAL DAILY
Qty: 90 TABLET | Refills: 1 | Status: SHIPPED | OUTPATIENT
Start: 2025-08-12

## 2025-08-11 RX ORDER — SACCHAROMYCES BOULARDII 250 MG
250 CAPSULE ORAL 2 TIMES DAILY
Qty: 60 CAPSULE | Refills: 3 | Status: SHIPPED | OUTPATIENT
Start: 2025-08-11

## 2025-08-11 RX ORDER — METOLAZONE 5 MG/1
5 TABLET ORAL ONCE
Status: DISCONTINUED | OUTPATIENT
Start: 2025-08-11 | End: 2025-08-11

## 2025-08-11 RX ORDER — FAMOTIDINE 20 MG/1
20 TABLET, FILM COATED ORAL DAILY
Qty: 90 TABLET | Refills: 3 | Status: SHIPPED | OUTPATIENT
Start: 2025-08-12

## 2025-08-11 RX ORDER — METOPROLOL TARTRATE 25 MG/1
25 TABLET, FILM COATED ORAL EVERY 12 HOURS SCHEDULED
Qty: 180 TABLET | Refills: 1 | Status: SHIPPED | OUTPATIENT
Start: 2025-08-11

## 2025-08-11 RX ADMIN — BUMETANIDE 1 MG: 0.25 INJECTION INTRAMUSCULAR; INTRAVENOUS at 02:06

## 2025-08-11 RX ADMIN — BUMETANIDE 1 MG: 0.25 INJECTION INTRAMUSCULAR; INTRAVENOUS at 12:43

## 2025-08-11 RX ADMIN — SODIUM BICARBONATE 650 MG: 650 TABLET ORAL at 09:10

## 2025-08-11 RX ADMIN — BUDESONIDE AND FORMOTEROL FUMARATE DIHYDRATE 2 PUFF: 160; 4.5 AEROSOL RESPIRATORY (INHALATION) at 08:00

## 2025-08-11 RX ADMIN — FAMOTIDINE 20 MG: 20 TABLET, FILM COATED ORAL at 09:10

## 2025-08-11 RX ADMIN — Medication 250 MG: at 09:10

## 2025-08-11 RX ADMIN — ALBUMIN HUMAN 25 G: 0.25 SOLUTION INTRAVENOUS at 12:43

## 2025-08-11 RX ADMIN — MAGNESIUM SULFATE IN WATER FOR 4 G: 40 INJECTION INTRAVENOUS at 09:09

## 2025-08-11 RX ADMIN — LEVOTHYROXINE SODIUM 137 MCG: 0.03 TABLET ORAL at 05:51

## 2025-08-11 RX ADMIN — VANCOMYCIN HYDROCHLORIDE 500 MG: 250 POWDER, FOR SOLUTION ORAL at 12:43

## 2025-08-11 RX ADMIN — GUAIFENESIN 600 MG: 600 TABLET, EXTENDED RELEASE ORAL at 09:10

## 2025-08-11 RX ADMIN — AMIODARONE HYDROCHLORIDE 200 MG: 200 TABLET ORAL at 09:10

## 2025-08-11 RX ADMIN — HYDROCORTISONE 10 MG: 10 TABLET ORAL at 09:10

## 2025-08-11 RX ADMIN — VANCOMYCIN HYDROCHLORIDE 500 MG: 250 POWDER, FOR SOLUTION ORAL at 02:06

## 2025-08-11 RX ADMIN — VANCOMYCIN HYDROCHLORIDE 500 MG: 250 POWDER, FOR SOLUTION ORAL at 05:51

## 2025-08-11 RX ADMIN — ALLOPURINOL 100 MG: 100 TABLET ORAL at 09:10

## 2025-08-11 RX ADMIN — METOPROLOL TARTRATE 25 MG: 25 TABLET, FILM COATED ORAL at 09:10

## 2025-08-12 ENCOUNTER — READMISSION MANAGEMENT (OUTPATIENT)
Dept: CALL CENTER | Facility: HOSPITAL | Age: 75
End: 2025-08-12
Payer: MEDICARE

## 2025-08-13 ENCOUNTER — TELEPHONE (OUTPATIENT)
Dept: ONCOLOGY | Facility: CLINIC | Age: 75
End: 2025-08-13
Payer: MEDICARE

## 2025-08-15 ENCOUNTER — LAB REQUISITION (OUTPATIENT)
Dept: LAB | Facility: HOSPITAL | Age: 75
End: 2025-08-15
Payer: MEDICARE

## 2025-08-15 ENCOUNTER — READMISSION MANAGEMENT (OUTPATIENT)
Dept: CALL CENTER | Facility: HOSPITAL | Age: 75
End: 2025-08-15
Payer: MEDICARE

## 2025-08-15 DIAGNOSIS — J90 PLEURAL EFFUSION, NOT ELSEWHERE CLASSIFIED: ICD-10-CM

## 2025-08-15 DIAGNOSIS — C34.91 MALIGNANT NEOPLASM OF UNSPECIFIED PART OF RIGHT BRONCHUS OR LUNG: ICD-10-CM

## 2025-08-15 LAB
ANION GAP SERPL CALCULATED.3IONS-SCNC: 10.7 MMOL/L (ref 5–15)
BUN SERPL-MCNC: 20 MG/DL (ref 8–23)
BUN/CREAT SERPL: 15.5 (ref 7–25)
CALCIUM SPEC-SCNC: 8.4 MG/DL (ref 8.6–10.5)
CHLORIDE SERPL-SCNC: 99 MMOL/L (ref 98–107)
CO2 SERPL-SCNC: 27.3 MMOL/L (ref 22–29)
CREAT SERPL-MCNC: 1.29 MG/DL (ref 0.76–1.27)
EGFRCR SERPLBLD CKD-EPI 2021: 57.8 ML/MIN/1.73
GLUCOSE SERPL-MCNC: 129 MG/DL (ref 65–99)
POTASSIUM SERPL-SCNC: 3.9 MMOL/L (ref 3.5–5.2)
SODIUM SERPL-SCNC: 137 MMOL/L (ref 136–145)

## 2025-08-15 PROCEDURE — 80048 BASIC METABOLIC PNL TOTAL CA: CPT | Performed by: NURSE PRACTITIONER

## 2025-08-18 ENCOUNTER — PATIENT OUTREACH (OUTPATIENT)
Dept: ONCOLOGY | Facility: CLINIC | Age: 75
End: 2025-08-18
Payer: MEDICARE

## 2025-08-21 ENCOUNTER — HOSPITAL ENCOUNTER (OUTPATIENT)
Dept: ONCOLOGY | Facility: HOSPITAL | Age: 75
Discharge: HOME | End: 2025-08-21
Admitting: INTERNAL MEDICINE
Payer: MEDICARE

## 2025-08-21 ENCOUNTER — OFFICE VISIT (OUTPATIENT)
Dept: ONCOLOGY | Facility: CLINIC | Age: 75
End: 2025-08-21
Payer: MEDICARE

## 2025-08-21 VITALS
BODY MASS INDEX: 25.17 KG/M2 | TEMPERATURE: 98.6 F | SYSTOLIC BLOOD PRESSURE: 144 MMHG | RESPIRATION RATE: 18 BRPM | HEIGHT: 70 IN | WEIGHT: 175.8 LBS | DIASTOLIC BLOOD PRESSURE: 75 MMHG | HEART RATE: 81 BPM | OXYGEN SATURATION: 90 %

## 2025-08-21 DIAGNOSIS — C34.92 STAGE IV ADENOCARCINOMA OF LUNG, LEFT: ICD-10-CM

## 2025-08-21 DIAGNOSIS — C34.91 STAGE IV ADENOCARCINOMA OF LUNG, RIGHT: Primary | ICD-10-CM

## 2025-08-21 LAB
ALBUMIN SERPL-MCNC: 3.4 G/DL (ref 3.5–5.2)
ALBUMIN/GLOB SERPL: 1.8 G/DL
ALP SERPL-CCNC: 91 U/L (ref 39–117)
ALT SERPL W P-5'-P-CCNC: 7 U/L (ref 1–41)
ANION GAP SERPL CALCULATED.3IONS-SCNC: 10.8 MMOL/L (ref 5–15)
AST SERPL-CCNC: 26 U/L (ref 1–40)
BASOPHILS # BLD AUTO: 0.04 10*3/MM3 (ref 0–0.2)
BASOPHILS NFR BLD AUTO: 0.3 % (ref 0–1.5)
BILIRUB SERPL-MCNC: 0.5 MG/DL (ref 0–1.2)
BUN SERPL-MCNC: 14.7 MG/DL (ref 8–23)
BUN/CREAT SERPL: 9.8 (ref 7–25)
CALCIUM SPEC-SCNC: 8.9 MG/DL (ref 8.6–10.5)
CHLORIDE SERPL-SCNC: 99 MMOL/L (ref 98–107)
CO2 SERPL-SCNC: 26.2 MMOL/L (ref 22–29)
CREAT SERPL-MCNC: 1.5 MG/DL (ref 0.76–1.27)
DEPRECATED RDW RBC AUTO: 64.5 FL (ref 37–54)
EGFRCR SERPLBLD CKD-EPI 2021: 48.2 ML/MIN/1.73
EOSINOPHIL # BLD AUTO: 0.32 10*3/MM3 (ref 0–0.4)
EOSINOPHIL NFR BLD AUTO: 2.2 % (ref 0.3–6.2)
ERYTHROCYTE [DISTWIDTH] IN BLOOD BY AUTOMATED COUNT: 18.8 % (ref 12.3–15.4)
GLOBULIN UR ELPH-MCNC: 1.9 GM/DL
GLUCOSE SERPL-MCNC: 106 MG/DL (ref 65–99)
HCT VFR BLD AUTO: 27.2 % (ref 37.5–51)
HGB BLD-MCNC: 8.9 G/DL (ref 13–17.7)
IMM GRANULOCYTES # BLD AUTO: 0.03 10*3/MM3 (ref 0–0.05)
IMM GRANULOCYTES NFR BLD AUTO: 0.2 % (ref 0–0.5)
LYMPHOCYTES # BLD AUTO: 0.64 10*3/MM3 (ref 0.7–3.1)
LYMPHOCYTES NFR BLD AUTO: 4.3 % (ref 19.6–45.3)
MCH RBC QN AUTO: 30.8 PG (ref 26.6–33)
MCHC RBC AUTO-ENTMCNC: 32.7 G/DL (ref 31.5–35.7)
MCV RBC AUTO: 94.1 FL (ref 79–97)
MONOCYTES # BLD AUTO: 0.84 10*3/MM3 (ref 0.1–0.9)
MONOCYTES NFR BLD AUTO: 5.7 % (ref 5–12)
NEUTROPHILS NFR BLD AUTO: 12.95 10*3/MM3 (ref 1.7–7)
NEUTROPHILS NFR BLD AUTO: 87.3 % (ref 42.7–76)
PLATELET # BLD AUTO: 282 10*3/MM3 (ref 140–450)
PMV BLD AUTO: 8.8 FL (ref 6–12)
POTASSIUM SERPL-SCNC: 3.9 MMOL/L (ref 3.5–5.2)
PROT SERPL-MCNC: 5.3 G/DL (ref 6–8.5)
RBC # BLD AUTO: 2.89 10*6/MM3 (ref 4.14–5.8)
SODIUM SERPL-SCNC: 136 MMOL/L (ref 136–145)
WBC NRBC COR # BLD AUTO: 14.82 10*3/MM3 (ref 3.4–10.8)

## 2025-08-21 PROCEDURE — 1160F RVW MEDS BY RX/DR IN RCRD: CPT | Performed by: INTERNAL MEDICINE

## 2025-08-21 PROCEDURE — 1126F AMNT PAIN NOTED NONE PRSNT: CPT | Performed by: INTERNAL MEDICINE

## 2025-08-21 PROCEDURE — 1159F MED LIST DOCD IN RCRD: CPT | Performed by: INTERNAL MEDICINE

## 2025-08-21 PROCEDURE — 99214 OFFICE O/P EST MOD 30 MIN: CPT | Performed by: INTERNAL MEDICINE

## 2025-08-21 PROCEDURE — 85025 COMPLETE CBC W/AUTO DIFF WBC: CPT | Performed by: INTERNAL MEDICINE

## 2025-08-21 PROCEDURE — 36592 COLLECT BLOOD FROM PICC: CPT

## 2025-08-21 RX ORDER — HEPARIN SODIUM (PORCINE) LOCK FLUSH IV SOLN 100 UNIT/ML 100 UNIT/ML
500 SOLUTION INTRAVENOUS AS NEEDED
Status: DISCONTINUED | OUTPATIENT
Start: 2025-08-21 | End: 2025-08-22 | Stop reason: HOSPADM

## 2025-08-21 RX ORDER — SODIUM CHLORIDE 0.9 % (FLUSH) 0.9 %
10 SYRINGE (ML) INJECTION AS NEEDED
Status: CANCELLED | OUTPATIENT
Start: 2025-08-21

## 2025-08-21 RX ORDER — HEPARIN SODIUM (PORCINE) LOCK FLUSH IV SOLN 100 UNIT/ML 100 UNIT/ML
500 SOLUTION INTRAVENOUS AS NEEDED
OUTPATIENT
Start: 2025-08-21

## 2025-08-21 RX ORDER — SODIUM CHLORIDE 0.9 % (FLUSH) 0.9 %
10 SYRINGE (ML) INJECTION AS NEEDED
Status: DISCONTINUED | OUTPATIENT
Start: 2025-08-21 | End: 2025-08-22 | Stop reason: HOSPADM

## 2025-08-21 RX ADMIN — Medication 10 ML: at 10:32

## 2025-08-22 ENCOUNTER — HOSPITAL ENCOUNTER (OUTPATIENT)
Dept: ONCOLOGY | Facility: HOSPITAL | Age: 75
Discharge: HOME | End: 2025-08-22
Payer: MEDICARE

## 2025-08-22 VITALS
BODY MASS INDEX: 25.03 KG/M2 | OXYGEN SATURATION: 92 % | HEART RATE: 97 BPM | SYSTOLIC BLOOD PRESSURE: 130 MMHG | DIASTOLIC BLOOD PRESSURE: 82 MMHG | RESPIRATION RATE: 20 BRPM | TEMPERATURE: 97 F | WEIGHT: 174.8 LBS | HEIGHT: 70 IN

## 2025-08-22 DIAGNOSIS — C34.91 STAGE IV ADENOCARCINOMA OF LUNG, RIGHT: Primary | ICD-10-CM

## 2025-08-22 DIAGNOSIS — R80.9 PROTEINURIA, UNSPECIFIED TYPE: ICD-10-CM

## 2025-08-22 LAB
BILIRUB UR QL STRIP: NEGATIVE
CLARITY UR: ABNORMAL
COLOR UR: YELLOW
GLUCOSE UR STRIP-MCNC: NEGATIVE MG/DL
HGB UR QL STRIP.AUTO: NEGATIVE
KETONES UR QL STRIP: NEGATIVE
LEUKOCYTE ESTERASE UR QL STRIP.AUTO: ABNORMAL
NITRITE UR QL STRIP: NEGATIVE
PH UR STRIP.AUTO: 8.5 [PH] (ref 5–8)
PROT UR QL STRIP: ABNORMAL
SP GR UR STRIP: 1.02 (ref 1–1.03)
UROBILINOGEN UR QL STRIP: ABNORMAL

## 2025-08-22 PROCEDURE — 25010000002 RAMUCIRUMAB 500 MG/50ML SOLUTION 50 ML VIAL: Performed by: INTERNAL MEDICINE

## 2025-08-22 PROCEDURE — 25810000003 SODIUM CHLORIDE 0.9 % SOLUTION: Performed by: INTERNAL MEDICINE

## 2025-08-22 PROCEDURE — 25810000003 SODIUM CHLORIDE 0.9 % SOLUTION 250 ML FLEX CONT: Performed by: INTERNAL MEDICINE

## 2025-08-22 PROCEDURE — 96417 CHEMO IV INFUS EACH ADDL SEQ: CPT

## 2025-08-22 PROCEDURE — 96413 CHEMO IV INFUSION 1 HR: CPT

## 2025-08-22 PROCEDURE — 25010000002 RAMUCIRUMAB 100 MG/10ML SOLUTION 10 ML VIAL: Performed by: INTERNAL MEDICINE

## 2025-08-22 PROCEDURE — 96375 TX/PRO/DX INJ NEW DRUG ADDON: CPT

## 2025-08-22 PROCEDURE — 25010000002 DEXAMETHASONE PER 1 MG: Performed by: INTERNAL MEDICINE

## 2025-08-22 PROCEDURE — 81003 URINALYSIS AUTO W/O SCOPE: CPT | Performed by: INTERNAL MEDICINE

## 2025-08-22 PROCEDURE — 25010000002 DOCETAXEL 20 MG/ML SOLUTION 8 ML VIAL: Performed by: INTERNAL MEDICINE

## 2025-08-22 PROCEDURE — 25010000002 DIPHENHYDRAMINE PER 50 MG: Performed by: INTERNAL MEDICINE

## 2025-08-22 RX ORDER — SODIUM CHLORIDE 9 MG/ML
20 INJECTION, SOLUTION INTRAVENOUS ONCE
Status: CANCELLED | OUTPATIENT
Start: 2025-08-22

## 2025-08-22 RX ORDER — DEXAMETHASONE SODIUM PHOSPHATE 4 MG/ML
8 INJECTION, SOLUTION INTRA-ARTICULAR; INTRALESIONAL; INTRAMUSCULAR; INTRAVENOUS; SOFT TISSUE ONCE
Status: COMPLETED | OUTPATIENT
Start: 2025-08-22 | End: 2025-08-22

## 2025-08-22 RX ORDER — SODIUM CHLORIDE 0.9 % (FLUSH) 0.9 %
10 SYRINGE (ML) INJECTION AS NEEDED
OUTPATIENT
Start: 2025-08-22

## 2025-08-22 RX ORDER — SODIUM CHLORIDE 9 MG/ML
20 INJECTION, SOLUTION INTRAVENOUS ONCE
Status: COMPLETED | OUTPATIENT
Start: 2025-08-22 | End: 2025-08-22

## 2025-08-22 RX ORDER — FAMOTIDINE 10 MG/ML
20 INJECTION, SOLUTION INTRAVENOUS AS NEEDED
Status: CANCELLED | OUTPATIENT
Start: 2025-08-22

## 2025-08-22 RX ORDER — DIPHENHYDRAMINE HYDROCHLORIDE 50 MG/ML
25 INJECTION, SOLUTION INTRAMUSCULAR; INTRAVENOUS ONCE
Status: COMPLETED | OUTPATIENT
Start: 2025-08-22 | End: 2025-08-22

## 2025-08-22 RX ORDER — SODIUM CHLORIDE 0.9 % (FLUSH) 0.9 %
10 SYRINGE (ML) INJECTION AS NEEDED
Status: DISCONTINUED | OUTPATIENT
Start: 2025-08-22 | End: 2025-08-23 | Stop reason: HOSPADM

## 2025-08-22 RX ORDER — DIPHENHYDRAMINE HYDROCHLORIDE 50 MG/ML
50 INJECTION, SOLUTION INTRAMUSCULAR; INTRAVENOUS AS NEEDED
Status: CANCELLED | OUTPATIENT
Start: 2025-08-22

## 2025-08-22 RX ORDER — HEPARIN SODIUM (PORCINE) LOCK FLUSH IV SOLN 100 UNIT/ML 100 UNIT/ML
500 SOLUTION INTRAVENOUS AS NEEDED
OUTPATIENT
Start: 2025-08-22

## 2025-08-22 RX ORDER — HYDROCORTISONE SODIUM SUCCINATE 100 MG/2ML
100 INJECTION INTRAMUSCULAR; INTRAVENOUS AS NEEDED
Status: CANCELLED | OUTPATIENT
Start: 2025-08-22

## 2025-08-22 RX ADMIN — DOCETAXEL 130 MG: 20 INJECTION, SOLUTION, CONCENTRATE INTRAVENOUS at 09:39

## 2025-08-22 RX ADMIN — DIPHENHYDRAMINE HYDROCHLORIDE 25 MG: 50 INJECTION INTRAMUSCULAR; INTRAVENOUS at 08:48

## 2025-08-22 RX ADMIN — DEXAMETHASONE SODIUM PHOSPHATE 8 MG: 4 INJECTION INTRA-ARTICULAR; INTRALESIONAL; INTRAMUSCULAR; INTRAVENOUS; SOFT TISSUE at 08:49

## 2025-08-22 RX ADMIN — SODIUM CHLORIDE 20 ML/HR: 9 INJECTION, SOLUTION INTRAVENOUS at 08:46

## 2025-08-22 RX ADMIN — SODIUM CHLORIDE 770 MG: 9 INJECTION, SOLUTION INTRAVENOUS at 09:05

## 2025-08-25 ENCOUNTER — LAB (OUTPATIENT)
Dept: LAB | Facility: HOSPITAL | Age: 75
End: 2025-08-25
Payer: MEDICARE

## 2025-08-25 DIAGNOSIS — R80.9 PROTEINURIA, UNSPECIFIED TYPE: ICD-10-CM

## 2025-08-25 LAB
COLLECT DURATION TIME UR: 24 HRS
PROT 24H UR-MRATE: 389.5 MG/24HOURS (ref 0–150)
SPECIMEN VOL 24H UR: 1900 ML

## 2025-08-25 PROCEDURE — 84156 ASSAY OF PROTEIN URINE: CPT | Performed by: INTERNAL MEDICINE

## 2025-08-25 PROCEDURE — 81050 URINALYSIS VOLUME MEASURE: CPT | Performed by: INTERNAL MEDICINE

## 2025-08-28 ENCOUNTER — READMISSION MANAGEMENT (OUTPATIENT)
Dept: CALL CENTER | Facility: HOSPITAL | Age: 75
End: 2025-08-28
Payer: MEDICARE

## 2025-08-31 PROBLEM — E86.0 DEHYDRATION: Status: ACTIVE | Noted: 2025-08-31

## 2025-08-31 PROBLEM — R19.7 DIARRHEA: Status: ACTIVE | Noted: 2025-08-31

## 2025-09-01 ENCOUNTER — INPATIENT HOSPITAL (OUTPATIENT)
Dept: URBAN - METROPOLITAN AREA HOSPITAL 84 | Facility: HOSPITAL | Age: 75
End: 2025-09-01
Payer: MEDICARE

## 2025-09-01 DIAGNOSIS — E87.8 OTHER DISORDERS OF ELECTROLYTE AND FLUID BALANCE, NOT ELSEWH: ICD-10-CM

## 2025-09-01 DIAGNOSIS — A04.72 ENTEROCOLITIS DUE TO CLOSTRIDIUM DIFFICILE, NOT SPECIFIED AS: ICD-10-CM

## 2025-09-01 PROBLEM — A04.71 RECURRENT CLOSTRIDIOIDES DIFFICILE DIARRHEA: Status: ACTIVE | Noted: 2025-09-01

## 2025-09-01 PROBLEM — E87.1 HYPONATREMIA: Status: ACTIVE | Noted: 2025-09-01

## 2025-09-01 PROCEDURE — 99222 1ST HOSP IP/OBS MODERATE 55: CPT

## (undated) DEVICE — Device: Brand: ERBE

## (undated) DEVICE — ANGIO-SEAL VIP VASCULAR CLOSURE DEVICE: Brand: ANGIO-SEAL

## (undated) DEVICE — SUT MNCRYL 4/0 PS2 27IN UD MCP426H

## (undated) DEVICE — TUBING, SUCTION, 1/4" X 12', STRAIGHT: Brand: MEDLINE

## (undated) DEVICE — VISION PROBE: Brand: ION

## (undated) DEVICE — Device: Brand: ION

## (undated) DEVICE — DRSNG SURESITE123 6X8IN

## (undated) DEVICE — BAPTIST FLOYD BRONCHOSCOPY: Brand: MEDLINE INDUSTRIES, INC.

## (undated) DEVICE — SINGLE-USE BIOPSY FORCEPS: Brand: RADIAL JAW 4

## (undated) DEVICE — SUT MNCRYL PLS ANTIB UD 4/0 PS2 18IN

## (undated) DEVICE — SUT VIC 3/0 CT2 27IN J232H

## (undated) DEVICE — 3M™ IOBAN™ 2 ANTIMICROBIAL INCISE DRAPE 6650EZ: Brand: IOBAN™ 2

## (undated) DEVICE — DEV INFL CRE STERIFLATE 60CC DISP

## (undated) DEVICE — CATH DIAG IMPULSE FL4 6F 100CM

## (undated) DEVICE — PK TRY HEART CATH 50

## (undated) DEVICE — CATHETER: Brand: ION

## (undated) DEVICE — DECANTER: Brand: UNBRANDED

## (undated) DEVICE — ELECTRD DEFIB M/FUNC PROPADZ RADIOL 2PK

## (undated) DEVICE — C-ARM: Brand: UNBRANDED

## (undated) DEVICE — BITEBLOCK ENDO W/STRAP 60F A/ LF DISP

## (undated) DEVICE — CATH DIAG IMPULSE PIG .056 6F 110CM

## (undated) DEVICE — PK MINOR LAPAROTOMY 50

## (undated) DEVICE — SWIVEL CONNECTOR

## (undated) DEVICE — PK ENDO GI 50

## (undated) DEVICE — KT CATH DRN PLEURL PLEURX/SAFE/T SIL 15.5F 66CM 4BT/1000ML

## (undated) DEVICE — VISION PROBE ADAPTER AND SUCTION ADAPTER

## (undated) DEVICE — BALN DIL ESOPH CRE CONTRL RADL 15/18MM 8CM BX5

## (undated) DEVICE — GW DIAG EMERALD HEPCOAT MOVE JTIP STD .035 3MM 150CM

## (undated) DEVICE — PINNACLE INTRODUCER SHEATH: Brand: PINNACLE

## (undated) DEVICE — COVER,MAYO STAND,STERILE: Brand: MEDLINE

## (undated) DEVICE — KT SURG TURNOVER 050

## (undated) DEVICE — CATHETER GUIDE

## (undated) DEVICE — SNAR POLYP HOTSNARE/BRAIDED OVL/MINI 7F 2.8X10MM 230CM 1P/U

## (undated) DEVICE — CATH DIAG IMPULSE FR4 6F 100CM

## (undated) DEVICE — OASIS DRAIN, SINGLE, INLINE & ATS COMPATIBLE: Brand: OASIS

## (undated) DEVICE — ESOPHAGEAL BALLOON DILATATION CATHETER: Brand: CRE FIXED WIRE

## (undated) DEVICE — PENCL HND ROCKRSWTCH HOLSTR EZ CLEAN TP CRD 10FT

## (undated) DEVICE — ANESTH CIRCUIT 60IN 3LTR-LF: Brand: MEDLINE INDUSTRIES, INC.

## (undated) DEVICE — DBD-DRAPE,LAP,CHOLE,W/TROUGHS,STERILE: Brand: MEDLINE

## (undated) DEVICE — GOWN,SIRUS,POLYRNF,BRTHSLV,LG,30/CS: Brand: MEDLINE

## (undated) DEVICE — GLV SURG BIOGEL LTX PF 6